# Patient Record
Sex: FEMALE | Race: ASIAN | NOT HISPANIC OR LATINO
[De-identification: names, ages, dates, MRNs, and addresses within clinical notes are randomized per-mention and may not be internally consistent; named-entity substitution may affect disease eponyms.]

---

## 2017-01-31 ENCOUNTER — APPOINTMENT (OUTPATIENT)
Dept: PULMONOLOGY | Facility: CLINIC | Age: 77
End: 2017-01-31

## 2017-09-05 ENCOUNTER — APPOINTMENT (OUTPATIENT)
Dept: OPHTHALMOLOGY | Facility: CLINIC | Age: 77
End: 2017-09-05

## 2017-12-19 ENCOUNTER — INPATIENT (INPATIENT)
Facility: HOSPITAL | Age: 77
LOS: 2 days | Discharge: HOME HEALTH | End: 2017-12-22
Attending: INTERNAL MEDICINE | Admitting: INTERNAL MEDICINE
Payer: MEDICARE

## 2017-12-19 VITALS
HEART RATE: 105 BPM | TEMPERATURE: 99 F | OXYGEN SATURATION: 100 % | SYSTOLIC BLOOD PRESSURE: 178 MMHG | DIASTOLIC BLOOD PRESSURE: 88 MMHG | RESPIRATION RATE: 16 BRPM

## 2017-12-19 DIAGNOSIS — I21.4 NON-ST ELEVATION (NSTEMI) MYOCARDIAL INFARCTION: ICD-10-CM

## 2017-12-19 LAB
ALBUMIN SERPL ELPH-MCNC: 3.6 G/DL — SIGNIFICANT CHANGE UP (ref 3.3–5)
ALP SERPL-CCNC: 158 U/L — HIGH (ref 40–120)
ALT FLD-CCNC: 30 U/L — SIGNIFICANT CHANGE UP (ref 4–33)
APTT BLD: 32.4 SEC — SIGNIFICANT CHANGE UP (ref 27.5–37.4)
AST SERPL-CCNC: 40 U/L — HIGH (ref 4–32)
BASE EXCESS BLDV CALC-SCNC: 3.6 MMOL/L — SIGNIFICANT CHANGE UP
BASOPHILS # BLD AUTO: 0.07 K/UL — SIGNIFICANT CHANGE UP (ref 0–0.2)
BASOPHILS NFR BLD AUTO: 0.5 % — SIGNIFICANT CHANGE UP (ref 0–2)
BILIRUB SERPL-MCNC: 0.2 MG/DL — SIGNIFICANT CHANGE UP (ref 0.2–1.2)
BLOOD GAS VENOUS - CREATININE: 0.99 MG/DL — SIGNIFICANT CHANGE UP (ref 0.5–1.3)
BUN SERPL-MCNC: 15 MG/DL — SIGNIFICANT CHANGE UP (ref 7–23)
CALCIUM SERPL-MCNC: 8.1 MG/DL — LOW (ref 8.4–10.5)
CHLORIDE BLDV-SCNC: 102 MMOL/L — SIGNIFICANT CHANGE UP (ref 96–108)
CHLORIDE SERPL-SCNC: 99 MMOL/L — SIGNIFICANT CHANGE UP (ref 98–107)
CK MB BLD-MCNC: 3.4 — HIGH (ref 0–2.5)
CK MB BLD-MCNC: 6.93 NG/ML — HIGH (ref 1–4.7)
CK SERPL-CCNC: 201 U/L — HIGH (ref 25–170)
CO2 SERPL-SCNC: 27 MMOL/L — SIGNIFICANT CHANGE UP (ref 22–31)
CREAT SERPL-MCNC: 1.15 MG/DL — SIGNIFICANT CHANGE UP (ref 0.5–1.3)
EOSINOPHIL # BLD AUTO: 0.09 K/UL — SIGNIFICANT CHANGE UP (ref 0–0.5)
EOSINOPHIL NFR BLD AUTO: 0.6 % — SIGNIFICANT CHANGE UP (ref 0–6)
GAS PNL BLDV: 135 MMOL/L — LOW (ref 136–146)
GLUCOSE BLDV-MCNC: 255 — HIGH (ref 70–99)
GLUCOSE SERPL-MCNC: 262 MG/DL — HIGH (ref 70–99)
HCO3 BLDV-SCNC: 26 MMOL/L — SIGNIFICANT CHANGE UP (ref 20–27)
HCT VFR BLD CALC: 29.5 % — LOW (ref 34.5–45)
HCT VFR BLDV CALC: 26.9 % — LOW (ref 34.5–45)
HGB BLD-MCNC: 8.5 G/DL — LOW (ref 11.5–15.5)
HGB BLDV-MCNC: 8.7 G/DL — LOW (ref 11.5–15.5)
IMM GRANULOCYTES # BLD AUTO: 0.09 # — SIGNIFICANT CHANGE UP
IMM GRANULOCYTES NFR BLD AUTO: 0.6 % — SIGNIFICANT CHANGE UP (ref 0–1.5)
INR BLD: 0.94 — SIGNIFICANT CHANGE UP (ref 0.88–1.17)
LACTATE BLDV-MCNC: 1 MMOL/L — SIGNIFICANT CHANGE UP (ref 0.5–2)
LYMPHOCYTES # BLD AUTO: 1.11 K/UL — SIGNIFICANT CHANGE UP (ref 1–3.3)
LYMPHOCYTES # BLD AUTO: 7.7 % — LOW (ref 13–44)
MCHC RBC-ENTMCNC: 22.5 PG — LOW (ref 27–34)
MCHC RBC-ENTMCNC: 28.8 % — LOW (ref 32–36)
MCV RBC AUTO: 78.2 FL — LOW (ref 80–100)
MONOCYTES # BLD AUTO: 0.79 K/UL — SIGNIFICANT CHANGE UP (ref 0–0.9)
MONOCYTES NFR BLD AUTO: 5.5 % — SIGNIFICANT CHANGE UP (ref 2–14)
NEUTROPHILS # BLD AUTO: 12.2 K/UL — HIGH (ref 1.8–7.4)
NEUTROPHILS NFR BLD AUTO: 85.1 % — HIGH (ref 43–77)
NRBC # FLD: 0 — SIGNIFICANT CHANGE UP
PCO2 BLDV: 60 MMHG — HIGH (ref 41–51)
PH BLDV: 7.31 PH — LOW (ref 7.32–7.43)
PLATELET # BLD AUTO: 387 K/UL — SIGNIFICANT CHANGE UP (ref 150–400)
PMV BLD: 10.9 FL — SIGNIFICANT CHANGE UP (ref 7–13)
PO2 BLDV: 33 MMHG — LOW (ref 35–40)
POTASSIUM BLDV-SCNC: 4.9 MMOL/L — HIGH (ref 3.4–4.5)
POTASSIUM SERPL-MCNC: 5.3 MMOL/L — SIGNIFICANT CHANGE UP (ref 3.5–5.3)
POTASSIUM SERPL-SCNC: 5.3 MMOL/L — SIGNIFICANT CHANGE UP (ref 3.5–5.3)
PROT SERPL-MCNC: 7.3 G/DL — SIGNIFICANT CHANGE UP (ref 6–8.3)
PROTHROM AB SERPL-ACNC: 10.8 SEC — SIGNIFICANT CHANGE UP (ref 9.8–13.1)
RBC # BLD: 3.77 M/UL — LOW (ref 3.8–5.2)
RBC # FLD: 16.5 % — HIGH (ref 10.3–14.5)
SAO2 % BLDV: 53 % — LOW (ref 60–85)
SODIUM SERPL-SCNC: 137 MMOL/L — SIGNIFICANT CHANGE UP (ref 135–145)
TROPONIN T SERPL-MCNC: 0.12 NG/ML — HIGH (ref 0–0.06)
WBC # BLD: 14.35 K/UL — HIGH (ref 3.8–10.5)
WBC # FLD AUTO: 14.35 K/UL — HIGH (ref 3.8–10.5)

## 2017-12-19 PROCEDURE — 71010: CPT | Mod: 26

## 2017-12-19 RX ORDER — IPRATROPIUM/ALBUTEROL SULFATE 18-103MCG
3 AEROSOL WITH ADAPTER (GRAM) INHALATION ONCE
Qty: 0 | Refills: 0 | Status: COMPLETED | OUTPATIENT
Start: 2017-12-19 | End: 2017-12-19

## 2017-12-19 RX ORDER — HEPARIN SODIUM 5000 [USP'U]/ML
INJECTION INTRAVENOUS; SUBCUTANEOUS
Qty: 25000 | Refills: 0 | Status: DISCONTINUED | OUTPATIENT
Start: 2017-12-19 | End: 2017-12-20

## 2017-12-19 RX ORDER — AZITHROMYCIN 500 MG/1
500 TABLET, FILM COATED ORAL ONCE
Qty: 0 | Refills: 0 | Status: COMPLETED | OUTPATIENT
Start: 2017-12-19 | End: 2017-12-19

## 2017-12-19 RX ORDER — HEPARIN SODIUM 5000 [USP'U]/ML
4000 INJECTION INTRAVENOUS; SUBCUTANEOUS EVERY 6 HOURS
Qty: 0 | Refills: 0 | Status: DISCONTINUED | OUTPATIENT
Start: 2017-12-19 | End: 2017-12-20

## 2017-12-19 RX ORDER — ASPIRIN/CALCIUM CARB/MAGNESIUM 324 MG
324 TABLET ORAL ONCE
Qty: 0 | Refills: 0 | Status: COMPLETED | OUTPATIENT
Start: 2017-12-19 | End: 2017-12-19

## 2017-12-19 RX ORDER — CEFTRIAXONE 500 MG/1
1 INJECTION, POWDER, FOR SOLUTION INTRAMUSCULAR; INTRAVENOUS ONCE
Qty: 0 | Refills: 0 | Status: COMPLETED | OUTPATIENT
Start: 2017-12-19 | End: 2017-12-19

## 2017-12-19 RX ORDER — HEPARIN SODIUM 5000 [USP'U]/ML
4000 INJECTION INTRAVENOUS; SUBCUTANEOUS ONCE
Qty: 0 | Refills: 0 | Status: COMPLETED | OUTPATIENT
Start: 2017-12-19 | End: 2017-12-19

## 2017-12-19 RX ADMIN — Medication 324 MILLIGRAM(S): at 23:32

## 2017-12-19 RX ADMIN — HEPARIN SODIUM 900 UNIT(S)/HR: 5000 INJECTION INTRAVENOUS; SUBCUTANEOUS at 23:30

## 2017-12-19 RX ADMIN — CEFTRIAXONE 100 GRAM(S): 500 INJECTION, POWDER, FOR SOLUTION INTRAMUSCULAR; INTRAVENOUS at 23:32

## 2017-12-19 RX ADMIN — Medication 3 MILLILITER(S): at 21:46

## 2017-12-19 RX ADMIN — HEPARIN SODIUM 4000 UNIT(S): 5000 INJECTION INTRAVENOUS; SUBCUTANEOUS at 23:30

## 2017-12-19 NOTE — ED PROVIDER NOTE - ATTENDING CONTRIBUTION TO CARE
77 year old pmh dm former smoker presents with 1 day of sob and cough.  symptoms have worsened despite duoneb. no fever.  intermittent CP.   + EKG changes. no prior available.  concern for pna versus copd exacerbation vs acs vs ptx.  will give duonebs, trops, get xray, likely admit

## 2017-12-19 NOTE — ED PROVIDER NOTE - NS ED MD DISPO ADMIT [facility] UNIT
Problem: Goal Outcome Summary  Goal: Goal Outcome Summary  Outcome: Improving  Pt a/ox1 with VSS and CMS with 2-3+ BLE edema, in ryan hose. Neuros with BL dementia otherwise intact. Tele SR. Up in room with 1, GB, and wk. L temple bx site CDI. R hip ecchymosis painful, tylenol/ultram given with relief. BG with no coverage needed. [place], pocket talker in use. [facility] with increased services 4/30.       TELE

## 2017-12-19 NOTE — ED PROVIDER NOTE - CARE PLAN
Principal Discharge DX:	NSTEMI (non-ST elevated myocardial infarction)  Secondary Diagnosis:	CAP (community acquired pneumonia)

## 2017-12-19 NOTE — ED ADULT NURSE NOTE - CHIEF COMPLAINT QUOTE
Pt brought in by [facility] c/o SOB since 5 pm. Pt also c/o mid sternal and behind the neck pain.  Denies cardiac history.  EKG in progress.

## 2017-12-19 NOTE — ED ADULT NURSE NOTE - OBJECTIVE STATEMENT
pt received spot 4, alert and orientedx3. c.o productive cough, shortness of breath, chest pain x2 days. denies fever dizziness nausea.  20G IV placed in left ac, labs sent. vitals noted. pt on 2L nasal cannula, tolerating well. respirations even unlabored. family at bedside. will monitor.

## 2017-12-19 NOTE — ED PROVIDER NOTE - OBJECTIVE STATEMENT
77F with hx of HTN, (?)COPD p/w sob, cough and green phlegm for the past 2 days. Patient states the sob has been worsening and came to ED for eval. Denies recent travel, fever, chills. 77F with hx of DM2, (?)COPD p/w sob, cough and green phlegm for the past 2 days. Patient states the sob has been worsening and came to ED for eval. Denies recent travel, fever, chills, dysuria. (+)LE edema, chronic as per patient.

## 2017-12-19 NOTE — ED ADULT TRIAGE NOTE - CHIEF COMPLAINT QUOTE
Pt brought in by [facility] c/o SOB since 5 pm. Pt also c/o mid sternal and behind the neck pain.  Denies cardiac history.  EKG in progress. Pt brought in by [facility] c/o SOB since 5 pm. Pt also c/o mid sternal and behind the neck pain.  Denies cardiac history.  EKG in progress.

## 2017-12-19 NOTE — ED PROVIDER NOTE - MEDICAL DECISION MAKING DETAILS
77F with cough, wheezing and sob. r/o PNA, COPD Exacerbation. check cbc, cmp, vbg, cardiac enzymes, probnp, exg, cxr. venancio,.

## 2017-12-20 DIAGNOSIS — I21.4 NON-ST ELEVATION (NSTEMI) MYOCARDIAL INFARCTION: ICD-10-CM

## 2017-12-20 DIAGNOSIS — Z29.9 ENCOUNTER FOR PROPHYLACTIC MEASURES, UNSPECIFIED: ICD-10-CM

## 2017-12-20 DIAGNOSIS — E78.5 HYPERLIPIDEMIA, UNSPECIFIED: ICD-10-CM

## 2017-12-20 DIAGNOSIS — E11.40 TYPE 2 DIABETES MELLITUS WITH DIABETIC NEUROPATHY, UNSPECIFIED: ICD-10-CM

## 2017-12-20 DIAGNOSIS — E11.65 TYPE 2 DIABETES MELLITUS WITH HYPERGLYCEMIA: ICD-10-CM

## 2017-12-20 LAB
APTT BLD: 40.8 SEC — HIGH (ref 27.5–37.4)
B PERT DNA SPEC QL NAA+PROBE: SIGNIFICANT CHANGE UP
BUN SERPL-MCNC: 16 MG/DL — SIGNIFICANT CHANGE UP (ref 7–23)
C PNEUM DNA SPEC QL NAA+PROBE: NOT DETECTED — SIGNIFICANT CHANGE UP
CALCIUM SERPL-MCNC: 8 MG/DL — LOW (ref 8.4–10.5)
CHLORIDE SERPL-SCNC: 99 MMOL/L — SIGNIFICANT CHANGE UP (ref 98–107)
CHOLEST SERPL-MCNC: 189 MG/DL — SIGNIFICANT CHANGE UP (ref 120–199)
CK MB BLD-MCNC: 17.34 NG/ML — HIGH (ref 1–4.7)
CK SERPL-CCNC: 297 U/L — HIGH (ref 25–170)
CO2 SERPL-SCNC: 26 MMOL/L — SIGNIFICANT CHANGE UP (ref 22–31)
CREAT SERPL-MCNC: 1.21 MG/DL — SIGNIFICANT CHANGE UP (ref 0.5–1.3)
FLUAV H1 2009 PAND RNA SPEC QL NAA+PROBE: NOT DETECTED — SIGNIFICANT CHANGE UP
FLUAV H1 RNA SPEC QL NAA+PROBE: NOT DETECTED — SIGNIFICANT CHANGE UP
FLUAV H3 RNA SPEC QL NAA+PROBE: NOT DETECTED — SIGNIFICANT CHANGE UP
FLUAV SUBTYP SPEC NAA+PROBE: SIGNIFICANT CHANGE UP
FLUBV RNA SPEC QL NAA+PROBE: NOT DETECTED — SIGNIFICANT CHANGE UP
GLUCOSE SERPL-MCNC: 243 MG/DL — HIGH (ref 70–99)
HADV DNA SPEC QL NAA+PROBE: NOT DETECTED — SIGNIFICANT CHANGE UP
HBA1C BLD-MCNC: 8.9 % — HIGH (ref 4–5.6)
HCOV 229E RNA SPEC QL NAA+PROBE: NOT DETECTED — SIGNIFICANT CHANGE UP
HCOV HKU1 RNA SPEC QL NAA+PROBE: NOT DETECTED — SIGNIFICANT CHANGE UP
HCOV NL63 RNA SPEC QL NAA+PROBE: NOT DETECTED — SIGNIFICANT CHANGE UP
HCOV OC43 RNA SPEC QL NAA+PROBE: NOT DETECTED — SIGNIFICANT CHANGE UP
HCT VFR BLD CALC: 27.6 % — LOW (ref 34.5–45)
HDLC SERPL-MCNC: 58 MG/DL — SIGNIFICANT CHANGE UP (ref 45–65)
HGB BLD-MCNC: 8.1 G/DL — LOW (ref 11.5–15.5)
HMPV RNA SPEC QL NAA+PROBE: NOT DETECTED — SIGNIFICANT CHANGE UP
HPIV1 RNA SPEC QL NAA+PROBE: NOT DETECTED — SIGNIFICANT CHANGE UP
HPIV2 RNA SPEC QL NAA+PROBE: NOT DETECTED — SIGNIFICANT CHANGE UP
HPIV3 RNA SPEC QL NAA+PROBE: NOT DETECTED — SIGNIFICANT CHANGE UP
HPIV4 RNA SPEC QL NAA+PROBE: NOT DETECTED — SIGNIFICANT CHANGE UP
LIPID PNL WITH DIRECT LDL SERPL: 119 MG/DL — SIGNIFICANT CHANGE UP
M PNEUMO DNA SPEC QL NAA+PROBE: NOT DETECTED — SIGNIFICANT CHANGE UP
MAGNESIUM SERPL-MCNC: 2.3 MG/DL — SIGNIFICANT CHANGE UP (ref 1.6–2.6)
MCHC RBC-ENTMCNC: 23.5 PG — LOW (ref 27–34)
MCHC RBC-ENTMCNC: 29.3 % — LOW (ref 32–36)
MCV RBC AUTO: 80 FL — SIGNIFICANT CHANGE UP (ref 80–100)
NRBC # FLD: 0 — SIGNIFICANT CHANGE UP
NT-PROBNP SERPL-SCNC: 2757 PG/ML — SIGNIFICANT CHANGE UP
PLATELET # BLD AUTO: 349 K/UL — SIGNIFICANT CHANGE UP (ref 150–400)
PMV BLD: 11.7 FL — SIGNIFICANT CHANGE UP (ref 7–13)
POTASSIUM SERPL-MCNC: 4.8 MMOL/L — SIGNIFICANT CHANGE UP (ref 3.5–5.3)
POTASSIUM SERPL-SCNC: 4.8 MMOL/L — SIGNIFICANT CHANGE UP (ref 3.5–5.3)
RBC # BLD: 3.45 M/UL — LOW (ref 3.8–5.2)
RBC # FLD: 16.4 % — HIGH (ref 10.3–14.5)
RSV RNA SPEC QL NAA+PROBE: NOT DETECTED — SIGNIFICANT CHANGE UP
RV+EV RNA SPEC QL NAA+PROBE: NOT DETECTED — SIGNIFICANT CHANGE UP
SODIUM SERPL-SCNC: 137 MMOL/L — SIGNIFICANT CHANGE UP (ref 135–145)
TRIGL SERPL-MCNC: 118 MG/DL — SIGNIFICANT CHANGE UP (ref 10–149)
TROPONIN T SERPL-MCNC: 0.39 NG/ML — HIGH (ref 0–0.06)
TSH SERPL-MCNC: 0.59 UIU/ML — SIGNIFICANT CHANGE UP (ref 0.27–4.2)
WBC # BLD: 12.82 K/UL — HIGH (ref 3.8–10.5)
WBC # FLD AUTO: 12.82 K/UL — HIGH (ref 3.8–10.5)

## 2017-12-20 PROCEDURE — 93010 ELECTROCARDIOGRAM REPORT: CPT

## 2017-12-20 PROCEDURE — 93458 L HRT ARTERY/VENTRICLE ANGIO: CPT | Mod: 26,59,GC

## 2017-12-20 PROCEDURE — 92928 PRQ TCAT PLMT NTRAC ST 1 LES: CPT | Mod: LC,GC

## 2017-12-20 PROCEDURE — 93571 IV DOP VEL&/PRESS C FLO 1ST: CPT | Mod: 26,LC,GC

## 2017-12-20 RX ORDER — DEXTROSE 50 % IN WATER 50 %
12.5 SYRINGE (ML) INTRAVENOUS ONCE
Qty: 0 | Refills: 0 | Status: DISCONTINUED | OUTPATIENT
Start: 2017-12-20 | End: 2017-12-22

## 2017-12-20 RX ORDER — CLOPIDOGREL BISULFATE 75 MG/1
75 TABLET, FILM COATED ORAL DAILY
Qty: 0 | Refills: 0 | Status: DISCONTINUED | OUTPATIENT
Start: 2017-12-21 | End: 2017-12-22

## 2017-12-20 RX ORDER — SIMVASTATIN 20 MG/1
10 TABLET, FILM COATED ORAL AT BEDTIME
Qty: 0 | Refills: 0 | Status: DISCONTINUED | OUTPATIENT
Start: 2017-12-20 | End: 2017-12-21

## 2017-12-20 RX ORDER — LEVALBUTEROL 1.25 MG/.5ML
0.63 SOLUTION, CONCENTRATE RESPIRATORY (INHALATION) EVERY 6 HOURS
Qty: 0 | Refills: 0 | Status: DISCONTINUED | OUTPATIENT
Start: 2017-12-20 | End: 2017-12-22

## 2017-12-20 RX ORDER — SODIUM CHLORIDE 9 MG/ML
1000 INJECTION, SOLUTION INTRAVENOUS
Qty: 0 | Refills: 0 | Status: DISCONTINUED | OUTPATIENT
Start: 2017-12-20 | End: 2017-12-22

## 2017-12-20 RX ORDER — DEXTROSE 50 % IN WATER 50 %
25 SYRINGE (ML) INTRAVENOUS ONCE
Qty: 0 | Refills: 0 | Status: DISCONTINUED | OUTPATIENT
Start: 2017-12-20 | End: 2017-12-22

## 2017-12-20 RX ORDER — ASPIRIN/CALCIUM CARB/MAGNESIUM 324 MG
81 TABLET ORAL DAILY
Qty: 0 | Refills: 0 | Status: DISCONTINUED | OUTPATIENT
Start: 2017-12-20 | End: 2017-12-21

## 2017-12-20 RX ORDER — INSULIN LISPRO 100/ML
VIAL (ML) SUBCUTANEOUS AT BEDTIME
Qty: 0 | Refills: 0 | Status: DISCONTINUED | OUTPATIENT
Start: 2017-12-20 | End: 2017-12-22

## 2017-12-20 RX ORDER — INSULIN LISPRO 100/ML
VIAL (ML) SUBCUTANEOUS
Qty: 0 | Refills: 0 | Status: DISCONTINUED | OUTPATIENT
Start: 2017-12-20 | End: 2017-12-22

## 2017-12-20 RX ORDER — METOPROLOL TARTRATE 50 MG
12.5 TABLET ORAL
Qty: 0 | Refills: 0 | Status: DISCONTINUED | OUTPATIENT
Start: 2017-12-20 | End: 2017-12-22

## 2017-12-20 RX ORDER — GLUCAGON INJECTION, SOLUTION 0.5 MG/.1ML
1 INJECTION, SOLUTION SUBCUTANEOUS ONCE
Qty: 0 | Refills: 0 | Status: DISCONTINUED | OUTPATIENT
Start: 2017-12-20 | End: 2017-12-22

## 2017-12-20 RX ORDER — FUROSEMIDE 40 MG
40 TABLET ORAL ONCE
Qty: 0 | Refills: 0 | Status: COMPLETED | OUTPATIENT
Start: 2017-12-20 | End: 2017-12-20

## 2017-12-20 RX ORDER — INSULIN GLARGINE 100 [IU]/ML
68 INJECTION, SOLUTION SUBCUTANEOUS AT BEDTIME
Qty: 0 | Refills: 0 | Status: DISCONTINUED | OUTPATIENT
Start: 2017-12-20 | End: 2017-12-22

## 2017-12-20 RX ORDER — DEXTROSE 50 % IN WATER 50 %
1 SYRINGE (ML) INTRAVENOUS ONCE
Qty: 0 | Refills: 0 | Status: DISCONTINUED | OUTPATIENT
Start: 2017-12-20 | End: 2017-12-22

## 2017-12-20 RX ORDER — GABAPENTIN 400 MG/1
300 CAPSULE ORAL
Qty: 0 | Refills: 0 | Status: DISCONTINUED | OUTPATIENT
Start: 2017-12-20 | End: 2017-12-22

## 2017-12-20 RX ADMIN — Medication 12.5 MILLIGRAM(S): at 18:15

## 2017-12-20 RX ADMIN — Medication 40 MILLIGRAM(S): at 08:49

## 2017-12-20 RX ADMIN — GABAPENTIN 300 MILLIGRAM(S): 400 CAPSULE ORAL at 20:35

## 2017-12-20 RX ADMIN — Medication: at 09:16

## 2017-12-20 RX ADMIN — AZITHROMYCIN 250 MILLIGRAM(S): 500 TABLET, FILM COATED ORAL at 00:32

## 2017-12-20 RX ADMIN — HEPARIN SODIUM 1050 UNIT(S)/HR: 5000 INJECTION INTRAVENOUS; SUBCUTANEOUS at 09:16

## 2017-12-20 RX ADMIN — Medication 12.5 MILLIGRAM(S): at 05:51

## 2017-12-20 RX ADMIN — GABAPENTIN 300 MILLIGRAM(S): 400 CAPSULE ORAL at 05:51

## 2017-12-20 RX ADMIN — INSULIN GLARGINE 68 UNIT(S): 100 INJECTION, SOLUTION SUBCUTANEOUS at 22:06

## 2017-12-20 RX ADMIN — Medication: at 16:18

## 2017-12-20 RX ADMIN — SIMVASTATIN 10 MILLIGRAM(S): 20 TABLET, FILM COATED ORAL at 21:40

## 2017-12-20 RX ADMIN — Medication 81 MILLIGRAM(S): at 13:19

## 2017-12-20 NOTE — H&P ADULT - NEGATIVE MUSCULOSKELETAL SYMPTOMS
no joint swelling/no arthritis/no muscle weakness/no stiffness/no arthralgia/no myalgia/no muscle cramps/no neck pain

## 2017-12-20 NOTE — H&P ADULT - NEGATIVE ENMT SYMPTOMS
no ear pain/no vertigo/no tinnitus/no nasal discharge/no nasal congestion/no hearing difficulty/no sinus symptoms

## 2017-12-20 NOTE — H&P ADULT - HISTORY OF PRESENT ILLNESS
76 y/o F with PMH of DM, HLD, ?COPD(not on home O2) presented with the complaint of SOB, cough and midsternal chest pain. As per the patient she developed SOB about 2 days ago. Patient stated that any minimal exertion she would feel winded and SOB. Patient stated that today in the morning she was doing fine, and at 3:00pm she developed severe cough with yellow/green sputum production. The cough fits continued and the patient stated to have SOB both at rest and with exertion and this concerned her and her family and she was brought to the ER. Patient stated that today at 7:00pm she developed sudden onset midsternal chest pain. The chest pain was constant till she came to the ER, characterized as a sharp pain, unsure what is aggravating the pain, relief by the time she was in the ED, without any radiation of the chest pain, with a severity of 7/10. Patient stated that she has never had chest pain like this before. Patient stated that 2 days ago she tried the nebulizer treatment for her SOB but got no relief from it. Patient also endorsed abdominal distention for the past month, with associated 2 pillow orthopnea and intermittent LE swelling. Patient denied any PND. Patient denied any fevers, chills, N/V/D/C, abdominal pain, melena, hematochezia, recent travel, dysuria, sick contact, pleuritic or positional chest pain.     On ED admission EKG revealed Sinus tachycardia at a rate of 114 with TWI in lead V5-V6, 2mm STD in lead V5-V6 and 1mm STD in lead II, AVF with Qtc of 421, CE x 1: Trop: 0.12, CK: 201, CKMB: 6.93, CKMBRI: 3.4, WBC: 14.35, H&H: 8.5/29.5, Gluc: 262, Ca: 8.1, Alk Phos: 158, AST: 40. Patient was given 1x dose of Duonebs in the ED and was started on heparin drip. When examined patient is resting in the stretcher and denied any current chest pain or SOB. 78 y/o F with PMH of DM, HLD, ?COPD(not on home O2) presented with the complaint of SOB, cough and midsternal chest pain. As per the patient she developed SOB about 2 days ago. Patient stated that any minimal exertion she would feel winded and SOB. Patient stated that today in the morning she was doing fine, and at 3:00pm she developed severe cough with yellow/green sputum production. The cough fits continued and the patient stated to have SOB both at rest and with exertion and this concerned her and her family and she was brought to the ER. Patient stated that today at 7:00pm she developed sudden onset midsternal chest pain. The chest pain was constant till she came to the ER, characterized as a sharp pain, unsure what is aggravating the pain, relief by the time she was in the ED, without any radiation of the chest pain, with a severity of 7/10. Patient stated that she has never had chest pain like this before. Patient stated that 2 days ago she tried the nebulizer treatment for her SOB but got no relief from it. Patient also endorsed abdominal distention for the past month, with associated 2 pillow orthopnea and intermittent LE swelling. Patient denied any PND. Patient denied any fevers, chills, N/V/D/C, abdominal pain, melena, hematochezia, recent travel, dysuria, sick contact, pleuritic or positional chest pain.     On ED admission EKG revealed Sinus tachycardia at a rate of 114 with TWI in lead V5-V6, 2mm STD in lead V5-V6 and 1mm STD in lead II, AVF with Qtc of 421, CE x 1: Trop: 0.12, CK: 201, CKMB: 6.93, CKMBRI: 3.4, WBC: 14.35, H&H: 8.5/29.5, Gluc: 262, Ca: 8.1, Alk Phos: 158, AST: 40. Patient was given 1x dose of Duonebs in the ED, 1x dose of Aspirin 324mg and was started on heparin drip for anticoagulation. When examined patient is resting in the stretcher and denied any current chest pain or SOB.

## 2017-12-20 NOTE — H&P ADULT - NSHPSOCIALHISTORY_GEN_ALL_CORE
Single, lives with grand-daughter, retired   Quit smoking 10 years ago and smoked 1 pack a day, drinks alcohol occasionally

## 2017-12-20 NOTE — H&P ADULT - PROBLEM SELECTOR PLAN 2
Patient s/p IV Ceftriaxone and Azithromycin in the ED. Meets SIRS criteria(heart rate and elevated WBC count)   Patient denied any fevers and vital signs just remarkable for tachycardia  Will hold off on antibiotics for now unless patient spikes a fever and if blood cultures come back positive   Blood cultures ordered   RVP ordered   NC at 2L, pulse oxygen

## 2017-12-20 NOTE — H&P ADULT - NEGATIVE GASTROINTESTINAL SYMPTOMS
no hematochezia/no constipation/no diarrhea/no change in bowel habits/no abdominal pain/no nausea/no vomiting/no melena

## 2017-12-20 NOTE — H&P ADULT - NEGATIVE NEUROLOGICAL SYMPTOMS
no vertigo/no confusion/no weakness/no generalized seizures/no loss of sensation/no transient paralysis/no difficulty walking/no loss of consciousness/no paresthesias/no syncope/no focal seizures/no headache/no tremors/no hemiparesis

## 2017-12-20 NOTE — H&P ADULT - ASSESSMENT
76 y/o F with PMH of DM, HLD, ?COPD(not on home O2) presented with the complaint of SOB, cough and midsternal chest pain.     +NSTEMI-On Heparin gtt  +Possible CAP-S/p IV Ceftriaxone and Azithromycin

## 2017-12-20 NOTE — H&P ADULT - PMH
Diabetic neuropathy    HLD (hyperlipidemia)    Type 2 diabetes mellitus with hyperglycemia, with long-term current use of insulin

## 2017-12-20 NOTE — CHART NOTE - NSCHARTNOTEFT_GEN_A_CORE
Patient is s/p cardiac cath via right radial access. Patient without any complaints. Site is stable with no hematoma or active bleed noted. No swelling, dressing is clean/intact/dry. Right Radial pulse palpable,  will monitor closely.

## 2017-12-20 NOTE — H&P ADULT - NSHPLABSRESULTS_GEN_ALL_CORE
8.5    14.35 )-----------( 387      ( 19 Dec 2017 21:20 )             29.5     12-19    137  |  99  |  15  ----------------------------<  262<H>  5.3   |  27  |  1.15    Ca    8.1<L>      19 Dec 2017 21:20    TPro  7.3  /  Alb  3.6  /  TBili  0.2  /  DBili  x   /  AST  40<H>  /  ALT  30  /  AlkPhos  158<H>  12-19    CARDIAC MARKERS ( 19 Dec 2017 21:20 )  x     / 0.12 ng/mL / 201 u/L / 6.93 ng/mL / x        EKG: Sinus tachycardia at a rate of 114 with TWI in lead V5-V6, 2mm STD in lead V5-V6 and 1mm STD in lead II, AVF with Qtc of 421

## 2017-12-20 NOTE — H&P ADULT - ATTENDING COMMENTS
77 year old woman with uncontrolled IDDM presents with respiratory distress, ADHF, and NSTEMI    #NSTEMI-  Patient currently with no chest pain, but rising troponins.  EKG reveals diffuse ST depressions with AVR elevation concerning for multivessel disease.  Continue heparin gtt and ASA.  Hold off on Plavix load should patient have surgical coronary disease.  Plan for angiogram today.    #Acute decompensated CHF (unknown EF)-  Patient with crackles on exam and pulmonary vascular congestion on chest x-ray.  Lasix 40mg IV x1 now. Patient is able to lay flat for angiogram. She is on room air.   Check TTE.

## 2017-12-20 NOTE — H&P ADULT - PROBLEM SELECTOR PLAN 1
Will monitor on telemetry, serial EKG and CE prn for any more episodes of chest pain  HgbA1C, TSH, lipid profile, CBC, CMP in am  Patient was started on heparin drip for anticoagulation   Continue with Zocor 10mg QD  Started patient on Aspirin 81mg and Metoprolol 12.5mg BID  Will make patient NPO for possible LHC in am 12/20  TTE ordered

## 2017-12-20 NOTE — H&P ADULT - RS GEN PE MLT RESP DETAILS PC
rales/no chest wall tenderness/no intercostal retractions/normal/no rhonchi/airway patent/wheezes/respirations non-labored

## 2017-12-21 ENCOUNTER — TRANSCRIPTION ENCOUNTER (OUTPATIENT)
Age: 77
End: 2017-12-21

## 2017-12-21 LAB
APTT BLD: 40.3 SEC — HIGH (ref 27.5–37.4)
APTT BLD: 78.6 SEC — HIGH (ref 27.5–37.4)
BUN SERPL-MCNC: 23 MG/DL — SIGNIFICANT CHANGE UP (ref 7–23)
CALCIUM SERPL-MCNC: 8.3 MG/DL — LOW (ref 8.4–10.5)
CHLORIDE SERPL-SCNC: 96 MMOL/L — LOW (ref 98–107)
CO2 SERPL-SCNC: 27 MMOL/L — SIGNIFICANT CHANGE UP (ref 22–31)
CREAT SERPL-MCNC: 1.33 MG/DL — HIGH (ref 0.5–1.3)
GLUCOSE SERPL-MCNC: 154 MG/DL — HIGH (ref 70–99)
HCT VFR BLD CALC: 30.3 % — LOW (ref 34.5–45)
HCT VFR BLD CALC: 30.6 % — LOW (ref 34.5–45)
HGB BLD-MCNC: 8.6 G/DL — LOW (ref 11.5–15.5)
HGB BLD-MCNC: 8.7 G/DL — LOW (ref 11.5–15.5)
MCHC RBC-ENTMCNC: 22 PG — LOW (ref 27–34)
MCHC RBC-ENTMCNC: 22.2 PG — LOW (ref 27–34)
MCHC RBC-ENTMCNC: 28.4 % — LOW (ref 32–36)
MCHC RBC-ENTMCNC: 28.4 % — LOW (ref 32–36)
MCV RBC AUTO: 77.5 FL — LOW (ref 80–100)
MCV RBC AUTO: 78.1 FL — LOW (ref 80–100)
NRBC # FLD: 0 — SIGNIFICANT CHANGE UP
NRBC # FLD: 0 — SIGNIFICANT CHANGE UP
PLATELET # BLD AUTO: 400 K/UL — SIGNIFICANT CHANGE UP (ref 150–400)
PLATELET # BLD AUTO: 427 K/UL — HIGH (ref 150–400)
PMV BLD: 11.3 FL — SIGNIFICANT CHANGE UP (ref 7–13)
PMV BLD: 11.8 FL — SIGNIFICANT CHANGE UP (ref 7–13)
POTASSIUM SERPL-MCNC: 4.8 MMOL/L — SIGNIFICANT CHANGE UP (ref 3.5–5.3)
POTASSIUM SERPL-SCNC: 4.8 MMOL/L — SIGNIFICANT CHANGE UP (ref 3.5–5.3)
RBC # BLD: 3.88 M/UL — SIGNIFICANT CHANGE UP (ref 3.8–5.2)
RBC # BLD: 3.95 M/UL — SIGNIFICANT CHANGE UP (ref 3.8–5.2)
RBC # FLD: 16.6 % — HIGH (ref 10.3–14.5)
RBC # FLD: 16.8 % — HIGH (ref 10.3–14.5)
SODIUM SERPL-SCNC: 135 MMOL/L — SIGNIFICANT CHANGE UP (ref 135–145)
SPECIMEN SOURCE: SIGNIFICANT CHANGE UP
SPECIMEN SOURCE: SIGNIFICANT CHANGE UP
WBC # BLD: 8.2 K/UL — SIGNIFICANT CHANGE UP (ref 3.8–10.5)
WBC # BLD: 9.18 K/UL — SIGNIFICANT CHANGE UP (ref 3.8–10.5)
WBC # FLD AUTO: 8.2 K/UL — SIGNIFICANT CHANGE UP (ref 3.8–10.5)
WBC # FLD AUTO: 9.18 K/UL — SIGNIFICANT CHANGE UP (ref 3.8–10.5)

## 2017-12-21 PROCEDURE — 93306 TTE W/DOPPLER COMPLETE: CPT | Mod: 26

## 2017-12-21 PROCEDURE — 93010 ELECTROCARDIOGRAM REPORT: CPT

## 2017-12-21 RX ORDER — ATORVASTATIN CALCIUM 80 MG/1
40 TABLET, FILM COATED ORAL AT BEDTIME
Qty: 0 | Refills: 0 | Status: DISCONTINUED | OUTPATIENT
Start: 2017-12-21 | End: 2017-12-22

## 2017-12-21 RX ORDER — HEPARIN SODIUM 5000 [USP'U]/ML
3000 INJECTION INTRAVENOUS; SUBCUTANEOUS EVERY 6 HOURS
Qty: 0 | Refills: 0 | Status: DISCONTINUED | OUTPATIENT
Start: 2017-12-21 | End: 2017-12-21

## 2017-12-21 RX ORDER — HEPARIN SODIUM 5000 [USP'U]/ML
INJECTION INTRAVENOUS; SUBCUTANEOUS
Qty: 25000 | Refills: 0 | Status: DISCONTINUED | OUTPATIENT
Start: 2017-12-21 | End: 2017-12-21

## 2017-12-21 RX ORDER — INSULIN GLARGINE 100 [IU]/ML
30 INJECTION, SOLUTION SUBCUTANEOUS ONCE
Qty: 0 | Refills: 0 | Status: COMPLETED | OUTPATIENT
Start: 2017-12-21 | End: 2017-12-21

## 2017-12-21 RX ORDER — HEPARIN SODIUM 5000 [USP'U]/ML
6500 INJECTION INTRAVENOUS; SUBCUTANEOUS EVERY 6 HOURS
Qty: 0 | Refills: 0 | Status: DISCONTINUED | OUTPATIENT
Start: 2017-12-21 | End: 2017-12-21

## 2017-12-21 RX ORDER — FUROSEMIDE 40 MG
20 TABLET ORAL
Qty: 0 | Refills: 0 | Status: DISCONTINUED | OUTPATIENT
Start: 2017-12-21 | End: 2017-12-22

## 2017-12-21 RX ORDER — RIVAROXABAN 15 MG-20MG
15 KIT ORAL EVERY 24 HOURS
Qty: 0 | Refills: 0 | Status: DISCONTINUED | OUTPATIENT
Start: 2017-12-21 | End: 2017-12-22

## 2017-12-21 RX ORDER — ACETAMINOPHEN 500 MG
650 TABLET ORAL EVERY 6 HOURS
Qty: 0 | Refills: 0 | Status: DISCONTINUED | OUTPATIENT
Start: 2017-12-21 | End: 2017-12-22

## 2017-12-21 RX ADMIN — Medication 12.5 MILLIGRAM(S): at 05:34

## 2017-12-21 RX ADMIN — Medication 81 MILLIGRAM(S): at 11:23

## 2017-12-21 RX ADMIN — HEPARIN SODIUM 1500 UNIT(S)/HR: 5000 INJECTION INTRAVENOUS; SUBCUTANEOUS at 10:46

## 2017-12-21 RX ADMIN — GABAPENTIN 300 MILLIGRAM(S): 400 CAPSULE ORAL at 05:34

## 2017-12-21 RX ADMIN — Medication 1: at 13:57

## 2017-12-21 RX ADMIN — GABAPENTIN 300 MILLIGRAM(S): 400 CAPSULE ORAL at 17:19

## 2017-12-21 RX ADMIN — RIVAROXABAN 15 MILLIGRAM(S): KIT at 20:03

## 2017-12-21 RX ADMIN — HEPARIN SODIUM 1500 UNIT(S)/HR: 5000 INJECTION INTRAVENOUS; SUBCUTANEOUS at 02:55

## 2017-12-21 RX ADMIN — ATORVASTATIN CALCIUM 40 MILLIGRAM(S): 80 TABLET, FILM COATED ORAL at 21:43

## 2017-12-21 RX ADMIN — Medication 20 MILLIGRAM(S): at 17:18

## 2017-12-21 RX ADMIN — Medication 12.5 MILLIGRAM(S): at 17:19

## 2017-12-21 RX ADMIN — Medication: at 09:18

## 2017-12-21 RX ADMIN — CLOPIDOGREL BISULFATE 75 MILLIGRAM(S): 75 TABLET, FILM COATED ORAL at 11:23

## 2017-12-21 RX ADMIN — INSULIN GLARGINE 30 UNIT(S): 100 INJECTION, SOLUTION SUBCUTANEOUS at 23:46

## 2017-12-21 RX ADMIN — Medication 650 MILLIGRAM(S): at 23:45

## 2017-12-21 NOTE — PHYSICAL THERAPY INITIAL EVALUATION ADULT - PERTINENT HX OF CURRENT PROBLEM, REHAB EVAL
76 y/o F with PMH of DM, HLD, ?COPD(not on home O2) presented with the complaint of SOB, cough and midsternal chest pain

## 2017-12-21 NOTE — PROVIDER CONTACT NOTE (OTHER) - SITUATION
At 0121, Scope on monitor shows aflutter with RVR with HR in the 130-140's/min then converted to afib with RVR with HR in the 106-110/min.

## 2017-12-21 NOTE — PROVIDER CONTACT NOTE (OTHER) - ASSESSMENT
Pt. asymptomatic. Vital signs taken as follows: temp=98.5F HQ=753/min RR=18/min BP=160/94 O2 sat pulse ox=99% on O2 2L/min nasal cannula.

## 2017-12-21 NOTE — PROGRESS NOTE ADULT - SUBJECTIVE AND OBJECTIVE BOX
Cardiovascular Disease Progress Note    Overnight events: No acute events overnight. Ms. Tsang feels well. No chest pain or SOB.   Otherwise review of systems negative    Objective Findings:  T(C): 36.3 (12-21-17 @ 05:21), Max: 37.1 (12-20-17 @ 20:12)  HR: 96 (12-21-17 @ 05:21) (78 - 106)  BP: 145/93 (12-21-17 @ 05:21) (140/54 - 161/70)  RR: 18 (12-21-17 @ 05:21) (18 - 23)  SpO2: 100% (12-21-17 @ 05:21) (97% - 100%)  Wt(kg): --  Daily Height in cm: 152.4 (20 Dec 2017 19:40)    Daily       Physical Exam:  Gen: NAD  HEENT: EOMI  CV: IIR, normal S1 + S2, no m/r/g  Lungs: CTAB  Abd: soft, non-tender  Ext: No edema    Telemetry: A-fib 90-100s; no ectopy    Laboratory Data:                        8.7    8.20  )-----------( 400      ( 21 Dec 2017 09:30 )             30.6     12-21    135  |  96<L>  |  23  ----------------------------<  154<H>  4.8   |  27  |  1.33<H>    Ca    8.3<L>      21 Dec 2017 06:40  Mg     2.3     12-20    TPro  7.3  /  Alb  3.6  /  TBili  0.2  /  DBili  x   /  AST  40<H>  /  ALT  30  /  AlkPhos  158<H>  12-19    PT/INR - ( 19 Dec 2017 21:20 )   PT: 10.8 SEC;   INR: 0.94          PTT - ( 21 Dec 2017 09:30 )  PTT:78.6 SEC  CARDIAC MARKERS ( 20 Dec 2017 03:30 )  x     / 0.39 ng/mL / 297 u/L / 17.34 ng/mL / x      CARDIAC MARKERS ( 19 Dec 2017 21:20 )  x     / 0.12 ng/mL / 201 u/L / 6.93 ng/mL / x              Inpatient Medications:  MEDICATIONS  (STANDING):  aspirin enteric coated 81 milliGRAM(s) Oral daily  clopidogrel Tablet 75 milliGRAM(s) Oral daily  dextrose 5%. 1000 milliLiter(s) (50 mL/Hr) IV Continuous <Continuous>  dextrose 50% Injectable 12.5 Gram(s) IV Push once  dextrose 50% Injectable 25 Gram(s) IV Push once  dextrose 50% Injectable 25 Gram(s) IV Push once  gabapentin 300 milliGRAM(s) Oral two times a day  heparin  Infusion.  Unit(s)/Hr (15 mL/Hr) IV Continuous <Continuous>  insulin glargine Injectable (LANTUS) 68 Unit(s) SubCutaneous at bedtime  insulin lispro (HumaLOG) corrective regimen sliding scale   SubCutaneous three times a day before meals  insulin lispro (HumaLOG) corrective regimen sliding scale   SubCutaneous at bedtime  metoprolol     tartrate 12.5 milliGRAM(s) Oral two times a day  simvastatin 10 milliGRAM(s) Oral at bedtime      Assessment: 77 year old woman with uncontrolled IDDM presents with respiratory distress, ADHF, and NSTEMI    #NSTEMI-  Patient s/p MICHAEL to LCx and OM1  Continue ASA, Plavix, and beta-blocker.   Change Zocor to Lipitor 40mg    #Acute decompensated CHF (unknown EF)-  Patient with crackles on exam and pulmonary vascular congestion on chest x-ray.  Lasix 40mg IV x1 now. Patient is able to lay flat for angiogram. She is on room air.   Check TTE.      #A-fib- newly diagnosed on this admission.  Currently rate controlled on Lopressor.  Check TTE to rule out valvular a-fib.   If patient does not have valvular a-fib, will plan for Xarelto 15mg daily and Plavix 75mg daily to prevent triple therapy, as per PIONEER-AF trial.       Over 35 minutes spent on total encounter; more than 50% of the visit was spent counseling and/or coordinating care by the attending physician.      Julio César Hooper M.D.   Cardiovascular Disease  [phone] Cardiovascular Disease Progress Note    Overnight events: No acute events overnight. Ms. Tsang feels well. No chest pain or SOB.   Otherwise review of systems negative    Objective Findings:  T(C): 36.3 (12-21-17 @ 05:21), Max: 37.1 (12-20-17 @ 20:12)  HR: 96 (12-21-17 @ 05:21) (78 - 106)  BP: 145/93 (12-21-17 @ 05:21) (140/54 - 161/70)  RR: 18 (12-21-17 @ 05:21) (18 - 23)  SpO2: 100% (12-21-17 @ 05:21) (97% - 100%)  Wt(kg): --  Daily Height in cm: 152.4 (20 Dec 2017 19:40)    Daily       Physical Exam:  Gen: NAD  HEENT: EOMI  CV: IIR, normal S1 + S2, no m/r/g  Lungs: CTAB  Abd: soft, non-tender  Ext: No edema    Telemetry: A-fib 90-100s; no ectopy    Laboratory Data:                        8.7    8.20  )-----------( 400      ( 21 Dec 2017 09:30 )             30.6     12-21    135  |  96<L>  |  23  ----------------------------<  154<H>  4.8   |  27  |  1.33<H>    Ca    8.3<L>      21 Dec 2017 06:40  Mg     2.3     12-20    TPro  7.3  /  Alb  3.6  /  TBili  0.2  /  DBili  x   /  AST  40<H>  /  ALT  30  /  AlkPhos  158<H>  12-19    PT/INR - ( 19 Dec 2017 21:20 )   PT: 10.8 SEC;   INR: 0.94          PTT - ( 21 Dec 2017 09:30 )  PTT:78.6 SEC  CARDIAC MARKERS ( 20 Dec 2017 03:30 )  x     / 0.39 ng/mL / 297 u/L / 17.34 ng/mL / x      CARDIAC MARKERS ( 19 Dec 2017 21:20 )  x     / 0.12 ng/mL / 201 u/L / 6.93 ng/mL / x              Inpatient Medications:  MEDICATIONS  (STANDING):  aspirin enteric coated 81 milliGRAM(s) Oral daily  clopidogrel Tablet 75 milliGRAM(s) Oral daily  dextrose 5%. 1000 milliLiter(s) (50 mL/Hr) IV Continuous <Continuous>  dextrose 50% Injectable 12.5 Gram(s) IV Push once  dextrose 50% Injectable 25 Gram(s) IV Push once  dextrose 50% Injectable 25 Gram(s) IV Push once  gabapentin 300 milliGRAM(s) Oral two times a day  heparin  Infusion.  Unit(s)/Hr (15 mL/Hr) IV Continuous <Continuous>  insulin glargine Injectable (LANTUS) 68 Unit(s) SubCutaneous at bedtime  insulin lispro (HumaLOG) corrective regimen sliding scale   SubCutaneous three times a day before meals  insulin lispro (HumaLOG) corrective regimen sliding scale   SubCutaneous at bedtime  metoprolol     tartrate 12.5 milliGRAM(s) Oral two times a day  simvastatin 10 milliGRAM(s) Oral at bedtime      Assessment: 77 year old woman with uncontrolled IDDM presents with respiratory distress, ADHF, and NSTEMI    #NSTEMI-  Patient s/p MICHAEL to LCx and OM1  Continue ASA, Plavix, and beta-blocker.   Change Zocor to Lipitor 40mg    #Acute decompensated CHF (unknown EF)-  Lasix 20mg IV BID for LVEDP of 30mmHg on cath.  Check TTE.      #A-fib- newly diagnosed on this admission.  Currently rate controlled on Lopressor.  Check TTE to rule out valvular a-fib.   If patient does not have valvular a-fib, will plan for Xarelto 15mg daily and Plavix 75mg daily to prevent triple therapy, as per PIONEER-AF trial.       Over 35 minutes spent on total encounter; more than 50% of the visit was spent counseling and/or coordinating care by the attending physician.      Julio César Hooper M.D.   Cardiovascular Disease  [phone]

## 2017-12-21 NOTE — PHYSICAL THERAPY INITIAL EVALUATION ADULT - ADDITIONAL COMMENTS
Pt reports that she lives in a private house with grandchildren with ~3 EDWARD; bedroom/bathroom on first floor. Prior to hospital admission pt was completely independent and used no assistive device with ambulation. Pt does have single axis cane if she needs and denies any recent falls.      Pt left comfortable seated @EOB, NAD, all lines intact, all precautions maintained, with call bell in reach.

## 2017-12-21 NOTE — CONSULT NOTE ADULT - SUBJECTIVE AND OBJECTIVE BOX
Patient is a 77y old  Female who presents with a chief complaint of Pt. states" I couldn't breath last night." (20 Dec 2017 01:58)      HPI:  78 y/o F with PMH of DM, HLD, ?COPD(not on home O2) presented with the complaint of SOB, cough and midsternal chest pain. As per the patient she developed SOB about 2 days ago. Patient stated that any minimal exertion she would feel winded and SOB. Patient stated that today in the morning she was doing fine, and at 3:00pm she developed severe cough with yellow/green sputum production. The cough fits continued and the patient stated to have SOB both at rest and with exertion and this concerned her and her family and she was brought to the ER. Patient stated that today at 7:00pm she developed sudden onset midsternal chest pain. The chest pain was constant till she came to the ER, characterized as a sharp pain, unsure what is aggravating the pain, relief by the time she was in the ED, without any radiation of the chest pain, with a severity of 7/10. Patient stated that she has never had chest pain like this before. Patient stated that 2 days ago she tried the nebulizer treatment for her SOB but got no relief from it. Patient also endorsed abdominal distention for the past month, with associated 2 pillow orthopnea and intermittent LE swelling. Patient denied any PND. Patient denied any fevers, chills, N/V/D/C, abdominal pain, melena, hematochezia, recent travel, dysuria, sick contact, pleuritic or positional chest pain.     On ED admission EKG revealed Sinus tachycardia at a rate of 114 with TWI in lead V5-V6, 2mm STD in lead V5-V6 and 1mm STD in lead II, AVF with Qtc of 421, CE x 1: Trop: 0.12, CK: 201, CKMB: 6.93, CKMBRI: 3.4, WBC: 14.35, H&H: 8.5/29.5, Gluc: 262, Ca: 8.1, Alk Phos: 158, AST: 40. Patient was given 1x dose of Duonebs in the ED, 1x dose of Aspirin 324mg and was started on heparin drip for anticoagulation. When examined patient is resting in the stretcher and denied any current chest pain or SOB. (20 Dec 2017 01:29)      PAST MEDICAL & SURGICAL HISTORY:  Diabetic neuropathy  Type 2 diabetes mellitus with hyperglycemia, with long-term current use of insulin  HLD (hyperlipidemia)  No significant past surgical history      Review of Systems:   CONSTITUTIONAL: No fever, weight loss, or fatigue  EYES: No eye pain, visual disturbances, or discharge  ENMT:  No difficulty hearing, tinnitus, vertigo; No sinus or throat pain  NECK: No pain or stiffness  RESPIRATORY: No cough, wheezing, chills or hemoptysis; No shortness of breath  CARDIOVASCULAR: No chest pain, palpitations, dizziness, or leg swelling  GASTROINTESTINAL: No abdominal or epigastric pain. No nausea, vomiting, or hematemesis; No diarrhea or constipation. No melena or hematochezia.  NEUROLOGICAL: No headaches, memory loss, loss of strength, numbness, or tremors  SKIN: No itching, burning, rashes, or lesions   MUSCULOSKELETAL: No joint pain or swelling; No muscle, back, or extremity pain  PSYCHIATRIC: No depression, anxiety, mood swings, or difficulty sleeping      Allergies    No Known Allergies    Intolerances        Social History:     FAMILY HISTORY:  No pertinent family history in first degree relatives      MEDICATIONS  (STANDING):  atorvastatin 40 milliGRAM(s) Oral at bedtime  clopidogrel Tablet 75 milliGRAM(s) Oral daily  dextrose 5%. 1000 milliLiter(s) (50 mL/Hr) IV Continuous <Continuous>  dextrose 50% Injectable 12.5 Gram(s) IV Push once  dextrose 50% Injectable 25 Gram(s) IV Push once  dextrose 50% Injectable 25 Gram(s) IV Push once  furosemide   Injectable 20 milliGRAM(s) IV Push two times a day  gabapentin 300 milliGRAM(s) Oral two times a day  insulin glargine Injectable (LANTUS) 68 Unit(s) SubCutaneous at bedtime  insulin lispro (HumaLOG) corrective regimen sliding scale   SubCutaneous three times a day before meals  insulin lispro (HumaLOG) corrective regimen sliding scale   SubCutaneous at bedtime  metoprolol     tartrate 12.5 milliGRAM(s) Oral two times a day  rivaroxaban 15 milliGRAM(s) Oral every 24 hours    MEDICATIONS  (PRN):  acetaminophen   Tablet. 650 milliGRAM(s) Oral every 6 hours PRN mild to moderate to severe pain (1-10)  dextrose Gel 1 Dose(s) Oral once PRN Blood Glucose LESS THAN 70 milliGRAM(s)/deciliter  glucagon  Injectable 1 milliGRAM(s) IntraMuscular once PRN Glucose LESS THAN 70 milligrams/deciliter  levalbuterol Inhalation 0.63 milliGRAM(s) Inhalation every 6 hours PRN wheezing or SOB      CAPILLARY BLOOD GLUCOSE      POCT Blood Glucose.: 100 mg/dL (21 Dec 2017 22:57)  POCT Blood Glucose.: 136 mg/dL (21 Dec 2017 17:11)  POCT Blood Glucose.: 162 mg/dL (21 Dec 2017 13:04)  POCT Blood Glucose.: 159 mg/dL (21 Dec 2017 08:55)    I&O's Summary    20 Dec 2017 07:01  -  21 Dec 2017 07:00  --------------------------------------------------------  IN: 481.2 mL / OUT: 0 mL / NET: 481.2 mL    21 Dec 2017 07:01  -  21 Dec 2017 23:53  --------------------------------------------------------  IN: 120 mL / OUT: 0 mL / NET: 120 mL        PHYSICAL EXAM:  GENERAL: NAD, well-developed  HEAD:  Atraumatic, Normocephalic  NECK: Supple, No JVD  CHEST/LUNG: Clear to auscultation bilaterally; No wheezing.  HEART: Regular rate and rhythm; No murmurs, rubs, or gallops  ABDOMEN: Soft, Nontender, Nondistended; Bowel sounds present  EXTREMITIES:  2+ Peripheral Pulses, No clubbing, cyanosis, or edema  PSYCH: AAOx3  NEUROLOGY: non-focal  SKIN: No rashes or lesions    LABS:                        8.7    8.20  )-----------( 400      ( 21 Dec 2017 09:30 )             30.6     12-21    135  |  96<L>  |  23  ----------------------------<  154<H>  4.8   |  27  |  1.33<H>    Ca    8.3<L>      21 Dec 2017 06:40  Mg     2.3     12-20      PTT - ( 21 Dec 2017 17:40 )  PTT:40.3 SEC  CARDIAC MARKERS ( 20 Dec 2017 03:30 )  x     / 0.39 ng/mL / 297 u/L / 17.34 ng/mL / x            CAPILLARY BLOOD GLUCOSE      POCT Blood Glucose.: 100 mg/dL (21 Dec 2017 22:57)  POCT Blood Glucose.: 136 mg/dL (21 Dec 2017 17:11)  POCT Blood Glucose.: 162 mg/dL (21 Dec 2017 13:04)  POCT Blood Glucose.: 159 mg/dL (21 Dec 2017 08:55)    12-20 @ 06:07  Culture-urine --  Culture results --  method type --  Organism --  Organism Identification --  Specimen source BLOOD PERIPHERAL           12-20 @ 06:07  Culture blood   NO ORGANISMS ISOLATED  NO ORGANISMS ISOLATED AT 24 HOURS  Culture results --  Gram stain --  Gram stain blood --  Method type --  Organism --  Organism identification --  Specimen source BLOOD PERIPHERAL      RADIOLOGY & ADDITIONAL TESTS:    Imaging Personally Reviewed:    Consultant(s) Notes Reviewed:      Care Discussed with Consultants/Other Providers:    Thanks for consult. Will follow.

## 2017-12-21 NOTE — CONSULT NOTE ADULT - ASSESSMENT
A 76 yo female with SOB.    NSTEMI:  S/p C. Cath and PCI.  Plavix/Xarelto/lipitor.  Cardio f/up noted.    Acute sys HF:  IV lasix 20 bid.  ECHO reviewed.    DM II:  Lantus  FSSS    Diabetic neuropathy:  Neurontin    Dw family.

## 2017-12-21 NOTE — PROVIDER CONTACT NOTE (OTHER) - ACTION/TREATMENT ORDERED:
Pt has 9 beats of VTACH. JULIO CESAR Ireland made aware. Will continue to monitor.
Troponin- 0.39. JULIO CESAR Ireland made aware. Will continue to monitor.
As per JULIO CESAR Somers, she will order EKG.

## 2017-12-22 VITALS
SYSTOLIC BLOOD PRESSURE: 142 MMHG | RESPIRATION RATE: 18 BRPM | OXYGEN SATURATION: 97 % | TEMPERATURE: 98 F | DIASTOLIC BLOOD PRESSURE: 64 MMHG | HEART RATE: 70 BPM

## 2017-12-22 LAB
BASOPHILS # BLD AUTO: 0.06 K/UL — SIGNIFICANT CHANGE UP (ref 0–0.2)
BASOPHILS NFR BLD AUTO: 1 % — SIGNIFICANT CHANGE UP (ref 0–2)
BUN SERPL-MCNC: 31 MG/DL — HIGH (ref 7–23)
CALCIUM SERPL-MCNC: 7.8 MG/DL — LOW (ref 8.4–10.5)
CHLORIDE SERPL-SCNC: 97 MMOL/L — LOW (ref 98–107)
CO2 SERPL-SCNC: 26 MMOL/L — SIGNIFICANT CHANGE UP (ref 22–31)
CREAT SERPL-MCNC: 1.78 MG/DL — HIGH (ref 0.5–1.3)
EOSINOPHIL # BLD AUTO: 0.22 K/UL — SIGNIFICANT CHANGE UP (ref 0–0.5)
EOSINOPHIL NFR BLD AUTO: 3.5 % — SIGNIFICANT CHANGE UP (ref 0–6)
GLUCOSE SERPL-MCNC: 108 MG/DL — HIGH (ref 70–99)
HCT VFR BLD CALC: 27.3 % — LOW (ref 34.5–45)
HCT VFR BLD CALC: 27.3 % — LOW (ref 34.5–45)
HGB BLD-MCNC: 8.1 G/DL — LOW (ref 11.5–15.5)
HGB BLD-MCNC: 8.1 G/DL — LOW (ref 11.5–15.5)
IMM GRANULOCYTES # BLD AUTO: 0.02 # — SIGNIFICANT CHANGE UP
IMM GRANULOCYTES NFR BLD AUTO: 0.3 % — SIGNIFICANT CHANGE UP (ref 0–1.5)
LYMPHOCYTES # BLD AUTO: 1.99 K/UL — SIGNIFICANT CHANGE UP (ref 1–3.3)
LYMPHOCYTES # BLD AUTO: 31.7 % — SIGNIFICANT CHANGE UP (ref 13–44)
MAGNESIUM SERPL-MCNC: 2.4 MG/DL — SIGNIFICANT CHANGE UP (ref 1.6–2.6)
MCHC RBC-ENTMCNC: 22.4 PG — LOW (ref 27–34)
MCHC RBC-ENTMCNC: 22.4 PG — LOW (ref 27–34)
MCHC RBC-ENTMCNC: 29.7 % — LOW (ref 32–36)
MCHC RBC-ENTMCNC: 29.7 % — LOW (ref 32–36)
MCV RBC AUTO: 75.6 FL — LOW (ref 80–100)
MCV RBC AUTO: 75.6 FL — LOW (ref 80–100)
MONOCYTES # BLD AUTO: 0.62 K/UL — SIGNIFICANT CHANGE UP (ref 0–0.9)
MONOCYTES NFR BLD AUTO: 9.9 % — SIGNIFICANT CHANGE UP (ref 2–14)
NEUTROPHILS # BLD AUTO: 3.37 K/UL — SIGNIFICANT CHANGE UP (ref 1.8–7.4)
NEUTROPHILS NFR BLD AUTO: 53.6 % — SIGNIFICANT CHANGE UP (ref 43–77)
NRBC # FLD: 0 — SIGNIFICANT CHANGE UP
NRBC # FLD: 0 — SIGNIFICANT CHANGE UP
PHOSPHATE SERPL-MCNC: 4.8 MG/DL — HIGH (ref 2.5–4.5)
PLATELET # BLD AUTO: 389 K/UL — SIGNIFICANT CHANGE UP (ref 150–400)
PLATELET # BLD AUTO: 389 K/UL — SIGNIFICANT CHANGE UP (ref 150–400)
PMV BLD: 12.2 FL — SIGNIFICANT CHANGE UP (ref 7–13)
PMV BLD: 12.2 FL — SIGNIFICANT CHANGE UP (ref 7–13)
POTASSIUM SERPL-MCNC: 4.1 MMOL/L — SIGNIFICANT CHANGE UP (ref 3.5–5.3)
POTASSIUM SERPL-SCNC: 4.1 MMOL/L — SIGNIFICANT CHANGE UP (ref 3.5–5.3)
RBC # BLD: 3.61 M/UL — LOW (ref 3.8–5.2)
RBC # BLD: 3.61 M/UL — LOW (ref 3.8–5.2)
RBC # FLD: 16.5 % — HIGH (ref 10.3–14.5)
RBC # FLD: 16.5 % — HIGH (ref 10.3–14.5)
SODIUM SERPL-SCNC: 137 MMOL/L — SIGNIFICANT CHANGE UP (ref 135–145)
WBC # BLD: 6.28 K/UL — SIGNIFICANT CHANGE UP (ref 3.8–10.5)
WBC # BLD: 6.28 K/UL — SIGNIFICANT CHANGE UP (ref 3.8–10.5)
WBC # FLD AUTO: 6.28 K/UL — SIGNIFICANT CHANGE UP (ref 3.8–10.5)
WBC # FLD AUTO: 6.28 K/UL — SIGNIFICANT CHANGE UP (ref 3.8–10.5)

## 2017-12-22 RX ORDER — FUROSEMIDE 40 MG
1 TABLET ORAL
Qty: 30 | Refills: 0 | OUTPATIENT
Start: 2017-12-22 | End: 2018-01-20

## 2017-12-22 RX ORDER — RIVAROXABAN 15 MG-20MG
1 KIT ORAL
Qty: 30 | Refills: 0 | OUTPATIENT
Start: 2017-12-22 | End: 2018-01-20

## 2017-12-22 RX ORDER — GABAPENTIN 400 MG/1
1 CAPSULE ORAL
Qty: 0 | Refills: 0 | COMMUNITY

## 2017-12-22 RX ORDER — FUROSEMIDE 40 MG
1 TABLET ORAL
Qty: 30 | Refills: 0
Start: 2017-12-22 | End: 2018-01-20

## 2017-12-22 RX ORDER — GABAPENTIN 400 MG/1
1 CAPSULE ORAL
Qty: 0 | Refills: 0 | COMMUNITY
Start: 2017-12-22

## 2017-12-22 RX ORDER — ATORVASTATIN CALCIUM 80 MG/1
1 TABLET, FILM COATED ORAL
Qty: 30 | Refills: 0 | OUTPATIENT
Start: 2017-12-22 | End: 2018-01-20

## 2017-12-22 RX ORDER — CLOPIDOGREL BISULFATE 75 MG/1
1 TABLET, FILM COATED ORAL
Qty: 30 | Refills: 0 | OUTPATIENT
Start: 2017-12-22 | End: 2018-01-20

## 2017-12-22 RX ORDER — INSULIN GLARGINE 100 [IU]/ML
68 INJECTION, SOLUTION SUBCUTANEOUS
Qty: 0 | Refills: 0 | COMMUNITY

## 2017-12-22 RX ORDER — SIMVASTATIN 20 MG/1
1 TABLET, FILM COATED ORAL
Qty: 0 | Refills: 0 | COMMUNITY

## 2017-12-22 RX ORDER — METOPROLOL TARTRATE 50 MG
0.5 TABLET ORAL
Qty: 30 | Refills: 0 | OUTPATIENT
Start: 2017-12-22 | End: 2018-01-20

## 2017-12-22 RX ORDER — INSULIN GLARGINE 100 [IU]/ML
34 INJECTION, SOLUTION SUBCUTANEOUS
Qty: 10 | Refills: 0 | OUTPATIENT
Start: 2017-12-22 | End: 2018-01-20

## 2017-12-22 RX ORDER — INSULIN GLARGINE 100 [IU]/ML
34 INJECTION, SOLUTION SUBCUTANEOUS
Qty: 11 | Refills: 0
Start: 2017-12-22 | End: 2018-01-20

## 2017-12-22 RX ORDER — FUROSEMIDE 40 MG
40 TABLET ORAL DAILY
Qty: 0 | Refills: 0 | Status: DISCONTINUED | OUTPATIENT
Start: 2017-12-22 | End: 2017-12-22

## 2017-12-22 RX ORDER — CLOPIDOGREL BISULFATE 75 MG/1
1 TABLET, FILM COATED ORAL
Qty: 30 | Refills: 0
Start: 2017-12-22 | End: 2018-01-20

## 2017-12-22 RX ORDER — METOPROLOL TARTRATE 50 MG
0.5 TABLET ORAL
Qty: 30 | Refills: 0
Start: 2017-12-22 | End: 2018-01-20

## 2017-12-22 RX ADMIN — CLOPIDOGREL BISULFATE 75 MILLIGRAM(S): 75 TABLET, FILM COATED ORAL at 13:34

## 2017-12-22 RX ADMIN — Medication 650 MILLIGRAM(S): at 00:45

## 2017-12-22 RX ADMIN — Medication 1: at 13:34

## 2017-12-22 RX ADMIN — GABAPENTIN 300 MILLIGRAM(S): 400 CAPSULE ORAL at 05:42

## 2017-12-22 RX ADMIN — Medication 20 MILLIGRAM(S): at 05:42

## 2017-12-22 RX ADMIN — Medication 12.5 MILLIGRAM(S): at 05:42

## 2017-12-22 NOTE — DISCHARGE NOTE ADULT - MEDICATION SUMMARY - MEDICATIONS TO STOP TAKING
I will STOP taking the medications listed below when I get home from the hospital:    simvastatin 10 mg oral tablet  -- 1 tab(s) by mouth once a day (at bedtime)

## 2017-12-22 NOTE — DISCHARGE NOTE ADULT - HOME CARE AGENCY
[facility] for RN, Home Health Aid & MLTC evaluation.  Please call [phone] with questions/concerns.

## 2017-12-22 NOTE — DISCHARGE NOTE ADULT - PROVIDER TOKENS
TOKEN:'[id number]',TOKEN:'[id number]',FREE:[LAST:[follow up],PHONE:[(   )    -],FAX:[(   )    -],ADDRESS:[Endocrinology]]

## 2017-12-22 NOTE — DISCHARGE NOTE ADULT - CARE PROVIDER_API CALL
Parminder Goldberg), Internal Medicine  [address] [facility] [address] [place] [address]  Phone: [phone]  Fax: [phone]    Julio César Hooper), Internal Medicine  [address] [address] [place] [address]  Phone: [phone]  Fax: [phone]    follow up,   Endocrinology  Phone: (   )    -  Fax: (   )    -

## 2017-12-22 NOTE — DISCHARGE NOTE ADULT - SECONDARY DIAGNOSIS.
CAP (community acquired pneumonia) Type 2 diabetes mellitus with hyperglycemia, with long-term current use of insulin Atrial fibrillation Creatinine elevation

## 2017-12-22 NOTE — DISCHARGE NOTE ADULT - HOSPITAL COURSE
76 y/o F with PMH of DM, HLD, ?COPD(not on home O2) presented with the complaint of SOB, cough and midsternal chest pain. As per the patient she developed SOB about 2 days ago. Patient stated that any minimal exertion she would feel winded and SOB. Patient stated that today in the morning she was doing fine, and at 3:00pm she developed severe cough with yellow/green sputum production. The cough fits continued and the patient stated to have SOB both at rest and with exertion and this concerned her and her family and she was brought to the ER. Patient stated that today at 7:00pm she developed sudden onset midsternal chest pain. The chest pain was constant till she came to the ER, characterized as a sharp pain, unsure what is aggravating the pain, relief by the time she was in the ED, without any radiation of the chest pain, with a severity of 7/10. Patient stated that she has never had chest pain like this before. Patient stated that 2 days ago she tried the nebulizer treatment for her SOB but got no relief from it. Patient also endorsed abdominal distention for the past month, with associated 2 pillow orthopnea and intermittent LE swelling. Patient denied any PND. Patient denied any fevers, chills, N/V/D/C, abdominal pain, melena, hematochezia, recent travel, dysuria, sick contact, pleuritic or positional chest pain.     On ED admission EKG revealed Sinus tachycardia at a rate of 114 with TWI in lead V5-V6, 2mm STD in lead V5-V6 and 1mm STD in lead II, AVF with Qtc of 421, CE x 1: Trop: 0.12, CK: 201, CKMB: 6.93, CKMBRI: 3.4, WBC: 14.35, H&H: 8.5/29.5, Gluc: 262, Ca: 8.1, Alk Phos: 158, AST: 40. Patient was given 1x dose of Duonebs in the ED, 1x dose of Aspirin 324mg and was started on heparin drip for anticoagulation. When examined patient is resting in the stretcher and denied any current chest pain or SOB.     RVP: Negative   CXR: Small bilateral pleural effusions. Interstitial pulmonary edema    12/20 CATH: resolute arcadio stent LCx, resolute arcadio stent OM1 95; LVEDP 30 given Lasix 40mg IV x1 in lab; RRA access. Started on furosemide 20mg BID     TTE: Mitral annular calcification, otherwise normal mitral  valve. Minimal mitral regurgitation.  2. Normal left ventricular internal dimensions and wall  thicknesses.  3. Endocardium not well visualized; unable to evaluate left  ventricular systolic function.  Left ventricular systolic  function grossly appears abnormal.  4. The right ventricle is not well visualized; grossly  normal right ventricular systolic function.  Consider limited repeat imaging with IV contrast  administration for improved evaluation of LV systolic  function, if clinically indicated. 78 y/o F with PMH of DM, HLD, ?COPD(not on home O2) presented with the complaint of SOB, cough and midsternal chest pain. As per the patient she developed SOB about 2 days ago. Patient stated that any minimal exertion she would feel winded and SOB. Patient stated that today in the morning she was doing fine, and at 3:00pm she developed severe cough with yellow/green sputum production. The cough fits continued and the patient stated to have SOB both at rest and with exertion and this concerned her and her family and she was brought to the ER. Patient stated that today at 7:00pm she developed sudden onset midsternal chest pain. The chest pain was constant till she came to the ER, characterized as a sharp pain, unsure what is aggravating the pain, relief by the time she was in the ED, without any radiation of the chest pain, with a severity of 7/10. Patient stated that she has never had chest pain like this before. Patient stated that 2 days ago she tried the nebulizer treatment for her SOB but got no relief from it. Patient also endorsed abdominal distention for the past month, with associated 2 pillow orthopnea and intermittent LE swelling. Patient denied any PND. Patient denied any fevers, chills, N/V/D/C, abdominal pain, melena, hematochezia, recent travel, dysuria, sick contact, pleuritic or positional chest pain.     On ED admission EKG revealed Sinus tachycardia at a rate of 114 with TWI in lead V5-V6, 2mm STD in lead V5-V6 and 1mm STD in lead II, AVF with Qtc of 421, CE x 1: Trop: 0.12, CK: 201, CKMB: 6.93, CKMBRI: 3.4, WBC: 14.35, H&H: 8.5/29.5, Gluc: 262, Ca: 8.1, Alk Phos: 158, AST: 40. Patient was given 1x dose of Duonebs in the ED, 1x dose of Aspirin 324mg and was started on heparin drip for anticoagulation. When examined patient is resting in the stretcher and denied any current chest pain or SOB.     RVP: Negative   CXR: Small bilateral pleural effusions. Interstitial pulmonary edema    12/20 CATH: resolute arcadio stent LCx, resolute arcadio stent OM1 95; LVEDP 30 given Lasix 40mg IV x1 in lab; RRA access. Started on furosemide 20mg BID     TTE: Mitral annular calcification, otherwise normal mitral  valve. Minimal mitral regurgitation.  2. Normal left ventricular internal dimensions and wall  thicknesses.  3. Endocardium not well visualized; unable to evaluate left  ventricular systolic function.  Left ventricular systolic  function grossly appears abnormal.  4. The right ventricle is not well visualized; grossly  normal right ventricular systolic function.  Consider limited repeat imaging with IV contrast  administration for improved evaluation of LV systolic  function, if clinically indicated.    So eventually she was treated for:    Acute decompensated diastolic CHF-  Lasix 20mg IV BID for LVEDP of 30mmHg on cath.  She was switched to Lasix 40mg PO daily today.   TTE- normal LVEF with no significant valvulopathy.     Paroxysmal A-fib- newly diagnosed on this admission.  Currently in sinus.  No significant valvular disease on TTE.  Xarelto 15mg daily and Plavix 75mg daily to prevent triple therapy, as per PIONEER-AF trial.   Discussed with interventional cardiology, Dr. Mckinnon.     KOJO- likely contrast induced nephropathy.  Lasix  Iv was given and changed to PO for discharge.  Patient demanding to be discharged and does not want to wait for nephrology evaluation.   Plan for discharge today 12/22/17 and blood work later this week with her PCP, Dr. Goldberg.  Dr. Hooper discussed this with Dr. Goldberg as well.     She is stable for discharge, 12/22/17. 76 y/o F with PMH of DM, HLD, ?COPD(not on home O2) presented with the complaint of SOB, cough and midsternal chest pain. As per the patient she developed SOB about 2 days ago. Patient stated that any minimal exertion she would feel winded and SOB. Patient stated that today in the morning she was doing fine, and at 3:00pm she developed severe cough with yellow/green sputum production. The cough fits continued and the patient stated to have SOB both at rest and with exertion and this concerned her and her family and she was brought to the ER. Patient stated that today at 7:00pm she developed sudden onset midsternal chest pain. The chest pain was constant till she came to the ER, characterized as a sharp pain, unsure what is aggravating the pain, relief by the time she was in the ED, without any radiation of the chest pain, with a severity of 7/10. Patient stated that she has never had chest pain like this before. Patient stated that 2 days ago she tried the nebulizer treatment for her SOB but got no relief from it. Patient also endorsed abdominal distention for the past month, with associated 2 pillow orthopnea and intermittent LE swelling. Patient denied any PND. Patient denied any fevers, chills, N/V/D/C, abdominal pain, melena, hematochezia, recent travel, dysuria, sick contact, pleuritic or positional chest pain.     On ED admission EKG revealed Sinus tachycardia at a rate of 114 with TWI in lead V5-V6, 2mm STD in lead V5-V6 and 1mm STD in lead II, AVF with Qtc of 421, CE x 1: Trop: 0.12, CK: 201, CKMB: 6.93, CKMBRI: 3.4, WBC: 14.35, H&H: 8.5/29.5, Gluc: 262, Ca: 8.1, Alk Phos: 158, AST: 40. Patient was given 1x dose of Duonebs in the ED, 1x dose of Aspirin 324mg and was started on heparin drip for anticoagulation. When examined patient is resting in the stretcher and denied any current chest pain or SOB.     RVP: Negative   CXR: Small bilateral pleural effusions. Interstitial pulmonary edema    12/20 CATH: resolute arcadio stent LCx, resolute arcadio stent OM1 95; LVEDP 30 given Lasix 40mg IV x1 in lab; RRA access. Started on furosemide 20mg BID     TTE: Mitral annular calcification, otherwise normal mitral  valve. Minimal mitral regurgitation.  2. Normal left ventricular internal dimensions and wall  thicknesses.  3. Endocardium not well visualized; unable to evaluate left  ventricular systolic function.  Left ventricular systolic  function grossly appears abnormal.  4. The right ventricle is not well visualized; grossly  normal right ventricular systolic function.  Consider limited repeat imaging with IV contrast  administration for improved evaluation of LV systolic  function, if clinically indicated.    So eventually she was treated for:    Acute decompensated diastolic CHF-  Lasix 20mg IV BID for LVEDP of 30mmHg on cath.  She was switched to Lasix 40mg PO daily today.   TTE- normal LVEF with no significant valvulopathy.     Paroxysmal A-fib- newly diagnosed on this admission.  Currently in sinus.  No significant valvular disease on TTE.  Xarelto 15mg daily and Plavix 75mg daily to prevent triple therapy, as per PIONEER-AF trial.   Discussed with interventional cardiology, Dr. Mckinnon.     KOJO- likely contrast induced nephropathy.  Lasix  Iv was given and changed to PO for discharge.  Patient demanding to be discharged and does not want to wait for nephrology evaluation.   Plan for discharge today 12/22/17 and blood work later this week with her PCP, Dr. Goldberg.  Dr. Hoopre discussed this with Dr. Goldberg as well.     She is stable for discharge today 12/22/17.

## 2017-12-22 NOTE — DISCHARGE NOTE ADULT - PLAN OF CARE
Avoid another heart attack continue medications as prescribed  Seek medical attention for chest pain, shortness of breath or palpitations  follow up with Cardiology within 1 week Seek medical attention for fevers, shortness of breath, sputum production continue medications with Insulin and Metformin  Monitor your blood sugars, diabetic diet  Seek medical attention for blood sugars lower than 70 or more than 150, lightheadedness, headache, confusion continue medications as prescribed  Seek medical attention for chest pain, shortness of breath or palpitations Your creatinine level was high and needs to be monitored to avoid further kidney disfunction.  As advised and discussed with you by Dr. Hooper, Follow up with you PMD. Dr. Ashlyn KHANNA within 3-4 days

## 2017-12-22 NOTE — DISCHARGE NOTE ADULT - CARE PLAN
Principal Discharge DX:	NSTEMI (non-ST elevated myocardial infarction)  Goal:	Avoid another heart attack  Instructions for follow-up, activity and diet:	continue medications as prescribed  Seek medical attention for chest pain, shortness of breath or palpitations  follow up with Cardiology within 1 week  Secondary Diagnosis:	CAP (community acquired pneumonia)  Instructions for follow-up, activity and diet:	Seek medical attention for fevers, shortness of breath, sputum production  Secondary Diagnosis:	Type 2 diabetes mellitus with hyperglycemia, with long-term current use of insulin  Instructions for follow-up, activity and diet:	continue medications with Insulin and Metformin  Monitor your blood sugars, diabetic diet  Seek medical attention for blood sugars lower than 70 or more than 150, lightheadedness, headache, confusion  Secondary Diagnosis:	Atrial fibrillation  Instructions for follow-up, activity and diet:	continue medications as prescribed  Seek medical attention for chest pain, shortness of breath or palpitations  Secondary Diagnosis:	Creatinine elevation  Instructions for follow-up, activity and diet:	Your creatinine level was high and needs to be monitored to avoid further kidney disfunction.  As advised and discussed with you by Dr. Hooper, Follow up with you PMD. Dr. Ashlyn KHANNA within 3-4 days

## 2017-12-22 NOTE — DISCHARGE NOTE ADULT - ADDITIONAL INSTRUCTIONS
Follow up with Dr. Goldberg in 3-4 days  follow up with Cardiology within 1 week  Follow up with your Endocrinologist as scheduled

## 2017-12-22 NOTE — DISCHARGE NOTE ADULT - CARE PROVIDERS DIRECT ADDRESSES
[email or web address],DirectAddress_Unknown

## 2017-12-22 NOTE — DISCHARGE NOTE ADULT - MEDICATION SUMMARY - MEDICATIONS TO TAKE
I will START or STAY ON the medications listed below when I get home from the hospital:    rivaroxaban 15 mg oral tablet  -- 1 tab(s) by mouth every 24 hours  -- Indication: For blood thinner    gabapentin 300 mg oral capsule  -- 1 cap(s) by mouth 2 times a day  -- Indication: For pain    metFORMIN 500 mg oral tablet, extended release  -- 1 tab(s) by mouth once a day  -- Indication: For Diabetes    Lantus 100 units/mL subcutaneous solution  -- 68 unit(s) subcutaneous once a day (at bedtime)  -- Indication: For Diabetes    atorvastatin 40 mg oral tablet  -- 1 tab(s) by mouth once a day (at bedtime)  -- Indication: For Cholesterol    clopidogrel 75 mg oral tablet  -- 1 tab(s) by mouth once a day  -- Indication: For blood thinner    metoprolol tartrate 25 mg oral tablet  -- 0.5 tab(s) by mouth 2 times a day   -- Indication: For Heart rate    ipratropium-albuterol 0.5 mg-2.5 mg/3 mLinhalation solution  -- 3 milliliter(s) inhaled 4 times a day, As Needed - for shortness of breath and/or wheezing  -- Indication: For breathing    furosemide 40 mg oral tablet  -- 1 tab(s) by mouth once a day  -- Indication: For fluid balance I will START or STAY ON the medications listed below when I get home from the hospital:    rivaroxaban 15 mg oral tablet  -- 1 tab(s) by mouth every 24 hours  -- Indication: For blood thinner    gabapentin 300 mg oral capsule  -- 1 cap(s) by mouth 2 times a day  -- Indication: For pain    Lantus 100 units/mL subcutaneous solution  -- 34 unit(s) subcutaneous once a day (at bedtime)   -- Indication: For Diabetes    metFORMIN 500 mg oral tablet, extended release  -- 1 tab(s) by mouth once a day  -- Indication: For Diabetes    atorvastatin 40 mg oral tablet  -- 1 tab(s) by mouth once a day (at bedtime)  -- Indication: For Cholesterol    clopidogrel 75 mg oral tablet  -- 1 tab(s) by mouth once a day  -- Indication: For blood thinner    metoprolol tartrate 25 mg oral tablet  -- 0.5 tab(s) by mouth 2 times a day   -- Indication: For Heart rate    ipratropium-albuterol 0.5 mg-2.5 mg/3 mLinhalation solution  -- 3 milliliter(s) inhaled 4 times a day, As Needed - for shortness of breath and/or wheezing  -- Indication: For breathing    furosemide 40 mg oral tablet  -- 1 tab(s) by mouth once a day  -- Indication: For fluid balance

## 2017-12-22 NOTE — PROGRESS NOTE ADULT - SUBJECTIVE AND OBJECTIVE BOX
Cardiovascular Disease Progress Note    Overnight events: No acute events overnight. Ms. Tsang feels well. No chest pain or SOB.   Otherwise review of systems negative    Objective Findings:  T(C): 36.6 (12-22-17 @ 05:40), Max: 37.2 (12-21-17 @ 21:36)  HR: 70 (12-22-17 @ 05:40) (70 - 95)  BP: 142/64 (12-22-17 @ 05:40) (136/70 - 145/73)  RR: 18 (12-22-17 @ 05:40) (16 - 18)  SpO2: 97% (12-22-17 @ 05:40) (97% - 99%)  Wt(kg): --  Daily     Daily       Physical Exam:  Gen: NAD  HEENT: EOMI  CV: RRR, normal S1 + S2, no m/r/g  Lungs: CTAB  Abd: soft, non-tender  Ext: No edema    Telemetry: Sinus    Laboratory Data:                        8.1    6.28  )-----------( 389      ( 22 Dec 2017 06:17 )             27.3     12-22    137  |  97<L>  |  31<H>  ----------------------------<  108<H>  4.1   |  26  |  1.78<H>    Ca    7.8<L>      22 Dec 2017 06:17  Phos  4.8     12-22  Mg     2.4     12-22      PTT - ( 21 Dec 2017 17:40 )  PTT:40.3 SEC          Inpatient Medications:  MEDICATIONS  (STANDING):  atorvastatin 40 milliGRAM(s) Oral at bedtime  clopidogrel Tablet 75 milliGRAM(s) Oral daily  dextrose 5%. 1000 milliLiter(s) (50 mL/Hr) IV Continuous <Continuous>  dextrose 50% Injectable 12.5 Gram(s) IV Push once  dextrose 50% Injectable 25 Gram(s) IV Push once  dextrose 50% Injectable 25 Gram(s) IV Push once  furosemide   Injectable 20 milliGRAM(s) IV Push two times a day  gabapentin 300 milliGRAM(s) Oral two times a day  insulin glargine Injectable (LANTUS) 68 Unit(s) SubCutaneous at bedtime  insulin lispro (HumaLOG) corrective regimen sliding scale   SubCutaneous three times a day before meals  insulin lispro (HumaLOG) corrective regimen sliding scale   SubCutaneous at bedtime  metoprolol     tartrate 12.5 milliGRAM(s) Oral two times a day  rivaroxaban 15 milliGRAM(s) Oral every 24 hours      Assessment: 77 year old woman with uncontrolled IDDM presents with respiratory distress, ADHF, and NSTEMI    #NSTEMI-  Patient s/p MICHAEL to LCx and OM1  Continue ASA, Plavix, and beta-blocker.   Change Zocor to Lipitor 40mg    #Acute decompensated diastolic CHF-  Lasix 20mg IV BID for LVEDP of 30mmHg on cath.  Switch to Lasix 40mg PO daily today.   TTE- normal LVEF with no significant valvulopathy.     #Paroxysmal A-fib- newly diagnosed on this admission.  Currently in sinus.  No significant valvular disease on TTE.  Xarelto 15mg daily and Plavix 75mg daily to prevent triple therapy, as per PIONEER-AF trial.   Discussed with interventional cardiology, Dr. Mckinnon.     #KOJO- likely contrast induced nephropathy.  Lasix now decreased and changed to oral.  I will call for nephrology evaluation.     Over 35 minutes spent on total encounter; more than 50% of the visit was spent counseling and/or coordinating care by the attending physician.      Julio César Hooper M.D.   Cardiovascular Disease  [phone] Cardiovascular Disease Progress Note    Overnight events: No acute events overnight. Ms. Tsang feels well. No chest pain or SOB.   Otherwise review of systems negative    Objective Findings:  T(C): 36.6 (12-22-17 @ 05:40), Max: 37.2 (12-21-17 @ 21:36)  HR: 70 (12-22-17 @ 05:40) (70 - 95)  BP: 142/64 (12-22-17 @ 05:40) (136/70 - 145/73)  RR: 18 (12-22-17 @ 05:40) (16 - 18)  SpO2: 97% (12-22-17 @ 05:40) (97% - 99%)  Wt(kg): --  Daily     Daily       Physical Exam:  Gen: NAD  HEENT: EOMI  CV: RRR, normal S1 + S2, no m/r/g  Lungs: CTAB  Abd: soft, non-tender  Ext: No edema    Telemetry: Sinus    Laboratory Data:                        8.1    6.28  )-----------( 389      ( 22 Dec 2017 06:17 )             27.3     12-22    137  |  97<L>  |  31<H>  ----------------------------<  108<H>  4.1   |  26  |  1.78<H>    Ca    7.8<L>      22 Dec 2017 06:17  Phos  4.8     12-22  Mg     2.4     12-22      PTT - ( 21 Dec 2017 17:40 )  PTT:40.3 SEC          Inpatient Medications:  MEDICATIONS  (STANDING):  atorvastatin 40 milliGRAM(s) Oral at bedtime  clopidogrel Tablet 75 milliGRAM(s) Oral daily  dextrose 5%. 1000 milliLiter(s) (50 mL/Hr) IV Continuous <Continuous>  dextrose 50% Injectable 12.5 Gram(s) IV Push once  dextrose 50% Injectable 25 Gram(s) IV Push once  dextrose 50% Injectable 25 Gram(s) IV Push once  furosemide   Injectable 20 milliGRAM(s) IV Push two times a day  gabapentin 300 milliGRAM(s) Oral two times a day  insulin glargine Injectable (LANTUS) 68 Unit(s) SubCutaneous at bedtime  insulin lispro (HumaLOG) corrective regimen sliding scale   SubCutaneous three times a day before meals  insulin lispro (HumaLOG) corrective regimen sliding scale   SubCutaneous at bedtime  metoprolol     tartrate 12.5 milliGRAM(s) Oral two times a day  rivaroxaban 15 milliGRAM(s) Oral every 24 hours      Assessment: 77 year old woman with uncontrolled IDDM presents with respiratory distress, ADHF, and NSTEMI    #NSTEMI-  Patient s/p MICHAEL to LCx and OM1  Continue ASA, Plavix, and beta-blocker.   Change Zocor to Lipitor 40mg    #Acute decompensated diastolic CHF-  Lasix 20mg IV BID for LVEDP of 30mmHg on cath.  Switch to Lasix 40mg PO daily today.   TTE- normal LVEF with no significant valvulopathy.     #Paroxysmal A-fib- newly diagnosed on this admission.  Currently in sinus.  No significant valvular disease on TTE.  Xarelto 15mg daily and Plavix 75mg daily to prevent triple therapy, as per PIONEER-AF trial.   Discussed with interventional cardiology, Dr. Mckinnon.     #KOJO- likely contrast induced nephropathy.  Lasix now decreased and changed to oral.  Patient demanding to be discharged and does not want to wait for nephrology evaluation.   Plan for discharge today and blood work on Wednesday with her PCP, Dr. Goldberg.  I discussed this with Dr. Goldberg as well.     Over 35 minutes spent on total encounter; more than 50% of the visit was spent counseling and/or coordinating care by the attending physician.      Julio César Hooper M.D.   Cardiovascular Disease  [phone] Cardiovascular Disease Progress Note    Overnight events: No acute events overnight. Ms. Tsang feels well. No chest pain or SOB.   Otherwise review of systems negative    Objective Findings:  T(C): 36.6 (12-22-17 @ 05:40), Max: 37.2 (12-21-17 @ 21:36)  HR: 70 (12-22-17 @ 05:40) (70 - 95)  BP: 142/64 (12-22-17 @ 05:40) (136/70 - 145/73)  RR: 18 (12-22-17 @ 05:40) (16 - 18)  SpO2: 97% (12-22-17 @ 05:40) (97% - 99%)  Wt(kg): --  Daily     Daily       Physical Exam:  Gen: NAD  HEENT: EOMI  CV: RRR, normal S1 + S2, no m/r/g  Lungs: CTAB  Abd: soft, non-tender  Ext: No edema    Telemetry: Sinus    Laboratory Data:                        8.1    6.28  )-----------( 389      ( 22 Dec 2017 06:17 )             27.3     12-22    137  |  97<L>  |  31<H>  ----------------------------<  108<H>  4.1   |  26  |  1.78<H>    Ca    7.8<L>      22 Dec 2017 06:17  Phos  4.8     12-22  Mg     2.4     12-22      PTT - ( 21 Dec 2017 17:40 )  PTT:40.3 SEC          Inpatient Medications:  MEDICATIONS  (STANDING):  atorvastatin 40 milliGRAM(s) Oral at bedtime  clopidogrel Tablet 75 milliGRAM(s) Oral daily  dextrose 5%. 1000 milliLiter(s) (50 mL/Hr) IV Continuous <Continuous>  dextrose 50% Injectable 12.5 Gram(s) IV Push once  dextrose 50% Injectable 25 Gram(s) IV Push once  dextrose 50% Injectable 25 Gram(s) IV Push once  furosemide   Injectable 20 milliGRAM(s) IV Push two times a day  gabapentin 300 milliGRAM(s) Oral two times a day  insulin glargine Injectable (LANTUS) 68 Unit(s) SubCutaneous at bedtime  insulin lispro (HumaLOG) corrective regimen sliding scale   SubCutaneous three times a day before meals  insulin lispro (HumaLOG) corrective regimen sliding scale   SubCutaneous at bedtime  metoprolol     tartrate 12.5 milliGRAM(s) Oral two times a day  rivaroxaban 15 milliGRAM(s) Oral every 24 hours      Assessment: 77 year old woman with uncontrolled IDDM presents with respiratory distress, ADHF, and NSTEMI    #NSTEMI-  Patient s/p MICHAEL to LCx and OM1  Continue ASA, Plavix, and beta-blocker.   Change Zocor to Lipitor 40mg    #Acute decompensated diastolic CHF-  Lasix 20mg IV BID for LVEDP of 30mmHg on cath.  Switch to Lasix 40mg PO daily today.   TTE- normal LVEF with no significant valvulopathy.     #Paroxysmal A-fib- newly diagnosed on this admission.  Currently in sinus.  No significant valvular disease on TTE.  Xarelto 15mg daily and Plavix 75mg daily to prevent triple therapy, as per PIONEER-AF trial.   Discussed with interventional cardiology, Dr. Mckinnon.     #KOJO- likely contrast induced nephropathy.  Lasix now decreased and changed to oral.  Patient demanding to be discharged and does not want to wait for nephrology evaluation.   Plan for discharge today and blood work later this week with her PCP, Dr. Goldberg.  I discussed this with Dr. Goldberg as well.     Over 35 minutes spent on total encounter; more than 50% of the visit was spent counseling and/or coordinating care by the attending physician.      Julio César Hooper M.D.   Cardiovascular Disease  [phone]

## 2017-12-22 NOTE — DISCHARGE NOTE ADULT - PATIENT PORTAL LINK FT
“You can access the [employer] Patient Portal, offered by [facility], by registering with the following website: [email or web address]”

## 2017-12-25 LAB
BACTERIA BLD CULT: SIGNIFICANT CHANGE UP
BACTERIA BLD CULT: SIGNIFICANT CHANGE UP

## 2018-01-21 ENCOUNTER — INPATIENT (INPATIENT)
Facility: HOSPITAL | Age: 78
LOS: 1 days | Discharge: HOME | End: 2018-01-23
Attending: INTERNAL MEDICINE | Admitting: INTERNAL MEDICINE
Payer: MEDICARE

## 2018-01-21 VITALS
RESPIRATION RATE: 18 BRPM | OXYGEN SATURATION: 100 % | HEART RATE: 81 BPM | SYSTOLIC BLOOD PRESSURE: 140 MMHG | DIASTOLIC BLOOD PRESSURE: 75 MMHG | TEMPERATURE: 98 F

## 2018-01-21 DIAGNOSIS — R07.9 CHEST PAIN, UNSPECIFIED: ICD-10-CM

## 2018-01-21 DIAGNOSIS — E78.5 HYPERLIPIDEMIA, UNSPECIFIED: ICD-10-CM

## 2018-01-21 DIAGNOSIS — E11.43 TYPE 2 DIABETES MELLITUS WITH DIABETIC AUTONOMIC (POLY)NEUROPATHY: ICD-10-CM

## 2018-01-21 DIAGNOSIS — E11.65 TYPE 2 DIABETES MELLITUS WITH HYPERGLYCEMIA: ICD-10-CM

## 2018-01-21 DIAGNOSIS — I25.10 ATHEROSCLEROTIC HEART DISEASE OF NATIVE CORONARY ARTERY WITHOUT ANGINA PECTORIS: ICD-10-CM

## 2018-01-21 DIAGNOSIS — Z98.891 HISTORY OF UTERINE SCAR FROM PREVIOUS SURGERY: Chronic | ICD-10-CM

## 2018-01-21 DIAGNOSIS — D50.0 IRON DEFICIENCY ANEMIA SECONDARY TO BLOOD LOSS (CHRONIC): ICD-10-CM

## 2018-01-21 LAB
ALBUMIN SERPL ELPH-MCNC: 3.4 G/DL — SIGNIFICANT CHANGE UP (ref 3.3–5)
ALBUMIN SERPL ELPH-MCNC: 3.7 G/DL — SIGNIFICANT CHANGE UP (ref 3.3–5)
ALP SERPL-CCNC: 143 U/L — HIGH (ref 40–120)
ALP SERPL-CCNC: 152 U/L — HIGH (ref 40–120)
ALT FLD-CCNC: 25 U/L — SIGNIFICANT CHANGE UP (ref 4–33)
ALT FLD-CCNC: 26 U/L — SIGNIFICANT CHANGE UP (ref 4–33)
APTT BLD: 29.9 SEC — SIGNIFICANT CHANGE UP (ref 27.5–37.4)
AST SERPL-CCNC: 31 U/L — SIGNIFICANT CHANGE UP (ref 4–32)
AST SERPL-CCNC: 37 U/L — HIGH (ref 4–32)
BASOPHILS # BLD AUTO: 0.03 K/UL — SIGNIFICANT CHANGE UP (ref 0–0.2)
BASOPHILS NFR BLD AUTO: 0.5 % — SIGNIFICANT CHANGE UP (ref 0–2)
BILIRUB SERPL-MCNC: 0.2 MG/DL — SIGNIFICANT CHANGE UP (ref 0.2–1.2)
BILIRUB SERPL-MCNC: 0.7 MG/DL — SIGNIFICANT CHANGE UP (ref 0.2–1.2)
BLD GP AB SCN SERPL QL: NEGATIVE — SIGNIFICANT CHANGE UP
BUN SERPL-MCNC: 20 MG/DL — SIGNIFICANT CHANGE UP (ref 7–23)
BUN SERPL-MCNC: 24 MG/DL — HIGH (ref 7–23)
CALCIUM SERPL-MCNC: 7.9 MG/DL — LOW (ref 8.4–10.5)
CALCIUM SERPL-MCNC: 8.2 MG/DL — LOW (ref 8.4–10.5)
CHLORIDE SERPL-SCNC: 97 MMOL/L — LOW (ref 98–107)
CHLORIDE SERPL-SCNC: 99 MMOL/L — SIGNIFICANT CHANGE UP (ref 98–107)
CK MB BLD-MCNC: 1.8 — SIGNIFICANT CHANGE UP (ref 0–2.5)
CK MB BLD-MCNC: 5.49 NG/ML — HIGH (ref 1–4.7)
CK SERPL-CCNC: 235 U/L — HIGH (ref 25–170)
CK SERPL-CCNC: 310 U/L — HIGH (ref 25–170)
CO2 SERPL-SCNC: 31 MMOL/L — SIGNIFICANT CHANGE UP (ref 22–31)
CO2 SERPL-SCNC: 32 MMOL/L — HIGH (ref 22–31)
CREAT SERPL-MCNC: 1.27 MG/DL — SIGNIFICANT CHANGE UP (ref 0.5–1.3)
CREAT SERPL-MCNC: 1.4 MG/DL — HIGH (ref 0.5–1.3)
EOSINOPHIL # BLD AUTO: 0.02 K/UL — SIGNIFICANT CHANGE UP (ref 0–0.5)
EOSINOPHIL NFR BLD AUTO: 0.3 % — SIGNIFICANT CHANGE UP (ref 0–6)
GLUCOSE BLDC GLUCOMTR-MCNC: 326 MG/DL — HIGH (ref 70–99)
GLUCOSE BLDC GLUCOMTR-MCNC: 336 MG/DL — HIGH (ref 70–99)
GLUCOSE SERPL-MCNC: 241 MG/DL — HIGH (ref 70–99)
GLUCOSE SERPL-MCNC: 354 MG/DL — HIGH (ref 70–99)
HCT VFR BLD CALC: 16.7 % — CRITICAL LOW (ref 34.5–45)
HCT VFR BLD CALC: 23.9 % — LOW (ref 34.5–45)
HGB BLD-MCNC: 4.6 G/DL — CRITICAL LOW (ref 11.5–15.5)
HGB BLD-MCNC: 7.2 G/DL — LOW (ref 11.5–15.5)
IMM GRANULOCYTES # BLD AUTO: 0.05 # — SIGNIFICANT CHANGE UP
IMM GRANULOCYTES NFR BLD AUTO: 0.8 % — SIGNIFICANT CHANGE UP (ref 0–1.5)
INR BLD: 1.17 — SIGNIFICANT CHANGE UP (ref 0.88–1.17)
LYMPHOCYTES # BLD AUTO: 1.92 K/UL — SIGNIFICANT CHANGE UP (ref 1–3.3)
LYMPHOCYTES # BLD AUTO: 31.1 % — SIGNIFICANT CHANGE UP (ref 13–44)
MCHC RBC-ENTMCNC: 20.6 PG — LOW (ref 27–34)
MCHC RBC-ENTMCNC: 23.2 PG — LOW (ref 27–34)
MCHC RBC-ENTMCNC: 27.5 % — LOW (ref 32–36)
MCHC RBC-ENTMCNC: 30.1 % — LOW (ref 32–36)
MCV RBC AUTO: 74.9 FL — LOW (ref 80–100)
MCV RBC AUTO: 77.1 FL — LOW (ref 80–100)
MONOCYTES # BLD AUTO: 0.59 K/UL — SIGNIFICANT CHANGE UP (ref 0–0.9)
MONOCYTES NFR BLD AUTO: 9.5 % — SIGNIFICANT CHANGE UP (ref 2–14)
NEUTROPHILS # BLD AUTO: 3.57 K/UL — SIGNIFICANT CHANGE UP (ref 1.8–7.4)
NEUTROPHILS NFR BLD AUTO: 57.8 % — SIGNIFICANT CHANGE UP (ref 43–77)
NRBC # FLD: 0 — SIGNIFICANT CHANGE UP
NRBC # FLD: 0 — SIGNIFICANT CHANGE UP
OB PNL STL: NEGATIVE — SIGNIFICANT CHANGE UP
PLATELET # BLD AUTO: 319 K/UL — SIGNIFICANT CHANGE UP (ref 150–400)
PLATELET # BLD AUTO: 323 K/UL — SIGNIFICANT CHANGE UP (ref 150–400)
PMV BLD: 12.2 FL — SIGNIFICANT CHANGE UP (ref 7–13)
PMV BLD: 12.5 FL — SIGNIFICANT CHANGE UP (ref 7–13)
POTASSIUM SERPL-MCNC: 3.7 MMOL/L — SIGNIFICANT CHANGE UP (ref 3.5–5.3)
POTASSIUM SERPL-MCNC: 3.9 MMOL/L — SIGNIFICANT CHANGE UP (ref 3.5–5.3)
POTASSIUM SERPL-SCNC: 3.7 MMOL/L — SIGNIFICANT CHANGE UP (ref 3.5–5.3)
POTASSIUM SERPL-SCNC: 3.9 MMOL/L — SIGNIFICANT CHANGE UP (ref 3.5–5.3)
PROT SERPL-MCNC: 6.5 G/DL — SIGNIFICANT CHANGE UP (ref 6–8.3)
PROT SERPL-MCNC: 6.8 G/DL — SIGNIFICANT CHANGE UP (ref 6–8.3)
PROTHROM AB SERPL-ACNC: 13 SEC — SIGNIFICANT CHANGE UP (ref 9.8–13.1)
RBC # BLD: 2.23 M/UL — LOW (ref 3.8–5.2)
RBC # BLD: 3.1 M/UL — LOW (ref 3.8–5.2)
RBC # FLD: 15.7 % — HIGH (ref 10.3–14.5)
RBC # FLD: 16.3 % — HIGH (ref 10.3–14.5)
RETICS #: 0.1 10X6/UL — HIGH (ref 0.02–0.07)
RETICS/RBC NFR: 1.9 % — SIGNIFICANT CHANGE UP (ref 0.5–2.5)
RH IG SCN BLD-IMP: POSITIVE — SIGNIFICANT CHANGE UP
RH IG SCN BLD-IMP: POSITIVE — SIGNIFICANT CHANGE UP
SODIUM SERPL-SCNC: 140 MMOL/L — SIGNIFICANT CHANGE UP (ref 135–145)
SODIUM SERPL-SCNC: 142 MMOL/L — SIGNIFICANT CHANGE UP (ref 135–145)
TROPONIN T SERPL-MCNC: 0.08 NG/ML — HIGH (ref 0–0.06)
TROPONIN T SERPL-MCNC: 0.08 NG/ML — HIGH (ref 0–0.06)
WBC # BLD: 6.18 K/UL — SIGNIFICANT CHANGE UP (ref 3.8–10.5)
WBC # BLD: 6.19 K/UL — SIGNIFICANT CHANGE UP (ref 3.8–10.5)
WBC # FLD AUTO: 6.18 K/UL — SIGNIFICANT CHANGE UP (ref 3.8–10.5)
WBC # FLD AUTO: 6.19 K/UL — SIGNIFICANT CHANGE UP (ref 3.8–10.5)

## 2018-01-21 PROCEDURE — 71046 X-RAY EXAM CHEST 2 VIEWS: CPT | Mod: 26

## 2018-01-21 RX ORDER — PANTOPRAZOLE SODIUM 20 MG/1
40 TABLET, DELAYED RELEASE ORAL
Qty: 0 | Refills: 0 | Status: DISCONTINUED | OUTPATIENT
Start: 2018-01-21 | End: 2018-01-21

## 2018-01-21 RX ORDER — DIPHENHYDRAMINE HCL 50 MG
50 CAPSULE ORAL ONCE
Qty: 0 | Refills: 0 | Status: COMPLETED | OUTPATIENT
Start: 2018-01-21 | End: 2018-01-21

## 2018-01-21 RX ORDER — FUROSEMIDE 40 MG
40 TABLET ORAL DAILY
Qty: 0 | Refills: 0 | Status: DISCONTINUED | OUTPATIENT
Start: 2018-01-21 | End: 2018-01-23

## 2018-01-21 RX ORDER — GLUCAGON INJECTION, SOLUTION 0.5 MG/.1ML
1 INJECTION, SOLUTION SUBCUTANEOUS ONCE
Qty: 0 | Refills: 0 | Status: DISCONTINUED | OUTPATIENT
Start: 2018-01-21 | End: 2018-01-23

## 2018-01-21 RX ORDER — METOPROLOL TARTRATE 50 MG
25 TABLET ORAL
Qty: 0 | Refills: 0 | Status: DISCONTINUED | OUTPATIENT
Start: 2018-01-21 | End: 2018-01-23

## 2018-01-21 RX ORDER — DEXTROSE 50 % IN WATER 50 %
1 SYRINGE (ML) INTRAVENOUS ONCE
Qty: 0 | Refills: 0 | Status: DISCONTINUED | OUTPATIENT
Start: 2018-01-21 | End: 2018-01-23

## 2018-01-21 RX ORDER — ATORVASTATIN CALCIUM 80 MG/1
40 TABLET, FILM COATED ORAL AT BEDTIME
Qty: 0 | Refills: 0 | Status: DISCONTINUED | OUTPATIENT
Start: 2018-01-21 | End: 2018-01-23

## 2018-01-21 RX ORDER — CLOPIDOGREL BISULFATE 75 MG/1
75 TABLET, FILM COATED ORAL DAILY
Qty: 0 | Refills: 0 | Status: DISCONTINUED | OUTPATIENT
Start: 2018-01-21 | End: 2018-01-23

## 2018-01-21 RX ORDER — ACETAMINOPHEN 500 MG
650 TABLET ORAL ONCE
Qty: 0 | Refills: 0 | Status: COMPLETED | OUTPATIENT
Start: 2018-01-21 | End: 2018-01-21

## 2018-01-21 RX ORDER — ASPIRIN/CALCIUM CARB/MAGNESIUM 324 MG
81 TABLET ORAL DAILY
Qty: 0 | Refills: 0 | Status: DISCONTINUED | OUTPATIENT
Start: 2018-01-21 | End: 2018-01-23

## 2018-01-21 RX ORDER — INSULIN LISPRO 100/ML
VIAL (ML) SUBCUTANEOUS
Qty: 0 | Refills: 0 | Status: DISCONTINUED | OUTPATIENT
Start: 2018-01-21 | End: 2018-01-21

## 2018-01-21 RX ORDER — PANTOPRAZOLE SODIUM 20 MG/1
40 TABLET, DELAYED RELEASE ORAL
Qty: 0 | Refills: 0 | Status: DISCONTINUED | OUTPATIENT
Start: 2018-01-21 | End: 2018-01-23

## 2018-01-21 RX ORDER — CLOPIDOGREL BISULFATE 75 MG/1
75 TABLET, FILM COATED ORAL DAILY
Qty: 0 | Refills: 0 | Status: DISCONTINUED | OUTPATIENT
Start: 2018-01-21 | End: 2018-01-21

## 2018-01-21 RX ORDER — PANTOPRAZOLE SODIUM 20 MG/1
80 TABLET, DELAYED RELEASE ORAL ONCE
Qty: 0 | Refills: 0 | Status: COMPLETED | OUTPATIENT
Start: 2018-01-21 | End: 2018-01-21

## 2018-01-21 RX ORDER — DEXTROSE 50 % IN WATER 50 %
25 SYRINGE (ML) INTRAVENOUS ONCE
Qty: 0 | Refills: 0 | Status: DISCONTINUED | OUTPATIENT
Start: 2018-01-21 | End: 2018-01-23

## 2018-01-21 RX ORDER — SODIUM CHLORIDE 9 MG/ML
1000 INJECTION, SOLUTION INTRAVENOUS
Qty: 0 | Refills: 0 | Status: DISCONTINUED | OUTPATIENT
Start: 2018-01-21 | End: 2018-01-23

## 2018-01-21 RX ORDER — INSULIN LISPRO 100/ML
VIAL (ML) SUBCUTANEOUS
Qty: 0 | Refills: 0 | Status: DISCONTINUED | OUTPATIENT
Start: 2018-01-21 | End: 2018-01-23

## 2018-01-21 RX ORDER — GABAPENTIN 400 MG/1
300 CAPSULE ORAL ONCE
Qty: 0 | Refills: 0 | Status: COMPLETED | OUTPATIENT
Start: 2018-01-21 | End: 2018-01-21

## 2018-01-21 RX ORDER — GABAPENTIN 400 MG/1
300 CAPSULE ORAL
Qty: 0 | Refills: 0 | Status: DISCONTINUED | OUTPATIENT
Start: 2018-01-21 | End: 2018-01-23

## 2018-01-21 RX ORDER — DEXTROSE 50 % IN WATER 50 %
12.5 SYRINGE (ML) INTRAVENOUS ONCE
Qty: 0 | Refills: 0 | Status: DISCONTINUED | OUTPATIENT
Start: 2018-01-21 | End: 2018-01-23

## 2018-01-21 RX ORDER — ALBUTEROL 90 UG/1
2 AEROSOL, METERED ORAL EVERY 6 HOURS
Qty: 0 | Refills: 0 | Status: DISCONTINUED | OUTPATIENT
Start: 2018-01-21 | End: 2018-01-23

## 2018-01-21 RX ADMIN — ALBUTEROL 2 PUFF(S): 90 AEROSOL, METERED ORAL at 22:15

## 2018-01-21 RX ADMIN — CLOPIDOGREL BISULFATE 75 MILLIGRAM(S): 75 TABLET, FILM COATED ORAL at 18:21

## 2018-01-21 RX ADMIN — PANTOPRAZOLE SODIUM 80 MILLIGRAM(S): 20 TABLET, DELAYED RELEASE ORAL at 05:21

## 2018-01-21 RX ADMIN — ALBUTEROL 2 PUFF(S): 90 AEROSOL, METERED ORAL at 18:19

## 2018-01-21 RX ADMIN — GABAPENTIN 300 MILLIGRAM(S): 400 CAPSULE ORAL at 10:02

## 2018-01-21 RX ADMIN — PANTOPRAZOLE SODIUM 40 MILLIGRAM(S): 20 TABLET, DELAYED RELEASE ORAL at 16:19

## 2018-01-21 RX ADMIN — GABAPENTIN 300 MILLIGRAM(S): 400 CAPSULE ORAL at 18:20

## 2018-01-21 RX ADMIN — Medication 8: at 23:23

## 2018-01-21 RX ADMIN — Medication 40 MILLIGRAM(S): at 18:21

## 2018-01-21 RX ADMIN — Medication 25 MILLIGRAM(S): at 18:20

## 2018-01-21 RX ADMIN — Medication 650 MILLIGRAM(S): at 13:08

## 2018-01-21 RX ADMIN — Medication 81 MILLIGRAM(S): at 18:21

## 2018-01-21 RX ADMIN — Medication 50 MILLIGRAM(S): at 11:37

## 2018-01-21 RX ADMIN — Medication 650 MILLIGRAM(S): at 11:37

## 2018-01-21 NOTE — PROGRESS NOTE ADULT - SUBJECTIVE AND OBJECTIVE BOX
Patient is a 77y old  Female who presents with a chief complaint of Pt. states" I couldn't breath last night." (21 Jan 2018 19:52)      SUBJECTIVE / OVERNIGHT EVENTS:   Feels better.  Denies CP/SOB/Palpitation/HA.    MEDICATIONS  (STANDING):  ALBUTerol    90 MICROgram(s) HFA Inhaler 2 Puff(s) Inhalation every 6 hours  aspirin enteric coated 81 milliGRAM(s) Oral daily  atorvastatin 40 milliGRAM(s) Oral at bedtime  clopidogrel Tablet 75 milliGRAM(s) Oral daily  dextrose 5%. 1000 milliLiter(s) (50 mL/Hr) IV Continuous <Continuous>  dextrose 50% Injectable 12.5 Gram(s) IV Push once  dextrose 50% Injectable 25 Gram(s) IV Push once  dextrose 50% Injectable 25 Gram(s) IV Push once  furosemide    Tablet 40 milliGRAM(s) Oral daily  gabapentin 300 milliGRAM(s) Oral two times a day  insulin lispro (HumaLOG) corrective regimen sliding scale   SubCutaneous three times a day before meals  metoprolol     tartrate 25 milliGRAM(s) Oral two times a day  pantoprazole  Injectable 40 milliGRAM(s) IV Push two times a day    MEDICATIONS  (PRN):  dextrose Gel 1 Dose(s) Oral once PRN Blood Glucose LESS THAN 70 milliGRAM(s)/deciliter  glucagon  Injectable 1 milliGRAM(s) IntraMuscular once PRN Glucose LESS THAN 70 milligrams/deciliter        CAPILLARY BLOOD GLUCOSE      POCT Blood Glucose.: 326 mg/dL (21 Jan 2018 18:39)  POCT Blood Glucose.: 267 mg/dL (21 Jan 2018 12:04)    I&O's Summary      PHYSICAL EXAM:  GENERAL: NAD, well-developed  HEAD:  Atraumatic, Normocephalic  NECK: Supple, No JVD  CHEST/LUNG: Clear to auscultation bilaterally; No wheezing.  HEART: Regular rate and rhythm; No murmurs, rubs, or gallops  ABDOMEN: Soft, Nontender, Nondistended; Bowel sounds present  EXTREMITIES:   No clubbing, cyanosis, or edema  NEUROLOGY: AAO X 3  SKIN: No rashes    LABS:                        7.2    6.19  )-----------( 319      ( 21 Jan 2018 14:47 )             23.9     01-21    142  |  99  |  20  ----------------------------<  241<H>  3.9   |  31  |  1.27    Ca    7.9<L>      21 Jan 2018 14:47    TPro  6.5  /  Alb  3.4  /  TBili  0.7  /  DBili  x   /  AST  31  /  ALT  25  /  AlkPhos  143<H>  01-21    PT/INR - ( 21 Jan 2018 03:50 )   PT: 13.0 SEC;   INR: 1.17          PTT - ( 21 Jan 2018 03:50 )  PTT:29.9 SEC  CARDIAC MARKERS ( 21 Jan 2018 14:47 )  x     / 0.08 ng/mL / 235 u/L / x     / x      CARDIAC MARKERS ( 21 Jan 2018 03:50 )  x     / 0.08 ng/mL / 310 u/L / 5.49 ng/mL / x            CAPILLARY BLOOD GLUCOSE      POCT Blood Glucose.: 326 mg/dL (21 Jan 2018 18:39)  POCT Blood Glucose.: 267 mg/dL (21 Jan 2018 12:04)                RADIOLOGY & ADDITIONAL TESTS:    Imaging Personally Reviewed:    Consultant(s) Notes Reviewed:      Care Discussed with Consultants/Other Providers:

## 2018-01-21 NOTE — CONSULT NOTE ADULT - PROBLEM SELECTOR RECOMMENDATION 9
clear liquid diet  iv proton pump inhibitor bid  hold anticoagulation  have to continue dual antiplatlet therapy   upper gastrointestinal endoscopy once hemoglobin greater than 9  d/w patient and family  will need cardiac clearance

## 2018-01-21 NOTE — ED PROVIDER NOTE - MEDICAL DECISION MAKING DETAILS
77yoF hx of cad w 2 stents placed 2 weeks ago, pw sob. appears well. no chest pain. concern for stent occlusion vs chf vs fluid overload. will send labs, trops, bnp, cxr, ekg and likely admit for serial enzymes. ems gave ASA. 77yoF hx of cad w 2 stents placed 2 weeks ago, pw sob. appears well. no chest pain. concern for stent occlusion vs chf vs fluid overload. will send labs, trops, bnp, cxr, ekg and likely admit for serial enzymes. ems gave ASA.    Cabot: 77F with PMH of CAD s/p NSTEMI with 2 stents placed 2 weeks ago who comes in with SOB since yesterday AM.  No F/C/cough/sputum.  No CP/N/V/diaphoresis.  No blood per rectum.  No urinary sx.  Her cardiologist told her to take a double dose of lasix, but this did not help.  On exam, HDS, NAD, pale conjunctiva, lungs CTAB, heart sounds normal, abd benign, LEs without edema.  EKG without change from prior.  DDx includes anemia, infection, deconditioning, pulm edema, unlikely recurrent ACS.  Received ASA in ambulance.  Will check full labs, trops, BNP, EKG, CXR, admit.

## 2018-01-21 NOTE — ED ADULT NURSE REASSESSMENT NOTE - NS ED NURSE REASSESS COMMENT FT1
Pt started to complain of pain at the IV site. No erythema, edema noted. Area tender. Pt reported that she pull the line while moving. Pt started to became tachypneic. Reported some palpitations. Tele JULIO CESAR Devine notified. Assessed pt. Did not consider that pt is having an allergic reaction. Pt verbalized she just really tired. Became very emotional. Started to cry. Pt verbalized she was not having any pain from IV site anymore.

## 2018-01-21 NOTE — H&P ADULT - PMH
CAD (coronary artery disease)    Diabetic neuropathy    HLD (hyperlipidemia)    Type 2 diabetes mellitus with hyperglycemia, with long-term current use of insulin CAD (coronary artery disease)    Diabetic neuropathy    HLD (hyperlipidemia)    Paroxysmal atrial fibrillation    Type 2 diabetes mellitus with hyperglycemia, with long-term current use of insulin

## 2018-01-21 NOTE — ED ADULT NURSE NOTE - CHIEF COMPLAINT QUOTE
Brought to ED by EMS from home.  Appears comfortable and in no apparent distress.  Had 3 stents placed 3 wks ago at [facility].  Palpitations and SOB this evening.  Aspirin 162 mg and oxygen 2L NC given by EMS.  Denies chest pain, SOB or palpitations at this time.

## 2018-01-21 NOTE — ED ADULT NURSE NOTE - OBJECTIVE STATEMENT
alert c/o intermittent SOB  intermittent right shoulder stiffness and intermittent palpitations   states she has no energy  SR with occ'l PVCs on scope   seen by Dr Delacruz    Breath sounds clear bilaterally

## 2018-01-21 NOTE — ED PROVIDER NOTE - PROGRESS NOTE DETAILS
Cabot: On reeval, patient reports 3d of melena.  Still denies abd pain. Jayme LAZO - rectal exam with no stool in vault. however endorsing 3 days of black tarry stools.   chaperone MD Jang  called Dr. Hooper for admission who would like to call Dr. Goldberg go admission. awaiting call back from Dr. Goldberg. Jayme Hutchinson - called Dr. Goldberg again, no call back. left message. if no return call, will call Dr. Hooper for admission. No answer or call back from Dr. Goldberg. Will admit to Dr. Hooper.

## 2018-01-21 NOTE — H&P ADULT - ATTENDING COMMENTS
77 year old woman with uncontrolled IDDM presents with respiratory distress found to have acute blood loss anemia.    #Acute blood loss anemia- in the setting of Xarelto and Plavix.  Hemodynamically stable.   Transfuse for Hb of 9.  GI eval appreciated.  Patient displays no signs or symptoms of ACS, decompensated CHF or malignant arrhythmia.  No cardiac objection to proceeding with GI scoping for life threatening bleed.     #CAD-  Patient s/p MICHAEL to LCx and OM1 on 12/20/2017  Continue ASA, Plavix, and beta-blocker given recent stent.       #Compensated diastolic CHF-  Closely monitor volume status between transfusions.  TTE- normal LVEF with no significant valvulopathy.     #Paroxysmal A-fib-  Currently in sinus.  No significant valvular disease on TTE.  Holding AC for GI bleed.

## 2018-01-21 NOTE — ED PROVIDER NOTE - ATTENDING CONTRIBUTION TO CARE
I, Jennifer Cabot, MD, have performed a history and physical exam of the patient and discussed their management with the resident. I reviewed the resident's note and agree with the documented findings and plan of care. My medical decision making and observations are found above.    Cabot: 77F with PMH of CAD s/p NSTEMI with 2 stents placed 2 weeks ago who comes in with SOB since yesterday AM.  No F/C/cough/sputum.  No CP/N/V/diaphoresis.  No blood per rectum.  No urinary sx.  Her cardiologist told her to take a double dose of lasix, but this did not help.  On exam, HDS, NAD, pale conjunctiva, lungs CTAB, heart sounds normal, abd benign, LEs without edema.  EKG without change from prior.  DDx includes anemia, infection, deconditioning, pulm edema, unlikely recurrent ACS.  Received ASA in ambulance.  Will check full labs, trops, BNP, EKG, CXR, admit.

## 2018-01-21 NOTE — ED ADULT TRIAGE NOTE - CHIEF COMPLAINT QUOTE
Brought to ED by EMS from home.  Appears comfortable and in no apparent distress.  Had 3 stents placed 3 wks ago at [facility].  Palpitations and SOB this evening.  Aspirin 162 mg and oxygen 2L NC given by EMS.  Denies chest pain, SOB or palpitations at this time.

## 2018-01-21 NOTE — H&P ADULT - HISTORY OF PRESENT ILLNESS
This is a 78 yo F admitted co SOB, melena for 2 days. Patient states her SOB has gotten progressively worse. She called her doctor and was told to take extra furosemide, without releif. She states since her cath 2 weeks ago she has never fully recovered. She has been weak and SOB just walking around the house. She noticed her stools have been black the past few days, but seems to be returning to normal today. She also feels dizzy and unsteady when she walks.  She did not have any LOC or falls . She has no abd pain, N/V/D, no chest pain. No fever or chills This is a 76 yo F admitted co SOB, melena for 2 days. Patient states her SOB has gotten progressively worse. She called her doctor and was told to take extra furosemide, without releif. She states since her cath 2 weeks ago she has never fully recovered. She has been weak and SOB just walking around the house. She noticed her stools have been black the past few days, but seems to be returning to normal today. She also feels dizzy and unsteady when she walks.  She did not have any LOC or falls . She has no abd pain, N/V/D, no chest pain. No fever or chills     PMhx CAD WITH STENT, PAF on xarelto, DM, DM neuropathy

## 2018-01-21 NOTE — H&P ADULT - PROBLEM SELECTOR PLAN 1
being transfused 2nd unit PRBC   protonix  gave tylenol and benadryl during 2nd unit transfusion  LORRAINE joseph   NPO for now   will discuss with cardio regarding rivaroxaban

## 2018-01-21 NOTE — PROGRESS NOTE ADULT - ASSESSMENT
Patient is a 77y old  Female who presents with a chief complaint of Pt. states" I couldn't breath last night." (21 Jan 2018 19:52).    Acute blood loss anemia:    S/p PRBC  GI eval noted.  Will need EGD.    CAD s/p PCI:    Cardio eval noted.  ASA/Plavix.    DM II:  Clear liquid

## 2018-01-21 NOTE — CONSULT NOTE ADULT - SUBJECTIVE AND OBJECTIVE BOX
[facility]  Tonio Covarrubias PA-C  [address] [place] [address] [phone]      Chief Complaint:  Patient is a 77y old  Female who presents with a chief complaint of SOB and melena (2018 12:00)      HPI: 77F with history as below specifically cardiac cath with MICHAEL placed in december on dual antiplatlet therapy with therapeutic ac now presents with symptomatic anemia and melena   no hematemsis    Allergies:  No Known Allergies      Medications:  ALBUTerol    90 MICROgram(s) HFA Inhaler 2 Puff(s) Inhalation every 6 hours  atorvastatin 40 milliGRAM(s) Oral at bedtime  dextrose 5%. 1000 milliLiter(s) IV Continuous <Continuous>  dextrose 50% Injectable 12.5 Gram(s) IV Push once  dextrose 50% Injectable 25 Gram(s) IV Push once  dextrose 50% Injectable 25 Gram(s) IV Push once  dextrose Gel 1 Dose(s) Oral once PRN  furosemide    Tablet 40 milliGRAM(s) Oral daily  gabapentin 300 milliGRAM(s) Oral two times a day  glucagon  Injectable 1 milliGRAM(s) IntraMuscular once PRN  insulin lispro (HumaLOG) corrective regimen sliding scale   SubCutaneous three times a day before meals  metoprolol     tartrate 25 milliGRAM(s) Oral two times a day  pantoprazole    Tablet 40 milliGRAM(s) Oral before breakfast      PMHX/PSHX:  Paroxysmal atrial fibrillation  CAD (coronary artery disease)  Diabetic neuropathy  Type 2 diabetes mellitus with hyperglycemia, with long-term current use of insulin  HLD (hyperlipidemia)  Type 2 diabetes mellitus without complication  H/O  section  No significant past surgical history      Family history:  No pertinent family history in first degree relatives      Social History:     ROS:     General:  No wt loss, fevers, chills, night sweats, fatigue,   Eyes:  Good vision, no reported pain  ENT:  No sore throat, pain, runny nose, dysphagia  CV:  No pain, palpitations, hypo/hypertension  Resp:  No dyspnea, cough, tachypnea, wheezing  GI:  No pain, No nausea, No vomiting, No diarrhea, No constipation, No weight loss, No fever, No pruritis, No rectal bleeding, No tarry stools, No dysphagia,  :  No pain, bleeding, incontinence, nocturia  Muscle:  No pain, weakness  Neuro:  No weakness, tingling, memory problems  Psych:  No fatigue, insomnia, mood problems, depression  Endocrine:  No polyuria, polydipsia, cold/heat intolerance  Heme:  No petechiae, ecchymosis, easy bruisability  Skin:  No rash, tattoos, scars, edema      PHYSICAL EXAM:   Vital Signs:  Vital Signs Last 24 Hrs  T(C): 36.6 (2018 10:10), Max: 36.9 (2018 01:25)  T(F): 97.9 (2018 10:10), Max: 98.4 (2018 01:25)  HR: 75 (2018 11:07) (75 - 88)  BP: 150/47 (2018 11:07) (140/75 - 176/54)  BP(mean): --  RR: 14 (2018 11:07) (14 - 18)  SpO2: 100% (2018 11:07) (99% - 100%)  Daily     Daily     GENERAL:  Appears stated age, well-groomed, well-nourished, no distress  HEENT:  NC/AT,  conjunctivae clear and pink, no thyromegaly, nodules, adenopathy, no JVD, sclera -anicteric  CHEST:  Full & symmetric excursion, no increased effort, breath sounds clear  HEART:  Regular rhythm, S1, S2, no murmur/rub/S3/S4, no abdominal bruit, no edema  ABDOMEN:  Soft, non-tender, non-distended, normoactive bowel sounds,  no masses ,no hepato-splenomegaly, no signs of chronic liver disease  EXTEREMITIES:  no cyanosis,clubbing or edema  SKIN:  No rash/erythema/ecchymoses/petechiae/wounds/abscess/warm/dry  NEURO:  Alert, oriented, no asterixis, no tremor, no encephalopathy    LABS:                        4.6    6.18  )-----------( 323      ( 2018 03:50 )             16.7         140  |  97<L>  |  24<H>  ----------------------------<  354<H>  3.7   |  32<H>  |  1.40<H>    Ca    8.2<L>      2018 03:50    TPro  6.8  /  Alb  3.7  /  TBili  0.2  /  DBili  x   /  AST  37<H>  /  ALT  26  /  AlkPhos  152<H>      LIVER FUNCTIONS - ( 2018 03:50 )  Alb: 3.7 g/dL / Pro: 6.8 g/dL / ALK PHOS: 152 u/L / ALT: 26 u/L / AST: 37 u/L / GGT: x           PT/INR - ( 2018 03:50 )   PT: 13.0 SEC;   INR: 1.17          PTT - ( 2018 03:50 )  PTT:29.9 SEC        Imaging:

## 2018-01-21 NOTE — H&P ADULT - NSHPSOCIALHISTORY_GEN_ALL_CORE
Single, lives with grand-daughter, retired   Quit smoking 10 years ago and smoked 1 pack a day for 42 years , drinks alcohol occasionally

## 2018-01-21 NOTE — ED PROVIDER NOTE - OBJECTIVE STATEMENT
77yoF hx of CAD with 2 stents placed 2 weeks ago pw 2 days of shortness of breath. started yesterday, slowly got worse. tonight patient unable to sleep due to symptoms. felt dizzy and not right. since discharge from hospital patient also feeling weak and with no energy. took 2 tabs of lasix yesterday and did not help. EMS gave , NC 2 L and patient symptoms improved. now no sob. denies fever, chills, nausea, vomiting, chest pain, abd pain, numbness, tlinging or other issues.

## 2018-01-22 DIAGNOSIS — I48.0 PAROXYSMAL ATRIAL FIBRILLATION: ICD-10-CM

## 2018-01-22 DIAGNOSIS — I25.10 ATHEROSCLEROTIC HEART DISEASE OF NATIVE CORONARY ARTERY WITHOUT ANGINA PECTORIS: ICD-10-CM

## 2018-01-22 LAB
BUN SERPL-MCNC: 16 MG/DL — SIGNIFICANT CHANGE UP (ref 7–23)
CALCIUM SERPL-MCNC: 8.3 MG/DL — LOW (ref 8.4–10.5)
CHLORIDE SERPL-SCNC: 99 MMOL/L — SIGNIFICANT CHANGE UP (ref 98–107)
CO2 SERPL-SCNC: 31 MMOL/L — SIGNIFICANT CHANGE UP (ref 22–31)
CREAT SERPL-MCNC: 1.33 MG/DL — HIGH (ref 0.5–1.3)
GLUCOSE BLDC GLUCOMTR-MCNC: 207 MG/DL — HIGH (ref 70–99)
GLUCOSE BLDC GLUCOMTR-MCNC: 297 MG/DL — HIGH (ref 70–99)
GLUCOSE BLDC GLUCOMTR-MCNC: 301 MG/DL — HIGH (ref 70–99)
GLUCOSE BLDC GLUCOMTR-MCNC: 324 MG/DL — HIGH (ref 70–99)
GLUCOSE SERPL-MCNC: 153 MG/DL — HIGH (ref 70–99)
HCT VFR BLD CALC: 29.5 % — LOW (ref 34.5–45)
HGB BLD-MCNC: 9.3 G/DL — LOW (ref 11.5–15.5)
MAGNESIUM SERPL-MCNC: 2.1 MG/DL — SIGNIFICANT CHANGE UP (ref 1.6–2.6)
MCHC RBC-ENTMCNC: 24 PG — LOW (ref 27–34)
MCHC RBC-ENTMCNC: 31.5 % — LOW (ref 32–36)
MCV RBC AUTO: 76.2 FL — LOW (ref 80–100)
NRBC # FLD: 0 — SIGNIFICANT CHANGE UP
PLATELET # BLD AUTO: 312 K/UL — SIGNIFICANT CHANGE UP (ref 150–400)
PMV BLD: 11.8 FL — SIGNIFICANT CHANGE UP (ref 7–13)
POTASSIUM SERPL-MCNC: 3.4 MMOL/L — LOW (ref 3.5–5.3)
POTASSIUM SERPL-SCNC: 3.4 MMOL/L — LOW (ref 3.5–5.3)
RBC # BLD: 3.87 M/UL — SIGNIFICANT CHANGE UP (ref 3.8–5.2)
RBC # FLD: 15.2 % — HIGH (ref 10.3–14.5)
SODIUM SERPL-SCNC: 140 MMOL/L — SIGNIFICANT CHANGE UP (ref 135–145)
WBC # BLD: 5.94 K/UL — SIGNIFICANT CHANGE UP (ref 3.8–10.5)
WBC # FLD AUTO: 5.94 K/UL — SIGNIFICANT CHANGE UP (ref 3.8–10.5)

## 2018-01-22 RX ORDER — POTASSIUM CHLORIDE 20 MEQ
40 PACKET (EA) ORAL EVERY 4 HOURS
Qty: 0 | Refills: 0 | Status: COMPLETED | OUTPATIENT
Start: 2018-01-22 | End: 2018-01-22

## 2018-01-22 RX ADMIN — Medication 40 MILLIGRAM(S): at 05:40

## 2018-01-22 RX ADMIN — PANTOPRAZOLE SODIUM 40 MILLIGRAM(S): 20 TABLET, DELAYED RELEASE ORAL at 05:40

## 2018-01-22 RX ADMIN — ATORVASTATIN CALCIUM 40 MILLIGRAM(S): 80 TABLET, FILM COATED ORAL at 21:52

## 2018-01-22 RX ADMIN — Medication 40 MILLIEQUIVALENT(S): at 11:07

## 2018-01-22 RX ADMIN — ALBUTEROL 2 PUFF(S): 90 AEROSOL, METERED ORAL at 21:52

## 2018-01-22 RX ADMIN — Medication 40 MILLIEQUIVALENT(S): at 15:27

## 2018-01-22 RX ADMIN — ALBUTEROL 2 PUFF(S): 90 AEROSOL, METERED ORAL at 05:40

## 2018-01-22 RX ADMIN — Medication 25 MILLIGRAM(S): at 17:35

## 2018-01-22 RX ADMIN — ALBUTEROL 2 PUFF(S): 90 AEROSOL, METERED ORAL at 15:28

## 2018-01-22 RX ADMIN — GABAPENTIN 300 MILLIGRAM(S): 400 CAPSULE ORAL at 17:35

## 2018-01-22 RX ADMIN — GABAPENTIN 300 MILLIGRAM(S): 400 CAPSULE ORAL at 05:40

## 2018-01-22 RX ADMIN — Medication: at 12:05

## 2018-01-22 RX ADMIN — PANTOPRAZOLE SODIUM 40 MILLIGRAM(S): 20 TABLET, DELAYED RELEASE ORAL at 17:35

## 2018-01-22 RX ADMIN — Medication 25 MILLIGRAM(S): at 05:40

## 2018-01-22 RX ADMIN — Medication 81 MILLIGRAM(S): at 11:11

## 2018-01-22 RX ADMIN — CLOPIDOGREL BISULFATE 75 MILLIGRAM(S): 75 TABLET, FILM COATED ORAL at 11:11

## 2018-01-22 RX ADMIN — ALBUTEROL 2 PUFF(S): 90 AEROSOL, METERED ORAL at 11:07

## 2018-01-22 RX ADMIN — Medication 8: at 22:29

## 2018-01-22 RX ADMIN — Medication 6: at 17:35

## 2018-01-22 NOTE — PROGRESS NOTE ADULT - SUBJECTIVE AND OBJECTIVE BOX
Patient is a 77y old  Female who presents with a chief complaint of Pt. states" I couldn't breath last night." (21 Jan 2018 19:52)      SUBJECTIVE / OVERNIGHT EVENTS:   Feels better.  Denies CP/SOB/Palpitation/HA.    MEDICATIONS  (STANDING):  ALBUTerol    90 MICROgram(s) HFA Inhaler 2 Puff(s) Inhalation every 6 hours  aspirin enteric coated 81 milliGRAM(s) Oral daily  atorvastatin 40 milliGRAM(s) Oral at bedtime  clopidogrel Tablet 75 milliGRAM(s) Oral daily  dextrose 5%. 1000 milliLiter(s) (50 mL/Hr) IV Continuous <Continuous>  dextrose 50% Injectable 12.5 Gram(s) IV Push once  dextrose 50% Injectable 25 Gram(s) IV Push once  dextrose 50% Injectable 25 Gram(s) IV Push once  furosemide    Tablet 40 milliGRAM(s) Oral daily  gabapentin 300 milliGRAM(s) Oral two times a day  insulin lispro (HumaLOG) corrective regimen sliding scale   SubCutaneous Before meals and at bedtime  metoprolol     tartrate 25 milliGRAM(s) Oral two times a day  pantoprazole  Injectable 40 milliGRAM(s) IV Push two times a day    MEDICATIONS  (PRN):  dextrose Gel 1 Dose(s) Oral once PRN Blood Glucose LESS THAN 70 milliGRAM(s)/deciliter  glucagon  Injectable 1 milliGRAM(s) IntraMuscular once PRN Glucose LESS THAN 70 milligrams/deciliter        CAPILLARY BLOOD GLUCOSE      POCT Blood Glucose.: 301 mg/dL (22 Jan 2018 11:59)  POCT Blood Glucose.: 207 mg/dL (22 Jan 2018 08:37)  POCT Blood Glucose.: 336 mg/dL (21 Jan 2018 22:25)  POCT Blood Glucose.: 326 mg/dL (21 Jan 2018 18:39)    I&O's Summary      PHYSICAL EXAM:  GENERAL: NAD, well-developed  HEAD:  Atraumatic, Normocephalic  NECK: Supple, No JVD  CHEST/LUNG: Clear to auscultation bilaterally; No wheezing.  HEART: Regular rate and rhythm; No murmurs, rubs, or gallops  ABDOMEN: Soft, Nontender, Nondistended; Bowel sounds present  EXTREMITIES:   No clubbing, cyanosis, or edema  NEUROLOGY: AAO X 3  SKIN: No rashes    LABS:                        9.3    5.94  )-----------( 312      ( 22 Jan 2018 06:11 )             29.5     01-22    140  |  99  |  16  ----------------------------<  153<H>  3.4<L>   |  31  |  1.33<H>    Ca    8.3<L>      22 Jan 2018 06:11  Mg     2.1     01-22    TPro  6.5  /  Alb  3.4  /  TBili  0.7  /  DBili  x   /  AST  31  /  ALT  25  /  AlkPhos  143<H>  01-21    PT/INR - ( 21 Jan 2018 03:50 )   PT: 13.0 SEC;   INR: 1.17          PTT - ( 21 Jan 2018 03:50 )  PTT:29.9 SEC  CARDIAC MARKERS ( 21 Jan 2018 14:47 )  x     / 0.08 ng/mL / 235 u/L / x     / x      CARDIAC MARKERS ( 21 Jan 2018 03:50 )  x     / 0.08 ng/mL / 310 u/L / 5.49 ng/mL / x            CAPILLARY BLOOD GLUCOSE      POCT Blood Glucose.: 301 mg/dL (22 Jan 2018 11:59)  POCT Blood Glucose.: 207 mg/dL (22 Jan 2018 08:37)  POCT Blood Glucose.: 336 mg/dL (21 Jan 2018 22:25)  POCT Blood Glucose.: 326 mg/dL (21 Jan 2018 18:39)                RADIOLOGY & ADDITIONAL TESTS:    Imaging Personally Reviewed:    Consultant(s) Notes Reviewed:      Care Discussed with Consultants/Other Providers:

## 2018-01-22 NOTE — PROGRESS NOTE ADULT - ASSESSMENT
Patient is a 77y old  Female who presents with a chief complaint of Pt. states" I couldn't breath last night." (21 Jan 2018 19:52).    Acute blood loss anemia:    S/p PRBC  GI f/up noted.   EGD tomorrow.    CAD s/p PCI:    Cardio f/up  noted.  ASA/Plavix.    DM II:  Clear liquid

## 2018-01-22 NOTE — PROGRESS NOTE ADULT - SUBJECTIVE AND OBJECTIVE BOX
INTERVAL HPI/OVERNIGHT EVENTS:    pt reports +bm overnight "did not look at it"  no bm this morning   "feeling better"  denies n/v/d/c, abdominal pain    MEDICATIONS  (STANDING):  ALBUTerol    90 MICROgram(s) HFA Inhaler 2 Puff(s) Inhalation every 6 hours  aspirin enteric coated 81 milliGRAM(s) Oral daily  atorvastatin 40 milliGRAM(s) Oral at bedtime  clopidogrel Tablet 75 milliGRAM(s) Oral daily  dextrose 5%. 1000 milliLiter(s) (50 mL/Hr) IV Continuous <Continuous>  dextrose 50% Injectable 12.5 Gram(s) IV Push once  dextrose 50% Injectable 25 Gram(s) IV Push once  dextrose 50% Injectable 25 Gram(s) IV Push once  furosemide    Tablet 40 milliGRAM(s) Oral daily  gabapentin 300 milliGRAM(s) Oral two times a day  insulin lispro (HumaLOG) corrective regimen sliding scale   SubCutaneous Before meals and at bedtime  metoprolol     tartrate 25 milliGRAM(s) Oral two times a day  pantoprazole  Injectable 40 milliGRAM(s) IV Push two times a day  potassium chloride   Powder 40 milliEquivalent(s) Oral every 4 hours    MEDICATIONS  (PRN):  dextrose Gel 1 Dose(s) Oral once PRN Blood Glucose LESS THAN 70 milliGRAM(s)/deciliter  glucagon  Injectable 1 milliGRAM(s) IntraMuscular once PRN Glucose LESS THAN 70 milligrams/deciliter      Allergies    No Known Allergies    Intolerances        Review of Systems:    General:  No wt loss, fevers, chills, night sweats, fatigue   Eyes:  Good vision, no reported pain  ENT:  No sore throat, pain, runny nose, dysphagia  CV:  No pain, palpitations, hypo/hypertension  Resp:  No dyspnea, cough, tachypnea, wheezing  GI:  No pain, No nausea, No vomiting, No diarrhea, No constipation, No weight loss, No fever, No pruritis, No rectal bleeding, No melena, No dysphagia  :  No pain, bleeding, incontinence, nocturia  Muscle:  No pain, weakness  Neuro:  No weakness, tingling, memory problems  Psych:  No fatigue, insomnia, mood problems, depression  Endocrine:  No polyuria, polydypsia, cold/heat intolerance  Heme:  No petechiae, ecchymosis, easy bruisability  Skin:  No rash, tattoos, scars, edema      Vital Signs Last 24 Hrs  T(C): 36.7 (22 Jan 2018 10:35), Max: 36.9 (21 Jan 2018 19:46)  T(F): 98 (22 Jan 2018 10:35), Max: 98.4 (21 Jan 2018 19:46)  HR: 72 (22 Jan 2018 10:35) (66 - 75)  BP: 154/66 (22 Jan 2018 10:35) (139/55 - 158/65)  BP(mean): --  RR: 18 (22 Jan 2018 10:35) (17 - 18)  SpO2: 97% (22 Jan 2018 10:35) (97% - 100%)    PHYSICAL EXAM:    Constitutional: NAD  HEENT: EOMI, throat clear  Neck: No LAD, supple  Respiratory: CTA and P  Cardiovascular: S1 and S2, RRR, no M  Gastrointestinal: BS+, soft, NT/ND, neg HSM,  Extremities: No peripheral edema, neg clubbing, cyanosis  Vascular: 2+ peripheral pulses  Neurological: A/O x 3, no focal deficits  Psychiatric: Normal mood, normal affect  Skin: No rashes      LABS:                        9.3    5.94  )-----------( 312      ( 22 Jan 2018 06:11 )             29.5     01-22    140  |  99  |  16  ----------------------------<  153<H>  3.4<L>   |  31  |  1.33<H>    Ca    8.3<L>      22 Jan 2018 06:11  Mg     2.1     01-22    TPro  6.5  /  Alb  3.4  /  TBili  0.7  /  DBili  x   /  AST  31  /  ALT  25  /  AlkPhos  143<H>  01-21    PT/INR - ( 21 Jan 2018 03:50 )   PT: 13.0 SEC;   INR: 1.17          PTT - ( 21 Jan 2018 03:50 )  PTT:29.9 SEC      RADIOLOGY & ADDITIONAL TESTS:

## 2018-01-22 NOTE — PRE-OP CHECKLIST - 1.
Pt complaints of generalised itching on body stating "feels itchy after took morning pills". No rash noted . JULIO CESAR Hassan made aware . Ordered PO benadryl , unable to give medicine bcz pt was in on the way to endo . Informed YOKASTA Berger .

## 2018-01-22 NOTE — PROGRESS NOTE ADULT - SUBJECTIVE AND OBJECTIVE BOX
Cardiovascular Disease Progress Note    Overnight events: No acute events overnight. Ms. Tsnag feels well. No chest pain or SOB.   Otherwise review of systems negative    Objective Findings:  T(C): 36.9 (01-22-18 @ 05:10), Max: 36.9 (01-21-18 @ 19:46)  HR: 74 (01-22-18 @ 05:10) (66 - 78)  BP: 149/62 (01-22-18 @ 05:10) (139/55 - 158/65)  RR: 18 (01-22-18 @ 05:10) (14 - 18)  SpO2: 97% (01-22-18 @ 05:10) (97% - 100%)  Wt(kg): --  Daily     Daily       Physical Exam:  Gen: NAD  HEENT: EOMI  CV: RRR, normal S1 + S2, no m/r/g  Lungs: CTAB  Abd: soft, non-tender  Ext: No edema    Telemetry: Sinus; no ectopy    Laboratory Data:                        7.2    6.19  )-----------( 319      ( 21 Jan 2018 14:47 )             23.9     01-21    142  |  99  |  20  ----------------------------<  241<H>  3.9   |  31  |  1.27    Ca    7.9<L>      21 Jan 2018 14:47    TPro  6.5  /  Alb  3.4  /  TBili  0.7  /  DBili  x   /  AST  31  /  ALT  25  /  AlkPhos  143<H>  01-21    PT/INR - ( 21 Jan 2018 03:50 )   PT: 13.0 SEC;   INR: 1.17          PTT - ( 21 Jan 2018 03:50 )  PTT:29.9 SEC  CARDIAC MARKERS ( 21 Jan 2018 14:47 )  x     / 0.08 ng/mL / 235 u/L / x     / x      CARDIAC MARKERS ( 21 Jan 2018 03:50 )  x     / 0.08 ng/mL / 310 u/L / 5.49 ng/mL / x              Inpatient Medications:  MEDICATIONS  (STANDING):  ALBUTerol    90 MICROgram(s) HFA Inhaler 2 Puff(s) Inhalation every 6 hours  aspirin enteric coated 81 milliGRAM(s) Oral daily  atorvastatin 40 milliGRAM(s) Oral at bedtime  clopidogrel Tablet 75 milliGRAM(s) Oral daily  dextrose 5%. 1000 milliLiter(s) (50 mL/Hr) IV Continuous <Continuous>  dextrose 50% Injectable 12.5 Gram(s) IV Push once  dextrose 50% Injectable 25 Gram(s) IV Push once  dextrose 50% Injectable 25 Gram(s) IV Push once  furosemide    Tablet 40 milliGRAM(s) Oral daily  gabapentin 300 milliGRAM(s) Oral two times a day  insulin lispro (HumaLOG) corrective regimen sliding scale   SubCutaneous Before meals and at bedtime  metoprolol     tartrate 25 milliGRAM(s) Oral two times a day  pantoprazole  Injectable 40 milliGRAM(s) IV Push two times a day      Assessment: 77 year old woman with uncontrolled IDDM presents with respiratory distress found to have acute blood loss anemia.    #Acute blood loss anemia- in the setting of Xarelto and Plavix.  Hemodynamically stable.   Transfuse for Hb of 9, as per GI recommendations for scoping.  GI eval appreciated.  Patient displays no signs or symptoms of ACS, decompensated CHF or malignant arrhythmia.  Baseline EKG with ST depressions.  No cardiac objection to proceeding with GI scoping for life threatening bleed.     #CAD-  Patient s/p MICHAEL to LCx and OM1 on 12/20/2017  Continue ASA, Plavix, and beta-blocker given recent stent.     #Compensated diastolic CHF-  Closely monitor volume status between transfusions.  TTE- normal LVEF with no significant valvulopathy.     #Paroxysmal A-fib-  Currently in sinus.  No significant valvular disease on TTE.  Holding Xarelto for GI bleed.      Over 35 minutes spent on total encounter; more than 50% of the visit was spent counseling and/or coordinating care by the attending physician.      Julio César Hooper M.D.   Cardiovascular Disease  [phone] Cardiovascular Disease Progress Note    Overnight events: No acute events overnight. Ms. Tsang feels well. No chest pain or SOB.   Otherwise review of systems negative    Objective Findings:  T(C): 36.9 (01-22-18 @ 05:10), Max: 36.9 (01-21-18 @ 19:46)  HR: 74 (01-22-18 @ 05:10) (66 - 78)  BP: 149/62 (01-22-18 @ 05:10) (139/55 - 158/65)  RR: 18 (01-22-18 @ 05:10) (14 - 18)  SpO2: 97% (01-22-18 @ 05:10) (97% - 100%)  Wt(kg): --  Daily     Daily       Physical Exam:  Gen: NAD  HEENT: EOMI  CV: RRR, normal S1 + S2, no m/r/g  Lungs: CTAB  Abd: soft, non-tender  Ext: No edema    Telemetry: Sinus; no ectopy    Laboratory Data:                        7.2    6.19  )-----------( 319      ( 21 Jan 2018 14:47 )             23.9     01-21    142  |  99  |  20  ----------------------------<  241<H>  3.9   |  31  |  1.27    Ca    7.9<L>      21 Jan 2018 14:47    TPro  6.5  /  Alb  3.4  /  TBili  0.7  /  DBili  x   /  AST  31  /  ALT  25  /  AlkPhos  143<H>  01-21    PT/INR - ( 21 Jan 2018 03:50 )   PT: 13.0 SEC;   INR: 1.17          PTT - ( 21 Jan 2018 03:50 )  PTT:29.9 SEC  CARDIAC MARKERS ( 21 Jan 2018 14:47 )  x     / 0.08 ng/mL / 235 u/L / x     / x      CARDIAC MARKERS ( 21 Jan 2018 03:50 )  x     / 0.08 ng/mL / 310 u/L / 5.49 ng/mL / x              Inpatient Medications:  MEDICATIONS  (STANDING):  ALBUTerol    90 MICROgram(s) HFA Inhaler 2 Puff(s) Inhalation every 6 hours  aspirin enteric coated 81 milliGRAM(s) Oral daily  atorvastatin 40 milliGRAM(s) Oral at bedtime  clopidogrel Tablet 75 milliGRAM(s) Oral daily  dextrose 5%. 1000 milliLiter(s) (50 mL/Hr) IV Continuous <Continuous>  dextrose 50% Injectable 12.5 Gram(s) IV Push once  dextrose 50% Injectable 25 Gram(s) IV Push once  dextrose 50% Injectable 25 Gram(s) IV Push once  furosemide    Tablet 40 milliGRAM(s) Oral daily  gabapentin 300 milliGRAM(s) Oral two times a day  insulin lispro (HumaLOG) corrective regimen sliding scale   SubCutaneous Before meals and at bedtime  metoprolol     tartrate 25 milliGRAM(s) Oral two times a day  pantoprazole  Injectable 40 milliGRAM(s) IV Push two times a day      Assessment: 77 year old woman with uncontrolled IDDM presents with respiratory distress found to have acute blood loss anemia.    #Acute blood loss anemia- in the setting of Xarelto and Plavix.  Hemodynamically stable.   Awaiting CBC results for this morning.  GI eval appreciated.  Patient displays no signs or symptoms of ACS, decompensated CHF or malignant arrhythmia.  Baseline EKG with ST depressions.  No cardiac objection to proceeding with GI scoping for life threatening bleed.     #CAD-  Patient s/p MICHAEL to LCx and OM1 on 12/20/2017  Continue ASA, Plavix, and beta-blocker given recent stent.     #Compensated diastolic CHF-  Closely monitor volume status between transfusions.  Patient currently euvolemic.  TTE- normal LVEF with no significant valvulopathy.     #Paroxysmal A-fib-  Currently in sinus.  No significant valvular disease on TTE.  Holding Xarelto for GI bleed.      Over 35 minutes spent on total encounter; more than 50% of the visit was spent counseling and/or coordinating care by the attending physician.      Julio César Hooper M.D.   Cardiovascular Disease  [phone]

## 2018-01-23 ENCOUNTER — TRANSCRIPTION ENCOUNTER (OUTPATIENT)
Age: 78
End: 2018-01-23

## 2018-01-23 VITALS
OXYGEN SATURATION: 100 % | TEMPERATURE: 98 F | HEART RATE: 73 BPM | DIASTOLIC BLOOD PRESSURE: 49 MMHG | SYSTOLIC BLOOD PRESSURE: 138 MMHG | RESPIRATION RATE: 18 BRPM

## 2018-01-23 DIAGNOSIS — E78.5 HYPERLIPIDEMIA, UNSPECIFIED: ICD-10-CM

## 2018-01-23 LAB
GLUCOSE BLDC GLUCOMTR-MCNC: 212 MG/DL — HIGH (ref 70–99)
GLUCOSE BLDC GLUCOMTR-MCNC: 247 MG/DL — HIGH (ref 70–99)
GLUCOSE BLDC GLUCOMTR-MCNC: 335 MG/DL — HIGH (ref 70–99)
GLUCOSE BLDC GLUCOMTR-MCNC: 429 MG/DL — HIGH (ref 70–99)

## 2018-01-23 RX ORDER — GABAPENTIN 400 MG/1
1 CAPSULE ORAL
Qty: 0 | Refills: 0 | COMMUNITY
Start: 2018-01-23

## 2018-01-23 RX ORDER — PANTOPRAZOLE SODIUM 20 MG/1
1 TABLET, DELAYED RELEASE ORAL
Qty: 0 | Refills: 0 | COMMUNITY

## 2018-01-23 RX ORDER — ASPIRIN/CALCIUM CARB/MAGNESIUM 324 MG
1 TABLET ORAL
Qty: 0 | Refills: 0 | COMMUNITY
Start: 2018-01-23

## 2018-01-23 RX ORDER — DIPHENHYDRAMINE HCL 50 MG
25 CAPSULE ORAL ONCE
Qty: 0 | Refills: 0 | Status: DISCONTINUED | OUTPATIENT
Start: 2018-01-23 | End: 2018-01-23

## 2018-01-23 RX ORDER — PANTOPRAZOLE SODIUM 20 MG/1
1 TABLET, DELAYED RELEASE ORAL
Qty: 60 | Refills: 1
Start: 2018-01-23 | End: 2018-03-23

## 2018-01-23 RX ORDER — GABAPENTIN 400 MG/1
2 CAPSULE ORAL
Qty: 0 | Refills: 0 | COMMUNITY
Start: 2018-01-23

## 2018-01-23 RX ORDER — ATORVASTATIN CALCIUM 80 MG/1
1 TABLET, FILM COATED ORAL
Qty: 0 | Refills: 0 | DISCHARGE
Start: 2018-01-23

## 2018-01-23 RX ADMIN — Medication 25 MILLIGRAM(S): at 05:16

## 2018-01-23 RX ADMIN — CLOPIDOGREL BISULFATE 75 MILLIGRAM(S): 75 TABLET, FILM COATED ORAL at 12:22

## 2018-01-23 RX ADMIN — Medication 4: at 12:21

## 2018-01-23 RX ADMIN — ALBUTEROL 2 PUFF(S): 90 AEROSOL, METERED ORAL at 04:23

## 2018-01-23 RX ADMIN — PANTOPRAZOLE SODIUM 40 MILLIGRAM(S): 20 TABLET, DELAYED RELEASE ORAL at 05:17

## 2018-01-23 RX ADMIN — ALBUTEROL 2 PUFF(S): 90 AEROSOL, METERED ORAL at 17:51

## 2018-01-23 RX ADMIN — PANTOPRAZOLE SODIUM 40 MILLIGRAM(S): 20 TABLET, DELAYED RELEASE ORAL at 17:52

## 2018-01-23 RX ADMIN — Medication 12: at 17:49

## 2018-01-23 RX ADMIN — Medication 81 MILLIGRAM(S): at 12:22

## 2018-01-23 RX ADMIN — GABAPENTIN 300 MILLIGRAM(S): 400 CAPSULE ORAL at 17:51

## 2018-01-23 RX ADMIN — Medication 40 MILLIGRAM(S): at 05:17

## 2018-01-23 RX ADMIN — GABAPENTIN 300 MILLIGRAM(S): 400 CAPSULE ORAL at 05:16

## 2018-01-23 RX ADMIN — Medication 25 MILLIGRAM(S): at 17:51

## 2018-01-23 RX ADMIN — ALBUTEROL 2 PUFF(S): 90 AEROSOL, METERED ORAL at 12:21

## 2018-01-23 NOTE — DISCHARGE NOTE ADULT - CARE PLAN
Principal Discharge DX:	GI (gastrointestinal bleed)  Goal:	Continue to monitor.  Assessment and plan of treatment:	s/p EGD with non-bleeding duodenal ulcer.   F/U with Dr. Goldberg + Dr. Hooper.   Discontinue Xarelto as per Cardiology.  Secondary Diagnosis:	CAD (coronary artery disease)  Goal:	Prevent progression of disease.  Assessment and plan of treatment:	Continue ASA, Plavix, Lipitor, current medications.  Secondary Diagnosis:	Type 2 diabetes mellitus with hyperglycemia, with long-term current use of insulin  Goal:	Reduce blood glucose.  Assessment and plan of treatment:	Continue current medications.   Monitor your fingersticks.  Secondary Diagnosis:	Paroxysmal atrial fibrillation  Goal:	Rate control.  Assessment and plan of treatment:	Continue ASA, Plavix, Metoprolol.   Discontinue Xarelto as per Cardiology due to GI bleed.

## 2018-01-23 NOTE — PROGRESS NOTE ADULT - PROBLEM SELECTOR PLAN 5
CHADS-2  continue clopidogrel, metoprolol CHADS-2  continue clopidogrel, metoprolol  Xarelto held for EGD

## 2018-01-23 NOTE — DISCHARGE NOTE ADULT - CARE PROVIDER_API CALL
Parminder Goldberg), Internal Medicine  [address] [place] [address]  Phone: [phone]  Fax: [phone]    Julio César Hooper), Internal Medicine  [address] [address] [place] [address]  Phone: [phone]  Fax: [phone]

## 2018-01-23 NOTE — DISCHARGE NOTE ADULT - HOSPITAL COURSE
78 yo F admitted with SOB and melena and H/H 4.7/ 16.7 received 2 u PRBC in ED now on third unit     + PAF was on xarelto , ( the plavix, ASA, xarelto stopped for now)   + Anemia/melena - s/ p 3 U PRBC 1/22- EGD -p   + Hypokalemia- 3.4- supp  CXR - clear lungs  OCCULT negative   first enzymes   TROP - 0.08       12/21 TTE- normal LVEF with no significant valvulopathy.  Paroxysmal A-fib- Currently in sinus. No significant valvular disease on TTE. Holding AC for GI bleed.    1/21  GI eval Dr Daigle- EGD once HGB > 9, hold xarelto and start protonix IV BID ,  WILL NEED CARDIAC CLEARANCE FOR egd   1/21 Med;  S/p PRBC GI eval noted. Will need EGD. CAD s/p PCI: Cardio eval noted. ASA/Plavix. DM II: Clear liquid  1/21 Cardio;  Acute blood loss anemia- in the setting of Xarelto and Plavix. Hemodynamically stable.  Transfuse for Hb of 9. GI eval appreciated. Patient displays no signs or symptoms of ACS,  decompensated CHF or malignant arrhythmia. No cardiac objection to proceeding with GI scoping for life threatening bleed. CAD- Patient s/p MICHAEL to LCx and OM1 on 12/20/2017 Continue ASA, Plavix, and beta-blocker given recent stent.  compensated diastolic CHF- Closely monitor volume status between transfusions.      1/22 Med:  S/p PRBC GI f/up noted.  EGD tomorrow. CAD s/p PCI:  Cardio f/up  noted.  ASA/Plavix.  Clear liquid  1/22 GI: Anemia- daily cbc - transfuse prn - protonix 40mg IVP BID - hold  AC on (xarelto) for afib  - have to c/w  dual antiplatelet therapy given recent stent 12/20/2017 - npo p mn for EGD. - s/p MICHAEL to LCx and OM1 on 12/20/2017 - cont. ASA, Plavix given recent stent.  - holding xarelto for now give gib - keep on ppi upon discharge for gi ppx  1/22 Cardio; anemia- in the setting of Xarelto and Plavix.  Ptt displays no signs or symptoms of ACS, decompensated CHF or malignant arrhythmia. Baseline EKG with ST depressions. No cardiac objection to proceeding with GI scoping for life threatening bleed.  Patient s/p MICHAEL to LCx and OM1 on 12/20/2017 c/w  ASA, Plavix, and BB given recent stent. 78 yo F admitted with SOB and melena and H/H 4.7/ 16.7 received 2 u PRBC in ED now on third unit     + PAF was on xarelto , ( the plavix, ASA, xarelto stopped for now)   + Anemia/melena - s/ p 3 U PRBC 1/22- EGD -p   + Hypokalemia- 3.4- supp  CXR - clear lungs  OCCULT negative   first enzymes   TROP - 0.08       12/21 TTE- normal LVEF with no significant valvulopathy.  Paroxysmal A-fib- Currently in sinus. No significant valvular disease on TTE. Holding AC for GI bleed.    1/21  GI eval Dr Daigle- EGD once HGB > 9, hold xarelto and start protonix IV BID ,  WILL NEED CARDIAC CLEARANCE FOR egd   1/21 Med;  S/p PRBC GI eval noted. Will need EGD. CAD s/p PCI: Cardio eval noted. ASA/Plavix. DM II: Clear liquid  1/21 Cardio;  Acute blood loss anemia- in the setting of Xarelto and Plavix. Hemodynamically stable.  Transfuse for Hb of 9. GI eval appreciated. Patient displays no signs or symptoms of ACS,  decompensated CHF or malignant arrhythmia. No cardiac objection to proceeding with GI scoping for life threatening bleed. CAD- Patient s/p MICHAEL to LCx and OM1 on 12/20/2017 Continue ASA, Plavix, and beta-blocker given recent stent.  compensated diastolic CHF- Closely monitor volume status between transfusions.      1/22 Med:  S/p PRBC GI f/up noted.  EGD tomorrow. CAD s/p PCI:  Cardio f/up  noted.  ASA/Plavix.  Clear liquid  1/22 GI: Anemia- daily cbc - transfuse prn - protonix 40mg IVP BID - hold  AC on (xarelto) for afib  - have to c/w  dual antiplatelet therapy given recent stent 12/20/2017 - npo p mn for EGD. - s/p MICHAEL to LCx and OM1 on 12/20/2017 - cont. ASA, Plavix given recent stent.  - holding xarelto for now give gib - keep on ppi upon discharge for gi ppx  1/22 Cardio; anemia- in the setting of Xarelto and Plavix.  Ptt displays no signs or symptoms of ACS, decompensated CHF or malignant arrhythmia. Baseline EKG with ST depressions. No cardiac objection to proceeding with GI scoping for life threatening bleed.  Patient s/p MICHAEL to LCx and OM1 on 12/20/2017 c/w  ASA, Plavix, and BB given recent stent.     1/23/18 EGD:  Normal esophagus.                       - Gastritis.                       - One non-bleeding duodenal ulcer with no stigmata of                        bleeding.                       - No specimens collected.  Recommendation:      - [facility].                       - Resume regular diet.                       - Use Protonix (pantoprazole) 40 mg PO BID. 76 yo F admitted with SOB and melena and H/H 4.7/ 16.7 received 2 u PRBC in ED now on third unit     + PAF was on xarelto , ( the plavix, ASA, xarelto stopped for now)   + Anemia/melena - s/ p 3 U PRBC 1/22- EGD -p   + Hypokalemia- 3.4- supp  CXR - clear lungs  OCCULT negative   first enzymes   TROP - 0.08       12/21 TTE- normal LVEF with no significant valvulopathy.  Paroxysmal A-fib- Currently in sinus. No significant valvular disease on TTE. Holding AC for GI bleed.    1/21  GI eval Dr Daigle- EGD once HGB > 9, hold xarelto and start protonix IV BID ,  WILL NEED CARDIAC CLEARANCE FOR egd   1/21 Med;  S/p PRBC GI eval noted. Will need EGD. CAD s/p PCI: Cardio eval noted. ASA/Plavix. DM II: Clear liquid  1/21 Cardio;  Acute blood loss anemia- in the setting of Xarelto and Plavix. Hemodynamically stable.  Transfuse for Hb of 9. GI eval appreciated. Patient displays no signs or symptoms of ACS,  decompensated CHF or malignant arrhythmia. No cardiac objection to proceeding with GI scoping for life threatening bleed. CAD- Patient s/p MICHAEL to LCx and OM1 on 12/20/2017 Continue ASA, Plavix, and beta-blocker given recent stent.  compensated diastolic CHF- Closely monitor volume status between transfusions.      1/22 Med:  S/p PRBC GI f/up noted.  EGD tomorrow. CAD s/p PCI:  Cardio f/up  noted.  ASA/Plavix.  Clear liquid  1/22 GI: Anemia- daily cbc - transfuse prn - protonix 40mg IVP BID - hold  AC on (xarelto) for afib  - have to c/w  dual antiplatelet therapy given recent stent 12/20/2017 - npo p mn for EGD. - s/p MICHAEL to LCx and OM1 on 12/20/2017 - cont. ASA, Plavix given recent stent.  - holding xarelto for now give gib - keep on ppi upon discharge for gi ppx  1/22 Cardio; anemia- in the setting of Xarelto and Plavix.  Ptt displays no signs or symptoms of ACS, decompensated CHF or malignant arrhythmia. Baseline EKG with ST depressions. No cardiac objection to proceeding with GI scoping for life threatening bleed.  Patient s/p MICHAEL to LCx and OM1 on 12/20/2017 c/w  ASA, Plavix, and BB given recent stent.     1/23/18 EGD:  Normal esophagus.                       - Gastritis.                       - One non-bleeding duodenal ulcer with no stigmata of                        bleeding.                       - No specimens collected.  Recommendation:      - [facility] patient [facility] care.                       - Resume regular diet.                       - Use Protonix (pantoprazole) 40 mg PO BID.    1/23/18 Pt is medically stable for discharge home today as per Dr. Goldberg. 78 yo F admitted with SOB and melena and H/H 4.7/ 16.7 received 2 u PRBC in ED now on third unit     + PAF was on xarelto , ( the plavix, ASA, xarelto stopped for now)   + Anemia/melena - s/ p 3 U PRBC 1/22- EGD -p   + Hypokalemia- 3.4- supp  CXR - clear lungs  OCCULT negative   first enzymes   TROP - 0.08       12/21 TTE- normal LVEF with no significant valvulopathy.  Paroxysmal A-fib- Currently in sinus. No significant valvular disease on TTE. Holding AC for GI bleed.    1/21  GI eval Dr Daigle- EGD once HGB > 9, hold xarelto and start protonix IV BID ,  WILL NEED CARDIAC CLEARANCE FOR egd   1/21 Med;  S/p PRBC GI eval noted. Will need EGD. CAD s/p PCI: Cardio eval noted. ASA/Plavix. DM II: Clear liquid  1/21 Cardio;  Acute blood loss anemia- in the setting of Xarelto and Plavix. Hemodynamically stable.  Transfuse for Hb of 9. GI eval appreciated. Patient displays no signs or symptoms of ACS,  decompensated CHF or malignant arrhythmia. No cardiac objection to proceeding with GI scoping for life threatening bleed. CAD- Patient s/p MICHAEL to LCx and OM1 on 12/20/2017 Continue ASA, Plavix, and beta-blocker given recent stent.  compensated diastolic CHF- Closely monitor volume status between transfusions.      1/22 Med:  S/p PRBC GI f/up noted.  EGD tomorrow. CAD s/p PCI:  Cardio f/up  noted.  ASA/Plavix.  Clear liquid  1/22 GI: Anemia- daily cbc - transfuse prn - protonix 40mg IVP BID - hold  AC on (xarelto) for afib  - have to c/w  dual antiplatelet therapy given recent stent 12/20/2017 - npo p mn for EGD. - s/p MICHAEL to LCx and OM1 on 12/20/2017 - cont. ASA, Plavix given recent stent.  - holding xarelto for now give gib - keep on ppi upon discharge for gi ppx  1/22 Cardio; anemia- in the setting of Xarelto and Plavix.  Ptt displays no signs or symptoms of ACS, decompensated CHF or malignant arrhythmia. Baseline EKG with ST depressions. No cardiac objection to proceeding with GI scoping for life threatening bleed.  Patient s/p MICHAEL to LCx and OM1 on 12/20/2017 c/w  ASA, Plavix, and BB given recent stent.     1/23/18 EGD:  Normal esophagus.                       - Gastritis.                       - One non-bleeding duodenal ulcer with no stigmata of                        bleeding.                       - No specimens collected.  Recommendation:      - Return [facility] to [facility].                       - Resume regular diet.                       - Use Protonix (pantoprazole) 40 mg PO BID.    1/23/18 Pt is medically stable for discharge home today as per Dr. Goldberg. As per Dr. Hooper, pt to discontinue Xarelto and continue ASA/Plavix. F/U as outpatient with Dr. Goldberg and Dr. Hooper.

## 2018-01-23 NOTE — PROGRESS NOTE ADULT - PROBLEM SELECTOR PLAN 1
- daily cbc   - transfuse prn   - protonix 40mg IVP BID  - hold anticoagulation (xarelto) for afib   - have to continue dual antiplatelet therapy given recent stent 12/20/2017  - npo p mn for EGD
S/P PRBC 3 units  follow up  EGD

## 2018-01-23 NOTE — PROGRESS NOTE ADULT - SUBJECTIVE AND OBJECTIVE BOX
77 year old Female PMH paroxymal atrial fibrillation, CAD, DM, Diabetic neuropathy, HLD admitted for chest pain. Patient was seen at 10:40 AM and currently has no new complaints. Patient admits to a constant headache, slight dizziness when flexing her head and leg pain which she believes is all from her diabetic neuropathy.  Patient states she is "perfectly well and is ready to go home". Patient denies fever, chills, nausea, vomiting, diarrhea, constipation, chest pain, palpitations and chest tightness.    General- 77 year old Female A&OX3 appears well and in no acute distress.  Vital Signs Last 24 Hrs  T(C): 36.7 (23 Jan 2018 05:06), Max: 36.9 (22 Jan 2018 17:40)  T(F): 98.1 (23 Jan 2018 05:06), Max: 98.5 (22 Jan 2018 17:40)  HR: 66 (23 Jan 2018 05:06) (66 - 72)  BP: 139/52 (23 Jan 2018 05:06) (105/43 - 139/52)  RR: 18 (23 Jan 2018 05:06) (18 - 18)  SpO2: 96% (23 Jan 2018 05:06) (96% - 100%)  Cardiac- heart sounds S1, S2 heard   Lung- clear to ascultation, bilateral breath sounds heard  Abdomen- soft, bowel sounds heard in all four quadrants, no guarding or rebound tenderness   Extremities- radial pulse felt, left leg tender to palpation 77 year old Female PMH paroxymal atrial fibrillation, CAD, DM, Diabetic neuropathy, HLD admitted for chest pain. Patient was seen at 10:40 AM and currently has no new complaints. Patient admits to a constant headache, slight dizziness when flexing her head and leg pain which she believes is all from her diabetic neuropathy.  Patient states she is "perfectly well and is ready to go home". Patient denies fever, chills, nausea, vomiting, diarrhea, constipation, chest pain, palpitations and chest tightness.    General- 77 year old Female A&OX3 appears well and in no acute distress.  Vital Signs Last 24 Hrs  T(C): 36.7 (23 Jan 2018 05:06), Max: 36.9 (22 Jan 2018 17:40)  T(F): 98.1 (23 Jan 2018 05:06), Max: 98.5 (22 Jan 2018 17:40)  HR: 66 (23 Jan 2018 05:06) (66 - 72)  BP: 139/52 (23 Jan 2018 05:06) (105/43 - 139/52)  RR: 18 (23 Jan 2018 05:06) (18 - 18)  SpO2: 96% (23 Jan 2018 05:06) (96% - 100%)  Cardiac- heart sounds S1, S2 regular  Lung- clear to ascultation, bilateral breath sounds heard  Abdomen- soft, bowel sounds heard in all four quadrants, no guarding or rebound tenderness   Extremities- radial pulse felt, left leg tender to palpation

## 2018-01-23 NOTE — PROGRESS NOTE ADULT - ASSESSMENT
77 year old Female post EGD for unknown GI bleeding. 77 year old Female admitted with melena, anemia s/p 2 units PRBC now s/p EGD.

## 2018-01-23 NOTE — PROGRESS NOTE ADULT - PROBLEM SELECTOR PLAN 4
continue- aspirin, Clopidogrel, metoprolol, furosemide continue- aspirin, Clopidogrel, metoprolol, atorvastatin

## 2018-01-23 NOTE — DISCHARGE NOTE ADULT - PLAN OF CARE
Continue to monitor. s/p EGD with non-bleeding duodenal ulcer.   F/U with Dr. Goldberg + Dr. Hooper.   Discontinue Xarelto as per Cardiology. Prevent progression of disease. Continue ASA, Plavix, Lipitor, current medications. Reduce blood glucose. Continue current medications.   Monitor your fingersticks. Rate control. Continue ASA, Plavix, Metoprolol.   Discontinue Xarelto as per Cardiology due to GI bleed.

## 2018-01-23 NOTE — DISCHARGE NOTE ADULT - MEDICATION SUMMARY - MEDICATIONS TO TAKE
I will START or STAY ON the medications listed below when I get home from the hospital:    aspirin 81 mg oral delayed release tablet  -- 1 tab(s) by mouth once a day  -- Indication: For CAD (coronary artery disease)    gabapentin 300 mg oral capsule  -- 1 cap(s) by mouth 2 times a day  -- Indication: For CNS agent     metFORMIN 500 mg oral tablet, extended release  -- 1 tab(s) by mouth once a day  -- Indication: For Diabetes    Lantus 100 units/mL subcutaneous solution  -- 34 unit(s) subcutaneous once a day (at bedtime)   -- Indication: For Diabetes    atorvastatin 40 mg oral tablet  -- 1 tab(s) by mouth once a day (at bedtime)  -- Indication: For CAD (coronary artery disease)    clopidogrel 75 mg oral tablet  -- 1 tab(s) by mouth once a day  -- Indication: For CAD (coronary artery disease)    metoprolol tartrate 25 mg oral tablet  -- 0.5 tab(s) by mouth 2 times a day   -- Indication: For HTN    ipratropium-albuterol 0.5 mg-2.5 mg/3 mLinhalation solution  -- 3 milliliter(s) inhaled 4 times a day, As Needed - for shortness of breath and/or wheezing  -- Indication: For Bronchodilator    furosemide 40 mg oral tablet  -- 1 tab(s) by mouth once a day  -- Indication: For Diuretic     Protonix 40 mg oral delayed release tablet  -- 1 tab(s) by mouth 2 times a day  -- Indication: For GERD

## 2018-01-23 NOTE — PROGRESS NOTE ADULT - SUBJECTIVE AND OBJECTIVE BOX
Cardiovascular Disease Progress Note    Overnight events: No acute events overnight. Ms. Tsang feels well. No chest pain or SOB.   Otherwise review of systems negative    Objective Findings:  T(C): 36.7 (01-23-18 @ 05:06), Max: 36.9 (01-22-18 @ 17:40)  HR: 66 (01-23-18 @ 05:06) (66 - 72)  BP: 139/52 (01-23-18 @ 05:06) (105/43 - 154/66)  RR: 18 (01-23-18 @ 05:06) (18 - 18)  SpO2: 96% (01-23-18 @ 05:06) (96% - 100%)  Wt(kg): --  Daily Height in cm: 160 (22 Jan 2018 09:28)    Daily       Physical Exam:  Gen: NAD  HEENT: EOMI  CV: RRR, normal S1 + S2, no m/r/g  Lungs: CTAB  Abd: soft, non-tender  Ext: No edema    Telemetry: Sinus 60s; no ectopy    Laboratory Data:                        9.3    5.94  )-----------( 312      ( 22 Jan 2018 06:11 )             29.5     01-22    140  |  99  |  16  ----------------------------<  153<H>  3.4<L>   |  31  |  1.33<H>    Ca    8.3<L>      22 Jan 2018 06:11  Mg     2.1     01-22    TPro  6.5  /  Alb  3.4  /  TBili  0.7  /  DBili  x   /  AST  31  /  ALT  25  /  AlkPhos  143<H>  01-21      CARDIAC MARKERS ( 21 Jan 2018 14:47 )  x     / 0.08 ng/mL / 235 u/L / x     / x              Inpatient Medications:  MEDICATIONS  (STANDING):  ALBUTerol    90 MICROgram(s) HFA Inhaler 2 Puff(s) Inhalation every 6 hours  aspirin enteric coated 81 milliGRAM(s) Oral daily  atorvastatin 40 milliGRAM(s) Oral at bedtime  clopidogrel Tablet 75 milliGRAM(s) Oral daily  dextrose 5%. 1000 milliLiter(s) (50 mL/Hr) IV Continuous <Continuous>  dextrose 50% Injectable 12.5 Gram(s) IV Push once  dextrose 50% Injectable 25 Gram(s) IV Push once  dextrose 50% Injectable 25 Gram(s) IV Push once  furosemide    Tablet 40 milliGRAM(s) Oral daily  gabapentin 300 milliGRAM(s) Oral two times a day  insulin lispro (HumaLOG) corrective regimen sliding scale   SubCutaneous Before meals and at bedtime  metoprolol     tartrate 25 milliGRAM(s) Oral two times a day  pantoprazole  Injectable 40 milliGRAM(s) IV Push two times a day      Assessment: 77 year old woman with uncontrolled IDDM presents with respiratory distress found to have acute blood loss anemia.    #Acute blood loss anemia- in the setting of Xarelto and Plavix.  Hemodynamically stable.   GI eval appreciated.  Patient displays no signs or symptoms of ACS, decompensated CHF or malignant arrhythmia.  Baseline EKG with ST depressions.  No cardiac objection to proceeding with EGD today for life threatening bleed.     #CAD-  Patient s/p MICHAEL to LCx and OM1 on 12/20/2017  Continue ASA, Plavix, and beta-blocker given recent stent.     #Compensated diastolic CHF-  Closely monitor volume status between transfusions.  Patient currently euvolemic.  TTE- normal LVEF with no significant valvulopathy.     #Paroxysmal A-fib-  Currently in sinus.  No significant valvular disease on TTE.  Holding Xarelto for GI bleed.      Over 35 minutes spent on total encounter; more than 50% of the visit was spent counseling and/or coordinating care by the attending physician.      Julio César Hooper M.D.   Cardiovascular Disease  [phone]

## 2018-01-23 NOTE — DISCHARGE NOTE ADULT - SECONDARY DIAGNOSIS.
CAD (coronary artery disease) Type 2 diabetes mellitus with hyperglycemia, with long-term current use of insulin Paroxysmal atrial fibrillation

## 2018-01-23 NOTE — DISCHARGE NOTE ADULT - MEDICATION SUMMARY - MEDICATIONS TO STOP TAKING
I will STOP taking the medications listed below when I get home from the hospital:    rivaroxaban 15 mg oral tablet  -- 1 tab(s) by mouth every 24 hours

## 2018-03-16 ENCOUNTER — INPATIENT (INPATIENT)
Facility: HOSPITAL | Age: 78
LOS: 5 days | Discharge: HOME | End: 2018-03-22
Attending: INTERNAL MEDICINE | Admitting: INTERNAL MEDICINE
Payer: MEDICAID

## 2018-03-16 VITALS
HEART RATE: 82 BPM | DIASTOLIC BLOOD PRESSURE: 73 MMHG | RESPIRATION RATE: 16 BRPM | SYSTOLIC BLOOD PRESSURE: 167 MMHG | TEMPERATURE: 98 F | OXYGEN SATURATION: 97 %

## 2018-03-16 DIAGNOSIS — E11.65 TYPE 2 DIABETES MELLITUS WITH HYPERGLYCEMIA: ICD-10-CM

## 2018-03-16 DIAGNOSIS — I25.10 ATHEROSCLEROTIC HEART DISEASE OF NATIVE CORONARY ARTERY WITHOUT ANGINA PECTORIS: ICD-10-CM

## 2018-03-16 DIAGNOSIS — Z29.9 ENCOUNTER FOR PROPHYLACTIC MEASURES, UNSPECIFIED: ICD-10-CM

## 2018-03-16 DIAGNOSIS — K92.2 GASTROINTESTINAL HEMORRHAGE, UNSPECIFIED: ICD-10-CM

## 2018-03-16 DIAGNOSIS — I48.0 PAROXYSMAL ATRIAL FIBRILLATION: ICD-10-CM

## 2018-03-16 DIAGNOSIS — I10 ESSENTIAL (PRIMARY) HYPERTENSION: ICD-10-CM

## 2018-03-16 DIAGNOSIS — E78.5 HYPERLIPIDEMIA, UNSPECIFIED: ICD-10-CM

## 2018-03-16 DIAGNOSIS — Z98.891 HISTORY OF UTERINE SCAR FROM PREVIOUS SURGERY: Chronic | ICD-10-CM

## 2018-03-16 LAB
ALBUMIN SERPL ELPH-MCNC: 3.6 G/DL — SIGNIFICANT CHANGE UP (ref 3.3–5)
ALP SERPL-CCNC: 174 U/L — HIGH (ref 40–120)
ALT FLD-CCNC: 11 U/L — SIGNIFICANT CHANGE UP (ref 4–33)
APTT BLD: 25.7 SEC — LOW (ref 27.5–37.4)
AST SERPL-CCNC: 13 U/L — SIGNIFICANT CHANGE UP (ref 4–32)
BASOPHILS # BLD AUTO: 0.03 K/UL — SIGNIFICANT CHANGE UP (ref 0–0.2)
BASOPHILS NFR BLD AUTO: 0.5 % — SIGNIFICANT CHANGE UP (ref 0–2)
BILIRUB SERPL-MCNC: 0.3 MG/DL — SIGNIFICANT CHANGE UP (ref 0.2–1.2)
BLD GP AB SCN SERPL QL: NEGATIVE — SIGNIFICANT CHANGE UP
BUN SERPL-MCNC: 15 MG/DL — SIGNIFICANT CHANGE UP (ref 7–23)
CALCIUM SERPL-MCNC: 7.9 MG/DL — LOW (ref 8.4–10.5)
CHLORIDE SERPL-SCNC: 92 MMOL/L — LOW (ref 98–107)
CK MB BLD-MCNC: 2.25 NG/ML — SIGNIFICANT CHANGE UP (ref 1–4.7)
CK MB BLD-MCNC: SIGNIFICANT CHANGE UP (ref 0–2.5)
CK SERPL-CCNC: 46 U/L — SIGNIFICANT CHANGE UP (ref 25–170)
CO2 SERPL-SCNC: 27 MMOL/L — SIGNIFICANT CHANGE UP (ref 22–31)
CREAT SERPL-MCNC: 1.28 MG/DL — SIGNIFICANT CHANGE UP (ref 0.5–1.3)
EOSINOPHIL # BLD AUTO: 0.09 K/UL — SIGNIFICANT CHANGE UP (ref 0–0.5)
EOSINOPHIL NFR BLD AUTO: 1.5 % — SIGNIFICANT CHANGE UP (ref 0–6)
GLUCOSE SERPL-MCNC: 590 MG/DL — CRITICAL HIGH (ref 70–99)
HCT VFR BLD CALC: 19.6 % — CRITICAL LOW (ref 34.5–45)
HGB BLD-MCNC: 5.6 G/DL — CRITICAL LOW (ref 11.5–15.5)
IMM GRANULOCYTES # BLD AUTO: 0.02 # — SIGNIFICANT CHANGE UP
IMM GRANULOCYTES NFR BLD AUTO: 0.3 % — SIGNIFICANT CHANGE UP (ref 0–1.5)
INR BLD: 0.92 — SIGNIFICANT CHANGE UP (ref 0.88–1.17)
LYMPHOCYTES # BLD AUTO: 1.53 K/UL — SIGNIFICANT CHANGE UP (ref 1–3.3)
LYMPHOCYTES # BLD AUTO: 25.9 % — SIGNIFICANT CHANGE UP (ref 13–44)
MCHC RBC-ENTMCNC: 21.5 PG — LOW (ref 27–34)
MCHC RBC-ENTMCNC: 28.6 % — LOW (ref 32–36)
MCV RBC AUTO: 75.4 FL — LOW (ref 80–100)
MONOCYTES # BLD AUTO: 0.43 K/UL — SIGNIFICANT CHANGE UP (ref 0–0.9)
MONOCYTES NFR BLD AUTO: 7.3 % — SIGNIFICANT CHANGE UP (ref 2–14)
NEUTROPHILS # BLD AUTO: 3.8 K/UL — SIGNIFICANT CHANGE UP (ref 1.8–7.4)
NEUTROPHILS NFR BLD AUTO: 64.5 % — SIGNIFICANT CHANGE UP (ref 43–77)
NRBC # FLD: 0 — SIGNIFICANT CHANGE UP
NT-PROBNP SERPL-SCNC: 1100 PG/ML — SIGNIFICANT CHANGE UP
PLATELET # BLD AUTO: 343 K/UL — SIGNIFICANT CHANGE UP (ref 150–400)
PMV BLD: 12.7 FL — SIGNIFICANT CHANGE UP (ref 7–13)
POTASSIUM SERPL-MCNC: 4.1 MMOL/L — SIGNIFICANT CHANGE UP (ref 3.5–5.3)
POTASSIUM SERPL-SCNC: 4.1 MMOL/L — SIGNIFICANT CHANGE UP (ref 3.5–5.3)
PROT SERPL-MCNC: 6.6 G/DL — SIGNIFICANT CHANGE UP (ref 6–8.3)
PROTHROM AB SERPL-ACNC: 10.2 SEC — SIGNIFICANT CHANGE UP (ref 9.8–13.1)
RBC # BLD: 2.6 M/UL — LOW (ref 3.8–5.2)
RBC # FLD: 17.2 % — HIGH (ref 10.3–14.5)
RH IG SCN BLD-IMP: POSITIVE — SIGNIFICANT CHANGE UP
SODIUM SERPL-SCNC: 132 MMOL/L — LOW (ref 135–145)
TROPONIN T SERPL-MCNC: < 0.06 NG/ML — SIGNIFICANT CHANGE UP (ref 0–0.06)
WBC # BLD: 5.9 K/UL — SIGNIFICANT CHANGE UP (ref 3.8–10.5)
WBC # FLD AUTO: 5.9 K/UL — SIGNIFICANT CHANGE UP (ref 3.8–10.5)

## 2018-03-16 PROCEDURE — 71046 X-RAY EXAM CHEST 2 VIEWS: CPT | Mod: 26

## 2018-03-16 RX ORDER — ATORVASTATIN CALCIUM 80 MG/1
40 TABLET, FILM COATED ORAL AT BEDTIME
Qty: 0 | Refills: 0 | Status: DISCONTINUED | OUTPATIENT
Start: 2018-03-16 | End: 2018-03-22

## 2018-03-16 RX ORDER — DEXTROSE 50 % IN WATER 50 %
1 SYRINGE (ML) INTRAVENOUS ONCE
Qty: 0 | Refills: 0 | Status: DISCONTINUED | OUTPATIENT
Start: 2018-03-16 | End: 2018-03-22

## 2018-03-16 RX ORDER — INSULIN LISPRO 100/ML
VIAL (ML) SUBCUTANEOUS EVERY 6 HOURS
Qty: 0 | Refills: 0 | Status: DISCONTINUED | OUTPATIENT
Start: 2018-03-16 | End: 2018-03-17

## 2018-03-16 RX ORDER — ALBUTEROL 90 UG/1
2 AEROSOL, METERED ORAL EVERY 6 HOURS
Qty: 0 | Refills: 0 | Status: DISCONTINUED | OUTPATIENT
Start: 2018-03-16 | End: 2018-03-22

## 2018-03-16 RX ORDER — PANTOPRAZOLE SODIUM 20 MG/1
80 TABLET, DELAYED RELEASE ORAL ONCE
Qty: 0 | Refills: 0 | Status: COMPLETED | OUTPATIENT
Start: 2018-03-16 | End: 2018-03-16

## 2018-03-16 RX ORDER — FUROSEMIDE 40 MG
40 TABLET ORAL DAILY
Qty: 0 | Refills: 0 | Status: DISCONTINUED | OUTPATIENT
Start: 2018-03-16 | End: 2018-03-20

## 2018-03-16 RX ORDER — ASPIRIN/CALCIUM CARB/MAGNESIUM 324 MG
81 TABLET ORAL DAILY
Qty: 0 | Refills: 0 | Status: DISCONTINUED | OUTPATIENT
Start: 2018-03-16 | End: 2018-03-22

## 2018-03-16 RX ORDER — INSULIN GLARGINE 100 [IU]/ML
17 INJECTION, SOLUTION SUBCUTANEOUS AT BEDTIME
Qty: 0 | Refills: 0 | Status: DISCONTINUED | OUTPATIENT
Start: 2018-03-16 | End: 2018-03-22

## 2018-03-16 RX ORDER — DEXTROSE 50 % IN WATER 50 %
25 SYRINGE (ML) INTRAVENOUS ONCE
Qty: 0 | Refills: 0 | Status: DISCONTINUED | OUTPATIENT
Start: 2018-03-16 | End: 2018-03-22

## 2018-03-16 RX ORDER — DEXTROSE 50 % IN WATER 50 %
12.5 SYRINGE (ML) INTRAVENOUS ONCE
Qty: 0 | Refills: 0 | Status: DISCONTINUED | OUTPATIENT
Start: 2018-03-16 | End: 2018-03-22

## 2018-03-16 RX ORDER — PANTOPRAZOLE SODIUM 20 MG/1
40 TABLET, DELAYED RELEASE ORAL
Qty: 0 | Refills: 0 | Status: DISCONTINUED | OUTPATIENT
Start: 2018-03-16 | End: 2018-03-22

## 2018-03-16 RX ORDER — INSULIN LISPRO 100/ML
6 VIAL (ML) SUBCUTANEOUS ONCE
Qty: 0 | Refills: 0 | Status: COMPLETED | OUTPATIENT
Start: 2018-03-16 | End: 2018-03-16

## 2018-03-16 RX ORDER — GLUCAGON INJECTION, SOLUTION 0.5 MG/.1ML
1 INJECTION, SOLUTION SUBCUTANEOUS ONCE
Qty: 0 | Refills: 0 | Status: DISCONTINUED | OUTPATIENT
Start: 2018-03-16 | End: 2018-03-22

## 2018-03-16 RX ORDER — METOPROLOL TARTRATE 50 MG
12.5 TABLET ORAL
Qty: 0 | Refills: 0 | Status: DISCONTINUED | OUTPATIENT
Start: 2018-03-16 | End: 2018-03-19

## 2018-03-16 RX ORDER — SODIUM CHLORIDE 9 MG/ML
1000 INJECTION, SOLUTION INTRAVENOUS
Qty: 0 | Refills: 0 | Status: DISCONTINUED | OUTPATIENT
Start: 2018-03-16 | End: 2018-03-22

## 2018-03-16 RX ORDER — ASPIRIN/CALCIUM CARB/MAGNESIUM 324 MG
162 TABLET ORAL ONCE
Qty: 0 | Refills: 0 | Status: COMPLETED | OUTPATIENT
Start: 2018-03-16 | End: 2018-03-16

## 2018-03-16 RX ORDER — CLOPIDOGREL BISULFATE 75 MG/1
75 TABLET, FILM COATED ORAL DAILY
Qty: 0 | Refills: 0 | Status: DISCONTINUED | OUTPATIENT
Start: 2018-03-16 | End: 2018-03-22

## 2018-03-16 RX ORDER — GABAPENTIN 400 MG/1
300 CAPSULE ORAL
Qty: 0 | Refills: 0 | Status: DISCONTINUED | OUTPATIENT
Start: 2018-03-16 | End: 2018-03-22

## 2018-03-16 RX ADMIN — Medication 6 UNIT(S): at 21:08

## 2018-03-16 RX ADMIN — Medication 162 MILLIGRAM(S): at 20:03

## 2018-03-16 RX ADMIN — PANTOPRAZOLE SODIUM 80 MILLIGRAM(S): 20 TABLET, DELAYED RELEASE ORAL at 20:03

## 2018-03-16 NOTE — H&P ADULT - PROBLEM SELECTOR PLAN 1
-Patient with Hgb 5.5 in the setting of melanotic stools, HARPER and weakness likely 2/2 anemia 2/2 upper GI bleed  -Patient without tachycardia and hypertensive, hemodynamically stable  -Consented and ordered for 2u pRBC, will check post-transfusion CBC  -Monitor respiratory status during transfusions given patient's HF history, will give Lasix if needed  -Vital signs q4hr  -GI consult called, Dr. Daigle  -CBC q8hr after post-transfusion CBC  -Protonix 40mg IV BID  -NPO for now, likely clear liquid diet after GI consult  -Hold anticoagulation, patient must be on DAPT given recent MICHAEL

## 2018-03-16 NOTE — ED ADULT TRIAGE NOTE - CHIEF COMPLAINT QUOTE
Pt brought in by EMS, sent in from cardiologist for abnormal EKG. Pt denies chest pain at present time. Endorses dyspnea on exertion and lower leg swelling. Pt breathing even and unlabored, sating 97% on room air.

## 2018-03-16 NOTE — H&P ADULT - NSHPREVIEWOFSYSTEMS_GEN_ALL_CORE
REVIEW OF SYSTEMS    General: Denies fevers/chills    Skin/Breast: Negative  	  Ophthalmologic: Denies visual changes  	  ENMT: Negative    Respiratory and Thorax: Denies SOB, cough, palpitations  	  Cardiovascular: Denies CP, palpitations    Gastrointestinal: +Melena. Denies abdominal pain, nausea/vomiting    Genitourinary: Denies dysuria	    Musculoskeletal: Negative    Neurological: +Neuropathic pain in bilateral lower extremities	    Psychiatric: Negative	    Hematology/Lymphatics: Negative	    Endocrine: Denies nausea/vomiting, dry mouth, polydipsia. She does endorse polyuria, but notes this is at baseline with her Lasix.    Allergic/Immunologic: Negative

## 2018-03-16 NOTE — H&P ADULT - NSHPLABSRESULTS_GEN_ALL_CORE
5.6    5.90  )-----------( 343      ( 16 Mar 2018 18:30 )             19.6     03-16    132<L>  |  92<L>  |  15  ----------------------------<  590<HH>  4.1   |  27  |  1.28    Ca    7.9<L>      16 Mar 2018 18:30    TPro  6.6  /  Alb  3.6  /  TBili  0.3  /  DBili  x   /  AST  13  /  ALT  11  /  AlkPhos  174<H>  03-16    PT/INR - ( 16 Mar 2018 18:30 )   PT: 10.2 SEC;   INR: 0.92          PTT - ( 16 Mar 2018 18:30 )  PTT:25.7 SEC    CARDIAC MARKERS ( 16 Mar 2018 18:30 )  x     / < 0.06 ng/mL / 46 u/L / 2.25 ng/mL / x        CXR: Clear lungs    EKG: Normal sinus rhythm, ST depression and AVR elevation, similar to previous EKG. No new ischemic changes. (my read)

## 2018-03-16 NOTE — ED PROVIDER NOTE - ATTENDING CONTRIBUTION TO CARE
78F p/w exertional SOB since cardiac cath.  H/o GIB s/p upper endoscopy – black stools have not gone away.  Sent in by cards Dr Hooper for admission.  EKG with ST depr II, v4-v6 EDWARD avR – but it is chronic.  Pt with bilat LE edema as well as HARPER – likely CHF exacerbation – will check labs, give lasix and ASA, admit. 78F p/w exertional SOB since cardiac cath.  H/o GIB s/p upper endoscopy – black stools have not gone away.  Sent in by cards Dr Hooper for admission.  EKG with ST depr II, v4-v6 EDWARD avR – but it is chronic.  Pt with bilat LE edema as well as HARPER – likely CHF exacerbation – will check labs, give lasix and ASA, admit.  VS:  unremarkable except hypertensive    GEN - NAD; well appearing; A+O x3   HEAD - NC/AT     ENT - PEERL, EOMI, mucous membranes  moist , no discharge      NECK: Neck supple, non-tender without lymphadenopathy, no masses, no JVD  PULM - CTA b/l,  symmetric breath sounds  COR -  normal heart sounds    ABD - , mild abd distension, NT, soft, no guarding, no rebound, no masses    BACK - no CVA tenderness, nontender spine     EXTREMS - (+)1-2 LE edema, no deformity, warm and well perfused    SKIN - no rash or bruising      NEUROLOGIC - alert, CN 2-12 intact, sensation nl, motor 5/5 RUE/LUE/RLE/LLE.

## 2018-03-16 NOTE — ED PROVIDER NOTE - PROGRESS NOTE DETAILS
Oma Batres M.D: pt signed out to me pending labs, admission. labs notable for hb 5.5. pt states she has been having black stools and has recent admission for GIB. will give protonix, blood, admit to [facility].

## 2018-03-16 NOTE — ED PROVIDER NOTE - MEDICAL DECISION MAKING DETAILS
78F p/w exertional SOB since cardiac cath.  H/o GIB s/p upper endoscopy – black stools have not gone away.  Sent in by cards Dr Hooper for admission.  EKG with ST depr II, v4-v6 EDWARD avR – but it is chronic.  Pt with bilat LE edema as well as HARPER – likely CHF exacerbation – will check labs, give lasix and ASA, admit.

## 2018-03-16 NOTE — H&P ADULT - HISTORY OF PRESENT ILLNESS
78 year-old F with PMH CAD s/p MICHAEL 12/20/17 on DAPT, HTN, HFpEF, afib on Xarelto, DM2, and recent admission for GI bleed presented to the hospital from her cardiologist's office for black stools and EKG abnormalities. The patient had an EGD in January 2018 which showed a nonbleeding duodenal ulcer. Since discharge, she reports that she has continued to have black stools. She also endorses generalized weakness and dyspnea on exertion. She saw her cardiologist today, Dr. Hooper, who advised her to present to the ED given these symptoms as well as ST depressions and AVR elevation on EKG.    In the ED, patient's vital signs were: T: 98.5, HR: 82, BP: 167/73, RR: 16, SpO2: 97% on room air. She was given Protonix 80mg IV x1, Aspirin 162mg PO x1, and Humalog 6u SQ x1. She currently denies CP, SOB, abdominal pain, nausea/vomiting, and fevers/chills. She endorses black stools that have been persistent since December. She currently sleeps on 4 pillows at night and does endorse paroxysmal nocturnal dyspnea.

## 2018-03-16 NOTE — H&P ADULT - NSHPPHYSICALEXAM_GEN_ALL_CORE
PHYSICAL EXAM:    Constitutional: NAD, sitting up in bed    Eyes: PERRL    ENMT: No oral lesions noted    Neck: Supple    Respiratory: CTAB    Cardiovascular: S1S2, RRR    Gastrointestinal: Soft, nontender    Genitourinary: No alba    Extremities: +Trace edema    Neurological: AAOx3, no focal deficits    Skin: Warm, pale    Lymph Nodes: No LAD    Musculoskeletal: 5/5 strength in all extremities    Psychiatric: Mood and affect appropriate to clinical condition

## 2018-03-16 NOTE — H&P ADULT - PROBLEM SELECTOR PLAN 3
-Patient with recent MICHAEL 3 months ago, must be on DAPT  -No evidence of chest pain, cardiac enzymes negative, EKG without new depessions or elevations  -C/w asa, clopidogrel, statin, and BBlocker  -Repeat cardiac enzymes in AM, repeat stat with EKG for any chest pain  -Telemetry monitoring  -Cardiology consult in AM

## 2018-03-16 NOTE — H&P ADULT - ASSESSMENT
78 year-old F with PMH CAD s/p MICHAEL, HFpEF, HTN, DM2, afib on Xarelto, and recent admission for GI bleed who presented to the hospital with weakness and black stools, found to have Hgb 5.5, admitted for likely upper GI bleed.

## 2018-03-16 NOTE — H&P ADULT - PROBLEM SELECTOR PLAN 4
-Patient with paroxysmal afib, holding AC in setting of GI bleed  -C/w Metoprolol tartrate 12.5mg PO BID with strict hold parameters  -Currently in sinus rhythm, telemetry monitoring

## 2018-03-16 NOTE — ED ADULT NURSE NOTE - OBJECTIVE STATEMENT
pt is a 78 yr old female sent in by cardiologist for ekg changes. pt denies fever, chills, ha, n/v, chest pain/ pressure. pt states has been having sob/ leone for several week, stents x2 placed 12/17. bp elevated, otherwise no s/s of acute distress noted. pt arrived with r 18 g  AC piv from ems. labs sent, pt placed on monitor.

## 2018-03-16 NOTE — ED PROVIDER NOTE - PMH
CAD (coronary artery disease)    Diabetic neuropathy    HLD (hyperlipidemia)    Paroxysmal atrial fibrillation    Type 2 diabetes mellitus with hyperglycemia, with long-term current use of insulin

## 2018-03-16 NOTE — H&P ADULT - PROBLEM SELECTOR PLAN 2
-Patient with glucose 590 on BMP, anion gap of 13 without reported symptoms  -Patient is s/p 6u Humalog  -Repeat BMP at midnight to assess for further insulin needs  -Lantus dose decreased by half to 17u as patient is currently NPO  -ISS, FS q6hr while NPO  -Consistent carbohydrate diet when patient is taking PO  -Check A1c

## 2018-03-17 DIAGNOSIS — K25.4 CHRONIC OR UNSPECIFIED GASTRIC ULCER WITH HEMORRHAGE: ICD-10-CM

## 2018-03-17 LAB
APTT BLD: 26.6 SEC — LOW (ref 27.5–37.4)
BUN SERPL-MCNC: 13 MG/DL — SIGNIFICANT CHANGE UP (ref 7–23)
BUN SERPL-MCNC: 15 MG/DL — SIGNIFICANT CHANGE UP (ref 7–23)
CALCIUM SERPL-MCNC: 8.4 MG/DL — SIGNIFICANT CHANGE UP (ref 8.4–10.5)
CALCIUM SERPL-MCNC: 8.9 MG/DL — SIGNIFICANT CHANGE UP (ref 8.4–10.5)
CHLORIDE SERPL-SCNC: 97 MMOL/L — LOW (ref 98–107)
CHLORIDE SERPL-SCNC: 97 MMOL/L — LOW (ref 98–107)
CK MB BLD-MCNC: 2.59 NG/ML — SIGNIFICANT CHANGE UP (ref 1–4.7)
CK SERPL-CCNC: 68 U/L — SIGNIFICANT CHANGE UP (ref 25–170)
CO2 SERPL-SCNC: 27 MMOL/L — SIGNIFICANT CHANGE UP (ref 22–31)
CO2 SERPL-SCNC: 29 MMOL/L — SIGNIFICANT CHANGE UP (ref 22–31)
CREAT SERPL-MCNC: 1.18 MG/DL — SIGNIFICANT CHANGE UP (ref 0.5–1.3)
CREAT SERPL-MCNC: 1.19 MG/DL — SIGNIFICANT CHANGE UP (ref 0.5–1.3)
GLUCOSE BLDC GLUCOMTR-MCNC: 268 MG/DL — HIGH (ref 70–99)
GLUCOSE BLDC GLUCOMTR-MCNC: 297 MG/DL — HIGH (ref 70–99)
GLUCOSE BLDC GLUCOMTR-MCNC: 348 MG/DL — HIGH (ref 70–99)
GLUCOSE BLDC GLUCOMTR-MCNC: 424 MG/DL — HIGH (ref 70–99)
GLUCOSE BLDC GLUCOMTR-MCNC: 512 MG/DL — CRITICAL HIGH (ref 70–99)
GLUCOSE SERPL-MCNC: 303 MG/DL — HIGH (ref 70–99)
GLUCOSE SERPL-MCNC: 352 MG/DL — HIGH (ref 70–99)
HBA1C BLD-MCNC: 10.1 % — HIGH (ref 4–5.6)
HCT VFR BLD CALC: 32 % — LOW (ref 34.5–45)
HGB BLD-MCNC: 9.5 G/DL — LOW (ref 11.5–15.5)
INR BLD: 0.93 — SIGNIFICANT CHANGE UP (ref 0.88–1.17)
MAGNESIUM SERPL-MCNC: 3.3 MG/DL — HIGH (ref 1.6–2.6)
MCHC RBC-ENTMCNC: 22.6 PG — LOW (ref 27–34)
MCHC RBC-ENTMCNC: 29.7 % — LOW (ref 32–36)
MCV RBC AUTO: 76.2 FL — LOW (ref 80–100)
NRBC # FLD: 0 — SIGNIFICANT CHANGE UP
PHOSPHATE SERPL-MCNC: 3.2 MG/DL — SIGNIFICANT CHANGE UP (ref 2.5–4.5)
PLATELET # BLD AUTO: 384 K/UL — SIGNIFICANT CHANGE UP (ref 150–400)
PMV BLD: 12.1 FL — SIGNIFICANT CHANGE UP (ref 7–13)
POTASSIUM SERPL-MCNC: 3.9 MMOL/L — SIGNIFICANT CHANGE UP (ref 3.5–5.3)
POTASSIUM SERPL-MCNC: 4.1 MMOL/L — SIGNIFICANT CHANGE UP (ref 3.5–5.3)
POTASSIUM SERPL-SCNC: 3.9 MMOL/L — SIGNIFICANT CHANGE UP (ref 3.5–5.3)
POTASSIUM SERPL-SCNC: 4.1 MMOL/L — SIGNIFICANT CHANGE UP (ref 3.5–5.3)
PROTHROM AB SERPL-ACNC: 10.7 SEC — SIGNIFICANT CHANGE UP (ref 9.8–13.1)
RBC # BLD: 4.2 M/UL — SIGNIFICANT CHANGE UP (ref 3.8–5.2)
RBC # FLD: 17.2 % — HIGH (ref 10.3–14.5)
SODIUM SERPL-SCNC: 137 MMOL/L — SIGNIFICANT CHANGE UP (ref 135–145)
SODIUM SERPL-SCNC: 140 MMOL/L — SIGNIFICANT CHANGE UP (ref 135–145)
TROPONIN T SERPL-MCNC: < 0.06 NG/ML — SIGNIFICANT CHANGE UP (ref 0–0.06)
WBC # BLD: 7.71 K/UL — SIGNIFICANT CHANGE UP (ref 3.8–10.5)
WBC # FLD AUTO: 7.71 K/UL — SIGNIFICANT CHANGE UP (ref 3.8–10.5)

## 2018-03-17 RX ORDER — INSULIN LISPRO 100/ML
VIAL (ML) SUBCUTANEOUS AT BEDTIME
Qty: 0 | Refills: 0 | Status: DISCONTINUED | OUTPATIENT
Start: 2018-03-17 | End: 2018-03-22

## 2018-03-17 RX ORDER — INSULIN LISPRO 100/ML
VIAL (ML) SUBCUTANEOUS
Qty: 0 | Refills: 0 | Status: DISCONTINUED | OUTPATIENT
Start: 2018-03-17 | End: 2018-03-22

## 2018-03-17 RX ADMIN — Medication 12: at 18:47

## 2018-03-17 RX ADMIN — Medication 6: at 06:54

## 2018-03-17 RX ADMIN — PANTOPRAZOLE SODIUM 40 MILLIGRAM(S): 20 TABLET, DELAYED RELEASE ORAL at 17:45

## 2018-03-17 RX ADMIN — Medication 4: at 22:14

## 2018-03-17 RX ADMIN — GABAPENTIN 300 MILLIGRAM(S): 400 CAPSULE ORAL at 17:44

## 2018-03-17 RX ADMIN — Medication 12.5 MILLIGRAM(S): at 06:29

## 2018-03-17 RX ADMIN — Medication 8: at 13:34

## 2018-03-17 RX ADMIN — PANTOPRAZOLE SODIUM 40 MILLIGRAM(S): 20 TABLET, DELAYED RELEASE ORAL at 06:54

## 2018-03-17 RX ADMIN — Medication 12.5 MILLIGRAM(S): at 17:45

## 2018-03-17 RX ADMIN — Medication 40 MILLIGRAM(S): at 06:29

## 2018-03-17 RX ADMIN — INSULIN GLARGINE 17 UNIT(S): 100 INJECTION, SOLUTION SUBCUTANEOUS at 22:14

## 2018-03-17 RX ADMIN — Medication 81 MILLIGRAM(S): at 11:30

## 2018-03-17 RX ADMIN — GABAPENTIN 300 MILLIGRAM(S): 400 CAPSULE ORAL at 06:29

## 2018-03-17 RX ADMIN — CLOPIDOGREL BISULFATE 75 MILLIGRAM(S): 75 TABLET, FILM COATED ORAL at 11:30

## 2018-03-17 RX ADMIN — ATORVASTATIN CALCIUM 40 MILLIGRAM(S): 80 TABLET, FILM COATED ORAL at 22:14

## 2018-03-17 NOTE — CONSULT NOTE ADULT - SUBJECTIVE AND OBJECTIVE BOX
CHIEF COMPLAINT: Weakness    HISTORY OF PRESENT ILLNESS:  This is a 78 year old woman with CAD s/p MICHAEL to OM1 and LCx in 2017, HTN, a-fib not on AC due to GI bleed, and diastolic CHF who presented to my office yesterday for exertional SOB. Ms. Tsang said that she was unable to take more than a few steps without feeling short of breath. Of note, she was also having black stools for the past few weeks. In the office, she was noted to have diffuse ST depressions on her EKG and was sent to the ER.  In the ER, Hb was noted to be 5.9 (7.8 last month in the office). She was transfused 2 Units pRBCs and was admitted.     Allergies  No Known Allergies      MEDICATIONS:  aspirin enteric coated 81 milliGRAM(s) Oral daily  clopidogrel Tablet 75 milliGRAM(s) Oral daily  furosemide    Tablet 40 milliGRAM(s) Oral daily  metoprolol     tartrate 12.5 milliGRAM(s) Oral two times a day      ALBUTerol    90 MICROgram(s) HFA Inhaler 2 Puff(s) Inhalation every 6 hours PRN    gabapentin 300 milliGRAM(s) Oral two times a day    pantoprazole  Injectable 40 milliGRAM(s) IV Push two times a day    atorvastatin 40 milliGRAM(s) Oral at bedtime  dextrose 50% Injectable 12.5 Gram(s) IV Push once  dextrose 50% Injectable 25 Gram(s) IV Push once  dextrose 50% Injectable 25 Gram(s) IV Push once  dextrose Gel 1 Dose(s) Oral once PRN  glucagon  Injectable 1 milliGRAM(s) IntraMuscular once PRN  insulin glargine Injectable (LANTUS) 17 Unit(s) SubCutaneous at bedtime  insulin lispro (HumaLOG) corrective regimen sliding scale   SubCutaneous every 6 hours    dextrose 5%. 1000 milliLiter(s) IV Continuous <Continuous>      PAST MEDICAL & SURGICAL HISTORY:  Paroxysmal atrial fibrillation  CAD (coronary artery disease)  Diabetic neuropathy  Type 2 diabetes mellitus with hyperglycemia, with long-term current use of insulin  HLD (hyperlipidemia)  H/O  section      FAMILY HISTORY:  No pertinent family history in first degree relatives      REVIEW OF SYSTEMS:  See HPI, otherwise complete 10 point review of systems negative    PHYSICAL EXAM:  T(C): 36.8 (18 @ 05:41), Max: 37.1 (18 @ 04:31)  HR: 77 (18 @ 05:41) (74 - 82)  BP: 156/59 (18 @ 05:41) (138/57 - 199/89)  RR: 17 (18 @ 05:41) (16 - 24)  SpO2: 100% (18 @ 05:41) (97% - 100%)  Wt(kg): --  I&O's Summary      Appearance: No Acute Distress	  HEENT:  Normal oral mucosa, PERRL, EOMI	  Cardiovascular: Normal S1 S2, No JVD, No murmurs/rubs/gallops  Respiratory: Lungs clear to auscultation bilaterally  Gastrointestinal:  Soft, Non-tender, + BS	  Skin: No rashes, No ecchymoses, No cyanosis	  Neurologic: Non-focal  Extremities: No clubbing, cyanosis or edema  Vascular: Peripheral pulses palpable 2+ bilaterally  Psychiatry: A & O x 3, Mood & affect appropriate    Laboratory Data:	 	    CBC Full  -  ( 17 Mar 2018 06:21 )  WBC Count : 7.71 K/uL  Hemoglobin : 9.5 g/dL  Hematocrit : 32.0 %  Platelet Count - Automated : 384 K/uL  Mean Cell Volume : 76.2 fL  Mean Cell Hemoglobin : 22.6 pg  Mean Cell Hemoglobin Concentration : 29.7 %  Auto Neutrophil # : x  Auto Lymphocyte # : x  Auto Monocyte # : x  Auto Eosinophil # : x  Auto Basophil # : x  Auto Neutrophil % : x  Auto Lymphocyte % : x  Auto Monocyte % : x  Auto Eosinophil % : x  Auto Basophil % : x        140  |  97<L>  |  13  ----------------------------<  303<H>  4.1   |  29  |  1.18  -16    137  |  97<L>  |  15  ----------------------------<  352<H>  3.9   |  27  |  1.19    Ca    8.9      17 Mar 2018 06:21  Ca    8.4      16 Mar 2018 23:59  Phos  3.2     -  Mg     3.3     -    TPro  6.6  /  Alb  3.6  /  TBili  0.3  /  DBili  x   /  AST  13  /  ALT  11  /  AlkPhos  174<H>  03-16      proBNP: Serum Pro-Brain Natriuretic Peptide: 1100 pg/mL ( @ 18:30)    Lipid Profile:   HgA1c: Hemoglobin A1C, Whole Blood: 10.1 % ( @ 06:21)      CKMB: 2.59 ng/mL ( @ 06:21)  CKMB: 2.25 ng/mL ( @ 18:30)    CKMB Relative Index: Test not performed ( @ 18:30)
CHIEF COMPLAINT: Weakness    HISTORY OF PRESENT ILLNESS:  This is a 78 year old woman with CAD s/p MICHAEL to OM1 and LCx in 2017, HTN, a-fib not on AC due to GI bleed, and diastolic CHF who presented to my office yesterday for exertional SOB. Ms. Tsang said that she was unable to take more than a few steps without feeling short of breath. Of note, she was also having black stools for the past few weeks. In the office, she was noted to have diffuse ST depressions on her EKG and was sent to the ER.  In the ER, Hb was noted to be 5.9 (7.8 last month in the office). She was transfused 2 Units pRBCs and was admitted.     Allergies  No Known Allergies      MEDICATIONS:  aspirin enteric coated 81 milliGRAM(s) Oral daily  clopidogrel Tablet 75 milliGRAM(s) Oral daily  furosemide    Tablet 40 milliGRAM(s) Oral daily  metoprolol     tartrate 12.5 milliGRAM(s) Oral two times a day      ALBUTerol    90 MICROgram(s) HFA Inhaler 2 Puff(s) Inhalation every 6 hours PRN    gabapentin 300 milliGRAM(s) Oral two times a day    pantoprazole  Injectable 40 milliGRAM(s) IV Push two times a day    atorvastatin 40 milliGRAM(s) Oral at bedtime  dextrose 50% Injectable 12.5 Gram(s) IV Push once  dextrose 50% Injectable 25 Gram(s) IV Push once  dextrose 50% Injectable 25 Gram(s) IV Push once  dextrose Gel 1 Dose(s) Oral once PRN  glucagon  Injectable 1 milliGRAM(s) IntraMuscular once PRN  insulin glargine Injectable (LANTUS) 17 Unit(s) SubCutaneous at bedtime  insulin lispro (HumaLOG) corrective regimen sliding scale   SubCutaneous every 6 hours    dextrose 5%. 1000 milliLiter(s) IV Continuous <Continuous>      PAST MEDICAL & SURGICAL HISTORY:  Paroxysmal atrial fibrillation  CAD (coronary artery disease)  Diabetic neuropathy  Type 2 diabetes mellitus with hyperglycemia, with long-term current use of insulin  HLD (hyperlipidemia)  H/O  section      FAMILY HISTORY:  No pertinent family history in first degree relatives      REVIEW OF SYSTEMS:  See HPI, otherwise complete 10 point review of systems negative    PHYSICAL EXAM:  T(C): 36.8 (18 @ 05:41), Max: 37.1 (18 @ 04:31)  HR: 77 (18 @ 05:41) (74 - 82)  BP: 156/59 (18 @ 05:41) (138/57 - 199/89)  RR: 17 (18 @ 05:41) (16 - 24)  SpO2: 100% (18 @ 05:41) (97% - 100%)  Wt(kg): --  I&O's Summary      Appearance: No Acute Distress	  HEENT:  Normal oral mucosa, PERRL, EOMI	  Cardiovascular: Normal S1 S2, No JVD, No murmurs/rubs/gallops  Respiratory: Lungs clear to auscultation bilaterally  Gastrointestinal:  Soft, Non-tender, + BS	  Skin: No rashes, No ecchymoses, No cyanosis	  Neurologic: Non-focal  Extremities: No clubbing, cyanosis or edema  Vascular: Peripheral pulses palpable 2+ bilaterally  Psychiatry: A & O x 3, Mood & affect appropriate    Laboratory Data:	 	    CBC Full  -  ( 17 Mar 2018 06:21 )  WBC Count : 7.71 K/uL  Hemoglobin : 9.5 g/dL  Hematocrit : 32.0 %  Platelet Count - Automated : 384 K/uL  Mean Cell Volume : 76.2 fL  Mean Cell Hemoglobin : 22.6 pg  Mean Cell Hemoglobin Concentration : 29.7 %  Auto Neutrophil # : x  Auto Lymphocyte # : x  Auto Monocyte # : x  Auto Eosinophil # : x  Auto Basophil # : x  Auto Neutrophil % : x  Auto Lymphocyte % : x  Auto Monocyte % : x  Auto Eosinophil % : x  Auto Basophil % : x        140  |  97<L>  |  13  ----------------------------<  303<H>  4.1   |  29  |  1.18  -16    137  |  97<L>  |  15  ----------------------------<  352<H>  3.9   |  27  |  1.19    Ca    8.9      17 Mar 2018 06:21  Ca    8.4      16 Mar 2018 23:59  Phos  3.2     -  Mg     3.3     -    TPro  6.6  /  Alb  3.6  /  TBili  0.3  /  DBili  x   /  AST  13  /  ALT  11  /  AlkPhos  174<H>  03-16      proBNP: Serum Pro-Brain Natriuretic Peptide: 1100 pg/mL ( @ 18:30)    Lipid Profile:   HgA1c: Hemoglobin A1C, Whole Blood: 10.1 % ( @ 06:21)      CKMB: 2.59 ng/mL ( @ 06:21)  CKMB: 2.25 ng/mL ( @ 18:30)    CKMB Relative Index: Test not performed ( @ 18:30)      Interpretation of Telemetry: Sinus   ECG: Sinus ; diffuse ST depressions with AVR elevation     Assessment: 78 year old woman with CAD s/p MICHAEL x2 in 2017, a-fib not on AC due to GI bleed, and compensated diastolic CHF presents with respiratory distress and abnormal EKG due to acute blood loss anemia.    Plan of Care:    #CAD- s/p MICHAEL x2 in 2017.  Despite acute blood loss anemia, patient displays no signs of active ischemic as evidenced by negative cardiac biomarkers.  Continue DAPT in the setting of recent stents 4 months ago.  There is no cardiac objection to scoping given life threatening anemia.    #Paroxymal a-fib-  Currently in sinus.  No AC given GI bleed.    #Compensated diastolic CHF-  Patient tolerated 2 Unit pRBCs transfusion well.  Closely monitor fluid status.    90 minutes spent on total encounter; more than 50% of the visit was spent counseling and/or coordinating care by the attending physician.   	  Julio César Hooper MD [facility]  Cardiovascular Diseases  [phone]

## 2018-03-17 NOTE — ED ADULT NURSE REASSESSMENT NOTE - NS ED NURSE REASSESS COMMENT FT1
Blood transfusion in process, tolerating well. Denies any transfusion reaction, VSS. Pending bed assignment upstairs.

## 2018-03-18 LAB
BUN SERPL-MCNC: 16 MG/DL — SIGNIFICANT CHANGE UP (ref 7–23)
CALCIUM SERPL-MCNC: 8.5 MG/DL — SIGNIFICANT CHANGE UP (ref 8.4–10.5)
CHLORIDE SERPL-SCNC: 99 MMOL/L — SIGNIFICANT CHANGE UP (ref 98–107)
CO2 SERPL-SCNC: 31 MMOL/L — SIGNIFICANT CHANGE UP (ref 22–31)
CREAT SERPL-MCNC: 1.31 MG/DL — HIGH (ref 0.5–1.3)
GLUCOSE BLDC GLUCOMTR-MCNC: 232 MG/DL — HIGH (ref 70–99)
GLUCOSE BLDC GLUCOMTR-MCNC: 280 MG/DL — HIGH (ref 70–99)
GLUCOSE BLDC GLUCOMTR-MCNC: 333 MG/DL — HIGH (ref 70–99)
GLUCOSE BLDC GLUCOMTR-MCNC: 339 MG/DL — HIGH (ref 70–99)
GLUCOSE SERPL-MCNC: 242 MG/DL — HIGH (ref 70–99)
HCT VFR BLD CALC: 27.7 % — LOW (ref 34.5–45)
HGB BLD-MCNC: 8.1 G/DL — LOW (ref 11.5–15.5)
MAGNESIUM SERPL-MCNC: 2.3 MG/DL — SIGNIFICANT CHANGE UP (ref 1.6–2.6)
MCHC RBC-ENTMCNC: 22.3 PG — LOW (ref 27–34)
MCHC RBC-ENTMCNC: 29.2 % — LOW (ref 32–36)
MCV RBC AUTO: 76.3 FL — LOW (ref 80–100)
NRBC # FLD: 0 — SIGNIFICANT CHANGE UP
PLATELET # BLD AUTO: 375 K/UL — SIGNIFICANT CHANGE UP (ref 150–400)
PMV BLD: 12 FL — SIGNIFICANT CHANGE UP (ref 7–13)
POTASSIUM SERPL-MCNC: 4.2 MMOL/L — SIGNIFICANT CHANGE UP (ref 3.5–5.3)
POTASSIUM SERPL-SCNC: 4.2 MMOL/L — SIGNIFICANT CHANGE UP (ref 3.5–5.3)
RBC # BLD: 3.63 M/UL — LOW (ref 3.8–5.2)
RBC # FLD: 17 % — HIGH (ref 10.3–14.5)
SODIUM SERPL-SCNC: 142 MMOL/L — SIGNIFICANT CHANGE UP (ref 135–145)
WBC # BLD: 6.83 K/UL — SIGNIFICANT CHANGE UP (ref 3.8–10.5)
WBC # FLD AUTO: 6.83 K/UL — SIGNIFICANT CHANGE UP (ref 3.8–10.5)

## 2018-03-18 RX ORDER — DIPHENHYDRAMINE HCL 50 MG
25 CAPSULE ORAL ONCE
Qty: 0 | Refills: 0 | Status: COMPLETED | OUTPATIENT
Start: 2018-03-18 | End: 2018-03-18

## 2018-03-18 RX ORDER — POLYETHYLENE GLYCOL 3350 17 G/17G
17 POWDER, FOR SOLUTION ORAL ONCE
Qty: 0 | Refills: 0 | Status: COMPLETED | OUTPATIENT
Start: 2018-03-18 | End: 2018-03-18

## 2018-03-18 RX ORDER — GABAPENTIN 400 MG/1
300 CAPSULE ORAL ONCE
Qty: 0 | Refills: 0 | Status: COMPLETED | OUTPATIENT
Start: 2018-03-18 | End: 2018-03-18

## 2018-03-18 RX ORDER — SOD SULF/SODIUM/NAHCO3/KCL/PEG
1 SOLUTION, RECONSTITUTED, ORAL ORAL EVERY 4 HOURS
Qty: 0 | Refills: 0 | Status: COMPLETED | OUTPATIENT
Start: 2018-03-18 | End: 2018-03-18

## 2018-03-18 RX ADMIN — ATORVASTATIN CALCIUM 40 MILLIGRAM(S): 80 TABLET, FILM COATED ORAL at 22:56

## 2018-03-18 RX ADMIN — Medication 40 MILLIGRAM(S): at 05:53

## 2018-03-18 RX ADMIN — GABAPENTIN 300 MILLIGRAM(S): 400 CAPSULE ORAL at 19:24

## 2018-03-18 RX ADMIN — Medication 1 LITER(S): at 20:08

## 2018-03-18 RX ADMIN — GABAPENTIN 300 MILLIGRAM(S): 400 CAPSULE ORAL at 05:53

## 2018-03-18 RX ADMIN — Medication 25 MILLIGRAM(S): at 01:06

## 2018-03-18 RX ADMIN — Medication 3: at 19:51

## 2018-03-18 RX ADMIN — Medication 2: at 22:01

## 2018-03-18 RX ADMIN — INSULIN GLARGINE 17 UNIT(S): 100 INJECTION, SOLUTION SUBCUTANEOUS at 22:28

## 2018-03-18 RX ADMIN — Medication 2: at 09:11

## 2018-03-18 RX ADMIN — GABAPENTIN 300 MILLIGRAM(S): 400 CAPSULE ORAL at 01:10

## 2018-03-18 RX ADMIN — Medication 30 MILLILITER(S): at 15:28

## 2018-03-18 RX ADMIN — PANTOPRAZOLE SODIUM 40 MILLIGRAM(S): 20 TABLET, DELAYED RELEASE ORAL at 05:53

## 2018-03-18 RX ADMIN — Medication 4: at 14:02

## 2018-03-18 RX ADMIN — PANTOPRAZOLE SODIUM 40 MILLIGRAM(S): 20 TABLET, DELAYED RELEASE ORAL at 19:23

## 2018-03-18 RX ADMIN — POLYETHYLENE GLYCOL 3350 17 GRAM(S): 17 POWDER, FOR SOLUTION ORAL at 14:02

## 2018-03-18 RX ADMIN — Medication 81 MILLIGRAM(S): at 12:13

## 2018-03-18 RX ADMIN — CLOPIDOGREL BISULFATE 75 MILLIGRAM(S): 75 TABLET, FILM COATED ORAL at 12:13

## 2018-03-18 RX ADMIN — Medication 10 MILLIGRAM(S): at 17:52

## 2018-03-18 NOTE — PROGRESS NOTE ADULT - SUBJECTIVE AND OBJECTIVE BOX
Patient is a 78y old  Female who presents with a chief complaint of "I was told to come here by my heart doctor" (16 Mar 2018 22:51)      SUBJECTIVE / OVERNIGHT EVENTS:   Feels better.  Denies CP/SOB/Palpitation/HA.    MEDICATIONS  (STANDING):  aspirin enteric coated 81 milliGRAM(s) Oral daily  atorvastatin 40 milliGRAM(s) Oral at bedtime  bisacodyl 10 milliGRAM(s) Oral every 4 hours  clopidogrel Tablet 75 milliGRAM(s) Oral daily  dextrose 5%. 1000 milliLiter(s) (50 mL/Hr) IV Continuous <Continuous>  dextrose 50% Injectable 12.5 Gram(s) IV Push once  dextrose 50% Injectable 25 Gram(s) IV Push once  dextrose 50% Injectable 25 Gram(s) IV Push once  furosemide    Tablet 40 milliGRAM(s) Oral daily  gabapentin 300 milliGRAM(s) Oral two times a day  insulin glargine Injectable (LANTUS) 17 Unit(s) SubCutaneous at bedtime  insulin lispro (HumaLOG) corrective regimen sliding scale   SubCutaneous three times a day before meals  insulin lispro (HumaLOG) corrective regimen sliding scale   SubCutaneous at bedtime  metoprolol     tartrate 12.5 milliGRAM(s) Oral two times a day  pantoprazole  Injectable 40 milliGRAM(s) IV Push two times a day    MEDICATIONS  (PRN):  ALBUTerol    90 MICROgram(s) HFA Inhaler 2 Puff(s) Inhalation every 6 hours PRN Shortness of Breath and/or Wheezing  dextrose Gel 1 Dose(s) Oral once PRN Blood Glucose LESS THAN 70 milliGRAM(s)/deciliter  glucagon  Injectable 1 milliGRAM(s) IntraMuscular once PRN Glucose LESS THAN 70 milligrams/deciliter        CAPILLARY BLOOD GLUCOSE      POCT Blood Glucose.: 339 mg/dL (18 Mar 2018 21:31)  POCT Blood Glucose.: 280 mg/dL (18 Mar 2018 19:22)  POCT Blood Glucose.: 333 mg/dL (18 Mar 2018 12:53)  POCT Blood Glucose.: 232 mg/dL (18 Mar 2018 08:35)    I&O's Summary    17 Mar 2018 07:01  -  18 Mar 2018 07:00  --------------------------------------------------------  IN: 0 mL / OUT: 300 mL / NET: -300 mL        PHYSICAL EXAM:  GENERAL: NAD, well-developed  HEAD:  Atraumatic, Normocephalic  NECK: Supple, No JVD  CHEST/LUNG: Clear to auscultation bilaterally; No wheezing.  HEART: Regular rate and rhythm; No murmurs, rubs, or gallops  ABDOMEN: Soft, Nontender, Nondistended; Bowel sounds present  EXTREMITIES:   No clubbing, cyanosis, or edema  NEUROLOGY: AAO X 3  SKIN: No rashes    LABS:                        8.1    6.83  )-----------( 375      ( 18 Mar 2018 06:00 )             27.7     03-18    142  |  99  |  16  ----------------------------<  242<H>  4.2   |  31  |  1.31<H>    Ca    8.5      18 Mar 2018 06:00  Phos  3.2     03-17  Mg     2.3     03-18      PT/INR - ( 17 Mar 2018 06:21 )   PT: 10.7 SEC;   INR: 0.93          PTT - ( 17 Mar 2018 06:21 )  PTT:26.6 SEC  CARDIAC MARKERS ( 17 Mar 2018 06:21 )  x     / < 0.06 ng/mL / 68 u/L / 2.59 ng/mL / x            CAPILLARY BLOOD GLUCOSE      POCT Blood Glucose.: 339 mg/dL (18 Mar 2018 21:31)  POCT Blood Glucose.: 280 mg/dL (18 Mar 2018 19:22)  POCT Blood Glucose.: 333 mg/dL (18 Mar 2018 12:53)  POCT Blood Glucose.: 232 mg/dL (18 Mar 2018 08:35)                RADIOLOGY & ADDITIONAL TESTS:    Imaging Personally Reviewed:    Consultant(s) Notes Reviewed:      Care Discussed with Consultants/Other Providers:

## 2018-03-18 NOTE — PROGRESS NOTE ADULT - SUBJECTIVE AND OBJECTIVE BOX
Cardiovascular Disease Progress Note    Overnight events: No acute events overnight. Ms. Tsang denies chest pain. She says the SOB is improved.   Otherwise review of systems negative    Objective Findings:  T(C): 37.1 (03-18-18 @ 05:47), Max: 37.1 (03-18-18 @ 05:47)  HR: 59 (03-18-18 @ 05:47) (59 - 81)  BP: 127/47 (03-18-18 @ 05:47) (127/47 - 168/85)  RR: 18 (03-18-18 @ 05:47) (16 - 18)  SpO2: 97% (03-18-18 @ 05:47) (97% - 100%)  Wt(kg): --  Daily     Daily       Physical Exam:  Gen: NAD  HEENT: EOMI  CV: RRR, normal S1 + S2, no m/r/g  Lungs: CTAB  Abd: soft, non-tender  Ext: No edema    Telemetry: Sinus    Laboratory Data:                        8.1    6.83  )-----------( 375      ( 18 Mar 2018 06:00 )             27.7     03-18    142  |  99  |  16  ----------------------------<  242<H>  4.2   |  31  |  1.31<H>    Ca    8.5      18 Mar 2018 06:00  Phos  3.2     03-17  Mg     2.3     03-18    TPro  6.6  /  Alb  3.6  /  TBili  0.3  /  DBili  x   /  AST  13  /  ALT  11  /  AlkPhos  174<H>  03-16    PT/INR - ( 17 Mar 2018 06:21 )   PT: 10.7 SEC;   INR: 0.93          PTT - ( 17 Mar 2018 06:21 )  PTT:26.6 SEC  CARDIAC MARKERS ( 17 Mar 2018 06:21 )  x     / < 0.06 ng/mL / 68 u/L / 2.59 ng/mL / x      CARDIAC MARKERS ( 16 Mar 2018 18:30 )  x     / < 0.06 ng/mL / 46 u/L / 2.25 ng/mL / x              Inpatient Medications:  MEDICATIONS  (STANDING):  aspirin enteric coated 81 milliGRAM(s) Oral daily  atorvastatin 40 milliGRAM(s) Oral at bedtime  clopidogrel Tablet 75 milliGRAM(s) Oral daily  dextrose 5%. 1000 milliLiter(s) (50 mL/Hr) IV Continuous <Continuous>  dextrose 50% Injectable 12.5 Gram(s) IV Push once  dextrose 50% Injectable 25 Gram(s) IV Push once  dextrose 50% Injectable 25 Gram(s) IV Push once  furosemide    Tablet 40 milliGRAM(s) Oral daily  gabapentin 300 milliGRAM(s) Oral two times a day  insulin glargine Injectable (LANTUS) 17 Unit(s) SubCutaneous at bedtime  insulin lispro (HumaLOG) corrective regimen sliding scale   SubCutaneous three times a day before meals  insulin lispro (HumaLOG) corrective regimen sliding scale   SubCutaneous at bedtime  metoprolol     tartrate 12.5 milliGRAM(s) Oral two times a day  pantoprazole  Injectable 40 milliGRAM(s) IV Push two times a day      Assessment:  78 year old woman with CAD s/p MICHAEL x2 in 12/2017, a-fib not on AC due to GI bleed, and compensated diastolic CHF presents with respiratory distress and abnormal EKG due to acute blood loss anemia.    Plan of Care:    #CAD- s/p MICHAEL x2 in 2/2017.  Despite acute blood loss anemia, patient displays no signs of active ischemia as evidenced by negative cardiac biomarkers.  Continue DAPT in the setting of recent stents 4 months ago.  There is no cardiac objection to EGD/colonoscopy given life threatening anemia.    #Paroxymal a-fib-  Currently in sinus.  No AC given GI bleed.    #Compensated diastolic CHF-  Patient tolerated 2 Unit pRBCs transfusion well.  Closely monitor fluid status.  Continue oral Lasix.    Over 35 minutes spent on total encounter; more than 50% of the visit was spent counseling and/or coordinating care by the attending physician.      Julio Césra Hooper MD [facility]  Cardiovascular Disease  [phone]

## 2018-03-18 NOTE — PROGRESS NOTE ADULT - SUBJECTIVE AND OBJECTIVE BOX
[facility]  Tonio Covarrubias PA-C  [address] [place] [address] [phone]      INTERVAL HPI/OVERNIGHT EVENTS:    no new events     MEDICATIONS  (STANDING):  aluminum hydroxide/magnesium hydroxide/simethicone Suspension 30 milliLiter(s) Oral once  aspirin enteric coated 81 milliGRAM(s) Oral daily  atorvastatin 40 milliGRAM(s) Oral at bedtime  clopidogrel Tablet 75 milliGRAM(s) Oral daily  dextrose 5%. 1000 milliLiter(s) (50 mL/Hr) IV Continuous <Continuous>  dextrose 50% Injectable 12.5 Gram(s) IV Push once  dextrose 50% Injectable 25 Gram(s) IV Push once  dextrose 50% Injectable 25 Gram(s) IV Push once  furosemide    Tablet 40 milliGRAM(s) Oral daily  gabapentin 300 milliGRAM(s) Oral two times a day  insulin glargine Injectable (LANTUS) 17 Unit(s) SubCutaneous at bedtime  insulin lispro (HumaLOG) corrective regimen sliding scale   SubCutaneous three times a day before meals  insulin lispro (HumaLOG) corrective regimen sliding scale   SubCutaneous at bedtime  metoprolol     tartrate 12.5 milliGRAM(s) Oral two times a day  pantoprazole  Injectable 40 milliGRAM(s) IV Push two times a day    MEDICATIONS  (PRN):  ALBUTerol    90 MICROgram(s) HFA Inhaler 2 Puff(s) Inhalation every 6 hours PRN Shortness of Breath and/or Wheezing  dextrose Gel 1 Dose(s) Oral once PRN Blood Glucose LESS THAN 70 milliGRAM(s)/deciliter  glucagon  Injectable 1 milliGRAM(s) IntraMuscular once PRN Glucose LESS THAN 70 milligrams/deciliter      Allergies    No Known Allergies    Intolerances        ROS:   General:  No wt loss, fevers, chills, night sweats, fatigue,   Eyes:  Good vision, no reported pain  ENT:  No sore throat, pain, runny nose, dysphagia  CV:  No pain, palpitations, hypo/hypertension  Resp:  No dyspnea, cough, tachypnea, wheezing  GI:  No pain, No nausea, No vomiting, No diarrhea, No constipation, No weight loss, No fever, No pruritis, No rectal bleeding, No tarry stools, No dysphagia,  :  No pain, bleeding, incontinence, nocturia  Muscle:  No pain, weakness  Neuro:  No weakness, tingling, memory problems  Psych:  No fatigue, insomnia, mood problems, depression  Endocrine:  No polyuria, polydipsia, cold/heat intolerance  Heme:  No petechiae, ecchymosis, easy bruisability  Skin:  No rash, tattoos, scars, edema      PHYSICAL EXAM:   Vital Signs:  Vital Signs Last 24 Hrs  T(C): 37 (18 Mar 2018 10:21), Max: 37.1 (18 Mar 2018 05:47)  T(F): 98.6 (18 Mar 2018 10:21), Max: 98.7 (18 Mar 2018 05:47)  HR: 75 (18 Mar 2018 12:05) (59 - 81)  BP: 128/49 (18 Mar 2018 12:05) (127/47 - 168/56)  BP(mean): --  RR: 18 (18 Mar 2018 12:05) (16 - 18)  SpO2: 98% (18 Mar 2018 12:05) (97% - 100%)  Daily     Daily     GENERAL:  Appears stated age, well-groomed, well-nourished, no distress  HEENT:  NC/AT,  conjunctivae clear and pink, no thyromegaly, nodules, adenopathy, no JVD, sclera -anicteric  CHEST:  Full & symmetric excursion, no increased effort, breath sounds clear  HEART:  Regular rhythm, S1, S2, no murmur/rub/S3/S4, no abdominal bruit, no edema  ABDOMEN:  Soft, non-tender, non-distended, normoactive bowel sounds,  no masses ,no hepato-splenomegaly, no signs of chronic liver disease  EXTEREMITIES:  no cyanosis,clubbing or edema  SKIN:  No rash/erythema/ecchymoses/petechiae/wounds/abscess/warm/dry  NEURO:  Alert, oriented, no asterixis, no tremor, no encephalopathy      LABS:                        8.1    6.83  )-----------( 375      ( 18 Mar 2018 06:00 )             27.7     03-18    142  |  99  |  16  ----------------------------<  242<H>  4.2   |  31  |  1.31<H>    Ca    8.5      18 Mar 2018 06:00  Phos  3.2     03-17  Mg     2.3     03-18    TPro  6.6  /  Alb  3.6  /  TBili  0.3  /  DBili  x   /  AST  13  /  ALT  11  /  AlkPhos  174<H>  03-16    PT/INR - ( 17 Mar 2018 06:21 )   PT: 10.7 SEC;   INR: 0.93          PTT - ( 17 Mar 2018 06:21 )  PTT:26.6 SEC      RADIOLOGY & ADDITIONAL TESTS:

## 2018-03-19 LAB
BUN SERPL-MCNC: 17 MG/DL — SIGNIFICANT CHANGE UP (ref 7–23)
CALCIUM SERPL-MCNC: 8.5 MG/DL — SIGNIFICANT CHANGE UP (ref 8.4–10.5)
CHLORIDE SERPL-SCNC: 98 MMOL/L — SIGNIFICANT CHANGE UP (ref 98–107)
CO2 SERPL-SCNC: 31 MMOL/L — SIGNIFICANT CHANGE UP (ref 22–31)
CREAT SERPL-MCNC: 1.52 MG/DL — HIGH (ref 0.5–1.3)
GLUCOSE BLDC GLUCOMTR-MCNC: 169 MG/DL — HIGH (ref 70–99)
GLUCOSE BLDC GLUCOMTR-MCNC: 233 MG/DL — HIGH (ref 70–99)
GLUCOSE BLDC GLUCOMTR-MCNC: 286 MG/DL — HIGH (ref 70–99)
GLUCOSE BLDC GLUCOMTR-MCNC: 343 MG/DL — HIGH (ref 70–99)
GLUCOSE BLDC GLUCOMTR-MCNC: 359 MG/DL — HIGH (ref 70–99)
GLUCOSE SERPL-MCNC: 252 MG/DL — HIGH (ref 70–99)
HCT VFR BLD CALC: 32.2 % — LOW (ref 34.5–45)
HGB BLD-MCNC: 9.5 G/DL — LOW (ref 11.5–15.5)
MAGNESIUM SERPL-MCNC: 2.2 MG/DL — SIGNIFICANT CHANGE UP (ref 1.6–2.6)
MCHC RBC-ENTMCNC: 22.6 PG — LOW (ref 27–34)
MCHC RBC-ENTMCNC: 29.5 % — LOW (ref 32–36)
MCV RBC AUTO: 76.5 FL — LOW (ref 80–100)
NRBC # FLD: 0 — SIGNIFICANT CHANGE UP
PLATELET # BLD AUTO: 423 K/UL — HIGH (ref 150–400)
PMV BLD: 12 FL — SIGNIFICANT CHANGE UP (ref 7–13)
POTASSIUM SERPL-MCNC: 4 MMOL/L — SIGNIFICANT CHANGE UP (ref 3.5–5.3)
POTASSIUM SERPL-SCNC: 4 MMOL/L — SIGNIFICANT CHANGE UP (ref 3.5–5.3)
RBC # BLD: 4.21 M/UL — SIGNIFICANT CHANGE UP (ref 3.8–5.2)
RBC # FLD: 17.5 % — HIGH (ref 10.3–14.5)
SODIUM SERPL-SCNC: 143 MMOL/L — SIGNIFICANT CHANGE UP (ref 135–145)
WBC # BLD: 6.84 K/UL — SIGNIFICANT CHANGE UP (ref 3.8–10.5)
WBC # FLD AUTO: 6.84 K/UL — SIGNIFICANT CHANGE UP (ref 3.8–10.5)

## 2018-03-19 RX ORDER — METOPROLOL TARTRATE 50 MG
12.5 TABLET ORAL
Qty: 0 | Refills: 0 | Status: DISCONTINUED | OUTPATIENT
Start: 2018-03-19 | End: 2018-03-22

## 2018-03-19 RX ADMIN — INSULIN GLARGINE 17 UNIT(S): 100 INJECTION, SOLUTION SUBCUTANEOUS at 23:11

## 2018-03-19 RX ADMIN — GABAPENTIN 300 MILLIGRAM(S): 400 CAPSULE ORAL at 06:37

## 2018-03-19 RX ADMIN — PANTOPRAZOLE SODIUM 40 MILLIGRAM(S): 20 TABLET, DELAYED RELEASE ORAL at 06:37

## 2018-03-19 RX ADMIN — Medication 2: at 12:49

## 2018-03-19 RX ADMIN — Medication 12.5 MILLIGRAM(S): at 04:13

## 2018-03-19 RX ADMIN — Medication 1 ENEMA: at 05:49

## 2018-03-19 RX ADMIN — Medication 3: at 07:42

## 2018-03-19 RX ADMIN — Medication 1: at 19:05

## 2018-03-19 RX ADMIN — ATORVASTATIN CALCIUM 40 MILLIGRAM(S): 80 TABLET, FILM COATED ORAL at 23:09

## 2018-03-19 RX ADMIN — PANTOPRAZOLE SODIUM 40 MILLIGRAM(S): 20 TABLET, DELAYED RELEASE ORAL at 19:05

## 2018-03-19 RX ADMIN — GABAPENTIN 300 MILLIGRAM(S): 400 CAPSULE ORAL at 19:05

## 2018-03-19 RX ADMIN — Medication 12.5 MILLIGRAM(S): at 19:05

## 2018-03-19 RX ADMIN — Medication 2: at 23:12

## 2018-03-19 NOTE — CHART NOTE - NSCHARTNOTEFT_GEN_A_CORE
Patient noted to be tachycardic in AFib 110s-120s.  Patient HR noted to increase after she got up to use the restroom.  Patient does not endorse any feelings of chest pain, SOB or palpitations at this time.  Chart review noted that she has not received her last 2 doses of Metoprolol 2/2 HR <60.  Will administer Metoprolol now and continue to monitor.

## 2018-03-19 NOTE — PROGRESS NOTE ADULT - SUBJECTIVE AND OBJECTIVE BOX
Cardiovascular Disease Progress Note    Overnight events: Ms. Tsang had rapid a-fib overnight while ambulating. Currently she denies chest pain or SOB.     Otherwise review of systems negative    Objective Findings:  T(C): 36.4 (03-19-18 @ 05:48), Max: 37 (03-18-18 @ 10:21)  HR: 81 (03-19-18 @ 05:48) (67 - 87)  BP: 103/45 (03-19-18 @ 05:48) (103/45 - 168/56)  RR: 18 (03-19-18 @ 05:48) (16 - 18)  SpO2: 98% (03-19-18 @ 05:48) (95% - 100%)  Wt(kg): --  Daily     Daily       Physical Exam:  Gen: NAD  HEENT: EOMI  CV: RRR, normal S1 + S2, no m/r/g  Lungs: CTAB  Abd: soft, non-tender  Ext: No edema    Telemetry: A-fib with RVR overnight. Currently sinus    Laboratory Data:                        9.5    6.84  )-----------( 423      ( 19 Mar 2018 05:35 )             32.2     03-19    143  |  98  |  17  ----------------------------<  252<H>  4.0   |  31  |  1.52<H>    Ca    8.5      19 Mar 2018 05:35  Mg     2.2     03-19                Inpatient Medications:  MEDICATIONS  (STANDING):  aspirin enteric coated 81 milliGRAM(s) Oral daily  atorvastatin 40 milliGRAM(s) Oral at bedtime  clopidogrel Tablet 75 milliGRAM(s) Oral daily  dextrose 5%. 1000 milliLiter(s) (50 mL/Hr) IV Continuous <Continuous>  dextrose 50% Injectable 12.5 Gram(s) IV Push once  dextrose 50% Injectable 25 Gram(s) IV Push once  dextrose 50% Injectable 25 Gram(s) IV Push once  furosemide    Tablet 40 milliGRAM(s) Oral daily  gabapentin 300 milliGRAM(s) Oral two times a day  insulin glargine Injectable (LANTUS) 17 Unit(s) SubCutaneous at bedtime  insulin lispro (HumaLOG) corrective regimen sliding scale   SubCutaneous three times a day before meals  insulin lispro (HumaLOG) corrective regimen sliding scale   SubCutaneous at bedtime  metoprolol     tartrate 12.5 milliGRAM(s) Oral two times a day  pantoprazole  Injectable 40 milliGRAM(s) IV Push two times a day      Assessment: 78 year old woman with CAD s/p MICHAEL x2 in 12/2017, a-fib not on AC due to GI bleed, and compensated diastolic CHF presents with respiratory distress and abnormal EKG due to acute blood loss anemia.    Plan of Care:    #CAD- s/p MICHAEL x2 in 2/2017.  Despite acute blood loss anemia, patient displays no signs of active ischemia as evidenced by negative cardiac biomarkers.  Continue DAPT in the setting of recent stents 4 months ago.  There is no cardiac objection to EGD/colonoscopy given life threatening anemia.    #Paroxymal a-fib-  Patient had rapid a-fib overnight that spontaneously converted to sinus rhythm.  Currently in sinus.  No AC given GI bleed.    #Compensated diastolic CHF-  Patient tolerated 2 Unit pRBCs transfusion well.  Closely monitor fluid status.  Continue oral Lasix.    Over 35 minutes spent on total encounter; more than 50% of the visit was spent counseling and/or coordinating care by the attending physician.      Julio César Hooper MD [facility]  Cardiovascular Disease  [phone]

## 2018-03-19 NOTE — PROGRESS NOTE ADULT - SUBJECTIVE AND OBJECTIVE BOX
Patient is a 78y old  Female who presents with a chief complaint of "I was told to come here by my heart doctor" (16 Mar 2018 22:51)      SUBJECTIVE / OVERNIGHT EVENTS:   Feels better.  Denies CP/SOB/Palpitation/HA.    MEDICATIONS  (STANDING):  aspirin enteric coated 81 milliGRAM(s) Oral daily  atorvastatin 40 milliGRAM(s) Oral at bedtime  clopidogrel Tablet 75 milliGRAM(s) Oral daily  dextrose 5%. 1000 milliLiter(s) (50 mL/Hr) IV Continuous <Continuous>  dextrose 50% Injectable 12.5 Gram(s) IV Push once  dextrose 50% Injectable 25 Gram(s) IV Push once  dextrose 50% Injectable 25 Gram(s) IV Push once  furosemide    Tablet 40 milliGRAM(s) Oral daily  gabapentin 300 milliGRAM(s) Oral two times a day  insulin glargine Injectable (LANTUS) 17 Unit(s) SubCutaneous at bedtime  insulin lispro (HumaLOG) corrective regimen sliding scale   SubCutaneous three times a day before meals  insulin lispro (HumaLOG) corrective regimen sliding scale   SubCutaneous at bedtime  metoprolol     tartrate 12.5 milliGRAM(s) Oral two times a day  pantoprazole  Injectable 40 milliGRAM(s) IV Push two times a day    MEDICATIONS  (PRN):  ALBUTerol    90 MICROgram(s) HFA Inhaler 2 Puff(s) Inhalation every 6 hours PRN Shortness of Breath and/or Wheezing  dextrose Gel 1 Dose(s) Oral once PRN Blood Glucose LESS THAN 70 milliGRAM(s)/deciliter  glucagon  Injectable 1 milliGRAM(s) IntraMuscular once PRN Glucose LESS THAN 70 milligrams/deciliter        CAPILLARY BLOOD GLUCOSE      POCT Blood Glucose.: 343 mg/dL (19 Mar 2018 23:04)  POCT Blood Glucose.: 359 mg/dL (19 Mar 2018 21:47)  POCT Blood Glucose.: 169 mg/dL (19 Mar 2018 19:00)  POCT Blood Glucose.: 233 mg/dL (19 Mar 2018 12:46)  POCT Blood Glucose.: 286 mg/dL (19 Mar 2018 07:38)    I&O's Summary      PHYSICAL EXAM:  GENERAL: NAD, well-developed  HEAD:  Atraumatic, Normocephalic  NECK: Supple, No JVD  CHEST/LUNG: Clear to auscultation bilaterally; No wheezing.  HEART: Regular rate and rhythm; No murmurs, rubs, or gallops  ABDOMEN: Soft, Nontender, Nondistended; Bowel sounds present  EXTREMITIES:   No clubbing, cyanosis, or edema  NEUROLOGY: AAO X 3  SKIN: No rashes    LABS:                        9.5    6.84  )-----------( 423      ( 19 Mar 2018 05:35 )             32.2     03-19    143  |  98  |  17  ----------------------------<  252<H>  4.0   |  31  |  1.52<H>    Ca    8.5      19 Mar 2018 05:35  Mg     2.2     03-19              CAPILLARY BLOOD GLUCOSE      POCT Blood Glucose.: 343 mg/dL (19 Mar 2018 23:04)  POCT Blood Glucose.: 359 mg/dL (19 Mar 2018 21:47)  POCT Blood Glucose.: 169 mg/dL (19 Mar 2018 19:00)  POCT Blood Glucose.: 233 mg/dL (19 Mar 2018 12:46)  POCT Blood Glucose.: 286 mg/dL (19 Mar 2018 07:38)                RADIOLOGY & ADDITIONAL TESTS:    Imaging Personally Reviewed:    Consultant(s) Notes Reviewed:      Care Discussed with Consultants/Other Providers:

## 2018-03-20 LAB
BUN SERPL-MCNC: 18 MG/DL — SIGNIFICANT CHANGE UP (ref 7–23)
CALCIUM SERPL-MCNC: 7.8 MG/DL — LOW (ref 8.4–10.5)
CHLORIDE SERPL-SCNC: 97 MMOL/L — LOW (ref 98–107)
CO2 SERPL-SCNC: 32 MMOL/L — HIGH (ref 22–31)
CREAT SERPL-MCNC: 1.53 MG/DL — HIGH (ref 0.5–1.3)
GLUCOSE BLDC GLUCOMTR-MCNC: 196 MG/DL — HIGH (ref 70–99)
GLUCOSE BLDC GLUCOMTR-MCNC: 236 MG/DL — HIGH (ref 70–99)
GLUCOSE BLDC GLUCOMTR-MCNC: 291 MG/DL — HIGH (ref 70–99)
GLUCOSE BLDC GLUCOMTR-MCNC: 305 MG/DL — HIGH (ref 70–99)
GLUCOSE SERPL-MCNC: 310 MG/DL — HIGH (ref 70–99)
HCT VFR BLD CALC: 29.6 % — LOW (ref 34.5–45)
HGB BLD-MCNC: 8.4 G/DL — LOW (ref 11.5–15.5)
MAGNESIUM SERPL-MCNC: 2.5 MG/DL — SIGNIFICANT CHANGE UP (ref 1.6–2.6)
MCHC RBC-ENTMCNC: 22.3 PG — LOW (ref 27–34)
MCHC RBC-ENTMCNC: 28.4 % — LOW (ref 32–36)
MCV RBC AUTO: 78.7 FL — LOW (ref 80–100)
NRBC # FLD: 0 — SIGNIFICANT CHANGE UP
PLATELET # BLD AUTO: 370 K/UL — SIGNIFICANT CHANGE UP (ref 150–400)
PMV BLD: 12.2 FL — SIGNIFICANT CHANGE UP (ref 7–13)
POTASSIUM SERPL-MCNC: 3.5 MMOL/L — SIGNIFICANT CHANGE UP (ref 3.5–5.3)
POTASSIUM SERPL-SCNC: 3.5 MMOL/L — SIGNIFICANT CHANGE UP (ref 3.5–5.3)
RBC # BLD: 3.76 M/UL — LOW (ref 3.8–5.2)
RBC # FLD: 18.2 % — HIGH (ref 10.3–14.5)
SODIUM SERPL-SCNC: 138 MMOL/L — SIGNIFICANT CHANGE UP (ref 135–145)
WBC # BLD: 6.71 K/UL — SIGNIFICANT CHANGE UP (ref 3.8–10.5)
WBC # FLD AUTO: 6.71 K/UL — SIGNIFICANT CHANGE UP (ref 3.8–10.5)

## 2018-03-20 RX ORDER — SIMETHICONE 80 MG/1
80 TABLET, CHEWABLE ORAL ONCE
Qty: 0 | Refills: 0 | Status: COMPLETED | OUTPATIENT
Start: 2018-03-20 | End: 2018-03-20

## 2018-03-20 RX ORDER — POLYETHYLENE GLYCOL 3350 17 G/17G
17 POWDER, FOR SOLUTION ORAL ONCE
Qty: 0 | Refills: 0 | Status: COMPLETED | OUTPATIENT
Start: 2018-03-20 | End: 2018-03-20

## 2018-03-20 RX ADMIN — Medication 1: at 18:29

## 2018-03-20 RX ADMIN — CLOPIDOGREL BISULFATE 75 MILLIGRAM(S): 75 TABLET, FILM COATED ORAL at 12:42

## 2018-03-20 RX ADMIN — INSULIN GLARGINE 17 UNIT(S): 100 INJECTION, SOLUTION SUBCUTANEOUS at 22:15

## 2018-03-20 RX ADMIN — SIMETHICONE 80 MILLIGRAM(S): 80 TABLET, CHEWABLE ORAL at 23:18

## 2018-03-20 RX ADMIN — Medication 12.5 MILLIGRAM(S): at 05:48

## 2018-03-20 RX ADMIN — Medication 2: at 12:41

## 2018-03-20 RX ADMIN — Medication 2: at 22:15

## 2018-03-20 RX ADMIN — Medication 12.5 MILLIGRAM(S): at 17:37

## 2018-03-20 RX ADMIN — POLYETHYLENE GLYCOL 3350 17 GRAM(S): 17 POWDER, FOR SOLUTION ORAL at 23:18

## 2018-03-20 RX ADMIN — PANTOPRAZOLE SODIUM 40 MILLIGRAM(S): 20 TABLET, DELAYED RELEASE ORAL at 17:37

## 2018-03-20 RX ADMIN — ATORVASTATIN CALCIUM 40 MILLIGRAM(S): 80 TABLET, FILM COATED ORAL at 22:15

## 2018-03-20 RX ADMIN — Medication 81 MILLIGRAM(S): at 12:42

## 2018-03-20 RX ADMIN — PANTOPRAZOLE SODIUM 40 MILLIGRAM(S): 20 TABLET, DELAYED RELEASE ORAL at 05:49

## 2018-03-20 RX ADMIN — GABAPENTIN 300 MILLIGRAM(S): 400 CAPSULE ORAL at 05:48

## 2018-03-20 RX ADMIN — Medication 3: at 08:14

## 2018-03-20 RX ADMIN — GABAPENTIN 300 MILLIGRAM(S): 400 CAPSULE ORAL at 17:37

## 2018-03-20 RX ADMIN — Medication 40 MILLIGRAM(S): at 05:48

## 2018-03-20 NOTE — PROGRESS NOTE ADULT - SUBJECTIVE AND OBJECTIVE BOX
INTERVAL HPI/OVERNIGHT EVENTS:    pt this morning with no complaints  NPO for Capsule    MEDICATIONS  (STANDING):  aspirin enteric coated 81 milliGRAM(s) Oral daily  atorvastatin 40 milliGRAM(s) Oral at bedtime  clopidogrel Tablet 75 milliGRAM(s) Oral daily  dextrose 5%. 1000 milliLiter(s) (50 mL/Hr) IV Continuous <Continuous>  dextrose 50% Injectable 12.5 Gram(s) IV Push once  dextrose 50% Injectable 25 Gram(s) IV Push once  dextrose 50% Injectable 25 Gram(s) IV Push once  gabapentin 300 milliGRAM(s) Oral two times a day  insulin glargine Injectable (LANTUS) 17 Unit(s) SubCutaneous at bedtime  insulin lispro (HumaLOG) corrective regimen sliding scale   SubCutaneous three times a day before meals  insulin lispro (HumaLOG) corrective regimen sliding scale   SubCutaneous at bedtime  metoprolol     tartrate 12.5 milliGRAM(s) Oral two times a day  pantoprazole  Injectable 40 milliGRAM(s) IV Push two times a day    MEDICATIONS  (PRN):  ALBUTerol    90 MICROgram(s) HFA Inhaler 2 Puff(s) Inhalation every 6 hours PRN Shortness of Breath and/or Wheezing  dextrose Gel 1 Dose(s) Oral once PRN Blood Glucose LESS THAN 70 milliGRAM(s)/deciliter  glucagon  Injectable 1 milliGRAM(s) IntraMuscular once PRN Glucose LESS THAN 70 milligrams/deciliter      Allergies    No Known Allergies    Intolerances        Review of Systems:    General:  No wt loss, fevers, chills, night sweats, fatigue   Eyes:  Good vision, no reported pain  ENT:  No sore throat, pain, runny nose, dysphagia  CV:  No pain, palpitations, hypo/hypertension  Resp:  No dyspnea, cough, tachypnea, wheezing  GI:  No pain, No nausea, No vomiting, No diarrhea, No constipation, No weight loss, No fever, No pruritis, No rectal bleeding, No melena, No dysphagia  :  No pain, bleeding, incontinence, nocturia  Muscle:  No pain, weakness  Neuro:  No weakness, tingling, memory problems  Psych:  No fatigue, insomnia, mood problems, depression  Endocrine:  No polyuria, polydypsia, cold/heat intolerance  Heme:  No petechiae, ecchymosis, easy bruisability  Skin:  No rash, tattoos, scars, edema      Vital Signs Last 24 Hrs  T(C): 37.1 (20 Mar 2018 05:48), Max: 37.1 (20 Mar 2018 05:48)  T(F): 98.7 (20 Mar 2018 05:48), Max: 98.7 (20 Mar 2018 05:48)  HR: 73 (20 Mar 2018 05:48) (73 - 84)  BP: 129/54 (20 Mar 2018 05:48) (102/58 - 144/61)  BP(mean): --  RR: 18 (20 Mar 2018 05:48) (16 - 18)  SpO2: 97% (20 Mar 2018 05:48) (97% - 99%)    PHYSICAL EXAM:    Constitutional: NAD  HEENT: EOMI, throat clear  Neck: No LAD, supple  Respiratory: CTA and P  Cardiovascular: S1 and S2, RRR, no M  Gastrointestinal: BS+, soft, NT/ND, neg HSM,  Extremities: No peripheral edema, neg clubbing, cyanosis  Vascular: 2+ peripheral pulses  Neurological: A/O x 3, no focal deficits  Psychiatric: Normal mood, normal affect  Skin: No rashes      LABS:                        8.4    6.71  )-----------( 370      ( 20 Mar 2018 05:38 )             29.6     03-20    138  |  97<L>  |  18  ----------------------------<  310<H>  3.5   |  32<H>  |  1.53<H>    Ca    7.8<L>      20 Mar 2018 05:38  Mg     2.5     03-20            RADIOLOGY & ADDITIONAL TESTS:    < from: Colonoscopy (03.19.18 @ 17:12) >    [facility]  _______________________________________________________________________________  Patient Name: Niuknj Tsang             Procedure Date: 3/19/2018 5:12 PM  MRN: [id number]                     Account Number: [id number]  YOB: 1940              Admit Type: Inpatient  Room: ENDO 03                         Gender: Female  Attending MD: LENORE HODGE DO        _______________________________________________________________________________     Procedure:           Colonoscopy  Indications:         Iron deficiency anemia  Providers:           LENORE HODGE DO  Medicines:           Monitored Anesthesia Care  Complications:       No immediate complications.  Procedure:           After I obtained informed consent, the scope was passed                        under direct vision. Throughout the procedure, the                        patient's blood pressure, pulse, and oxygen saturations                        were monitored continuously. The Colonoscope was                        introduced through the anus and advanced to the cecum,                        identified by appendiceal orifice and ileocecal valve.                        The colonoscopy was performed without difficulty. The                 patient tolerated the procedure well. The quality of the                        bowel preparation was poor.                                                                                   Findings:       A moderate amount of semi-liquid stool was found in the entire colon,        interfering with visualization. Small or flat lesions may not have been        seen.       Stool was brown, no bleeding was noted.       Internal hemorrhoids were found during retroflexion. The hemorrhoids        were moderate.                                                                                   Impression:          - Preparation of the colon was poor.                       - Stool in the entire examined colon.                       - Internal hemorrhoids.                       - No specimens collected.  Recommendation:      - [facility].                       - will need repeat colonoscopy with 2 day prep for                        screening purposes.                                                                                   Attending Participation:       I personally performed the entire procedure.                                                                                     _________________  LENORE HODGE DO  3/19/2018 5:52:04 PM  This report has been signed electronically.  Number of Addenda: 0    Note Initiated On: 3/19/2018 5:12 PM    < end of copied text >  ______    < from: Upper Endoscopy (03.19.18 @ 17:11) >    [facility]  _______________________________________________________________________________  Patient Name: Nikunj Tsang             Procedure Date: 3/19/2018 5:11 PM  MRN: [id number]                     Account Number: [id number]  YOB: 1940              Admit Type: Inpatient  Room: [place] [address]                         Gender: Female  Attending MD: LENORE HODGE DO        _______________________________________________________________________________     Procedure:           Upper GI endoscopy  Indications:         Iron deficiency anemia  Providers:           LENORE HODGE DO  Medicines:           Monitored Anesthesia Care  Complications:       No immediate complications.  Procedure:           After obtaining informed consent, the endoscope was                        passed under direct vision. Throughout the procedure,                        the patient's blood pressure, pulse, and oxygen                        saturations were monitored continuously. The Endoscope                        was introduced through the mouth, and advanced to the                        second part of duodenum. The upper GI endoscopy was                        accomplished without difficulty. The patient tolerated                  the procedure well.                                                                                   Findings:       The examined esophagus was normal.       The entire examined stomach was normal.       The examined duodenum was normal.                                                                                   Impression:          - Normal esophagus.                       - Normal stomach.                       - Normal examined duodenum.                       - No specimens collected.  Recommendation:      - [facility].                       - Resume regular diet.                       - To visualize the small bowel, perform video capsule                        endoscopy tomorrow.                                                                                   Attending Participation:       I personally performed the entire procedure.                                                                                     _________________  LENORE HODGE DO  3/19/2018 5:49:23 PM  This report has been signed electronically.  Number of Addenda: 0    Note Initiated On: 3/19/2018 5:11 PM    < end of copied text > INTERVAL HPI/OVERNIGHT EVENTS:    pt this morning with no complaints      MEDICATIONS  (STANDING):  aspirin enteric coated 81 milliGRAM(s) Oral daily  atorvastatin 40 milliGRAM(s) Oral at bedtime  clopidogrel Tablet 75 milliGRAM(s) Oral daily  dextrose 5%. 1000 milliLiter(s) (50 mL/Hr) IV Continuous <Continuous>  dextrose 50% Injectable 12.5 Gram(s) IV Push once  dextrose 50% Injectable 25 Gram(s) IV Push once  dextrose 50% Injectable 25 Gram(s) IV Push once  gabapentin 300 milliGRAM(s) Oral two times a day  insulin glargine Injectable (LANTUS) 17 Unit(s) SubCutaneous at bedtime  insulin lispro (HumaLOG) corrective regimen sliding scale   SubCutaneous three times a day before meals  insulin lispro (HumaLOG) corrective regimen sliding scale   SubCutaneous at bedtime  metoprolol     tartrate 12.5 milliGRAM(s) Oral two times a day  pantoprazole  Injectable 40 milliGRAM(s) IV Push two times a day    MEDICATIONS  (PRN):  ALBUTerol    90 MICROgram(s) HFA Inhaler 2 Puff(s) Inhalation every 6 hours PRN Shortness of Breath and/or Wheezing  dextrose Gel 1 Dose(s) Oral once PRN Blood Glucose LESS THAN 70 milliGRAM(s)/deciliter  glucagon  Injectable 1 milliGRAM(s) IntraMuscular once PRN Glucose LESS THAN 70 milligrams/deciliter      Allergies    No Known Allergies    Intolerances        Review of Systems:    General:  No wt loss, fevers, chills, night sweats, fatigue   Eyes:  Good vision, no reported pain  ENT:  No sore throat, pain, runny nose, dysphagia  CV:  No pain, palpitations, hypo/hypertension  Resp:  No dyspnea, cough, tachypnea, wheezing  GI:  No pain, No nausea, No vomiting, No diarrhea, No constipation, No weight loss, No fever, No pruritis, No rectal bleeding, No melena, No dysphagia  :  No pain, bleeding, incontinence, nocturia  Muscle:  No pain, weakness  Neuro:  No weakness, tingling, memory problems  Psych:  No fatigue, insomnia, mood problems, depression  Endocrine:  No polyuria, polydypsia, cold/heat intolerance  Heme:  No petechiae, ecchymosis, easy bruisability  Skin:  No rash, tattoos, scars, edema      Vital Signs Last 24 Hrs  T(C): 37.1 (20 Mar 2018 05:48), Max: 37.1 (20 Mar 2018 05:48)  T(F): 98.7 (20 Mar 2018 05:48), Max: 98.7 (20 Mar 2018 05:48)  HR: 73 (20 Mar 2018 05:48) (73 - 84)  BP: 129/54 (20 Mar 2018 05:48) (102/58 - 144/61)  BP(mean): --  RR: 18 (20 Mar 2018 05:48) (16 - 18)  SpO2: 97% (20 Mar 2018 05:48) (97% - 99%)    PHYSICAL EXAM:    Constitutional: NAD  HEENT: EOMI, throat clear  Neck: No LAD, supple  Respiratory: CTA and P  Cardiovascular: S1 and S2, RRR, no M  Gastrointestinal: BS+, soft, NT/ND, neg HSM,  Extremities: No peripheral edema, neg clubbing, cyanosis  Vascular: 2+ peripheral pulses  Neurological: A/O x 3, no focal deficits  Psychiatric: Normal mood, normal affect  Skin: No rashes      LABS:                        8.4    6.71  )-----------( 370      ( 20 Mar 2018 05:38 )             29.6     03-20    138  |  97<L>  |  18  ----------------------------<  310<H>  3.5   |  32<H>  |  1.53<H>    Ca    7.8<L>      20 Mar 2018 05:38  Mg     2.5     03-20            RADIOLOGY & ADDITIONAL TESTS:    < from: Colonoscopy (03.19.18 @ 17:12) >    [facility]  _______________________________________________________________________________  Patient Name: Nikunj Tsang             Procedure Date: 3/19/2018 5:12 PM  MRN: [id number]                     Account Number: [id number]  YOB: 1940              Admit Type: Inpatient  Room: ENDO 03                         Gender: Female  Attending MD: LENORE HODGE DO        _______________________________________________________________________________     Procedure:           Colonoscopy  Indications:         Iron deficiency anemia  Providers:           LENORE HODGE DO  Medicines:           Monitored Anesthesia Care  Complications:       No immediate complications.  Procedure:           After I obtained informed consent, the scope was passed                        under direct vision. Throughout the procedure, the                        patient's blood pressure, pulse, and oxygen saturations                        were monitored continuously. The Colonoscope was                        introduced through the anus and advanced to the cecum,                        identified by appendiceal orifice and ileocecal valve.                        The colonoscopy was performed without difficulty. The                 patient tolerated the procedure well. The quality of the                        bowel preparation was poor.                                                                                   Findings:       A moderate amount of semi-liquid stool was found in the entire colon,        interfering with visualization. Small or flat lesions may not have been        seen.       Stool was brown, no bleeding was noted.       Internal hemorrhoids were found during retroflexion. The hemorrhoids        were moderate.                                                                                   Impression:          - Preparation of the colon was poor.                       - Stool in the entire examined colon.                       - Internal hemorrhoids.                       - No specimens collected.  Recommendation:      - [facility].                       - will need repeat colonoscopy with 2 day prep for                        screening purposes.                                                                                   Attending Participation:       I personally performed the entire procedure.                                                                                     _________________  LENORE HODGE DO  3/19/2018 5:52:04 PM  This report has been signed electronically.  Number of Addenda: 0    Note Initiated On: 3/19/2018 5:12 PM    < end of copied text >  ______    < from: Upper Endoscopy (03.19.18 @ 17:11) >    [facility]  _______________________________________________________________________________  Patient Name: Nikunj Tsang             Procedure Date: 3/19/2018 5:11 PM  MRN: [id number]                     Account Number: [id number]  YOB: 1940              Admit Type: Inpatient  Room: [place] [address]                         Gender: Female  Attending MD: LENORE HODGE DO        _______________________________________________________________________________     Procedure:           Upper GI endoscopy  Indications:         Iron deficiency anemia  Providers:           LENORE HODGE DO  Medicines:           Monitored Anesthesia Care  Complications:       No immediate complications.  Procedure:           After obtaining informed consent, the endoscope was                        passed under direct vision. Throughout the procedure,                        the patient's blood pressure, pulse, and oxygen                        saturations were monitored continuously. The Endoscope                        was introduced through the mouth, and advanced to the                        second part of duodenum. The upper GI endoscopy was                        accomplished without difficulty. The patient tolerated                  the procedure well.                                                                                   Findings:       The examined esophagus was normal.       The entire examined stomach was normal.       The examined duodenum was normal.                                                                                   Impression:          - Normal esophagus.                       - Normal stomach.                       - Normal examined duodenum.                       - No specimens collected.  Recommendation:      - [facility].                       - Resume regular diet.                       - To visualize the small bowel, perform video capsule                        endoscopy tomorrow.                                                                                   Attending Participation:       I personally performed the entire procedure.                                                                                     _________________  LENORE HODGE DO  3/19/2018 5:49:23 PM  This report has been signed electronically.  Number of Addenda: 0    Note Initiated On: 3/19/2018 5:11 PM    < end of copied text >

## 2018-03-20 NOTE — PROGRESS NOTE ADULT - SUBJECTIVE AND OBJECTIVE BOX
Patient is a 78y old  Female who presents with a chief complaint of "I was told to come here by my heart doctor" (16 Mar 2018 22:51)      SUBJECTIVE / OVERNIGHT EVENTS:   Feels better.  Denies CP/SOB/Palpitation/HA.    MEDICATIONS  (STANDING):  aspirin enteric coated 81 milliGRAM(s) Oral daily  atorvastatin 40 milliGRAM(s) Oral at bedtime  clopidogrel Tablet 75 milliGRAM(s) Oral daily  dextrose 5%. 1000 milliLiter(s) (50 mL/Hr) IV Continuous <Continuous>  dextrose 50% Injectable 12.5 Gram(s) IV Push once  dextrose 50% Injectable 25 Gram(s) IV Push once  dextrose 50% Injectable 25 Gram(s) IV Push once  gabapentin 300 milliGRAM(s) Oral two times a day  insulin glargine Injectable (LANTUS) 17 Unit(s) SubCutaneous at bedtime  insulin lispro (HumaLOG) corrective regimen sliding scale   SubCutaneous three times a day before meals  insulin lispro (HumaLOG) corrective regimen sliding scale   SubCutaneous at bedtime  metoprolol     tartrate 12.5 milliGRAM(s) Oral two times a day  pantoprazole  Injectable 40 milliGRAM(s) IV Push two times a day    MEDICATIONS  (PRN):  ALBUTerol    90 MICROgram(s) HFA Inhaler 2 Puff(s) Inhalation every 6 hours PRN Shortness of Breath and/or Wheezing  dextrose Gel 1 Dose(s) Oral once PRN Blood Glucose LESS THAN 70 milliGRAM(s)/deciliter  glucagon  Injectable 1 milliGRAM(s) IntraMuscular once PRN Glucose LESS THAN 70 milligrams/deciliter        CAPILLARY BLOOD GLUCOSE      POCT Blood Glucose.: 305 mg/dL (20 Mar 2018 21:50)  POCT Blood Glucose.: 196 mg/dL (20 Mar 2018 18:28)  POCT Blood Glucose.: 236 mg/dL (20 Mar 2018 12:08)  POCT Blood Glucose.: 291 mg/dL (20 Mar 2018 07:42)  POCT Blood Glucose.: 343 mg/dL (19 Mar 2018 23:04)    I&O's Summary      PHYSICAL EXAM:  GENERAL: NAD, well-developed  HEAD:  Atraumatic, Normocephalic  NECK: Supple, No JVD  CHEST/LUNG: Clear to auscultation bilaterally; No wheezing.  HEART: Regular rate and rhythm; No murmurs, rubs, or gallops  ABDOMEN: Soft, Nontender, Nondistended; Bowel sounds present  EXTREMITIES:   No clubbing, cyanosis, or edema  NEUROLOGY: AAO X 3  SKIN: No rashes    LABS:                        8.4    6.71  )-----------( 370      ( 20 Mar 2018 05:38 )             29.6     03-20    138  |  97<L>  |  18  ----------------------------<  310<H>  3.5   |  32<H>  |  1.53<H>    Ca    7.8<L>      20 Mar 2018 05:38  Mg     2.5     03-20              CAPILLARY BLOOD GLUCOSE      POCT Blood Glucose.: 305 mg/dL (20 Mar 2018 21:50)  POCT Blood Glucose.: 196 mg/dL (20 Mar 2018 18:28)  POCT Blood Glucose.: 236 mg/dL (20 Mar 2018 12:08)  POCT Blood Glucose.: 291 mg/dL (20 Mar 2018 07:42)  POCT Blood Glucose.: 343 mg/dL (19 Mar 2018 23:04)                RADIOLOGY & ADDITIONAL TESTS:    Imaging Personally Reviewed:    Consultant(s) Notes Reviewed:      Care Discussed with Consultants/Other Providers:

## 2018-03-20 NOTE — PROGRESS NOTE ADULT - PROBLEM SELECTOR PLAN 1
- s/p EGD with normal findings  - s/p colonoscopy with poor prep/internal hemorrhoids  - cont ppi qd for gi ppx while on plavix  - NPO today for Video Capsule - s/p EGD with normal findings  - s/p colonoscopy with poor prep/internal hemorrhoids  - cont ppi qd for gi ppx while on plavix  - Regular diet tonight  - NPO p MN for Video Capsule tomorrow morning, 3/21

## 2018-03-20 NOTE — PROGRESS NOTE ADULT - SUBJECTIVE AND OBJECTIVE BOX
Cardiovascular Disease Progress Note    Overnight events: No acute events overnight. Ms. Tsang denies chest pain or SOB.   Otherwise review of systems negative    Objective Findings:  T(C): 36.7 (03-19-18 @ 23:06), Max: 36.8 (03-19-18 @ 21:28)  HR: 76 (03-19-18 @ 23:06) (75 - 130)  BP: 123/63 (03-19-18 @ 23:06) (102/58 - 144/61)  RR: 18 (03-19-18 @ 23:06) (16 - 18)  SpO2: 97% (03-19-18 @ 23:06) (97% - 99%)  Wt(kg): --  Daily     Daily       Physical Exam:  Gen: NAD  HEENT: EOMI  CV: RRR, normal S1 + S2, no m/r/g  Lungs: CTAB  Abd: soft, non-tender  Ext: No edema    Telemetry: Sinus; occasional PVDs    Laboratory Data:                        8.4    6.71  )-----------( 370      ( 20 Mar 2018 05:38 )             29.6     03-20    138  |  97<L>  |  18  ----------------------------<  310<H>  3.5   |  32<H>  |  1.53<H>    Ca    7.8<L>      20 Mar 2018 05:38  Mg     2.5     03-20                Inpatient Medications:  MEDICATIONS  (STANDING):  aspirin enteric coated 81 milliGRAM(s) Oral daily  atorvastatin 40 milliGRAM(s) Oral at bedtime  clopidogrel Tablet 75 milliGRAM(s) Oral daily  dextrose 5%. 1000 milliLiter(s) (50 mL/Hr) IV Continuous <Continuous>  dextrose 50% Injectable 12.5 Gram(s) IV Push once  dextrose 50% Injectable 25 Gram(s) IV Push once  dextrose 50% Injectable 25 Gram(s) IV Push once  furosemide    Tablet 40 milliGRAM(s) Oral daily  gabapentin 300 milliGRAM(s) Oral two times a day  insulin glargine Injectable (LANTUS) 17 Unit(s) SubCutaneous at bedtime  insulin lispro (HumaLOG) corrective regimen sliding scale   SubCutaneous three times a day before meals  insulin lispro (HumaLOG) corrective regimen sliding scale   SubCutaneous at bedtime  metoprolol     tartrate 12.5 milliGRAM(s) Oral two times a day  pantoprazole  Injectable 40 milliGRAM(s) IV Push two times a day      Assessment:  78 year old woman with CAD s/p MICHAEL x2 in 12/2017, a-fib not on AC due to GI bleed, and compensated diastolic CHF presents with respiratory distress and abnormal EKG due to acute blood loss anemia.    Plan of Care:    #CAD- s/p MICHAEL x2 in 2/2017.  Despite acute blood loss anemia, patient displays no signs of active ischemia as evidenced by negative cardiac biomarkers.  EGD/colonoscopy found no overt source of bleeding, but prep suboptimal.  GI f/u  Continue DAPT in the setting of recent stents 4 months ago.      #Paroxymal a-fib-  Currently in sinus.  No AC given GI bleed.    #Compensated diastolic CHF-  Patient tolerated 2 Unit pRBCs transfusion well.  Closely monitor fluid status.  I will hold Lasix today for NPO status and elevated creatinine.     Over 35 minutes spent on total encounter; more than 50% of the visit was spent counseling and/or coordinating care by the attending physician.      Julio César Hooper MD [facility]  Cardiovascular Disease  [phone]

## 2018-03-21 LAB
BUN SERPL-MCNC: 17 MG/DL — SIGNIFICANT CHANGE UP (ref 7–23)
CALCIUM SERPL-MCNC: 8 MG/DL — LOW (ref 8.4–10.5)
CHLORIDE SERPL-SCNC: 102 MMOL/L — SIGNIFICANT CHANGE UP (ref 98–107)
CO2 SERPL-SCNC: 31 MMOL/L — SIGNIFICANT CHANGE UP (ref 22–31)
CREAT SERPL-MCNC: 1.46 MG/DL — HIGH (ref 0.5–1.3)
GLUCOSE BLDC GLUCOMTR-MCNC: 228 MG/DL — HIGH (ref 70–99)
GLUCOSE BLDC GLUCOMTR-MCNC: 269 MG/DL — HIGH (ref 70–99)
GLUCOSE BLDC GLUCOMTR-MCNC: 361 MG/DL — HIGH (ref 70–99)
GLUCOSE BLDC GLUCOMTR-MCNC: 365 MG/DL — HIGH (ref 70–99)
GLUCOSE SERPL-MCNC: 271 MG/DL — HIGH (ref 70–99)
HCT VFR BLD CALC: 30.3 % — LOW (ref 34.5–45)
HGB BLD-MCNC: 8.5 G/DL — LOW (ref 11.5–15.5)
MAGNESIUM SERPL-MCNC: 2.5 MG/DL — SIGNIFICANT CHANGE UP (ref 1.6–2.6)
MCHC RBC-ENTMCNC: 22.2 PG — LOW (ref 27–34)
MCHC RBC-ENTMCNC: 28.1 % — LOW (ref 32–36)
MCV RBC AUTO: 79.1 FL — LOW (ref 80–100)
NRBC # FLD: 0 — SIGNIFICANT CHANGE UP
PLATELET # BLD AUTO: 383 K/UL — SIGNIFICANT CHANGE UP (ref 150–400)
PMV BLD: 12.1 FL — SIGNIFICANT CHANGE UP (ref 7–13)
POTASSIUM SERPL-MCNC: 3.9 MMOL/L — SIGNIFICANT CHANGE UP (ref 3.5–5.3)
POTASSIUM SERPL-SCNC: 3.9 MMOL/L — SIGNIFICANT CHANGE UP (ref 3.5–5.3)
RBC # BLD: 3.83 M/UL — SIGNIFICANT CHANGE UP (ref 3.8–5.2)
RBC # FLD: 18 % — HIGH (ref 10.3–14.5)
SODIUM SERPL-SCNC: 143 MMOL/L — SIGNIFICANT CHANGE UP (ref 135–145)
WBC # BLD: 6.62 K/UL — SIGNIFICANT CHANGE UP (ref 3.8–10.5)
WBC # FLD AUTO: 6.62 K/UL — SIGNIFICANT CHANGE UP (ref 3.8–10.5)

## 2018-03-21 RX ORDER — DIPHENHYDRAMINE HCL 50 MG
25 CAPSULE ORAL ONCE
Qty: 0 | Refills: 0 | Status: COMPLETED | OUTPATIENT
Start: 2018-03-21 | End: 2018-03-21

## 2018-03-21 RX ORDER — DIPHENHYDRAMINE HCL 50 MG
25 CAPSULE ORAL ONCE
Qty: 0 | Refills: 0 | Status: DISCONTINUED | OUTPATIENT
Start: 2018-03-21 | End: 2018-03-21

## 2018-03-21 RX ORDER — HYDROCORTISONE 1 %
1 OINTMENT (GRAM) TOPICAL EVERY 8 HOURS
Qty: 0 | Refills: 0 | Status: DISCONTINUED | OUTPATIENT
Start: 2018-03-21 | End: 2018-03-22

## 2018-03-21 RX ORDER — HYDRALAZINE HCL 50 MG
10 TABLET ORAL ONCE
Qty: 0 | Refills: 0 | Status: COMPLETED | OUTPATIENT
Start: 2018-03-21 | End: 2018-03-21

## 2018-03-21 RX ADMIN — CLOPIDOGREL BISULFATE 75 MILLIGRAM(S): 75 TABLET, FILM COATED ORAL at 13:01

## 2018-03-21 RX ADMIN — Medication 12.5 MILLIGRAM(S): at 09:56

## 2018-03-21 RX ADMIN — Medication 5: at 18:01

## 2018-03-21 RX ADMIN — Medication 81 MILLIGRAM(S): at 13:01

## 2018-03-21 RX ADMIN — GABAPENTIN 300 MILLIGRAM(S): 400 CAPSULE ORAL at 09:45

## 2018-03-21 RX ADMIN — Medication 3: at 09:40

## 2018-03-21 RX ADMIN — Medication 3: at 21:51

## 2018-03-21 RX ADMIN — PANTOPRAZOLE SODIUM 40 MILLIGRAM(S): 20 TABLET, DELAYED RELEASE ORAL at 17:38

## 2018-03-21 RX ADMIN — GABAPENTIN 300 MILLIGRAM(S): 400 CAPSULE ORAL at 17:36

## 2018-03-21 RX ADMIN — Medication 25 MILLIGRAM(S): at 06:13

## 2018-03-21 RX ADMIN — Medication 1 APPLICATION(S): at 21:51

## 2018-03-21 RX ADMIN — ATORVASTATIN CALCIUM 40 MILLIGRAM(S): 80 TABLET, FILM COATED ORAL at 21:51

## 2018-03-21 RX ADMIN — INSULIN GLARGINE 17 UNIT(S): 100 INJECTION, SOLUTION SUBCUTANEOUS at 21:51

## 2018-03-21 RX ADMIN — Medication 12.5 MILLIGRAM(S): at 17:36

## 2018-03-21 RX ADMIN — PANTOPRAZOLE SODIUM 40 MILLIGRAM(S): 20 TABLET, DELAYED RELEASE ORAL at 06:13

## 2018-03-21 RX ADMIN — Medication 2: at 13:03

## 2018-03-21 RX ADMIN — Medication 10 MILLIGRAM(S): at 17:39

## 2018-03-21 NOTE — PROGRESS NOTE ADULT - SUBJECTIVE AND OBJECTIVE BOX
INTERVAL HPI/OVERNIGHT EVENTS:    pt in good spirits despite being upset she did not swallow the capsule yesterday; happy she got to eat dinner last night  she remains NPO for Capsule today  no bms thus far this morning; no melena/no brbpr  yesterday reports some loose stools "from all that prep the other day"    MEDICATIONS  (STANDING):  aspirin enteric coated 81 milliGRAM(s) Oral daily  atorvastatin 40 milliGRAM(s) Oral at bedtime  clopidogrel Tablet 75 milliGRAM(s) Oral daily  dextrose 5%. 1000 milliLiter(s) (50 mL/Hr) IV Continuous <Continuous>  dextrose 50% Injectable 12.5 Gram(s) IV Push once  dextrose 50% Injectable 25 Gram(s) IV Push once  dextrose 50% Injectable 25 Gram(s) IV Push once  gabapentin 300 milliGRAM(s) Oral two times a day  insulin glargine Injectable (LANTUS) 17 Unit(s) SubCutaneous at bedtime  insulin lispro (HumaLOG) corrective regimen sliding scale   SubCutaneous three times a day before meals  insulin lispro (HumaLOG) corrective regimen sliding scale   SubCutaneous at bedtime  metoprolol     tartrate 12.5 milliGRAM(s) Oral two times a day  pantoprazole  Injectable 40 milliGRAM(s) IV Push two times a day    MEDICATIONS  (PRN):  ALBUTerol    90 MICROgram(s) HFA Inhaler 2 Puff(s) Inhalation every 6 hours PRN Shortness of Breath and/or Wheezing  dextrose Gel 1 Dose(s) Oral once PRN Blood Glucose LESS THAN 70 milliGRAM(s)/deciliter  glucagon  Injectable 1 milliGRAM(s) IntraMuscular once PRN Glucose LESS THAN 70 milligrams/deciliter      Allergies    No Known Allergies    Intolerances        Review of Systems:    General:  No wt loss, fevers, chills, night sweats, fatigue   Eyes:  Good vision, no reported pain  ENT:  No sore throat, pain, runny nose, dysphagia  CV:  No pain, palpitations, hypo/hypertension  Resp:  No dyspnea, cough, tachypnea, wheezing  GI:  No pain, No nausea, No vomiting, No diarrhea, No constipation, No weight loss, No fever, No pruritis, No rectal bleeding, No melena, No dysphagia  :  No pain, bleeding, incontinence, nocturia  Muscle:  No pain, weakness  Neuro:  No weakness, tingling, memory problems  Psych:  No fatigue, insomnia, mood problems, depression  Endocrine:  No polyuria, polydypsia, cold/heat intolerance  Heme:  No petechiae, ecchymosis, easy bruisability  Skin:  No rash, tattoos, scars, edema      Vital Signs Last 24 Hrs  T(C): 36.8 (21 Mar 2018 01:30), Max: 37.1 (20 Mar 2018 05:48)  T(F): 98.2 (21 Mar 2018 01:30), Max: 98.7 (20 Mar 2018 05:48)  HR: 71 (21 Mar 2018 01:30) (65 - 82)  BP: 145/58 (21 Mar 2018 01:30) (120/56 - 170/74)  BP(mean): --  RR: 16 (21 Mar 2018 01:30) (16 - 18)  SpO2: 99% (21 Mar 2018 01:30) (97% - 100%)    PHYSICAL EXAM:    Constitutional: NAD  HEENT: EOMI, throat clear  Neck: No LAD, supple  Respiratory: CTA and P  Cardiovascular: S1 and S2, RRR, no M  Gastrointestinal: BS+, soft, NT/ND, neg HSM,  Extremities: No peripheral edema, neg clubbing, cyanosis  Vascular: 2+ peripheral pulses  Neurological: A/O x 3, no focal deficits  Psychiatric: Normal mood, normal affect  Skin: No rashes      LABS:                        8.4    6.71  )-----------( 370      ( 20 Mar 2018 05:38 )             29.6     03-20    138  |  97<L>  |  18  ----------------------------<  310<H>  3.5   |  32<H>  |  1.53<H>    Ca    7.8<L>      20 Mar 2018 05:38  Mg     2.5     03-20            RADIOLOGY & ADDITIONAL TESTS:

## 2018-03-21 NOTE — PROGRESS NOTE ADULT - PROBLEM SELECTOR PLAN 1
- s/p EGD with normal findings  - s/p colonoscopy with poor prep/internal hemorrhoids  - cont ppi qd for gi ppx while on plavix  - NPO Video Capsule this morning; House GI to drop capsule

## 2018-03-21 NOTE — PROGRESS NOTE ADULT - SUBJECTIVE AND OBJECTIVE BOX
Cardiovascular Disease Progress Note    Overnight events: No acute events overnight. Ms. Tsang denies chest pain or SOB.   Otherwise review of systems negative    Objective Findings:  T(C): 36.8 (03-21-18 @ 05:30), Max: 37.1 (03-20-18 @ 21:32)  HR: 74 (03-21-18 @ 05:30) (65 - 75)  BP: 124/83 (03-21-18 @ 05:30) (124/83 - 170/74)  RR: 16 (03-21-18 @ 05:30) (16 - 18)  SpO2: 99% (03-21-18 @ 05:30) (98% - 100%)  Wt(kg): --  Daily     Daily       Physical Exam:  Gen: NAD  HEENT: EOMI  CV: RRR, normal S1 + S2, no m/r/g  Lungs: CTAB  Abd: soft, non-tender  Ext: No edema    Telemetry: Sinus; occasional PVDs    Laboratory Data:                        8.5    6.62  )-----------( 383      ( 21 Mar 2018 05:55 )             30.3     03-21    143  |  102  |  17  ----------------------------<  271<H>  3.9   |  31  |  1.46<H>    Ca    8.0<L>      21 Mar 2018 05:55  Mg     2.5     03-21                Inpatient Medications:  MEDICATIONS  (STANDING):  aspirin enteric coated 81 milliGRAM(s) Oral daily  atorvastatin 40 milliGRAM(s) Oral at bedtime  clopidogrel Tablet 75 milliGRAM(s) Oral daily  dextrose 5%. 1000 milliLiter(s) (50 mL/Hr) IV Continuous <Continuous>  dextrose 50% Injectable 12.5 Gram(s) IV Push once  dextrose 50% Injectable 25 Gram(s) IV Push once  dextrose 50% Injectable 25 Gram(s) IV Push once  gabapentin 300 milliGRAM(s) Oral two times a day  insulin glargine Injectable (LANTUS) 17 Unit(s) SubCutaneous at bedtime  insulin lispro (HumaLOG) corrective regimen sliding scale   SubCutaneous three times a day before meals  insulin lispro (HumaLOG) corrective regimen sliding scale   SubCutaneous at bedtime  metoprolol     tartrate 12.5 milliGRAM(s) Oral two times a day  pantoprazole  Injectable 40 milliGRAM(s) IV Push two times a day      Assessment: 78 year old woman with CAD s/p MICHAEL x2 in 12/2017, a-fib not on AC due to GI bleed, and compensated diastolic CHF presents with respiratory distress and abnormal EKG due to acute blood loss anemia.    Plan of Care:    #CAD- s/p MICHAEL x2 in 12/2017.  Thus far, no source of bleeding found on EGD/colonoscopy.  Awaiting results of capsule study.  Please continue DAPT in the setting of recent stents 4 months ago.      #Paroxymal a-fib-  Currently in sinus.  No AC given GI bleed.    #Compensated diastolic CHF-  Patient tolerated 2 Unit pRBCs transfusion well.  Closely monitor fluid status.  I will hold Lasix today for NPO status and elevated creatinine.    Over 35 minutes spent on total encounter; more than 50% of the visit was spent counseling and/or coordinating care by the attending physician.      Julio César Hooper MD [facility]  Cardiovascular Disease  [phone]

## 2018-03-21 NOTE — PROGRESS NOTE ADULT - SUBJECTIVE AND OBJECTIVE BOX
Patient is a 78y old  Female who presents with a chief complaint of "I was told to come here by my heart doctor" (16 Mar 2018 22:51)      SUBJECTIVE / OVERNIGHT EVENTS:   Feels better.  Denies CP/SOB/Palpitation/HA.    MEDICATIONS  (STANDING):  aspirin enteric coated 81 milliGRAM(s) Oral daily  atorvastatin 40 milliGRAM(s) Oral at bedtime  clopidogrel Tablet 75 milliGRAM(s) Oral daily  dextrose 5%. 1000 milliLiter(s) (50 mL/Hr) IV Continuous <Continuous>  dextrose 50% Injectable 12.5 Gram(s) IV Push once  dextrose 50% Injectable 25 Gram(s) IV Push once  dextrose 50% Injectable 25 Gram(s) IV Push once  gabapentin 300 milliGRAM(s) Oral two times a day  hydrALAZINE Injectable 10 milliGRAM(s) IV Push once  insulin glargine Injectable (LANTUS) 17 Unit(s) SubCutaneous at bedtime  insulin lispro (HumaLOG) corrective regimen sliding scale   SubCutaneous three times a day before meals  insulin lispro (HumaLOG) corrective regimen sliding scale   SubCutaneous at bedtime  metoprolol     tartrate 12.5 milliGRAM(s) Oral two times a day  pantoprazole  Injectable 40 milliGRAM(s) IV Push two times a day    MEDICATIONS  (PRN):  ALBUTerol    90 MICROgram(s) HFA Inhaler 2 Puff(s) Inhalation every 6 hours PRN Shortness of Breath and/or Wheezing  dextrose Gel 1 Dose(s) Oral once PRN Blood Glucose LESS THAN 70 milliGRAM(s)/deciliter  glucagon  Injectable 1 milliGRAM(s) IntraMuscular once PRN Glucose LESS THAN 70 milligrams/deciliter  hydrocortisone 0.5% Cream 1 Application(s) Topical every 8 hours PRN Rash and/or Itching        CAPILLARY BLOOD GLUCOSE      POCT Blood Glucose.: 228 mg/dL (21 Mar 2018 12:57)  POCT Blood Glucose.: 269 mg/dL (21 Mar 2018 09:22)  POCT Blood Glucose.: 305 mg/dL (20 Mar 2018 21:50)  POCT Blood Glucose.: 196 mg/dL (20 Mar 2018 18:28)    I&O's Summary      PHYSICAL EXAM:  GENERAL: NAD, well-developed  HEAD:  Atraumatic, Normocephalic  NECK: Supple, No JVD  CHEST/LUNG: Clear to auscultation bilaterally; No wheezing.  HEART: Regular rate and rhythm; No murmurs, rubs, or gallops  ABDOMEN: Soft, Nontender, Nondistended; Bowel sounds present  EXTREMITIES:   No clubbing, cyanosis, or edema  NEUROLOGY: AAO X 3  SKIN: No rashes    LABS:                        8.5    6.62  )-----------( 383      ( 21 Mar 2018 05:55 )             30.3     03-21    143  |  102  |  17  ----------------------------<  271<H>  3.9   |  31  |  1.46<H>    Ca    8.0<L>      21 Mar 2018 05:55  Mg     2.5     03-21              CAPILLARY BLOOD GLUCOSE      POCT Blood Glucose.: 228 mg/dL (21 Mar 2018 12:57)  POCT Blood Glucose.: 269 mg/dL (21 Mar 2018 09:22)  POCT Blood Glucose.: 305 mg/dL (20 Mar 2018 21:50)  POCT Blood Glucose.: 196 mg/dL (20 Mar 2018 18:28)                RADIOLOGY & ADDITIONAL TESTS:    Imaging Personally Reviewed:    Consultant(s) Notes Reviewed:      Care Discussed with Consultants/Other Providers:

## 2018-03-21 NOTE — CHART NOTE - NSCHARTNOTEFT_GEN_A_CORE
Risks of video capsule endoscopy explained, including risk of retained capsule, potentially requiring surgery for removal.  Patient understands and agrees to risks.    Capsule ID: [id number]    Capsule swallowed at: 7:45AM    NPO now.   Resume Clear liquid diet at:  9:45AM   Resume regular consistency diet at: 11:45AM    Equipment can be removed at: 19:45    GI fellow will retrieve equipment in the evening or tomorrow morning.    NO MRI UNTIL CAPSULE CLEARANCE CONFIRMED. CAPSULE IS NOT MRI COMPATIBLE.

## 2018-03-22 ENCOUNTER — TRANSCRIPTION ENCOUNTER (OUTPATIENT)
Age: 78
End: 2018-03-22

## 2018-03-22 VITALS
OXYGEN SATURATION: 96 % | DIASTOLIC BLOOD PRESSURE: 76 MMHG | HEART RATE: 696 BPM | TEMPERATURE: 98 F | RESPIRATION RATE: 16 BRPM | SYSTOLIC BLOOD PRESSURE: 165 MMHG

## 2018-03-22 LAB
BUN SERPL-MCNC: 11 MG/DL — SIGNIFICANT CHANGE UP (ref 7–23)
CALCIUM SERPL-MCNC: 8.1 MG/DL — LOW (ref 8.4–10.5)
CHLORIDE SERPL-SCNC: 104 MMOL/L — SIGNIFICANT CHANGE UP (ref 98–107)
CO2 SERPL-SCNC: 29 MMOL/L — SIGNIFICANT CHANGE UP (ref 22–31)
CREAT SERPL-MCNC: 1.06 MG/DL — SIGNIFICANT CHANGE UP (ref 0.5–1.3)
GLUCOSE BLDC GLUCOMTR-MCNC: 204 MG/DL — HIGH (ref 70–99)
GLUCOSE BLDC GLUCOMTR-MCNC: 329 MG/DL — HIGH (ref 70–99)
GLUCOSE SERPL-MCNC: 186 MG/DL — HIGH (ref 70–99)
HCT VFR BLD CALC: 28.5 % — LOW (ref 34.5–45)
HGB BLD-MCNC: 8.3 G/DL — LOW (ref 11.5–15.5)
MAGNESIUM SERPL-MCNC: 3.4 MG/DL — HIGH (ref 1.6–2.6)
MCHC RBC-ENTMCNC: 23.2 PG — LOW (ref 27–34)
MCHC RBC-ENTMCNC: 29.1 % — LOW (ref 32–36)
MCV RBC AUTO: 79.8 FL — LOW (ref 80–100)
NRBC # FLD: 0 — SIGNIFICANT CHANGE UP
PLATELET # BLD AUTO: 365 K/UL — SIGNIFICANT CHANGE UP (ref 150–400)
PMV BLD: 12.2 FL — SIGNIFICANT CHANGE UP (ref 7–13)
POTASSIUM SERPL-MCNC: 4 MMOL/L — SIGNIFICANT CHANGE UP (ref 3.5–5.3)
POTASSIUM SERPL-SCNC: 4 MMOL/L — SIGNIFICANT CHANGE UP (ref 3.5–5.3)
RBC # BLD: 3.57 M/UL — LOW (ref 3.8–5.2)
RBC # FLD: 17.7 % — HIGH (ref 10.3–14.5)
SODIUM SERPL-SCNC: 143 MMOL/L — SIGNIFICANT CHANGE UP (ref 135–145)
WBC # BLD: 5.4 K/UL — SIGNIFICANT CHANGE UP (ref 3.8–10.5)
WBC # FLD AUTO: 5.4 K/UL — SIGNIFICANT CHANGE UP (ref 3.8–10.5)

## 2018-03-22 RX ORDER — ACETAMINOPHEN 500 MG
650 TABLET ORAL ONCE
Qty: 0 | Refills: 0 | Status: COMPLETED | OUTPATIENT
Start: 2018-03-22 | End: 2018-03-22

## 2018-03-22 RX ORDER — FUROSEMIDE 40 MG
40 TABLET ORAL DAILY
Qty: 0 | Refills: 0 | Status: DISCONTINUED | OUTPATIENT
Start: 2018-03-22 | End: 2018-03-22

## 2018-03-22 RX ORDER — MESALAMINE 400 MG
800 TABLET, DELAYED RELEASE (ENTERIC COATED) ORAL THREE TIMES A DAY
Qty: 0 | Refills: 0 | Status: DISCONTINUED | OUTPATIENT
Start: 2018-03-22 | End: 2018-03-22

## 2018-03-22 RX ORDER — MESALAMINE 400 MG
2 TABLET, DELAYED RELEASE (ENTERIC COATED) ORAL
Qty: 180 | Refills: 0
Start: 2018-03-22 | End: 2018-04-20

## 2018-03-22 RX ADMIN — GABAPENTIN 300 MILLIGRAM(S): 400 CAPSULE ORAL at 06:07

## 2018-03-22 RX ADMIN — Medication 40 MILLIGRAM(S): at 12:48

## 2018-03-22 RX ADMIN — Medication 12.5 MILLIGRAM(S): at 06:07

## 2018-03-22 RX ADMIN — Medication 650 MILLIGRAM(S): at 08:24

## 2018-03-22 RX ADMIN — Medication 81 MILLIGRAM(S): at 12:47

## 2018-03-22 RX ADMIN — Medication 4: at 13:37

## 2018-03-22 RX ADMIN — Medication 650 MILLIGRAM(S): at 09:15

## 2018-03-22 RX ADMIN — Medication 2: at 09:33

## 2018-03-22 RX ADMIN — CLOPIDOGREL BISULFATE 75 MILLIGRAM(S): 75 TABLET, FILM COATED ORAL at 12:47

## 2018-03-22 RX ADMIN — PANTOPRAZOLE SODIUM 40 MILLIGRAM(S): 20 TABLET, DELAYED RELEASE ORAL at 06:06

## 2018-03-22 NOTE — DISCHARGE NOTE ADULT - HOSPITAL COURSE
77 y/o Female, with a PmHx of CAD s/p MICHAEL, DM2, HTN, HLD, and recent GI bleed, who presented to the hospital with weakness and melanotic stools, found to have hemoglobin 5.5. Pt admitted to telemetry due to recent stent placement. Transfer from  to Tele PA 3/17.    + Recent Stent - monitor on Tele  + Anemia - Hgb: 5.6/19.6 - s/p 2 units of PRBC's  + PAF - converted to NSR 3/19, no AC 2/2 GIB. CHADS2 =  4 no ac 2/2 GIB    EKG: NSR, ST dep and AVR elevation, similar to previous EKG. No new ischemic changes  CE x 2 neg                   Hgb: 5.6/19.6                   Glucose: 590-->352  Alk phos: 174               BNP: 1,100                  HgbA1-c: 10.1  3/16 CXR - no emergent findings  3/19 Colonoscopy: Preparation of the colon was poor. Stool in the entire examined colon. Internal hemorrhoids. No specimens collected. Recommendation: Return patient to [facility] for [facility] care. will need repeat colonoscopy with 2 day prep for screening purposes.  3/19 EGD: Normal esophagus. Normal stomach. Normal examined duodenum. No specimens collected. Recommendation: Resume regular diet. To visualize the small bowel, perform video capsule  endoscopy tomorrow.    Capsule study showed pigmentation suggestive of bleeding in ileum. Mesalamine started by GI and patient cleared for discharge with close outpatient follow up.

## 2018-03-22 NOTE — PROGRESS NOTE ADULT - SUBJECTIVE AND OBJECTIVE BOX
Patient is a 78y old  Female who presents with a chief complaint of "I was told to come here by my heart doctor" (22 Mar 2018 15:44)      SUBJECTIVE / OVERNIGHT EVENTS:   Feels better.  Denies CP/SOB/Palpitation/HA.    MEDICATIONS  (STANDING):  aspirin enteric coated 81 milliGRAM(s) Oral daily  atorvastatin 40 milliGRAM(s) Oral at bedtime  clopidogrel Tablet 75 milliGRAM(s) Oral daily  dextrose 5%. 1000 milliLiter(s) (50 mL/Hr) IV Continuous <Continuous>  dextrose 50% Injectable 12.5 Gram(s) IV Push once  dextrose 50% Injectable 25 Gram(s) IV Push once  dextrose 50% Injectable 25 Gram(s) IV Push once  furosemide    Tablet 40 milliGRAM(s) Oral daily  gabapentin 300 milliGRAM(s) Oral two times a day  insulin glargine Injectable (LANTUS) 17 Unit(s) SubCutaneous at bedtime  insulin lispro (HumaLOG) corrective regimen sliding scale   SubCutaneous three times a day before meals  insulin lispro (HumaLOG) corrective regimen sliding scale   SubCutaneous at bedtime  mesalamine DR Capsule 800 milliGRAM(s) Oral three times a day  metoprolol     tartrate 12.5 milliGRAM(s) Oral two times a day  pantoprazole  Injectable 40 milliGRAM(s) IV Push two times a day    MEDICATIONS  (PRN):  ALBUTerol    90 MICROgram(s) HFA Inhaler 2 Puff(s) Inhalation every 6 hours PRN Shortness of Breath and/or Wheezing  dextrose Gel 1 Dose(s) Oral once PRN Blood Glucose LESS THAN 70 milliGRAM(s)/deciliter  glucagon  Injectable 1 milliGRAM(s) IntraMuscular once PRN Glucose LESS THAN 70 milligrams/deciliter  hydrocortisone 0.5% Cream 1 Application(s) Topical every 8 hours PRN Rash and/or Itching        CAPILLARY BLOOD GLUCOSE      POCT Blood Glucose.: 329 mg/dL (22 Mar 2018 13:00)  POCT Blood Glucose.: 204 mg/dL (22 Mar 2018 09:29)  POCT Blood Glucose.: 361 mg/dL (21 Mar 2018 21:38)  POCT Blood Glucose.: 365 mg/dL (21 Mar 2018 17:45)    I&O's Summary    21 Mar 2018 07:01  -  22 Mar 2018 07:00  --------------------------------------------------------  IN: 1110 mL / OUT: 400 mL / NET: 710 mL    22 Mar 2018 07:01  -  22 Mar 2018 16:16  --------------------------------------------------------  IN: 480 mL / OUT: 450 mL / NET: 30 mL        PHYSICAL EXAM:  GENERAL: NAD, well-developed  HEAD:  Atraumatic, Normocephalic  NECK: Supple, No JVD  CHEST/LUNG: Clear to auscultation bilaterally; No wheezing.  HEART: Regular rate and rhythm; No murmurs, rubs, or gallops  ABDOMEN: Soft, Nontender, Nondistended; Bowel sounds present  EXTREMITIES:   No clubbing, cyanosis, or edema  NEUROLOGY: AAO X 3  SKIN: No rashes    LABS:                        8.3    5.40  )-----------( 365      ( 22 Mar 2018 06:35 )             28.5     03-22    143  |  104  |  11  ----------------------------<  186<H>  4.0   |  29  |  1.06    Ca    8.1<L>      22 Mar 2018 06:35  Mg     3.4     03-22              CAPILLARY BLOOD GLUCOSE      POCT Blood Glucose.: 329 mg/dL (22 Mar 2018 13:00)  POCT Blood Glucose.: 204 mg/dL (22 Mar 2018 09:29)  POCT Blood Glucose.: 361 mg/dL (21 Mar 2018 21:38)  POCT Blood Glucose.: 365 mg/dL (21 Mar 2018 17:45)                RADIOLOGY & ADDITIONAL TESTS:    Imaging Personally Reviewed:    Consultant(s) Notes Reviewed:      Care Discussed with Consultants/Other Providers:

## 2018-03-22 NOTE — DISCHARGE NOTE ADULT - CARE PLAN
Principal Discharge DX:	GI bleed  Goal:	Prevent further bleeding  Assessment and plan of treatment:	Please follow up with your doctors in 1-2 weeks. Information below for Dr. Daigle, please call for an appointment. Evidence of bleeding in your gastrointestinal tract was seen. Please observe for signs and symptoms of bleeding including, but not limited too, blood in stool, dark tarry stool, shortness of breath, abdominal pain, chest pain. Call your doctor or return to ED if you notice these signs and symptoms.  Secondary Diagnosis:	CAD (coronary artery disease)  Assessment and plan of treatment:	Continue aspirin and Plavix, do not stop unless instructed by your physician.  Continue low salt, fat, cholesterol and carbohydrate diet. Follow up with cardiologist and primary care physician's routine appointment.  Secondary Diagnosis:	Paroxysmal atrial fibrillation  Assessment and plan of treatment:	No anticoagulation due to GI bleeding. Follow up Dr. Hooper.  Secondary Diagnosis:	HLD (hyperlipidemia)  Assessment and plan of treatment:	Low fat diet, exercise daily and continue current medications. Follow up with primary care physician and cardiologist for management.  Secondary Diagnosis:	Essential hypertension  Assessment and plan of treatment:	Low sodium and fat diet, continue anti-hypertensive medications, and follow up with primary care physician.

## 2018-03-22 NOTE — PROGRESS NOTE ADULT - SUBJECTIVE AND OBJECTIVE BOX
INTERVAL HPI/OVERNIGHT EVENTS:    denies n/v/d/c, abdominal pain, melena or brbpr   brown bm this morning per patient    MEDICATIONS  (STANDING):  aspirin enteric coated 81 milliGRAM(s) Oral daily  atorvastatin 40 milliGRAM(s) Oral at bedtime  clopidogrel Tablet 75 milliGRAM(s) Oral daily  dextrose 5%. 1000 milliLiter(s) (50 mL/Hr) IV Continuous <Continuous>  dextrose 50% Injectable 12.5 Gram(s) IV Push once  dextrose 50% Injectable 25 Gram(s) IV Push once  dextrose 50% Injectable 25 Gram(s) IV Push once  furosemide    Tablet 40 milliGRAM(s) Oral daily  gabapentin 300 milliGRAM(s) Oral two times a day  insulin glargine Injectable (LANTUS) 17 Unit(s) SubCutaneous at bedtime  insulin lispro (HumaLOG) corrective regimen sliding scale   SubCutaneous three times a day before meals  insulin lispro (HumaLOG) corrective regimen sliding scale   SubCutaneous at bedtime  metoprolol     tartrate 12.5 milliGRAM(s) Oral two times a day  pantoprazole  Injectable 40 milliGRAM(s) IV Push two times a day    MEDICATIONS  (PRN):  ALBUTerol    90 MICROgram(s) HFA Inhaler 2 Puff(s) Inhalation every 6 hours PRN Shortness of Breath and/or Wheezing  dextrose Gel 1 Dose(s) Oral once PRN Blood Glucose LESS THAN 70 milliGRAM(s)/deciliter  glucagon  Injectable 1 milliGRAM(s) IntraMuscular once PRN Glucose LESS THAN 70 milligrams/deciliter  hydrocortisone 0.5% Cream 1 Application(s) Topical every 8 hours PRN Rash and/or Itching      Allergies    Carrots (Rash)  No Known Drug Allergies    Intolerances        Review of Systems:    General:  No wt loss, fevers, chills, night sweats, fatigue   Eyes:  Good vision, no reported pain  ENT:  No sore throat, pain, runny nose, dysphagia  CV:  No pain, palpitations, hypo/hypertension  Resp:  No dyspnea, cough, tachypnea, wheezing  GI:  No pain, No nausea, No vomiting, No diarrhea, No constipation, No weight loss, No fever, No pruritis, No rectal bleeding, No melena, No dysphagia  :  No pain, bleeding, incontinence, nocturia  Muscle:  No pain, weakness  Neuro:  No weakness, tingling, memory problems  Psych:  No fatigue, insomnia, mood problems, depression  Endocrine:  No polyuria, polydypsia, cold/heat intolerance  Heme:  No petechiae, ecchymosis, easy bruisability  Skin:  No rash, tattoos, scars, edema      Vital Signs Last 24 Hrs  T(C): 36.4 (22 Mar 2018 12:49), Max: 36.6 (21 Mar 2018 19:20)  T(F): 97.5 (22 Mar 2018 12:49), Max: 97.9 (21 Mar 2018 19:20)  HR: 70 (22 Mar 2018 12:49) (66 - 72)  BP: 166/64 (22 Mar 2018 12:49) (142/58 - 184/70)  BP(mean): --  RR: 16 (22 Mar 2018 12:49) (16 - 18)  SpO2: 97% (22 Mar 2018 12:49) (97% - 100%)    PHYSICAL EXAM:    Constitutional: NAD  HEENT: EOMI, throat clear  Neck: No LAD, supple  Respiratory: CTA and P  Cardiovascular: S1 and S2, RRR, no M  Gastrointestinal: BS+, soft, NT/ND, neg HSM,  Extremities: No peripheral edema, neg clubbing, cyanosis  Vascular: 2+ peripheral pulses  Neurological: A/O x 3, no focal deficits  Psychiatric: Normal mood, normal affect  Skin: No rashes      LABS:                        8.3    5.40  )-----------( 365      ( 22 Mar 2018 06:35 )             28.5     03-22    143  |  104  |  11  ----------------------------<  186<H>  4.0   |  29  |  1.06    Ca    8.1<L>      22 Mar 2018 06:35  Mg     3.4     03-22            RADIOLOGY & ADDITIONAL TESTS:

## 2018-03-22 NOTE — PROGRESS NOTE ADULT - ASSESSMENT
· Assessment	  78 year-old F with PMH CAD s/p MICHAEL, HFpEF, HTN, DM2, afib on Xarelto, and recent admission for GI bleed who presented to the hospital with weakness and black stools, found to have Hgb 5.5, admitted for likely upper GI bleed.     Problem/Plan - 1:  ·  Problem: GI bleed.  Plan: -  EGD: No source of bleeding/Colon: Poor prep  GI f/up noted.     Problem/Plan - 2:  ·  Problem: Hyperglycemia due to type 2 diabetes mellitus.  Plan: - FSSS  -Consistent carbohydrate diet when patient is taking PO  -Check A1c.      Problem/Plan - 3:  ·  Problem: CAD (coronary artery disease).  Plan: -   -C/w asa, clopidogrel, statin, and B Blocker       Problem/Plan - 4:  ·  Problem: Paroxysmal atrial fibrillation.  Plan: -Patient with paroxysmal afib, holding AC in setting of GI bleed  -C/w Metoprolol tartrate 12.5mg PO BID.        Problem/Plan - 5:  ·  Problem: Hypertension.  Plan: -Patient with HTN, continue with metoprolol and lasix  -DASH diet when taking PO.      Problem/Plan - 6:  Problem: HLD (hyperlipidemia). Plan: -C/w statin.
· Assessment	  78 year-old F with PMH CAD s/p MICHAEL, HFpEF, HTN, DM2, afib on Xarelto, and recent admission for GI bleed who presented to the hospital with weakness and black stools, found to have Hgb 5.5, admitted for likely upper GI bleed.    Accelerated HTN:    S/p IV Hydralazine.   Problem/Plan - 1:  ·  Problem: GI bleed.  Plan: -  EGD: No source of bleeding/Colon: Poor prep  GI f/up noted.  F/up with capsule endoscopy.     Problem/Plan - 2:  ·  Problem: Hyperglycemia due to type 2 diabetes mellitus.  Plan: - FSSS       Problem/Plan - 3:  ·  Problem: CAD (coronary artery disease).  Plan: -   -C/w asa, clopidogrel, statin, and B Blocker       Problem/Plan - 4:  ·  Problem: Paroxysmal atrial fibrillation.  Plan: -Patient with paroxysmal afib,   -C/w Metoprolol tartrate 12.5mg PO BID.        Problem/Plan - 5:  ·  Problem: Hypertension.  Plan: -Patient with HTN, continue with metoprolol and lasix         Problem/Plan - 6:  Problem: HLD (hyperlipidemia). Plan: -C/w statin.
· Assessment	  78 year-old F with PMH CAD s/p MICHAEL, HFpEF, HTN, DM2, afib on Xarelto, and recent admission for GI bleed who presented to the hospital with weakness and black stools, found to have Hgb 5.5, admitted for likely upper GI bleed.    Accelerated HTN:    S/p IV Hydralazine.   Problem/Plan - 1:  ·  Problem: GI bleed.  Plan: -  EGD: No source of bleeding/Colon: Poor prep  GI f/up noted.  Capsule study  w/some pigmentation noted in ileum; started on Delzicol 800mg TID.     Problem/Plan - 2:  ·  Problem: Hyperglycemia due to type 2 diabetes mellitus.  Plan: - FSSS       Problem/Plan - 3:  ·  Problem: CAD (coronary artery disease).  Plan: -   -C/w asa, clopidogrel, statin, and B Blocker       Problem/Plan - 4:  ·  Problem: Paroxysmal atrial fibrillation.  Plan: -Patient with paroxysmal afib,   -C/w Metoprolol tartrate 12.5mg PO BID.        Problem/Plan - 5:  ·  Problem: Hypertension.  Plan: -Patient with HTN, continue with metoprolol and lasix         Problem/Plan - 6:  Problem: HLD (hyperlipidemia). Plan: -C/w statin.    DC home.
· Assessment	  78 year-old F with PMH CAD s/p MICHAEL, HFpEF, HTN, DM2, afib on Xarelto, and recent admission for GI bleed who presented to the hospital with weakness and black stools, found to have Hgb 5.5, admitted for likely upper GI bleed.     Problem/Plan - 1:  ·  Problem: GI bleed.  Plan: -  EGD/Colon tomorrow.  GI f/up noted.     Problem/Plan - 2:  ·  Problem: Hyperglycemia due to type 2 diabetes mellitus.  Plan: - FSSS  -Consistent carbohydrate diet when patient is taking PO  -Check A1c.      Problem/Plan - 3:  ·  Problem: CAD (coronary artery disease).  Plan: -   -C/w asa, clopidogrel, statin, and B Blocker       Problem/Plan - 4:  ·  Problem: Paroxysmal atrial fibrillation.  Plan: -Patient with paroxysmal afib, holding AC in setting of GI bleed  -C/w Metoprolol tartrate 12.5mg PO BID.        Problem/Plan - 5:  ·  Problem: Hypertension.  Plan: -Patient with HTN, continue with metoprolol and lasix  -DASH diet when taking PO.      Problem/Plan - 6:  Problem: HLD (hyperlipidemia). Plan: -C/w statin.
· Assessment	  78 year-old F with PMH CAD s/p MICHAEL, HFpEF, HTN, DM2, afib on Xarelto, and recent admission for GI bleed who presented to the hospital with weakness and black stools, found to have Hgb 5.5, admitted for likely upper GI bleed.     Problem/Plan - 1:  ·  Problem: GI bleed.  Plan: -  EGD: No source of bleeding/Colon: Poor prep  GI f/up noted.  For capsule endoscopy.     Problem/Plan - 2:  ·  Problem: Hyperglycemia due to type 2 diabetes mellitus.  Plan: - FSSS       Problem/Plan - 3:  ·  Problem: CAD (coronary artery disease).  Plan: -   -C/w asa, clopidogrel, statin, and B Blocker       Problem/Plan - 4:  ·  Problem: Paroxysmal atrial fibrillation.  Plan: -Patient with paroxysmal afib,   -C/w Metoprolol tartrate 12.5mg PO BID.        Problem/Plan - 5:  ·  Problem: Hypertension.  Plan: -Patient with HTN, continue with metoprolol and lasix  -DASH diet when taking PO.      Problem/Plan - 6:  Problem: HLD (hyperlipidemia). Plan: -C/w statin.

## 2018-03-22 NOTE — PROGRESS NOTE ADULT - PROBLEM SELECTOR PROBLEM 1
Gastrointestinal hemorrhage associated with gastric ulcer

## 2018-03-22 NOTE — DISCHARGE NOTE ADULT - CARE PROVIDER_API CALL
Julio César Hooper), Internal Medicine  [id number] [address] [place] [address]  Phone: [phone]  Fax: [phone]    Curtis Daigle (), Gastroenterology; Internal Medicine  [address] [place] [address]  Phone: [phone]  Fax: [phone]

## 2018-03-22 NOTE — DISCHARGE NOTE ADULT - SECONDARY DIAGNOSIS.
CAD (coronary artery disease) Paroxysmal atrial fibrillation HLD (hyperlipidemia) Essential hypertension

## 2018-03-22 NOTE — PROGRESS NOTE ADULT - SUBJECTIVE AND OBJECTIVE BOX
Cardiovascular Disease Progress Note    Overnight events: No acute events overnight. Ms. Tsang denies chest pain or SOB.     Otherwise review of systems negative    Objective Findings:  T(C): 36.5 (03-22-18 @ 05:05), Max: 36.9 (03-21-18 @ 09:30)  HR: 66 (03-22-18 @ 05:05) (64 - 74)  BP: 173/65 (03-22-18 @ 05:05) (150/60 - 184/70)  RR: 16 (03-22-18 @ 05:05) (16 - 18)  SpO2: 100% (03-22-18 @ 05:05) (98% - 100%)  Wt(kg): --  Daily Height in cm: 162.56 (21 Mar 2018 12:33)    Daily       Physical Exam:  Gen: NAD  HEENT: EOMI  CV: RRR, normal S1 + S2, no m/r/g  Lungs: CTAB  Abd: soft, non-tender  Ext: No edema    Telemetry: Sinus; occasional PVDs    Laboratory Data:                        8.3    5.40  )-----------( 365      ( 22 Mar 2018 06:35 )             28.5     03-22    143  |  104  |  11  ----------------------------<  186<H>  4.0   |  29  |  1.06    Ca    8.1<L>      22 Mar 2018 06:35  Mg     3.4     03-22                Inpatient Medications:  MEDICATIONS  (STANDING):  aspirin enteric coated 81 milliGRAM(s) Oral daily  atorvastatin 40 milliGRAM(s) Oral at bedtime  clopidogrel Tablet 75 milliGRAM(s) Oral daily  dextrose 5%. 1000 milliLiter(s) (50 mL/Hr) IV Continuous <Continuous>  dextrose 50% Injectable 12.5 Gram(s) IV Push once  dextrose 50% Injectable 25 Gram(s) IV Push once  dextrose 50% Injectable 25 Gram(s) IV Push once  gabapentin 300 milliGRAM(s) Oral two times a day  insulin glargine Injectable (LANTUS) 17 Unit(s) SubCutaneous at bedtime  insulin lispro (HumaLOG) corrective regimen sliding scale   SubCutaneous three times a day before meals  insulin lispro (HumaLOG) corrective regimen sliding scale   SubCutaneous at bedtime  metoprolol     tartrate 12.5 milliGRAM(s) Oral two times a day  pantoprazole  Injectable 40 milliGRAM(s) IV Push two times a day      Assessment: 78 year old woman with CAD s/p MICHAEL x2 in 12/2017, a-fib not on AC due to GI bleed, and compensated diastolic CHF presents with respiratory distress and abnormal EKG due to acute blood loss anemia.    Plan of Care:    #CAD- s/p MICHAEL x2 in 12/2017.  Thus far, no source of bleeding found on EGD/colonoscopy.  Awaiting results of capsule study.  Please continue DAPT in the setting of recent stents 4 months ago.      #Paroxymal a-fib-  Currently in sinus.  No AC given GI bleed.    #Compensated diastolic CHF-  Patient tolerated 2 Unit pRBCs transfusion well.  Closely monitor fluid status.  I will resume oral Lasix today, as patient is no longer NPO.     Over 35 minutes spent on total encounter; more than 50% of the visit was spent counseling and/or coordinating care by the attending physician.      Julio César Hooper MD [facility]  Cardiovascular Disease  [phone]

## 2018-03-22 NOTE — PROGRESS NOTE ADULT - PROBLEM SELECTOR PLAN 1
- now resolved   - s/p EGD with normal findings  - s/p colonoscopy with poor prep/internal hemorrhoids  - cont ppi qd for gi ppx while on plavix  - capsule study uploading; further recommendations pending review - now resolved   - s/p EGD with normal findings  - s/p colonoscopy with poor prep/internal hemorrhoids  - cont ppi qd for gi ppx while on plavix  - capsule study reviewed w/some pigmentation noted in ileum; started on Delzicol 800mg TID

## 2018-03-22 NOTE — DISCHARGE NOTE ADULT - PATIENT PORTAL LINK FT
You can access the [facility], offered by [facility], by registering with the following website: [email or web address]

## 2018-03-22 NOTE — DISCHARGE NOTE ADULT - PLAN OF CARE
Prevent further bleeding Please follow up with your doctors in 1-2 weeks. Information below for Dr. Daigle, please call for an appointment. Evidence of bleeding in your gastrointestinal tract was seen. Please observe for signs and symptoms of bleeding including, but not limited too, blood in stool, dark tarry stool, shortness of breath, abdominal pain, chest pain. Call your doctor or return to ED if you notice these signs and symptoms. Continue aspirin and Plavix, do not stop unless instructed by your physician.  Continue low salt, fat, cholesterol and carbohydrate diet. Follow up with cardiologist and primary care physician's routine appointment. No anticoagulation due to GI bleeding. Follow up Dr. Hooper. Low fat diet, exercise daily and continue current medications. Follow up with primary care physician and cardiologist for management. Low sodium and fat diet, continue anti-hypertensive medications, and follow up with primary care physician.

## 2018-03-22 NOTE — PROGRESS NOTE ADULT - ATTENDING COMMENTS
Advanced care planning was discussed with patient and family.  Advanced care planning forms were reviewed and discussed.  Risks, benefits and alternatives of gastroenterologic procedures were discussed in detail and all questions were answered.    25 minutes spent.
capsule endoscopy placed today  (unable to place yesterday as hospital did not have any in stock)  will read capsule results tommorow
capsule reviewed; ? ulcer in the ileum  would start delzicol 800 tid  hold anticoagulation  diet as tolerated

## 2018-03-22 NOTE — DISCHARGE NOTE ADULT - MEDICATION SUMMARY - MEDICATIONS TO TAKE
I will START or STAY ON the medications listed below when I get home from the hospital:    mesalamine 400 mg oral delayed release capsule  -- 2 cap(s) by mouth 3 times a day  -- Indication: For GI bleed    aspirin 81 mg oral delayed release tablet  -- 1 tab(s) by mouth once a day  -- Indication: For CAD (coronary artery disease)    gabapentin 300 mg oral capsule  -- 1 cap(s) by mouth 2 times a day  -- Indication: For Pain control    Lantus 100 units/mL subcutaneous solution  -- 34 unit(s) subcutaneous once a day (at bedtime)  -- Indication: For Diabetes    metFORMIN 500 mg oral tablet, extended release  -- 1 tab(s) by mouth once a day  -- Indication: For Diabetes    Benadryl 25 mg oral tablet  -- 1 tab(s) by mouth 3 times a day, As Needed  -- Indication: For Itching    atorvastatin 40 mg oral tablet  -- 1 tab(s) by mouth once a day (at bedtime)  -- Indication: For CHolesterol    clopidogrel 75 mg oral tablet  -- 1 tab(s) by mouth once a day  -- Indication: For CAD (coronary artery disease)    Metoprolol Tartrate 25 mg oral tablet  -- 0.5 tab(s) (12.5mg) by mouth 2 times a day  -- Indication: For HTN    Ventolin HFA 90 mcg/inh inhalation aerosol  -- 2 puff(s) inhaled every 4 hours, As Needed  -- Indication: For Shortness of breath    furosemide 40 mg oral tablet  -- 1 tab(s) by mouth once a day  -- Indication: For HTN    Senna 8.6 mg oral tablet  -- 1 tab(s) by mouth once a day (at bedtime)  -- Indication: For Constipation    Protonix 40 mg oral delayed release tablet  -- 1 tab(s) by mouth once a day  -- Indication: For GERD    Multiple Vitamins oral tablet  -- 1 tab(s) by mouth once a day  -- Indication: For Supplement

## 2018-03-22 NOTE — PROGRESS NOTE ADULT - PROVIDER SPECIALTY LIST ADULT
Cardiology
Gastroenterology
Internal Medicine
Gastroenterology

## 2018-05-11 ENCOUNTER — INPATIENT (INPATIENT)
Facility: HOSPITAL | Age: 78
LOS: 9 days | Discharge: HOME | DRG: 871 | End: 2018-05-21
Attending: INTERNAL MEDICINE | Admitting: INTERNAL MEDICINE
Payer: MEDICARE

## 2018-05-11 VITALS
HEART RATE: 87 BPM | DIASTOLIC BLOOD PRESSURE: 75 MMHG | TEMPERATURE: 99 F | WEIGHT: 164.91 LBS | OXYGEN SATURATION: 97 % | SYSTOLIC BLOOD PRESSURE: 146 MMHG | RESPIRATION RATE: 19 BRPM

## 2018-05-11 DIAGNOSIS — E87.1 HYPO-OSMOLALITY AND HYPONATREMIA: ICD-10-CM

## 2018-05-11 DIAGNOSIS — N12 TUBULO-INTERSTITIAL NEPHRITIS, NOT SPECIFIED AS ACUTE OR CHRONIC: ICD-10-CM

## 2018-05-11 DIAGNOSIS — Z98.891 HISTORY OF UTERINE SCAR FROM PREVIOUS SURGERY: Chronic | ICD-10-CM

## 2018-05-11 DIAGNOSIS — I48.91 UNSPECIFIED ATRIAL FIBRILLATION: ICD-10-CM

## 2018-05-11 DIAGNOSIS — D64.9 ANEMIA, UNSPECIFIED: ICD-10-CM

## 2018-05-11 DIAGNOSIS — D50.9 IRON DEFICIENCY ANEMIA, UNSPECIFIED: ICD-10-CM

## 2018-05-11 DIAGNOSIS — I25.10 ATHEROSCLEROTIC HEART DISEASE OF NATIVE CORONARY ARTERY WITHOUT ANGINA PECTORIS: ICD-10-CM

## 2018-05-11 DIAGNOSIS — Z29.9 ENCOUNTER FOR PROPHYLACTIC MEASURES, UNSPECIFIED: ICD-10-CM

## 2018-05-11 DIAGNOSIS — E11.40 TYPE 2 DIABETES MELLITUS WITH DIABETIC NEUROPATHY, UNSPECIFIED: ICD-10-CM

## 2018-05-11 DIAGNOSIS — N17.9 ACUTE KIDNEY FAILURE, UNSPECIFIED: ICD-10-CM

## 2018-05-11 LAB
ALBUMIN SERPL ELPH-MCNC: 3.1 G/DL — LOW (ref 3.3–5)
ALP SERPL-CCNC: 203 U/L — HIGH (ref 40–120)
ALT FLD-CCNC: 14 U/L — SIGNIFICANT CHANGE UP (ref 10–45)
ANION GAP SERPL CALC-SCNC: 16 MMOL/L — SIGNIFICANT CHANGE UP (ref 5–17)
APPEARANCE UR: ABNORMAL
APTT BLD: 24.5 SEC — LOW (ref 27.5–37.4)
AST SERPL-CCNC: 19 U/L — SIGNIFICANT CHANGE UP (ref 10–40)
BACTERIA # UR AUTO: ABNORMAL /HPF
BASOPHILS # BLD AUTO: 0.1 K/UL — SIGNIFICANT CHANGE UP (ref 0–0.2)
BASOPHILS NFR BLD AUTO: 0.3 % — SIGNIFICANT CHANGE UP (ref 0–2)
BILIRUB SERPL-MCNC: 0.5 MG/DL — SIGNIFICANT CHANGE UP (ref 0.2–1.2)
BILIRUB UR-MCNC: NEGATIVE — SIGNIFICANT CHANGE UP
BLD GP AB SCN SERPL QL: NEGATIVE — SIGNIFICANT CHANGE UP
BUN SERPL-MCNC: 25 MG/DL — HIGH (ref 7–23)
CALCIUM SERPL-MCNC: 8.6 MG/DL — SIGNIFICANT CHANGE UP (ref 8.4–10.5)
CHLORIDE SERPL-SCNC: 87 MMOL/L — LOW (ref 96–108)
CO2 SERPL-SCNC: 21 MMOL/L — LOW (ref 22–31)
COLOR SPEC: YELLOW — SIGNIFICANT CHANGE UP
CREAT SERPL-MCNC: 1.54 MG/DL — HIGH (ref 0.5–1.3)
DIFF PNL FLD: ABNORMAL
EOSINOPHIL # BLD AUTO: 0 K/UL — SIGNIFICANT CHANGE UP (ref 0–0.5)
EOSINOPHIL NFR BLD AUTO: 0.1 % — SIGNIFICANT CHANGE UP (ref 0–6)
GAS PNL BLDV: SIGNIFICANT CHANGE UP
GLUCOSE SERPL-MCNC: 450 MG/DL — CRITICAL HIGH (ref 70–99)
GLUCOSE UR QL: >1000 MG/DL
HCT VFR BLD CALC: 26.4 % — LOW (ref 34.5–45)
HGB BLD-MCNC: 7.7 G/DL — LOW (ref 11.5–15.5)
INR BLD: 1.25 RATIO — HIGH (ref 0.88–1.16)
KETONES UR-MCNC: ABNORMAL
LEUKOCYTE ESTERASE UR-ACNC: ABNORMAL
LIDOCAIN IGE QN: 23 U/L — SIGNIFICANT CHANGE UP (ref 7–60)
LYMPHOCYTES # BLD AUTO: 1.2 K/UL — SIGNIFICANT CHANGE UP (ref 1–3.3)
LYMPHOCYTES # BLD AUTO: 5.4 % — LOW (ref 13–44)
MCHC RBC-ENTMCNC: 21.4 PG — LOW (ref 27–34)
MCHC RBC-ENTMCNC: 29.1 GM/DL — LOW (ref 32–36)
MCV RBC AUTO: 73.5 FL — LOW (ref 80–100)
MONOCYTES # BLD AUTO: 0.7 K/UL — SIGNIFICANT CHANGE UP (ref 0–0.9)
MONOCYTES NFR BLD AUTO: 3.4 % — SIGNIFICANT CHANGE UP (ref 2–14)
NEUTROPHILS # BLD AUTO: 19.6 K/UL — HIGH (ref 1.8–7.4)
NEUTROPHILS NFR BLD AUTO: 90.8 % — HIGH (ref 43–77)
NITRITE UR-MCNC: NEGATIVE — SIGNIFICANT CHANGE UP
PH UR: 6 — SIGNIFICANT CHANGE UP (ref 5–8)
PLATELET # BLD AUTO: 605 K/UL — HIGH (ref 150–400)
POTASSIUM SERPL-MCNC: 4.8 MMOL/L — SIGNIFICANT CHANGE UP (ref 3.5–5.3)
POTASSIUM SERPL-SCNC: 4.8 MMOL/L — SIGNIFICANT CHANGE UP (ref 3.5–5.3)
PROT SERPL-MCNC: 7.3 G/DL — SIGNIFICANT CHANGE UP (ref 6–8.3)
PROT UR-MCNC: 150 MG/DL
PROTHROM AB SERPL-ACNC: 13.6 SEC — HIGH (ref 9.8–12.7)
RBC # BLD: 3.59 M/UL — LOW (ref 3.8–5.2)
RBC # FLD: 17.9 % — HIGH (ref 10.3–14.5)
RBC CASTS # UR COMP ASSIST: SIGNIFICANT CHANGE UP /HPF (ref 0–2)
RH IG SCN BLD-IMP: POSITIVE — SIGNIFICANT CHANGE UP
SODIUM SERPL-SCNC: 124 MMOL/L — LOW (ref 135–145)
SP GR SPEC: 1.02 — SIGNIFICANT CHANGE UP (ref 1.01–1.02)
UROBILINOGEN FLD QL: NEGATIVE — SIGNIFICANT CHANGE UP
WBC # BLD: 21.6 K/UL — HIGH (ref 3.8–10.5)
WBC # FLD AUTO: 21.6 K/UL — HIGH (ref 3.8–10.5)
WBC UR QL: SIGNIFICANT CHANGE UP /HPF (ref 0–5)

## 2018-05-11 PROCEDURE — 99285 EMERGENCY DEPT VISIT HI MDM: CPT | Mod: GC

## 2018-05-11 PROCEDURE — 99223 1ST HOSP IP/OBS HIGH 75: CPT

## 2018-05-11 PROCEDURE — 71045 X-RAY EXAM CHEST 1 VIEW: CPT | Mod: 26

## 2018-05-11 PROCEDURE — 74177 CT ABD & PELVIS W/CONTRAST: CPT | Mod: 26

## 2018-05-11 RX ORDER — INSULIN LISPRO 100/ML
VIAL (ML) SUBCUTANEOUS AT BEDTIME
Qty: 0 | Refills: 0 | Status: DISCONTINUED | OUTPATIENT
Start: 2018-05-11 | End: 2018-05-21

## 2018-05-11 RX ORDER — GLUCAGON INJECTION, SOLUTION 0.5 MG/.1ML
1 INJECTION, SOLUTION SUBCUTANEOUS ONCE
Qty: 0 | Refills: 0 | Status: DISCONTINUED | OUTPATIENT
Start: 2018-05-11 | End: 2018-05-21

## 2018-05-11 RX ORDER — ATORVASTATIN CALCIUM 80 MG/1
40 TABLET, FILM COATED ORAL AT BEDTIME
Qty: 0 | Refills: 0 | Status: DISCONTINUED | OUTPATIENT
Start: 2018-05-11 | End: 2018-05-21

## 2018-05-11 RX ORDER — MORPHINE SULFATE 50 MG/1
2 CAPSULE, EXTENDED RELEASE ORAL EVERY 6 HOURS
Qty: 0 | Refills: 0 | Status: DISCONTINUED | OUTPATIENT
Start: 2018-05-11 | End: 2018-05-12

## 2018-05-11 RX ORDER — ACETAMINOPHEN 500 MG
1000 TABLET ORAL ONCE
Qty: 0 | Refills: 0 | Status: COMPLETED | OUTPATIENT
Start: 2018-05-11 | End: 2018-05-11

## 2018-05-11 RX ORDER — METOPROLOL TARTRATE 50 MG
12.5 TABLET ORAL
Qty: 0 | Refills: 0 | Status: DISCONTINUED | OUTPATIENT
Start: 2018-05-11 | End: 2018-05-21

## 2018-05-11 RX ORDER — GABAPENTIN 400 MG/1
600 CAPSULE ORAL
Qty: 0 | Refills: 0 | Status: DISCONTINUED | OUTPATIENT
Start: 2018-05-11 | End: 2018-05-13

## 2018-05-11 RX ORDER — PIPERACILLIN AND TAZOBACTAM 4; .5 G/20ML; G/20ML
3.38 INJECTION, POWDER, LYOPHILIZED, FOR SOLUTION INTRAVENOUS EVERY 8 HOURS
Qty: 0 | Refills: 0 | Status: DISCONTINUED | OUTPATIENT
Start: 2018-05-11 | End: 2018-05-13

## 2018-05-11 RX ORDER — SODIUM CHLORIDE 9 MG/ML
500 INJECTION INTRAMUSCULAR; INTRAVENOUS; SUBCUTANEOUS ONCE
Qty: 0 | Refills: 0 | Status: COMPLETED | OUTPATIENT
Start: 2018-05-11 | End: 2018-05-11

## 2018-05-11 RX ORDER — CLOPIDOGREL BISULFATE 75 MG/1
75 TABLET, FILM COATED ORAL DAILY
Qty: 0 | Refills: 0 | Status: DISCONTINUED | OUTPATIENT
Start: 2018-05-11 | End: 2018-05-21

## 2018-05-11 RX ORDER — SODIUM CHLORIDE 9 MG/ML
3 INJECTION INTRAMUSCULAR; INTRAVENOUS; SUBCUTANEOUS ONCE
Qty: 0 | Refills: 0 | Status: COMPLETED | OUTPATIENT
Start: 2018-05-11 | End: 2018-05-11

## 2018-05-11 RX ORDER — SODIUM CHLORIDE 9 MG/ML
1000 INJECTION, SOLUTION INTRAVENOUS
Qty: 0 | Refills: 0 | Status: DISCONTINUED | OUTPATIENT
Start: 2018-05-11 | End: 2018-05-17

## 2018-05-11 RX ORDER — INSULIN LISPRO 100/ML
VIAL (ML) SUBCUTANEOUS
Qty: 0 | Refills: 0 | Status: DISCONTINUED | OUTPATIENT
Start: 2018-05-11 | End: 2018-05-21

## 2018-05-11 RX ORDER — DEXTROSE 50 % IN WATER 50 %
25 SYRINGE (ML) INTRAVENOUS ONCE
Qty: 0 | Refills: 0 | Status: DISCONTINUED | OUTPATIENT
Start: 2018-05-11 | End: 2018-05-21

## 2018-05-11 RX ORDER — DEXTROSE 50 % IN WATER 50 %
12.5 SYRINGE (ML) INTRAVENOUS ONCE
Qty: 0 | Refills: 0 | Status: DISCONTINUED | OUTPATIENT
Start: 2018-05-11 | End: 2018-05-21

## 2018-05-11 RX ORDER — PIPERACILLIN AND TAZOBACTAM 4; .5 G/20ML; G/20ML
3.38 INJECTION, POWDER, LYOPHILIZED, FOR SOLUTION INTRAVENOUS ONCE
Qty: 0 | Refills: 0 | Status: COMPLETED | OUTPATIENT
Start: 2018-05-11 | End: 2018-05-11

## 2018-05-11 RX ORDER — PANTOPRAZOLE SODIUM 20 MG/1
40 TABLET, DELAYED RELEASE ORAL
Qty: 0 | Refills: 0 | Status: DISCONTINUED | OUTPATIENT
Start: 2018-05-11 | End: 2018-05-21

## 2018-05-11 RX ORDER — VANCOMYCIN HCL 1 G
1000 VIAL (EA) INTRAVENOUS ONCE
Qty: 0 | Refills: 0 | Status: DISCONTINUED | OUTPATIENT
Start: 2018-05-11 | End: 2018-05-11

## 2018-05-11 RX ORDER — SODIUM CHLORIDE 9 MG/ML
500 INJECTION INTRAMUSCULAR; INTRAVENOUS; SUBCUTANEOUS
Qty: 0 | Refills: 0 | Status: COMPLETED | OUTPATIENT
Start: 2018-05-11 | End: 2018-05-12

## 2018-05-11 RX ORDER — INSULIN GLARGINE 100 [IU]/ML
34 INJECTION, SOLUTION SUBCUTANEOUS AT BEDTIME
Qty: 0 | Refills: 0 | Status: DISCONTINUED | OUTPATIENT
Start: 2018-05-11 | End: 2018-05-21

## 2018-05-11 RX ORDER — DEXTROSE 50 % IN WATER 50 %
15 SYRINGE (ML) INTRAVENOUS ONCE
Qty: 0 | Refills: 0 | Status: DISCONTINUED | OUTPATIENT
Start: 2018-05-11 | End: 2018-05-21

## 2018-05-11 RX ORDER — SENNA PLUS 8.6 MG/1
2 TABLET ORAL AT BEDTIME
Qty: 0 | Refills: 0 | Status: DISCONTINUED | OUTPATIENT
Start: 2018-05-11 | End: 2018-05-18

## 2018-05-11 RX ORDER — INSULIN HUMAN 100 [IU]/ML
7 INJECTION, SOLUTION SUBCUTANEOUS ONCE
Qty: 0 | Refills: 0 | Status: COMPLETED | OUTPATIENT
Start: 2018-05-11 | End: 2018-05-11

## 2018-05-11 RX ADMIN — PIPERACILLIN AND TAZOBACTAM 200 GRAM(S): 4; .5 INJECTION, POWDER, LYOPHILIZED, FOR SOLUTION INTRAVENOUS at 18:16

## 2018-05-11 RX ADMIN — Medication 400 MILLIGRAM(S): at 17:05

## 2018-05-11 RX ADMIN — INSULIN GLARGINE 34 UNIT(S): 100 INJECTION, SOLUTION SUBCUTANEOUS at 22:46

## 2018-05-11 RX ADMIN — INSULIN HUMAN 7 UNIT(S): 100 INJECTION, SOLUTION SUBCUTANEOUS at 18:16

## 2018-05-11 RX ADMIN — MORPHINE SULFATE 2 MILLIGRAM(S): 50 CAPSULE, EXTENDED RELEASE ORAL at 23:01

## 2018-05-11 RX ADMIN — Medication 1000 MILLIGRAM(S): at 19:42

## 2018-05-11 RX ADMIN — SODIUM CHLORIDE 500 MILLILITER(S): 9 INJECTION INTRAMUSCULAR; INTRAVENOUS; SUBCUTANEOUS at 17:05

## 2018-05-11 RX ADMIN — SODIUM CHLORIDE 3 MILLILITER(S): 9 INJECTION INTRAMUSCULAR; INTRAVENOUS; SUBCUTANEOUS at 16:47

## 2018-05-11 RX ADMIN — SODIUM CHLORIDE 1000 MILLILITER(S): 9 INJECTION INTRAMUSCULAR; INTRAVENOUS; SUBCUTANEOUS at 22:33

## 2018-05-11 NOTE — H&P ADULT - NSHPREVIEWOFSYSTEMS_GEN_ALL_CORE
REVIEW OF SYSTEMS:  CONSTITUTIONAL: See HPI  EYES: No discharge  ENMT: No sinus or throat pain  NECK: No pain or stiffness  RESPIRATORY: No cough, wheezing. see HPI  CARDIOVASCULAR: No chest pain, dizziness, or leg swelling  GASTROINTESTINAL: See HPI  GENITOURINARY: See HPI  NEUROLOGICAL: No headaches, memory loss, loss of strength. +Chronic neuropathy  SKIN: No rashes or lesions   MUSCULOSKELETAL: No joint pain or swelling; No muscle, back, or extremity pain  PSYCHIATRIC: No depression, anxiety  HEME/LYMPH: No easy bruising, or bleeding gums  ALLERGY AND IMMUNOLOGIC: No reported allergies

## 2018-05-11 NOTE — H&P ADULT - PROBLEM SELECTOR PLAN 3
Patient with uncontrolled glucose levels. Patient seems non compliant with medications: Has not taken Metformin, Takes Lantus irregularly.   pH 7.37, AG at 16 trace ketone: with fairly normal PH, and patient without persistent abdominal pain, nausea, emesis: Low suspicion for DKA.   Will hold Metformin  S/P Insulin Regular 7 units in ED  Initiate Corrective SSI  Resume Lantus 34 U now  Consider Endo consult in AM for improved control

## 2018-05-11 NOTE — ED PROVIDER NOTE - PHYSICAL EXAMINATION
Attending MD Ann: A & O x 3, NAD, HEENT WNL and no facial asymmetry; lungs course BS, heart with reg rhythm without murmur; abdomen soft NTND; extremities with 2 + edema; skin with no rashes, neuro exam non focal with no motor or sensory deficits. Rectal with dark guaiac negative.

## 2018-05-11 NOTE — H&P ADULT - PROBLEM SELECTOR PLAN 7
DVT ppx: SCDs for now Patient CHADVASC ~4 yet per prior hospitalization has been off A/C in setting of GI bleed

## 2018-05-11 NOTE — ED PROVIDER NOTE - OBJECTIVE STATEMENT
79yo F pmhx CAD sp stent on DAP, PUD on EGD Dec 2017 presenting with crampy back pain that changes with defecation a/w black stools. ALternate MRN [id number]    79yo F pmhx CAD sp MICHAEL on DAP, PAF formerly on xarelto, d/c 2/2 gib. EGD Dec 2017 demonstrating evidence of bleeding in ileum with nL gastrum and duondenum presenting with crampy back pain that changes with defecation a/w black stools intermittent x 1 mo. No urinary symptoms. No cough, COLEJ MRN [id number]    77yo F pmhx CAD sp MICHAEL on DAP, PAF formerly on xarelto, d/c 2/2 gib. EGD Dec 2017 demonstrating evidence of bleeding in ileum with nL gastrum and duondenum presenting with crampy back pain that changes with defecation a/w black stools intermittent x 1 mo. No urinary symptoms. No cough    TTE Dec 2017 - grossly nL LV sys function; nL RV systolic function

## 2018-05-11 NOTE — H&P ADULT - PROBLEM SELECTOR PLAN 6
Patient CHADVASC ~4 yet per prior hospitalization has been off A/C in setting of GI bleed Per patient was advised by cardiologist to hold on Aspirin and to continue with Plavix  Will continue with Plavix  Primary day team to obtain collateral with Cardiologist

## 2018-05-11 NOTE — ED PROVIDER NOTE - ATTENDING CONTRIBUTION TO CARE
Attending MD Ann:  I personally have seen and examined this patient.  Resident note reviewed and agree on plan of care and except where noted.  See MDM for details.

## 2018-05-11 NOTE — ED PROVIDER NOTE - NS ED ROS FT
Denies fever/chills (though febrile in ED), no change in vision, no throat pain, no chest pain, no abdominal pain, +back pain, no nausea/vomiting,  no dysuria, no joint pain, no rashes, no focal numbness or weakness, no latex allergy.

## 2018-05-11 NOTE — H&P ADULT - HISTORY OF PRESENT ILLNESS
79 yo woman with PMH of CAD s/p MICHAEL x2 on DAPT, AFib not on A/C 2/2 GI bleed, DMT2 with neuropathy, anemia, presenting with crampy lower back pain ~5 days. Denies dysuria or increased urinary frequency. Endorses subjective fevers and sweats. Patient also endorses dark stool that has been present since her March discharge from [facility]. Denies abdominal pain, BRBPR. Endorses "pain from her ulcers",  and an episode of nausea and with NBNB emesis. She has been taking Advil ~4 pills a day for her neuropathy. Endorses drinking coffee and V8 frequently. Patient denies chest pain or worsening SOB. Has dyspnea on exertion with intermittent palpitations at baseline. States she has been fairly compliant with medications, but in the past few days has been forgetful with some of her medications in the setting of the pain being too distracting. Patient was at cardiologist's office and was referred to the ED for further eval. 79 yo woman with PMH of CAD s/p MICHAEL x2, AFib not on A/C 2/2 GI bleed, DMT2 with neuropathy, anemia, presenting with crampy lower back pain ~5 days. Denies dysuria or increased urinary frequency. Endorses subjective fevers and sweats. Patient also endorses dark stool that has been present since her March discharge from [facility]. Denies abdominal pain, BRBPR. Endorses "pain from her ulcers",  and an episode of nausea and with NBNB emesis. She has been taking Advil ~4 pills a day for her neuropathy. Endorses drinking coffee and V8 frequently. Patient denies chest pain or worsening SOB. Has dyspnea on exertion with intermittent palpitations at baseline. States she has been fairly compliant with medications, but in the past few days has been forgetful with some of her medications in the setting of the pain being too distracting. Patient was at cardiologist's office and was referred to the ED for further eval.

## 2018-05-11 NOTE — H&P ADULT - ATTENDING COMMENTS
Dr. Parminder Goldberg accepted patient's case from the ED and requested in house hospitalist team to complete admission. Patient was previously unknown to me. Patient was assigned to me by hospitalist in charge. My involvement in this case consisted of initial history, physical and management plan. Dr. Goldberg to assume care in AM. Case discussed in detail with overnight medicine NP, Lakshmi [phone]

## 2018-05-11 NOTE — H&P ADULT - PROBLEM SELECTOR PLAN 2
Patient last Hgb on 3/22/2018 of 8.3. Patient currently hemodynamically stable at 7.7  Per ED team, [place] x2: negative PRBCx1 initiated by ED team  Low suspicion of GI bleed at this time: will continue with Plavix in setting for MICHAEL placed <3 months ago.   Trend CBC  Check repeat FOBT  Consider GI consult in AM

## 2018-05-11 NOTE — ED ADULT NURSE REASSESSMENT NOTE - NS ED NURSE REASSESS COMMENT FT1
Signed blood transfusion consent in chart. Blood verified and administered with 2 RNs. Pt. educated and verbalized understanding of transfusion reactions.

## 2018-05-11 NOTE — ED ADULT NURSE REASSESSMENT NOTE - NS ED NURSE REASSESS COMMENT FT1
straight catheterization done under sterile technique as per MD order with 2RNs present. Pt. tolerated well, approx 200cc of cloudy yellow urine drained.

## 2018-05-11 NOTE — H&P ADULT - ASSESSMENT
79 yo woman with PMH of CAD s/p MICHAEL x2 on DAPT, AFib not on A/C 2/2 GI bleed, DMT2 with neuropathy, anemia, presenting with crampy lower back pain ~5 days found with b/l pyelonephritis. 79 yo woman with PMH of CAD s/p MICHAEL x2, AFib not on A/C 2/2 GI bleed, DMT2 with neuropathy, anemia, presenting with crampy lower back pain ~5 days found with b/l pyelonephritis.

## 2018-05-11 NOTE — ED ADULT NURSE REASSESSMENT NOTE - NS ED NURSE REASSESS COMMENT FT1
blood bank contacted, states patient needs second type and screen specimen, second type sent. family at bedside

## 2018-05-11 NOTE — H&P ADULT - NSHPPHYSICALEXAM_GEN_ALL_CORE
Vital Signs Last 24 Hrs  T(C): 36.8 (11 May 2018 19:55), Max: 38.9 (11 May 2018 16:21)  T(F): 98.3 (11 May 2018 19:55), Max: 102.1 (11 May 2018 16:21)  HR: 72 (11 May 2018 20:50) (72 - 92)  BP: 147/64 (11 May 2018 20:50) (115/47 - 152/66)  BP(mean): --  RR: 20 (11 May 2018 20:50) (18 - 20)  SpO2: 99% (11 May 2018 20:50) (92% - 100%)      PHYSICAL EXAM:  GENERAL: NAD, well appearing, well-groomed, well-developed  HEAD:  Atraumatic, Normocephalic  EYES: EOMI, PERRLA, conjunctiva and sclera clear  ENMT: Moist mucous membranes, Good dentition, No lesions  NECK: Supple, No JVD, Normal thyroid  BACK: No verterbral tenderness. + b/l CVA tenderness  NERVOUS SYSTEM:  Alert & Oriented X3, Good concentration; Motor Strength 5/5 B/L upper and lower extremities;   CHEST/LUNG: Clear to percussion bilaterally; No rales, rhonchi, wheezing, or rubs  HEART: Regular rate and rhythm +S1 S2; No murmurs, rubs  ABDOMEN: +BS, Soft, Nontender, Nondistended No guarding No rigidity; Bowel sounds present  EXTREMITIES:  2+ Peripheral Pulses, No clubbing, cyanosis, or edema  LYMPH: No lymphadenopathy noted  SKIN: No rashes or lesions

## 2018-05-11 NOTE — H&P ADULT - NSHPSOURCEINFOTX_GEN_ALL_CORE
Patient with J record MRN [id number] Patient with [facility] record [id number]. Spoke with Evelyn Brock [phone]

## 2018-05-11 NOTE — H&P ADULT - PMH
Atrial fibrillation, unspecified type    CAD (coronary artery disease)    Diabetes mellitus, type 2    GI bleed    Neuropathy

## 2018-05-11 NOTE — ED PROVIDER NOTE - MEDICAL DECISION MAKING DETAILS
Attending MD Ann: 79 yo female with PMH for CAD, PTCA with stent. atrial fib on no AC due to GI Bleed.  Patient presents with complaint of black stool and intermittent low back pain, crampy with defecation.  No reported fever, no nausea or vomiting. Dyspnea on exertion, no chest pain.  Attending MD Ann: A & O x 3, NAD, HEENT WNL and no facial asymmetry; lungs course BS, heart with reg rhythm without murmur; abdomen soft NTND; extremities with 2 + edema; skin with no rashes, neuro exam non focal with no motor or sensory deficits. Rectal with dark guaiac negative.   DDX consider GI, vs other intraabdominal pathology.  Fever here, consider intra-abd source, hyperglycemia will give insulin, low hemoglobin will give blood, give lasix of edema.

## 2018-05-11 NOTE — ED ADULT NURSE REASSESSMENT NOTE - NS ED NURSE REASSESS COMMENT FT1
Patient c/o severe lower back pain, Admitting MD Pershad aware of this, morphine 2mg given as ordered.  Patient's post-transfusion vitals taken and documented in chart.  YOKASTA Lewis on 2DSU made aware of patient's status.  VSS.  Safety maintained.

## 2018-05-11 NOTE — H&P ADULT - PROBLEM SELECTOR PLAN 4
Patient with KOJO last Cr 1.06 in 3/22  May be prerenal. No appreciable obstructive signs on CT  Monitor I/O  Hold on nephrotoxins, diuretics

## 2018-05-11 NOTE — H&P ADULT - NSHPSOCIALHISTORY_GEN_ALL_CORE
Lives at home  Children/Grandchildren in home  Denies ETOH use Lives at home with Grandchildren in home  Denies ETOH use  Former smoker quit 10 years ago  Denies illicit drug use

## 2018-05-11 NOTE — ED ADULT NURSE NOTE - OBJECTIVE STATEMENT
77 yo F presents to ED A+Ox2 by EMS c/o abdominal pain. As per EMS, pt. was at PMD office and wanted to have patient further evaluated. Pt. reports having back pain for approx. 1 month, reports having two days of dark stool and epigastric pain. Pt. denies SOB, changes in urinary pattern, diarrhea, N/V. Pt. noted to be febrile in ED, MD aware, medications to be given as ordered. Pt. states she has two cardiac stents and is on aspirin and plavix. Moves all extremities. Skin warm dry and of color appropriate for ethnicity. Abdomen soft, nondistended, nontender. Breathing unlabored on RA. Comfort and safety measures in place.

## 2018-05-11 NOTE — H&P ADULT - PROBLEM SELECTOR PLAN 5
Per patient was advised by cardiologist to hold on Aspirin and to continue with Plavix  Will continue with Plavix  Primary day team to obtain collateral with Cardiologist May be in setting of low PO intake. Patient with no neurological deficits  S/P 1L NS in ED  Continue maintenance IV fluids  Trend BMP q4-6h

## 2018-05-11 NOTE — H&P ADULT - NSHPLABSRESULTS_GEN_ALL_CORE
Personally reviewed labs and noted in detail below    < from: CT Abdomen and Pelvis w/ Oral Cont and w/ IV Cont (05.11.18 @ 20:20) >    INTERPRETATION:  CLINICAL INFORMATION: Back pain, black stools and   history of GI bleed. Positive urinalysis, fever, elevated white blood   cell count.    PROCEDURE:   CT of the Abdomen and Pelvis was performed with intravenous contrast.   Intravenous contrast: 90 ml Omnipaque 350. 10 ml discarded.  Oral contrast: positive contrast was administered.  Sagittal and coronal reformats were performed.    COMPARISON: None.    FINDINGS:    LOWER CHEST: Within normal limits.    LIVER: Within normal limits.  BILE DUCTS: Normal caliber.  GALLBLADDER: Within normal limits.  SPLEEN: Within normal limits.  PANCREAS: Within normal limits.  ADRENALS: Nodular thickening of the bilateral adrenal glands.  KIDNEYS/URETERS: There is a focal wedge-shaped region of hypoattenuation   in the right lower pole with surrounding perinephric fat stranding. The   left kidney demonstrates striated nephrograms with mild perinephric   stranding. No hydroureteronephrosis bilaterally.    BLADDER: Within normal limits.  REPRODUCTIVE ORGANS: Calcified fibroid measuring 2.6 cm. Normal adnexa   bilaterally. Small to moderate fluid in the pelvis.    BOWEL: No bowel obstruction. Appendix is normal.  PERITONEUM: No free air.     VESSELS: Atherosclerotic disease.  RETROPERITONEUM: No lymphadenopathy.    ABDOMINAL WALL: Within normal limits.  BONES: Degenerative changes of the spine.    IMPRESSION: Bilateral pyelonephritis, right greater than left. It is   uncertain if there is a developing collection in the right kidney.  Small to moderate fluid in the pelvis can be related to adnexa. Further   evaluation with a pelvic ultrasound is advised on outpatient basis.    < end of copied text >        EKG ordered and pending review

## 2018-05-11 NOTE — ED ADULT NURSE REASSESSMENT NOTE - NS ED NURSE REASSESS COMMENT FT1
Patient still at CT.  Repeat vitals performed in CT, documented in EMR flow sheet and blood transfusion form in patient's chart.  Patient states she has no complaints at this time, VSS.  Safety maintained.  Blood transfusion still in progress.

## 2018-05-12 DIAGNOSIS — R11.0 NAUSEA: ICD-10-CM

## 2018-05-12 DIAGNOSIS — K92.2 GASTROINTESTINAL HEMORRHAGE, UNSPECIFIED: ICD-10-CM

## 2018-05-12 LAB
-  CANDIDA ALBICANS: SIGNIFICANT CHANGE UP
-  CANDIDA GLABRATA: SIGNIFICANT CHANGE UP
-  CANDIDA KRUSEI: SIGNIFICANT CHANGE UP
-  CANDIDA PARAPSILOSIS: SIGNIFICANT CHANGE UP
-  CANDIDA TROPICALIS: SIGNIFICANT CHANGE UP
-  COAGULASE NEGATIVE STAPHYLOCOCCUS: SIGNIFICANT CHANGE UP
-  K. PNEUMONIAE GROUP: SIGNIFICANT CHANGE UP
-  KPC RESISTANCE GENE: SIGNIFICANT CHANGE UP
-  STREPTOCOCCUS SP. (NOT GRP A, B OR S PNEUMONIAE): SIGNIFICANT CHANGE UP
A BAUMANNII DNA SPEC QL NAA+PROBE: SIGNIFICANT CHANGE UP
ACETONE SERPL-MCNC: NEGATIVE — SIGNIFICANT CHANGE UP
ANION GAP SERPL CALC-SCNC: 14 MMOL/L — SIGNIFICANT CHANGE UP (ref 5–17)
ANION GAP SERPL CALC-SCNC: 19 MMOL/L — HIGH (ref 5–17)
B-OH-BUTYR SERPL-SCNC: 0.1 MMOL/L — SIGNIFICANT CHANGE UP
BUN SERPL-MCNC: 26 MG/DL — HIGH (ref 7–23)
BUN SERPL-MCNC: 26 MG/DL — HIGH (ref 7–23)
CALCIUM SERPL-MCNC: 8.1 MG/DL — LOW (ref 8.4–10.5)
CALCIUM SERPL-MCNC: 9.1 MG/DL — SIGNIFICANT CHANGE UP (ref 8.4–10.5)
CHLORIDE SERPL-SCNC: 90 MMOL/L — LOW (ref 96–108)
CHLORIDE SERPL-SCNC: 92 MMOL/L — LOW (ref 96–108)
CK MB CFR SERPL CALC: 2 NG/ML — SIGNIFICANT CHANGE UP (ref 0–3.8)
CK MB CFR SERPL CALC: 2.1 NG/ML — SIGNIFICANT CHANGE UP (ref 0–3.8)
CK MB CFR SERPL CALC: 2.2 NG/ML — SIGNIFICANT CHANGE UP (ref 0–3.8)
CK SERPL-CCNC: 58 U/L — SIGNIFICANT CHANGE UP (ref 25–170)
CK SERPL-CCNC: 64 U/L — SIGNIFICANT CHANGE UP (ref 25–170)
CK SERPL-CCNC: 67 U/L — SIGNIFICANT CHANGE UP (ref 25–170)
CO2 SERPL-SCNC: 19 MMOL/L — LOW (ref 22–31)
CO2 SERPL-SCNC: 21 MMOL/L — LOW (ref 22–31)
CREAT SERPL-MCNC: 1.55 MG/DL — HIGH (ref 0.5–1.3)
CREAT SERPL-MCNC: 1.88 MG/DL — HIGH (ref 0.5–1.3)
E CLOAC COMP DNA BLD POS QL NAA+PROBE: SIGNIFICANT CHANGE UP
E COLI DNA BLD POS QL NAA+NON-PROBE: SIGNIFICANT CHANGE UP
ENTEROCOC DNA BLD POS QL NAA+NON-PROBE: SIGNIFICANT CHANGE UP
ENTEROCOC DNA BLD POS QL NAA+NON-PROBE: SIGNIFICANT CHANGE UP
GLUCOSE BLDC GLUCOMTR-MCNC: 178 MG/DL — HIGH (ref 70–99)
GLUCOSE BLDC GLUCOMTR-MCNC: 270 MG/DL — HIGH (ref 70–99)
GLUCOSE BLDC GLUCOMTR-MCNC: 293 MG/DL — HIGH (ref 70–99)
GLUCOSE BLDC GLUCOMTR-MCNC: 293 MG/DL — HIGH (ref 70–99)
GLUCOSE BLDC GLUCOMTR-MCNC: 299 MG/DL — HIGH (ref 70–99)
GLUCOSE SERPL-MCNC: 277 MG/DL — HIGH (ref 70–99)
GLUCOSE SERPL-MCNC: 284 MG/DL — HIGH (ref 70–99)
GP B STREP DNA BLD POS QL NAA+NON-PROBE: SIGNIFICANT CHANGE UP
GRAM STN FLD: SIGNIFICANT CHANGE UP
HAEM INFLU DNA BLD POS QL NAA+NON-PROBE: SIGNIFICANT CHANGE UP
HBA1C BLD-MCNC: 11.8 % — HIGH (ref 4–5.6)
HCT VFR BLD CALC: 25 % — LOW (ref 34.5–45)
HCT VFR BLD CALC: 25.4 % — LOW (ref 34.5–45)
HCT VFR BLD CALC: 29.5 % — LOW (ref 34.5–45)
HCT VFR BLD CALC: 34 % — LOW (ref 34.5–45)
HGB BLD-MCNC: 10.3 G/DL — LOW (ref 11.5–15.5)
HGB BLD-MCNC: 7.7 G/DL — LOW (ref 11.5–15.5)
HGB BLD-MCNC: 7.8 G/DL — LOW (ref 11.5–15.5)
HGB BLD-MCNC: 9.1 G/DL — LOW (ref 11.5–15.5)
K OXYTOCA DNA BLD POS QL NAA+NON-PROBE: SIGNIFICANT CHANGE UP
L MONOCYTOG DNA BLD POS QL NAA+NON-PROBE: SIGNIFICANT CHANGE UP
MCHC RBC-ENTMCNC: 22.9 PG — LOW (ref 27–34)
MCHC RBC-ENTMCNC: 23.8 PG — LOW (ref 27–34)
MCHC RBC-ENTMCNC: 24 PG — LOW (ref 27–34)
MCHC RBC-ENTMCNC: 24.1 PG — LOW (ref 27–34)
MCHC RBC-ENTMCNC: 30.2 GM/DL — LOW (ref 32–36)
MCHC RBC-ENTMCNC: 30.3 GM/DL — LOW (ref 32–36)
MCHC RBC-ENTMCNC: 31 GM/DL — LOW (ref 32–36)
MCHC RBC-ENTMCNC: 31.1 GM/DL — LOW (ref 32–36)
MCV RBC AUTO: 75.6 FL — LOW (ref 80–100)
MCV RBC AUTO: 77.4 FL — LOW (ref 80–100)
MCV RBC AUTO: 78 FL — LOW (ref 80–100)
MCV RBC AUTO: 78.7 FL — LOW (ref 80–100)
METHOD TYPE: SIGNIFICANT CHANGE UP
MRSA SPEC QL CULT: SIGNIFICANT CHANGE UP
MSSA DNA SPEC QL NAA+PROBE: SIGNIFICANT CHANGE UP
N MEN ISLT CULT: SIGNIFICANT CHANGE UP
P AERUGINOSA DNA BLD POS NAA+NON-PROBE: SIGNIFICANT CHANGE UP
PLATELET # BLD AUTO: 477 K/UL — HIGH (ref 150–400)
PLATELET # BLD AUTO: 513 K/UL — HIGH (ref 150–400)
PLATELET # BLD AUTO: 551 K/UL — HIGH (ref 150–400)
PLATELET # BLD AUTO: 675 K/UL — HIGH (ref 150–400)
POTASSIUM SERPL-MCNC: 4.6 MMOL/L — SIGNIFICANT CHANGE UP (ref 3.5–5.3)
POTASSIUM SERPL-MCNC: 4.7 MMOL/L — SIGNIFICANT CHANGE UP (ref 3.5–5.3)
POTASSIUM SERPL-SCNC: 4.6 MMOL/L — SIGNIFICANT CHANGE UP (ref 3.5–5.3)
POTASSIUM SERPL-SCNC: 4.7 MMOL/L — SIGNIFICANT CHANGE UP (ref 3.5–5.3)
RBC # BLD: 3.24 M/UL — LOW (ref 3.8–5.2)
RBC # BLD: 3.36 M/UL — LOW (ref 3.8–5.2)
RBC # BLD: 3.78 M/UL — LOW (ref 3.8–5.2)
RBC # BLD: 4.32 M/UL — SIGNIFICANT CHANGE UP (ref 3.8–5.2)
RBC # FLD: 19.2 % — HIGH (ref 10.3–14.5)
RBC # FLD: 19.5 % — HIGH (ref 10.3–14.5)
RBC # FLD: 19.7 % — HIGH (ref 10.3–14.5)
RBC # FLD: 20.2 % — HIGH (ref 10.3–14.5)
S MARCESCENS DNA BLD POS NAA+NON-PROBE: SIGNIFICANT CHANGE UP
S PNEUM DNA BLD POS QL NAA+NON-PROBE: SIGNIFICANT CHANGE UP
S PYO DNA BLD POS QL NAA+NON-PROBE: SIGNIFICANT CHANGE UP
SODIUM SERPL-SCNC: 127 MMOL/L — LOW (ref 135–145)
SODIUM SERPL-SCNC: 128 MMOL/L — LOW (ref 135–145)
SPECIMEN SOURCE: SIGNIFICANT CHANGE UP
SPECIMEN SOURCE: SIGNIFICANT CHANGE UP
TROPONIN T SERPL-MCNC: 0.28 NG/ML — HIGH (ref 0–0.06)
TROPONIN T SERPL-MCNC: 0.3 NG/ML — HIGH (ref 0–0.06)
TROPONIN T SERPL-MCNC: 0.32 NG/ML — HIGH (ref 0–0.06)
WBC # BLD: 17.5 K/UL — HIGH (ref 3.8–10.5)
WBC # BLD: 18.07 K/UL — HIGH (ref 3.8–10.5)
WBC # BLD: 19.8 K/UL — HIGH (ref 3.8–10.5)
WBC # BLD: 21 K/UL — HIGH (ref 3.8–10.5)
WBC # FLD AUTO: 17.5 K/UL — HIGH (ref 3.8–10.5)
WBC # FLD AUTO: 18.07 K/UL — HIGH (ref 3.8–10.5)
WBC # FLD AUTO: 19.8 K/UL — HIGH (ref 3.8–10.5)
WBC # FLD AUTO: 21 K/UL — HIGH (ref 3.8–10.5)

## 2018-05-12 PROCEDURE — 93010 ELECTROCARDIOGRAM REPORT: CPT | Mod: 76

## 2018-05-12 RX ORDER — ONDANSETRON 8 MG/1
4 TABLET, FILM COATED ORAL ONCE
Qty: 0 | Refills: 0 | Status: COMPLETED | OUTPATIENT
Start: 2018-05-12 | End: 2018-05-12

## 2018-05-12 RX ORDER — FUROSEMIDE 40 MG
1 TABLET ORAL
Qty: 0 | Refills: 0 | COMMUNITY

## 2018-05-12 RX ORDER — ACETAMINOPHEN 500 MG
650 TABLET ORAL EVERY 6 HOURS
Qty: 0 | Refills: 0 | Status: DISCONTINUED | OUTPATIENT
Start: 2018-05-12 | End: 2018-05-21

## 2018-05-12 RX ADMIN — Medication 6: at 12:18

## 2018-05-12 RX ADMIN — Medication 12.5 MILLIGRAM(S): at 17:09

## 2018-05-12 RX ADMIN — ATORVASTATIN CALCIUM 40 MILLIGRAM(S): 80 TABLET, FILM COATED ORAL at 22:17

## 2018-05-12 RX ADMIN — Medication 12.5 MILLIGRAM(S): at 06:15

## 2018-05-12 RX ADMIN — CLOPIDOGREL BISULFATE 75 MILLIGRAM(S): 75 TABLET, FILM COATED ORAL at 12:19

## 2018-05-12 RX ADMIN — PANTOPRAZOLE SODIUM 40 MILLIGRAM(S): 20 TABLET, DELAYED RELEASE ORAL at 17:08

## 2018-05-12 RX ADMIN — PIPERACILLIN AND TAZOBACTAM 25 GRAM(S): 4; .5 INJECTION, POWDER, LYOPHILIZED, FOR SOLUTION INTRAVENOUS at 14:11

## 2018-05-12 RX ADMIN — ONDANSETRON 4 MILLIGRAM(S): 8 TABLET, FILM COATED ORAL at 00:19

## 2018-05-12 RX ADMIN — INSULIN GLARGINE 34 UNIT(S): 100 INJECTION, SOLUTION SUBCUTANEOUS at 22:16

## 2018-05-12 RX ADMIN — ONDANSETRON 4 MILLIGRAM(S): 8 TABLET, FILM COATED ORAL at 17:08

## 2018-05-12 RX ADMIN — Medication 1: at 00:15

## 2018-05-12 RX ADMIN — GABAPENTIN 600 MILLIGRAM(S): 400 CAPSULE ORAL at 17:09

## 2018-05-12 RX ADMIN — PIPERACILLIN AND TAZOBACTAM 25 GRAM(S): 4; .5 INJECTION, POWDER, LYOPHILIZED, FOR SOLUTION INTRAVENOUS at 22:56

## 2018-05-12 RX ADMIN — Medication 6: at 07:41

## 2018-05-12 RX ADMIN — MORPHINE SULFATE 2 MILLIGRAM(S): 50 CAPSULE, EXTENDED RELEASE ORAL at 17:21

## 2018-05-12 RX ADMIN — Medication 650 MILLIGRAM(S): at 06:16

## 2018-05-12 RX ADMIN — PIPERACILLIN AND TAZOBACTAM 25 GRAM(S): 4; .5 INJECTION, POWDER, LYOPHILIZED, FOR SOLUTION INTRAVENOUS at 06:42

## 2018-05-12 RX ADMIN — SODIUM CHLORIDE 70 MILLILITER(S): 9 INJECTION INTRAMUSCULAR; INTRAVENOUS; SUBCUTANEOUS at 07:38

## 2018-05-12 RX ADMIN — Medication 6: at 17:29

## 2018-05-12 RX ADMIN — GABAPENTIN 600 MILLIGRAM(S): 400 CAPSULE ORAL at 06:16

## 2018-05-12 RX ADMIN — PANTOPRAZOLE SODIUM 40 MILLIGRAM(S): 20 TABLET, DELAYED RELEASE ORAL at 06:15

## 2018-05-12 NOTE — PROVIDER CONTACT NOTE (CRITICAL VALUE NOTIFICATION) - SITUATION
For blood culture drawn on 5/11/2018, preliminary results show growth in anaerobic bottle, gram negative rods

## 2018-05-12 NOTE — CHART NOTE - NSCHARTNOTEFT_GEN_A_CORE
Notified by Rn that pt was experiencing nausea and abdominal pain with headache. Pt was assessed and reported epigastric pain, nausea, fatigue, decreased appetite, and headache. Attending notified and assessed pt.   Bladder scan was done revealing 500 ccs urine. Initiated Zofran and Maalox, GI consulted. Sylvester was placed.    Rabia HARRELL

## 2018-05-12 NOTE — CONSULT NOTE ADULT - SUBJECTIVE AND OBJECTIVE BOX
Chief Complaint:  Patient is a 78y old  Female who presents with a chief complaint of bilateral lower back pain (11 May 2018 21:29)    Neuropathy  GI bleed  Diabetes mellitus, type 2  Atrial fibrillation, unspecified type  CAD (coronary artery disease)  H/O:  section     HPI:  77 yo woman with PMH of CAD s/p MICHAEL x2, AFib not on A/C 2/2 GI bleed, DMT2 with neuropathy, anemia, presenting with crampy lower back pain ~5 days. Denies dysuria or increased urinary frequency. Endorses subjective fevers and sweats. Patient also endorses dark stool that has been present since her March discharge from [facility]. Denies abdominal pain, BRBPR. Endorses "pain from her ulcers",  and an episode of nausea and with NBNB emesis. She has been taking Advil ~4 pills a day for her neuropathy. Endorses drinking coffee and V8 frequently. Patient denies chest pain or worsening SOB. Has dyspnea on exertion with intermittent palpitations at baseline. States she has been fairly compliant with medications, but in the past few days has been forgetful with some of her medications in the setting of the pain being too distracting. Patient was at cardiologist's office and was referred to the ED for further eval. (11 May 2018 21:29)      No Known Allergies      acetaminophen   Tablet. 650 milliGRAM(s) Oral every 6 hours PRN  aluminum hydroxide/magnesium hydroxide/simethicone Suspension 30 milliLiter(s) Oral once PRN  atorvastatin 40 milliGRAM(s) Oral at bedtime  clopidogrel Tablet 75 milliGRAM(s) Oral daily  dextrose 40% Gel 15 Gram(s) Oral once PRN  dextrose 5%. 1000 milliLiter(s) IV Continuous <Continuous>  dextrose 50% Injectable 12.5 Gram(s) IV Push once  dextrose 50% Injectable 25 Gram(s) IV Push once  dextrose 50% Injectable 25 Gram(s) IV Push once  gabapentin 600 milliGRAM(s) Oral two times a day  glucagon  Injectable 1 milliGRAM(s) IntraMuscular once PRN  insulin glargine Injectable (LANTUS) 34 Unit(s) SubCutaneous at bedtime  insulin lispro (HumaLOG) corrective regimen sliding scale   SubCutaneous three times a day before meals  insulin lispro (HumaLOG) corrective regimen sliding scale   SubCutaneous at bedtime  metoprolol tartrate 12.5 milliGRAM(s) Oral two times a day  morphine  - Injectable 2 milliGRAM(s) IV Push every 6 hours PRN  pantoprazole    Tablet 40 milliGRAM(s) Oral two times a day  piperacillin/tazobactam IVPB. 3.375 Gram(s) IV Intermittent every 8 hours  senna 2 Tablet(s) Oral at bedtime PRN        FAMILY HISTORY:  No pertinent family history in first degree relatives        Review of Systems:    General:  No wt loss, fevers, chills, night sweats,fatigue,   Eyes:  Good vision, no reported pain  ENT:  No sore throat, pain, runny nose, dysphagia  CV:  No pain, palpitatioins, hypo/hypertension  Resp:  No dyspnea, cough, tachypnea, wheezing  :  No pain, bleeding, incontinence, nocturia  Muscle:  No pain, weakness  Neuro:  No weakness, tingling, memory problems  Psych:  No fatigue, insomnia, mood problems, depression  Endocrine:  No polyuria, polydypsia, cold/heat intolerance  Heme:  No petechiae, ecchymosis, easy bruisability  Skin:  No rash, tattoos, scars, edema    Relevant Family History:       Relevant Social History:       Physical Exam:    Vital Signs:  Vital Signs Last 24 Hrs  T(C): 37.6 (12 May 2018 15:51), Max: 38.7 (11 May 2018 18:26)  T(F): 99.7 (12 May 2018 15:51), Max: 101.7 (11 May 2018 18:26)  HR: 91 (12 May 2018 17:11) (72 - 103)  BP: 147/96 (12 May 2018 17:11) (115/47 - 165/81)  BP(mean): --  RR: 18 (12 May 2018 17:11) (16 - 20)  SpO2: 94% (12 May 2018 17:11) (92% - 100%)  Daily     Daily     General:  Appears stated age, well-groomed, well-nourished, no distress  HEENT:  NC/AT,  conjunctivae clear and pink, no thyromegaly, nodules, adenopathy, no JVD  Chest:  Full & symmetric excursion, no increased effort, breath sounds clear  Cardiovascular:  Regular rhythm, S1, S2, no murmur/rub/S3/S4, no abdominal bruit, no edema  Abdomen:  Soft, non-tender, non-distended, normoactive bowel sounds,  no masses ,no hepatosplenomeagaly, no signs of chronic liver disease  Extremities:  no cyanosis,clubbing or edema  Skin:  No rash/erythema/ecchymoses/petechiae/wounds/abscess/warm/dry  Neuro/Psych:  Alert, oriented, no asterixis, no tremor, no encephalopathy    Laboratory:                            9.1    21.0  )-----------( 513      ( 12 May 2018 16:44 )             29.5     05-12    127<L>  |  92<L>  |  26<H>  ----------------------------<  277<H>  4.6   |  21<L>  |  1.88<H>    Ca    8.1<L>      12 May 2018 07:54    TPro  7.3  /  Alb  3.1<L>  /  TBili  0.5  /  DBili  x   /  AST  19  /  ALT  14  /  AlkPhos  203<H>  05-11    LIVER FUNCTIONS - ( 11 May 2018 16:32 )  Alb: 3.1 g/dL / Pro: 7.3 g/dL / ALK PHOS: 203 U/L / ALT: 14 U/L / AST: 19 U/L / GGT: x           PT/INR - ( 11 May 2018 16:32 )   PT: 13.6 sec;   INR: 1.25 ratio         PTT - ( 11 May 2018 16:32 )  PTT:24.5 sec  Urinalysis Basic - ( 11 May 2018 17:33 )    Color: Yellow / Appearance: SL Turbid / S.016 / pH: x  Gluc: x / Ketone: Trace  / Bili: Negative / Urobili: Negative   Blood: x / Protein: 150 mg/dL / Nitrite: Negative   Leuk Esterase: Large / RBC: 5-10 /HPF / WBC 10-25 /HPF   Sq Epi: x / Non Sq Epi: x / Bacteria: Few /HPF        Imaging:

## 2018-05-12 NOTE — CONSULT NOTE ADULT - SUBJECTIVE AND OBJECTIVE BOX
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 Ms. Tsang has CAD and had MICHAEL placed to LCx and OM1 on 2017. She was also had PAF, but Xarelto and ASA were stopped due to prior GI bleed at [facility]. Plavix was continued.   On 2018, she presented to [facility] with progressive SOB. She was found to have a Hb of 4.6 and was transfused multiple units of pRBCs. She underwent EGD by Dr. Curtis Daigle revealing one non-bleeding duodenal ulcer and Xarelto was stopped. She again presented with similar symptoms in 3/2018 with Hb-5. Capsule study revealed questionable ulcer in ileum and patient was started on mesalamine. ASA was d/c'ed.    In the ED, she was found to have pyelonephritis complicated with GNR bacteremia.     Allergies  No Known Allergies  	    MEDICATIONS:  clopidogrel Tablet 75 milliGRAM(s) Oral daily  metoprolol tartrate 12.5 milliGRAM(s) Oral two times a day  piperacillin/tazobactam IVPB. 3.375 Gram(s) IV Intermittent every 8 hours  acetaminophen   Tablet. 650 milliGRAM(s) Oral every 6 hours PRN  gabapentin 600 milliGRAM(s) Oral two times a day  morphine  - Injectable 2 milliGRAM(s) IV Push every 6 hours PRN  pantoprazole    Tablet 40 milliGRAM(s) Oral two times a day  senna 2 Tablet(s) Oral at bedtime PRN    atorvastatin 40 milliGRAM(s) Oral at bedtime  dextrose 40% Gel 15 Gram(s) Oral once PRN  dextrose 50% Injectable 12.5 Gram(s) IV Push once  dextrose 50% Injectable 25 Gram(s) IV Push once  dextrose 50% Injectable 25 Gram(s) IV Push once  glucagon  Injectable 1 milliGRAM(s) IntraMuscular once PRN  insulin glargine Injectable (LANTUS) 34 Unit(s) SubCutaneous at bedtime  insulin lispro (HumaLOG) corrective regimen sliding scale   SubCutaneous three times a day before meals  insulin lispro (HumaLOG) corrective regimen sliding scale   SubCutaneous at bedtime    dextrose 5%. 1000 milliLiter(s) IV Continuous <Continuous>      PAST MEDICAL & SURGICAL HISTORY:  Neuropathy  GI bleed  Diabetes mellitus, type 2  Atrial fibrillation, unspecified type  CAD (coronary artery disease)  H/O:  section      FAMILY HISTORY:  No pertinent family history in first degree relatives      SOCIAL HISTORY:    Non-smoker        REVIEW OF SYSTEMS:  See HPI, otherwise complete 10 point review of systems negative      PHYSICAL EXAM:  T(C): 37.4 (18 @ 05:06), Max: 38.9 (18 @ 16:21)  HR: 97 (18 @ 05:06) (72 - 103)  BP: 148/72 (18 @ 05:06) (115/47 - 165/81)  RR: 19 (18 @ 05:06) (18 - 20)  SpO2: 92% (18 @ 05:06) (92% - 100%)  Wt(kg): --  I&O's Summary      Appearance: No Acute Distress	  HEENT:  Normal oral mucosa, PERRL, EOMI	  Cardiovascular: Normal S1 S2, No JVD, No murmurs/rubs/gallops  Respiratory: Lungs clear to auscultation bilaterally  Gastrointestinal:  Soft, Non-tender, + BS	  Skin: No rashes, No ecchymoses, No cyanosis	  Neurologic: Non-focal  Extremities: No clubbing, cyanosis or edema  Vascular: Peripheral pulses palpable 2+ bilaterally  Psychiatry: A & O x 3, Mood & affect appropriate    Laboratory Data:	 	    CBC Full  -  ( 12 May 2018 10:40 )  WBC Count : 18.07 K/uL  Hemoglobin : 7.7 g/dL  Hematocrit : 25.4 %  Platelet Count - Automated : 551 K/uL  Mean Cell Volume : 75.6 fl  Mean Cell Hemoglobin : 22.9 pg  Mean Cell Hemoglobin Concentration : 30.3 gm/dL  Auto Neutrophil # : x  Auto Lymphocyte # : x  Auto Monocyte # : x  Auto Eosinophil # : x  Auto Basophil # : x  Auto Neutrophil % : x  Auto Lymphocyte % : x  Auto Monocyte % : x  Auto Eosinophil % : x  Auto Basophil % : x        127<L>  |  92<L>  |  26<H>  ----------------------------<  277<H>  4.6   |  21<L>  |  1.88<H>  -    128<L>  |  90<L>  |  26<H>  ----------------------------<  284<H>  4.7   |  19<L>  |  1.55<H>    Ca    8.1<L>      12 May 2018 07:54  Ca    9.1      12 May 2018 00:56    TPro  7.3  /  Alb  3.1<L>  /  TBili  0.5  /  DBili  x   /  AST  19  /  ALT  14  /  AlkPhos  203<H>  05-        Interpretation of Telemetry: Sinus   ECG: Sinus; diffuse t-wave inversions      Assessment: 78 year old woman with CAD s/p MICHAEL x2 in 2017, a-fib not on AC due to GI bleed, and compensated diastolic CHF presents for weakness found to have severe sepsis bacteremia complicated by supply demand ischemia.      Plan of Care:    #Type II NSTEMI-  Supply demand ischemia in the setting of severe sepsis with GNR bacteremia.  Patient chest pain free.  Given recent h/o life threatening GI bleed, will hold off on further ischemic evaluation at this time.  Ms. Tsang has demonstrated that she cannot tolerate dual anti-platelet therapy.    #CAD- s/p MICHAEL x2 in 2017.  Questionable ulcer in ileum on capsule study.  Only on Plavix now for multiple hospitalizations for life-threatening anemia despite stents in 2017.  Please resume home dose Lopressor 12.5 mg BID.    #Paroxymal a-fib-  Currently in sinus.  No AC given h/o GI bleed.    #Compensated diastolic CHF-  Euvolemic on exam today.  Hold Lasix for severe sepsis.     Please consider ID consult for bacteremia.     90 minutes spent on total encounter; more than 50% of the visit was spent counseling and/or coordinating care by the attending physician.   	  Julio César Hooper MD [facility]  Cardiovascular Diseases  [phone]

## 2018-05-12 NOTE — PROGRESS NOTE ADULT - SUBJECTIVE AND OBJECTIVE BOX
Patient is a 78y old  Female who presents with a chief complaint of bilateral lower back pain (11 May 2018 21:29)      SUBJECTIVE / OVERNIGHT EVENTS:   Feels better.  Denies CP/SOB/Palpitation/HA.    MEDICATIONS  (STANDING):  atorvastatin 40 milliGRAM(s) Oral at bedtime  clopidogrel Tablet 75 milliGRAM(s) Oral daily  dextrose 5%. 1000 milliLiter(s) (50 mL/Hr) IV Continuous <Continuous>  dextrose 50% Injectable 12.5 Gram(s) IV Push once  dextrose 50% Injectable 25 Gram(s) IV Push once  dextrose 50% Injectable 25 Gram(s) IV Push once  gabapentin 600 milliGRAM(s) Oral two times a day  insulin glargine Injectable (LANTUS) 34 Unit(s) SubCutaneous at bedtime  insulin lispro (HumaLOG) corrective regimen sliding scale   SubCutaneous three times a day before meals  insulin lispro (HumaLOG) corrective regimen sliding scale   SubCutaneous at bedtime  metoprolol tartrate 12.5 milliGRAM(s) Oral two times a day  pantoprazole    Tablet 40 milliGRAM(s) Oral two times a day  piperacillin/tazobactam IVPB. 3.375 Gram(s) IV Intermittent every 8 hours    MEDICATIONS  (PRN):  acetaminophen   Tablet. 650 milliGRAM(s) Oral every 6 hours PRN Mild Pain (1 - 3)  aluminum hydroxide/magnesium hydroxide/simethicone Suspension 30 milliLiter(s) Oral once PRN Dyspepsia  dextrose 40% Gel 15 Gram(s) Oral once PRN Blood Glucose LESS THAN 70 milliGRAM(s)/deciliter  glucagon  Injectable 1 milliGRAM(s) IntraMuscular once PRN Glucose LESS THAN 70 milligrams/deciliter  morphine  - Injectable 2 milliGRAM(s) IV Push every 6 hours PRN Moderate to Severe pain  senna 2 Tablet(s) Oral at bedtime PRN Constipation        CAPILLARY BLOOD GLUCOSE  338 (11 May 2018 22:44)      POCT Blood Glucose.: 270 mg/dL (12 May 2018 17:20)  POCT Blood Glucose.: 293 mg/dL (12 May 2018 11:33)  POCT Blood Glucose.: 293 mg/dL (12 May 2018 07:37)  POCT Blood Glucose.: 299 mg/dL (12 May 2018 00:14)  POCT Blood Glucose.: 431 mg/dL (11 May 2018 19:03)    I&O's Summary    12 May 2018 07:01  -  12 May 2018 19:02  --------------------------------------------------------  IN: 300 mL / OUT: 0 mL / NET: 300 mL        PHYSICAL EXAM:  GENERAL: NAD, well-developed  HEAD:  Atraumatic, Normocephalic  NECK: Supple, No JVD  CHEST/LUNG: Clear to auscultation bilaterally; No wheezing.  HEART: Regular rate and rhythm; No murmurs, rubs, or gallops  ABDOMEN: Soft, Nontender, Nondistended; Bowel sounds present  EXTREMITIES:   No clubbing, cyanosis, or edema  NEUROLOGY: AAO X 3  SKIN: No rashes    LABS:                        9.1    21.0  )-----------( 513      ( 12 May 2018 16:44 )             29.5     05-12    127<L>  |  92<L>  |  26<H>  ----------------------------<  277<H>  4.6   |  21<L>  |  1.88<H>    Ca    8.1<L>      12 May 2018 07:54    TPro  7.3  /  Alb  3.1<L>  /  TBili  0.5  /  DBili  x   /  AST  19  /  ALT  14  /  AlkPhos  203<H>  05-11    PT/INR - ( 11 May 2018 16:32 )   PT: 13.6 sec;   INR: 1.25 ratio         PTT - ( 11 May 2018 16:32 )  PTT:24.5 sec  CARDIAC MARKERS ( 12 May 2018 07:54 )  x     / 0.32 ng/mL / 64 U/L / x     / 2.1 ng/mL  CARDIAC MARKERS ( 12 May 2018 00:56 )  x     / 0.30 ng/mL / 58 U/L / x     / 2.2 ng/mL  CARDIAC MARKERS ( 11 May 2018 16:32 )  x     / 0.28 ng/mL / 67 U/L / x     / 2.0 ng/mL      Urinalysis Basic - ( 11 May 2018 17:33 )    Color: Yellow / Appearance: SL Turbid / S.016 / pH: x  Gluc: x / Ketone: Trace  / Bili: Negative / Urobili: Negative   Blood: x / Protein: 150 mg/dL / Nitrite: Negative   Leuk Esterase: Large / RBC: 5-10 /HPF / WBC 10-25 /HPF   Sq Epi: x / Non Sq Epi: x / Bacteria: Few /HPF      CAPILLARY BLOOD GLUCOSE  338 (11 May 2018 22:44)      POCT Blood Glucose.: 270 mg/dL (12 May 2018 17:20)  POCT Blood Glucose.: 293 mg/dL (12 May 2018 11:33)  POCT Blood Glucose.: 293 mg/dL (12 May 2018 07:37)  POCT Blood Glucose.: 299 mg/dL (12 May 2018 00:14)  POCT Blood Glucose.: 431 mg/dL (11 May 2018 19:03)     @ 22:45  Culture-urine --  Culture results   Growth in anaerobic bottle: Gram Negative Rods  Growth in aerobic bottle: Gram Negative Rods  method type PCR  Organism Blood Culture PCR  Organism Identification Blood Culture PCR  Specimen source .Blood Blood-[facility]       @ 22:45  Culture-CSF --  Culture results   Growth in anaerobic bottle: Gram Negative Rods  Growth in aerobic bottle: Gram Negative Rods  Gram stain   Growth in anaerobic bottle: Gram Negative Rods  Growth in aerobic bottle: Gram Negative Rods  Gram stain spinal fluid --  Method type PCR  Organism Blood Culture PCR  Organism identification Blood Culture PCR  Specimen source .Blood Blood-Peripheral        @ 22:45  Culture blood --  Culture results   Growth in anaerobic bottle: Gram Negative Rods  Growth in aerobic bottle: Gram Negative Rods  Gram stain   Growth in anaerobic bottle: Gram Negative Rods  Growth in aerobic bottle: Gram Negative Rods  Gram stain blood --  Method type PCR  Organism Blood Culture PCR  Organism identification Blood Culture PCR  Specimen source .Blood Blood-Peripheral      RADIOLOGY & ADDITIONAL TESTS:    Imaging Personally Reviewed:    Consultant(s) Notes Reviewed:      Care Discussed with Consultants/Other Providers:

## 2018-05-12 NOTE — PROCEDURE NOTE - ADDITIONAL PROCEDURE DETAILS
Called by the primary team for alba catheter placement, patient in urinary retention.   Glans was prepped and cleansed with betadine solution, and a 18fr coude catheter was inserted in sterile manner, with clear urine output of 400 cc .  Patient tolerated procedure well.  RN present at bedside during procedure.

## 2018-05-13 LAB
-  AMIKACIN: SIGNIFICANT CHANGE UP
-  AMOXICILLIN/CLAVULANIC ACID: SIGNIFICANT CHANGE UP
-  AMPICILLIN/SULBACTAM: SIGNIFICANT CHANGE UP
-  AMPICILLIN: SIGNIFICANT CHANGE UP
-  AZTREONAM: SIGNIFICANT CHANGE UP
-  CEFAZOLIN: SIGNIFICANT CHANGE UP
-  CEFEPIME: SIGNIFICANT CHANGE UP
-  CEFOXITIN: SIGNIFICANT CHANGE UP
-  CEFTRIAXONE: SIGNIFICANT CHANGE UP
-  CIPROFLOXACIN: SIGNIFICANT CHANGE UP
-  ERTAPENEM: SIGNIFICANT CHANGE UP
-  GENTAMICIN: SIGNIFICANT CHANGE UP
-  IMIPENEM: SIGNIFICANT CHANGE UP
-  LEVOFLOXACIN: SIGNIFICANT CHANGE UP
-  MEROPENEM: SIGNIFICANT CHANGE UP
-  NITROFURANTOIN: SIGNIFICANT CHANGE UP
-  PIPERACILLIN/TAZOBACTAM: SIGNIFICANT CHANGE UP
-  TIGECYCLINE: SIGNIFICANT CHANGE UP
-  TOBRAMYCIN: SIGNIFICANT CHANGE UP
-  TRIMETHOPRIM/SULFAMETHOXAZOLE: SIGNIFICANT CHANGE UP
ANION GAP SERPL CALC-SCNC: 16 MMOL/L — SIGNIFICANT CHANGE UP (ref 5–17)
BUN SERPL-MCNC: 36 MG/DL — HIGH (ref 7–23)
CALCIUM SERPL-MCNC: 8.4 MG/DL — SIGNIFICANT CHANGE UP (ref 8.4–10.5)
CHLORIDE SERPL-SCNC: 91 MMOL/L — LOW (ref 96–108)
CO2 SERPL-SCNC: 19 MMOL/L — LOW (ref 22–31)
CREAT SERPL-MCNC: 3.8 MG/DL — HIGH (ref 0.5–1.3)
CULTURE RESULTS: SIGNIFICANT CHANGE UP
FERRITIN SERPL-MCNC: 203 NG/ML — HIGH (ref 15–150)
GLUCOSE BLDC GLUCOMTR-MCNC: 199 MG/DL — HIGH (ref 70–99)
GLUCOSE BLDC GLUCOMTR-MCNC: 205 MG/DL — HIGH (ref 70–99)
GLUCOSE BLDC GLUCOMTR-MCNC: 215 MG/DL — HIGH (ref 70–99)
GLUCOSE BLDC GLUCOMTR-MCNC: 261 MG/DL — HIGH (ref 70–99)
GLUCOSE SERPL-MCNC: 185 MG/DL — HIGH (ref 70–99)
HCT VFR BLD CALC: 26.1 % — LOW (ref 34.5–45)
HCT VFR BLD CALC: 26.6 % — LOW (ref 34.5–45)
HGB BLD-MCNC: 7.7 G/DL — LOW (ref 11.5–15.5)
HGB BLD-MCNC: 8.1 G/DL — LOW (ref 11.5–15.5)
IRON SATN MFR SERPL: 5 % — LOW (ref 14–50)
IRON SATN MFR SERPL: 8 UG/DL — LOW (ref 30–160)
MAGNESIUM SERPL-MCNC: 2.4 MG/DL — SIGNIFICANT CHANGE UP (ref 1.6–2.6)
MCHC RBC-ENTMCNC: 22.4 PG — LOW (ref 27–34)
MCHC RBC-ENTMCNC: 23.6 PG — LOW (ref 27–34)
MCHC RBC-ENTMCNC: 29.5 GM/DL — LOW (ref 32–36)
MCHC RBC-ENTMCNC: 30.4 GM/DL — LOW (ref 32–36)
MCV RBC AUTO: 75.9 FL — LOW (ref 80–100)
MCV RBC AUTO: 77.7 FL — LOW (ref 80–100)
METHOD TYPE: SIGNIFICANT CHANGE UP
ORGANISM # SPEC MICROSCOPIC CNT: SIGNIFICANT CHANGE UP
ORGANISM # SPEC MICROSCOPIC CNT: SIGNIFICANT CHANGE UP
PHOSPHATE SERPL-MCNC: 3.9 MG/DL — SIGNIFICANT CHANGE UP (ref 2.5–4.5)
PLATELET # BLD AUTO: 467 K/UL — HIGH (ref 150–400)
PLATELET # BLD AUTO: 564 K/UL — HIGH (ref 150–400)
POTASSIUM SERPL-MCNC: 4.6 MMOL/L — SIGNIFICANT CHANGE UP (ref 3.5–5.3)
POTASSIUM SERPL-SCNC: 4.6 MMOL/L — SIGNIFICANT CHANGE UP (ref 3.5–5.3)
RBC # BLD: 3.42 M/UL — LOW (ref 3.8–5.2)
RBC # BLD: 3.44 M/UL — LOW (ref 3.8–5.2)
RBC # FLD: 20.1 % — HIGH (ref 10.3–14.5)
RBC # FLD: 20.6 % — HIGH (ref 10.3–14.5)
SODIUM SERPL-SCNC: 126 MMOL/L — LOW (ref 135–145)
SPECIMEN SOURCE: SIGNIFICANT CHANGE UP
TIBC SERPL-MCNC: 171 UG/DL — LOW (ref 220–430)
UIBC SERPL-MCNC: 163 UG/DL — SIGNIFICANT CHANGE UP (ref 110–370)
WBC # BLD: 15.9 K/UL — HIGH (ref 3.8–10.5)
WBC # BLD: 18.01 K/UL — HIGH (ref 3.8–10.5)
WBC # FLD AUTO: 15.9 K/UL — HIGH (ref 3.8–10.5)
WBC # FLD AUTO: 18.01 K/UL — HIGH (ref 3.8–10.5)

## 2018-05-13 RX ORDER — ERTAPENEM SODIUM 1 G/1
500 INJECTION, POWDER, LYOPHILIZED, FOR SOLUTION INTRAMUSCULAR; INTRAVENOUS EVERY 24 HOURS
Qty: 0 | Refills: 0 | Status: DISCONTINUED | OUTPATIENT
Start: 2018-05-13 | End: 2018-05-21

## 2018-05-13 RX ORDER — ONDANSETRON 8 MG/1
4 TABLET, FILM COATED ORAL ONCE
Qty: 0 | Refills: 0 | Status: COMPLETED | OUTPATIENT
Start: 2018-05-13 | End: 2018-05-13

## 2018-05-13 RX ORDER — DOCUSATE SODIUM 100 MG
100 CAPSULE ORAL THREE TIMES A DAY
Qty: 0 | Refills: 0 | Status: DISCONTINUED | OUTPATIENT
Start: 2018-05-13 | End: 2018-05-21

## 2018-05-13 RX ORDER — KETOROLAC TROMETHAMINE 30 MG/ML
15 SYRINGE (ML) INJECTION ONCE
Qty: 0 | Refills: 0 | Status: DISCONTINUED | OUTPATIENT
Start: 2018-05-13 | End: 2018-05-13

## 2018-05-13 RX ORDER — SENNA PLUS 8.6 MG/1
2 TABLET ORAL AT BEDTIME
Qty: 0 | Refills: 0 | Status: DISCONTINUED | OUTPATIENT
Start: 2018-05-13 | End: 2018-05-21

## 2018-05-13 RX ORDER — MULTIVIT WITH MIN/MFOLATE/K2 340-15/3 G
150 POWDER (GRAM) ORAL ONCE
Qty: 0 | Refills: 0 | Status: COMPLETED | OUTPATIENT
Start: 2018-05-13 | End: 2018-05-13

## 2018-05-13 RX ORDER — GABAPENTIN 400 MG/1
300 CAPSULE ORAL DAILY
Qty: 0 | Refills: 0 | Status: DISCONTINUED | OUTPATIENT
Start: 2018-05-13 | End: 2018-05-21

## 2018-05-13 RX ORDER — POLYETHYLENE GLYCOL 3350 17 G/17G
17 POWDER, FOR SOLUTION ORAL DAILY
Qty: 0 | Refills: 0 | Status: DISCONTINUED | OUTPATIENT
Start: 2018-05-13 | End: 2018-05-14

## 2018-05-13 RX ORDER — SODIUM CHLORIDE 9 MG/ML
1000 INJECTION INTRAMUSCULAR; INTRAVENOUS; SUBCUTANEOUS
Qty: 0 | Refills: 0 | Status: DISCONTINUED | OUTPATIENT
Start: 2018-05-13 | End: 2018-05-15

## 2018-05-13 RX ORDER — CALCIUM CARBONATE 500(1250)
1 TABLET ORAL ONCE
Qty: 0 | Refills: 0 | Status: COMPLETED | OUTPATIENT
Start: 2018-05-13 | End: 2018-05-14

## 2018-05-13 RX ADMIN — ERTAPENEM SODIUM 100 MILLIGRAM(S): 1 INJECTION, POWDER, LYOPHILIZED, FOR SOLUTION INTRAMUSCULAR; INTRAVENOUS at 22:43

## 2018-05-13 RX ADMIN — Medication 100 MILLIGRAM(S): at 13:15

## 2018-05-13 RX ADMIN — Medication 4: at 07:38

## 2018-05-13 RX ADMIN — CLOPIDOGREL BISULFATE 75 MILLIGRAM(S): 75 TABLET, FILM COATED ORAL at 13:00

## 2018-05-13 RX ADMIN — Medication 12.5 MILLIGRAM(S): at 17:48

## 2018-05-13 RX ADMIN — PIPERACILLIN AND TAZOBACTAM 25 GRAM(S): 4; .5 INJECTION, POWDER, LYOPHILIZED, FOR SOLUTION INTRAVENOUS at 13:13

## 2018-05-13 RX ADMIN — Medication 100 MILLIGRAM(S): at 05:41

## 2018-05-13 RX ADMIN — SENNA PLUS 2 TABLET(S): 8.6 TABLET ORAL at 00:48

## 2018-05-13 RX ADMIN — Medication 100 MILLIGRAM(S): at 00:47

## 2018-05-13 RX ADMIN — INSULIN GLARGINE 34 UNIT(S): 100 INJECTION, SOLUTION SUBCUTANEOUS at 22:10

## 2018-05-13 RX ADMIN — ONDANSETRON 4 MILLIGRAM(S): 8 TABLET, FILM COATED ORAL at 20:33

## 2018-05-13 RX ADMIN — SENNA PLUS 2 TABLET(S): 8.6 TABLET ORAL at 22:10

## 2018-05-13 RX ADMIN — PANTOPRAZOLE SODIUM 40 MILLIGRAM(S): 20 TABLET, DELAYED RELEASE ORAL at 05:41

## 2018-05-13 RX ADMIN — Medication 30 MILLILITER(S): at 17:49

## 2018-05-13 RX ADMIN — Medication 12.5 MILLIGRAM(S): at 05:41

## 2018-05-13 RX ADMIN — Medication 4: at 17:48

## 2018-05-13 RX ADMIN — GABAPENTIN 600 MILLIGRAM(S): 400 CAPSULE ORAL at 05:41

## 2018-05-13 RX ADMIN — SODIUM CHLORIDE 75 MILLILITER(S): 9 INJECTION INTRAMUSCULAR; INTRAVENOUS; SUBCUTANEOUS at 13:00

## 2018-05-13 RX ADMIN — PANTOPRAZOLE SODIUM 40 MILLIGRAM(S): 20 TABLET, DELAYED RELEASE ORAL at 17:52

## 2018-05-13 RX ADMIN — Medication 100 MILLIGRAM(S): at 22:09

## 2018-05-13 RX ADMIN — PIPERACILLIN AND TAZOBACTAM 25 GRAM(S): 4; .5 INJECTION, POWDER, LYOPHILIZED, FOR SOLUTION INTRAVENOUS at 05:42

## 2018-05-13 RX ADMIN — Medication 6: at 11:53

## 2018-05-13 RX ADMIN — ATORVASTATIN CALCIUM 40 MILLIGRAM(S): 80 TABLET, FILM COATED ORAL at 22:10

## 2018-05-13 NOTE — CHART NOTE - NSCHARTNOTEFT_GEN_A_CORE
Nurse called to report pt with PAT 2.8sec and hr 140 on tele, pt asymptomatic,  /60, HR 67,   continue to monitor electrolytes and tele monitoring.  Cardiology on board. will f/u electrolyes

## 2018-05-13 NOTE — PROGRESS NOTE ADULT - SUBJECTIVE AND OBJECTIVE BOX
Cardiovascular Disease Progress Note    Overnight events: Sylvester placed for urinary retention overnight. Ms. Tsang is eating lunch. She denies chest pain or SOB.  Otherwise review of systems negative    Objective Findings:  T(C): 36.9 (05-13-18 @ 05:35), Max: 37.6 (05-12-18 @ 15:51)  HR: 72 (05-13-18 @ 05:35) (72 - 93)  BP: 124/65 (05-13-18 @ 05:35) (124/65 - 165/74)  RR: 18 (05-13-18 @ 05:35) (16 - 19)  SpO2: 97% (05-13-18 @ 05:35) (92% - 97%)  Wt(kg): --  Daily     Daily       Physical Exam:  Gen: NAD  HEENT: EOMI  CV: RRR, normal S1 + S2, no m/r/g  Lungs: CTAB  Abd: soft, non-tender  Ext: No edema    Telemetry: Sinus    Laboratory Data:                        8.1    15.9  )-----------( 467      ( 13 May 2018 10:57 )             26.6     05-13    126<L>  |  91<L>  |  36<H>  ----------------------------<  185<H>  4.6   |  19<L>  |  3.80<H>    Ca    8.4      13 May 2018 06:03  Phos  3.9     05-13  Mg     2.4     05-13    TPro  7.3  /  Alb  3.1<L>  /  TBili  0.5  /  DBili  x   /  AST  19  /  ALT  14  /  AlkPhos  203<H>  05-11    PT/INR - ( 11 May 2018 16:32 )   PT: 13.6 sec;   INR: 1.25 ratio         PTT - ( 11 May 2018 16:32 )  PTT:24.5 sec  CARDIAC MARKERS ( 12 May 2018 07:54 )  x     / 0.32 ng/mL / 64 U/L / x     / 2.1 ng/mL  CARDIAC MARKERS ( 12 May 2018 00:56 )  x     / 0.30 ng/mL / 58 U/L / x     / 2.2 ng/mL  CARDIAC MARKERS ( 11 May 2018 16:32 )  x     / 0.28 ng/mL / 67 U/L / x     / 2.0 ng/mL          Inpatient Medications:  MEDICATIONS  (STANDING):  atorvastatin 40 milliGRAM(s) Oral at bedtime  clopidogrel Tablet 75 milliGRAM(s) Oral daily  dextrose 5%. 1000 milliLiter(s) (50 mL/Hr) IV Continuous <Continuous>  dextrose 50% Injectable 12.5 Gram(s) IV Push once  dextrose 50% Injectable 25 Gram(s) IV Push once  dextrose 50% Injectable 25 Gram(s) IV Push once  docusate sodium 100 milliGRAM(s) Oral three times a day  gabapentin 600 milliGRAM(s) Oral two times a day  insulin glargine Injectable (LANTUS) 34 Unit(s) SubCutaneous at bedtime  insulin lispro (HumaLOG) corrective regimen sliding scale   SubCutaneous three times a day before meals  insulin lispro (HumaLOG) corrective regimen sliding scale   SubCutaneous at bedtime  magnesium citrate Solution 150 milliLiter(s) Oral once  metoprolol tartrate 12.5 milliGRAM(s) Oral two times a day  pantoprazole    Tablet 40 milliGRAM(s) Oral two times a day  piperacillin/tazobactam IVPB. 3.375 Gram(s) IV Intermittent every 8 hours  senna 2 Tablet(s) Oral at bedtime  sodium chloride 0.9%. 1000 milliLiter(s) (75 mL/Hr) IV Continuous <Continuous>      Assessment: 78 year old woman with CAD s/p MICHAEL x2 in 12/2017, a-fib not on AC due to GI bleed, and compensated diastolic CHF presents for weakness found to have severe sepsis bacteremia complicated by supply demand ischemia.      Plan of Care:    #Type II NSTEMI-  Supply demand ischemia in the setting of severe sepsis with GNR bacteremia.  Patient chest pain free.  Given recent h/o life threatening GI bleed, will hold off on further ischemic evaluation at this time.  Ms. Tsang has demonstrated that she cannot tolerate dual anti-platelet therapy.    #CAD- s/p MICHAEL x2 in 12/2017.  Questionable ulcer in ileum on capsule study.  Only on Plavix now for multiple hospitalizations for life-threatening anemia despite stents in 12/2017.  GI input noted.    #Paroxymal a-fib-  Currently in sinus.  No AC given h/o GI bleed.    #Compensated diastolic CHF-  Euvolemic on exam today.  Hold Lasix for severe sepsis.     Please consider ID consult for bacteremia.     Over 35 minutes spent on total encounter; more than 50% of the visit was spent counseling and/or coordinating care by the attending physician.      Julio César Hooper MD [facility]  Cardiovascular Disease  [phone]

## 2018-05-13 NOTE — CONSULT NOTE ADULT - ASSESSMENT
78y Female with CAD, DM, Afib a/w lower back pain x5-6 days 2/2 b/l pyelonephritis, with KOJO and GNR bacteremia
77 yo woman with PMH of CAD s/p MICHAEL x2, AFib not on A/C 2/2 GI bleed, DMT2 with neuropathy, anemia, presenting with crampy lower back pain ~5 days. Denies dysuria or increased urinary frequency. Endorses subjective fevers and sweats. Patient also endorses dark stool that has been present since her March discharge from [facility]. Denies abdominal pain, BRBPR. Endorses "pain from her ulcers",  and an episode of nausea and with NBNB emesis. She has been taking Advil ~4 pills a day for her neuropathy. Endorses drinking coffee and V8 frequently. Patient denies chest pain or worsening SOB. Has dyspnea on exertion with intermittent palpitations at baseline. States she has been fairly compliant with medications, but in the past few days has been forgetful with some of her medications in the setting of the pain being too distracting. Patient was at cardiologist's office and was referred to the ED for further eval. (11 May 2018 21:29)    ER vitals:  T 102.1, P 92, /66.  WBC 21, Na 124, Cr 1.54, elevated blood glucose, UA (+) LE/pyuria.  Pt had Sylvester catheter placed in ER for urinary retention, with 400 cc clear yellow urine output.  Urine cultures with Enterobacter aerogenes, blood cultures growing the same, CT ap with oral/IV co showed b/l pyelonephritis R>L, unclear if developing abscess on right side.  Pt currently on zosyn.  ID consult called for further antibiotic managment.    Problem/Plan - 1:    ·	Sepsis    - Meets sepsis criteria, fever, leukocytosis, and bacteremia.  Source uti and b/l pyelonephritis.  Pt with enterobacter aerogenes from urine and blood cultures.    - Switch to invanz.  Possible developing renal abscess?  Recommend repeat CT ap with iv contrast in a few days, to re-evaluate    - Check repeat cultures to ensure clearance.    - Blood sugar control.    - Monitor wbc, temp curve.     Will follow,    Lisa Lopez  [phone]

## 2018-05-13 NOTE — PROGRESS NOTE ADULT - SUBJECTIVE AND OBJECTIVE BOX
Patient is a 78y old  Female who presents with a chief complaint of bilateral lower back pain (11 May 2018 21:29)      SUBJECTIVE / OVERNIGHT EVENTS:   Feels better.  Denies CP/SOB/Palpitation/HA.    MEDICATIONS  (STANDING):  atorvastatin 40 milliGRAM(s) Oral at bedtime  calcium carbonate 500 mG (Tums) Chewable 1 Tablet(s) Chew once  clopidogrel Tablet 75 milliGRAM(s) Oral daily  dextrose 5%. 1000 milliLiter(s) (50 mL/Hr) IV Continuous <Continuous>  dextrose 50% Injectable 12.5 Gram(s) IV Push once  dextrose 50% Injectable 25 Gram(s) IV Push once  dextrose 50% Injectable 25 Gram(s) IV Push once  docusate sodium 100 milliGRAM(s) Oral three times a day  ertapenem  IVPB 500 milliGRAM(s) IV Intermittent every 24 hours  gabapentin 300 milliGRAM(s) Oral daily  insulin glargine Injectable (LANTUS) 34 Unit(s) SubCutaneous at bedtime  insulin lispro (HumaLOG) corrective regimen sliding scale   SubCutaneous three times a day before meals  insulin lispro (HumaLOG) corrective regimen sliding scale   SubCutaneous at bedtime  metoprolol tartrate 12.5 milliGRAM(s) Oral two times a day  pantoprazole    Tablet 40 milliGRAM(s) Oral two times a day  senna 2 Tablet(s) Oral at bedtime  sodium chloride 0.9%. 1000 milliLiter(s) (75 mL/Hr) IV Continuous <Continuous>    MEDICATIONS  (PRN):  acetaminophen   Tablet. 650 milliGRAM(s) Oral every 6 hours PRN Mild Pain (1 - 3)  dextrose 40% Gel 15 Gram(s) Oral once PRN Blood Glucose LESS THAN 70 milliGRAM(s)/deciliter  glucagon  Injectable 1 milliGRAM(s) IntraMuscular once PRN Glucose LESS THAN 70 milligrams/deciliter  morphine  - Injectable 2 milliGRAM(s) IV Push every 6 hours PRN Moderate to Severe pain  polyethylene glycol 3350 17 Gram(s) Oral daily PRN Constipation  senna 2 Tablet(s) Oral at bedtime PRN Constipation        CAPILLARY BLOOD GLUCOSE      POCT Blood Glucose.: 199 mg/dL (13 May 2018 21:41)  POCT Blood Glucose.: 215 mg/dL (13 May 2018 17:36)  POCT Blood Glucose.: 261 mg/dL (13 May 2018 11:53)  POCT Blood Glucose.: 205 mg/dL (13 May 2018 07:35)  POCT Blood Glucose.: 178 mg/dL (12 May 2018 22:15)    I&O's Summary    12 May 2018 07:01  -  13 May 2018 07:00  --------------------------------------------------------  IN: 520 mL / OUT: 600 mL / NET: -80 mL    13 May 2018 07:01  -  13 May 2018 21:51  --------------------------------------------------------  IN: 610 mL / OUT: 0 mL / NET: 610 mL        PHYSICAL EXAM:  GENERAL: NAD, well-developed  HEAD:  Atraumatic, Normocephalic  NECK: Supple, No JVD  CHEST/LUNG: Clear to auscultation bilaterally; No wheezing.  HEART: Regular rate and rhythm; No murmurs, rubs, or gallops  ABDOMEN: Soft, Nontender, Nondistended; Bowel sounds present  EXTREMITIES:   No clubbing, cyanosis, or edema  NEUROLOGY: AAO X 3  SKIN: No rashes    LABS:                        8.1    15.9  )-----------( 467      ( 13 May 2018 10:57 )             26.6     05-13    126<L>  |  91<L>  |  36<H>  ----------------------------<  185<H>  4.6   |  19<L>  |  3.80<H>    Ca    8.4      13 May 2018 06:03  Phos  3.9     05-13  Mg     2.4     05-13        CARDIAC MARKERS ( 12 May 2018 07:54 )  x     / 0.32 ng/mL / 64 U/L / x     / 2.1 ng/mL  CARDIAC MARKERS ( 12 May 2018 00:56 )  x     / 0.30 ng/mL / 58 U/L / x     / 2.2 ng/mL        CAPILLARY BLOOD GLUCOSE      POCT Blood Glucose.: 199 mg/dL (13 May 2018 21:41)  POCT Blood Glucose.: 215 mg/dL (13 May 2018 17:36)  POCT Blood Glucose.: 261 mg/dL (13 May 2018 11:53)  POCT Blood Glucose.: 205 mg/dL (13 May 2018 07:35)  POCT Blood Glucose.: 178 mg/dL (12 May 2018 22:15)    05-11 @ 22:45  Culture-urine --  Culture results   Growth in anaerobic bottle: Enterobacter aerogenes  Growth in aerobic bottle: Gram Negative Rods  method type PCR  Organism Blood Culture PCR  Organism Identification Blood Culture PCR  Specimen source .Blood Blood-Peripheral  05-11 @ 19:49  Culture-urine --  Culture results   >100,000 CFU/ml Enterobacter aerogenes  method type --  Organism --  Organism Identification --  Specimen source .Urine Clean Catch (Midstream)           05-11 @ 22:45  Culture blood --  Culture results   Growth in anaerobic bottle: Enterobacter aerogenes  Growth in aerobic bottle: Gram Negative Rods  Gram stain   Growth in anaerobic bottle: Gram Negative Rods  Growth in aerobic bottle: Gram Negative Rods  Gram stain blood --  Method type PCR  Organism Blood Culture PCR  Organism identification Blood Culture PCR  Specimen source .Blood Blood-Peripheral   05-11 @ 19:49  Culture blood --  Culture results   >100,000 CFU/ml Enterobacter aerogenes  Gram stain --  Gram stain blood --  Method type --  Organism --  Organism identification --  Specimen source .Urine Clean Catch (Midstream)      RADIOLOGY & ADDITIONAL TESTS:    Imaging Personally Reviewed:    Consultant(s) Notes Reviewed:      Care Discussed with Consultants/Other Providers:

## 2018-05-13 NOTE — CONSULT NOTE ADULT - SUBJECTIVE AND OBJECTIVE BOX
[facility] Consult Note Nephrology - CONSULTATION NOTE    Patient is a 78y Female with CAD, DM, Afib a/w lower back pain x5-6 days. Lower back pain is so severe that it was causing difficulty in ambulation. +chills, but unclear if she was feverish. Poor appetite. Reports urine output, but denies dysuria or hematuria. +chronic constipation. Upon admission, labs significant for leukocytosis with elevated cr, and imaging c/w b/l pyelonephritis.   Since admission, back pain somewhat better. Renal consult for KOJO.    PAST MEDICAL & SURGICAL HISTORY:  Neuropathy  GI bleed  Diabetes mellitus, type 2  Atrial fibrillation, unspecified type  CAD (coronary artery disease)  Paroxysmal atrial fibrillation  CAD (coronary artery disease)  Diabetic neuropathy  Type 2 diabetes mellitus with hyperglycemia, with long-term current use of insulin  HLD (hyperlipidemia)  H/O:  section  H/O  section    Allergies   Carrots (Rash)  No Known Drug Allergies    Home Medications Reviewed  Hospital Medications:   MEDICATIONS  (STANDING):  atorvastatin 40 milliGRAM(s) Oral at bedtime  clopidogrel Tablet 75 milliGRAM(s) Oral daily  dextrose 5%. 1000 milliLiter(s) (50 mL/Hr) IV Continuous <Continuous>  dextrose 50% Injectable 12.5 Gram(s) IV Push once  dextrose 50% Injectable 25 Gram(s) IV Push once  dextrose 50% Injectable 25 Gram(s) IV Push once  docusate sodium 100 milliGRAM(s) Oral three times a day  gabapentin 600 milliGRAM(s) Oral two times a day  insulin glargine Injectable (LANTUS) 34 Unit(s) SubCutaneous at bedtime  insulin lispro (HumaLOG) corrective regimen sliding scale   SubCutaneous three times a day before meals  insulin lispro (HumaLOG) corrective regimen sliding scale   SubCutaneous at bedtime  magnesium citrate Solution 150 milliLiter(s) Oral once  metoprolol tartrate 12.5 milliGRAM(s) Oral two times a day  pantoprazole    Tablet 40 milliGRAM(s) Oral two times a day  piperacillin/tazobactam IVPB. 3.375 Gram(s) IV Intermittent every 8 hours  senna 2 Tablet(s) Oral at bedtime  sodium chloride 0.9%. 1000 milliLiter(s) (75 mL/Hr) IV Continuous <Continuous>    SOCIAL HISTORY:  Denies ETOh,Smoking,   FAMILY HISTORY:  No pertinent family history in first degree relatives  No pertinent family history in first degree relatives    REVIEW OF SYSTEMS:  CONSTITUTIONAL: +weakness, +chills  EYES/ENT: No visual changes;  No vertigo or throat pain   NECK: No pain or stiffness  RESPIRATORY: No cough, wheezing, hemoptysis; No shortness of breath  CARDIOVASCULAR: No chest pain or palpitations.  GASTROINTESTINAL: No abdominal or epigastric pain. No nausea, vomiting, or hematemesis; No diarrhea or constipation. No melena or hematochezia.  GENITOURINARY: No dysuria, frequency, foamy urine, urinary urgency, incontinence or hematuria  NEUROLOGICAL: No numbness or weakness  SKIN: No itching, burning, rashes, or lesions   VASCULAR: No bilateral lower extremity edema.   All other review of systems is negative unless indicated above.    VITALS:  T(F): 98.4 (18 @ 05:35), Max: 99.7 (18 @ 15:51)  HR: 72 (18 @ 05:35)  BP: 124/65 (18 @ 05:35)  RR: 18 (18 @ 05:35)  SpO2: 97% (18 @ 05:35)  Wt(kg): --     @ 07:01  -   @ 07:00  --------------------------------------------------------  IN: 520 mL / OUT: 600 mL / NET: -80 mL    PHYSICAL EXAM:  Constitutional: NAD  HEENT: anicteric sclera, oropharynx clear, MMM  Neck: No JVD  Respiratory: CTAB, no wheezes, rales or rhonchi  Cardiovascular: S1, S2, RRR  Gastrointestinal: BS+, soft, NT/ND  Extremities: No cyanosis or clubbing. No peripheral edema  Neurological: A/O x 3, no focal deficits  Psychiatric: Normal mood, normal affect  : No CVA tenderness. +alba.   Skin: No rashes    LABS:      126<L>  |  91<L>  |  36<H>  ----------------------------<  185<H>  4.6   |  19<L>  |  3.80<H>    Ca    8.4      13 May 2018 06:03  Phos  3.9     05-  Mg     2.4     -    TPro  7.3  /  Alb  3.1<L>  /  TBili  0.5  /  DBili      /  AST  19  /  ALT  14  /  AlkPhos  203<H>  -    Creatinine Trend: 3.80 <--, 1.88 <--, 1.55 <--, 1.54 <--                        8.1    15.9  )-----------( 467      ( 13 May 2018 10:57 )             26.6     Urine Studies:  Urinalysis Basic - ( 11 May 2018 17:33 )    Color: Yellow / Appearance: SL Turbid / S.016 / pH:   Gluc:  / Ketone: Trace  / Bili: Negative / Urobili: Negative   Blood:  / Protein: 150 mg/dL / Nitrite: Negative   Leuk Esterase: Large / RBC: 5-10 /HPF / WBC 10-25 /HPF   Sq Epi:  / Non Sq Epi:  / Bacteria: Few /HPF        RADIOLOGY & ADDITIONAL STUDIES:  < from: CT Abdomen and Pelvis w/ Oral Cont and w/ IV Cont (18 @ 20:20) >  IMPRESSION: Bilateral pyelonephritis, right greater than left. It is   uncertain if there is a developing collection in the right kidney.  Small to moderate fluid in the pelvis can be related to adnexa. Further   evaluation with a pelvic ultrasound is advised on outpatient basis.    < end of copied text >    < from: Xray Chest 1 View- PORTABLE-Urgent (18 @ 22:12) >  IMPRESSION:   Clear lungs.    < end of copied text >

## 2018-05-13 NOTE — CONSULT NOTE ADULT - PROBLEM SELECTOR RECOMMENDATION 9
likely secondary to acute infections and antibiotic use  IVF   Zofran q 8 hours prn  OOB  PPI bid
KOJO likely related to ATN from b/l pyelonephritis and bacteremia.   Urinary retention noted, agree with alba placement.   Broad spectrum abx for pyelo. No hydronephrosis on CT imaging.   Contrast CT scan noted.   Recommend starting IVF NS at 75cc/hr.  Avoid NSAID use.  Decrease gabapentin to 300 daily, given KOJO.

## 2018-05-13 NOTE — CONSULT NOTE ADULT - SUBJECTIVE AND OBJECTIVE BOX
Patient is a 78y old  Female who presents with a chief complaint of bilateral lower back pain (11 May 2018 21:29)      HPI:  79 yo woman with PMH of CAD s/p MICHAEL x2, AFib not on A/C 2/2 GI bleed, DMT2 with neuropathy, anemia, presenting with crampy lower back pain ~5 days. Denies dysuria or increased urinary frequency. Endorses subjective fevers and sweats. Patient also endorses dark stool that has been present since her March discharge from [facility]. Denies abdominal pain, BRBPR. Endorses "pain from her ulcers",  and an episode of nausea and with NBNB emesis. She has been taking Advil ~4 pills a day for her neuropathy. Endorses drinking coffee and V8 frequently. Patient denies chest pain or worsening SOB. Has dyspnea on exertion with intermittent palpitations at baseline. States she has been fairly compliant with medications, but in the past few days has been forgetful with some of her medications in the setting of the pain being too distracting. Patient was at cardiologist's office and was referred to the ED for further eval. (11 May 2018 21:29)      REVIEW OF SYSTEMS:    CONSTITUTIONAL: No fever, weight loss, or fatigue  EYES: No eye pain, visual disturbances, or discharge  ENMT:  No sore throat  NECK: No pain or stiffness  RESPIRATORY: No cough, wheezing, chills or hemoptysis; No shortness of breath  CARDIOVASCULAR: No chest pain, palpitations, dizziness, or leg swelling  GASTROINTESTINAL: No abdominal or epigastric pain. No nausea, vomiting, or hematemesis; No diarrhea or constipation. No melena or hematochezia.  GENITOURINARY: No dysuria, frequency, hematuria, or incontinence  NEUROLOGICAL: No headaches, memory loss, loss of strength, numbness, or tremors  SKIN: No itching, burning, rashes, or lesions   LYMPH NODES: No enlarged glands  MUSCULOSKELETAL: No joint pain or swelling; No muscle, back, or extremity pain      PAST MEDICAL & SURGICAL HISTORY:  Neuropathy  GI bleed  Diabetes mellitus, type 2  Atrial fibrillation, unspecified type  CAD (coronary artery disease)  Paroxysmal atrial fibrillation  CAD (coronary artery disease)  Diabetic neuropathy  Type 2 diabetes mellitus with hyperglycemia, with long-term current use of insulin  HLD (hyperlipidemia)  H/O:  section  H/O  section      Allergies    Carrots (Rash)  No Known Drug Allergies    Intolerances        FAMILY HISTORY:  No pertinent family history in first degree relatives  No pertinent family history in first degree relatives      SOCIAL HISTORY:        MEDICATIONS  (STANDING):  atorvastatin 40 milliGRAM(s) Oral at bedtime  clopidogrel Tablet 75 milliGRAM(s) Oral daily  dextrose 5%. 1000 milliLiter(s) (50 mL/Hr) IV Continuous <Continuous>  dextrose 50% Injectable 12.5 Gram(s) IV Push once  dextrose 50% Injectable 25 Gram(s) IV Push once  dextrose 50% Injectable 25 Gram(s) IV Push once  docusate sodium 100 milliGRAM(s) Oral three times a day  gabapentin 300 milliGRAM(s) Oral daily  insulin glargine Injectable (LANTUS) 34 Unit(s) SubCutaneous at bedtime  insulin lispro (HumaLOG) corrective regimen sliding scale   SubCutaneous three times a day before meals  insulin lispro (HumaLOG) corrective regimen sliding scale   SubCutaneous at bedtime  metoprolol tartrate 12.5 milliGRAM(s) Oral two times a day  pantoprazole    Tablet 40 milliGRAM(s) Oral two times a day  piperacillin/tazobactam IVPB. 3.375 Gram(s) IV Intermittent every 8 hours  senna 2 Tablet(s) Oral at bedtime  sodium chloride 0.9%. 1000 milliLiter(s) (75 mL/Hr) IV Continuous <Continuous>    MEDICATIONS  (PRN):  acetaminophen   Tablet. 650 milliGRAM(s) Oral every 6 hours PRN Mild Pain (1 - 3)  aluminum hydroxide/magnesium hydroxide/simethicone Suspension 30 milliLiter(s) Oral once PRN Dyspepsia  dextrose 40% Gel 15 Gram(s) Oral once PRN Blood Glucose LESS THAN 70 milliGRAM(s)/deciliter  glucagon  Injectable 1 milliGRAM(s) IntraMuscular once PRN Glucose LESS THAN 70 milligrams/deciliter  morphine  - Injectable 2 milliGRAM(s) IV Push every 6 hours PRN Moderate to Severe pain  polyethylene glycol 3350 17 Gram(s) Oral daily PRN Constipation  senna 2 Tablet(s) Oral at bedtime PRN Constipation      Vital Signs Last 24 Hrs  T(C): 36.9 (13 May 2018 05:35), Max: 37.6 (12 May 2018 15:51)  T(F): 98.4 (13 May 2018 05:35), Max: 99.7 (12 May 2018 15:51)  HR: 72 (13 May 2018 05:35) (72 - 93)  BP: 124/65 (13 May 2018 05:35) (124/65 - 160/71)  BP(mean): --  RR: 18 (13 May 2018 05:35) (18 - 19)  SpO2: 97% (13 May 2018 05:35) (92% - 97%)    PHYSICAL EXAM:    GENERAL: NAD, well-groomed  HEAD:  Atraumatic, Normocephalic  EYES: EOMI, PERRLA, conjunctiva and sclera clear  ENMT: No tonsillar erythema, exudates, or enlargement; Moist mucous membranes  NECK: Supple, No JVD  CHEST/LUNG: Clear to percussion bilaterally; No rales, rhonchi, wheezing, or rubs  HEART: Regular rate and rhythm; No murmurs, rubs, or gallops  ABDOMEN: Soft, Nontender, Nondistended; Bowel sounds present  EXTREMITIES:  2+ Peripheral Pulses, No clubbing, cyanosis, or edema  LYMPH: No lymphadenopathy noted  SKIN: No rashes or lesions    LABS:  CBC Full  -  ( 13 May 2018 10:57 )  WBC Count : 15.9 K/uL  Hemoglobin : 8.1 g/dL  Hematocrit : 26.6 %  Platelet Count - Automated : 467 K/uL  Mean Cell Volume : 77.7 fl  Mean Cell Hemoglobin : 23.6 pg  Mean Cell Hemoglobin Concentration : 30.4 gm/dL  Auto Neutrophil # : x  Auto Lymphocyte # : x  Auto Monocyte # : x  Auto Eosinophil # : x  Auto Basophil # : x  Auto Neutrophil % : x  Auto Lymphocyte % : x  Auto Monocyte % : x  Auto Eosinophil % : x  Auto Basophil % : x      -13    126<L>  |  91<L>  |  36<H>  ----------------------------<  185<H>  4.6   |  19<L>  |  3.80<H>    Ca    8.4      13 May 2018 06:03  Phos  3.9     05-  Mg     2.4     -    TPro  7.3  /  Alb  3.1<L>  /  TBili  0.5  /  DBili  x   /  AST  19  /  ALT  14  /  AlkPhos  203<H>  05-11      LIVER FUNCTIONS - ( 11 May 2018 16:32 )  Alb: 3.1 g/dL / Pro: 7.3 g/dL / ALK PHOS: 203 U/L / ALT: 14 U/L / AST: 19 U/L / GGT: x                 MICROBIOLOGY:    Urinalysis Basic - ( 11 May 2018 17:33 )  Color: Yellow / Appearance: SL Turbid / S.016 / pH: x  Gluc: x / Ketone: Trace  / Bili: Negative / Urobili: Negative   Blood: x / Protein: 150 mg/dL / Nitrite: Negative   Leuk Esterase: Large / RBC: 5-10 /HPF / WBC 10-25 /HPF   Sq Epi: x / Non Sq Epi: x / Bacteria: Few /HPF    Culture - Blood (18 @ 22:45)    -  Multidrug (KPC pos) resistant organism: Nondet    -  Staphylococcus aureus: Nondet    -  Methicillin resistant Staphylococcus aureus (MRSA): Nondet    -  Coagulase negative Staphylococcus: Nondet    -  Enterococcus species: Nondet    -  Vancomycin resistant Enterococcus sp.: Nondet    -  Escherichia coli: Nondet    -  Klebsiella oxytoca: Nondet    -  Klebsiella pneumoniae: Nondet    -  Serratia marcescens: Nondet    -  Haemophilus influenzae: Nondet    -  Listeria monocytogenes: Nondet    -  Neisseria meningitidis: Nondet    -  Pseudomonas aeruginosa: Nondet    -  Acinetobacter baumanii: Nondet    -  Enterobacter cloacae complex: Nondet    -  Streptococcus sp. (Not Grp A, B or S pneumoniae): Nondet    -  Streptococcus agalactiae (Group B): Nondet    -  Streptococcus pyogenes (Group A): Nondet    -  Streptococcus pneumoniae: Nondet    -  Candida albicans: Nondet    -  Candida glabrata: Nondet    -  Candida krusei: Nondet    -  Candida parapsilosis: Nondet    -  Candida tropicalis: Nondet    Gram Stain:   Growth in anaerobic bottle: Gram Negative Rods  Growth in aerobic bottle: Gram Negative Rods    Specimen Source: .Blood Blood-Peripheral    Organism: Blood Culture PCR    Culture Results:   Growth in anaerobic bottle: Enterobacter aerogenes  Growth in aerobic bottle: Gram Negative Rods  "Due to technical problems, Proteus sp. will Not be reported as part of  the BCID panel until further notice"  ***Blood Panel PCR results on this specimen are available  approximately 3 hours after the Gram stain result.***  Gram stain, PCR, and/or culture results may not always  correspond due to difference in methodologies.  ************************************************************  This PCR assay was performed using [employer].  The following targets are tested for: Enterococcus,  vancomycin resistant enterococci, Listeria monocytogenes,  coagulase negative staphylococci, S. aureus,  methicillin resistant S. aureus, Streptococcus agalactiae  (Group B), S. pneumoniae, S. pyogenes (Group A),  Acinetobacter baumannii, Enterobacter cloacae, E. coli,  Klebsiella oxytoca, K. pneumoniae, Proteus sp.,  Serratia marcescens, Haemophilus influenzae,  Neisseria meningitidis, Pseudomonas aeruginosa, Candida  albicans, C. glabrata, C krusei, C parapsilosis,  C. tropicalis and the KPC resistance gene.    Organism Identification: Blood Culture PCR    Method Type: PCR        Culture - Blood (18 @ 22:45)    Gram Stain:   Growth in anaerobic bottle: Gram Negative Rods  Growth in aerobic bottle: Gram Negative Rods    Specimen Source: .Blood Blood-Peripheral    Culture Results:   Growth in anaerobic bottle: Enterobacter aerogenes  Growth in aerobic bottle: Gram Negative Rods      Culture - Urine (18 @ 19:49)    Specimen Source: .Urine Clean Catch (Midstream)    Culture Results:   >100,000 CFU/ml Enterobacter aerogenes          RADIOLOGY:    < from: Xray Chest 1 View- PORTABLE-Urgent (18 @ 22:12) >  FINDINGS:  The lungs are clear. There is no pleural effusion or pneumothorax.  The heart size is normal.   No acute bony pathology.    IMPRESSION:   Clear lungs.    < end of copied text >      < from: CT Abdomen and Pelvis w/ Oral Cont and w/ IV Cont (18 @ 20:20) >    FINDINGS:    LOWER CHEST: Within normal limits.    LIVER: Within normal limits.  BILE DUCTS: Normal caliber.  GALLBLADDER: Within normal limits.  SPLEEN: Within normal limits.  PANCREAS: Within normal limits.  ADRENALS: Nodular thickening of the bilateral adrenal glands.  KIDNEYS/URETERS: There is a focal wedge-shaped region of hypoattenuation   in the right lower pole with surrounding perinephric fat stranding. The   left kidney demonstrates striated nephrograms with mild perinephric   stranding. No hydroureteronephrosis bilaterally.    BLADDER: Within normal limits.  REPRODUCTIVE ORGANS: Calcified fibroid measuring 2.6 cm. Normal adnexa   bilaterally. Small to moderate fluid in the pelvis.    BOWEL: No bowel obstruction. Appendix is normal.  PERITONEUM: No free air.     VESSELS: Atherosclerotic disease.  RETROPERITONEUM: No lymphadenopathy.    ABDOMINAL WALL: Within normal limits.  BONES: Degenerative changes of the spine.    IMPRESSION: Bilateral pyelonephritis, right greater than left. It is   uncertain if there is a developing collection in the right kidney.  Small to moderate fluid in the pelvis can be related to adnexa. Further   evaluation with a pelvic ultrasound is advised on outpatient basis.    < end of copied text > Patient is a 78y old  Female who presents with a chief complaint of bilateral lower back pain (11 May 2018 21:29)      HPI:  79 yo woman with PMH of CAD s/p MICHAEL x2, AFib not on A/C 2/2 GI bleed, DMT2 with neuropathy, anemia, presenting with crampy lower back pain ~5 days. Denies dysuria or increased urinary frequency. Endorses subjective fevers and sweats. Patient also endorses dark stool that has been present since her March discharge from [facility]. Denies abdominal pain, BRBPR. Endorses "pain from her ulcers",  and an episode of nausea and with NBNB emesis. She has been taking Advil ~4 pills a day for her neuropathy. Endorses drinking coffee and V8 frequently. Patient denies chest pain or worsening SOB. Has dyspnea on exertion with intermittent palpitations at baseline. States she has been fairly compliant with medications, but in the past few days has been forgetful with some of her medications in the setting of the pain being too distracting. Patient was at cardiologist's office and was referred to the ED for further eval. (11 May 2018 21:29)    ER vitals:  T 102.1, P 92, /66.  WBC 21, Na 124, Cr 1.54, elevated blood glucose, UA (+) LE/pyuria.  Pt had Sylvester catheter placed in ER for urinary retention, with 400 cc clear yellow urine output.  Urine cultures with Enterobacter aerogenes, blood cultures growing the same, CT ap with oral/IV co showed b/l pyelonephritis R>L, unclear if developing abscess on right side.  Pt currently on zosyn.  ID consult called for further antibiotic managment.      REVIEW OF SYSTEMS:    CONSTITUTIONAL: No fever, weight loss, or fatigue  EYES: No eye pain, visual disturbances, or discharge  ENMT:  No sore throat  NECK: No pain or stiffness  RESPIRATORY: No cough, wheezing, chills or hemoptysis; No shortness of breath  CARDIOVASCULAR: No chest pain, palpitations, dizziness, or leg swelling  GASTROINTESTINAL: No abdominal or epigastric pain. No nausea, vomiting, or hematemesis; No diarrhea or constipation. No melena or hematochezia.  GENITOURINARY: No dysuria, frequency, hematuria, or incontinence  NEUROLOGICAL: No headaches, memory loss, loss of strength, numbness, or tremors  SKIN: No itching, burning, rashes, or lesions   LYMPH NODES: No enlarged glands  MUSCULOSKELETAL: Low back pain improving      PAST MEDICAL & SURGICAL HISTORY:  Neuropathy  GI bleed  Diabetes mellitus, type 2  Atrial fibrillation, unspecified type  CAD (coronary artery disease)  Paroxysmal atrial fibrillation  CAD (coronary artery disease)  Diabetic neuropathy  Type 2 diabetes mellitus with hyperglycemia, with long-term current use of insulin  HLD (hyperlipidemia)  H/O:  section  H/O  section      Allergies    Carrots (Rash)  No Known Drug Allergies    Intolerances        FAMILY HISTORY:  No pertinent family history in first degree relatives  No pertinent family history in first degree relatives      SOCIAL HISTORY:  Lives at home with Grandchildren in home  	Denies ETOH use  	Former smoker quit 10 years ago  Denies illicit drug use      MEDICATIONS  (STANDING):  atorvastatin 40 milliGRAM(s) Oral at bedtime  clopidogrel Tablet 75 milliGRAM(s) Oral daily  dextrose 5%. 1000 milliLiter(s) (50 mL/Hr) IV Continuous <Continuous>  dextrose 50% Injectable 12.5 Gram(s) IV Push once  dextrose 50% Injectable 25 Gram(s) IV Push once  dextrose 50% Injectable 25 Gram(s) IV Push once  docusate sodium 100 milliGRAM(s) Oral three times a day  gabapentin 300 milliGRAM(s) Oral daily  insulin glargine Injectable (LANTUS) 34 Unit(s) SubCutaneous at bedtime  insulin lispro (HumaLOG) corrective regimen sliding scale   SubCutaneous three times a day before meals  insulin lispro (HumaLOG) corrective regimen sliding scale   SubCutaneous at bedtime  metoprolol tartrate 12.5 milliGRAM(s) Oral two times a day  pantoprazole    Tablet 40 milliGRAM(s) Oral two times a day  piperacillin/tazobactam IVPB. 3.375 Gram(s) IV Intermittent every 8 hours  senna 2 Tablet(s) Oral at bedtime  sodium chloride 0.9%. 1000 milliLiter(s) (75 mL/Hr) IV Continuous <Continuous>    MEDICATIONS  (PRN):  acetaminophen   Tablet. 650 milliGRAM(s) Oral every 6 hours PRN Mild Pain (1 - 3)  aluminum hydroxide/magnesium hydroxide/simethicone Suspension 30 milliLiter(s) Oral once PRN Dyspepsia  dextrose 40% Gel 15 Gram(s) Oral once PRN Blood Glucose LESS THAN 70 milliGRAM(s)/deciliter  glucagon  Injectable 1 milliGRAM(s) IntraMuscular once PRN Glucose LESS THAN 70 milligrams/deciliter  morphine  - Injectable 2 milliGRAM(s) IV Push every 6 hours PRN Moderate to Severe pain  polyethylene glycol 3350 17 Gram(s) Oral daily PRN Constipation  senna 2 Tablet(s) Oral at bedtime PRN Constipation      Vital Signs Last 24 Hrs  T(C): 36.9 (13 May 2018 05:35), Max: 37.6 (12 May 2018 15:51)  T(F): 98.4 (13 May 2018 05:35), Max: 99.7 (12 May 2018 15:51)  HR: 72 (13 May 2018 05:35) (72 - 93)  BP: 124/65 (13 May 2018 05:35) (124/65 - 160/71)  BP(mean): --  RR: 18 (13 May 2018 05:35) (18 - 19)  SpO2: 97% (13 May 2018 05:35) (92% - 97%)    PHYSICAL EXAM:    GENERAL: NAD, well-groomed  HEAD:  Atraumatic, Normocephalic  EYES: EOMI, PERRLA, conjunctiva and sclera clear  ENMT: No tonsillar erythema, exudates, or enlargement; Moist mucous membranes  NECK: Supple, No JVD  CHEST/LUNG: Clear to percussion bilaterally; No rales, rhonchi, wheezing, or rubs  HEART: Regular rate and rhythm; No murmurs, rubs, or gallops  ABDOMEN: Soft, Nontender, Nondistended; Bowel sounds present  EXTREMITIES:  2+ Peripheral Pulses, No clubbing, cyanosis, or edema  LYMPH: No lymphadenopathy noted  SKIN: No rashes or lesions    LABS:  CBC Full  -  ( 13 May 2018 10:57 )  WBC Count : 15.9 K/uL  Hemoglobin : 8.1 g/dL  Hematocrit : 26.6 %  Platelet Count - Automated : 467 K/uL  Mean Cell Volume : 77.7 fl  Mean Cell Hemoglobin : 23.6 pg  Mean Cell Hemoglobin Concentration : 30.4 gm/dL  Auto Neutrophil # : x  Auto Lymphocyte # : x  Auto Monocyte # : x  Auto Eosinophil # : x  Auto Basophil # : x  Auto Neutrophil % : x  Auto Lymphocyte % : x  Auto Monocyte % : x  Auto Eosinophil % : x  Auto Basophil % : x      05-13    126<L>  |  91<L>  |  36<H>  ----------------------------<  185<H>  4.6   |  19<L>  |  3.80<H>    Ca    8.4      13 May 2018 06:03  Phos  3.9     05-  Mg     2.4     05-    TPro  7.3  /  Alb  3.1<L>  /  TBili  0.5  /  DBili  x   /  AST  19  /  ALT  14  /  AlkPhos  203<H>  05      LIVER FUNCTIONS - ( 11 May 2018 16:32 )  Alb: 3.1 g/dL / Pro: 7.3 g/dL / ALK PHOS: 203 U/L / ALT: 14 U/L / AST: 19 U/L / GGT: x                 MICROBIOLOGY:    Urinalysis Basic - ( 11 May 2018 17:33 )  Color: Yellow / Appearance: SL Turbid / S.016 / pH: x  Gluc: x / Ketone: Trace  / Bili: Negative / Urobili: Negative   Blood: x / Protein: 150 mg/dL / Nitrite: Negative   Leuk Esterase: Large / RBC: 5-10 /HPF / WBC 10-25 /HPF   Sq Epi: x / Non Sq Epi: x / Bacteria: Few /HPF    Culture - Blood (18 @ 22:45)    -  Multidrug (KPC pos) resistant organism: Nondet    -  Staphylococcus aureus: Nondet    -  Methicillin resistant Staphylococcus aureus (MRSA): Nondet    -  Coagulase negative Staphylococcus: Nondet    -  Enterococcus species: Nondet    -  Vancomycin resistant Enterococcus sp.: Nondet    -  Escherichia coli: Nondet    -  Klebsiella oxytoca: Nondet    -  Klebsiella pneumoniae: Nondet    -  Serratia marcescens: Nondet    -  Haemophilus influenzae: Nondet    -  Listeria monocytogenes: Nondet    -  Neisseria meningitidis: Nondet    -  Pseudomonas aeruginosa: Nondet    -  Acinetobacter baumanii: Nondet    -  Enterobacter cloacae complex: Nondet    -  Streptococcus sp. (Not Grp A, B or S pneumoniae): Nondet    -  Streptococcus agalactiae (Group B): Nondet    -  Streptococcus pyogenes (Group A): Nondet    -  Streptococcus pneumoniae: Nondet    -  Candida albicans: Nondet    -  Candida glabrata: Nondet    -  Candida krusei: Nondet    -  Candida parapsilosis: Nondet    -  Candida tropicalis: Nondet    Gram Stain:   Growth in anaerobic bottle: Gram Negative Rods  Growth in aerobic bottle: Gram Negative Rods    Specimen Source: .Blood Blood-Peripheral    Organism: Blood Culture PCR    Culture Results:   Growth in anaerobic bottle: Enterobacter aerogenes  Growth in aerobic bottle: Gram Negative Rods  "Due to technical problems, Proteus sp. will Not be reported as part of  the BCID panel until further notice"  ***Blood Panel PCR results on this specimen are available  approximately 3 hours after the Gram stain result.***  Gram stain, PCR, and/or culture results may not always  correspond due to difference in methodologies.  ************************************************************  This PCR assay was performed using [employer].  The following targets are tested for: Enterococcus,  vancomycin resistant enterococci, Listeria monocytogenes,  coagulase negative staphylococci, S. aureus,  methicillin resistant S. aureus, Streptococcus agalactiae  (Group B), S. pneumoniae, S. pyogenes (Group A),  Acinetobacter baumannii, Enterobacter cloacae, E. coli,  Klebsiella oxytoca, K. pneumoniae, Proteus sp.,  Serratia marcescens, Haemophilus influenzae,  Neisseria meningitidis, Pseudomonas aeruginosa, Candida  albicans, C. glabrata, C krusei, C parapsilosis,  C. tropicalis and the KPC resistance gene.    Organism Identification: Blood Culture PCR    Method Type: PCR        Culture - Blood (18 @ 22:45)    Gram Stain:   Growth in anaerobic bottle: Gram Negative Rods  Growth in aerobic bottle: Gram Negative Rods    Specimen Source: .Blood Blood-Peripheral    Culture Results:   Growth in anaerobic bottle: Enterobacter aerogenes  Growth in aerobic bottle: Gram Negative Rods      Culture - Urine (18 @ 19:49)    Specimen Source: .Urine Clean Catch (Midstream)    Culture Results:   >100,000 CFU/ml Enterobacter aerogenes          RADIOLOGY:    < from: Xray Chest 1 View- PORTABLE-Urgent (18 @ 22:12) >  FINDINGS:  The lungs are clear. There is no pleural effusion or pneumothorax.  The heart size is normal.   No acute bony pathology.    IMPRESSION:   Clear lungs.    < end of copied text >      < from: CT Abdomen and Pelvis w/ Oral Cont and w/ IV Cont (18 @ 20:20) >    FINDINGS:    LOWER CHEST: Within normal limits.    LIVER: Within normal limits.  BILE DUCTS: Normal caliber.  GALLBLADDER: Within normal limits.  SPLEEN: Within normal limits.  PANCREAS: Within normal limits.  ADRENALS: Nodular thickening of the bilateral adrenal glands.  KIDNEYS/URETERS: There is a focal wedge-shaped region of hypoattenuation   in the right lower pole with surrounding perinephric fat stranding. The   left kidney demonstrates striated nephrograms with mild perinephric   stranding. No hydroureteronephrosis bilaterally.    BLADDER: Within normal limits.  REPRODUCTIVE ORGANS: Calcified fibroid measuring 2.6 cm. Normal adnexa   bilaterally. Small to moderate fluid in the pelvis.    BOWEL: No bowel obstruction. Appendix is normal.  PERITONEUM: No free air.     VESSELS: Atherosclerotic disease.  RETROPERITONEUM: No lymphadenopathy.    ABDOMINAL WALL: Within normal limits.  BONES: Degenerative changes of the spine.    IMPRESSION: Bilateral pyelonephritis, right greater than left. It is   uncertain if there is a developing collection in the right kidney.  Small to moderate fluid in the pelvis can be related to adnexa. Further   evaluation with a pelvic ultrasound is advised on outpatient basis.    < end of copied text >

## 2018-05-13 NOTE — PROGRESS NOTE ADULT - SUBJECTIVE AND OBJECTIVE BOX
INTERVAL HPI/OVERNIGHT EVENTS:  nausea resolved  eating    MEDICATIONS  (STANDING):  atorvastatin 40 milliGRAM(s) Oral at bedtime  clopidogrel Tablet 75 milliGRAM(s) Oral daily  dextrose 5%. 1000 milliLiter(s) (50 mL/Hr) IV Continuous <Continuous>  dextrose 50% Injectable 12.5 Gram(s) IV Push once  dextrose 50% Injectable 25 Gram(s) IV Push once  dextrose 50% Injectable 25 Gram(s) IV Push once  docusate sodium 100 milliGRAM(s) Oral three times a day  gabapentin 300 milliGRAM(s) Oral daily  insulin glargine Injectable (LANTUS) 34 Unit(s) SubCutaneous at bedtime  insulin lispro (HumaLOG) corrective regimen sliding scale   SubCutaneous three times a day before meals  insulin lispro (HumaLOG) corrective regimen sliding scale   SubCutaneous at bedtime  metoprolol tartrate 12.5 milliGRAM(s) Oral two times a day  pantoprazole    Tablet 40 milliGRAM(s) Oral two times a day  piperacillin/tazobactam IVPB. 3.375 Gram(s) IV Intermittent every 8 hours  senna 2 Tablet(s) Oral at bedtime  sodium chloride 0.9%. 1000 milliLiter(s) (75 mL/Hr) IV Continuous <Continuous>    MEDICATIONS  (PRN):  acetaminophen   Tablet. 650 milliGRAM(s) Oral every 6 hours PRN Mild Pain (1 - 3)  aluminum hydroxide/magnesium hydroxide/simethicone Suspension 30 milliLiter(s) Oral once PRN Dyspepsia  dextrose 40% Gel 15 Gram(s) Oral once PRN Blood Glucose LESS THAN 70 milliGRAM(s)/deciliter  glucagon  Injectable 1 milliGRAM(s) IntraMuscular once PRN Glucose LESS THAN 70 milligrams/deciliter  morphine  - Injectable 2 milliGRAM(s) IV Push every 6 hours PRN Moderate to Severe pain  polyethylene glycol 3350 17 Gram(s) Oral daily PRN Constipation  senna 2 Tablet(s) Oral at bedtime PRN Constipation      Allergies    Carrots (Rash)  No Known Drug Allergies    Intolerances        Review of Systems:    General:  No wt loss, fevers, chills, night sweats,fatigue,   Eyes:  Good vision, no reported pain  ENT:  No sore throat, pain, runny nose, dysphagia  CV:  No pain, palpitatioins, hypo/hypertension  Resp:  No dyspnea, cough, tachypnea, wheezing  GI:  No pain, No nausea, No vomiting, No diarrhea, No constipatiion, No weight loss, No fever, No pruritis, No rectal bleeding, No tarry stools, No dysphagia,  :  No pain, bleeding, incontinence, nocturia  Muscle:  No pain, weakness  Neuro:  No weakness, tingling, memory problems  Psych:  No fatigue, insomnia, mood problems, depression  Endocrine:  No polyuria, polydypsia, cold/heat intolerance  Heme:  No petechiae, ecchymosis, easy bruisability  Skin:  No rash, tattoos, scars, edema      Vital Signs Last 24 Hrs  T(C): 36.9 (13 May 2018 05:35), Max: 37.6 (12 May 2018 15:51)  T(F): 98.4 (13 May 2018 05:35), Max: 99.7 (12 May 2018 15:51)  HR: 72 (13 May 2018 05:35) (72 - 93)  BP: 124/65 (13 May 2018 05:35) (124/65 - 165/74)  BP(mean): --  RR: 18 (13 May 2018 05:35) (16 - 19)  SpO2: 97% (13 May 2018 05:35) (92% - 97%)    PHYSICAL EXAM:    Constitutional: NAD, well-developed  HEENT: EOMI, throat clear  Neck: No LAD, supple  Respiratory: CTA and P  Cardiovascular: S1 and S2, RRR, no M  Gastrointestinal: BS+, soft, NT/ND, neg HSM,  Extremities: No peripheral edema, neg clubing, cyanosis  Vascular: 2+ peripheral pulses  Neurological: A/O x 3, no focal deficits  Psychiatric: Normal mood, normal affect  Skin: No rashes      LABS:                        8.1    15.9  )-----------( 467      ( 13 May 2018 10:57 )             26.6     05-13    126<L>  |  91<L>  |  36<H>  ----------------------------<  185<H>  4.6   |  19<L>  |  3.80<H>    Ca    8.4      13 May 2018 06:03  Phos  3.9     -  Mg     2.4         TPro  7.3  /  Alb  3.1<L>  /  TBili  0.5  /  DBili  x   /  AST  19  /  ALT  14  /  AlkPhos  203<H>  05-11    PT/INR - ( 11 May 2018 16:32 )   PT: 13.6 sec;   INR: 1.25 ratio         PTT - ( 11 May 2018 16:32 )  PTT:24.5 sec  Urinalysis Basic - ( 11 May 2018 17:33 )    Color: Yellow / Appearance: SL Turbid / S.016 / pH: x  Gluc: x / Ketone: Trace  / Bili: Negative / Urobili: Negative   Blood: x / Protein: 150 mg/dL / Nitrite: Negative   Leuk Esterase: Large / RBC: 5-10 /HPF / WBC 10-25 /HPF   Sq Epi: x / Non Sq Epi: x / Bacteria: Few /HPF        RADIOLOGY & ADDITIONAL TESTS:

## 2018-05-14 LAB
-  AMIKACIN: SIGNIFICANT CHANGE UP
-  AMPICILLIN/SULBACTAM: SIGNIFICANT CHANGE UP
-  AMPICILLIN: SIGNIFICANT CHANGE UP
-  AZTREONAM: SIGNIFICANT CHANGE UP
-  CEFAZOLIN: SIGNIFICANT CHANGE UP
-  CEFEPIME: SIGNIFICANT CHANGE UP
-  CEFOXITIN: SIGNIFICANT CHANGE UP
-  CEFTRIAXONE: SIGNIFICANT CHANGE UP
-  CIPROFLOXACIN: SIGNIFICANT CHANGE UP
-  ERTAPENEM: SIGNIFICANT CHANGE UP
-  GENTAMICIN: SIGNIFICANT CHANGE UP
-  IMIPENEM: SIGNIFICANT CHANGE UP
-  LEVOFLOXACIN: SIGNIFICANT CHANGE UP
-  MEROPENEM: SIGNIFICANT CHANGE UP
-  PIPERACILLIN/TAZOBACTAM: SIGNIFICANT CHANGE UP
-  TOBRAMYCIN: SIGNIFICANT CHANGE UP
-  TRIMETHOPRIM/SULFAMETHOXAZOLE: SIGNIFICANT CHANGE UP
ALBUMIN SERPL ELPH-MCNC: 2.6 G/DL — LOW (ref 3.3–5)
ALBUMIN SERPL ELPH-MCNC: 2.7 G/DL — LOW (ref 3.3–5)
ALP SERPL-CCNC: 237 U/L — HIGH (ref 40–120)
ALP SERPL-CCNC: 240 U/L — HIGH (ref 40–120)
ALT FLD-CCNC: 29 U/L — SIGNIFICANT CHANGE UP (ref 10–45)
ALT FLD-CCNC: 30 U/L — SIGNIFICANT CHANGE UP (ref 10–45)
ANION GAP SERPL CALC-SCNC: 14 MMOL/L — SIGNIFICANT CHANGE UP (ref 5–17)
ANION GAP SERPL CALC-SCNC: 17 MMOL/L — SIGNIFICANT CHANGE UP (ref 5–17)
ANION GAP SERPL CALC-SCNC: 21 MMOL/L — HIGH (ref 5–17)
APPEARANCE UR: ABNORMAL
AST SERPL-CCNC: 21 U/L — SIGNIFICANT CHANGE UP (ref 10–40)
AST SERPL-CCNC: 21 U/L — SIGNIFICANT CHANGE UP (ref 10–40)
BILIRUB SERPL-MCNC: 0.3 MG/DL — SIGNIFICANT CHANGE UP (ref 0.2–1.2)
BILIRUB SERPL-MCNC: 0.4 MG/DL — SIGNIFICANT CHANGE UP (ref 0.2–1.2)
BILIRUB UR-MCNC: NEGATIVE — SIGNIFICANT CHANGE UP
BUN SERPL-MCNC: 48 MG/DL — HIGH (ref 7–23)
BUN SERPL-MCNC: 50 MG/DL — HIGH (ref 7–23)
BUN SERPL-MCNC: 52 MG/DL — HIGH (ref 7–23)
CALCIUM SERPL-MCNC: 8 MG/DL — LOW (ref 8.4–10.5)
CALCIUM SERPL-MCNC: 8.1 MG/DL — LOW (ref 8.4–10.5)
CALCIUM SERPL-MCNC: 8.2 MG/DL — LOW (ref 8.4–10.5)
CHLORIDE SERPL-SCNC: 92 MMOL/L — LOW (ref 96–108)
CHLORIDE SERPL-SCNC: 92 MMOL/L — LOW (ref 96–108)
CHLORIDE SERPL-SCNC: 93 MMOL/L — LOW (ref 96–108)
CHLORIDE UR-SCNC: 35 MMOL/L — SIGNIFICANT CHANGE UP
CO2 SERPL-SCNC: 16 MMOL/L — LOW (ref 22–31)
CO2 SERPL-SCNC: 18 MMOL/L — LOW (ref 22–31)
CO2 SERPL-SCNC: 19 MMOL/L — LOW (ref 22–31)
COLOR SPEC: YELLOW — SIGNIFICANT CHANGE UP
CREAT ?TM UR-MCNC: 51 MG/DL — SIGNIFICANT CHANGE UP
CREAT SERPL-MCNC: 5.22 MG/DL — HIGH (ref 0.5–1.3)
CREAT SERPL-MCNC: 5.71 MG/DL — HIGH (ref 0.5–1.3)
CREAT SERPL-MCNC: 5.93 MG/DL — HIGH (ref 0.5–1.3)
CULTURE RESULTS: SIGNIFICANT CHANGE UP
CULTURE RESULTS: SIGNIFICANT CHANGE UP
DIFF PNL FLD: ABNORMAL
GLUCOSE BLDC GLUCOMTR-MCNC: 170 MG/DL — HIGH (ref 70–99)
GLUCOSE BLDC GLUCOMTR-MCNC: 178 MG/DL — HIGH (ref 70–99)
GLUCOSE BLDC GLUCOMTR-MCNC: 182 MG/DL — HIGH (ref 70–99)
GLUCOSE BLDC GLUCOMTR-MCNC: 207 MG/DL — HIGH (ref 70–99)
GLUCOSE BLDC GLUCOMTR-MCNC: 338 MG/DL — HIGH (ref 70–99)
GLUCOSE SERPL-MCNC: 143 MG/DL — HIGH (ref 70–99)
GLUCOSE SERPL-MCNC: 145 MG/DL — HIGH (ref 70–99)
GLUCOSE SERPL-MCNC: 181 MG/DL — HIGH (ref 70–99)
GLUCOSE UR QL: NEGATIVE — SIGNIFICANT CHANGE UP
HCT VFR BLD CALC: 24.4 % — LOW (ref 34.5–45)
HGB BLD-MCNC: 7.3 G/DL — LOW (ref 11.5–15.5)
KETONES UR-MCNC: NEGATIVE — SIGNIFICANT CHANGE UP
LEUKOCYTE ESTERASE UR-ACNC: ABNORMAL
MCHC RBC-ENTMCNC: 22.5 PG — LOW (ref 27–34)
MCHC RBC-ENTMCNC: 29.9 GM/DL — LOW (ref 32–36)
MCV RBC AUTO: 75.1 FL — LOW (ref 80–100)
METHOD TYPE: SIGNIFICANT CHANGE UP
NITRITE UR-MCNC: NEGATIVE — SIGNIFICANT CHANGE UP
ORGANISM # SPEC MICROSCOPIC CNT: SIGNIFICANT CHANGE UP
OSMOLALITY UR: 276 MOS/KG — SIGNIFICANT CHANGE UP (ref 50–1200)
PH UR: 6 — SIGNIFICANT CHANGE UP (ref 5–8)
PLATELET # BLD AUTO: 520 K/UL — HIGH (ref 150–400)
POTASSIUM SERPL-MCNC: 4.5 MMOL/L — SIGNIFICANT CHANGE UP (ref 3.5–5.3)
POTASSIUM SERPL-MCNC: 4.8 MMOL/L — SIGNIFICANT CHANGE UP (ref 3.5–5.3)
POTASSIUM SERPL-MCNC: 5 MMOL/L — SIGNIFICANT CHANGE UP (ref 3.5–5.3)
POTASSIUM SERPL-SCNC: 4.5 MMOL/L — SIGNIFICANT CHANGE UP (ref 3.5–5.3)
POTASSIUM SERPL-SCNC: 4.8 MMOL/L — SIGNIFICANT CHANGE UP (ref 3.5–5.3)
POTASSIUM SERPL-SCNC: 5 MMOL/L — SIGNIFICANT CHANGE UP (ref 3.5–5.3)
POTASSIUM UR-SCNC: 42 MMOL/L — SIGNIFICANT CHANGE UP
PROT ?TM UR-MCNC: 332 MG/DL — HIGH (ref 0–12)
PROT ?TM UR-MCNC: 332 MG/DL — HIGH (ref 0–12)
PROT SERPL-MCNC: 6.7 G/DL — SIGNIFICANT CHANGE UP (ref 6–8.3)
PROT SERPL-MCNC: 6.8 G/DL — SIGNIFICANT CHANGE UP (ref 6–8.3)
PROT UR-MCNC: 300 MG/DL
PROT/CREAT UR-RTO: 6.5 RATIO — HIGH (ref 0–0.2)
RBC # BLD: 3.25 M/UL — LOW (ref 3.8–5.2)
RBC # FLD: 21 % — HIGH (ref 10.3–14.5)
RBC CASTS # UR COMP ASSIST: ABNORMAL /HPF (ref 0–2)
SODIUM SERPL-SCNC: 125 MMOL/L — LOW (ref 135–145)
SODIUM SERPL-SCNC: 128 MMOL/L — LOW (ref 135–145)
SODIUM SERPL-SCNC: 129 MMOL/L — LOW (ref 135–145)
SODIUM UR-SCNC: 46 MMOL/L — SIGNIFICANT CHANGE UP
SODIUM UR-SCNC: 53 MMOL/L — SIGNIFICANT CHANGE UP
SP GR SPEC: 1.02 — SIGNIFICANT CHANGE UP (ref 1.01–1.02)
SPECIMEN SOURCE: SIGNIFICANT CHANGE UP
SPECIMEN SOURCE: SIGNIFICANT CHANGE UP
TSH SERPL-MCNC: 0.61 UIU/ML — SIGNIFICANT CHANGE UP (ref 0.27–4.2)
URATE SERPL-MCNC: 11.3 MG/DL — HIGH (ref 2.5–7)
UROBILINOGEN FLD QL: NEGATIVE — SIGNIFICANT CHANGE UP
WBC # BLD: 20.88 K/UL — HIGH (ref 3.8–10.5)
WBC # FLD AUTO: 20.88 K/UL — HIGH (ref 3.8–10.5)
WBC UR QL: >50 /HPF (ref 0–5)

## 2018-05-14 RX ORDER — LACTULOSE 10 G/15ML
20 SOLUTION ORAL EVERY 4 HOURS
Qty: 0 | Refills: 0 | Status: DISCONTINUED | OUTPATIENT
Start: 2018-05-14 | End: 2018-05-14

## 2018-05-14 RX ORDER — POLYETHYLENE GLYCOL 3350 17 G/17G
17 POWDER, FOR SOLUTION ORAL
Qty: 0 | Refills: 0 | Status: DISCONTINUED | OUTPATIENT
Start: 2018-05-14 | End: 2018-05-21

## 2018-05-14 RX ADMIN — SODIUM CHLORIDE 100 MILLILITER(S): 9 INJECTION INTRAMUSCULAR; INTRAVENOUS; SUBCUTANEOUS at 17:09

## 2018-05-14 RX ADMIN — INSULIN GLARGINE 34 UNIT(S): 100 INJECTION, SOLUTION SUBCUTANEOUS at 22:28

## 2018-05-14 RX ADMIN — Medication 100 MILLIGRAM(S): at 05:52

## 2018-05-14 RX ADMIN — Medication 1 TABLET(S): at 09:17

## 2018-05-14 RX ADMIN — SODIUM CHLORIDE 100 MILLILITER(S): 9 INJECTION INTRAMUSCULAR; INTRAVENOUS; SUBCUTANEOUS at 13:32

## 2018-05-14 RX ADMIN — LACTULOSE 20 GRAM(S): 10 SOLUTION ORAL at 09:17

## 2018-05-14 RX ADMIN — CLOPIDOGREL BISULFATE 75 MILLIGRAM(S): 75 TABLET, FILM COATED ORAL at 11:41

## 2018-05-14 RX ADMIN — PANTOPRAZOLE SODIUM 40 MILLIGRAM(S): 20 TABLET, DELAYED RELEASE ORAL at 05:52

## 2018-05-14 RX ADMIN — Medication 2: at 17:08

## 2018-05-14 RX ADMIN — Medication 12.5 MILLIGRAM(S): at 17:09

## 2018-05-14 RX ADMIN — Medication 4: at 08:06

## 2018-05-14 RX ADMIN — ERTAPENEM SODIUM 100 MILLIGRAM(S): 1 INJECTION, POWDER, LYOPHILIZED, FOR SOLUTION INTRAMUSCULAR; INTRAVENOUS at 23:43

## 2018-05-14 RX ADMIN — GABAPENTIN 300 MILLIGRAM(S): 400 CAPSULE ORAL at 11:41

## 2018-05-14 RX ADMIN — Medication 12.5 MILLIGRAM(S): at 05:52

## 2018-05-14 RX ADMIN — PANTOPRAZOLE SODIUM 40 MILLIGRAM(S): 20 TABLET, DELAYED RELEASE ORAL at 17:09

## 2018-05-14 RX ADMIN — SODIUM CHLORIDE 75 MILLILITER(S): 9 INJECTION INTRAMUSCULAR; INTRAVENOUS; SUBCUTANEOUS at 09:17

## 2018-05-14 RX ADMIN — Medication 2: at 11:41

## 2018-05-14 RX ADMIN — SODIUM CHLORIDE 100 MILLILITER(S): 9 INJECTION INTRAMUSCULAR; INTRAVENOUS; SUBCUTANEOUS at 22:28

## 2018-05-14 RX ADMIN — ATORVASTATIN CALCIUM 40 MILLIGRAM(S): 80 TABLET, FILM COATED ORAL at 22:28

## 2018-05-14 RX ADMIN — POLYETHYLENE GLYCOL 3350 17 GRAM(S): 17 POWDER, FOR SOLUTION ORAL at 08:06

## 2018-05-14 NOTE — CHART NOTE - NSCHARTNOTEFT_GEN_A_CORE
Comprehensive Metabolic Panel (05.14.18 @ 18:41)    Sodium, Serum: 129 mmol/L    Potassium, Serum: 4.5 mmol/L    Chloride, Serum: 92 mmol/L    Carbon Dioxide, Serum: 16 mmol/L    Anion Gap, Serum: 21 mmol/L    Blood Urea Nitrogen, Serum: 52 mg/dL    Creatinine, Serum: 5.93 mg/dL    Glucose, Serum: 143 mg/dL    Calcium, Total Serum: 8.0 mg/dL    Protein Total, Serum: 6.8 g/dL    Albumin, Serum: 2.6 g/dL    Bilirubin Total, Serum: 0.3 mg/dL    Alkaline Phosphatase, Serum: 237 U/L    Aspartate Aminotransferase (AST/SGOT): 21 U/L    Alanine Aminotransferase (ALT/SGPT): 29 U/L    eGFR if Non : 6: Interpretative comment  The units for eGFR are ml/min/1.73m2 (normalized body surface area). The  eGFR is calculated from a serum creatinine using the CKD-EPI equation.  Other variables required for calculation are race, age and sex. Among  patients with chronic kidney disease (CKD), the eGFR is useful in  determining the stage of disease according to KDOQI CKD classification.  All eGFR results are reported numerically with the following  interpretation.          GFR                    With                 Without     (ml/min/1.73 m2)    Kidney Damage       Kidney Damage        >= 90                    Stage 1                     Normal        60-89                    Stage 2                     Decreased GFR        30-59     Stage 3                     Stage 3        15-29                    Stage 4                     Stage 4        < 15                      Stage 5                     Stage 5  Each stage of CKD assumes that the associated GFR level has been in  effect for at least 3 months. Determination of stages one and two (with  eGFR > 59 ml/min/m2) requires estimation of kidney damage for at least 3  months as defined by structural or functional abnormalities.  Limitations: All estimates of GFR will be less accurate for patients at  extremes of muscle mass (including but not limited to frail elderly,  critically ill, or cancer patients), those with unusual diets, and those  with conditions associated with reduced secretion or extrarenal  elimination of creatinine. The eGFR equation is not recommended for use  in patients with unstable creatinine levels. mL/min/1.73M2    eGFR if African American: 7 mL/min/1.73M2      Discussed results with Dr. LISA Tsang. Will need shiley placement tomorrow for hemodialysis. Attending will discuss with family in the morning. Patient with lab work for the morning including CBC with dif and CMP. Pending urgent renal ultrasound.     Saskia Clark PA-C   [id number]

## 2018-05-14 NOTE — PROGRESS NOTE ADULT - SUBJECTIVE AND OBJECTIVE BOX
Patient seen and examined  no complaints  denies any sob or cp    Carrots (Rash)  No Known Drug Allergies    Hospital Medications:   MEDICATIONS  (STANDING):  atorvastatin 40 milliGRAM(s) Oral at bedtime  clopidogrel Tablet 75 milliGRAM(s) Oral daily  dextrose 5%. 1000 milliLiter(s) (50 mL/Hr) IV Continuous <Continuous>  dextrose 50% Injectable 12.5 Gram(s) IV Push once  dextrose 50% Injectable 25 Gram(s) IV Push once  dextrose 50% Injectable 25 Gram(s) IV Push once  docusate sodium 100 milliGRAM(s) Oral three times a day  ertapenem  IVPB 500 milliGRAM(s) IV Intermittent every 24 hours  gabapentin 300 milliGRAM(s) Oral daily  insulin glargine Injectable (LANTUS) 34 Unit(s) SubCutaneous at bedtime  insulin lispro (HumaLOG) corrective regimen sliding scale   SubCutaneous three times a day before meals  insulin lispro (HumaLOG) corrective regimen sliding scale   SubCutaneous at bedtime  lactulose Syrup 20 Gram(s) Oral every 4 hours  metoprolol tartrate 12.5 milliGRAM(s) Oral two times a day  pantoprazole    Tablet 40 milliGRAM(s) Oral two times a day  polyethylene glycol 3350 17 Gram(s) Oral two times a day  senna 2 Tablet(s) Oral at bedtime  sodium chloride 0.9%. 1000 milliLiter(s) (75 mL/Hr) IV Continuous <Continuous>      VITALS:  T(F): 99.9 (18 @ 05:43), Max: 99.9 (18 @ 05:43)  HR: 71 (18 @ 05:43)  BP: 113/66 (18 @ 05:43)  RR: 16 (18 @ 05:43)  SpO2: 94% (18 @ 05:43)  Wt(kg): --     @ 07:01  -   @ 07:00  --------------------------------------------------------  IN: 1610 mL / OUT: 0 mL / NET: 1610 mL        PHYSICAL EXAM:  Constitutional: NAD  HEENT: anicteric sclera, oropharynx clear, MMM  Neck: No JVD  Respiratory: CTAB, no wheezes, rales or rhonchi  Cardiovascular: S1, S2, RRR  Gastrointestinal: BS+, soft, NT/ND  Extremities: +peripheral edema  Neurological: A/O x 3, no focal deficits  Psychiatric: Normal mood, normal affect  : +alba    LABS:      125<L>  |  93<L>  |  48<H>  ----------------------------<  181<H>  5.0   |  18<L>  |  5.22<H>    Ca    8.1<L>      14 May 2018 05:50  Phos  3.9     -  Mg     2.4           Creatinine Trend: 5.22 <--, 3.80 <--, 1.88 <--, 1.55 <--, 1.54 <--                        7.3    20.88 )-----------( 520      ( 14 May 2018 07:08 )             24.4     Urine Studies:  Urinalysis Basic - ( 11 May 2018 17:33 )    Color: Yellow / Appearance: SL Turbid / S.016 / pH:   Gluc:  / Ketone: Trace  / Bili: Negative / Urobili: Negative   Blood:  / Protein: 150 mg/dL / Nitrite: Negative   Leuk Esterase: Large / RBC: 5-10 /HPF / WBC 10-25 /HPF   Sq Epi:  / Non Sq Epi:  / Bacteria: Few /HPF        RADIOLOGY & ADDITIONAL STUDIES:

## 2018-05-14 NOTE — PROGRESS NOTE ADULT - SUBJECTIVE AND OBJECTIVE BOX
Interval Events: Pt denies abdominal pain, N/V/D/C, BRBPR/melena.       MEDICATIONS  (STANDING):  atorvastatin 40 milliGRAM(s) Oral at bedtime  clopidogrel Tablet 75 milliGRAM(s) Oral daily  dextrose 5%. 1000 milliLiter(s) (50 mL/Hr) IV Continuous <Continuous>  dextrose 50% Injectable 12.5 Gram(s) IV Push once  dextrose 50% Injectable 25 Gram(s) IV Push once  dextrose 50% Injectable 25 Gram(s) IV Push once  docusate sodium 100 milliGRAM(s) Oral three times a day  ertapenem  IVPB 500 milliGRAM(s) IV Intermittent every 24 hours  gabapentin 300 milliGRAM(s) Oral daily  insulin glargine Injectable (LANTUS) 34 Unit(s) SubCutaneous at bedtime  insulin lispro (HumaLOG) corrective regimen sliding scale   SubCutaneous three times a day before meals  insulin lispro (HumaLOG) corrective regimen sliding scale   SubCutaneous at bedtime  lactulose Syrup 20 Gram(s) Oral every 4 hours  metoprolol tartrate 12.5 milliGRAM(s) Oral two times a day  pantoprazole    Tablet 40 milliGRAM(s) Oral two times a day  polyethylene glycol 3350 17 Gram(s) Oral two times a day  senna 2 Tablet(s) Oral at bedtime  sodium chloride 0.9%. 1000 milliLiter(s) (75 mL/Hr) IV Continuous <Continuous>    MEDICATIONS  (PRN):  acetaminophen   Tablet. 650 milliGRAM(s) Oral every 6 hours PRN Mild Pain (1 - 3)  dextrose 40% Gel 15 Gram(s) Oral once PRN Blood Glucose LESS THAN 70 milliGRAM(s)/deciliter  glucagon  Injectable 1 milliGRAM(s) IntraMuscular once PRN Glucose LESS THAN 70 milligrams/deciliter  morphine  - Injectable 2 milliGRAM(s) IV Push every 6 hours PRN Moderate to Severe pain  senna 2 Tablet(s) Oral at bedtime PRN Constipation      Allergies    Carrots (Rash)  No Known Drug Allergies    Intolerances        Review of Systems:    General:  No wt loss, fevers, chills, night sweats,fatigue,   Eyes:  Good vision, no reported pain  ENT:  No sore throat, pain, runny nose, dysphagia  CV:  No pain, palpitations, hypo/hypertension  Resp:  No dyspnea, cough, tachypnea, wheezing  GI:  No pain, No nausea, No vomiting, No diarrhea, No constipation, No weight loss, No fever, No pruritis, No rectal bleeding, No melena, No dysphagia  :  No pain, bleeding, incontinence, nocturia  Muscle:  No pain, weakness  Neuro:  No weakness, tingling, memory problems  Psych:  No fatigue, insomnia, mood problems, depression  Endocrine:  No polyuria, polydypsia, cold/heat intolerance  Heme:  No petechiae, ecchymosis, easy bruisability  Skin:  No rash, tattoos, scars, edema      Vital Signs Last 24 Hrs  T(C): 37.7 (14 May 2018 05:43), Max: 37.7 (14 May 2018 05:43)  T(F): 99.9 (14 May 2018 05:43), Max: 99.9 (14 May 2018 05:43)  HR: 71 (14 May 2018 05:43) (65 - 87)  BP: 113/66 (14 May 2018 05:43) (113/66 - 151/77)  BP(mean): --  RR: 16 (14 May 2018 05:43) (16 - 19)  SpO2: 94% (14 May 2018 05:43) (91% - 98%)    PHYSICAL EXAM:    Constitutional: NAD, well-developed  HEENT: EOMI, throat clear  Neck: No LAD, supple  Respiratory: CTA and P  Cardiovascular: S1 and S2, RRR, no M  Gastrointestinal: BS+, soft, NT/ND, neg HSM,  Extremities: No peripheral edema, neg clubing, cyanosis  Vascular: 2+ peripheral pulses  Neurological: A/O x 3, no focal deficits  Psychiatric: Normal mood, normal affect  Skin: No rashes      LABS:                        7.3    20.88 )-----------( 520      ( 14 May 2018 07:08 )             24.4     05-14    125<L>  |  93<L>  |  48<H>  ----------------------------<  181<H>  5.0   |  18<L>  |  5.22<H>    Ca    8.1<L>      14 May 2018 05:50  Phos  3.9     05-13  Mg     2.4     05-13            RADIOLOGY & ADDITIONAL TESTS:

## 2018-05-14 NOTE — PROGRESS NOTE ADULT - SUBJECTIVE AND OBJECTIVE BOX
Infectious Diseases progress note:    Subjective: Events noted.  Pt with worsening renal function.  Feels very weak.  C/o neuropathic pain in legs, pt expresses her worry regarding possible dialysis.  Low back pain present    ROS:  CONSTITUTIONAL:  No fever, chills, rigors  CARDIOVASCULAR:  No chest pain or palpitations  RESPIRATORY:   No SOB, cough, dyspnea on exertion.  No wheezing  GASTROINTESTINAL:  No abd pain, N/V, diarrhea/constipation  EXTREMITIES:  No swelling or joint pain  GENITOURINARY:  No burning on urination, increased frequency or urgency.  No flank pain  NEUROLOGIC:  No HA, visual disturbances  SKIN: No rashes    Allergies    Carrots (Rash)  No Known Drug Allergies    Intolerances        ANTIBIOTICS/RELEVANT:  antimicrobials  ertapenem  IVPB 500 milliGRAM(s) IV Intermittent every 24 hours    immunologic:    OTHER:  acetaminophen   Tablet. 650 milliGRAM(s) Oral every 6 hours PRN  atorvastatin 40 milliGRAM(s) Oral at bedtime  clopidogrel Tablet 75 milliGRAM(s) Oral daily  dextrose 40% Gel 15 Gram(s) Oral once PRN  dextrose 5%. 1000 milliLiter(s) IV Continuous <Continuous>  dextrose 50% Injectable 12.5 Gram(s) IV Push once  dextrose 50% Injectable 25 Gram(s) IV Push once  dextrose 50% Injectable 25 Gram(s) IV Push once  docusate sodium 100 milliGRAM(s) Oral three times a day  gabapentin 300 milliGRAM(s) Oral daily  glucagon  Injectable 1 milliGRAM(s) IntraMuscular once PRN  insulin glargine Injectable (LANTUS) 34 Unit(s) SubCutaneous at bedtime  insulin lispro (HumaLOG) corrective regimen sliding scale   SubCutaneous three times a day before meals  insulin lispro (HumaLOG) corrective regimen sliding scale   SubCutaneous at bedtime  metoprolol tartrate 12.5 milliGRAM(s) Oral two times a day  morphine  - Injectable 2 milliGRAM(s) IV Push every 6 hours PRN  pantoprazole    Tablet 40 milliGRAM(s) Oral two times a day  polyethylene glycol 3350 17 Gram(s) Oral two times a day  senna 2 Tablet(s) Oral at bedtime  senna 2 Tablet(s) Oral at bedtime PRN  sodium chloride 0.9%. 1000 milliLiter(s) IV Continuous <Continuous>      Objective:  Vital Signs Last 24 Hrs  T(C): 36.5 (14 May 2018 13:03), Max: 37.7 (14 May 2018 05:43)  T(F): 97.7 (14 May 2018 13:03), Max: 99.9 (14 May 2018 05:43)  HR: 66 (14 May 2018 13:03) (66 - 87)  BP: 123/59 (14 May 2018 13:03) (113/66 - 151/77)  BP(mean): --  RR: 17 (14 May 2018 13:03) (16 - 19)  SpO2: 97% (14 May 2018 13:03) (91% - 97%)    PHYSICAL EXAM:  Constitutional:NAD  Eyes:EDIN, EOMI  Ear/Nose/Throat: no thrush, mucositis.  Moist mucous membranes	  Neck:no JVD, no lymphadenopathy, supple  Respiratory: CTA carlos manuel  Cardiovascular: S1S2 RRR, no murmurs  Gastrointestinal:soft, nontender,  nondistended (+) BS  Extremities:no e/e/c  Skin:  no rashes, open wounds or ulcerations  :  Sylvester draining clear yellow urine        LABS:                        7.3    20.88 )-----------( 520      ( 14 May 2018 07:08 )             24.4     05-14    128<L>  |  92<L>  |  50<H>  ----------------------------<  145<H>  4.8   |  19<L>  |  5.71<H>    Ca    8.2<L>      14 May 2018 12:15  Phos  3.9     05-13  Mg     2.4     05-13    TPro  6.7  /  Alb  2.7<L>  /  TBili  0.4  /  DBili  x   /  AST  21  /  ALT  30  /  AlkPhos  240<H>  05-14      Urinalysis Basic - ( 14 May 2018 12:46 )    Color: Yellow / Appearance: Turbid / S.018 / pH: x  Gluc: x / Ketone: Negative  / Bili: Negative / Urobili: Negative   Blood: x / Protein: 300 mg/dL / Nitrite: Negative   Leuk Esterase: Large / RBC: 25-50 /HPF / WBC >50 /HPF   Sq Epi: x / Non Sq Epi: x / Bacteria: x            MICROBIOLOGY:    Culture - Blood (18 @ 22:45)    Gram Stain:   Growth in anaerobic bottle: Gram Negative Rods  Growth in aerobic bottle: Gram Negative Rods    -  Amikacin: S <=8    -  Ampicillin: R >16 These ampicillin results predict results for amoxicillin    -  Ampicillin/Sulbactam: R 8/4    -  Aztreonam: S <=4    -  Cefazolin: R >16    -  Cefepime: S <=2    -  Cefoxitin: R >16    -  Ceftriaxone: S <=1 Enterobacter, Citrobacter, and Serratia may develop resistance during prolonged therapy    -  Ciprofloxacin: S <=0.5    -  Ertapenem: S <=0.5    -  Gentamicin: S <=1    -  Imipenem: S <=1    -  Levofloxacin: S <=1    -  Meropenem: S <=1    -  Piperacillin/Tazobactam: S <=8    -  Tobramycin: S <=2    -  Multidrug (KPC pos) resistant organism: Nondet    -  Staphylococcus aureus: Nondet    -  Methicillin resistant Staphylococcus aureus (MRSA): Nondet    -  Coagulase negative Staphylococcus: Nondet    -  Enterococcus species: Nondet    -  Vancomycin resistant Enterococcus sp.: Nondet    -  Escherichia coli: Nondet    -  Klebsiella oxytoca: Nondet    -  Klebsiella pneumoniae: Nondet    -  Serratia marcescens: Nondet    -  Haemophilus influenzae: Nondet    -  Listeria monocytogenes: Nondet    -  Neisseria meningitidis: Nondet    -  Pseudomonas aeruginosa: Nondet    -  Acinetobacter baumanii: Nondet    -  Enterobacter cloacae complex: Nondet    -  Streptococcus sp. (Not Grp A, B or S pneumoniae): Nondet    -  Streptococcus agalactiae (Group B): Nondet    -  Streptococcus pyogenes (Group A): Nondet    -  Streptococcus pneumoniae: Nondet    -  Candida albicans: Nondet    -  Candida glabrata: Nondet    -  Candida krusei: Nondet    -  Candida parapsilosis: Nondet    -  Candida tropicalis: Nondet    -  Trimethoprim/Sulfamethoxazole: S <=0.5/9.5    Specimen Source: .Blood Blood-Peripheral    Organism: Blood Culture PCR    Organism: Enterobacter aerogenes    Culture Results:   Growth in aerobic and anaerobic bottles: Enterobacter aerogenes  "Due to technical problems, Proteus sp. will Not be reported as part of  the BCID panel until further notice"  ***Blood Panel PCR results on this specimen are available  approximately3 hours after the Gram stain result.***  Gram stain, PCR, and/or culture results may not always  correspond due to difference in methodologies.  ************************************************************  This PCR assay was performed using [employer].  The following targets are tested for: Enterococcus,  vancomycin resistant enterococci, Listeria monocytogenes,  coagulase negative staphylococci, S. aureus,  methicillin resistant S. aureus, Streptococcus agalactiae  (Group B), S. pneumoniae, S. pyogenes (Group A),  Acinetobacter baumannii, Enterobacter cloacae, E. coli,  Klebsiella oxytoca, K. pneumoniae, Proteus sp.,  Serratia marcescens, Haemophilus influenzae,  Neisseria meningitidis, Pseudomonas aeruginosa, Candida  albicans, C. glabrata, C krusei, C parapsilosis,  C. tropicalis and the KPC resistance gene.    Organism Identification: Blood Culture PCR  Enterobacter aerogenes    Method Type: PCR    Method Type: CAITLYN    Culture - Blood (18 @ 22:45)    Gram Stain:   Growth in anaerobic bottle: Gram Negative Rods  Growth in aerobic bottle: Gram Negative Rods    Specimen Source: .Blood Blood-Peripheral    Culture Results:   Growth in aerobic and anaerobic bottles: Enterobacter aerogenes  See previous culture [id number]            RADIOLOGY & ADDITIONAL STUDIES:    < from: Xray Chest 1 View- PORTABLE-Urgent (18 @ 22:12) >  FINDINGS:  The lungs are clear. There is no pleural effusion or pneumothorax.  The heart size is normal.   No acute bony pathology.    IMPRESSION:   Clear lungs.    < end of copied text >      < from: CT Abdomen and Pelvis w/ Oral Cont and w/ IV Cont (18 @ 20:20) >  IMPRESSION: Bilateral pyelonephritis, right greater than left. It is   uncertain if there is a developing collection in the right kidney.  Small to moderate fluid in the pelvis can be related to adnexa. Further   evaluation with a pelvic ultrasound is advised on outpatient basis.    < end of copied text >

## 2018-05-14 NOTE — PROGRESS NOTE ADULT - SUBJECTIVE AND OBJECTIVE BOX
Cardiovascular Disease Progress Note    Overnight events: No acute events overnight. Ms. Tsang denies chest pain or SOB.    Otherwise review of systems negative    Objective Findings:  T(C): 37.7 (18 @ 05:43), Max: 37.7 (18 @ 05:43)  HR: 71 (18 @ 05:43) (65 - 87)  BP: 113/66 (18 @ 05:43) (113/66 - 151/77)  RR: 16 (18 @ 05:43) (16 - 19)  SpO2: 94% (18 @ 05:43) (91% - 98%)  Wt(kg): --  Daily     Daily Weight in k.3 (14 May 2018 07:14)      Physical Exam:  Gen: NAD  HEENT: EOMI  CV: RRR, normal S1 + S2, no m/r/g  Lungs: CTAB  Abd: soft, non-tender  Ext: No edema    Telemetry: brief a-fib overnight; currently sinus    Laboratory Data:                        7.3    20.88 )-----------( 520      ( 14 May 2018 07:08 )             24.4     05-14    125<L>  |  93<L>  |  48<H>  ----------------------------<  181<H>  5.0   |  18<L>  |  5.22<H>    Ca    8.1<L>      14 May 2018 05:50  Phos  3.9     05-13  Mg     2.4     05-13                Inpatient Medications:  MEDICATIONS  (STANDING):  atorvastatin 40 milliGRAM(s) Oral at bedtime  calcium carbonate 500 mG (Tums) Chewable 1 Tablet(s) Chew once  clopidogrel Tablet 75 milliGRAM(s) Oral daily  dextrose 5%. 1000 milliLiter(s) (50 mL/Hr) IV Continuous <Continuous>  dextrose 50% Injectable 12.5 Gram(s) IV Push once  dextrose 50% Injectable 25 Gram(s) IV Push once  dextrose 50% Injectable 25 Gram(s) IV Push once  docusate sodium 100 milliGRAM(s) Oral three times a day  ertapenem  IVPB 500 milliGRAM(s) IV Intermittent every 24 hours  gabapentin 300 milliGRAM(s) Oral daily  insulin glargine Injectable (LANTUS) 34 Unit(s) SubCutaneous at bedtime  insulin lispro (HumaLOG) corrective regimen sliding scale   SubCutaneous three times a day before meals  insulin lispro (HumaLOG) corrective regimen sliding scale   SubCutaneous at bedtime  lactulose Syrup 20 Gram(s) Oral every 4 hours  metoprolol tartrate 12.5 milliGRAM(s) Oral two times a day  pantoprazole    Tablet 40 milliGRAM(s) Oral two times a day  polyethylene glycol 3350 17 Gram(s) Oral two times a day  senna 2 Tablet(s) Oral at bedtime  sodium chloride 0.9%. 1000 milliLiter(s) (75 mL/Hr) IV Continuous <Continuous>      Assessment: 78 year old woman with CAD s/p MICHAEL x2 in 2017, a-fib not on AC due to GI bleed, and compensated diastolic CHF presents for weakness found to have severe sepsis bacteremia complicated by supply demand ischemia.      Plan of Care:    #Type II NSTEMI-  Supply demand ischemia in the setting of severe sepsis with GNR bacteremia.  Patient chest pain free.  Given recent h/o life threatening GI bleed, will hold off on further ischemic evaluation at this time.  Ms. Tsang has demonstrated that she cannot tolerate dual anti-platelet therapy.    #CAD- s/p MICHAEL x2 in 2017.  Questionable ulcer in ileum on capsule study.  Only on Plavix now for multiple hospitalizations for life-threatening anemia despite stents in 2017.  GI input noted.    #Paroxymal a-fib-  Brief a-fib overnight.  Currently in sinus.  No AC given h/o GI bleed.    #Compensated diastolic CHF-  Euvolemic on exam today.  Hold Lasix for severe sepsis.     ID consult appreciated.    Over 35 minutes spent on total encounter; more than 50% of the visit was spent counseling and/or coordinating care by the attending physician.      Julio César Hooper MD [facility]  Cardiovascular Disease  [phone]

## 2018-05-14 NOTE — PROGRESS NOTE ADULT - SUBJECTIVE AND OBJECTIVE BOX
Patient is a 78y old  Female who presents with a chief complaint of bilateral lower back pain (11 May 2018 21:29)      SUBJECTIVE / OVERNIGHT EVENTS:  C/o weakness  Worsening Cr.  Denies CP/SOB/Palpitation/HA.    MEDICATIONS  (STANDING):  atorvastatin 40 milliGRAM(s) Oral at bedtime  clopidogrel Tablet 75 milliGRAM(s) Oral daily  dextrose 5%. 1000 milliLiter(s) (50 mL/Hr) IV Continuous <Continuous>  dextrose 50% Injectable 12.5 Gram(s) IV Push once  dextrose 50% Injectable 25 Gram(s) IV Push once  dextrose 50% Injectable 25 Gram(s) IV Push once  docusate sodium 100 milliGRAM(s) Oral three times a day  ertapenem  IVPB 500 milliGRAM(s) IV Intermittent every 24 hours  gabapentin 300 milliGRAM(s) Oral daily  insulin glargine Injectable (LANTUS) 34 Unit(s) SubCutaneous at bedtime  insulin lispro (HumaLOG) corrective regimen sliding scale   SubCutaneous three times a day before meals  insulin lispro (HumaLOG) corrective regimen sliding scale   SubCutaneous at bedtime  metoprolol tartrate 12.5 milliGRAM(s) Oral two times a day  pantoprazole    Tablet 40 milliGRAM(s) Oral two times a day  polyethylene glycol 3350 17 Gram(s) Oral two times a day  senna 2 Tablet(s) Oral at bedtime  sodium chloride 0.9%. 1000 milliLiter(s) (100 mL/Hr) IV Continuous <Continuous>    MEDICATIONS  (PRN):  acetaminophen   Tablet. 650 milliGRAM(s) Oral every 6 hours PRN Mild Pain (1 - 3)  dextrose 40% Gel 15 Gram(s) Oral once PRN Blood Glucose LESS THAN 70 milliGRAM(s)/deciliter  glucagon  Injectable 1 milliGRAM(s) IntraMuscular once PRN Glucose LESS THAN 70 milligrams/deciliter  morphine  - Injectable 2 milliGRAM(s) IV Push every 6 hours PRN Moderate to Severe pain  senna 2 Tablet(s) Oral at bedtime PRN Constipation        CAPILLARY BLOOD GLUCOSE      POCT Blood Glucose.: 178 mg/dL (14 May 2018 21:50)  POCT Blood Glucose.: 182 mg/dL (14 May 2018 16:59)  POCT Blood Glucose.: 170 mg/dL (14 May 2018 11:35)  POCT Blood Glucose.: 207 mg/dL (14 May 2018 07:14)    I&O's Summary    13 May 2018 07:01  -  14 May 2018 07:00  --------------------------------------------------------  IN: 1610 mL / OUT: 0 mL / NET: 1610 mL    14 May 2018 07:01  -  14 May 2018 22:52  --------------------------------------------------------  IN: 1815 mL / OUT: 45 mL / NET: 1770 mL        PHYSICAL EXAM:    NECK: Supple, No JVD  CHEST/LUNG: Clear to auscultation bilaterally; No wheezing.  HEART: Regular rate and rhythm; No murmurs, rubs, or gallops  ABDOMEN: Soft, Nontender, Nondistended; Bowel sounds present  EXTREMITIES:   No clubbing, cyanosis, or edema  NEUROLOGY: AAO X 3  SKIN: No rashes    LABS:                        7.3    20.88 )-----------( 520      ( 14 May 2018 07:08 )             24.4     05-14    129<L>  |  92<L>  |  52<H>  ----------------------------<  143<H>  4.5   |  16<L>  |  5.93<H>    Ca    8.0<L>      14 May 2018 18:41  Phos  3.9     05-13  Mg     2.4     05-13    TPro  6.8  /  Alb  2.6<L>  /  TBili  0.3  /  DBili  x   /  AST  21  /  ALT  29  /  AlkPhos  237<H>  05-14          Urinalysis Basic - ( 14 May 2018 12:46 )    Color: Yellow / Appearance: Turbid / S.018 / pH: x  Gluc: x / Ketone: Negative  / Bili: Negative / Urobili: Negative   Blood: x / Protein: 300 mg/dL / Nitrite: Negative   Leuk Esterase: Large / RBC: 25-50 /HPF / WBC >50 /HPF   Sq Epi: x / Non Sq Epi: x / Bacteria: x      CAPILLARY BLOOD GLUCOSE      POCT Blood Glucose.: 178 mg/dL (14 May 2018 21:50)  POCT Blood Glucose.: 182 mg/dL (14 May 2018 16:59)  POCT Blood Glucose.: 170 mg/dL (14 May 2018 11:35)  POCT Blood Glucose.: 207 mg/dL (14 May 2018 07:14)     @ 22:45  Culture-urine --  Culture results   Growth in aerobic and anaerobic bottles: Enterobacter aerogenes  See previous culture [id number]  method type PCR  Organism Blood Culture PCR  Organism Identification Blood Culture PCR  Enterobacter aerogenes  Specimen source .Blood Blood-Peripheral   @ 19:49  Culture-urine --  Culture results   >100,000 CFU/ml Enterobacter aerogenes  method type CAITLYN  Organism Enterobacter aerogenes  Organism Identification Enterobacter aerogenes  Specimen source .Urine Clean Catch (Midstream)            @ 22:45  Culture blood --  Culture results   Growth in aerobic and anaerobic bottles: Enterobacter aerogenes  See previous culture [id number]  Gram stain   Growth in anaerobic bottle: Gram Negative Rods  Growth in aerobic bottle: Gram Negative Rods  Gram stain blood --  Method type PCR  Organism Blood Culture PCR  Organism identification Blood Culture PCR  Enterobacter aerogenes  Specimen source .Blood Blood-Peripheral    @ 19:49  Culture blood --  Culture results   >100,000 CFU/ml Enterobacter aerogenes  Gram stain --  Gram stain blood --  Method type CAITLYN  Organism Enterobacter aerogenes  Organism identification Enterobacter aerogenes  Specimen source .Urine Clean Catch (Midstream)      RADIOLOGY & ADDITIONAL TESTS:    Imaging Personally Reviewed:    Consultant(s) Notes Reviewed:      Care Discussed with Consultants/Other Providers:

## 2018-05-15 LAB
ALBUMIN SERPL ELPH-MCNC: 2.5 G/DL — LOW (ref 3.3–5)
ALP SERPL-CCNC: 214 U/L — HIGH (ref 40–120)
ALT FLD-CCNC: 23 U/L — SIGNIFICANT CHANGE UP (ref 10–45)
ANION GAP SERPL CALC-SCNC: 16 MMOL/L — SIGNIFICANT CHANGE UP (ref 5–17)
AST SERPL-CCNC: 13 U/L — SIGNIFICANT CHANGE UP (ref 10–40)
BASOPHILS # BLD AUTO: 0.02 K/UL — SIGNIFICANT CHANGE UP (ref 0–0.2)
BASOPHILS # BLD AUTO: 0.05 K/UL — SIGNIFICANT CHANGE UP (ref 0–0.2)
BASOPHILS NFR BLD AUTO: 0.1 % — SIGNIFICANT CHANGE UP (ref 0–2)
BASOPHILS NFR BLD AUTO: 0.5 % — SIGNIFICANT CHANGE UP (ref 0–2)
BILIRUB SERPL-MCNC: 0.3 MG/DL — SIGNIFICANT CHANGE UP (ref 0.2–1.2)
BUN SERPL-MCNC: 49 MG/DL — HIGH (ref 7–23)
CALCIUM SERPL-MCNC: 8 MG/DL — LOW (ref 8.4–10.5)
CHLORIDE SERPL-SCNC: 96 MMOL/L — SIGNIFICANT CHANGE UP (ref 96–108)
CO2 SERPL-SCNC: 16 MMOL/L — LOW (ref 22–31)
CORTIS AM PEAK SERPL-MCNC: 16.9 UG/DL — SIGNIFICANT CHANGE UP (ref 6–18.4)
CREAT SERPL-MCNC: 5.8 MG/DL — HIGH (ref 0.5–1.3)
EOSINOPHIL # BLD AUTO: 0.1 K/UL — SIGNIFICANT CHANGE UP (ref 0–0.5)
EOSINOPHIL # BLD AUTO: 0.31 K/UL — SIGNIFICANT CHANGE UP (ref 0–0.5)
EOSINOPHIL NFR BLD AUTO: 0.5 % — SIGNIFICANT CHANGE UP (ref 0–6)
EOSINOPHIL NFR BLD AUTO: 3.1 % — SIGNIFICANT CHANGE UP (ref 0–6)
EOSINOPHIL NFR URNS MANUAL: NEGATIVE — SIGNIFICANT CHANGE UP
GLUCOSE BLDC GLUCOMTR-MCNC: 139 MG/DL — HIGH (ref 70–99)
GLUCOSE BLDC GLUCOMTR-MCNC: 201 MG/DL — HIGH (ref 70–99)
GLUCOSE BLDC GLUCOMTR-MCNC: 252 MG/DL — HIGH (ref 70–99)
GLUCOSE BLDC GLUCOMTR-MCNC: 259 MG/DL — HIGH (ref 70–99)
GLUCOSE SERPL-MCNC: 118 MG/DL — HIGH (ref 70–99)
HAV IGM SER-ACNC: SIGNIFICANT CHANGE UP
HBV CORE IGM SER-ACNC: SIGNIFICANT CHANGE UP
HBV SURFACE AB SER-ACNC: SIGNIFICANT CHANGE UP
HBV SURFACE AG SER-ACNC: SIGNIFICANT CHANGE UP
HCT VFR BLD CALC: 25.3 % — LOW (ref 34.5–45)
HCV AB S/CO SERPL IA: 0.48 S/CO — SIGNIFICANT CHANGE UP
HCV AB SERPL-IMP: SIGNIFICANT CHANGE UP
HGB BLD-MCNC: 7.5 G/DL — LOW (ref 11.5–15.5)
IMM GRANULOCYTES NFR BLD AUTO: 0.2 % — SIGNIFICANT CHANGE UP (ref 0–1.5)
IMM GRANULOCYTES NFR BLD AUTO: 1 % — SIGNIFICANT CHANGE UP (ref 0–1.5)
LYMPHOCYTES # BLD AUTO: 1.12 K/UL — SIGNIFICANT CHANGE UP (ref 1–3.3)
LYMPHOCYTES # BLD AUTO: 1.24 K/UL — SIGNIFICANT CHANGE UP (ref 1–3.3)
LYMPHOCYTES # BLD AUTO: 12.3 % — LOW (ref 13–44)
LYMPHOCYTES # BLD AUTO: 5.6 % — LOW (ref 13–44)
MAGNESIUM SERPL-MCNC: 2.6 MG/DL — SIGNIFICANT CHANGE UP (ref 1.6–2.6)
MCHC RBC-ENTMCNC: 22.3 PG — LOW (ref 27–34)
MCHC RBC-ENTMCNC: 29.6 GM/DL — LOW (ref 32–36)
MCV RBC AUTO: 75.3 FL — LOW (ref 80–100)
MONOCYTES # BLD AUTO: 0.99 K/UL — HIGH (ref 0–0.9)
MONOCYTES # BLD AUTO: 1.02 K/UL — HIGH (ref 0–0.9)
MONOCYTES NFR BLD AUTO: 5.1 % — SIGNIFICANT CHANGE UP (ref 2–14)
MONOCYTES NFR BLD AUTO: 9.8 % — SIGNIFICANT CHANGE UP (ref 2–14)
NEUTROPHILS # BLD AUTO: 17.81 K/UL — HIGH (ref 1.8–7.4)
NEUTROPHILS # BLD AUTO: 7.37 K/UL — SIGNIFICANT CHANGE UP (ref 1.8–7.4)
NEUTROPHILS NFR BLD AUTO: 73.3 % — SIGNIFICANT CHANGE UP (ref 43–77)
NEUTROPHILS NFR BLD AUTO: 88.5 % — HIGH (ref 43–77)
OB PNL STL: NEGATIVE — SIGNIFICANT CHANGE UP
OSMOLALITY SERPL: 285 MOS/KG — SIGNIFICANT CHANGE UP (ref 275–300)
OSMOLALITY SERPL: 289 MOS/KG — SIGNIFICANT CHANGE UP (ref 275–300)
OSMOLALITY UR: 238 MOS/KG — SIGNIFICANT CHANGE UP (ref 50–1200)
PHOSPHATE SERPL-MCNC: 5.5 MG/DL — HIGH (ref 2.5–4.5)
PLATELET # BLD AUTO: 522 K/UL — HIGH (ref 150–400)
POTASSIUM SERPL-MCNC: 4.6 MMOL/L — SIGNIFICANT CHANGE UP (ref 3.5–5.3)
POTASSIUM SERPL-SCNC: 4.6 MMOL/L — SIGNIFICANT CHANGE UP (ref 3.5–5.3)
PROT SERPL-MCNC: 6.1 G/DL — SIGNIFICANT CHANGE UP (ref 6–8.3)
RBC # BLD: 3.36 M/UL — LOW (ref 3.8–5.2)
RBC # FLD: 21.5 % — HIGH (ref 10.3–14.5)
SODIUM SERPL-SCNC: 128 MMOL/L — LOW (ref 135–145)
T4 FREE SERPL-MCNC: 0.7 NG/DL — LOW (ref 0.9–1.8)
TSH SERPL-MCNC: 1.21 UIU/ML — SIGNIFICANT CHANGE UP (ref 0.27–4.2)
URATE SERPL-MCNC: 11.1 MG/DL — HIGH (ref 2.5–7)
UUN UR-MCNC: 107 MG/DL — SIGNIFICANT CHANGE UP
WBC # BLD: 10.06 K/UL — SIGNIFICANT CHANGE UP (ref 3.8–10.5)
WBC # FLD AUTO: 10.06 K/UL — SIGNIFICANT CHANGE UP (ref 3.8–10.5)

## 2018-05-15 PROCEDURE — 76775 US EXAM ABDO BACK WALL LIM: CPT | Mod: 26

## 2018-05-15 RX ORDER — SODIUM BICARBONATE 1 MEQ/ML
0.07 SYRINGE (ML) INTRAVENOUS
Qty: 75 | Refills: 0 | Status: DISCONTINUED | OUTPATIENT
Start: 2018-05-15 | End: 2018-05-16

## 2018-05-15 RX ADMIN — ATORVASTATIN CALCIUM 40 MILLIGRAM(S): 80 TABLET, FILM COATED ORAL at 21:35

## 2018-05-15 RX ADMIN — Medication 6: at 14:14

## 2018-05-15 RX ADMIN — INSULIN GLARGINE 34 UNIT(S): 100 INJECTION, SOLUTION SUBCUTANEOUS at 21:35

## 2018-05-15 RX ADMIN — Medication 4: at 18:21

## 2018-05-15 RX ADMIN — Medication 12.5 MILLIGRAM(S): at 18:22

## 2018-05-15 RX ADMIN — GABAPENTIN 300 MILLIGRAM(S): 400 CAPSULE ORAL at 14:06

## 2018-05-15 RX ADMIN — Medication 75 MEQ/KG/HR: at 16:29

## 2018-05-15 RX ADMIN — Medication 12.5 MILLIGRAM(S): at 06:13

## 2018-05-15 RX ADMIN — PANTOPRAZOLE SODIUM 40 MILLIGRAM(S): 20 TABLET, DELAYED RELEASE ORAL at 06:13

## 2018-05-15 RX ADMIN — CLOPIDOGREL BISULFATE 75 MILLIGRAM(S): 75 TABLET, FILM COATED ORAL at 14:06

## 2018-05-15 RX ADMIN — SODIUM CHLORIDE 100 MILLILITER(S): 9 INJECTION INTRAMUSCULAR; INTRAVENOUS; SUBCUTANEOUS at 06:13

## 2018-05-15 RX ADMIN — Medication 1: at 21:35

## 2018-05-15 RX ADMIN — Medication 100 MILLIGRAM(S): at 14:06

## 2018-05-15 RX ADMIN — ERTAPENEM SODIUM 100 MILLIGRAM(S): 1 INJECTION, POWDER, LYOPHILIZED, FOR SOLUTION INTRAMUSCULAR; INTRAVENOUS at 22:38

## 2018-05-15 RX ADMIN — Medication 75 MEQ/KG/HR: at 21:35

## 2018-05-15 RX ADMIN — PANTOPRAZOLE SODIUM 40 MILLIGRAM(S): 20 TABLET, DELAYED RELEASE ORAL at 18:25

## 2018-05-15 NOTE — DIETITIAN INITIAL EVALUATION ADULT. - PERTINENT LABORATORY DATA
5/15: Na: 128, BUN: 49, Cr: 5.80, Glucose: 118, eGFR: 7, Phos: 5.5%. HGA1c: 11.8%. Fingersticks: 259, 139, 178.

## 2018-05-15 NOTE — PROGRESS NOTE ADULT - SUBJECTIVE AND OBJECTIVE BOX
Interval Events: Pt admits to lower abdominal pain, denies N/V/D/C, BRBPR/melena.       MEDICATIONS  (STANDING):  atorvastatin 40 milliGRAM(s) Oral at bedtime  clopidogrel Tablet 75 milliGRAM(s) Oral daily  dextrose 5%. 1000 milliLiter(s) (50 mL/Hr) IV Continuous <Continuous>  dextrose 50% Injectable 12.5 Gram(s) IV Push once  dextrose 50% Injectable 25 Gram(s) IV Push once  dextrose 50% Injectable 25 Gram(s) IV Push once  docusate sodium 100 milliGRAM(s) Oral three times a day  ertapenem  IVPB 500 milliGRAM(s) IV Intermittent every 24 hours  gabapentin 300 milliGRAM(s) Oral daily  insulin glargine Injectable (LANTUS) 34 Unit(s) SubCutaneous at bedtime  insulin lispro (HumaLOG) corrective regimen sliding scale   SubCutaneous three times a day before meals  insulin lispro (HumaLOG) corrective regimen sliding scale   SubCutaneous at bedtime  metoprolol tartrate 12.5 milliGRAM(s) Oral two times a day  pantoprazole    Tablet 40 milliGRAM(s) Oral two times a day  polyethylene glycol 3350 17 Gram(s) Oral two times a day  senna 2 Tablet(s) Oral at bedtime  sodium chloride 0.9%. 1000 milliLiter(s) (100 mL/Hr) IV Continuous <Continuous>    MEDICATIONS  (PRN):  acetaminophen   Tablet. 650 milliGRAM(s) Oral every 6 hours PRN Mild Pain (1 - 3)  dextrose 40% Gel 15 Gram(s) Oral once PRN Blood Glucose LESS THAN 70 milliGRAM(s)/deciliter  glucagon  Injectable 1 milliGRAM(s) IntraMuscular once PRN Glucose LESS THAN 70 milligrams/deciliter  morphine  - Injectable 2 milliGRAM(s) IV Push every 6 hours PRN Moderate to Severe pain  senna 2 Tablet(s) Oral at bedtime PRN Constipation      Allergies    Carrots (Rash)  No Known Drug Allergies    Intolerances        Review of Systems:    General:  No wt loss, fevers, chills, night sweats,fatigue,   Eyes:  Good vision, no reported pain  ENT:  No sore throat, pain, runny nose, dysphagia  CV:  No pain, palpitations, hypo/hypertension  Resp:  No dyspnea, cough, tachypnea, wheezing  GI:  lower abdominal pain, No nausea, No vomiting, No diarrhea, No constipation, No weight loss, No fever, No pruritis, No rectal bleeding, No melena, No dysphagia  :  No pain, bleeding, incontinence, nocturia  Muscle:  No pain, weakness  Neuro:  No weakness, tingling, memory problems  Psych:  No fatigue, insomnia, mood problems, depression  Endocrine:  No polyuria, polydypsia, cold/heat intolerance  Heme:  No petechiae, ecchymosis, easy bruisability  Skin:  No rash, tattoos, scars, edema      Vital Signs Last 24 Hrs  T(C): 36.3 (15 May 2018 11:17), Max: 36.8 (14 May 2018 21:03)  T(F): 97.3 (15 May 2018 11:17), Max: 98.2 (14 May 2018 21:03)  HR: 62 (15 May 2018 11:17) (58 - 66)  BP: 135/66 (15 May 2018 11:17) (112/68 - 135/66)  BP(mean): --  RR: 16 (15 May 2018 11:17) (16 - 18)  SpO2: 99% (15 May 2018 11:17) (97% - 99%)    PHYSICAL EXAM:    Constitutional: NAD, well-developed  HEENT: EOMI, throat clear  Neck: No LAD, supple  Respiratory: CTA and P  Cardiovascular: S1 and S2, RRR, no M  Gastrointestinal: BS+, soft, NT/ND, neg HSM,  Extremities: No peripheral edema, neg clubing, cyanosis  Vascular: 2+ peripheral pulses  Neurological: A/O x 3, no focal deficits  Psychiatric: Normal mood, normal affect  Skin: No rashes      LABS:                        7.3    20.88 )-----------( 520      ( 14 May 2018 07:08 )             24.4     05-15    128<L>  |  96  |  49<H>  ----------------------------<  118<H>  4.6   |  16<L>  |  5.80<H>    Ca    8.0<L>      15 May 2018 06:36  Phos  5.5     05-15  Mg     2.6     05-15    TPro  6.1  /  Alb  2.5<L>  /  TBili  0.3  /  DBili  x   /  AST  13  /  ALT  23  /  AlkPhos  214<H>  05-15      Urinalysis Basic - ( 14 May 2018 12:46 )    Color: Yellow / Appearance: Turbid / S.018 / pH: x  Gluc: x / Ketone: Negative  / Bili: Negative / Urobili: Negative   Blood: x / Protein: 300 mg/dL / Nitrite: Negative   Leuk Esterase: Large / RBC: 25-50 /HPF / WBC >50 /HPF   Sq Epi: x / Non Sq Epi: x / Bacteria: x        RADIOLOGY & ADDITIONAL TESTS:

## 2018-05-15 NOTE — PHYSICAL THERAPY INITIAL EVALUATION ADULT - DISCHARGE DISPOSITION, PT EVAL
home/home w/ assist/home w/ home PT/Home with Home PT for strengthening, bed mob, transfer, gait and balance training

## 2018-05-15 NOTE — PHYSICAL THERAPY INITIAL EVALUATION ADULT - PERTINENT HX OF CURRENT PROBLEM, REHAB EVAL
78yoF with CAD, A-fib, DM, Anemia, p/w fever, anemia, and Pyelonephritis, recent admission to [facility] for anemia, CXR (-), 5/12 ECG, ST/T wave abnormality,

## 2018-05-15 NOTE — DIETITIAN INITIAL EVALUATION ADULT. - ENERGY NEEDS
ht: 60 inches. wt: 176.3 pounds (current, standing, no edema noted). 164 pounds admit wt 5/10. BMI: 34.4 kG/m2. UBW: IBW: 100 pounds +/- 10%. %IBW: 176%  Other pertinent objective information: 78 year old female pt with PMH CAD s/p MICHAEL x2, AFib not on A/C 2/2 GI bleed, T2DM uncontrolled with neuropathy, anemia, p/w crampy lower back pain ~5 days. Found to have symptomatic anemia, fever 2/2 to Pyelonephritis & Enterobacter bacteremia, uncontrolled T2DM, KOJO. No pressure ulcers noted. ht: 60 inches. wt: 176.3 pounds (current, standing, no edema noted). 164 pounds admit wt 5/10. BMI: 34.4 kG/m2. UBW: unknown per pt. IBW: 100 pounds +/- 10%. %IBW: 176%  Other pertinent objective information: 78 year old female pt with PMH CAD s/p MICHAEL x2, AFib not on A/C 2/2 GI bleed, T2DM uncontrolled with neuropathy, anemia, p/w crampy lower back pain ~5 days. Found to have symptomatic anemia, fever 2/2 to pyelonephritis & enterobacter bacteremia, uncontrolled T2DM, KOJO. No pressure ulcers noted.

## 2018-05-15 NOTE — PROGRESS NOTE ADULT - SUBJECTIVE AND OBJECTIVE BOX
Infectious Diseases progress note:    Subjective:  Feels weak in general, still c/o low back pain and neuropathy in legs.  Afebrile.  No chills/rigors/HA/abd pain/diarrhea    ROS:  CONSTITUTIONAL:  No fever, chills, rigors  CARDIOVASCULAR:  No chest pain or palpitations  RESPIRATORY:   No SOB, cough, dyspnea on exertion.  No wheezing  GASTROINTESTINAL:  No abd pain, N/V, diarrhea/constipation  EXTREMITIES:  No swelling or joint pain  GENITOURINARY:  No burning on urination, increased frequency or urgency.  No flank pain  NEUROLOGIC:  No HA, visual disturbances  SKIN: No rashes    Allergies    Carrots (Rash)  No Known Drug Allergies    Intolerances        ANTIBIOTICS/RELEVANT:  antimicrobials  ertapenem  IVPB 500 milliGRAM(s) IV Intermittent every 24 hours    immunologic:    OTHER:  acetaminophen   Tablet. 650 milliGRAM(s) Oral every 6 hours PRN  atorvastatin 40 milliGRAM(s) Oral at bedtime  clopidogrel Tablet 75 milliGRAM(s) Oral daily  dextrose 40% Gel 15 Gram(s) Oral once PRN  dextrose 5%. 1000 milliLiter(s) IV Continuous <Continuous>  dextrose 50% Injectable 12.5 Gram(s) IV Push once  dextrose 50% Injectable 25 Gram(s) IV Push once  dextrose 50% Injectable 25 Gram(s) IV Push once  docusate sodium 100 milliGRAM(s) Oral three times a day  gabapentin 300 milliGRAM(s) Oral daily  glucagon  Injectable 1 milliGRAM(s) IntraMuscular once PRN  insulin glargine Injectable (LANTUS) 34 Unit(s) SubCutaneous at bedtime  insulin lispro (HumaLOG) corrective regimen sliding scale   SubCutaneous three times a day before meals  insulin lispro (HumaLOG) corrective regimen sliding scale   SubCutaneous at bedtime  metoprolol tartrate 12.5 milliGRAM(s) Oral two times a day  morphine  - Injectable 2 milliGRAM(s) IV Push every 6 hours PRN  pantoprazole    Tablet 40 milliGRAM(s) Oral two times a day  polyethylene glycol 3350 17 Gram(s) Oral two times a day  senna 2 Tablet(s) Oral at bedtime  senna 2 Tablet(s) Oral at bedtime PRN  sodium bicarbonate  Infusion 0.075 mEq/kG/Hr IV Continuous <Continuous>      Objective:  Vital Signs Last 24 Hrs  T(C): 36.4 (15 May 2018 12:47), Max: 36.8 (14 May 2018 21:03)  T(F): 97.5 (15 May 2018 12:47), Max: 98.2 (14 May 2018 21:03)  HR: 61 (15 May 2018 12:47) (58 - 64)  BP: 144/73 (15 May 2018 12:47) (112/68 - 144/73)  BP(mean): --  RR: 17 (15 May 2018 12:47) (16 - 18)  SpO2: 99% (15 May 2018 12:47) (97% - 99%)    PHYSICAL EXAM:  Constitutional:NAD  Eyes:EDIN, EOMI  Ear/Nose/Throat: no thrush, mucositis.  Moist mucous membranes	  Neck:no JVD, no lymphadenopathy, supple  Respiratory: CTA carlos manuel  Cardiovascular: S1S2 RRR, no murmurs  Gastrointestinal:soft, nontender,  nondistended (+) BS  Extremities:no e/e/c  Skin:  no rashes, open wounds or ulcerations  :  alba draining clear yellow urine        LABS:                        7.5    10.06 )-----------( 522      ( 15 May 2018 07:38 )             25.3     05-15    128<L>  |  96  |  49<H>  ----------------------------<  118<H>  4.6   |  16<L>  |  5.80<H>    Ca    8.0<L>      15 May 2018 06:36  Phos  5.5     05-15  Mg     2.6     05-15    TPro  6.1  /  Alb  2.5<L>  /  TBili  0.3  /  DBili  x   /  AST  13  /  ALT  23  /  AlkPhos  214<H>  05-15      Urinalysis Basic - ( 14 May 2018 12:46 )    Color: Yellow / Appearance: Turbid / S.018 / pH: x  Gluc: x / Ketone: Negative  / Bili: Negative / Urobili: Negative   Blood: x / Protein: 300 mg/dL / Nitrite: Negative   Leuk Esterase: Large / RBC: 25-50 /HPF / WBC >50 /HPF   Sq Epi: x / Non Sq Epi: x / Bacteria: x            MICROBIOLOGY:    Culture - Blood (18 @ 16:49)    Specimen Source: .Blood Blood-Peripheral    Culture Results:   No growth to date.    Culture - Blood (18 @ 16:42)    Specimen Source: .Blood Blood-Peripheral    Culture Results:   No growth to date.    Culture - Blood (18 @ 22:45)    Gram Stain:   Growth in anaerobic bottle: Gram Negative Rods  Growth in aerobic bottle: Gram Negative Rods    -  Amikacin: S <=8    -  Ampicillin: R >16 These ampicillin results predict results for amoxicillin    -  Multidrug (KPC pos) resistant organism: Nondet    -  Staphylococcus aureus: Nondet    -  Methicillin resistant Staphylococcus aureus (MRSA): Nondet    -  Coagulase negative Staphylococcus: Nondet    -  Enterococcus species: Nondet    -  Vancomycin resistant Enterococcus sp.: Nondet    -  Escherichia coli: Nondet    -  Klebsiella oxytoca: Nondet    -  Klebsiella pneumoniae: Nondet    -  Serratia marcescens: Nondet    -  Haemophilus influenzae: Nondet    -  Listeria monocytogenes: Nondet    -  Neisseria meningitidis: Nondet    -  Pseudomonas aeruginosa: Nondet    -  Acinetobacter baumanii: Nondet    -  Enterobacter cloacae complex: Nondet    -  Streptococcus sp. (Not Grp A, B or S pneumoniae): Nondet    -  Streptococcus agalactiae (Group B): Nondet    -  Streptococcus pyogenes (Group A): Nondet    -  Streptococcus pneumoniae: Nondet    -  Candida albicans: Nondet    -  Candida glabrata: Nondet    -  Candida krusei: Nondet    -  Candida parapsilosis: Nondet    -  Candida tropicalis: Nondet    -  Ampicillin/Sulbactam: R 8/4    -  Aztreonam: S <=4    -  Cefazolin: R >16    -  Cefepime: S <=2    -  Cefoxitin: R >16    -  Ceftriaxone: S <=1 Enterobacter, Citrobacter, and Serratia may develop resistance during prolonged therapy    -  Ciprofloxacin: S <=0.5    -  Ertapenem: S <=0.5    -  Gentamicin: S <=1    -  Imipenem: S <=1    -  Levofloxacin: S <=1    -  Meropenem: S <=1    -  Piperacillin/Tazobactam: S <=8    -  Tobramycin: S <=2    -  Trimethoprim/Sulfamethoxazole: S <=0.5/9.5    Specimen Source: .Blood Blood-Peripheral    Organism: Blood Culture PCR    Organism: Enterobacter aerogenes    Culture Results:   Growth in aerobic and anaerobic bottles: Enterobacter aerogenes  "Due to technical problems, Proteus sp. will Not be reported as part of  the BCID panel until further notice"  ***Blood Panel PCR results on this specimen are available  approximately3 hours after the Gram stain result.***  Gram stain, PCR, and/or culture results may not always  correspond due to difference in methodologies.  ************************************************************  This PCR assay was performed using [employer].  The following targets are tested for: Enterococcus,  vancomycin resistant enterococci, Listeria monocytogenes,  coagulase negative staphylococci, S. aureus,  methicillin resistant S. aureus, Streptococcus agalactiae  (Group B), S. pneumoniae, S. pyogenes (Group A),  Acinetobacter baumannii, Enterobacter cloacae, E. coli,  Klebsiella oxytoca, K. pneumoniae, Proteus sp.,  Serratia marcescens, Haemophilus influenzae,  Neisseria meningitidis, Pseudomonas aeruginosa, Candida  albicans, C. glabrata, C krusei, C parapsilosis,  C. tropicalis and the KPC resistance gene.    Organism Identification: Blood Culture PCR  Enterobacter aerogenes    Method Type: PCR    Method Type: CAITLYN    Culture - Urine (18 @ 19:49)    -  Ampicillin: R 16 These ampicillin results predict results for amoxicillin    -  Amikacin: S <=8    -  Amoxicillin/Clavulanic Acid: R 16/8    -  Trimethoprim/Sulfamethoxazole: S <=0.5/9.5    -  Tobramycin: S <=2    -  Piperacillin/Tazobactam: S <=8    -  Tigecycline: S <=1    -  Meropenem: S <=1    -  Nitrofurantoin: S <=32 Should not be used to treat pyelonephritis    -  Levofloxacin: S <=1    -  Imipenem: S <=1    -  Gentamicin: S <=1    -  Ertapenem: S <=0.5    -  Ciprofloxacin: S <=0.5    -  Ceftriaxone: S <=1 Enterobacter, Citrobacter, and Serratia may develop resistance during prolonged therapy    -  Cefoxitin: R >16    -  Cefepime: S <=2    -  Cefazolin: R >16 This predicts results for oral agents cefaclor, cefdinir, cefpodoxime, cefprozil, cefuroxime axetil, cephalexin and locarbef for uncomplicated UTI. Note that some isolates may be susceptible to these agents while testing resistant to cefazolin.    -  Aztreonam: S <=4    -  Ampicillin/Sulbactam: R <=4/2    Specimen Source: .Urine Clean Catch (Midstream)    Culture Results:   >100,000 CFU/ml Enterobacter aerogenes    Organism Identification: Enterobacter aerogenes    Organism: Enterobacter aerogenes    Method Type: CAITLYN          RADIOLOGY & ADDITIONAL STUDIES:    < from: US Renal (05.15.18 @ 11:36) >    IMPRESSION:     Mild heterogeneous echotexture to the kidneys bilaterally. No   hydronephrosis.    Partially collapsed with a Alba catheter.    < end of copied text >

## 2018-05-15 NOTE — DIETITIAN INITIAL EVALUATION ADULT. - NS AS NUTRI INTERV ED CONTENT
Pt refused diet education at this time in setting of agitation. Emotional support provided, SW aware and states she will see patient to assist. Follow-up as pt amenable with T2DM diet/management education, heart healthy education, wt loss education.

## 2018-05-15 NOTE — PROGRESS NOTE ADULT - SUBJECTIVE AND OBJECTIVE BOX
Patient is a 78y old  Female who presents with a chief complaint of bilateral lower back pain (11 May 2018 21:29)      SUBJECTIVE / OVERNIGHT EVENTS:  Worried about her kidneys  Denies CP/SOB/Palpitation/HA.    MEDICATIONS  (STANDING):  atorvastatin 40 milliGRAM(s) Oral at bedtime  clopidogrel Tablet 75 milliGRAM(s) Oral daily  dextrose 5%. 1000 milliLiter(s) (50 mL/Hr) IV Continuous <Continuous>  dextrose 50% Injectable 12.5 Gram(s) IV Push once  dextrose 50% Injectable 25 Gram(s) IV Push once  dextrose 50% Injectable 25 Gram(s) IV Push once  docusate sodium 100 milliGRAM(s) Oral three times a day  ertapenem  IVPB 500 milliGRAM(s) IV Intermittent every 24 hours  gabapentin 300 milliGRAM(s) Oral daily  insulin glargine Injectable (LANTUS) 34 Unit(s) SubCutaneous at bedtime  insulin lispro (HumaLOG) corrective regimen sliding scale   SubCutaneous three times a day before meals  insulin lispro (HumaLOG) corrective regimen sliding scale   SubCutaneous at bedtime  metoprolol tartrate 12.5 milliGRAM(s) Oral two times a day  pantoprazole    Tablet 40 milliGRAM(s) Oral two times a day  polyethylene glycol 3350 17 Gram(s) Oral two times a day  senna 2 Tablet(s) Oral at bedtime  sodium bicarbonate  Infusion 0.075 mEq/kG/Hr (75 mL/Hr) IV Continuous <Continuous>    MEDICATIONS  (PRN):  acetaminophen   Tablet. 650 milliGRAM(s) Oral every 6 hours PRN Mild Pain (1 - 3)  dextrose 40% Gel 15 Gram(s) Oral once PRN Blood Glucose LESS THAN 70 milliGRAM(s)/deciliter  glucagon  Injectable 1 milliGRAM(s) IntraMuscular once PRN Glucose LESS THAN 70 milligrams/deciliter  morphine  - Injectable 2 milliGRAM(s) IV Push every 6 hours PRN Moderate to Severe pain  senna 2 Tablet(s) Oral at bedtime PRN Constipation        CAPILLARY BLOOD GLUCOSE      POCT Blood Glucose.: 252 mg/dL (15 May 2018 21:31)  POCT Blood Glucose.: 201 mg/dL (15 May 2018 17:49)  POCT Blood Glucose.: 259 mg/dL (15 May 2018 14:10)  POCT Blood Glucose.: 139 mg/dL (15 May 2018 07:20)    I&O's Summary    14 May 2018 07:01  -  15 May 2018 07:00  --------------------------------------------------------  IN: 3015 mL / OUT: 45 mL / NET: 2970 mL    15 May 2018 07:01  -  15 May 2018 22:47  --------------------------------------------------------  IN: 1080 mL / OUT: 2050 mL / NET: -970 mL        PHYSICAL EXAM:    NECK: Supple, No JVD  CHEST/LUNG: Clear to auscultation bilaterally; No wheezing.  HEART: Regular rate and rhythm; No murmurs, rubs, or gallops  ABDOMEN: Soft, Nontender, Nondistended; Bowel sounds present  EXTREMITIES:   No clubbing, cyanosis, or edema  NEUROLOGY: AAO   SKIN: No rashes    LABS:                        7.5    10.06 )-----------( 522      ( 15 May 2018 07:38 )             25.3     05-15    128<L>  |  96  |  49<H>  ----------------------------<  118<H>  4.6   |  16<L>  |  5.80<H>    Ca    8.0<L>      15 May 2018 06:36  Phos  5.5     05-15  Mg     2.6     05-15    TPro  6.1  /  Alb  2.5<L>  /  TBili  0.3  /  DBili  x   /  AST  13  /  ALT  23  /  AlkPhos  214<H>  -15          Urinalysis Basic - ( 14 May 2018 12:46 )    Color: Yellow / Appearance: Turbid / S.018 / pH: x  Gluc: x / Ketone: Negative  / Bili: Negative / Urobili: Negative   Blood: x / Protein: 300 mg/dL / Nitrite: Negative   Leuk Esterase: Large / RBC: 25-50 /HPF / WBC >50 /HPF   Sq Epi: x / Non Sq Epi: x / Bacteria: x      CAPILLARY BLOOD GLUCOSE      POCT Blood Glucose.: 252 mg/dL (15 May 2018 21:31)  POCT Blood Glucose.: 201 mg/dL (15 May 2018 17:49)  POCT Blood Glucose.: 259 mg/dL (15 May 2018 14:10)  POCT Blood Glucose.: 139 mg/dL (15 May 2018 07:20)     @ 16:49  Culture-urine --  Culture results   No growth to date.  method type --  Organism --  Organism Identification --  Specimen source .Blood Blood-Peripheral   @ 16:42  Culture-urine --  Culture results   No growth to date.  method type --  Organism --  Organism Identification --  Specimen source .Blood Blood-[facility]   @ 22:45  Culture-urine --  Culture results   Growth in aerobic and anaerobic bottles: Enterobacter aerogenes  See previous culture [id number]  method type PCR  Organism Blood Culture PCR  Organism Identification Blood Culture PCR  Enterobacter aerogenes  Specimen source .Blood Blood-[facility]   @ 19:49  Culture-urine --  Culture results   >100,000 CFU/ml Enterobacter aerogenes  method type CAITLYN  Organism Enterobacter aerogenes  Organism Identification Enterobacter aerogenes  Specimen source .Urine Clean Catch (Midstream)            @ 16:49  Culture blood --  Culture results   No growth to date.  Gram stain --  Gram stain blood --  Method type --  Organism --  Organism identification --  Specimen source .Blood Blood-Peripheral    @ 16:42  Culture blood --  Culture results   No growth to date.  Gram stain --  Gram stain blood --  Method type --  Organism --  Organism identification --  Specimen source .Blood Blood-[facility]    @ 22:45  Culture blood --  Culture results   Growth in aerobic and anaerobic bottles: Enterobacter aerogenes  See previous culture [id number]  Gram stain   Growth in anaerobic bottle: Gram Negative Rods  Growth in aerobic bottle: Gram Negative Rods  Gram stain blood --  Method type PCR  Organism Blood Culture PCR  Organism identification Blood Culture PCR  Enterobacter aerogenes  Specimen source .Blood Blood-Peripheral    @ 19:49  Culture blood --  Culture results   >100,000 CFU/ml Enterobacter aerogenes  Gram stain --  Gram stain blood --  Method type CAITLYN  Organism Enterobacter aerogenes  Organism identification Enterobacter aerogenes  Specimen source .Urine Clean Catch (Midstream)      RADIOLOGY & ADDITIONAL TESTS:    Imaging Personally Reviewed:    Consultant(s) Notes Reviewed:      Care Discussed with Consultants/Other Providers:

## 2018-05-15 NOTE — DIETITIAN INITIAL EVALUATION ADULT. - ADHERENCE
Pt states she does not eat sweets to help control her blood sugar. however admits to regular intake of cranberry juice, rice suspect in large quantity as pt consumes one large meal/day. Also exhibits knowledge deficit; when asked what foods can spike her blood sugar pt states "spinach."

## 2018-05-15 NOTE — PHYSICAL THERAPY INITIAL EVALUATION ADULT - ADDITIONAL COMMENTS
as per pt, resides in a PH with grand-children, 3 Steps to enter with railings, stays on main floor, PTA, pt amb (I), use of SC occasionally, (I) with ADLs.

## 2018-05-15 NOTE — DIETITIAN INITIAL EVALUATION ADULT. - OTHER INFO
Pt seen for consult: HgA1c: 11.8%. DM medications PTA: Lantus, Metformin. Pt seen for consult: HgA1c: 11.8%. Pt currently agitated therefore limited interview conducted. Pt reports poor appetite only wants "tea and bread." Denies N+V or constipation, admits to large loose BM yesterday following bowel regimen. Pt unsure of UBW or typical weight; states she does not have a scale at home to weight herself. In terms of DM control pt reports non adherence to home DM medication regimen, states she stopped Metformin on her own 2/2 "kidney issues," states she would still take the Lantus. Pt states she checks her fingersticks at home (morning, evening) but is unable to recall results. States it is often always "high in 400s," denies hypoglycemia hx. Pt states she is able to walk around at home and do some exercise in that sense but otherwise is fairly sedentary. Pt not amenable to diet education at this time as she appears agitated, stating "when you get me bread I'll chat with you."

## 2018-05-15 NOTE — DIETITIAN INITIAL EVALUATION ADULT. - NS AS NUTRI DX KNOWLEDGE BELIEFS
Spontaneous, unlabored and symmetrical
Limited adherence to nutrition - related recommendations/(suspected)

## 2018-05-15 NOTE — DIETITIAN INITIAL EVALUATION ADULT. - PROBLEM SELECTOR PLAN 1
Patient meeting sepsis criteria in setting of fever of 101.7 and elevated leukocytosis  Patient denies history of UTIs  F/U Urine Culture and Blood cultures  S/P Zosyn in ED. Will continue for now. Deescalate once speciated  Consider ID consult in AM

## 2018-05-15 NOTE — DIETITIAN INITIAL EVALUATION ADULT. - PROBLEM SELECTOR PLAN 5
May be in setting of low PO intake. Patient with no neurological deficits  S/P 1L NS in ED  Continue maintenance IV fluids  Trend BMP q4-6h

## 2018-05-15 NOTE — DIETITIAN INITIAL EVALUATION ADULT. - PT NOT SOURCE
Endorses carrot allergy. Pt agitated at this time: states her roommate is very noisy and she has a headache. Just wants bread and tea, refuses to continue interview until she gets bread. Called kitchen to send up bread but can take up to 45 min for delivery. Therefore limited interview conducted./other (specify)

## 2018-05-15 NOTE — PHYSICAL THERAPY INITIAL EVALUATION ADULT - PLANNED THERAPY INTERVENTIONS, PT EVAL
gait training/bed mobility training/stairs: GOAL: patient will be able to negotiated 3 stairs (I) with one HR in 2 weeks/transfer training

## 2018-05-15 NOTE — DIETITIAN INITIAL EVALUATION ADULT. - PROBLEM SELECTOR PLAN 6
Per patient was advised by cardiologist to hold on Aspirin and to continue with Plavix  Will continue with Plavix  Primary day team to obtain collateral with Cardiologist

## 2018-05-15 NOTE — DIETITIAN INITIAL EVALUATION ADULT. - ORAL INTAKE PTA
fair/Typical meal intake: Breakfast: skips, may just have tea. Lunch: Largest meal of the day rice with beef or chicken and vegetables. Dinner: Soup and bread. Pt's granddaughters are primarily responsible for meal prep. Endorses carrot allergy. Vitamin/mineral/herbal supplements PTA: Iron.

## 2018-05-15 NOTE — PROGRESS NOTE ADULT - SUBJECTIVE AND OBJECTIVE BOX
Patient seen and examined  has some pain around alba insertion  has urineoutput in alba    Carrots (Rash)  No Known Drug Allergies    Hospital Medications:   MEDICATIONS  (STANDING):  atorvastatin 40 milliGRAM(s) Oral at bedtime  clopidogrel Tablet 75 milliGRAM(s) Oral daily  dextrose 5%. 1000 milliLiter(s) (50 mL/Hr) IV Continuous <Continuous>  dextrose 50% Injectable 12.5 Gram(s) IV Push once  dextrose 50% Injectable 25 Gram(s) IV Push once  dextrose 50% Injectable 25 Gram(s) IV Push once  docusate sodium 100 milliGRAM(s) Oral three times a day  ertapenem  IVPB 500 milliGRAM(s) IV Intermittent every 24 hours  gabapentin 300 milliGRAM(s) Oral daily  insulin glargine Injectable (LANTUS) 34 Unit(s) SubCutaneous at bedtime  insulin lispro (HumaLOG) corrective regimen sliding scale   SubCutaneous three times a day before meals  insulin lispro (HumaLOG) corrective regimen sliding scale   SubCutaneous at bedtime  metoprolol tartrate 12.5 milliGRAM(s) Oral two times a day  pantoprazole    Tablet 40 milliGRAM(s) Oral two times a day  polyethylene glycol 3350 17 Gram(s) Oral two times a day  senna 2 Tablet(s) Oral at bedtime  sodium chloride 0.9%. 1000 milliLiter(s) (100 mL/Hr) IV Continuous <Continuous>        VITALS:  T(F): 97.5 (05-15-18 @ 12:47), Max: 98.2 (18 @ 21:03)  HR: 61 (05-15-18 @ 12:47)  BP: 144/73 (05-15-18 @ 12:47)  RR: 17 (05-15-18 @ 12:47)  SpO2: 99% (05-15-18 @ 12:47)  Wt(kg): --     @ 07:01  -  05-15 @ 07:00  --------------------------------------------------------  IN: 3015 mL / OUT: 45 mL / NET: 2970 mL    05-15 @ 07:01  -  05-15 @ 13:56  --------------------------------------------------------  IN: 600 mL / OUT: 1100 mL / NET: -500 mL      Height (cm): 152.4 ( @ 21:18)  PHYSICAL EXAM:  Constitutional: NAD  HEENT: anicteric sclera, oropharynx clear, MMM  Neck: No JVD  Respiratory: CTAB, no wheezes, rales or rhonchi  Cardiovascular: S1, S2, RRR  Gastrointestinal: BS+, soft, NT/ND  Extremities: +peripheral edema  Neurological: A/O x 3, no focal deficits  Psychiatric: Normal mood, normal affect  : +alba    LABS:  05-15    128<L>  |  96  |  49<H>  ----------------------------<  118<H>  4.6   |  16<L>  |  5.80<H>    Ca    8.0<L>      15 May 2018 06:36  Phos  5.5     05-15  Mg     2.6     05-15    TPro  6.1  /  Alb  2.5<L>  /  TBili  0.3  /  DBili      /  AST  13  /  ALT  23  /  AlkPhos  214<H>  05-15    Creatinine Trend: 5.80 <--, 5.93 <--, 5.71 <--, 5.22 <--, 3.80 <--, 1.88 <--, 1.55 <--, 1.54 <--                        7.5    10.06 )-----------( 522      ( 15 May 2018 07:38 )             25.3     Urine Studies:  Urinalysis Basic - ( 14 May 2018 12:46 )    Color: Yellow / Appearance: Turbid / S.018 / pH:   Gluc:  / Ketone: Negative  / Bili: Negative / Urobili: Negative   Blood:  / Protein: 300 mg/dL / Nitrite: Negative   Leuk Esterase: Large / RBC: 25-50 /HPF / WBC >50 /HPF   Sq Epi:  / Non Sq Epi:  / Bacteria:       Osmolality, Random Urine: 238 mos/kg ( @ 23:08)  Sodium, Random Urine: 46 mmol/L ( @ 21:13)  Osmolality, Random Urine: 276 mos/kg ( @ 15:27)  Creatinine, Random Urine: 51 mg/dL ( @ 15:27)  Protein/Creatinine Ratio Calculation: 6.5 Ratio ( @ 15:27)  Chloride, Random Urine: 35 mmol/L ( @ 12:46)  Potassium, Random Urine: 42 mmol/L ( @ 12:46)  Sodium, Random Urine: 53 mmol/L ( @ 12:46)    RADIOLOGY & ADDITIONAL STUDIES:

## 2018-05-15 NOTE — PROGRESS NOTE ADULT - SUBJECTIVE AND OBJECTIVE BOX
Cardiovascular Disease Progress Note    Overnight events: No acute events overnight.  Ms. Tsang is feeling weak. She denies chest pain or SOB.   Otherwise review of systems negative    Objective Findings:  T(C): 36.5 (05-15-18 @ 05:28), Max: 36.8 (05-14-18 @ 21:03)  HR: 62 (05-15-18 @ 06:10) (58 - 66)  BP: 112/68 (05-15-18 @ 05:28) (112/68 - 134/67)  RR: 18 (05-15-18 @ 05:28) (17 - 18)  SpO2: 97% (05-15-18 @ 05:28) (97% - 99%)  Wt(kg): --  Daily Height in cm: 152.4 (14 May 2018 21:18)    Daily       Physical Exam:  Gen: NAD  HEENT: EOMI  CV: RRR, normal S1 + S2, no m/r/g  Lungs: CTAB  Abd: soft, non-tender  Ext: No edema    Telemetry: Sinus; no ectopy    Laboratory Data:                        7.3    20.88 )-----------( 520      ( 14 May 2018 07:08 )             24.4     05-15    128<L>  |  96  |  49<H>  ----------------------------<  118<H>  4.6   |  16<L>  |  5.80<H>    Ca    8.0<L>      15 May 2018 06:36  Phos  5.5     05-15  Mg     2.6     05-15    TPro  6.1  /  Alb  2.5<L>  /  TBili  0.3  /  DBili  x   /  AST  13  /  ALT  23  /  AlkPhos  214<H>  05-15              Inpatient Medications:  MEDICATIONS  (STANDING):  atorvastatin 40 milliGRAM(s) Oral at bedtime  clopidogrel Tablet 75 milliGRAM(s) Oral daily  dextrose 5%. 1000 milliLiter(s) (50 mL/Hr) IV Continuous <Continuous>  dextrose 50% Injectable 12.5 Gram(s) IV Push once  dextrose 50% Injectable 25 Gram(s) IV Push once  dextrose 50% Injectable 25 Gram(s) IV Push once  docusate sodium 100 milliGRAM(s) Oral three times a day  ertapenem  IVPB 500 milliGRAM(s) IV Intermittent every 24 hours  gabapentin 300 milliGRAM(s) Oral daily  insulin glargine Injectable (LANTUS) 34 Unit(s) SubCutaneous at bedtime  insulin lispro (HumaLOG) corrective regimen sliding scale   SubCutaneous three times a day before meals  insulin lispro (HumaLOG) corrective regimen sliding scale   SubCutaneous at bedtime  metoprolol tartrate 12.5 milliGRAM(s) Oral two times a day  pantoprazole    Tablet 40 milliGRAM(s) Oral two times a day  polyethylene glycol 3350 17 Gram(s) Oral two times a day  senna 2 Tablet(s) Oral at bedtime  sodium chloride 0.9%. 1000 milliLiter(s) (100 mL/Hr) IV Continuous <Continuous>      Assessment:  78 year old woman with CAD s/p MICHAEL x2 in 12/2017, a-fib not on AC due to GI bleed, and compensated diastolic CHF presents for weakness found to have severe sepsis bacteremia complicated by supply demand ischemia.      Plan of Care:    #Non-oliguric KOJO-  Patient with good urine output via [place].  Electrolytes are within normal limits.  Renal f/u today.    #Type II NSTEMI-  Supply demand ischemia in the setting of severe sepsis with GNR bacteremia.  Patient chest pain free.  Given recent h/o life threatening GI bleed, will hold off on further ischemic evaluation at this time.  Ms. Tsang has demonstrated that she cannot tolerate dual anti-platelet therapy.    #CAD- s/p MICHAEL x2 in 12/2017.  Only on Plavix now for multiple hospitalizations for life-threatening anemia despite stents in 12/2017.  GI input noted.    #Paroxymal a-fib-  Brief a-fib overnight.  Currently in sinus.  No AC given h/o GI bleed.    #Compensated diastolic CHF-  Euvolemic on exam today.  Hold Lasix for severe sepsis with KOJO    ID consult appreciated- hold off on CT scan given KOJO.     Care discussed at length with patient and granddaughter.       Over 35 minutes spent on total encounter; more than 50% of the visit was spent counseling and/or coordinating care by the attending physician.      Julio César Hooper MD [facility]  Cardiovascular Disease  [phone]

## 2018-05-16 LAB
ANION GAP SERPL CALC-SCNC: 18 MMOL/L — HIGH (ref 5–17)
BUN SERPL-MCNC: 48 MG/DL — HIGH (ref 7–23)
CALCIUM SERPL-MCNC: 7.5 MG/DL — LOW (ref 8.4–10.5)
CHLORIDE SERPL-SCNC: 98 MMOL/L — SIGNIFICANT CHANGE UP (ref 96–108)
CO2 SERPL-SCNC: 17 MMOL/L — LOW (ref 22–31)
CREAT SERPL-MCNC: 4.67 MG/DL — HIGH (ref 0.5–1.3)
GLUCOSE BLDC GLUCOMTR-MCNC: 118 MG/DL — HIGH (ref 70–99)
GLUCOSE BLDC GLUCOMTR-MCNC: 182 MG/DL — HIGH (ref 70–99)
GLUCOSE BLDC GLUCOMTR-MCNC: 193 MG/DL — HIGH (ref 70–99)
GLUCOSE BLDC GLUCOMTR-MCNC: 226 MG/DL — HIGH (ref 70–99)
GLUCOSE SERPL-MCNC: 111 MG/DL — HIGH (ref 70–99)
HCT VFR BLD CALC: 24.5 % — LOW (ref 34.5–45)
HGB BLD-MCNC: 7.4 G/DL — LOW (ref 11.5–15.5)
MAGNESIUM SERPL-MCNC: 2.3 MG/DL — SIGNIFICANT CHANGE UP (ref 1.6–2.6)
MCHC RBC-ENTMCNC: 22.2 PG — LOW (ref 27–34)
MCHC RBC-ENTMCNC: 30.2 GM/DL — LOW (ref 32–36)
MCV RBC AUTO: 73.6 FL — LOW (ref 80–100)
PHOSPHATE SERPL-MCNC: 4.1 MG/DL — SIGNIFICANT CHANGE UP (ref 2.5–4.5)
PLATELET # BLD AUTO: 471 K/UL — HIGH (ref 150–400)
POTASSIUM SERPL-MCNC: 4.2 MMOL/L — SIGNIFICANT CHANGE UP (ref 3.5–5.3)
POTASSIUM SERPL-SCNC: 4.2 MMOL/L — SIGNIFICANT CHANGE UP (ref 3.5–5.3)
RBC # BLD: 3.33 M/UL — LOW (ref 3.8–5.2)
RBC # FLD: 21.9 % — HIGH (ref 10.3–14.5)
SODIUM SERPL-SCNC: 133 MMOL/L — LOW (ref 135–145)
WBC # BLD: 14.43 K/UL — HIGH (ref 3.8–10.5)
WBC # FLD AUTO: 14.43 K/UL — HIGH (ref 3.8–10.5)

## 2018-05-16 RX ORDER — SODIUM BICARBONATE 1 MEQ/ML
0.05 SYRINGE (ML) INTRAVENOUS
Qty: 75 | Refills: 0 | Status: DISCONTINUED | OUTPATIENT
Start: 2018-05-16 | End: 2018-05-17

## 2018-05-16 RX ORDER — INSULIN LISPRO 100/ML
7 VIAL (ML) SUBCUTANEOUS
Qty: 0 | Refills: 0 | Status: DISCONTINUED | OUTPATIENT
Start: 2018-05-16 | End: 2018-05-21

## 2018-05-16 RX ADMIN — INSULIN GLARGINE 34 UNIT(S): 100 INJECTION, SOLUTION SUBCUTANEOUS at 22:25

## 2018-05-16 RX ADMIN — Medication 12.5 MILLIGRAM(S): at 05:48

## 2018-05-16 RX ADMIN — Medication 75 MEQ/KG/HR: at 05:48

## 2018-05-16 RX ADMIN — Medication 2: at 12:04

## 2018-05-16 RX ADMIN — Medication 75 MEQ/KG/HR: at 12:06

## 2018-05-16 RX ADMIN — Medication 7 UNIT(S): at 12:05

## 2018-05-16 RX ADMIN — PANTOPRAZOLE SODIUM 40 MILLIGRAM(S): 20 TABLET, DELAYED RELEASE ORAL at 05:48

## 2018-05-16 RX ADMIN — Medication 12.5 MILLIGRAM(S): at 17:42

## 2018-05-16 RX ADMIN — GABAPENTIN 300 MILLIGRAM(S): 400 CAPSULE ORAL at 11:06

## 2018-05-16 RX ADMIN — Medication 7 UNIT(S): at 17:42

## 2018-05-16 RX ADMIN — POLYETHYLENE GLYCOL 3350 17 GRAM(S): 17 POWDER, FOR SOLUTION ORAL at 17:42

## 2018-05-16 RX ADMIN — ATORVASTATIN CALCIUM 40 MILLIGRAM(S): 80 TABLET, FILM COATED ORAL at 22:25

## 2018-05-16 RX ADMIN — PANTOPRAZOLE SODIUM 40 MILLIGRAM(S): 20 TABLET, DELAYED RELEASE ORAL at 17:43

## 2018-05-16 RX ADMIN — ERTAPENEM SODIUM 100 MILLIGRAM(S): 1 INJECTION, POWDER, LYOPHILIZED, FOR SOLUTION INTRAMUSCULAR; INTRAVENOUS at 21:47

## 2018-05-16 RX ADMIN — Medication 50 MEQ/KG/HR: at 21:47

## 2018-05-16 RX ADMIN — CLOPIDOGREL BISULFATE 75 MILLIGRAM(S): 75 TABLET, FILM COATED ORAL at 11:06

## 2018-05-16 RX ADMIN — Medication 4: at 17:42

## 2018-05-16 RX ADMIN — Medication 50 MEQ/KG/HR: at 18:18

## 2018-05-16 NOTE — PROGRESS NOTE ADULT - SUBJECTIVE AND OBJECTIVE BOX
Interval Events: Pt is without GI complaints. She denies abdominal pain, N/V/D/C, BRBPR/melena.     MEDICATIONS  (STANDING):  atorvastatin 40 milliGRAM(s) Oral at bedtime  clopidogrel Tablet 75 milliGRAM(s) Oral daily  dextrose 5%. 1000 milliLiter(s) (50 mL/Hr) IV Continuous <Continuous>  dextrose 50% Injectable 12.5 Gram(s) IV Push once  dextrose 50% Injectable 25 Gram(s) IV Push once  dextrose 50% Injectable 25 Gram(s) IV Push once  docusate sodium 100 milliGRAM(s) Oral three times a day  ertapenem  IVPB 500 milliGRAM(s) IV Intermittent every 24 hours  gabapentin 300 milliGRAM(s) Oral daily  insulin glargine Injectable (LANTUS) 34 Unit(s) SubCutaneous at bedtime  insulin lispro (HumaLOG) corrective regimen sliding scale   SubCutaneous three times a day before meals  insulin lispro (HumaLOG) corrective regimen sliding scale   SubCutaneous at bedtime  insulin lispro Injectable (HumaLOG) 7 Unit(s) SubCutaneous three times a day before meals  metoprolol tartrate 12.5 milliGRAM(s) Oral two times a day  pantoprazole    Tablet 40 milliGRAM(s) Oral two times a day  polyethylene glycol 3350 17 Gram(s) Oral two times a day  senna 2 Tablet(s) Oral at bedtime  sodium bicarbonate  Infusion 0.075 mEq/kG/Hr (75 mL/Hr) IV Continuous <Continuous>    MEDICATIONS  (PRN):  acetaminophen   Tablet. 650 milliGRAM(s) Oral every 6 hours PRN Mild Pain (1 - 3)  dextrose 40% Gel 15 Gram(s) Oral once PRN Blood Glucose LESS THAN 70 milliGRAM(s)/deciliter  glucagon  Injectable 1 milliGRAM(s) IntraMuscular once PRN Glucose LESS THAN 70 milligrams/deciliter  morphine  - Injectable 2 milliGRAM(s) IV Push every 6 hours PRN Moderate to Severe pain  senna 2 Tablet(s) Oral at bedtime PRN Constipation      Allergies    Carrots (Rash)  No Known Drug Allergies    Intolerances        Review of Systems:    General:  No wt loss, fevers, chills, night sweats,fatigue,   Eyes:  Good vision, no reported pain  ENT:  No sore throat, pain, runny nose, dysphagia  CV:  No pain, palpitations, hypo/hypertension  Resp:  No dyspnea, cough, tachypnea, wheezing  GI:  No pain, No nausea, No vomiting, No diarrhea, No constipation, No weight loss, No fever, No pruritis, No rectal bleeding, No melena, No dysphagia  :  No pain, bleeding, incontinence, nocturia  Muscle:  No pain, weakness  Neuro:  No weakness, tingling, memory problems  Psych:  No fatigue, insomnia, mood problems, depression  Endocrine:  No polyuria, polydypsia, cold/heat intolerance  Heme:  No petechiae, ecchymosis, easy bruisability  Skin:  No rash, tattoos, scars, edema      Vital Signs Last 24 Hrs  T(C): 36.9 (16 May 2018 04:47), Max: 37 (15 May 2018 21:23)  T(F): 98.4 (16 May 2018 04:47), Max: 98.6 (15 May 2018 21:23)  HR: 72 (16 May 2018 04:47) (61 - 72)  BP: 142/75 (16 May 2018 04:47) (142/75 - 154/73)  BP(mean): --  RR: 20 (16 May 2018 04:47) (17 - 20)  SpO2: 97% (16 May 2018 04:47) (96% - 99%)    PHYSICAL EXAM:    Constitutional: NAD, well-developed  HEENT: EOMI, throat clear  Neck: No LAD, supple  Respiratory: CTA and P  Cardiovascular: S1 and S2, RRR, no M  Gastrointestinal: BS+, soft, NT/ND, neg HSM,  Extremities: No peripheral edema, neg clubing, cyanosis  Vascular: 2+ peripheral pulses  Neurological: A/O x 3, no focal deficits  Psychiatric: Normal mood, normal affect  Skin: No rashes      LABS:                        7.4    14.43 )-----------( 471      ( 16 May 2018 07:42 )             24.5     05-16    133<L>  |  98  |  48<H>  ----------------------------<  111<H>  4.2   |  17<L>  |  4.67<H>    Ca    7.5<L>      16 May 2018 05:44  Phos  4.1     05-16  Mg     2.3     05-16    TPro  6.1  /  Alb  2.5<L>  /  TBili  0.3  /  DBili  x   /  AST  13  /  ALT  23  /  AlkPhos  214<H>  05-15      Urinalysis Basic - ( 14 May 2018 12:46 )    Color: Yellow / Appearance: Turbid / S.018 / pH: x  Gluc: x / Ketone: Negative  / Bili: Negative / Urobili: Negative   Blood: x / Protein: 300 mg/dL / Nitrite: Negative   Leuk Esterase: Large / RBC: 25-50 /HPF / WBC >50 /HPF   Sq Epi: x / Non Sq Epi: x / Bacteria: x        RADIOLOGY & ADDITIONAL TESTS:

## 2018-05-16 NOTE — PROGRESS NOTE ADULT - SUBJECTIVE AND OBJECTIVE BOX
Patient is a 78y old  Female who presents with a chief complaint of bilateral lower back pain (11 May 2018 21:29)      SUBJECTIVE / OVERNIGHT EVENTS:   Feels better.  Denies CP/SOB/Palpitation/HA.    MEDICATIONS  (STANDING):  atorvastatin 40 milliGRAM(s) Oral at bedtime  clopidogrel Tablet 75 milliGRAM(s) Oral daily  dextrose 5%. 1000 milliLiter(s) (50 mL/Hr) IV Continuous <Continuous>  dextrose 50% Injectable 12.5 Gram(s) IV Push once  dextrose 50% Injectable 25 Gram(s) IV Push once  dextrose 50% Injectable 25 Gram(s) IV Push once  docusate sodium 100 milliGRAM(s) Oral three times a day  ertapenem  IVPB 500 milliGRAM(s) IV Intermittent every 24 hours  gabapentin 300 milliGRAM(s) Oral daily  insulin glargine Injectable (LANTUS) 34 Unit(s) SubCutaneous at bedtime  insulin lispro (HumaLOG) corrective regimen sliding scale   SubCutaneous three times a day before meals  insulin lispro (HumaLOG) corrective regimen sliding scale   SubCutaneous at bedtime  insulin lispro Injectable (HumaLOG) 7 Unit(s) SubCutaneous three times a day before meals  metoprolol tartrate 12.5 milliGRAM(s) Oral two times a day  pantoprazole    Tablet 40 milliGRAM(s) Oral two times a day  polyethylene glycol 3350 17 Gram(s) Oral two times a day  senna 2 Tablet(s) Oral at bedtime  sodium bicarbonate  Infusion 0.05 mEq/kG/Hr (50 mL/Hr) IV Continuous <Continuous>    MEDICATIONS  (PRN):  acetaminophen   Tablet. 650 milliGRAM(s) Oral every 6 hours PRN Mild Pain (1 - 3)  dextrose 40% Gel 15 Gram(s) Oral once PRN Blood Glucose LESS THAN 70 milliGRAM(s)/deciliter  glucagon  Injectable 1 milliGRAM(s) IntraMuscular once PRN Glucose LESS THAN 70 milligrams/deciliter  morphine  - Injectable 2 milliGRAM(s) IV Push every 6 hours PRN Moderate to Severe pain  senna 2 Tablet(s) Oral at bedtime PRN Constipation        CAPILLARY BLOOD GLUCOSE      POCT Blood Glucose.: 226 mg/dL (16 May 2018 17:12)  POCT Blood Glucose.: 193 mg/dL (16 May 2018 11:43)  POCT Blood Glucose.: 118 mg/dL (16 May 2018 07:55)  POCT Blood Glucose.: 252 mg/dL (15 May 2018 21:31)    I&O's Summary    15 May 2018 07:01  -  16 May 2018 07:00  --------------------------------------------------------  IN: 1980 mL / OUT: 2700 mL / NET: -720 mL    16 May 2018 07:01  -  16 May 2018 19:45  --------------------------------------------------------  IN: 1380 mL / OUT: 1200 mL / NET: 180 mL        PHYSICAL EXAM:  GENERAL: NAD, well-developed  HEAD:  Atraumatic, Normocephalic  NECK: Supple, No JVD  CHEST/LUNG: Clear to auscultation bilaterally; No wheezing.  HEART: Regular rate and rhythm; No murmurs, rubs, or gallops  ABDOMEN: Soft, Nontender, Nondistended; Bowel sounds present  EXTREMITIES:   No clubbing, cyanosis, or edema  NEUROLOGY: AAO X 3  SKIN: No rashes    LABS:                        7.4    14.43 )-----------( 471      ( 16 May 2018 07:42 )             24.5     05-16    133<L>  |  98  |  48<H>  ----------------------------<  111<H>  4.2   |  17<L>  |  4.67<H>    Ca    7.5<L>      16 May 2018 05:44  Phos  4.1     05-16  Mg     2.3     05-16    TPro  6.1  /  Alb  2.5<L>  /  TBili  0.3  /  DBili  x   /  AST  13  /  ALT  23  /  AlkPhos  214<H>  05-15            CAPILLARY BLOOD GLUCOSE      POCT Blood Glucose.: 226 mg/dL (16 May 2018 17:12)  POCT Blood Glucose.: 193 mg/dL (16 May 2018 11:43)  POCT Blood Glucose.: 118 mg/dL (16 May 2018 07:55)  POCT Blood Glucose.: 252 mg/dL (15 May 2018 21:31)    05-15 @ 09:15  Culture-urine --  Culture results   No growth to date.  method type --  Organism --  Organism Identification --  Specimen source .Blood Blood-Peripheral  05-14 @ 16:49  Culture-urine --  Culture results   No growth to date.  method type --  Organism --  Organism Identification --  Specimen source .Blood Blood-Peripheral  05-14 @ 16:42  Culture-urine --  Culture results   No growth to date.  method type --  Organism --  Organism Identification --  Specimen source .Blood Blood-Peripheral  05-11 @ 22:45  Culture-urine --  Culture results   Growth in aerobic and anaerobic bottles: Enterobacter aerogenes  See previous culture [id number]  method type PCR  Organism Blood Culture PCR  Organism Identification Blood Culture PCR  Enterobacter aerogenes  Specimen source .Blood Blood-[facility]  05-11 @ 19:49  Culture-urine --  Culture results   >100,000 CFU/ml Enterobacter aerogenes  method type CAITLYN  Organism Enterobacter aerogenes  Organism Identification Enterobacter aerogenes  Specimen source .Urine Clean Catch (Midstream)           05-15 @ 09:15  Culture blood --  Culture results   No growth to date.  Gram stain --  Gram stain blood --  Method type --  Organism --  Organism identification --  Specimen source .Blood Blood-Peripheral   05-14 @ 16:49  Culture blood --  Culture results   No growth to date.  Gram stain --  Gram stain blood --  Method type --  Organism --  Organism identification --  Specimen source .Blood Blood-Peripheral   05-14 @ 16:42  Culture blood --  Culture results   No growth to date.  Gram stain --  Gram stain blood --  Method type --  Organism --  Organism identification --  Specimen source .Blood Blood-Peripheral   05-11 @ 22:45  Culture blood --  Culture results   Growth in aerobic and anaerobic bottles: Enterobacter aerogenes  See previous culture [id number]  Gram stain   Growth in anaerobic bottle: Gram Negative Rods  Growth in aerobic bottle: Gram Negative Rods  Gram stain blood --  Method type PCR  Organism Blood Culture PCR  Organism identification Blood Culture PCR  Enterobacter aerogenes  Specimen source .Blood BloodPeripheral   05-11 @ 19:49  Culture blood --  Culture results   >100,000 CFU/ml Enterobacter aerogenes  Gram stain --  Gram stain blood --  Method type CAITLYN  Organism Enterobacter aerogenes  Organism identification Enterobacter aerogenes  Specimen source .Urine Clean Catch (Midstream)      RADIOLOGY & ADDITIONAL TESTS:    Imaging Personally Reviewed:    Consultant(s) Notes Reviewed:      Care Discussed with Consultants/Other Providers:

## 2018-05-16 NOTE — PROGRESS NOTE ADULT - SUBJECTIVE AND OBJECTIVE BOX
Cardiovascular Disease Progress Note    Overnight events: No acute events overnight.  Ms. Tsang denies chest pain or SOB.   Otherwise review of systems negative    Objective Findings:  T(C): 36.9 (18 @ 04:47), Max: 37 (05-15-18 @ 21:23)  HR: 72 (18 @ 04:47) (61 - 72)  BP: 142/75 (18 @ 04:47) (135/66 - 154/73)  RR: 20 (18 @ 04:47) (16 - 20)  SpO2: 97% (18 @ 04:47) (96% - 99%)  Wt(kg): --  Daily     Daily Weight in k.8 (15 May 2018 15:13)      Physical Exam:  Gen: NAD  HEENT: EOMI  CV: RRR, normal S1 + S2, no m/r/g  Lungs: CTAB  Abd: soft, non-tender  Ext: No edema    Telemetry: Sinus 50-80s; no ectopy    Laboratory Data:                        7.4    14.43 )-----------( 471      ( 16 May 2018 07:42 )             24.5     05-16    133<L>  |  98  |  48<H>  ----------------------------<  111<H>  4.2   |  17<L>  |  4.67<H>    Ca    7.5<L>      16 May 2018 05:44  Phos  4.1     -  Mg     2.3     -    TPro  6.1  /  Alb  2.5<L>  /  TBili  0.3  /  DBili  x   /  AST  13  /  ALT  23  /  AlkPhos  214<H>  -15              Inpatient Medications:  MEDICATIONS  (STANDING):  atorvastatin 40 milliGRAM(s) Oral at bedtime  clopidogrel Tablet 75 milliGRAM(s) Oral daily  dextrose 5%. 1000 milliLiter(s) (50 mL/Hr) IV Continuous <Continuous>  dextrose 50% Injectable 12.5 Gram(s) IV Push once  dextrose 50% Injectable 25 Gram(s) IV Push once  dextrose 50% Injectable 25 Gram(s) IV Push once  docusate sodium 100 milliGRAM(s) Oral three times a day  ertapenem  IVPB 500 milliGRAM(s) IV Intermittent every 24 hours  gabapentin 300 milliGRAM(s) Oral daily  insulin glargine Injectable (LANTUS) 34 Unit(s) SubCutaneous at bedtime  insulin lispro (HumaLOG) corrective regimen sliding scale   SubCutaneous three times a day before meals  insulin lispro (HumaLOG) corrective regimen sliding scale   SubCutaneous at bedtime  metoprolol tartrate 12.5 milliGRAM(s) Oral two times a day  pantoprazole    Tablet 40 milliGRAM(s) Oral two times a day  polyethylene glycol 3350 17 Gram(s) Oral two times a day  senna 2 Tablet(s) Oral at bedtime  sodium bicarbonate  Infusion 0.075 mEq/kG/Hr (75 mL/Hr) IV Continuous <Continuous>      Assessment:  78 year old woman with CAD s/p MICHAEL x2 in 2017, a-fib not on AC due to GI bleed, and compensated diastolic CHF presents for weakness found to have severe sepsis bacteremia complicated by supply demand ischemia.      Plan of Care:    #Non-oliguric KOJO-  Patient with good urine output via [place].  Electrolytes are within normal limits.  Renal f/u appreciated.    #Type II NSTEMI-  Supply demand ischemia in the setting of severe sepsis with GNR bacteremia.  Patient chest pain free.  Given recent h/o life threatening GI bleed, will hold off on further ischemic evaluation at this time.  Ms. Tsang has demonstrated that she cannot tolerate dual anti-platelet therapy.    #CAD- s/p MICHAEL x2 in 2017.  Only on Plavix now for multiple hospitalizations for life-threatening anemia despite stents in 2017.  GI input noted.    #Paroxymal a-fib-  Currently in sinus.  No AC given h/o GI bleed.    #Compensated diastolic CHF-  Euvolemic on exam today.  Hold Lasix for severe sepsis with KOJO    ID consult appreciated- hold off on CT scan given KOJO.     Over 35 minutes spent on total encounter; more than 50% of the visit was spent counseling and/or coordinating care by the attending physician.      Julio César Hooper MD [facility]  Cardiovascular Disease  [phone]

## 2018-05-16 NOTE — PROGRESS NOTE ADULT - SUBJECTIVE AND OBJECTIVE BOX
Infectious Diseases progress note:    Subjective: No new complaints.  Creatinine improved, patient making good urine in Sylvester bag.  Back pain improving.  Afebrile and WBC downtrending.    ROS:  CONSTITUTIONAL:  No fever, chills, rigors  CARDIOVASCULAR:  No chest pain or palpitations  RESPIRATORY:   No SOB, cough, dyspnea on exertion.  No wheezing  GASTROINTESTINAL:  No abd pain, N/V, diarrhea/constipation  EXTREMITIES:  No swelling or joint pain  GENITOURINARY:  No burning on urination, increased frequency or urgency.  No flank pain  NEUROLOGIC:  No HA, visual disturbances  SKIN: No rashes    Allergies    Carrots (Rash)  No Known Drug Allergies    Intolerances        ANTIBIOTICS/RELEVANT:  antimicrobials  ertapenem  IVPB 500 milliGRAM(s) IV Intermittent every 24 hours    immunologic:    OTHER:  acetaminophen   Tablet. 650 milliGRAM(s) Oral every 6 hours PRN  atorvastatin 40 milliGRAM(s) Oral at bedtime  clopidogrel Tablet 75 milliGRAM(s) Oral daily  dextrose 40% Gel 15 Gram(s) Oral once PRN  dextrose 5%. 1000 milliLiter(s) IV Continuous <Continuous>  dextrose 50% Injectable 12.5 Gram(s) IV Push once  dextrose 50% Injectable 25 Gram(s) IV Push once  dextrose 50% Injectable 25 Gram(s) IV Push once  docusate sodium 100 milliGRAM(s) Oral three times a day  gabapentin 300 milliGRAM(s) Oral daily  glucagon  Injectable 1 milliGRAM(s) IntraMuscular once PRN  insulin glargine Injectable (LANTUS) 34 Unit(s) SubCutaneous at bedtime  insulin lispro (HumaLOG) corrective regimen sliding scale   SubCutaneous three times a day before meals  insulin lispro (HumaLOG) corrective regimen sliding scale   SubCutaneous at bedtime  insulin lispro Injectable (HumaLOG) 7 Unit(s) SubCutaneous three times a day before meals  metoprolol tartrate 12.5 milliGRAM(s) Oral two times a day  morphine  - Injectable 2 milliGRAM(s) IV Push every 6 hours PRN  pantoprazole    Tablet 40 milliGRAM(s) Oral two times a day  polyethylene glycol 3350 17 Gram(s) Oral two times a day  senna 2 Tablet(s) Oral at bedtime  senna 2 Tablet(s) Oral at bedtime PRN  sodium bicarbonate  Infusion 0.075 mEq/kG/Hr IV Continuous <Continuous>      Objective:  Vital Signs Last 24 Hrs  T(C): 36.9 (16 May 2018 04:47), Max: 37 (15 May 2018 21:23)  T(F): 98.4 (16 May 2018 04:47), Max: 98.6 (15 May 2018 21:23)  HR: 72 (16 May 2018 04:47) (66 - 72)  BP: 142/75 (16 May 2018 04:47) (142/75 - 154/73)  BP(mean): --  RR: 20 (16 May 2018 04:47) (20 - 20)  SpO2: 97% (16 May 2018 04:47) (96% - 97%)    PHYSICAL EXAM:  Constitutional:NAD  Eyes:EDIN, EOMI  Ear/Nose/Throat: no thrush, mucositis.  Moist mucous membranes	  Neck:no JVD, no lymphadenopathy, supple  Respiratory: CTA carlos manuel  Cardiovascular: S1S2 RRR, no murmurs  Gastrointestinal:soft, nontender,  nondistended (+) BS  Extremities:no e/e/c  Skin:  no rashes, open wounds or ulcerations  :  Sylvester in place draining clear yellow urine        LABS:                        7.4    14.43 )-----------( 471      ( 16 May 2018 07:42 )             24.5     05-16    133<L>  |  98  |  48<H>  ----------------------------<  111<H>  4.2   |  17<L>  |  4.67<H>    Ca    7.5<L>      16 May 2018 05:44  Phos  4.1     05-16  Mg     2.3     05-16    TPro  6.1  /  Alb  2.5<L>  /  TBili  0.3  /  DBili  x   /  AST  13  /  ALT  23  /  AlkPhos  214<H>  05-15            MICROBIOLOGY:    Culture - Blood in AM (05.15.18 @ 09:15)    Specimen Source: .Blood Blood-Peripheral    Culture Results:   No growth to date.    Culture - Blood (05.14.18 @ 16:49)    Specimen Source: .Blood Blood-Peripheral    Culture Results:   No growth to date.    Culture - Blood (05.14.18 @ 16:42)    Specimen Source: .Blood Blood-Peripheral    Culture Results:   No growth to date.    Culture - Blood (05.11.18 @ 22:45)    -  Multidrug (KPC pos) resistant organism: Nondet    -  Staphylococcus aureus: Nondet    -  Methicillin resistant Staphylococcus aureus (MRSA): Nondet    -  Coagulase negative Staphylococcus: Nondet    -  Enterococcus species: Nondet    -  Vancomycin resistant Enterococcus sp.: Nondet    -  Escherichia coli: Nondet    -  Klebsiella oxytoca: Nondet    -  Klebsiella pneumoniae: Nondet    -  Serratia marcescens: Nondet    -  Haemophilus influenzae: Nondet    -  Listeria monocytogenes: Nondet    -  Neisseria meningitidis: Nondet    -  Pseudomonas aeruginosa: Nondet    -  Acinetobacter baumanii: Nondet    -  Enterobacter cloacae complex: Nondet    -  Streptococcus sp. (Not Grp A, B or S pneumoniae): Nondet    -  Streptococcus agalactiae (Group B): Nondet    -  Streptococcus pyogenes (Group A): Nondet    -  Streptococcus pneumoniae: Nondet    -  Candida albicans: Nondet    -  Candida glabrata: Nondet    -  Candida krusei: Nondet    -  Candida parapsilosis: Nondet    -  Candida tropicalis: Nondet    -  Trimethoprim/Sulfamethoxazole: S <=0.5/9.5    -  Ampicillin: R >16 These ampicillin results predict results for amoxicillin    -  Ampicillin/Sulbactam: R 8/4    -  Aztreonam: S <=4    -  Cefazolin: R >16    -  Cefepime: S <=2    -  Cefoxitin: R >16    -  Ceftriaxone: S <=1 Enterobacter, Citrobacter, and Serratia may develop resistance during prolonged therapy    -  Ciprofloxacin: S <=0.5    -  Ertapenem: S <=0.5    -  Gentamicin: S <=1    -  Imipenem: S <=1    -  Levofloxacin: S <=1    -  Meropenem: S <=1    -  Piperacillin/Tazobactam: S <=8    -  Tobramycin: S <=2    Gram Stain:   Growth in anaerobic bottle: Gram Negative Rods  Growth in aerobic bottle: Gram Negative Rods    -  Amikacin: S <=8    Specimen Source: .Blood Blood-Peripheral    Organism: Blood Culture PCR    Organism: Enterobacter aerogenes    Culture Results:   Growth in aerobic and anaerobic bottles: Enterobacter aerogenes  "Due to technical problems, Proteus sp. will Not be reported as part of  the BCID panel until further notice"  ***Blood Panel PCR results on this specimen are available  approximately3 hours after the Gram stain result.***  Gram stain, PCR, and/or culture results may not always  correspond due to difference in methodologies.  ************************************************************  This PCR assay was performed using [employer].  The following targets are tested for: Enterococcus,  vancomycin resistant enterococci, Listeria monocytogenes,  coagulase negative staphylococci, S. aureus,  methicillin resistant S. aureus, Streptococcus agalactiae  (Group B), S. pneumoniae, S. pyogenes (Group A),  Acinetobacter baumannii, Enterobacter cloacae, E. coli,  Klebsiella oxytoca, K. pneumoniae, Proteus sp.,  Serratia marcescens, Haemophilus influenzae,  Neisseria meningitidis, Pseudomonas aeruginosa, Candida  albicans, C. glabrata, C krusei, C parapsilosis,  C. tropicalis and the KPC resistance gene.    Organism Identification: Blood Culture PCR  Enterobacter aerogenes    Method Type: PCR    Method Type: CAITLYN    Culture - Urine (05.11.18 @ 19:49)    -  Trimethoprim/Sulfamethoxazole: S <=0.5/9.5    -  Amikacin: S <=8    -  Amoxicillin/Clavulanic Acid: R 16/8    -  Tobramycin: S <=2    -  Piperacillin/Tazobactam: S <=8    -  Tigecycline: S <=1    -  Meropenem: S <=1    -  Nitrofurantoin: S <=32 Should not be used to treat pyelonephritis    -  Levofloxacin: S <=1    -  Imipenem: S <=1    -  Gentamicin: S <=1    -  Ertapenem: S <=0.5    -  Ciprofloxacin: S <=0.5    -  Ceftriaxone: S <=1 Enterobacter, Citrobacter, and Serratia may develop resistance during prolonged therapy    -  Cefoxitin: R >16    -  Cefepime: S <=2    -  Cefazolin: R >16 This predicts results for oral agents cefaclor, cefdinir, cefpodoxime, cefprozil, cefuroxime axetil, cephalexin and locarbef for uncomplicated UTI. Note that some isolates may be susceptible to these agents while testing resistant to cefazolin.    -  Aztreonam: S <=4    -  Ampicillin/Sulbactam: R <=4/2    -  Ampicillin: R 16 These ampicillin results predict results for amoxicillin    Specimen Source: .Urine Clean Catch (Midstream)    Culture Results:   >100,000 CFU/ml Enterobacter aerogenes    Organism Identification: Enterobacter aerogenes    Organism: Enterobacter aerogenes    Method Type: CAITLYN          RADIOLOGY & ADDITIONAL STUDIES:    < from: US Renal (05.15.18 @ 11:36) >  FINDINGS:    Right kidney:  11.2 cm. Mildly heterogeneous echotexture. No renal mass,   hydronephrosis or calculi.    Left kidney:  9.4 cm. Mildly heterogeneous echotexture. No renal mass,   hydronephrosis or calculi.    Urinary bladder: Partially collapsed with a Sylvester catheter.    IMPRESSION:     Mild heterogeneous echotexture to the kidneys bilaterally. No   hydronephrosis.    Partially collapsed with a Sylvester catheter.    < end of copied text > Infectious Diseases progress note:    Subjective: No new complaints.  Creatinine improved, patient making good urine in Sylvester bag.  Back pain improving.  Afebrile.  WBC elevated.  No diarrhea.    ROS:  CONSTITUTIONAL:  No fever, chills, rigors  CARDIOVASCULAR:  No chest pain or palpitations  RESPIRATORY:   No SOB, cough, dyspnea on exertion.  No wheezing  GASTROINTESTINAL:  No abd pain, N/V, diarrhea/constipation  EXTREMITIES:  No swelling or joint pain  GENITOURINARY:  No burning on urination, increased frequency or urgency.  No flank pain  NEUROLOGIC:  No HA, visual disturbances  SKIN: No rashes    Allergies    Carrots (Rash)  No Known Drug Allergies    Intolerances        ANTIBIOTICS/RELEVANT:  antimicrobials  ertapenem  IVPB 500 milliGRAM(s) IV Intermittent every 24 hours    immunologic:    OTHER:  acetaminophen   Tablet. 650 milliGRAM(s) Oral every 6 hours PRN  atorvastatin 40 milliGRAM(s) Oral at bedtime  clopidogrel Tablet 75 milliGRAM(s) Oral daily  dextrose 40% Gel 15 Gram(s) Oral once PRN  dextrose 5%. 1000 milliLiter(s) IV Continuous <Continuous>  dextrose 50% Injectable 12.5 Gram(s) IV Push once  dextrose 50% Injectable 25 Gram(s) IV Push once  dextrose 50% Injectable 25 Gram(s) IV Push once  docusate sodium 100 milliGRAM(s) Oral three times a day  gabapentin 300 milliGRAM(s) Oral daily  glucagon  Injectable 1 milliGRAM(s) IntraMuscular once PRN  insulin glargine Injectable (LANTUS) 34 Unit(s) SubCutaneous at bedtime  insulin lispro (HumaLOG) corrective regimen sliding scale   SubCutaneous three times a day before meals  insulin lispro (HumaLOG) corrective regimen sliding scale   SubCutaneous at bedtime  insulin lispro Injectable (HumaLOG) 7 Unit(s) SubCutaneous three times a day before meals  metoprolol tartrate 12.5 milliGRAM(s) Oral two times a day  morphine  - Injectable 2 milliGRAM(s) IV Push every 6 hours PRN  pantoprazole    Tablet 40 milliGRAM(s) Oral two times a day  polyethylene glycol 3350 17 Gram(s) Oral two times a day  senna 2 Tablet(s) Oral at bedtime  senna 2 Tablet(s) Oral at bedtime PRN  sodium bicarbonate  Infusion 0.075 mEq/kG/Hr IV Continuous <Continuous>      Objective:  Vital Signs Last 24 Hrs  T(C): 36.9 (16 May 2018 04:47), Max: 37 (15 May 2018 21:23)  T(F): 98.4 (16 May 2018 04:47), Max: 98.6 (15 May 2018 21:23)  HR: 72 (16 May 2018 04:47) (66 - 72)  BP: 142/75 (16 May 2018 04:47) (142/75 - 154/73)  BP(mean): --  RR: 20 (16 May 2018 04:47) (20 - 20)  SpO2: 97% (16 May 2018 04:47) (96% - 97%)    PHYSICAL EXAM:  Constitutional:NAD  Eyes:EDIN, EOMI  Ear/Nose/Throat: no thrush, mucositis.  Moist mucous membranes	  Neck:no JVD, no lymphadenopathy, supple  Respiratory: CTA carlos manuel  Cardiovascular: S1S2 RRR, no murmurs  Gastrointestinal:soft, nontender,  nondistended (+) BS  Extremities:no e/e/c  Skin:  no rashes, open wounds or ulcerations  :  Sylvester in place draining clear yellow urine        LABS:                        7.4    14.43 )-----------( 471      ( 16 May 2018 07:42 )             24.5     05-16    133<L>  |  98  |  48<H>  ----------------------------<  111<H>  4.2   |  17<L>  |  4.67<H>    Ca    7.5<L>      16 May 2018 05:44  Phos  4.1     05-16  Mg     2.3     05-16    TPro  6.1  /  Alb  2.5<L>  /  TBili  0.3  /  DBili  x   /  AST  13  /  ALT  23  /  AlkPhos  214<H>  05-15            MICROBIOLOGY:    Culture - Blood in AM (05.15.18 @ 09:15)    Specimen Source: .Blood Blood-Peripheral    Culture Results:   No growth to date.    Culture - Blood (05.14.18 @ 16:49)    Specimen Source: .Blood Blood-Peripheral    Culture Results:   No growth to date.    Culture - Blood (05.14.18 @ 16:42)    Specimen Source: .Blood Blood-Peripheral    Culture Results:   No growth to date.    Culture - Blood (05.11.18 @ 22:45)    -  Multidrug (KPC pos) resistant organism: Nondet    -  Staphylococcus aureus: Nondet    -  Methicillin resistant Staphylococcus aureus (MRSA): Nondet    -  Coagulase negative Staphylococcus: Nondet    -  Enterococcus species: Nondet    -  Vancomycin resistant Enterococcus sp.: Nondet    -  Escherichia coli: Nondet    -  Klebsiella oxytoca: Nondet    -  Klebsiella pneumoniae: Nondet    -  Serratia marcescens: Nondet    -  Haemophilus influenzae: Nondet    -  Listeria monocytogenes: Nondet    -  Neisseria meningitidis: Nondet    -  Pseudomonas aeruginosa: Nondet    -  Acinetobacter baumanii: Nondet    -  Enterobacter cloacae complex: Nondet    -  Streptococcus sp. (Not Grp A, B or S pneumoniae): Nondet    -  Streptococcus agalactiae (Group B): Nondet    -  Streptococcus pyogenes (Group A): Nondet    -  Streptococcus pneumoniae: Nondet    -  Candida albicans: Nondet    -  Candida glabrata: Nondet    -  Candida krusei: Nondet    -  Candida parapsilosis: Nondet    -  Candida tropicalis: Nondet    -  Trimethoprim/Sulfamethoxazole: S <=0.5/9.5    -  Ampicillin: R >16 These ampicillin results predict results for amoxicillin    -  Ampicillin/Sulbactam: R 8/4    -  Aztreonam: S <=4    -  Cefazolin: R >16    -  Cefepime: S <=2    -  Cefoxitin: R >16    -  Ceftriaxone: S <=1 Enterobacter, Citrobacter, and Serratia may develop resistance during prolonged therapy    -  Ciprofloxacin: S <=0.5    -  Ertapenem: S <=0.5    -  Gentamicin: S <=1    -  Imipenem: S <=1    -  Levofloxacin: S <=1    -  Meropenem: S <=1    -  Piperacillin/Tazobactam: S <=8    -  Tobramycin: S <=2    Gram Stain:   Growth in anaerobic bottle: Gram Negative Rods  Growth in aerobic bottle: Gram Negative Rods    -  Amikacin: S <=8    Specimen Source: .Blood Blood-Peripheral    Organism: Blood Culture PCR    Organism: Enterobacter aerogenes    Culture Results:   Growth in aerobic and anaerobic bottles: Enterobacter aerogenes  "Due to technical problems, Proteus sp. will Not be reported as part of  the BCID panel until further notice"  ***Blood Panel PCR results on this specimen are available  approximately3 hours after the Gram stain result.***  Gram stain, PCR, and/or culture results may not always  correspond due to difference in methodologies.  ************************************************************  This PCR assay was performed using [employer].  The following targets are tested for: Enterococcus,  vancomycin resistant enterococci, Listeria monocytogenes,  coagulase negative staphylococci, S. aureus,  methicillin resistant S. aureus, Streptococcus agalactiae  (Group B), S. pneumoniae, S. pyogenes (Group A),  Acinetobacter baumannii, Enterobacter cloacae, E. coli,  Klebsiella oxytoca, K. pneumoniae, Proteus sp.,  Serratia marcescens, Haemophilus influenzae,  Neisseria meningitidis, Pseudomonas aeruginosa, Candida  albicans, C. glabrata, C krusei, C parapsilosis,  C. tropicalis and the KPC resistance gene.    Organism Identification: Blood Culture PCR  Enterobacter aerogenes    Method Type: PCR    Method Type: CAITLYN    Culture - Urine (05.11.18 @ 19:49)    -  Trimethoprim/Sulfamethoxazole: S <=0.5/9.5    -  Amikacin: S <=8    -  Amoxicillin/Clavulanic Acid: R 16/8    -  Tobramycin: S <=2    -  Piperacillin/Tazobactam: S <=8    -  Tigecycline: S <=1    -  Meropenem: S <=1    -  Nitrofurantoin: S <=32 Should not be used to treat pyelonephritis    -  Levofloxacin: S <=1    -  Imipenem: S <=1    -  Gentamicin: S <=1    -  Ertapenem: S <=0.5    -  Ciprofloxacin: S <=0.5    -  Ceftriaxone: S <=1 Enterobacter, Citrobacter, and Serratia may develop resistance during prolonged therapy    -  Cefoxitin: R >16    -  Cefepime: S <=2    -  Cefazolin: R >16 This predicts results for oral agents cefaclor, cefdinir, cefpodoxime, cefprozil, cefuroxime axetil, cephalexin and locarbef for uncomplicated UTI. Note that some isolates may be susceptible to these agents while testing resistant to cefazolin.    -  Aztreonam: S <=4    -  Ampicillin/Sulbactam: R <=4/2    -  Ampicillin: R 16 These ampicillin results predict results for amoxicillin    Specimen Source: .Urine Clean Catch (Midstream)    Culture Results:   >100,000 CFU/ml Enterobacter aerogenes    Organism Identification: Enterobacter aerogenes    Organism: Enterobacter aerogenes    Method Type: CAITLYN          RADIOLOGY & ADDITIONAL STUDIES:    < from: US Renal (05.15.18 @ 11:36) >  FINDINGS:    Right kidney:  11.2 cm. Mildly heterogeneous echotexture. No renal mass,   hydronephrosis or calculi.    Left kidney:  9.4 cm. Mildly heterogeneous echotexture. No renal mass,   hydronephrosis or calculi.    Urinary bladder: Partially collapsed with a Sylvester catheter.    IMPRESSION:     Mild heterogeneous echotexture to the kidneys bilaterally. No   hydronephrosis.    Partially collapsed with a Sylvester catheter.    < end of copied text >

## 2018-05-16 NOTE — PROGRESS NOTE ADULT - SUBJECTIVE AND OBJECTIVE BOX
HPI:  77 yo woman with PMH of CAD s/p MICHAEL x2, AFib not on A/C 2/2 GI bleed, DMT2 with neuropathy, anemia, presenting with crampy lower back pain ~5 days. Denies dysuria or increased urinary frequency. Endorses subjective fevers and sweats. Patient also endorses dark stool that has been present since her March discharge from [facility]. Denies abdominal pain, BRBPR. Endorses "pain from her ulcers",  and an episode of nausea and with NBNB emesis. She has been taking Advil ~4 pills a day for her neuropathy. Endorses drinking coffee and V8 frequently. Patient denies chest pain or worsening SOB. Has dyspnea on exertion with intermittent palpitations at baseline. States she has been fairly compliant with medications, but in the past few days has been forgetful with some of her medications in the setting of the pain being too distracting. Patient was at cardiologist's office and was referred to the ED for further eval. (11 May 2018 21:29)  Patient has history of diabetes, on oral meds and on insulin at home, no recent hypoglycemic episodes, no polyuria polydipsia. Patient follows up with PCP for diabetes management.    PAST MEDICAL & SURGICAL HISTORY:  Neuropathy  GI bleed  Diabetes mellitus, type 2  Atrial fibrillation, unspecified type  CAD (coronary artery disease)  Paroxysmal atrial fibrillation  CAD (coronary artery disease)  Diabetic neuropathy  Type 2 diabetes mellitus with hyperglycemia, with long-term current use of insulin  HLD (hyperlipidemia)  H/O:  section  H/O  section      FAMILY HISTORY:  No pertinent family history in first degree relatives  No pertinent family history in first degree relatives      Social History:    Outpatient Medications:    MEDICATIONS  (STANDING):  atorvastatin 40 milliGRAM(s) Oral at bedtime  clopidogrel Tablet 75 milliGRAM(s) Oral daily  dextrose 5%. 1000 milliLiter(s) (50 mL/Hr) IV Continuous <Continuous>  dextrose 50% Injectable 12.5 Gram(s) IV Push once  dextrose 50% Injectable 25 Gram(s) IV Push once  dextrose 50% Injectable 25 Gram(s) IV Push once  docusate sodium 100 milliGRAM(s) Oral three times a day  ertapenem  IVPB 500 milliGRAM(s) IV Intermittent every 24 hours  gabapentin 300 milliGRAM(s) Oral daily  insulin glargine Injectable (LANTUS) 34 Unit(s) SubCutaneous at bedtime  insulin lispro (HumaLOG) corrective regimen sliding scale   SubCutaneous three times a day before meals  insulin lispro (HumaLOG) corrective regimen sliding scale   SubCutaneous at bedtime  insulin lispro Injectable (HumaLOG) 7 Unit(s) SubCutaneous three times a day before meals  metoprolol tartrate 12.5 milliGRAM(s) Oral two times a day  pantoprazole    Tablet 40 milliGRAM(s) Oral two times a day  polyethylene glycol 3350 17 Gram(s) Oral two times a day  senna 2 Tablet(s) Oral at bedtime  sodium bicarbonate  Infusion 0.075 mEq/kG/Hr (75 mL/Hr) IV Continuous <Continuous>    MEDICATIONS  (PRN):  acetaminophen   Tablet. 650 milliGRAM(s) Oral every 6 hours PRN Mild Pain (1 - 3)  dextrose 40% Gel 15 Gram(s) Oral once PRN Blood Glucose LESS THAN 70 milliGRAM(s)/deciliter  glucagon  Injectable 1 milliGRAM(s) IntraMuscular once PRN Glucose LESS THAN 70 milligrams/deciliter  morphine  - Injectable 2 milliGRAM(s) IV Push every 6 hours PRN Moderate to Severe pain  senna 2 Tablet(s) Oral at bedtime PRN Constipation      Allergies    Carrots (Rash)  No Known Drug Allergies    Intolerances      Review of Systems:  Constitutional: No fever, no chills  Eyes: No blurry vision  Neuro: No tremors  HEENT: No pain, no neck swelling  Cardiovascular: No chest pain, no palpitations  Respiratory: Has SOB, no cough  GI: No nausea, vomiting, abdominal pain  : No dysuria  Skin: no rash  MSK: Has leg swelling.  Psych: no depression  Endocrine: no polyuria, polydipsia    ALL OTHER SYSTEMS REVIEWED AND NEGATIVE    UNABLE TO OBTAIN    PHYSICAL EXAM:  VITALS: T(C): 36.9 (18 @ 04:47)  T(F): 98.4 (18 @ 04:47), Max: 98.6 (05-15-18 @ 21:23)  HR: 72 (18 @ 04:47) (61 - 72)  BP: 142/75 (18 @ 04:47) (135/66 - 154/73)  RR:  (16 - 20)  SpO2:  (96% - 99%)  Wt(kg): --  GENERAL: NAD, well-groomed, well-developed  EYES: No proptosis, no lid lag  HEENT:  Atraumatic, Normocephalic  THYROID: Normal size, no palpable nodules  RESPIRATORY: Clear to auscultation bilaterally; No rales, rhonchi, wheezing  CARDIOVASCULAR: Si S2, No murmurs;  GI: Soft, non distended, normal bowel sounds  SKIN: Dry, intact, No rashes or lesions  MUSCULOSKELETAL: Has BL lower extremity edema.  NEURO:  no tremor, sensation decreased in feet BL,    POCT Blood Glucose.: 118 mg/dL (18 @ 07:55)  POCT Blood Glucose.: 252 mg/dL (05-15-18 @ 21:31)  POCT Blood Glucose.: 201 mg/dL (05-15-18 @ 17:49)  POCT Blood Glucose.: 259 mg/dL (05-15-18 @ 14:10)  POCT Blood Glucose.: 139 mg/dL (05-15-18 @ 07:20)  POCT Blood Glucose.: 178 mg/dL (18 @ 21:50)  POCT Blood Glucose.: 182 mg/dL (18 @ 16:59)  POCT Blood Glucose.: 170 mg/dL (18 @ 11:35)  POCT Blood Glucose.: 207 mg/dL (18 @ 07:14)  POCT Blood Glucose.: 199 mg/dL (18 @ 21:41)  POCT Blood Glucose.: 215 mg/dL (18 @ 17:36)  POCT Blood Glucose.: 261 mg/dL (18 @ 11:53)                            7.4    14.43 )-----------( 471      ( 16 May 2018 07:42 )             24.5           133<L>  |  98  |  48<H>  ----------------------------<  111<H>  4.2   |  17<L>  |  4.67<H>    EGFR if : 10<L>  EGFR if non : 8<L>    Ca    7.5<L>        Mg     2.3     -  Phos  4.1     -    TPro  6.1  /  Alb  2.5<L>  /  TBili  0.3  /  DBili  x   /  AST  13  /  ALT  23  /  AlkPhos  214<H>  05-15      Thyroid Function Tests:  05-15 @ 09:35 TSH 1.21 FreeT4 0.7 T3 -- Anti TPO -- Anti Thyroglobulin Ab -- TSI --   @ 07:12 TSH 0.61 FreeT4 -- T3 -- Anti TPO -- Anti Thyroglobulin Ab -- TSI --      Hemoglobin A1C, Whole Blood: 11.8 % <H> [4.0 - 5.6] (18 @ 07:46)  Hemoglobin A1C, Whole Blood: 10.1 % <H> [4.0 - 5.6] (18 @ 06:21)          Radiology:

## 2018-05-16 NOTE — PROGRESS NOTE ADULT - SUBJECTIVE AND OBJECTIVE BOX
Patient seen and examined  no complaints    Carrots (Rash)  No Known Drug Allergies    Hospital Medications:   MEDICATIONS  (STANDING):  atorvastatin 40 milliGRAM(s) Oral at bedtime  clopidogrel Tablet 75 milliGRAM(s) Oral daily  dextrose 5%. 1000 milliLiter(s) (50 mL/Hr) IV Continuous <Continuous>  dextrose 50% Injectable 12.5 Gram(s) IV Push once  dextrose 50% Injectable 25 Gram(s) IV Push once  dextrose 50% Injectable 25 Gram(s) IV Push once  docusate sodium 100 milliGRAM(s) Oral three times a day  ertapenem  IVPB 500 milliGRAM(s) IV Intermittent every 24 hours  gabapentin 300 milliGRAM(s) Oral daily  insulin glargine Injectable (LANTUS) 34 Unit(s) SubCutaneous at bedtime  insulin lispro (HumaLOG) corrective regimen sliding scale   SubCutaneous three times a day before meals  insulin lispro (HumaLOG) corrective regimen sliding scale   SubCutaneous at bedtime  insulin lispro Injectable (HumaLOG) 7 Unit(s) SubCutaneous three times a day before meals  metoprolol tartrate 12.5 milliGRAM(s) Oral two times a day  pantoprazole    Tablet 40 milliGRAM(s) Oral two times a day  polyethylene glycol 3350 17 Gram(s) Oral two times a day  senna 2 Tablet(s) Oral at bedtime  sodium bicarbonate  Infusion 0.075 mEq/kG/Hr (75 mL/Hr) IV Continuous <Continuous>      VITALS:  T(F): 98.4 (18 @ 04:47), Max: 98.6 (05-15-18 @ 21:23)  HR: 72 (18 @ 04:47)  BP: 142/75 (18 @ 04:47)  RR: 20 (18 @ 04:47)  SpO2: 97% (18 @ 04:47)  Wt(kg): --    05-15 @ 07:01  -   @ 07:00  --------------------------------------------------------  IN: 1980 mL / OUT: 2700 mL / NET: -720 mL     @ 07:01  -   @ 14:35  --------------------------------------------------------  IN: 480 mL / OUT: 0 mL / NET: 480 mL      PHYSICAL EXAM:  Constitutional: NAD  HEENT: anicteric sclera, oropharynx clear, MMM  Neck: No JVD  Respiratory: CTAB, no wheezes, rales or rhonchi  Cardiovascular: S1, S2, RRR  Gastrointestinal: BS+, soft, NT/ND  Extremities: +peripheral edema  Neurological: A/O x 3, no focal deficits  Psychiatric: Normal mood, normal affect  : +alba    LABS:      133<L>  |  98  |  48<H>  ----------------------------<  111<H>  4.2   |  17<L>  |  4.67<H>    Ca    7.5<L>      16 May 2018 05:44  Phos  4.1       Mg     2.3         TPro  6.1  /  Alb  2.5<L>  /  TBili  0.3  /  DBili      /  AST  13  /  ALT  23  /  AlkPhos  214<H>  05-15    Creatinine Trend: 4.67 <--, 5.80 <--, 5.93 <--, 5.71 <--, 5.22 <--, 3.80 <--, 1.88 <--, 1.55 <--, 1.54 <--                        7.4    14.43 )-----------( 471      ( 16 May 2018 07:42 )             24.5     Urine Studies:  Urinalysis Basic - ( 14 May 2018 12:46 )    Color: Yellow / Appearance: Turbid / S.018 / pH:   Gluc:  / Ketone: Negative  / Bili: Negative / Urobili: Negative   Blood:  / Protein: 300 mg/dL / Nitrite: Negative   Leuk Esterase: Large / RBC: 25-50 /HPF / WBC >50 /HPF   Sq Epi:  / Non Sq Epi:  / Bacteria:       Osmolality, Random Urine: 238 mos/kg ( @ 23:08)  Sodium, Random Urine: 46 mmol/L ( @ 21:13)  Osmolality, Random Urine: 276 mos/kg ( @ 15:27)  Creatinine, Random Urine: 51 mg/dL ( @ 15:27)  Protein/Creatinine Ratio Calculation: 6.5 Ratio ( @ 15:27)  Chloride, Random Urine: 35 mmol/L ( @ 12:46)  Potassium, Random Urine: 42 mmol/L ( @ 12:46)  Sodium, Random Urine: 53 mmol/L ( @ 12:46)    RADIOLOGY & ADDITIONAL STUDIES:

## 2018-05-17 LAB
ANION GAP SERPL CALC-SCNC: 12 MMOL/L — SIGNIFICANT CHANGE UP (ref 5–17)
BUN SERPL-MCNC: 40 MG/DL — HIGH (ref 7–23)
CALCIUM SERPL-MCNC: 7.7 MG/DL — LOW (ref 8.4–10.5)
CHLORIDE SERPL-SCNC: 103 MMOL/L — SIGNIFICANT CHANGE UP (ref 96–108)
CO2 SERPL-SCNC: 20 MMOL/L — LOW (ref 22–31)
CREAT SERPL-MCNC: 3.22 MG/DL — HIGH (ref 0.5–1.3)
GLUCOSE BLDC GLUCOMTR-MCNC: 132 MG/DL — HIGH (ref 70–99)
GLUCOSE BLDC GLUCOMTR-MCNC: 178 MG/DL — HIGH (ref 70–99)
GLUCOSE BLDC GLUCOMTR-MCNC: 196 MG/DL — HIGH (ref 70–99)
GLUCOSE BLDC GLUCOMTR-MCNC: 255 MG/DL — HIGH (ref 70–99)
GLUCOSE SERPL-MCNC: 160 MG/DL — HIGH (ref 70–99)
HCT VFR BLD CALC: 24.1 % — LOW (ref 34.5–45)
HGB BLD-MCNC: 7.1 G/DL — LOW (ref 11.5–15.5)
MAGNESIUM SERPL-MCNC: 2.3 MG/DL — SIGNIFICANT CHANGE UP (ref 1.6–2.6)
MCHC RBC-ENTMCNC: 22.3 PG — LOW (ref 27–34)
MCHC RBC-ENTMCNC: 29.5 GM/DL — LOW (ref 32–36)
MCV RBC AUTO: 75.8 FL — LOW (ref 80–100)
PHOSPHATE SERPL-MCNC: 3.5 MG/DL — SIGNIFICANT CHANGE UP (ref 2.5–4.5)
PLATELET # BLD AUTO: 589 K/UL — HIGH (ref 150–400)
POTASSIUM SERPL-MCNC: 4.5 MMOL/L — SIGNIFICANT CHANGE UP (ref 3.5–5.3)
POTASSIUM SERPL-SCNC: 4.5 MMOL/L — SIGNIFICANT CHANGE UP (ref 3.5–5.3)
RBC # BLD: 3.18 M/UL — LOW (ref 3.8–5.2)
RBC # FLD: 21.5 % — HIGH (ref 10.3–14.5)
SODIUM SERPL-SCNC: 135 MMOL/L — SIGNIFICANT CHANGE UP (ref 135–145)
WBC # BLD: 9.55 K/UL — SIGNIFICANT CHANGE UP (ref 3.8–10.5)
WBC # FLD AUTO: 9.55 K/UL — SIGNIFICANT CHANGE UP (ref 3.8–10.5)

## 2018-05-17 RX ADMIN — Medication 12.5 MILLIGRAM(S): at 18:20

## 2018-05-17 RX ADMIN — ATORVASTATIN CALCIUM 40 MILLIGRAM(S): 80 TABLET, FILM COATED ORAL at 22:02

## 2018-05-17 RX ADMIN — Medication 650 MILLIGRAM(S): at 00:50

## 2018-05-17 RX ADMIN — Medication 7 UNIT(S): at 07:51

## 2018-05-17 RX ADMIN — GABAPENTIN 300 MILLIGRAM(S): 400 CAPSULE ORAL at 11:07

## 2018-05-17 RX ADMIN — Medication 50 MEQ/KG/HR: at 07:53

## 2018-05-17 RX ADMIN — Medication 2: at 18:19

## 2018-05-17 RX ADMIN — PANTOPRAZOLE SODIUM 40 MILLIGRAM(S): 20 TABLET, DELAYED RELEASE ORAL at 06:39

## 2018-05-17 RX ADMIN — Medication 100 MILLIGRAM(S): at 22:02

## 2018-05-17 RX ADMIN — CLOPIDOGREL BISULFATE 75 MILLIGRAM(S): 75 TABLET, FILM COATED ORAL at 11:07

## 2018-05-17 RX ADMIN — Medication 12.5 MILLIGRAM(S): at 06:38

## 2018-05-17 RX ADMIN — POLYETHYLENE GLYCOL 3350 17 GRAM(S): 17 POWDER, FOR SOLUTION ORAL at 18:20

## 2018-05-17 RX ADMIN — INSULIN GLARGINE 34 UNIT(S): 100 INJECTION, SOLUTION SUBCUTANEOUS at 22:02

## 2018-05-17 RX ADMIN — Medication 7 UNIT(S): at 18:19

## 2018-05-17 RX ADMIN — Medication 650 MILLIGRAM(S): at 00:18

## 2018-05-17 RX ADMIN — Medication 50 MEQ/KG/HR: at 06:39

## 2018-05-17 RX ADMIN — Medication 1: at 22:03

## 2018-05-17 RX ADMIN — SENNA PLUS 2 TABLET(S): 8.6 TABLET ORAL at 22:02

## 2018-05-17 RX ADMIN — Medication 2: at 07:51

## 2018-05-17 RX ADMIN — Medication 7 UNIT(S): at 12:04

## 2018-05-17 RX ADMIN — PANTOPRAZOLE SODIUM 40 MILLIGRAM(S): 20 TABLET, DELAYED RELEASE ORAL at 18:22

## 2018-05-17 NOTE — PROGRESS NOTE ADULT - SUBJECTIVE AND OBJECTIVE BOX
Cardiovascular Disease Progress Note    Overnight events: No acute events overnight. Ms. Tsang continues to feel weak. No chest pain or SOB.   Otherwise review of systems negative    Objective Findings:  T(C): 37 (05-16-18 @ 20:42), Max: 37 (05-16-18 @ 20:42)  HR: 65 (05-16-18 @ 20:42) (62 - 65)  BP: 157/63 (05-16-18 @ 20:42) (147/69 - 157/63)  RR: 18 (05-16-18 @ 20:42) (18 - 20)  SpO2: 96% (05-16-18 @ 20:42) (96% - 96%)  Wt(kg): --  Daily     Daily       Physical Exam:  Gen: NAD  HEENT: EOMI  CV: RRR, normal S1 + S2, no m/r/g  Lungs: CTAB  Abd: soft, non-tender  Ext: No edema    Telemetry: Sinus    Laboratory Data:                        7.4    14.43 )-----------( 471      ( 16 May 2018 07:42 )             24.5     05-17    135  |  103  |  40<H>  ----------------------------<  160<H>  4.5   |  20<L>  |  3.22<H>    Ca    7.7<L>      17 May 2018 05:42  Phos  3.5     05-17  Mg     2.3     05-17                Inpatient Medications:  MEDICATIONS  (STANDING):  atorvastatin 40 milliGRAM(s) Oral at bedtime  clopidogrel Tablet 75 milliGRAM(s) Oral daily  dextrose 5%. 1000 milliLiter(s) (50 mL/Hr) IV Continuous <Continuous>  dextrose 50% Injectable 12.5 Gram(s) IV Push once  dextrose 50% Injectable 25 Gram(s) IV Push once  dextrose 50% Injectable 25 Gram(s) IV Push once  docusate sodium 100 milliGRAM(s) Oral three times a day  ertapenem  IVPB 500 milliGRAM(s) IV Intermittent every 24 hours  gabapentin 300 milliGRAM(s) Oral daily  insulin glargine Injectable (LANTUS) 34 Unit(s) SubCutaneous at bedtime  insulin lispro (HumaLOG) corrective regimen sliding scale   SubCutaneous three times a day before meals  insulin lispro (HumaLOG) corrective regimen sliding scale   SubCutaneous at bedtime  insulin lispro Injectable (HumaLOG) 7 Unit(s) SubCutaneous three times a day before meals  metoprolol tartrate 12.5 milliGRAM(s) Oral two times a day  pantoprazole    Tablet 40 milliGRAM(s) Oral two times a day  polyethylene glycol 3350 17 Gram(s) Oral two times a day  senna 2 Tablet(s) Oral at bedtime  sodium bicarbonate  Infusion 0.05 mEq/kG/Hr (50 mL/Hr) IV Continuous <Continuous>      Assessment:  78 year old woman with CAD s/p MICHAEL x2 in 12/2017, a-fib not on AC due to GI bleed, and compensated diastolic CHF presents for weakness found to have severe sepsis bacteremia complicated by supply demand ischemia.      Plan of Care:    #Non-oliguric KOJO-  Patient with good urine output.  Electrolytes are within normal limits.  Renal f/u appreciated.    #Type II NSTEMI-  Supply demand ischemia in the setting of severe sepsis with GNR bacteremia.  Patient chest pain free.  Given recent h/o life threatening GI bleed, will hold off on further ischemic evaluation at this time.  Ms. Tsang has demonstrated that she cannot tolerate dual anti-platelet therapy.    #CAD- s/p MICHAEL x2 in 12/2017.  Only on Plavix now for multiple hospitalizations for life-threatening anemia despite stents in 12/2017.  GI input noted.    #Uncontrolled IDDM-  Sugars better controlled on Basal/bolus insulin.  Endocrine input noted.    #Paroxymal a-fib-  Currently in sinus.  No AC given h/o GI bleed.    #Compensated diastolic CHF-  Euvolemic on exam today.  Hold Lasix for severe sepsis with KOJO    ID consult appreciated- hold off on CT scan given KOJO.     Over 35 minutes spent on total encounter; more than 50% of the visit was spent counseling and/or coordinating care by the attending physician.      Julio César Hooper MD [facility]  Cardiovascular Disease  [phone] Cardiovascular Disease Progress Note    Overnight events: No acute events overnight. Ms. Tsang continues to feel weak. No chest pain or SOB.   Otherwise review of systems negative    Objective Findings:  T(C): 37 (05-16-18 @ 20:42), Max: 37 (05-16-18 @ 20:42)  HR: 65 (05-16-18 @ 20:42) (62 - 65)  BP: 157/63 (05-16-18 @ 20:42) (147/69 - 157/63)  RR: 18 (05-16-18 @ 20:42) (18 - 20)  SpO2: 96% (05-16-18 @ 20:42) (96% - 96%)  Wt(kg): --  Daily     Daily       Physical Exam:  Gen: NAD  HEENT: EOMI  CV: RRR, normal S1 + S2, no m/r/g  Lungs: CTAB  Abd: soft, non-tender  Ext: 1+ pedal edema    Telemetry: Sinus    Laboratory Data:                        7.4    14.43 )-----------( 471      ( 16 May 2018 07:42 )             24.5     05-17    135  |  103  |  40<H>  ----------------------------<  160<H>  4.5   |  20<L>  |  3.22<H>    Ca    7.7<L>      17 May 2018 05:42  Phos  3.5     05-17  Mg     2.3     05-17                Inpatient Medications:  MEDICATIONS  (STANDING):  atorvastatin 40 milliGRAM(s) Oral at bedtime  clopidogrel Tablet 75 milliGRAM(s) Oral daily  dextrose 5%. 1000 milliLiter(s) (50 mL/Hr) IV Continuous <Continuous>  dextrose 50% Injectable 12.5 Gram(s) IV Push once  dextrose 50% Injectable 25 Gram(s) IV Push once  dextrose 50% Injectable 25 Gram(s) IV Push once  docusate sodium 100 milliGRAM(s) Oral three times a day  ertapenem  IVPB 500 milliGRAM(s) IV Intermittent every 24 hours  gabapentin 300 milliGRAM(s) Oral daily  insulin glargine Injectable (LANTUS) 34 Unit(s) SubCutaneous at bedtime  insulin lispro (HumaLOG) corrective regimen sliding scale   SubCutaneous three times a day before meals  insulin lispro (HumaLOG) corrective regimen sliding scale   SubCutaneous at bedtime  insulin lispro Injectable (HumaLOG) 7 Unit(s) SubCutaneous three times a day before meals  metoprolol tartrate 12.5 milliGRAM(s) Oral two times a day  pantoprazole    Tablet 40 milliGRAM(s) Oral two times a day  polyethylene glycol 3350 17 Gram(s) Oral two times a day  senna 2 Tablet(s) Oral at bedtime  sodium bicarbonate  Infusion 0.05 mEq/kG/Hr (50 mL/Hr) IV Continuous <Continuous>      Assessment:  78 year old woman with CAD s/p MICHAEL x2 in 12/2017, a-fib not on AC due to GI bleed, and compensated diastolic CHF presents for weakness found to have severe sepsis bacteremia complicated by supply demand ischemia.      Plan of Care:    #Non-oliguric KOJO-  Patient with good urine output.  Electrolytes are within normal limits.  Renal f/u appreciated.    #Compensated diastolic CHF-  Patient with increasing pedal edema.   Consider stopping IV fluids now that KOJO is improving.    #Type II NSTEMI-  Supply demand ischemia in the setting of severe sepsis with GNR bacteremia.  Patient chest pain free.  Given recent h/o life threatening GI bleed, will hold off on further ischemic evaluation at this time.  Ms. Tsang has demonstrated that she cannot tolerate dual anti-platelet therapy.    #CAD- s/p MICHAEL x2 in 12/2017.  Only on Plavix now for multiple hospitalizations for life-threatening anemia despite stents in 12/2017.  GI input noted.    #Uncontrolled IDDM-  Sugars better controlled on Basal/bolus insulin.  Endocrine input noted.    #Paroxymal a-fib-  Currently in sinus.  No AC given h/o GI bleed.  ID consult appreciated- hold off on CT scan given KOJO.     Over 35 minutes spent on total encounter; more than 50% of the visit was spent counseling and/or coordinating care by the attending physician.      Julio César Hooper MD [facility]  Cardiovascular Disease  [phone] Cardiovascular Disease Progress Note    Overnight events: No acute events overnight. Ms. Tsang continues to feel weak. No chest pain or SOB.   Otherwise review of systems negative    Objective Findings:  T(C): 37 (05-16-18 @ 20:42), Max: 37 (05-16-18 @ 20:42)  HR: 65 (05-16-18 @ 20:42) (62 - 65)  BP: 157/63 (05-16-18 @ 20:42) (147/69 - 157/63)  RR: 18 (05-16-18 @ 20:42) (18 - 20)  SpO2: 96% (05-16-18 @ 20:42) (96% - 96%)  Wt(kg): --  Daily     Daily       Physical Exam:  Gen: NAD  HEENT: EOMI  CV: RRR, normal S1 + S2, no m/r/g  Lungs: CTAB  Abd: soft, non-tender  Ext: 1+ pedal edema    Telemetry: Sinus    Laboratory Data:                        7.4    14.43 )-----------( 471      ( 16 May 2018 07:42 )             24.5     05-17    135  |  103  |  40<H>  ----------------------------<  160<H>  4.5   |  20<L>  |  3.22<H>    Ca    7.7<L>      17 May 2018 05:42  Phos  3.5     05-17  Mg     2.3     05-17                Inpatient Medications:  MEDICATIONS  (STANDING):  atorvastatin 40 milliGRAM(s) Oral at bedtime  clopidogrel Tablet 75 milliGRAM(s) Oral daily  dextrose 5%. 1000 milliLiter(s) (50 mL/Hr) IV Continuous <Continuous>  dextrose 50% Injectable 12.5 Gram(s) IV Push once  dextrose 50% Injectable 25 Gram(s) IV Push once  dextrose 50% Injectable 25 Gram(s) IV Push once  docusate sodium 100 milliGRAM(s) Oral three times a day  ertapenem  IVPB 500 milliGRAM(s) IV Intermittent every 24 hours  gabapentin 300 milliGRAM(s) Oral daily  insulin glargine Injectable (LANTUS) 34 Unit(s) SubCutaneous at bedtime  insulin lispro (HumaLOG) corrective regimen sliding scale   SubCutaneous three times a day before meals  insulin lispro (HumaLOG) corrective regimen sliding scale   SubCutaneous at bedtime  insulin lispro Injectable (HumaLOG) 7 Unit(s) SubCutaneous three times a day before meals  metoprolol tartrate 12.5 milliGRAM(s) Oral two times a day  pantoprazole    Tablet 40 milliGRAM(s) Oral two times a day  polyethylene glycol 3350 17 Gram(s) Oral two times a day  senna 2 Tablet(s) Oral at bedtime  sodium bicarbonate  Infusion 0.05 mEq/kG/Hr (50 mL/Hr) IV Continuous <Continuous>      Assessment:  78 year old woman with CAD s/p MICHAEL x2 in 12/2017, a-fib not on AC due to GI bleed, and compensated diastolic CHF presents for weakness found to have severe sepsis bacteremia complicated by supply demand ischemia.      Plan of Care:    #Non-oliguric KOJO-  Patient with good urine output.  Electrolytes are within normal limits.  Renal f/u appreciated.    #Compensated diastolic CHF-  Will discontinue IV fluid, as patient now has pedal edema.  Plan discussed with nephrology.     #Type II NSTEMI-  Supply demand ischemia in the setting of severe sepsis with GNR bacteremia.  Patient chest pain free.  Given recent h/o life threatening GI bleed, will hold off on further ischemic evaluation at this time.  Ms. Tsang has demonstrated that she cannot tolerate dual anti-platelet therapy.    #CAD- s/p MICHAEL x2 in 12/2017.  Only on Plavix now for multiple hospitalizations for life-threatening anemia despite stents in 12/2017.  GI input noted.    #Uncontrolled IDDM-  Sugars better controlled on Basal/bolus insulin.  Endocrine input noted.    #Paroxymal a-fib-  Currently in sinus.  No AC given h/o GI bleed.  ID consult appreciated- hold off on CT scan given KOJO.     Over 35 minutes spent on total encounter; more than 50% of the visit was spent counseling and/or coordinating care by the attending physician.      Julio César Hooper MD [facility]  Cardiovascular Disease  [phone] Cardiovascular Disease Progress Note    Overnight events: No acute events overnight. Ms. Tsang continues to feel weak. No chest pain or SOB.   Otherwise review of systems negative    Objective Findings:  T(C): 37 (05-16-18 @ 20:42), Max: 37 (05-16-18 @ 20:42)  HR: 65 (05-16-18 @ 20:42) (62 - 65)  BP: 157/63 (05-16-18 @ 20:42) (147/69 - 157/63)  RR: 18 (05-16-18 @ 20:42) (18 - 20)  SpO2: 96% (05-16-18 @ 20:42) (96% - 96%)  Wt(kg): --  Daily     Daily       Physical Exam:  Gen: NAD  HEENT: EOMI  CV: RRR, normal S1 + S2, no m/r/g  Lungs: CTAB  Abd: soft, non-tender  Ext: 1+ pedal edema    Telemetry: Sinus    Laboratory Data:                        7.4    14.43 )-----------( 471      ( 16 May 2018 07:42 )             24.5     05-17    135  |  103  |  40<H>  ----------------------------<  160<H>  4.5   |  20<L>  |  3.22<H>    Ca    7.7<L>      17 May 2018 05:42  Phos  3.5     05-17  Mg     2.3     05-17                Inpatient Medications:  MEDICATIONS  (STANDING):  atorvastatin 40 milliGRAM(s) Oral at bedtime  clopidogrel Tablet 75 milliGRAM(s) Oral daily  dextrose 5%. 1000 milliLiter(s) (50 mL/Hr) IV Continuous <Continuous>  dextrose 50% Injectable 12.5 Gram(s) IV Push once  dextrose 50% Injectable 25 Gram(s) IV Push once  dextrose 50% Injectable 25 Gram(s) IV Push once  docusate sodium 100 milliGRAM(s) Oral three times a day  ertapenem  IVPB 500 milliGRAM(s) IV Intermittent every 24 hours  gabapentin 300 milliGRAM(s) Oral daily  insulin glargine Injectable (LANTUS) 34 Unit(s) SubCutaneous at bedtime  insulin lispro (HumaLOG) corrective regimen sliding scale   SubCutaneous three times a day before meals  insulin lispro (HumaLOG) corrective regimen sliding scale   SubCutaneous at bedtime  insulin lispro Injectable (HumaLOG) 7 Unit(s) SubCutaneous three times a day before meals  metoprolol tartrate 12.5 milliGRAM(s) Oral two times a day  pantoprazole    Tablet 40 milliGRAM(s) Oral two times a day  polyethylene glycol 3350 17 Gram(s) Oral two times a day  senna 2 Tablet(s) Oral at bedtime  sodium bicarbonate  Infusion 0.05 mEq/kG/Hr (50 mL/Hr) IV Continuous <Continuous>      Assessment:  78 year old woman with CAD s/p MICHAEL x2 in 12/2017, a-fib not on AC due to GI bleed, and compensated diastolic CHF presents for weakness found to have severe sepsis bacteremia complicated by supply demand ischemia.      Plan of Care:    #Non-oliguric KOJO-  Patient with good urine output.  Electrolytes are within normal limits.  Renal f/u appreciated.    #Compensated diastolic CHF-  Will discontinue IV fluid, as patient now has pedal edema.  Plan discussed with nephrology.     #Type II NSTEMI-  Supply demand ischemia in the setting of severe sepsis with GNR bacteremia.  Patient chest pain free.  Given recent h/o life threatening GI bleed, will hold off on further ischemic evaluation at this time.  Ms. Tsang has demonstrated that she cannot tolerate dual anti-platelet therapy.    #CAD- s/p MICHAEL x2 in 12/2017.  Only on Plavix now for multiple hospitalizations for life-threatening anemia despite stents in 12/2017.  GI input noted.    #Uncontrolled IDDM-  Sugars better controlled on Basal/bolus insulin.  Endocrine input noted.    #Paroxymal a-fib-  Currently in sinus.  No AC given h/o GI bleed.  Consider pRBC transfusion.   ID consult appreciated- hold off on CT scan given KOJO.     Over 35 minutes spent on total encounter; more than 50% of the visit was spent counseling and/or coordinating care by the attending physician.      Julio César Hooper MD [facility]  Cardiovascular Disease  [phone]

## 2018-05-17 NOTE — PROGRESS NOTE ADULT - SUBJECTIVE AND OBJECTIVE BOX
Patient is a 78y old  Female who presents with a chief complaint of bilateral lower back pain (11 May 2018 21:29)      SUBJECTIVE / OVERNIGHT EVENTS:   Feels better.  Denies CP/SOB/Palpitation/HA.    MEDICATIONS  (STANDING):  atorvastatin 40 milliGRAM(s) Oral at bedtime  clopidogrel Tablet 75 milliGRAM(s) Oral daily  dextrose 50% Injectable 12.5 Gram(s) IV Push once  dextrose 50% Injectable 25 Gram(s) IV Push once  dextrose 50% Injectable 25 Gram(s) IV Push once  docusate sodium 100 milliGRAM(s) Oral three times a day  ertapenem  IVPB 500 milliGRAM(s) IV Intermittent every 24 hours  gabapentin 300 milliGRAM(s) Oral daily  insulin glargine Injectable (LANTUS) 34 Unit(s) SubCutaneous at bedtime  insulin lispro (HumaLOG) corrective regimen sliding scale   SubCutaneous three times a day before meals  insulin lispro (HumaLOG) corrective regimen sliding scale   SubCutaneous at bedtime  insulin lispro Injectable (HumaLOG) 7 Unit(s) SubCutaneous three times a day before meals  metoprolol tartrate 12.5 milliGRAM(s) Oral two times a day  pantoprazole    Tablet 40 milliGRAM(s) Oral two times a day  polyethylene glycol 3350 17 Gram(s) Oral two times a day  senna 2 Tablet(s) Oral at bedtime    MEDICATIONS  (PRN):  acetaminophen   Tablet. 650 milliGRAM(s) Oral every 6 hours PRN Mild Pain (1 - 3)  dextrose 40% Gel 15 Gram(s) Oral once PRN Blood Glucose LESS THAN 70 milliGRAM(s)/deciliter  glucagon  Injectable 1 milliGRAM(s) IntraMuscular once PRN Glucose LESS THAN 70 milligrams/deciliter  morphine  - Injectable 2 milliGRAM(s) IV Push every 6 hours PRN Moderate to Severe pain  senna 2 Tablet(s) Oral at bedtime PRN Constipation        CAPILLARY BLOOD GLUCOSE      POCT Blood Glucose.: 255 mg/dL (17 May 2018 21:54)  POCT Blood Glucose.: 196 mg/dL (17 May 2018 17:27)  POCT Blood Glucose.: 132 mg/dL (17 May 2018 11:35)  POCT Blood Glucose.: 178 mg/dL (17 May 2018 07:17)    I&O's Summary    16 May 2018 07:01  -  17 May 2018 07:00  --------------------------------------------------------  IN: 1980 mL / OUT: 3350 mL / NET: -1370 mL    17 May 2018 07:01  -  17 May 2018 23:43  --------------------------------------------------------  IN: 730 mL / OUT: 1150 mL / NET: -420 mL        PHYSICAL EXAM:  GENERAL: NAD, well-developed  HEAD:  Atraumatic, Normocephalic  NECK: Supple, No JVD  CHEST/LUNG: Clear to auscultation bilaterally; No wheezing.  HEART: Regular rate and rhythm; No murmurs, rubs, or gallops  ABDOMEN: Soft, Nontender, Nondistended; Bowel sounds present  EXTREMITIES:   No clubbing, cyanosis, or edema  NEUROLOGY: AAO X 3  SKIN: No rashes    LABS:                        7.1    9.55  )-----------( 589      ( 17 May 2018 07:42 )             24.1     05-17    135  |  103  |  40<H>  ----------------------------<  160<H>  4.5   |  20<L>  |  3.22<H>    Ca    7.7<L>      17 May 2018 05:42  Phos  3.5     05-17  Mg     2.3     05-17              CAPILLARY BLOOD GLUCOSE      POCT Blood Glucose.: 255 mg/dL (17 May 2018 21:54)  POCT Blood Glucose.: 196 mg/dL (17 May 2018 17:27)  POCT Blood Glucose.: 132 mg/dL (17 May 2018 11:35)  POCT Blood Glucose.: 178 mg/dL (17 May 2018 07:17)    05-15 @ 09:15  Culture-urine --  Culture results   No growth to date.  method type --  Organism --  Organism Identification --  Specimen source .Blood Blood-Peripheral  05-14 @ 16:49  Culture-urine --  Culture results   No growth to date.  method type --  Organism --  Organism Identification --  Specimen source .Blood Blood-Peripheral  05-14 @ 16:42  Culture-urine --  Culture results   No growth to date.  method type --  Organism --  Organism Identification --  Specimen source .Blood Blood-Peripheral  05-11 @ 22:45  Culture-urine --  Culture results   Growth in aerobic and anaerobic bottles: Enterobacter aerogenes  See previous culture [id number]  method type PCR  Organism Blood Culture PCR  Organism Identification Blood Culture PCR  Enterobacter aerogenes  Specimen source .Blood Blood-Peripheral  05-11 @ 19:49  Culture-urine --  Culture results   >100,000 CFU/ml Enterobacter aerogenes  method type CAITLYN  Organism Enterobacter aerogenes  Organism Identification Enterobacter aerogenes  Specimen source .Urine Clean Catch (Midstream)           05-15 @ 09:15  Culture blood --  Culture results   No growth to date.  Gram stain --  Gram stain blood --  Method type --  Organism --  Organism identification --  Specimen source .Blood Blood-Peripheral   05-14 @ 16:49  Culture blood --  Culture results   No growth to date.  Gram stain --  Gram stain blood --  Method type --  Organism --  Organism identification --  Specimen source .Blood Blood-Peripheral   05-14 @ 16:42  Culture blood --  Culture results   No growth to date.  Gram stain --  Gram stain blood --  Method type --  Organism --  Organism identification --  Specimen source .Blood BloodPeripheral   05-11 @ 22:45  Culture blood --  Culture results   Growth in aerobic and anaerobic bottles: Enterobacter aerogenes  See previous culture [id number]  Gram stain   Growth in anaerobic bottle: Gram Negative Rods  Growth in aerobic bottle: Gram Negative Rods  Gram stain blood --  Method type PCR  Organism Blood Culture PCR  Organism identification Blood Culture PCR  Enterobacter aerogenes  Specimen source .Blood Blood-Peripheral   05-11 @ 19:49  Culture blood --  Culture results   >100,000 CFU/ml Enterobacter aerogenes  Gram stain --  Gram stain blood --  Method type CAITLYN  Organism Enterobacter aerogenes  Organism identification Enterobacter aerogenes  Specimen source .Urine Clean Catch (Midstream)      RADIOLOGY & ADDITIONAL TESTS:    Imaging Personally Reviewed:    Consultant(s) Notes Reviewed:      Care Discussed with Consultants/Other Providers:

## 2018-05-17 NOTE — PROGRESS NOTE ADULT - SUBJECTIVE AND OBJECTIVE BOX
Infectious Diseases progress note:    Subjective: Pt states she feels sleepy.  Sylvester removed.  Afebrile.  No back pain    ROS:  CONSTITUTIONAL:  No fever, chills, rigors  CARDIOVASCULAR:  No chest pain or palpitations  RESPIRATORY:   No SOB, cough, dyspnea on exertion.  No wheezing  GASTROINTESTINAL:  No abd pain, N/V, diarrhea/constipation  EXTREMITIES:  No swelling or joint pain  GENITOURINARY:  No burning on urination, increased frequency or urgency.  No flank pain  NEUROLOGIC:  No HA, visual disturbances  SKIN: No rashes    Allergies    Carrots (Rash)  No Known Drug Allergies    Intolerances        ANTIBIOTICS/RELEVANT:  antimicrobials  ertapenem  IVPB 500 milliGRAM(s) IV Intermittent every 24 hours    immunologic:    OTHER:  acetaminophen   Tablet. 650 milliGRAM(s) Oral every 6 hours PRN  atorvastatin 40 milliGRAM(s) Oral at bedtime  clopidogrel Tablet 75 milliGRAM(s) Oral daily  dextrose 40% Gel 15 Gram(s) Oral once PRN  dextrose 50% Injectable 12.5 Gram(s) IV Push once  dextrose 50% Injectable 25 Gram(s) IV Push once  dextrose 50% Injectable 25 Gram(s) IV Push once  docusate sodium 100 milliGRAM(s) Oral three times a day  gabapentin 300 milliGRAM(s) Oral daily  glucagon  Injectable 1 milliGRAM(s) IntraMuscular once PRN  insulin glargine Injectable (LANTUS) 34 Unit(s) SubCutaneous at bedtime  insulin lispro (HumaLOG) corrective regimen sliding scale   SubCutaneous three times a day before meals  insulin lispro (HumaLOG) corrective regimen sliding scale   SubCutaneous at bedtime  insulin lispro Injectable (HumaLOG) 7 Unit(s) SubCutaneous three times a day before meals  metoprolol tartrate 12.5 milliGRAM(s) Oral two times a day  morphine  - Injectable 2 milliGRAM(s) IV Push every 6 hours PRN  pantoprazole    Tablet 40 milliGRAM(s) Oral two times a day  polyethylene glycol 3350 17 Gram(s) Oral two times a day  senna 2 Tablet(s) Oral at bedtime  senna 2 Tablet(s) Oral at bedtime PRN      Objective:  Vital Signs Last 24 Hrs  T(C): 36.9 (17 May 2018 14:43), Max: 37 (16 May 2018 20:42)  T(F): 98.4 (17 May 2018 14:43), Max: 98.6 (16 May 2018 20:42)  HR: 76 (17 May 2018 14:43) (65 - 76)  BP: 158/75 (17 May 2018 14:43) (157/63 - 158/75)  BP(mean): --  RR: 17 (17 May 2018 14:43) (17 - 18)  SpO2: 99% (17 May 2018 14:43) (96% - 99%)    PHYSICAL EXAM:  Constitutional:NAD  Eyes:EDIN, EOMI  Ear/Nose/Throat: no thrush, mucositis.  Moist mucous membranes	  Neck:no JVD, no lymphadenopathy, supple  Respiratory: CTA carlos manuel  Cardiovascular: S1S2 RRR, no murmurs  Gastrointestinal:soft, nontender,  nondistended (+) BS  Extremities:no e/e/c  Skin:  no rashes, open wounds or ulcerations        LABS:                        7.1    9.55  )-----------( 589      ( 17 May 2018 07:42 )             24.1     05-17    135  |  103  |  40<H>  ----------------------------<  160<H>  4.5   |  20<L>  |  3.22<H>    Ca    7.7<L>      17 May 2018 05:42  Phos  3.5     05-17  Mg     2.3     05-17          MICROBIOLOGY:    Culture - Blood in AM (05.15.18 @ 09:15)    Specimen Source: .Blood Blood-Peripheral    Culture Results:   No growth to date.    Culture - Blood (05.14.18 @ 16:49)    Specimen Source: .Blood Blood-Peripheral    Culture Results:   No growth to date.    Culture - Blood (05.14.18 @ 16:42)    Specimen Source: .Blood Blood-Peripheral    Culture Results:   No growth to date.          RADIOLOGY & ADDITIONAL STUDIES:

## 2018-05-17 NOTE — PROGRESS NOTE ADULT - SUBJECTIVE AND OBJECTIVE BOX
Chief complaint  Patient is a 78y old  Female who presents with a chief complaint of bilateral lower back pain (11 May 2018 21:29)   Review of systems  Patient in bed, looks comfortable, no fever, no hypoglycemia.    Labs and Fingersticks  CAPILLARY BLOOD GLUCOSE      POCT Blood Glucose.: 196 mg/dL (17 May 2018 17:27)  POCT Blood Glucose.: 132 mg/dL (17 May 2018 11:35)  POCT Blood Glucose.: 178 mg/dL (17 May 2018 07:17)  POCT Blood Glucose.: 182 mg/dL (16 May 2018 21:06)      Anion Gap, Serum: 12 (05-17 @ 05:42)  Anion Gap, Serum: 18 <H> (05-16 @ 05:44)      Calcium, Total Serum: 7.7 <L> (05-17 @ 05:42)  Calcium, Total Serum: 7.5 <L> (05-16 @ 05:44)          05-17    135  |  103  |  40<H>  ----------------------------<  160<H>  4.5   |  20<L>  |  3.22<H>    Ca    7.7<L>      17 May 2018 05:42  Phos  3.5     05-17  Mg     2.3     05-17                          7.1    9.55  )-----------( 589      ( 17 May 2018 07:42 )             24.1     Medications  MEDICATIONS  (STANDING):  atorvastatin 40 milliGRAM(s) Oral at bedtime  clopidogrel Tablet 75 milliGRAM(s) Oral daily  dextrose 50% Injectable 12.5 Gram(s) IV Push once  dextrose 50% Injectable 25 Gram(s) IV Push once  dextrose 50% Injectable 25 Gram(s) IV Push once  docusate sodium 100 milliGRAM(s) Oral three times a day  ertapenem  IVPB 500 milliGRAM(s) IV Intermittent every 24 hours  gabapentin 300 milliGRAM(s) Oral daily  insulin glargine Injectable (LANTUS) 34 Unit(s) SubCutaneous at bedtime  insulin lispro (HumaLOG) corrective regimen sliding scale   SubCutaneous three times a day before meals  insulin lispro (HumaLOG) corrective regimen sliding scale   SubCutaneous at bedtime  insulin lispro Injectable (HumaLOG) 7 Unit(s) SubCutaneous three times a day before meals  metoprolol tartrate 12.5 milliGRAM(s) Oral two times a day  pantoprazole    Tablet 40 milliGRAM(s) Oral two times a day  polyethylene glycol 3350 17 Gram(s) Oral two times a day  senna 2 Tablet(s) Oral at bedtime      Physical Exam  General: Patient comfortable in bed  Vital Signs Last 12 Hrs  T(F): 98.4 (05-17-18 @ 14:43), Max: 98.4 (05-17-18 @ 14:43)  HR: 76 (05-17-18 @ 14:43) (76 - 76)  BP: 158/75 (05-17-18 @ 14:43) (158/75 - 158/75)  BP(mean): --  RR: 17 (05-17-18 @ 14:43) (17 - 17)  SpO2: 99% (05-17-18 @ 14:43) (99% - 99%)  Neck: No palpable thyroid nodules.  CVS: S1S2, No murmurs  Respiratory: No wheezing, no crepitations  GI: Abdomen soft, bowel sounds positive  Musculoskeletal:  edema lower extremities.   Skin: No skin rashes, no ecchymosis    Diagnostics

## 2018-05-17 NOTE — PROGRESS NOTE ADULT - SUBJECTIVE AND OBJECTIVE BOX
Interval Events:Pt is without GI complaints. She denies abdominal pain, N/V/D/C, BRBPR/melena.     MEDICATIONS  (STANDING):  atorvastatin 40 milliGRAM(s) Oral at bedtime  clopidogrel Tablet 75 milliGRAM(s) Oral daily  dextrose 50% Injectable 12.5 Gram(s) IV Push once  dextrose 50% Injectable 25 Gram(s) IV Push once  dextrose 50% Injectable 25 Gram(s) IV Push once  docusate sodium 100 milliGRAM(s) Oral three times a day  ertapenem  IVPB 500 milliGRAM(s) IV Intermittent every 24 hours  gabapentin 300 milliGRAM(s) Oral daily  insulin glargine Injectable (LANTUS) 34 Unit(s) SubCutaneous at bedtime  insulin lispro (HumaLOG) corrective regimen sliding scale   SubCutaneous three times a day before meals  insulin lispro (HumaLOG) corrective regimen sliding scale   SubCutaneous at bedtime  insulin lispro Injectable (HumaLOG) 7 Unit(s) SubCutaneous three times a day before meals  metoprolol tartrate 12.5 milliGRAM(s) Oral two times a day  pantoprazole    Tablet 40 milliGRAM(s) Oral two times a day  polyethylene glycol 3350 17 Gram(s) Oral two times a day  senna 2 Tablet(s) Oral at bedtime    MEDICATIONS  (PRN):  acetaminophen   Tablet. 650 milliGRAM(s) Oral every 6 hours PRN Mild Pain (1 - 3)  dextrose 40% Gel 15 Gram(s) Oral once PRN Blood Glucose LESS THAN 70 milliGRAM(s)/deciliter  glucagon  Injectable 1 milliGRAM(s) IntraMuscular once PRN Glucose LESS THAN 70 milligrams/deciliter  morphine  - Injectable 2 milliGRAM(s) IV Push every 6 hours PRN Moderate to Severe pain  senna 2 Tablet(s) Oral at bedtime PRN Constipation      Allergies    Carrots (Rash)  No Known Drug Allergies    Intolerances        Review of Systems:    General:  No wt loss, fevers, chills, night sweats,fatigue,   Eyes:  Good vision, no reported pain  ENT:  No sore throat, pain, runny nose, dysphagia  CV:  No pain, palpitations, hypo/hypertension  Resp:  No dyspnea, cough, tachypnea, wheezing  GI:  No pain, No nausea, No vomiting, No diarrhea, No constipation, No weight loss, No fever, No pruritis, No rectal bleeding, No melena, No dysphagia  :  No pain, bleeding, incontinence, nocturia  Muscle:  No pain, weakness  Neuro:  No weakness, tingling, memory problems  Psych:  No fatigue, insomnia, mood problems, depression  Endocrine:  No polyuria, polydypsia, cold/heat intolerance  Heme:  No petechiae, ecchymosis, easy bruisability  Skin:  No rash, tattoos, scars, edema      Vital Signs Last 24 Hrs  T(C): 37 (16 May 2018 20:42), Max: 37 (16 May 2018 20:42)  T(F): 98.6 (16 May 2018 20:42), Max: 98.6 (16 May 2018 20:42)  HR: 65 (16 May 2018 20:42) (62 - 65)  BP: 157/63 (16 May 2018 20:42) (147/69 - 157/63)  BP(mean): --  RR: 18 (16 May 2018 20:42) (18 - 20)  SpO2: 96% (16 May 2018 20:42) (96% - 96%)    PHYSICAL EXAM:    Constitutional: NAD, well-developed  HEENT: EOMI, throat clear  Neck: No LAD, supple  Respiratory: CTA and P  Cardiovascular: S1 and S2, RRR, no M  Gastrointestinal: BS+, soft, NT/ND, neg HSM,  Extremities: No peripheral edema, neg clubing, cyanosis  Vascular: 2+ peripheral pulses  Neurological: A/O x 3, no focal deficits  Psychiatric: Normal mood, normal affect  Skin: No rashes      LABS:                        7.1    9.55  )-----------( 589      ( 17 May 2018 07:42 )             24.1     05-17    135  |  103  |  40<H>  ----------------------------<  160<H>  4.5   |  20<L>  |  3.22<H>    Ca    7.7<L>      17 May 2018 05:42  Phos  3.5     05-17  Mg     2.3     05-17            RADIOLOGY & ADDITIONAL TESTS:

## 2018-05-18 DIAGNOSIS — E87.0 HYPEROSMOLALITY AND HYPERNATREMIA: ICD-10-CM

## 2018-05-18 LAB
ANION GAP SERPL CALC-SCNC: 17 MMOL/L — SIGNIFICANT CHANGE UP (ref 5–17)
BUN SERPL-MCNC: 27 MG/DL — HIGH (ref 7–23)
CALCIUM SERPL-MCNC: 9.1 MG/DL — SIGNIFICANT CHANGE UP (ref 8.4–10.5)
CHLORIDE SERPL-SCNC: 109 MMOL/L — HIGH (ref 96–108)
CO2 SERPL-SCNC: 20 MMOL/L — LOW (ref 22–31)
CREAT SERPL-MCNC: 2.45 MG/DL — HIGH (ref 0.5–1.3)
GLUCOSE BLDC GLUCOMTR-MCNC: 102 MG/DL — HIGH (ref 70–99)
GLUCOSE BLDC GLUCOMTR-MCNC: 186 MG/DL — HIGH (ref 70–99)
GLUCOSE BLDC GLUCOMTR-MCNC: 209 MG/DL — HIGH (ref 70–99)
GLUCOSE BLDC GLUCOMTR-MCNC: 94 MG/DL — SIGNIFICANT CHANGE UP (ref 70–99)
GLUCOSE SERPL-MCNC: 91 MG/DL — SIGNIFICANT CHANGE UP (ref 70–99)
HCT VFR BLD CALC: 26 % — LOW (ref 34.5–45)
HGB BLD-MCNC: 7.5 G/DL — LOW (ref 11.5–15.5)
MCHC RBC-ENTMCNC: 22.1 PG — LOW (ref 27–34)
MCHC RBC-ENTMCNC: 28.8 GM/DL — LOW (ref 32–36)
MCV RBC AUTO: 76.7 FL — LOW (ref 80–100)
PLATELET # BLD AUTO: 665 K/UL — HIGH (ref 150–400)
POTASSIUM SERPL-MCNC: 5 MMOL/L — SIGNIFICANT CHANGE UP (ref 3.5–5.3)
POTASSIUM SERPL-SCNC: 5 MMOL/L — SIGNIFICANT CHANGE UP (ref 3.5–5.3)
RBC # BLD: 3.39 M/UL — LOW (ref 3.8–5.2)
RBC # FLD: 21.5 % — HIGH (ref 10.3–14.5)
SODIUM SERPL-SCNC: 146 MMOL/L — HIGH (ref 135–145)
WBC # BLD: 14.49 K/UL — HIGH (ref 3.8–10.5)
WBC # FLD AUTO: 14.49 K/UL — HIGH (ref 3.8–10.5)

## 2018-05-18 PROCEDURE — 74176 CT ABD & PELVIS W/O CONTRAST: CPT | Mod: 26

## 2018-05-18 RX ADMIN — Medication 100 MILLIGRAM(S): at 05:30

## 2018-05-18 RX ADMIN — ERTAPENEM SODIUM 100 MILLIGRAM(S): 1 INJECTION, POWDER, LYOPHILIZED, FOR SOLUTION INTRAMUSCULAR; INTRAVENOUS at 22:31

## 2018-05-18 RX ADMIN — Medication 7 UNIT(S): at 13:11

## 2018-05-18 RX ADMIN — ATORVASTATIN CALCIUM 40 MILLIGRAM(S): 80 TABLET, FILM COATED ORAL at 21:36

## 2018-05-18 RX ADMIN — Medication 12.5 MILLIGRAM(S): at 05:30

## 2018-05-18 RX ADMIN — Medication 650 MILLIGRAM(S): at 22:30

## 2018-05-18 RX ADMIN — PANTOPRAZOLE SODIUM 40 MILLIGRAM(S): 20 TABLET, DELAYED RELEASE ORAL at 05:30

## 2018-05-18 RX ADMIN — GABAPENTIN 300 MILLIGRAM(S): 400 CAPSULE ORAL at 13:11

## 2018-05-18 RX ADMIN — CLOPIDOGREL BISULFATE 75 MILLIGRAM(S): 75 TABLET, FILM COATED ORAL at 13:11

## 2018-05-18 RX ADMIN — ERTAPENEM SODIUM 100 MILLIGRAM(S): 1 INJECTION, POWDER, LYOPHILIZED, FOR SOLUTION INTRAMUSCULAR; INTRAVENOUS at 00:44

## 2018-05-18 RX ADMIN — INSULIN GLARGINE 34 UNIT(S): 100 INJECTION, SOLUTION SUBCUTANEOUS at 21:41

## 2018-05-18 RX ADMIN — Medication 12.5 MILLIGRAM(S): at 17:46

## 2018-05-18 RX ADMIN — PANTOPRAZOLE SODIUM 40 MILLIGRAM(S): 20 TABLET, DELAYED RELEASE ORAL at 17:46

## 2018-05-18 RX ADMIN — Medication 2: at 17:46

## 2018-05-18 RX ADMIN — Medication 650 MILLIGRAM(S): at 21:36

## 2018-05-18 RX ADMIN — POLYETHYLENE GLYCOL 3350 17 GRAM(S): 17 POWDER, FOR SOLUTION ORAL at 05:30

## 2018-05-18 NOTE — PROGRESS NOTE ADULT - SUBJECTIVE AND OBJECTIVE BOX
Chief complaint  Patient is a 78y old  Female who presents with a chief complaint of bilateral lower back pain (11 May 2018 21:29)   Review of systems  Patient in bed, looks comfortable, no fever, no hypoglycemia.    Labs and Fingersticks  CAPILLARY BLOOD GLUCOSE      POCT Blood Glucose.: 102 mg/dL (18 May 2018 11:45)  POCT Blood Glucose.: 94 mg/dL (18 May 2018 07:25)  POCT Blood Glucose.: 255 mg/dL (17 May 2018 21:54)  POCT Blood Glucose.: 196 mg/dL (17 May 2018 17:27)      Anion Gap, Serum: 17 (05-18 @ 08:54)  Anion Gap, Serum: 12 (05-17 @ 05:42)      Calcium, Total Serum: 9.1 (05-18 @ 08:54)  Calcium, Total Serum: 7.7 <L> (05-17 @ 05:42)          05-18    146<H>  |  109<H>  |  27<H>  ----------------------------<  91  5.0   |  20<L>  |  2.45<H>    Ca    9.1      18 May 2018 08:54  Phos  3.5     05-17  Mg     2.3     05-17                          7.5    14.49 )-----------( 665      ( 18 May 2018 11:16 )             26.0     Medications  MEDICATIONS  (STANDING):  atorvastatin 40 milliGRAM(s) Oral at bedtime  clopidogrel Tablet 75 milliGRAM(s) Oral daily  dextrose 50% Injectable 12.5 Gram(s) IV Push once  dextrose 50% Injectable 25 Gram(s) IV Push once  dextrose 50% Injectable 25 Gram(s) IV Push once  docusate sodium 100 milliGRAM(s) Oral three times a day  ertapenem  IVPB 500 milliGRAM(s) IV Intermittent every 24 hours  gabapentin 300 milliGRAM(s) Oral daily  insulin glargine Injectable (LANTUS) 34 Unit(s) SubCutaneous at bedtime  insulin lispro (HumaLOG) corrective regimen sliding scale   SubCutaneous three times a day before meals  insulin lispro (HumaLOG) corrective regimen sliding scale   SubCutaneous at bedtime  insulin lispro Injectable (HumaLOG) 7 Unit(s) SubCutaneous three times a day before meals  metoprolol tartrate 12.5 milliGRAM(s) Oral two times a day  pantoprazole    Tablet 40 milliGRAM(s) Oral two times a day  polyethylene glycol 3350 17 Gram(s) Oral two times a day  senna 2 Tablet(s) Oral at bedtime      Physical Exam  General: Patient comfortable in bed  Vital Signs Last 12 Hrs  T(F): 98.4 (05-18-18 @ 13:31), Max: 100.4 (05-18-18 @ 05:00)  HR: 69 (05-18-18 @ 13:31) (69 - 102)  BP: 158/76 (05-18-18 @ 13:31) (158/76 - 182/76)  BP(mean): --  RR: 16 (05-18-18 @ 13:31) (16 - 18)  SpO2: 99% (05-18-18 @ 13:31) (93% - 99%)  Neck: No palpable thyroid nodules.  CVS: S1S2, No murmurs  Respiratory: No wheezing, no crepitations  GI: Abdomen soft, bowel sounds positive  Musculoskeletal:  edema lower extremities.   Skin: No skin rashes, no ecchymosis    Diagnostics

## 2018-05-18 NOTE — PROGRESS NOTE ADULT - SUBJECTIVE AND OBJECTIVE BOX
Interval Events: Pt is without GI complaints. She denies abdominal pain, N/V/D/C, BRBPR/melena.     MEDICATIONS  (STANDING):  atorvastatin 40 milliGRAM(s) Oral at bedtime  clopidogrel Tablet 75 milliGRAM(s) Oral daily  dextrose 50% Injectable 12.5 Gram(s) IV Push once  dextrose 50% Injectable 25 Gram(s) IV Push once  dextrose 50% Injectable 25 Gram(s) IV Push once  docusate sodium 100 milliGRAM(s) Oral three times a day  ertapenem  IVPB 500 milliGRAM(s) IV Intermittent every 24 hours  gabapentin 300 milliGRAM(s) Oral daily  insulin glargine Injectable (LANTUS) 34 Unit(s) SubCutaneous at bedtime  insulin lispro (HumaLOG) corrective regimen sliding scale   SubCutaneous three times a day before meals  insulin lispro (HumaLOG) corrective regimen sliding scale   SubCutaneous at bedtime  insulin lispro Injectable (HumaLOG) 7 Unit(s) SubCutaneous three times a day before meals  metoprolol tartrate 12.5 milliGRAM(s) Oral two times a day  pantoprazole    Tablet 40 milliGRAM(s) Oral two times a day  polyethylene glycol 3350 17 Gram(s) Oral two times a day  senna 2 Tablet(s) Oral at bedtime    MEDICATIONS  (PRN):  acetaminophen   Tablet. 650 milliGRAM(s) Oral every 6 hours PRN Mild Pain (1 - 3)  dextrose 40% Gel 15 Gram(s) Oral once PRN Blood Glucose LESS THAN 70 milliGRAM(s)/deciliter  glucagon  Injectable 1 milliGRAM(s) IntraMuscular once PRN Glucose LESS THAN 70 milligrams/deciliter  morphine  - Injectable 2 milliGRAM(s) IV Push every 6 hours PRN Moderate to Severe pain      Allergies    Carrots (Rash)  No Known Drug Allergies    Intolerances        Review of Systems:    General:  No wt loss, fevers, chills, night sweats,fatigue,   Eyes:  Good vision, no reported pain  ENT:  No sore throat, pain, runny nose, dysphagia  CV:  No pain, palpitations, hypo/hypertension  Resp:  No dyspnea, cough, tachypnea, wheezing  GI:  No pain, No nausea, No vomiting, No diarrhea, No constipation, No weight loss, No fever, No pruritis, No rectal bleeding, No melena, No dysphagia  :  No pain, bleeding, incontinence, nocturia  Muscle:  No pain, weakness  Neuro:  No weakness, tingling, memory problems  Psych:  No fatigue, insomnia, mood problems, depression  Endocrine:  No polyuria, polydypsia, cold/heat intolerance  Heme:  No petechiae, ecchymosis, easy bruisability  Skin:  No rash, tattoos, scars, edema      Vital Signs Last 24 Hrs  T(C): 38 (18 May 2018 05:00), Max: 38 (18 May 2018 05:00)  T(F): 100.4 (18 May 2018 05:00), Max: 100.4 (18 May 2018 05:00)  HR: 102 (18 May 2018 05:00) (76 - 102)  BP: 182/76 (18 May 2018 05:00) (158/75 - 182/76)  BP(mean): --  RR: 18 (18 May 2018 05:00) (17 - 18)  SpO2: 93% (18 May 2018 05:00) (93% - 99%)    PHYSICAL EXAM:    Constitutional: NAD, well-developed  HEENT: EOMI, throat clear  Neck: No LAD, supple  Respiratory: CTA and P  Cardiovascular: S1 and S2, RRR, no M  Gastrointestinal: BS+, soft, NT/ND, neg HSM,  Extremities: No peripheral edema, neg clubing, cyanosis  Vascular: 2+ peripheral pulses  Neurological: A/O x 3, no focal deficits  Psychiatric: Normal mood, normal affect  Skin: No rashes      LABS:                        7.5    14.49 )-----------( 665      ( 18 May 2018 11:16 )             26.0     05-18    146<H>  |  109<H>  |  27<H>  ----------------------------<  91  5.0   |  20<L>  |  2.45<H>    Ca    9.1      18 May 2018 08:54  Phos  3.5     05-17  Mg     2.3     05-17            RADIOLOGY & ADDITIONAL TESTS:

## 2018-05-18 NOTE — PROGRESS NOTE ADULT - SUBJECTIVE AND OBJECTIVE BOX
Infectious Diseases progress note:    Subjective: Feels weak.  Occasional chills.  Had fever 100.4 this AM, mildly tachycardic, and WBC elevated.  Denies dysuria, difficulty urinating, abd pain, diarrhea, cough or congestion.    ROS:  CONSTITUTIONAL:  No fever, chills, rigors  CARDIOVASCULAR:  No chest pain or palpitations  RESPIRATORY:   No SOB, cough, dyspnea on exertion.  No wheezing  GASTROINTESTINAL:  No abd pain, N/V, diarrhea/constipation  EXTREMITIES:  No swelling or joint pain  GENITOURINARY:  No burning on urination, increased frequency or urgency.  No flank pain  NEUROLOGIC:  No HA, visual disturbances  SKIN: No rashes    Allergies    Carrots (Rash)  No Known Drug Allergies    Intolerances        ANTIBIOTICS/RELEVANT:  antimicrobials  ertapenem  IVPB 500 milliGRAM(s) IV Intermittent every 24 hours    immunologic:    OTHER:  acetaminophen   Tablet. 650 milliGRAM(s) Oral every 6 hours PRN  atorvastatin 40 milliGRAM(s) Oral at bedtime  clopidogrel Tablet 75 milliGRAM(s) Oral daily  dextrose 40% Gel 15 Gram(s) Oral once PRN  dextrose 50% Injectable 12.5 Gram(s) IV Push once  dextrose 50% Injectable 25 Gram(s) IV Push once  dextrose 50% Injectable 25 Gram(s) IV Push once  docusate sodium 100 milliGRAM(s) Oral three times a day  gabapentin 300 milliGRAM(s) Oral daily  glucagon  Injectable 1 milliGRAM(s) IntraMuscular once PRN  insulin glargine Injectable (LANTUS) 34 Unit(s) SubCutaneous at bedtime  insulin lispro (HumaLOG) corrective regimen sliding scale   SubCutaneous three times a day before meals  insulin lispro (HumaLOG) corrective regimen sliding scale   SubCutaneous at bedtime  insulin lispro Injectable (HumaLOG) 7 Unit(s) SubCutaneous three times a day before meals  metoprolol tartrate 12.5 milliGRAM(s) Oral two times a day  morphine  - Injectable 2 milliGRAM(s) IV Push every 6 hours PRN  pantoprazole    Tablet 40 milliGRAM(s) Oral two times a day  polyethylene glycol 3350 17 Gram(s) Oral two times a day  senna 2 Tablet(s) Oral at bedtime      Objective:  Vital Signs Last 24 Hrs  T(C): 36.9 (18 May 2018 13:31), Max: 38 (18 May 2018 05:00)  T(F): 98.4 (18 May 2018 13:31), Max: 100.4 (18 May 2018 05:00)  HR: 69 (18 May 2018 13:31) (69 - 102)  BP: 158/76 (18 May 2018 13:31) (158/75 - 182/76)  BP(mean): --  RR: 16 (18 May 2018 13:31) (16 - 18)  SpO2: 99% (18 May 2018 13:31) (93% - 99%)    PHYSICAL EXAM:  Constitutional:NAD  Eyes:EDIN, EOMI  Ear/Nose/Throat: no thrush, mucositis.  Moist mucous membranes	  Neck:no JVD, no lymphadenopathy, supple  Respiratory: CTA carlos manuel  Cardiovascular: S1S2 RRR, no murmurs  Gastrointestinal:soft, nontender,  nondistended (+) BS  Extremities: LE b/l trace edema  Skin:  no rashes, open wounds or ulcerations        LABS:                        7.5    14.49 )-----------( 665      ( 18 May 2018 11:16 )             26.0     05-18    146<H>  |  109<H>  |  27<H>  ----------------------------<  91  5.0   |  20<L>  |  2.45<H>    Ca    9.1      18 May 2018 08:54  Phos  3.5     05-17  Mg     2.3     05-17          MICROBIOLOGY:    Culture - Blood in AM (05.15.18 @ 09:15)    Specimen Source: .Blood Blood-Peripheral    Culture Results:   No growth to date.    Culture - Blood (05.14.18 @ 16:49)    Specimen Source: .Blood Blood-Peripheral    Culture Results:   No growth to date.    Culture - Blood (05.14.18 @ 16:42)    Specimen Source: .Blood Blood-Peripheral    Culture Results:   No growth to date.          RADIOLOGY & ADDITIONAL STUDIES:    < from: US Renal (05.15.18 @ 11:36) >  FINDINGS:    Right kidney:  11.2 cm. Mildly heterogeneous echotexture. No renal mass,   hydronephrosis or calculi.    Left kidney:  9.4 cm. Mildly heterogeneous echotexture. No renal mass,   hydronephrosis or calculi.    Urinary bladder: Partially collapsed with a Sylvester catheter.    IMPRESSION:     Mild heterogeneous echotexture to the kidneys bilaterally. No   hydronephrosis.    Partially collapsed with a Sylvester catheter.    < end of copied text >      < from: CT Abdomen and Pelvis w/ Oral Cont and w/ IV Cont (05.11.18 @ 20:20) >    IMPRESSION: Bilateral pyelonephritis, right greater than left. It is   uncertain if there is a developing collection in the right kidney.  Small to moderate fluid in the pelvis can be related to adnexa. Further   evaluation with a pelvic ultrasound is advised on outpatient basis.    < end of copied text >

## 2018-05-18 NOTE — PROGRESS NOTE ADULT - SUBJECTIVE AND OBJECTIVE BOX
Patient seen and examined  no complaints    Carrots (Rash)  No Known Drug Allergies    Hospital Medications:   MEDICATIONS  (STANDING):  atorvastatin 40 milliGRAM(s) Oral at bedtime  clopidogrel Tablet 75 milliGRAM(s) Oral daily  dextrose 50% Injectable 12.5 Gram(s) IV Push once  dextrose 50% Injectable 25 Gram(s) IV Push once  dextrose 50% Injectable 25 Gram(s) IV Push once  docusate sodium 100 milliGRAM(s) Oral three times a day  ertapenem  IVPB 500 milliGRAM(s) IV Intermittent every 24 hours  gabapentin 300 milliGRAM(s) Oral daily  insulin glargine Injectable (LANTUS) 34 Unit(s) SubCutaneous at bedtime  insulin lispro (HumaLOG) corrective regimen sliding scale   SubCutaneous three times a day before meals  insulin lispro (HumaLOG) corrective regimen sliding scale   SubCutaneous at bedtime  insulin lispro Injectable (HumaLOG) 7 Unit(s) SubCutaneous three times a day before meals  metoprolol tartrate 12.5 milliGRAM(s) Oral two times a day  pantoprazole    Tablet 40 milliGRAM(s) Oral two times a day  polyethylene glycol 3350 17 Gram(s) Oral two times a day  senna 2 Tablet(s) Oral at bedtime        VITALS:  T(F): 100.4 (18 @ 05:00), Max: 100.4 (18 @ 05:00)  HR: 102 (18 @ 05:00)  BP: 182/76 (18 @ 05:00)  RR: 18 (18 @ 05:00)  SpO2: 93% (18 @ 05:00)  Wt(kg): --     @ 07:01  -   @ 07:00  --------------------------------------------------------  IN: 730 mL / OUT: 1550 mL / NET: -820 mL     @ 07:01  -   @ 11:49  --------------------------------------------------------  IN: 0 mL / OUT: 300 mL / NET: -300 mL        PHYSICAL EXAM:  Constitutional: NAD  HEENT: anicteric sclera, oropharynx clear, MMM  Neck: No JVD  Respiratory: CTAB, no wheezes, rales or rhonchi  Cardiovascular: S1, S2, RRR  Gastrointestinal: BS+, soft, NT/ND  Extremities: +peripheral edema  Neurological: A/O x 3, no focal deficits  Psychiatric: Normal mood, normal affect  : +alba    LABS:      146<H>  |  109<H>  |  27<H>  ----------------------------<  91  5.0   |  20<L>  |  2.45<H>    Ca    9.1      18 May 2018 08:54  Phos  3.5       Mg     2.3           Creatinine Trend: 2.45 <--, 3.22 <--, 4.67 <--, 5.80 <--, 5.93 <--, 5.71 <--, 5.22 <--, 3.80 <--, 1.88 <--, 1.55 <--, 1.54 <--                        7.5    14.49 )-----------( 665      ( 18 May 2018 11:16 )             26.0     Urine Studies:  Urinalysis Basic - ( 14 May 2018 12:46 )    Color: Yellow / Appearance: Turbid / S.018 / pH:   Gluc:  / Ketone: Negative  / Bili: Negative / Urobili: Negative   Blood:  / Protein: 300 mg/dL / Nitrite: Negative   Leuk Esterase: Large / RBC: 25-50 /HPF / WBC >50 /HPF   Sq Epi:  / Non Sq Epi:  / Bacteria:       Osmolality, Random Urine: 238 mos/kg ( @ 23:08)  Sodium, Random Urine: 46 mmol/L ( @ 21:13)  Osmolality, Random Urine: 276 mos/kg ( @ 15:27)  Creatinine, Random Urine: 51 mg/dL ( @ 15:27)  Protein/Creatinine Ratio Calculation: 6.5 Ratio ( @ 15:27)  Chloride, Random Urine: 35 mmol/L ( @ 12:46)  Potassium, Random Urine: 42 mmol/L ( @ 12:46)  Sodium, Random Urine: 53 mmol/L ( @ 12:46)    RADIOLOGY & ADDITIONAL STUDIES:

## 2018-05-18 NOTE — PROGRESS NOTE ADULT - SUBJECTIVE AND OBJECTIVE BOX
Patient is a 78y old  Female who presents with a chief complaint of bilateral lower back pain (11 May 2018 21:29)      SUBJECTIVE / OVERNIGHT EVENTS:   Feels better.  Denies CP/SOB/Palpitation/HA.    MEDICATIONS  (STANDING):  atorvastatin 40 milliGRAM(s) Oral at bedtime  clopidogrel Tablet 75 milliGRAM(s) Oral daily  dextrose 50% Injectable 12.5 Gram(s) IV Push once  dextrose 50% Injectable 25 Gram(s) IV Push once  dextrose 50% Injectable 25 Gram(s) IV Push once  docusate sodium 100 milliGRAM(s) Oral three times a day  ertapenem  IVPB 500 milliGRAM(s) IV Intermittent every 24 hours  gabapentin 300 milliGRAM(s) Oral daily  insulin glargine Injectable (LANTUS) 34 Unit(s) SubCutaneous at bedtime  insulin lispro (HumaLOG) corrective regimen sliding scale   SubCutaneous three times a day before meals  insulin lispro (HumaLOG) corrective regimen sliding scale   SubCutaneous at bedtime  insulin lispro Injectable (HumaLOG) 7 Unit(s) SubCutaneous three times a day before meals  metoprolol tartrate 12.5 milliGRAM(s) Oral two times a day  pantoprazole    Tablet 40 milliGRAM(s) Oral two times a day  polyethylene glycol 3350 17 Gram(s) Oral two times a day  senna 2 Tablet(s) Oral at bedtime    MEDICATIONS  (PRN):  acetaminophen   Tablet. 650 milliGRAM(s) Oral every 6 hours PRN Mild Pain (1 - 3)  dextrose 40% Gel 15 Gram(s) Oral once PRN Blood Glucose LESS THAN 70 milliGRAM(s)/deciliter  glucagon  Injectable 1 milliGRAM(s) IntraMuscular once PRN Glucose LESS THAN 70 milligrams/deciliter  morphine  - Injectable 2 milliGRAM(s) IV Push every 6 hours PRN Moderate to Severe pain        CAPILLARY BLOOD GLUCOSE      POCT Blood Glucose.: 186 mg/dL (18 May 2018 17:26)  POCT Blood Glucose.: 102 mg/dL (18 May 2018 11:45)  POCT Blood Glucose.: 94 mg/dL (18 May 2018 07:25)  POCT Blood Glucose.: 255 mg/dL (17 May 2018 21:54)    I&O's Summary    17 May 2018 07:01  -  18 May 2018 07:00  --------------------------------------------------------  IN: 730 mL / OUT: 1550 mL / NET: -820 mL    18 May 2018 07:01  -  18 May 2018 19:02  --------------------------------------------------------  IN: 480 mL / OUT: 800 mL / NET: -320 mL        PHYSICAL EXAM:  GENERAL: NAD, well-developed  HEAD:  Atraumatic, Normocephalic  NECK: Supple, No JVD  CHEST/LUNG: Clear to auscultation bilaterally; No wheezing.  HEART: Regular rate and rhythm; No murmurs, rubs, or gallops  ABDOMEN: Soft, Nontender, Nondistended; Bowel sounds present  EXTREMITIES:   No clubbing, cyanosis, or edema  NEUROLOGY: AAO X 3  SKIN: No rashes    LABS:                        7.5    14.49 )-----------( 665      ( 18 May 2018 11:16 )             26.0     05-18    146<H>  |  109<H>  |  27<H>  ----------------------------<  91  5.0   |  20<L>  |  2.45<H>    Ca    9.1      18 May 2018 08:54  Phos  3.5     05-17  Mg     2.3     05-17              CAPILLARY BLOOD GLUCOSE      POCT Blood Glucose.: 186 mg/dL (18 May 2018 17:26)  POCT Blood Glucose.: 102 mg/dL (18 May 2018 11:45)  POCT Blood Glucose.: 94 mg/dL (18 May 2018 07:25)  POCT Blood Glucose.: 255 mg/dL (17 May 2018 21:54)    05-15 @ 09:15  Culture-urine --  Culture results   No growth to date.  method type --  Organism --  Organism Identification --  Specimen source .Blood Blood-Peripheral  05-14 @ 16:49  Culture-urine --  Culture results   No growth to date.  method type --  Organism --  Organism Identification --  Specimen source .Blood Blood-Peripheral  05-14 @ 16:42  Culture-urine --  Culture results   No growth to date.  method type --  Organism --  Organism Identification --  [facility]  05-11 @ 22:45  Culture-urine --  Culture results   Growth in aerobic and anaerobic bottles: Enterobacter aerogenes  See previous culture [id number]  method type PCR  Organism Blood Culture PCR  Organism Identification Blood Culture PCR  Enterobacter aerogenes  [facility]  05-11 @ 19:49  Culture-urine --  Culture results   >100,000 CFU/ml Enterobacter aerogenes  method type CAITLYN  Organism Enterobacter aerogenes  Organism Identification Enterobacter aerogenes  Specimen source .Urine Clean Catch (Midstream)           05-15 @ 09:15  Culture blood --  Culture results   No growth to date.  Gram stain --  Gram stain blood --  Method type --  Organism --  Organism identification --  Specimen source .Blood BloodPeripheral   05-14 @ 16:49  Culture blood --  Culture results   No growth to date.  Gram stain --  Gram stain blood --  Method type --  Organism --  Organism identification --  Specimen source .Blood Blood-Peripheral   05-14 @ 16:42  Culture blood --  Culture results   No growth to date.  Gram stain --  Gram stain blood --  Method type --  Organism --  Organism identification --  Specimen source .Blood BloodPeripheral   05-11 @ 22:45  Culture blood --  Culture results   Growth in aerobic and anaerobic bottles: Enterobacter aerogenes  See previous culture [id number]  Gram stain   Growth in anaerobic bottle: Gram Negative Rods  Growth in aerobic bottle: Gram Negative Rods  Gram stain blood --  Method type PCR  Organism Blood Culture PCR  Organism identification Blood Culture PCR  Enterobacter aerogenes  Specimen source .Blood BloodPeripheral   05-11 @ 19:49  Culture blood --  Culture results   >100,000 CFU/ml Enterobacter aerogenes  Gram stain --  Gram stain blood --  Method type CAITLYN  Organism Enterobacter aerogenes  Organism identification Enterobacter aerogenes  Specimen source .Urine Clean Catch (Midstream)      RADIOLOGY & ADDITIONAL TESTS:    Imaging Personally Reviewed:    Consultant(s) Notes Reviewed:      Care Discussed with Consultants/Other Providers:

## 2018-05-18 NOTE — PROGRESS NOTE ADULT - SUBJECTIVE AND OBJECTIVE BOX
Cardiovascular Disease Progress Note    Overnight events: No acute events overnight. Ms. Tsang denies chest pain or SOB.   Otherwise review of systems negative    Objective Findings:  T(C): 38 (18 @ 05:00), Max: 38 (18 @ 05:00)  HR: 102 (18 @ 05:00) (76 - 102)  BP: 182/76 (18 @ 05:00) (158/75 - 182/76)  RR: 18 (18 @ 05:00) (17 - 18)  SpO2: 93% (18 @ 05:00) (93% - 99%)  Wt(kg): --  Daily     Daily Weight in k.8 (17 May 2018 11:45)      Physical Exam:  Gen: NAD  HEENT: EOMI  CV: RRR, normal S1 + S2, no m/r/g  Lungs: CTAB  Abd: soft, non-tender  Ext: 1+ pedal edema    Telemetry: Sinus; brief a-fib.     Laboratory Data:                        7.1    9.55  )-----------( 589      ( 17 May 2018 07:42 )             24.1     05-    135  |  103  |  40<H>  ----------------------------<  160<H>  4.5   |  20<L>  |  3.22<H>    Ca    7.7<L>      17 May 2018 05:42  Phos  3.5     -  Mg     2.3     -                Inpatient Medications:  MEDICATIONS  (STANDING):  atorvastatin 40 milliGRAM(s) Oral at bedtime  clopidogrel Tablet 75 milliGRAM(s) Oral daily  dextrose 50% Injectable 12.5 Gram(s) IV Push once  dextrose 50% Injectable 25 Gram(s) IV Push once  dextrose 50% Injectable 25 Gram(s) IV Push once  docusate sodium 100 milliGRAM(s) Oral three times a day  ertapenem  IVPB 500 milliGRAM(s) IV Intermittent every 24 hours  gabapentin 300 milliGRAM(s) Oral daily  insulin glargine Injectable (LANTUS) 34 Unit(s) SubCutaneous at bedtime  insulin lispro (HumaLOG) corrective regimen sliding scale   SubCutaneous three times a day before meals  insulin lispro (HumaLOG) corrective regimen sliding scale   SubCutaneous at bedtime  insulin lispro Injectable (HumaLOG) 7 Unit(s) SubCutaneous three times a day before meals  metoprolol tartrate 12.5 milliGRAM(s) Oral two times a day  pantoprazole    Tablet 40 milliGRAM(s) Oral two times a day  polyethylene glycol 3350 17 Gram(s) Oral two times a day  senna 2 Tablet(s) Oral at bedtime      Assessment:  78 year old woman with CAD s/p MICHAEL x2 in 2017, a-fib not on AC due to GI bleed, and compensated diastolic CHF presents for weakness found to have severe sepsis bacteremia complicated by supply demand ischemia.      Plan of Care:    #Non-oliguric KOJO-  Patient with good urine output.  Electrolytes are within normal limits.  Renal f/u appreciated.    #Compensated diastolic CHF-  Now off IV fluids.  Will eventually resume diuretics once KOJO improves.     #Type II NSTEMI-  Supply demand ischemia in the setting of severe sepsis with GNR bacteremia.  Patient chest pain free.  Given recent h/o life threatening GI bleed, will hold off on further ischemic evaluation at this time.  Ms. Tsang has demonstrated that she cannot tolerate dual anti-platelet therapy.    #CAD- s/p MICHAEL x2 in 2017.  Only on Plavix now for multiple hospitalizations for life-threatening anemia despite stents in 2017.  GI input noted.    #Uncontrolled IDDM-  Sugars better controlled on Basal/bolus insulin.  Endocrine input noted.    #Paroxymal a-fib-  Currently in sinus.  No AC given h/o GI bleed.  ID consult appreciated- eventually transition to Cipro.        Over 35 minutes spent on total encounter; more than 50% of the visit was spent counseling and/or coordinating care by the attending physician.      Julio César Hooper MD [facility]  Cardiovascular Disease  [phone] Cardiovascular Disease Progress Note    Overnight events: No acute events overnight. Ms. Tsang continues to feel weak. She denies chest pain or SOB.   Otherwise review of systems negative    Objective Findings:  T(C): 38 (18 @ 05:00), Max: 38 (18 @ 05:00)  HR: 102 (18 @ 05:00) (76 - 102)  BP: 182/76 (18 @ 05:00) (158/75 - 182/76)  RR: 18 (18 @ 05:00) (17 - 18)  SpO2: 93% (18 @ 05:00) (93% - 99%)  Wt(kg): --  Daily     Daily Weight in k.8 (17 May 2018 11:45)      Physical Exam:  Gen: NAD  HEENT: EOMI  CV: RRR, normal S1 + S2, no m/r/g  Lungs: CTAB  Abd: soft, non-tender  Ext: 1+ pedal edema    Telemetry: Sinus; brief a-fib.     Laboratory Data:                        7.1    9.55  )-----------( 589      ( 17 May 2018 07:42 )             24.1     05-17    135  |  103  |  40<H>  ----------------------------<  160<H>  4.5   |  20<L>  |  3.22<H>    Ca    7.7<L>      17 May 2018 05:42  Phos  3.5       Mg     2.3                     Inpatient Medications:  MEDICATIONS  (STANDING):  atorvastatin 40 milliGRAM(s) Oral at bedtime  clopidogrel Tablet 75 milliGRAM(s) Oral daily  dextrose 50% Injectable 12.5 Gram(s) IV Push once  dextrose 50% Injectable 25 Gram(s) IV Push once  dextrose 50% Injectable 25 Gram(s) IV Push once  docusate sodium 100 milliGRAM(s) Oral three times a day  ertapenem  IVPB 500 milliGRAM(s) IV Intermittent every 24 hours  gabapentin 300 milliGRAM(s) Oral daily  insulin glargine Injectable (LANTUS) 34 Unit(s) SubCutaneous at bedtime  insulin lispro (HumaLOG) corrective regimen sliding scale   SubCutaneous three times a day before meals  insulin lispro (HumaLOG) corrective regimen sliding scale   SubCutaneous at bedtime  insulin lispro Injectable (HumaLOG) 7 Unit(s) SubCutaneous three times a day before meals  metoprolol tartrate 12.5 milliGRAM(s) Oral two times a day  pantoprazole    Tablet 40 milliGRAM(s) Oral two times a day  polyethylene glycol 3350 17 Gram(s) Oral two times a day  senna 2 Tablet(s) Oral at bedtime      Assessment:  78 year old woman with CAD s/p MICHAEL x2 in 2017, a-fib not on AC due to GI bleed, and compensated diastolic CHF presents for weakness found to have severe sepsis bacteremia complicated by supply demand ischemia.      Plan of Care:    #Non-oliguric KOJO-  Patient with good urine output.  Electrolytes are within normal limits.  Renal f/u appreciated.    #Anemia-  FOBT negative.  Consider pRBC transfusion.     #Compensated diastolic CHF-  Now off IV fluids.  Will eventually resume diuretics once KOJO improves.     #Type II NSTEMI-  Supply demand ischemia in the setting of severe sepsis with GNR bacteremia.  Patient chest pain free.  Given recent h/o life threatening GI bleed, will hold off on further ischemic evaluation at this time.  Ms. Tsang has demonstrated that she cannot tolerate dual anti-platelet therapy.    #CAD- s/p MICHAEL x2 in 2017.  Only on Plavix now for multiple hospitalizations for life-threatening anemia despite stents in 2017.  GI input noted.    #Uncontrolled IDDM-  Sugars better controlled on Basal/bolus insulin.  Endocrine input noted.    #Paroxymal a-fib-  Currently in sinus.  No AC given h/o GI bleed.  ID consult appreciated- eventually transition to Cipro.        Over 35 minutes spent on total encounter; more than 50% of the visit was spent counseling and/or coordinating care by the attending physician.      Julio César Hooper MD [facility]  Cardiovascular Disease  [phone]

## 2018-05-19 LAB
ANION GAP SERPL CALC-SCNC: 12 MMOL/L — SIGNIFICANT CHANGE UP (ref 5–17)
BUN SERPL-MCNC: 20 MG/DL — SIGNIFICANT CHANGE UP (ref 7–23)
CALCIUM SERPL-MCNC: 8.9 MG/DL — SIGNIFICANT CHANGE UP (ref 8.4–10.5)
CHLORIDE SERPL-SCNC: 104 MMOL/L — SIGNIFICANT CHANGE UP (ref 96–108)
CO2 SERPL-SCNC: 23 MMOL/L — SIGNIFICANT CHANGE UP (ref 22–31)
CREAT SERPL-MCNC: 1.76 MG/DL — HIGH (ref 0.5–1.3)
CULTURE RESULTS: SIGNIFICANT CHANGE UP
CULTURE RESULTS: SIGNIFICANT CHANGE UP
GLUCOSE BLDC GLUCOMTR-MCNC: 100 MG/DL — HIGH (ref 70–99)
GLUCOSE BLDC GLUCOMTR-MCNC: 177 MG/DL — HIGH (ref 70–99)
GLUCOSE BLDC GLUCOMTR-MCNC: 234 MG/DL — HIGH (ref 70–99)
GLUCOSE BLDC GLUCOMTR-MCNC: 91 MG/DL — SIGNIFICANT CHANGE UP (ref 70–99)
GLUCOSE SERPL-MCNC: 92 MG/DL — SIGNIFICANT CHANGE UP (ref 70–99)
HCT VFR BLD CALC: 25.4 % — LOW (ref 34.5–45)
HGB BLD-MCNC: 7.4 G/DL — LOW (ref 11.5–15.5)
MCHC RBC-ENTMCNC: 22.3 PG — LOW (ref 27–34)
MCHC RBC-ENTMCNC: 29.1 GM/DL — LOW (ref 32–36)
MCV RBC AUTO: 76.5 FL — LOW (ref 80–100)
PLATELET # BLD AUTO: 674 K/UL — HIGH (ref 150–400)
POTASSIUM SERPL-MCNC: 4.8 MMOL/L — SIGNIFICANT CHANGE UP (ref 3.5–5.3)
POTASSIUM SERPL-SCNC: 4.8 MMOL/L — SIGNIFICANT CHANGE UP (ref 3.5–5.3)
RBC # BLD: 3.32 M/UL — LOW (ref 3.8–5.2)
RBC # FLD: 21.7 % — HIGH (ref 10.3–14.5)
SODIUM SERPL-SCNC: 139 MMOL/L — SIGNIFICANT CHANGE UP (ref 135–145)
SPECIMEN SOURCE: SIGNIFICANT CHANGE UP
SPECIMEN SOURCE: SIGNIFICANT CHANGE UP
WBC # BLD: 7.51 K/UL — SIGNIFICANT CHANGE UP (ref 3.8–10.5)
WBC # FLD AUTO: 7.51 K/UL — SIGNIFICANT CHANGE UP (ref 3.8–10.5)

## 2018-05-19 RX ORDER — HYDROCORTISONE 1 %
1 OINTMENT (GRAM) TOPICAL EVERY 8 HOURS
Qty: 0 | Refills: 0 | Status: DISCONTINUED | OUTPATIENT
Start: 2018-05-19 | End: 2018-05-21

## 2018-05-19 RX ADMIN — Medication 650 MILLIGRAM(S): at 21:57

## 2018-05-19 RX ADMIN — Medication 12.5 MILLIGRAM(S): at 05:52

## 2018-05-19 RX ADMIN — Medication 12.5 MILLIGRAM(S): at 17:25

## 2018-05-19 RX ADMIN — ATORVASTATIN CALCIUM 40 MILLIGRAM(S): 80 TABLET, FILM COATED ORAL at 21:57

## 2018-05-19 RX ADMIN — Medication 650 MILLIGRAM(S): at 10:23

## 2018-05-19 RX ADMIN — PANTOPRAZOLE SODIUM 40 MILLIGRAM(S): 20 TABLET, DELAYED RELEASE ORAL at 17:25

## 2018-05-19 RX ADMIN — GABAPENTIN 300 MILLIGRAM(S): 400 CAPSULE ORAL at 10:23

## 2018-05-19 RX ADMIN — Medication 650 MILLIGRAM(S): at 11:00

## 2018-05-19 RX ADMIN — Medication 650 MILLIGRAM(S): at 22:30

## 2018-05-19 RX ADMIN — PANTOPRAZOLE SODIUM 40 MILLIGRAM(S): 20 TABLET, DELAYED RELEASE ORAL at 05:52

## 2018-05-19 RX ADMIN — ERTAPENEM SODIUM 100 MILLIGRAM(S): 1 INJECTION, POWDER, LYOPHILIZED, FOR SOLUTION INTRAMUSCULAR; INTRAVENOUS at 21:57

## 2018-05-19 RX ADMIN — INSULIN GLARGINE 34 UNIT(S): 100 INJECTION, SOLUTION SUBCUTANEOUS at 21:58

## 2018-05-19 RX ADMIN — Medication 7 UNIT(S): at 17:25

## 2018-05-19 RX ADMIN — Medication 2: at 17:25

## 2018-05-19 RX ADMIN — Medication 7 UNIT(S): at 08:15

## 2018-05-19 RX ADMIN — CLOPIDOGREL BISULFATE 75 MILLIGRAM(S): 75 TABLET, FILM COATED ORAL at 12:38

## 2018-05-19 NOTE — PROGRESS NOTE ADULT - SUBJECTIVE AND OBJECTIVE BOX
Patient seen and examined  no complaints    Carrots (Rash)  No Known Drug Allergies    Hospital Medications:   MEDICATIONS  (STANDING):  atorvastatin 40 milliGRAM(s) Oral at bedtime  clopidogrel Tablet 75 milliGRAM(s) Oral daily  dextrose 50% Injectable 12.5 Gram(s) IV Push once  dextrose 50% Injectable 25 Gram(s) IV Push once  dextrose 50% Injectable 25 Gram(s) IV Push once  docusate sodium 100 milliGRAM(s) Oral three times a day  ertapenem  IVPB 500 milliGRAM(s) IV Intermittent every 24 hours  gabapentin 300 milliGRAM(s) Oral daily  insulin glargine Injectable (LANTUS) 34 Unit(s) SubCutaneous at bedtime  insulin lispro (HumaLOG) corrective regimen sliding scale   SubCutaneous three times a day before meals  insulin lispro (HumaLOG) corrective regimen sliding scale   SubCutaneous at bedtime  insulin lispro Injectable (HumaLOG) 7 Unit(s) SubCutaneous three times a day before meals  metoprolol tartrate 12.5 milliGRAM(s) Oral two times a day  pantoprazole    Tablet 40 milliGRAM(s) Oral two times a day  polyethylene glycol 3350 17 Gram(s) Oral two times a day  senna 2 Tablet(s) Oral at bedtime      VITALS:  T(F): 98 (18 @ 05:12), Max: 98.4 (18 @ 13:31)  HR: 66 (18 @ 05:12)  BP: 194/77 (18 @ 05:12)  RR: 18 (18 @ 05:12)  SpO2: 98% (18 @ 05:12)  Wt(kg): --     @ 07:01  -   @ 07:00  --------------------------------------------------------  IN: 1194 mL / OUT: 1400 mL / NET: -206 mL      PHYSICAL EXAM:  Constitutional: NAD  HEENT: anicteric sclera, oropharynx clear, MMM  Neck: No JVD  Respiratory: CTAB, no wheezes, rales or rhonchi  Cardiovascular: S1, S2, RRR  Gastrointestinal: BS+, soft, NT/ND  Extremities: +peripheral edema  Neurological: A/O x 3, no focal deficits  Psychiatric: Normal mood, normal affect    LABS:      139  |  104  |  20  ----------------------------<  92  4.8   |  23  |  1.76<H>    Ca    8.9      19 May 2018 06:47      Creatinine Trend: 1.76 <--, 2.45 <--, 3.22 <--, 4.67 <--, 5.80 <--, 5.93 <--, 5.71 <--, 5.22 <--, 3.80 <--                        7.5    14.49 )-----------( 665      ( 18 May 2018 11:16 )             26.0     Urine Studies:  Urinalysis Basic - ( 14 May 2018 12:46 )    Color: Yellow / Appearance: Turbid / S.018 / pH:   Gluc:  / Ketone: Negative  / Bili: Negative / Urobili: Negative   Blood:  / Protein: 300 mg/dL / Nitrite: Negative   Leuk Esterase: Large / RBC: 25-50 /HPF / WBC >50 /HPF   Sq Epi:  / Non Sq Epi:  / Bacteria:       Osmolality, Random Urine: 238 mos/kg ( @ 23:08)  Sodium, Random Urine: 46 mmol/L ( @ 21:13)  Osmolality, Random Urine: 276 mos/kg ( @ 15:27)  Creatinine, Random Urine: 51 mg/dL ( @ 15:27)  Protein/Creatinine Ratio Calculation: 6.5 Ratio ( @ 15:27)  Chloride, Random Urine: 35 mmol/L ( @ 12:46)  Potassium, Random Urine: 42 mmol/L ( @ 12:46)  Sodium, Random Urine: 53 mmol/L ( @ 12:46)    RADIOLOGY & ADDITIONAL STUDIES:

## 2018-05-19 NOTE — PROGRESS NOTE ADULT - SUBJECTIVE AND OBJECTIVE BOX
Interval Events: Pt is without GI complaints. She denies abdominal pain, N/V/D/C, BRBPR/melena.     MEDICATIONS  (STANDING):  atorvastatin 40 milliGRAM(s) Oral at bedtime  clopidogrel Tablet 75 milliGRAM(s) Oral daily  dextrose 50% Injectable 12.5 Gram(s) IV Push once  dextrose 50% Injectable 25 Gram(s) IV Push once  dextrose 50% Injectable 25 Gram(s) IV Push once  docusate sodium 100 milliGRAM(s) Oral three times a day  ertapenem  IVPB 500 milliGRAM(s) IV Intermittent every 24 hours  gabapentin 300 milliGRAM(s) Oral daily  insulin glargine Injectable (LANTUS) 34 Unit(s) SubCutaneous at bedtime  insulin lispro (HumaLOG) corrective regimen sliding scale   SubCutaneous three times a day before meals  insulin lispro (HumaLOG) corrective regimen sliding scale   SubCutaneous at bedtime  insulin lispro Injectable (HumaLOG) 7 Unit(s) SubCutaneous three times a day before meals  metoprolol tartrate 12.5 milliGRAM(s) Oral two times a day  pantoprazole    Tablet 40 milliGRAM(s) Oral two times a day  polyethylene glycol 3350 17 Gram(s) Oral two times a day  senna 2 Tablet(s) Oral at bedtime    MEDICATIONS  (PRN):  acetaminophen   Tablet. 650 milliGRAM(s) Oral every 6 hours PRN Mild Pain (1 - 3)  dextrose 40% Gel 15 Gram(s) Oral once PRN Blood Glucose LESS THAN 70 milliGRAM(s)/deciliter  glucagon  Injectable 1 milliGRAM(s) IntraMuscular once PRN Glucose LESS THAN 70 milligrams/deciliter      Allergies    Carrots (Rash)  No Known Drug Allergies    Intolerances        Review of Systems:    General:  No wt loss, fevers, chills, night sweats,fatigue,   Eyes:  Good vision, no reported pain  ENT:  No sore throat, pain, runny nose, dysphagia  CV:  No pain, palpitations, hypo/hypertension  Resp:  No dyspnea, cough, tachypnea, wheezing  GI:  No pain, No nausea, No vomiting, No diarrhea, No constipation, No weight loss, No fever, No pruritis, No rectal bleeding, No melena, No dysphagia  :  No pain, bleeding, incontinence, nocturia  Muscle:  No pain, weakness  Neuro:  No weakness, tingling, memory problems  Psych:  No fatigue, insomnia, mood problems, depression  Endocrine:  No polyuria, polydypsia, cold/heat intolerance  Heme:  No petechiae, ecchymosis, easy bruisability  Skin:  No rash, tattoos, scars, edema      Vital Signs Last 24 Hrs  T(C): 36.7 (19 May 2018 05:12), Max: 36.9 (18 May 2018 13:31)  T(F): 98 (19 May 2018 05:12), Max: 98.4 (18 May 2018 13:31)  HR: 66 (19 May 2018 05:12) (66 - 74)  BP: 194/77 (19 May 2018 05:12) (155/70 - 194/77)  BP(mean): --  RR: 18 (19 May 2018 05:12) (16 - 18)  SpO2: 98% (19 May 2018 05:12) (98% - 99%)    PHYSICAL EXAM:    Constitutional: NAD, well-developed  HEENT: EOMI, throat clear  Neck: No LAD, supple  Respiratory: CTA and P  Cardiovascular: S1 and S2, RRR, no M  Gastrointestinal: BS+, soft, NT/ND, neg HSM,  Extremities: No peripheral edema, neg clubing, cyanosis  Vascular: 2+ peripheral pulses  Neurological: A/O x 3, no focal deficits  Psychiatric: Normal mood, normal affect  Skin: No rashes      LABS:                        7.4    7.51  )-----------( 674      ( 19 May 2018 08:33 )             25.4     05-19    139  |  104  |  20  ----------------------------<  92  4.8   |  23  |  1.76<H>    Ca    8.9      19 May 2018 06:47            RADIOLOGY & ADDITIONAL TESTS:

## 2018-05-19 NOTE — PROGRESS NOTE ADULT - SUBJECTIVE AND OBJECTIVE BOX
Chief complaint  Patient is a 78y old  Female who presents with a chief complaint of bilateral lower back pain (11 May 2018 21:29)   Review of systems  Patient in bed, looks comfortable, no fever, no hypoglycemia.    Labs and Fingersticks  CAPILLARY BLOOD GLUCOSE      POCT Blood Glucose.: 91 mg/dL (19 May 2018 11:15)  POCT Blood Glucose.: 100 mg/dL (19 May 2018 07:45)  POCT Blood Glucose.: 209 mg/dL (18 May 2018 21:39)  POCT Blood Glucose.: 186 mg/dL (18 May 2018 17:26)      Anion Gap, Serum: 12 (05-19 @ 06:47)  Anion Gap, Serum: 17 (05-18 @ 08:54)      Calcium, Total Serum: 8.9 (05-19 @ 06:47)  Calcium, Total Serum: 9.1 (05-18 @ 08:54)          05-19    139  |  104  |  20  ----------------------------<  92  4.8   |  23  |  1.76<H>    Ca    8.9      19 May 2018 06:47                          7.4    7.51  )-----------( 674      ( 19 May 2018 08:33 )             25.4     Medications  MEDICATIONS  (STANDING):  atorvastatin 40 milliGRAM(s) Oral at bedtime  clopidogrel Tablet 75 milliGRAM(s) Oral daily  dextrose 50% Injectable 12.5 Gram(s) IV Push once  dextrose 50% Injectable 25 Gram(s) IV Push once  dextrose 50% Injectable 25 Gram(s) IV Push once  docusate sodium 100 milliGRAM(s) Oral three times a day  ertapenem  IVPB 500 milliGRAM(s) IV Intermittent every 24 hours  gabapentin 300 milliGRAM(s) Oral daily  insulin glargine Injectable (LANTUS) 34 Unit(s) SubCutaneous at bedtime  insulin lispro (HumaLOG) corrective regimen sliding scale   SubCutaneous three times a day before meals  insulin lispro (HumaLOG) corrective regimen sliding scale   SubCutaneous at bedtime  insulin lispro Injectable (HumaLOG) 7 Unit(s) SubCutaneous three times a day before meals  metoprolol tartrate 12.5 milliGRAM(s) Oral two times a day  pantoprazole    Tablet 40 milliGRAM(s) Oral two times a day  polyethylene glycol 3350 17 Gram(s) Oral two times a day  senna 2 Tablet(s) Oral at bedtime      Physical Exam  General: Patient comfortable in bed  Vital Signs Last 12 Hrs  T(F): 98.3 (05-19-18 @ 14:45), Max: 98.3 (05-19-18 @ 14:45)  HR: 74 (05-19-18 @ 14:45) (66 - 74)  BP: 179/70 (05-19-18 @ 14:45) (179/70 - 194/77)  BP(mean): --  RR: 16 (05-19-18 @ 14:45) (16 - 18)  SpO2: 96% (05-19-18 @ 14:45) (96% - 98%)  Neck: No palpable thyroid nodules.  CVS: S1S2, No murmurs  Respiratory: No wheezing, no crepitations  GI: Abdomen soft, bowel sounds positive  Musculoskeletal:  edema lower extremities.   Skin: No skin rashes, no ecchymosis    Diagnostics

## 2018-05-19 NOTE — PROGRESS NOTE ADULT - SUBJECTIVE AND OBJECTIVE BOX
Infectious Diseases progress note:    Subjective: WBC normalized today.  Afebrile.  s/p CTap without evidence for abscess.  Renal function continues to improve    ROS:  CONSTITUTIONAL:  No fever, chills, rigors  CARDIOVASCULAR:  No chest pain or palpitations  RESPIRATORY:   No SOB, cough, dyspnea on exertion.  No wheezing  GASTROINTESTINAL:  No abd pain, N/V, diarrhea/constipation  EXTREMITIES:  No swelling or joint pain  GENITOURINARY:  No burning on urination, increased frequency or urgency.  No flank pain  NEUROLOGIC:  No HA, visual disturbances  SKIN: No rashes    Allergies    Carrots (Rash)  No Known Drug Allergies    Intolerances        ANTIBIOTICS/RELEVANT:  antimicrobials  ertapenem  IVPB 500 milliGRAM(s) IV Intermittent every 24 hours    immunologic:    OTHER:  acetaminophen   Tablet. 650 milliGRAM(s) Oral every 6 hours PRN  atorvastatin 40 milliGRAM(s) Oral at bedtime  clopidogrel Tablet 75 milliGRAM(s) Oral daily  dextrose 40% Gel 15 Gram(s) Oral once PRN  dextrose 50% Injectable 12.5 Gram(s) IV Push once  dextrose 50% Injectable 25 Gram(s) IV Push once  dextrose 50% Injectable 25 Gram(s) IV Push once  docusate sodium 100 milliGRAM(s) Oral three times a day  gabapentin 300 milliGRAM(s) Oral daily  glucagon  Injectable 1 milliGRAM(s) IntraMuscular once PRN  insulin glargine Injectable (LANTUS) 34 Unit(s) SubCutaneous at bedtime  insulin lispro (HumaLOG) corrective regimen sliding scale   SubCutaneous three times a day before meals  insulin lispro (HumaLOG) corrective regimen sliding scale   SubCutaneous at bedtime  insulin lispro Injectable (HumaLOG) 7 Unit(s) SubCutaneous three times a day before meals  metoprolol tartrate 12.5 milliGRAM(s) Oral two times a day  pantoprazole    Tablet 40 milliGRAM(s) Oral two times a day  polyethylene glycol 3350 17 Gram(s) Oral two times a day  senna 2 Tablet(s) Oral at bedtime      Objective:  Vital Signs Last 24 Hrs  T(C): 36.8 (19 May 2018 14:45), Max: 36.8 (19 May 2018 14:45)  T(F): 98.3 (19 May 2018 14:45), Max: 98.3 (19 May 2018 14:45)  HR: 74 (19 May 2018 17:28) (66 - 74)  BP: 176/81 (19 May 2018 17:28) (167/69 - 194/77)  BP(mean): --  RR: 16 (19 May 2018 14:45) (16 - 18)  SpO2: 96% (19 May 2018 14:45) (96% - 99%)    PHYSICAL EXAM:  Constitutional:NAD  Eyes:EIDN, EOMI  Ear/Nose/Throat: no thrush, mucositis.  Moist mucous membranes	  Neck:no JVD, no lymphadenopathy, supple  Respiratory: CTA carlos manuel  Cardiovascular: S1S2 RRR, no murmurs  Gastrointestinal:soft, nontender,  nondistended (+) BS  Extremities:no e/e/c  Skin:  no rashes, open wounds or ulcerations  MSK:  no cvat        LABS:                        7.4    7.51  )-----------( 674      ( 19 May 2018 08:33 )             25.4     05-19    139  |  104  |  20  ----------------------------<  92  4.8   |  23  |  1.76<H>    Ca    8.9      19 May 2018 06:47          MICROBIOLOGY:    Culture - Blood in AM (05.15.18 @ 09:15)    Specimen Source: .Blood Blood-Peripheral    Culture Results:   No growth to date.    Culture - Blood (05.14.18 @ 16:49)    Specimen Source: .Blood Blood-Peripheral    Culture Results:   No growth at 5 days.    Culture - Blood (05.14.18 @ 16:42)    Specimen Source: .Blood Blood-Peripheral    Culture Results:   No growth at 5 days.    Culture - Blood (05.14.18 @ 16:42)    Specimen Source: .Blood Blood-Peripheral    Culture Results:   No growth at 5 days.          RADIOLOGY & ADDITIONAL STUDIES:    < from: CT Abdomen and Pelvis w/ Oral Cont (05.18.18 @ 17:21) >  IMPRESSION:     Limited exam without administration of IV contrast.    No abscess or hydronephrosis is identified.     6 mm right lower lobe part solid pulmonary nodule. Recommend further   evaluation with a dedicated noncontrast CT chest.    Cystic adnexal mass. Further evaluation with pelvic ultrasound is   recommended.    < end of copied text >

## 2018-05-19 NOTE — PROGRESS NOTE ADULT - SUBJECTIVE AND OBJECTIVE BOX
Patient is a 78y old  Female who presents with a chief complaint of bilateral lower back pain (11 May 2018 21:29)      SUBJECTIVE / OVERNIGHT EVENTS:  Afebrile.  Review of Systems:   CONSTITUTIONAL: No fever, weight loss, or fatigue  EYES: No eye pain, visual disturbances, or discharge  ENMT:  No difficulty hearing, tinnitus, vertigo; No sinus or throat pain  NECK: No pain or stiffness  BREASTS: No pain, masses, or nipple discharge  RESPIRATORY: No cough, wheezing, chills or hemoptysis; No shortness of breath  CARDIOVASCULAR: No chest pain, palpitations, dizziness, or leg swelling  GASTROINTESTINAL: No abdominal or epigastric pain. No nausea, vomiting, or hematemesis; No diarrhea or constipation. No melena or hematochezia.  GENITOURINARY: No dysuria, frequency, hematuria, or incontinence  NEUROLOGICAL: No headaches, memory loss, loss of strength, numbness, or tremors  SKIN: No itching, burning, rashes, or lesions   LYMPH NODES: No enlarged glands  ENDOCRINE: No heat or cold intolerance; No hair loss  MUSCULOSKELETAL: No joint pain or swelling; No muscle, back, or extremity pain  PSYCHIATRIC: No depression, anxiety, mood swings, or difficulty sleeping  HEME/LYMPH: No easy bruising, or bleeding gums  ALLERY AND IMMUNOLOGIC: No hives or eczema    MEDICATIONS  (STANDING):  atorvastatin 40 milliGRAM(s) Oral at bedtime  clopidogrel Tablet 75 milliGRAM(s) Oral daily  dextrose 50% Injectable 12.5 Gram(s) IV Push once  dextrose 50% Injectable 25 Gram(s) IV Push once  dextrose 50% Injectable 25 Gram(s) IV Push once  docusate sodium 100 milliGRAM(s) Oral three times a day  ertapenem  IVPB 500 milliGRAM(s) IV Intermittent every 24 hours  gabapentin 300 milliGRAM(s) Oral daily  insulin glargine Injectable (LANTUS) 34 Unit(s) SubCutaneous at bedtime  insulin lispro (HumaLOG) corrective regimen sliding scale   SubCutaneous three times a day before meals  insulin lispro (HumaLOG) corrective regimen sliding scale   SubCutaneous at bedtime  insulin lispro Injectable (HumaLOG) 7 Unit(s) SubCutaneous three times a day before meals  metoprolol tartrate 12.5 milliGRAM(s) Oral two times a day  pantoprazole    Tablet 40 milliGRAM(s) Oral two times a day  polyethylene glycol 3350 17 Gram(s) Oral two times a day  senna 2 Tablet(s) Oral at bedtime    MEDICATIONS  (PRN):  acetaminophen   Tablet. 650 milliGRAM(s) Oral every 6 hours PRN Mild Pain (1 - 3)  dextrose 40% Gel 15 Gram(s) Oral once PRN Blood Glucose LESS THAN 70 milliGRAM(s)/deciliter  glucagon  Injectable 1 milliGRAM(s) IntraMuscular once PRN Glucose LESS THAN 70 milligrams/deciliter      PHYSICAL EXAM:  Vital Signs Last 24 Hrs  T(C): 36.8 (19 May 2018 14:45), Max: 36.8 (19 May 2018 14:45)  T(F): 98.3 (19 May 2018 14:45), Max: 98.3 (19 May 2018 14:45)  HR: 74 (19 May 2018 14:45) (66 - 74)  BP: 179/70 (19 May 2018 14:45) (155/70 - 194/77)  BP(mean): --  RR: 16 (19 May 2018 14:45) (16 - 18)  SpO2: 96% (19 May 2018 14:45) (96% - 99%)  I&O's Summary    18 May 2018 07:01  -  19 May 2018 07:00  --------------------------------------------------------  IN: 1194 mL / OUT: 1400 mL / NET: -206 mL    19 May 2018 07:01  -  19 May 2018 15:47  --------------------------------------------------------  IN: 480 mL / OUT: 0 mL / NET: 480 mL      GENERAL: NAD, well-developed  HEAD:  Atraumatic, Normocephalic  EYES: EOMI, PERRLA, conjunctiva and sclera clear  NECK: Supple, No JVD  CHEST/LUNG: Clear to auscultation bilaterally; No wheeze  HEART: Regular rate and rhythm; No murmurs, rubs, or gallops  ABDOMEN: Soft, Nontender, Nondistended; Bowel sounds present  EXTREMITIES:  2+ Peripheral Pulses, No clubbing, cyanosis, or edema  PSYCH: AAOx3  NEUROLOGY: non-focal  SKIN: No rashes or lesions    LABS:  CAPILLARY BLOOD GLUCOSE      POCT Blood Glucose.: 91 mg/dL (19 May 2018 11:15)  POCT Blood Glucose.: 100 mg/dL (19 May 2018 07:45)  POCT Blood Glucose.: 209 mg/dL (18 May 2018 21:39)  POCT Blood Glucose.: 186 mg/dL (18 May 2018 17:26)                          7.4    7.51  )-----------( 674      ( 19 May 2018 08:33 )             25.4     05-19    139  |  104  |  20  ----------------------------<  92  4.8   |  23  |  1.76<H>    Ca    8.9      19 May 2018 06:47                RADIOLOGY & ADDITIONAL TESTS:    Imaging Personally Reviewed:    Consultant(s) Notes Reviewed:      Care Discussed with Consultants/Other Providers:

## 2018-05-20 LAB
ANION GAP SERPL CALC-SCNC: 10 MMOL/L — SIGNIFICANT CHANGE UP (ref 5–17)
BUN SERPL-MCNC: 18 MG/DL — SIGNIFICANT CHANGE UP (ref 7–23)
CALCIUM SERPL-MCNC: 8.5 MG/DL — SIGNIFICANT CHANGE UP (ref 8.4–10.5)
CHLORIDE SERPL-SCNC: 103 MMOL/L — SIGNIFICANT CHANGE UP (ref 96–108)
CO2 SERPL-SCNC: 25 MMOL/L — SIGNIFICANT CHANGE UP (ref 22–31)
CREAT SERPL-MCNC: 1.58 MG/DL — HIGH (ref 0.5–1.3)
CULTURE RESULTS: SIGNIFICANT CHANGE UP
GLUCOSE BLDC GLUCOMTR-MCNC: 130 MG/DL — HIGH (ref 70–99)
GLUCOSE BLDC GLUCOMTR-MCNC: 131 MG/DL — HIGH (ref 70–99)
GLUCOSE BLDC GLUCOMTR-MCNC: 183 MG/DL — HIGH (ref 70–99)
GLUCOSE BLDC GLUCOMTR-MCNC: 89 MG/DL — SIGNIFICANT CHANGE UP (ref 70–99)
GLUCOSE SERPL-MCNC: 185 MG/DL — HIGH (ref 70–99)
HCT VFR BLD CALC: 27.2 % — LOW (ref 34.5–45)
HGB BLD-MCNC: 7.8 G/DL — LOW (ref 11.5–15.5)
MAGNESIUM SERPL-MCNC: 1.9 MG/DL — SIGNIFICANT CHANGE UP (ref 1.6–2.6)
MCHC RBC-ENTMCNC: 22.2 PG — LOW (ref 27–34)
MCHC RBC-ENTMCNC: 28.7 GM/DL — LOW (ref 32–36)
MCV RBC AUTO: 77.5 FL — LOW (ref 80–100)
PHOSPHATE SERPL-MCNC: 3.5 MG/DL — SIGNIFICANT CHANGE UP (ref 2.5–4.5)
PLATELET # BLD AUTO: 734 K/UL — HIGH (ref 150–400)
POTASSIUM SERPL-MCNC: 4.8 MMOL/L — SIGNIFICANT CHANGE UP (ref 3.5–5.3)
POTASSIUM SERPL-SCNC: 4.8 MMOL/L — SIGNIFICANT CHANGE UP (ref 3.5–5.3)
RBC # BLD: 3.51 M/UL — LOW (ref 3.8–5.2)
RBC # FLD: 21.8 % — HIGH (ref 10.3–14.5)
SODIUM SERPL-SCNC: 138 MMOL/L — SIGNIFICANT CHANGE UP (ref 135–145)
SPECIMEN SOURCE: SIGNIFICANT CHANGE UP
WBC # BLD: 11.39 K/UL — HIGH (ref 3.8–10.5)
WBC # FLD AUTO: 11.39 K/UL — HIGH (ref 3.8–10.5)

## 2018-05-20 RX ORDER — FUROSEMIDE 40 MG
40 TABLET ORAL DAILY
Qty: 0 | Refills: 0 | Status: DISCONTINUED | OUTPATIENT
Start: 2018-05-20 | End: 2018-05-21

## 2018-05-20 RX ADMIN — Medication 7 UNIT(S): at 11:42

## 2018-05-20 RX ADMIN — CLOPIDOGREL BISULFATE 75 MILLIGRAM(S): 75 TABLET, FILM COATED ORAL at 11:42

## 2018-05-20 RX ADMIN — GABAPENTIN 300 MILLIGRAM(S): 400 CAPSULE ORAL at 11:42

## 2018-05-20 RX ADMIN — INSULIN GLARGINE 34 UNIT(S): 100 INJECTION, SOLUTION SUBCUTANEOUS at 21:39

## 2018-05-20 RX ADMIN — Medication 7 UNIT(S): at 08:11

## 2018-05-20 RX ADMIN — Medication 650 MILLIGRAM(S): at 11:42

## 2018-05-20 RX ADMIN — PANTOPRAZOLE SODIUM 40 MILLIGRAM(S): 20 TABLET, DELAYED RELEASE ORAL at 17:19

## 2018-05-20 RX ADMIN — Medication 7 UNIT(S): at 17:19

## 2018-05-20 RX ADMIN — Medication 12.5 MILLIGRAM(S): at 17:19

## 2018-05-20 RX ADMIN — Medication 1 APPLICATION(S): at 05:06

## 2018-05-20 RX ADMIN — Medication 12.5 MILLIGRAM(S): at 05:06

## 2018-05-20 RX ADMIN — ATORVASTATIN CALCIUM 40 MILLIGRAM(S): 80 TABLET, FILM COATED ORAL at 21:40

## 2018-05-20 RX ADMIN — PANTOPRAZOLE SODIUM 40 MILLIGRAM(S): 20 TABLET, DELAYED RELEASE ORAL at 05:06

## 2018-05-20 RX ADMIN — ERTAPENEM SODIUM 100 MILLIGRAM(S): 1 INJECTION, POWDER, LYOPHILIZED, FOR SOLUTION INTRAMUSCULAR; INTRAVENOUS at 21:40

## 2018-05-20 RX ADMIN — Medication 650 MILLIGRAM(S): at 12:15

## 2018-05-20 NOTE — PROGRESS NOTE ADULT - ASSESSMENT
77 yo woman with PMH of CAD s/p MICHAEL x2, AFib not on A/C 2/2 GI bleed, DMT2 with neuropathy, anemia, presenting with crampy lower back pain ~5 days. Denies dysuria or increased urinary frequency. Endorses subjective fevers and sweats. Patient also endorses dark stool that has been present since her March discharge from [facility]. Denies abdominal pain, BRBPR. Endorses "pain from her ulcers",  and an episode of nausea and with NBNB emesis. She has been taking Advil ~4 pills a day for her neuropathy. Endorses drinking coffee and V8 frequently. Patient denies chest pain or worsening SOB. Has dyspnea on exertion with intermittent palpitations at baseline. States she has been fairly compliant with medications, but in the past few days has been forgetful with some of her medications in the setting of the pain being too distracting. Patient was at cardiologist's office and was referred to the ED for further eval. (11 May 2018 21:29)    ER vitals:  T 102.1, P 92, /66.  WBC 21, Na 124, Cr 1.54, elevated blood glucose, UA (+) LE/pyuria.  Pt had Sylvester catheter placed in ER for urinary retention, with 400 cc clear yellow urine output.  Urine cultures with Enterobacter aerogenes, blood cultures growing the same, CT ap with oral/IV co showed b/l pyelonephritis R>L, unclear if developing abscess on right side.  Pt currently on zosyn.  ID consult called for further antibiotic managment.    Problem/Plan - 1:    ·	Sepsis    - Meets sepsis criteria, fever, leukocytosis, and bacteremia.  Source uti and b/l pyelonephritis.  Pt with enterobacter aerogenes from urine and blood cultures.    - Switch to invanz.  Possible developing renal abscess?  Recommend repeat CT ap  in a few days, to re-evaluate.  Avoid IV contrast given rise in Cr    - Check repeat cultures to ensure clearance.    - Blood sugar control.    - Pt with worsening renal function - Nephrology f/u noted.  Pt undergoing w/u for KOJO.  Renally dose antibiotics.      Will follow,    Lisa Lopez  [phone]
78y Female with CAD, DM, Afib a/w lower back pain x5-6 days 2/2 b/l pyelonephritis, with KOJO and GNR bacteremia
· Assessment	  79 yo woman with PMH of CAD s/p MICHAEL x2, AFib not on A/C 2/2 GI bleed, DMT2 with neuropathy, anemia, presenting with crampy lower back pain ~5 days found with b/l pyelonephritis.        Sepsis sec to pyelonephritis:  IV Invanz, as per ID recommendations.      KOJO:  Prerenal vs ATN  Nephro f/up noted.  Cr improving.    Anemia:  GI f/up  Hb stable.
77 yo woman with PMH of CAD s/p MICHAEL x2, AFib not on A/C 2/2 GI bleed, DMT2 with neuropathy, anemia, presenting with crampy lower back pain ~5 days. Denies dysuria or increased urinary frequency. Endorses subjective fevers and sweats. Patient also endorses dark stool that has been present since her March discharge from [facility]. Denies abdominal pain, BRBPR. Endorses "pain from her ulcers",  and an episode of nausea and with NBNB emesis. She has been taking Advil ~4 pills a day for her neuropathy. Endorses drinking coffee and V8 frequently. Patient denies chest pain or worsening SOB. Has dyspnea on exertion with intermittent palpitations at baseline. States she has been fairly compliant with medications, but in the past few days has been forgetful with some of her medications in the setting of the pain being too distracting. Patient was at cardiologist's office and was referred to the ED for further eval. (11 May 2018 21:29)    ER vitals:  T 102.1, P 92, /66.  WBC 21, Na 124, Cr 1.54, elevated blood glucose, UA (+) LE/pyuria.  Pt had Sylvester catheter placed in ER for urinary retention, with 400 cc clear yellow urine output.  Urine cultures with Enterobacter aerogenes, blood cultures growing the same, CT ap with oral/IV co showed b/l pyelonephritis R>L, unclear if developing abscess on right side.  Pt currently on zosyn.  ID consult called for further antibiotic managment.    Problem/Plan - 1:    ·	Sepsis    - Meets sepsis criteria:  On admission, pt had fever, leukocytosis, and found to have bacteremia.  Source uti and b/l pyelonephritis.  Pt with enterobacter aerogenes from urine and blood cultures.    - Switched to invanz.  Initial ctap showed possible renal abscess.  Repeat CT without evidence for collection.      - Kidney function improving - no plan for Shiley or dialysis at this time.    - No new fevers.  WBC normalized today.  If pt continues to improve, change to PO cipro 500mg Qdaily to complete total 2 week course (5/14 through 5/27)      Will follow,    Lisa Lopez  [phone]
77 yo woman with PMH of CAD s/p MICHAEL x2, AFib not on A/C 2/2 GI bleed, DMT2 with neuropathy, anemia, presenting with crampy lower back pain ~5 days. Denies dysuria or increased urinary frequency. Endorses subjective fevers and sweats. Patient also endorses dark stool that has been present since her March discharge from [facility]. Denies abdominal pain, BRBPR. Endorses "pain from her ulcers",  and an episode of nausea and with NBNB emesis. She has been taking Advil ~4 pills a day for her neuropathy. Endorses drinking coffee and V8 frequently. Patient denies chest pain or worsening SOB. Has dyspnea on exertion with intermittent palpitations at baseline. States she has been fairly compliant with medications, but in the past few days has been forgetful with some of her medications in the setting of the pain being too distracting. Patient was at cardiologist's office and was referred to the ED for further eval. (11 May 2018 21:29)    ER vitals:  T 102.1, P 92, /66.  WBC 21, Na 124, Cr 1.54, elevated blood glucose, UA (+) LE/pyuria.  Pt had Sylvester catheter placed in ER for urinary retention, with 400 cc clear yellow urine output.  Urine cultures with Enterobacter aerogenes, blood cultures growing the same, CT ap with oral/IV co showed b/l pyelonephritis R>L, unclear if developing abscess on right side.  Pt currently on zosyn.  ID consult called for further antibiotic managment.    Problem/Plan - 1:    ·	Sepsis    - Meets sepsis criteria, fever, leukocytosis, and bacteremia.  Source uti and b/l pyelonephritis.  Pt with enterobacter aerogenes from urine and blood cultures.    - Switched to invanz.  Possible developing renal abscess?  Recommend repeat CT ap.  Pt has been clinically improving, repeat blood cultures NGTD.  f/u CT scan    - Kidney function improving - no plan for Shiley or dialysis at this time.    - If no renal abscess, will de-escalate to PO cipro (Qtc  = 408) to complete total 2 week course      Will follow,    Lisa Lopez  [phone]
77 yo woman with PMH of CAD s/p MICHAEL x2, AFib not on A/C 2/2 GI bleed, DMT2 with neuropathy, anemia, presenting with crampy lower back pain ~5 days. Denies dysuria or increased urinary frequency. Endorses subjective fevers and sweats. Patient also endorses dark stool that has been present since her March discharge from [facility]. Denies abdominal pain, BRBPR. Endorses "pain from her ulcers",  and an episode of nausea and with NBNB emesis. She has been taking Advil ~4 pills a day for her neuropathy. Endorses drinking coffee and V8 frequently. Patient denies chest pain or worsening SOB. Has dyspnea on exertion with intermittent palpitations at baseline. States she has been fairly compliant with medications, but in the past few days has been forgetful with some of her medications in the setting of the pain being too distracting. Patient was at cardiologist's office and was referred to the ED for further eval. (11 May 2018 21:29)    ER vitals:  T 102.1, P 92, /66.  WBC 21, Na 124, Cr 1.54, elevated blood glucose, UA (+) LE/pyuria.  Pt had Sylvester catheter placed in ER for urinary retention, with 400 cc clear yellow urine output.  Urine cultures with Enterobacter aerogenes, blood cultures growing the same, CT ap with oral/IV co showed b/l pyelonephritis R>L, unclear if developing abscess on right side.  Pt currently on zosyn.  ID consult called for further antibiotic managment.    Problem/Plan - 1:    ·	Sepsis    - Meets sepsis criteria, fever, leukocytosis, and bacteremia.  Source uti and b/l pyelonephritis.  Pt with enterobacter aerogenes from urine and blood cultures.    - Switched to invanz.  Possible developing renal abscess?  Recommend repeat CT ap  in one week to re-evaluate.  If Cr still elevated, avoid IV contrast    - Repeat cultures NGTD    - Blood sugar control - endocrine following    - Nephrology following - if kidney function worsens, may need dialysis.    Will follow,    Lisa Lopez  [phone]
78y Female with CAD, DM, Afib a/w lower back pain x5-6 days 2/2 b/l pyelonephritis, with KOJO and GNR bacteremia
78y Female with CAD, DM, Afib a/w lower back pain x5-6 days 2/2 b/l pyelonephritis, with KOJO and bacteremia
79 yo woman with PMH of CAD s/p MICHAEL x2, AFib not on A/C 2/2 GI bleed, DMT2 with neuropathy, anemia, presenting with crampy lower back pain ~5 days. Denies dysuria or increased urinary frequency. Endorses subjective fevers and sweats. Patient also endorses dark stool that has been present since her March discharge from [facility]. Denies abdominal pain, BRBPR. Endorses "pain from her ulcers",  and an episode of nausea and with NBNB emesis. She has been taking Advil ~4 pills a day for her neuropathy. Endorses drinking coffee and V8 frequently. Patient denies chest pain or worsening SOB. Has dyspnea on exertion with intermittent palpitations at baseline. States she has been fairly compliant with medications, but in the past few days has been forgetful with some of her medications in the setting of the pain being too distracting. Patient was at cardiologist's office and was referred to the ED for further eval. (11 May 2018 21:29)    ER vitals:  T 102.1, P 92, /66.  WBC 21, Na 124, Cr 1.54, elevated blood glucose, UA (+) LE/pyuria.  Pt had Sylvester catheter placed in ER for urinary retention, with 400 cc clear yellow urine output.  Urine cultures with Enterobacter aerogenes, blood cultures growing the same, CT ap with oral/IV co showed b/l pyelonephritis R>L, unclear if developing abscess on right side.  Pt currently on zosyn.  ID consult called for further antibiotic managment.    Problem/Plan - 1:    ·	Sepsis    - Meets sepsis criteria, fever, leukocytosis, and bacteremia.  Source uti and b/l pyelonephritis.  Pt with enterobacter aerogenes from urine and blood cultures.    - Switched to invanz.  Possible developing renal abscess?  Recommend repeat CT ap.  Pt has been clinically improving, repeat blood cultures NGTD.  Will order CT for AM (d/w Renal - avoid IV contrast)    - Kidney function improving - no plan for Shiley or dialysis at this time.      Will follow,    Lisa Lopez  [phone]
79 yo woman with PMH of CAD s/p MICHAEL x2, AFib not on A/C 2/2 GI bleed, DMT2 with neuropathy, anemia, presenting with crampy lower back pain ~5 days. Denies dysuria or increased urinary frequency. Endorses subjective fevers and sweats. Patient also endorses dark stool that has been present since her March discharge from [facility]. Denies abdominal pain, BRBPR. Endorses "pain from her ulcers",  and an episode of nausea and with NBNB emesis. She has been taking Advil ~4 pills a day for her neuropathy. Endorses drinking coffee and V8 frequently. Patient denies chest pain or worsening SOB. Has dyspnea on exertion with intermittent palpitations at baseline. States she has been fairly compliant with medications, but in the past few days has been forgetful with some of her medications in the setting of the pain being too distracting. Patient was at cardiologist's office and was referred to the ED for further eval. (11 May 2018 21:29)    ER vitals:  T 102.1, P 92, /66.  WBC 21, Na 124, Cr 1.54, elevated blood glucose, UA (+) LE/pyuria.  Pt had Sylvester catheter placed in ER for urinary retention, with 400 cc clear yellow urine output.  Urine cultures with Enterobacter aerogenes, blood cultures growing the same, CT ap with oral/IV co showed b/l pyelonephritis R>L, unclear if developing abscess on right side.  Pt currently on zosyn.  ID consult called for further antibiotic managment.    Problem/Plan - 1:    ·	Sepsis    - Meets sepsis criteria, fever, leukocytosis, and bacteremia.  Source uti and b/l pyelonephritis.  Pt with enterobacter aerogenes from urine and blood cultures.    - Switched to invanz.  Possible developing renal abscess?  Recommend repeat CT ap.      - Kidney function improving - no plan for Shiley or dialysis at this time.    - Pt with low grade fever, and leukocytosis today. Cont to monitor, f/u ct to evaluate for renal abscess      Will follow,    Lisa Lopez  [phone]
Assessment  DMT2: 78y Female with DM T2 with hyperglycemia, A1C high, on insulin, blood sugars improving, no hypoglycemic episode,  eating meals,  non compliant with low carb diet.  CAD: On medications, stable, monitored.  Anemia: Stable H&H, GI FU  HTN: Controlled, On med.
Assessment  DMT2: 78y Female with DM T2 with hyperglycemia, A1C high, on insulin, blood sugars running high postprandially, no hypoglycemic episode,  eating meals,  non compliant with low carb diet.  CAD: On medications, stable, monitored.  Anemia: Stable H&H, GI FU  HTN: Controlled, On med.
· Assessment	  77 yo woman with PMH of CAD s/p MICHAEL x2, AFib not on A/C 2/2 GI bleed, DMT2 with neuropathy, anemia, presenting with crampy lower back pain ~5 days found with b/l pyelonephritis.        Sepsis sec to pyelonephritis:  IV Invanz  ID f/up noted.    KOJO:  Prerenal vs ATN  Nephro f/up noted.  BMP    Anemia:  GI f/up  CBC
· Assessment	  77 yo woman with PMH of CAD s/p MICHAEL x2, AFib not on A/C 2/2 GI bleed, DMT2 with neuropathy, anemia, presenting with crampy lower back pain ~5 days found with b/l pyelonephritis.        Sepsis sec to pyelonephritis:  IV Invanz  ID f/up noted.    KOJO:  Prerenal vs ATN  Nephro f/up noted.  Cr improving.    Anemia:  GI f/up  CBC
· Assessment	  77 yo woman with PMH of CAD s/p MICHAEL x2, AFib not on A/C 2/2 GI bleed, DMT2 with neuropathy, anemia, presenting with crampy lower back pain ~5 days found with b/l pyelonephritis.        Sepsis sec to pyelonephritis:  IV InvanzDiscontinued, as per ID recommendations.  ID f/up noted.    KOJO:  Prerenal vs ATN  Nephro f/up noted.  Cr improving.    Anemia:  GI f/up  CBC
· Assessment	  77 yo woman with PMH of CAD s/p MICHAEL x2, AFib not on A/C 2/2 GI bleed, DMT2 with neuropathy, anemia, presenting with crampy lower back pain ~5 days found with b/l pyelonephritis.       ·  Sepsis sec to pyelonephritis:  IV Invanz  ID f/up noted.    KOJO:  Prerenal vs ATN  Nephro f/up noted.  BMP    Anemia:  GI f/up  CBC
· Assessment	  77 yo woman with PMH of CAD s/p MICHAEL x2, AFib not on A/C 2/2 GI bleed, DMT2 with neuropathy, anemia, presenting with crampy lower back pain ~5 days found with b/l pyelonephritis.     Problem/Plan - 1:  ·  Problem: Pyelonephritis.  Plan: Patient meeting sepsis criteria in setting of fever of 101.7 and elevated leukocytosis  Patient denies history of UTIs  F/U Urine Culture and Blood cultures  IV Zosyn       Problem/Plan - 2:  ·  Problem: Microcytic anemia.  Plan: GI f/up noted. CBC       Problem/Plan - 3:  ·  Problem: Type 2 diabetes mellitus with diabetic neuropathy, with long-term current use of insulin.  Plan:  Lantus /FSSS       Problem/Plan - 4:  ·  Problem: KOJO (acute kidney injury).  Plan: BMP/ Nephro eval noted.       Problem/Plan - 6:  Problem: CAD (coronary artery disease). Plan: Per patient was advised by cardiologist to hold on Aspirin and to continue with Plavix  Will continue with Plavix       Problem/Plan - 7:  ·  Problem: Atrial fibrillation, unspecified type.  Plan: Cardio f/up.
· Assessment	  77 yo woman with PMH of CAD s/p MICHAEL x2, AFib not on A/C 2/2 GI bleed, DMT2 with neuropathy, anemia, presenting with crampy lower back pain ~5 days found with b/l pyelonephritis.     Problem/Plan - 1:  ·  Problem: Pyelonephritis.  Plan: Patient meeting sepsis criteria in setting of fever of 101.7 and elevated leukocytosis  Patient denies history of UTIs  F/U Urine Culture and Blood cultures  IV Zosyn       Problem/Plan - 2:  ·  Problem: Microcytic anemia.  Plan: Patient last Hgb on 3/22/2018 of 8.3. Patient currently hemodynamically stable at 7.7  Per ED team, [place] x2: negative PRBCx1 initiated by ED team  Low suspicion of GI bleed at this time: will continue with Plavix in setting for MICHAEL placed <3 months ago.   GI eval appreciated.       Problem/Plan - 3:  ·  Problem: Type 2 diabetes mellitus with diabetic neuropathy, with long-term current use of insulin.  Plan: Patient with uncontrolled glucose levels. Patient seems non compliant with medications: Has not taken Metformin, Takes Lantus irregularly.   pH 7.37, AG at 16 trace ketone: with fairly normal PH, and patient without persistent abdominal pain, nausea, emesis: Low suspicion for DKA.   Will hold Metformin  Corrective SSI   Lantus 34 U       Problem/Plan - 4:  ·  Problem: KOJO (acute kidney injury).  Plan: Patient with KOJO last Cr 1.06 in 3/22  May be prerenal. No appreciable obstructive signs on CT  Monitor I/O  Hold on nephrotoxins, diuretics.      Problem/Plan - 5:  ·  Problem: Hyponatremia.  Plan: May be in setting of low PO intake. Patient with no neurological deficits  Continue maintenance IV fluids       Problem/Plan - 6:  Problem: CAD (coronary artery disease). Plan: Per patient was advised by cardiologist to hold on Aspirin and to continue with Plavix  Will continue with Plavix       Problem/Plan - 7:  ·  Problem: Atrial fibrillation, unspecified type.  Plan: Patient CHADVASC ~4 yet per prior hospitalization has been off A/C in setting of GI bleed.
Assessment  DMT2: 78y Female with DM T2 with hyperglycemia, A1C high, on insulin, blood sugars improving, no hypoglycemic episode,  eating meals,  non compliant with low carb diet.  CAD: On medications, stable, monitored.  Anemia: Stable H&H, GI FU  HTN: Controlled, On med.

## 2018-05-20 NOTE — PROGRESS NOTE ADULT - SUBJECTIVE AND OBJECTIVE BOX
Interval Events: Pt is without GI complaints. She denies abdominal pain, N/V/D/C, BRBPR/melena.       MEDICATIONS  (STANDING):  atorvastatin 40 milliGRAM(s) Oral at bedtime  clopidogrel Tablet 75 milliGRAM(s) Oral daily  dextrose 50% Injectable 12.5 Gram(s) IV Push once  dextrose 50% Injectable 25 Gram(s) IV Push once  dextrose 50% Injectable 25 Gram(s) IV Push once  docusate sodium 100 milliGRAM(s) Oral three times a day  ertapenem  IVPB 500 milliGRAM(s) IV Intermittent every 24 hours  gabapentin 300 milliGRAM(s) Oral daily  insulin glargine Injectable (LANTUS) 34 Unit(s) SubCutaneous at bedtime  insulin lispro (HumaLOG) corrective regimen sliding scale   SubCutaneous three times a day before meals  insulin lispro (HumaLOG) corrective regimen sliding scale   SubCutaneous at bedtime  insulin lispro Injectable (HumaLOG) 7 Unit(s) SubCutaneous three times a day before meals  metoprolol tartrate 12.5 milliGRAM(s) Oral two times a day  pantoprazole    Tablet 40 milliGRAM(s) Oral two times a day  polyethylene glycol 3350 17 Gram(s) Oral two times a day  senna 2 Tablet(s) Oral at bedtime    MEDICATIONS  (PRN):  acetaminophen   Tablet. 650 milliGRAM(s) Oral every 6 hours PRN Mild Pain (1 - 3)  dextrose 40% Gel 15 Gram(s) Oral once PRN Blood Glucose LESS THAN 70 milliGRAM(s)/deciliter  glucagon  Injectable 1 milliGRAM(s) IntraMuscular once PRN Glucose LESS THAN 70 milligrams/deciliter  hydrocortisone 1% Ointment 1 Application(s) Topical every 8 hours PRN Itching      Allergies    Carrots (Rash)  No Known Drug Allergies    Intolerances        Review of Systems:    General:  No wt loss, fevers, chills, night sweats,fatigue,   Eyes:  Good vision, no reported pain  ENT:  No sore throat, pain, runny nose, dysphagia  CV:  No pain, palpitations, hypo/hypertension  Resp:  No dyspnea, cough, tachypnea, wheezing  GI:  No pain, No nausea, No vomiting, No diarrhea, No constipation, No weight loss, No fever, No pruritis, No rectal bleeding, No melena, No dysphagia  :  No pain, bleeding, incontinence, nocturia  Muscle:  No pain, weakness  Neuro:  No weakness, tingling, memory problems  Psych:  No fatigue, insomnia, mood problems, depression  Endocrine:  No polyuria, polydypsia, cold/heat intolerance  Heme:  No petechiae, ecchymosis, easy bruisability  Skin:  No rash, tattoos, scars, edema      Vital Signs Last 24 Hrs  T(C): 36.8 (20 May 2018 04:43), Max: 36.8 (19 May 2018 14:45)  T(F): 98.2 (20 May 2018 04:43), Max: 98.3 (19 May 2018 14:45)  HR: 80 (20 May 2018 04:43) (74 - 80)  BP: 189/79 (20 May 2018 04:43) (160/78 - 189/79)  BP(mean): --  RR: 18 (20 May 2018 04:43) (16 - 18)  SpO2: 98% (20 May 2018 04:43) (96% - 98%)    PHYSICAL EXAM:    Constitutional: NAD, well-developed  HEENT: EOMI, throat clear  Neck: No LAD, supple  Respiratory: CTA and P  Cardiovascular: S1 and S2, RRR, no M  Gastrointestinal: BS+, soft, NT/ND, neg HSM,  Extremities: No peripheral edema, neg clubing, cyanosis  Vascular: 2+ peripheral pulses  Neurological: A/O x 3, no focal deficits  Psychiatric: Normal mood, normal affect  Skin: No rashes      LABS:                        7.8    11.39 )-----------( 734      ( 20 May 2018 11:40 )             27.2     05-20    138  |  103  |  18  ----------------------------<  185<H>  4.8   |  25  |  1.58<H>    Ca    8.5      20 May 2018 09:24  Phos  3.5     05-20  Mg     1.9     05-20            RADIOLOGY & ADDITIONAL TESTS:

## 2018-05-20 NOTE — PROGRESS NOTE ADULT - SUBJECTIVE AND OBJECTIVE BOX
Chief complaint  Patient is a 78y old  Female who presents with a chief complaint of bilateral lower back pain (11 May 2018 21:29)   Review of systems  Patient in bed, looks comfortable, no fever, no hypoglycemia.    Labs and Fingersticks  CAPILLARY BLOOD GLUCOSE      POCT Blood Glucose.: 131 mg/dL (20 May 2018 11:41)  POCT Blood Glucose.: 89 mg/dL (20 May 2018 07:30)  POCT Blood Glucose.: 234 mg/dL (19 May 2018 21:12)  POCT Blood Glucose.: 177 mg/dL (19 May 2018 17:06)      Anion Gap, Serum: 10 (05-20 @ 09:24)  Anion Gap, Serum: 12 (05-19 @ 06:47)      Calcium, Total Serum: 8.5 (05-20 @ 09:24)  Calcium, Total Serum: 8.9 (05-19 @ 06:47)          05-20    138  |  103  |  18  ----------------------------<  185<H>  4.8   |  25  |  1.58<H>    Ca    8.5      20 May 2018 09:24  Phos  3.5     05-20  Mg     1.9     05-20                          7.8    11.39 )-----------( 734      ( 20 May 2018 11:40 )             27.2     Medications  MEDICATIONS  (STANDING):  atorvastatin 40 milliGRAM(s) Oral at bedtime  clopidogrel Tablet 75 milliGRAM(s) Oral daily  dextrose 50% Injectable 12.5 Gram(s) IV Push once  dextrose 50% Injectable 25 Gram(s) IV Push once  dextrose 50% Injectable 25 Gram(s) IV Push once  docusate sodium 100 milliGRAM(s) Oral three times a day  ertapenem  IVPB 500 milliGRAM(s) IV Intermittent every 24 hours  furosemide    Tablet 40 milliGRAM(s) Oral daily  gabapentin 300 milliGRAM(s) Oral daily  insulin glargine Injectable (LANTUS) 34 Unit(s) SubCutaneous at bedtime  insulin lispro (HumaLOG) corrective regimen sliding scale   SubCutaneous three times a day before meals  insulin lispro (HumaLOG) corrective regimen sliding scale   SubCutaneous at bedtime  insulin lispro Injectable (HumaLOG) 7 Unit(s) SubCutaneous three times a day before meals  metoprolol tartrate 12.5 milliGRAM(s) Oral two times a day  pantoprazole    Tablet 40 milliGRAM(s) Oral two times a day  polyethylene glycol 3350 17 Gram(s) Oral two times a day  senna 2 Tablet(s) Oral at bedtime      Physical Exam  General: Patient comfortable in bed  Vital Signs Last 12 Hrs  T(F): 98.2 (05-20-18 @ 04:43), Max: 98.2 (05-20-18 @ 04:43)  HR: 80 (05-20-18 @ 04:43) (80 - 80)  BP: 189/79 (05-20-18 @ 04:43) (189/79 - 189/79)  BP(mean): --  RR: 18 (05-20-18 @ 04:43) (18 - 18)  SpO2: 98% (05-20-18 @ 04:43) (98% - 98%)  Neck: No palpable thyroid nodules.  CVS: S1S2, No murmurs  Respiratory: No wheezing, no crepitations  GI: Abdomen soft, bowel sounds positive  Musculoskeletal:  edema lower extremities.   Skin: No skin rashes, no ecchymosis    Diagnostics

## 2018-05-20 NOTE — PROGRESS NOTE ADULT - SUBJECTIVE AND OBJECTIVE BOX
Patient is a 78y old  Female who presents with a chief complaint of bilateral lower back pain (11 May 2018 21:29)      SUBJECTIVE / OVERNIGHT EVENTS:   Feels better.  Denies CP/SOB/Palpitation/HA.    MEDICATIONS  (STANDING):  atorvastatin 40 milliGRAM(s) Oral at bedtime  clopidogrel Tablet 75 milliGRAM(s) Oral daily  dextrose 50% Injectable 12.5 Gram(s) IV Push once  dextrose 50% Injectable 25 Gram(s) IV Push once  dextrose 50% Injectable 25 Gram(s) IV Push once  docusate sodium 100 milliGRAM(s) Oral three times a day  ertapenem  IVPB 500 milliGRAM(s) IV Intermittent every 24 hours  furosemide    Tablet 40 milliGRAM(s) Oral daily  gabapentin 300 milliGRAM(s) Oral daily  insulin glargine Injectable (LANTUS) 34 Unit(s) SubCutaneous at bedtime  insulin lispro (HumaLOG) corrective regimen sliding scale   SubCutaneous three times a day before meals  insulin lispro (HumaLOG) corrective regimen sliding scale   SubCutaneous at bedtime  insulin lispro Injectable (HumaLOG) 7 Unit(s) SubCutaneous three times a day before meals  metoprolol tartrate 12.5 milliGRAM(s) Oral two times a day  pantoprazole    Tablet 40 milliGRAM(s) Oral two times a day  polyethylene glycol 3350 17 Gram(s) Oral two times a day  senna 2 Tablet(s) Oral at bedtime    MEDICATIONS  (PRN):  acetaminophen   Tablet. 650 milliGRAM(s) Oral every 6 hours PRN Mild Pain (1 - 3)  dextrose 40% Gel 15 Gram(s) Oral once PRN Blood Glucose LESS THAN 70 milliGRAM(s)/deciliter  glucagon  Injectable 1 milliGRAM(s) IntraMuscular once PRN Glucose LESS THAN 70 milligrams/deciliter  hydrocortisone 1% Ointment 1 Application(s) Topical every 8 hours PRN Itching        CAPILLARY BLOOD GLUCOSE      POCT Blood Glucose.: 130 mg/dL (20 May 2018 17:02)  POCT Blood Glucose.: 131 mg/dL (20 May 2018 11:41)  POCT Blood Glucose.: 89 mg/dL (20 May 2018 07:30)  POCT Blood Glucose.: 234 mg/dL (19 May 2018 21:12)    I&O's Summary    19 May 2018 07:01  -  20 May 2018 07:00  --------------------------------------------------------  IN: 954 mL / OUT: 0 mL / NET: 954 mL    20 May 2018 07:01  -  20 May 2018 17:56  --------------------------------------------------------  IN: 840 mL / OUT: 0 mL / NET: 840 mL        PHYSICAL EXAM:  GENERAL: NAD, well-developed  HEAD:  Atraumatic, Normocephalic  NECK: Supple, No JVD  CHEST/LUNG: Clear to auscultation bilaterally; No wheezing.  HEART: Regular rate and rhythm; No murmurs, rubs, or gallops  ABDOMEN: Soft, Nontender, Nondistended; Bowel sounds present  EXTREMITIES:   No clubbing, cyanosis, or edema  NEUROLOGY: AAO X 3  SKIN: No rashes    LABS:                        7.8    11.39 )-----------( 734      ( 20 May 2018 11:40 )             27.2     05-20    138  |  103  |  18  ----------------------------<  185<H>  4.8   |  25  |  1.58<H>    Ca    8.5      20 May 2018 09:24  Phos  3.5     05-20  Mg     1.9     05-20              CAPILLARY BLOOD GLUCOSE      POCT Blood Glucose.: 130 mg/dL (20 May 2018 17:02)  POCT Blood Glucose.: 131 mg/dL (20 May 2018 11:41)  POCT Blood Glucose.: 89 mg/dL (20 May 2018 07:30)  POCT Blood Glucose.: 234 mg/dL (19 May 2018 21:12)    05-15 @ 09:15  Culture-urine --  Culture results   No growth at 5 days.  method type --  Organism --  Organism Identification --  Specimen source .Blood Blood-Peripheral  05-14 @ 16:49  Culture-urine --  Culture results   No growth at 5 days.  method type --  Organism --  Organism Identification --  Specimen source .Blood Blood-Peripheral  05-14 @ 16:42  Culture-urine --  Culture results   No growth at 5 days.  method type --  Organism --  Organism Identification --  Specimen source .Blood Blood-Peripheral           05-15 @ 09:15  Culture blood --  Culture results   No growth at 5 days.  Gram stain --  Gram stain blood --  Method type --  Organism --  Organism identification --  Specimen source .Blood Blood-Peripheral   05-14 @ 16:49  Culture blood --  Culture results   No growth at 5 days.  Gram stain --  Gram stain blood --  Method type --  Organism --  Organism identification --  Specimen source .Blood Blood-Peripheral   05-14 @ 16:42  Culture blood --  Culture results   No growth at 5 days.  Gram stain --  Gram stain blood --  Method type --  Organism --  Organism identification --  Specimen source .Blood Blood-Peripheral      RADIOLOGY & ADDITIONAL TESTS:    Imaging Personally Reviewed:    Consultant(s) Notes Reviewed:      Care Discussed with Consultants/Other Providers:

## 2018-05-20 NOTE — PROGRESS NOTE ADULT - SUBJECTIVE AND OBJECTIVE BOX
Patient seen and examined  no complaints    Carrots (Rash)  No Known Drug Allergies    Hospital Medications:   MEDICATIONS  (STANDING):  atorvastatin 40 milliGRAM(s) Oral at bedtime  clopidogrel Tablet 75 milliGRAM(s) Oral daily  dextrose 50% Injectable 12.5 Gram(s) IV Push once  dextrose 50% Injectable 25 Gram(s) IV Push once  dextrose 50% Injectable 25 Gram(s) IV Push once  docusate sodium 100 milliGRAM(s) Oral three times a day  ertapenem  IVPB 500 milliGRAM(s) IV Intermittent every 24 hours  gabapentin 300 milliGRAM(s) Oral daily  insulin glargine Injectable (LANTUS) 34 Unit(s) SubCutaneous at bedtime  insulin lispro (HumaLOG) corrective regimen sliding scale   SubCutaneous three times a day before meals  insulin lispro (HumaLOG) corrective regimen sliding scale   SubCutaneous at bedtime  insulin lispro Injectable (HumaLOG) 7 Unit(s) SubCutaneous three times a day before meals  metoprolol tartrate 12.5 milliGRAM(s) Oral two times a day  pantoprazole    Tablet 40 milliGRAM(s) Oral two times a day  polyethylene glycol 3350 17 Gram(s) Oral two times a day  senna 2 Tablet(s) Oral at bedtime        VITALS:  T(F): 98.2 (18 @ 04:43), Max: 98.3 (18 @ 14:45)  HR: 80 (18 @ 04:43)  BP: 189/79 (18 @ 04:43)  RR: 18 (18 @ 04:43)  SpO2: 98% (18 @ 04:43)  Wt(kg): --     @ 07:01  -   @ 07:00  --------------------------------------------------------  IN: 954 mL / OUT: 0 mL / NET: 954 mL     @ 07:01  -   @ 12:26  --------------------------------------------------------  IN: 360 mL / OUT: 0 mL / NET: 360 mL        PHYSICAL EXAM:  Constitutional: NAD  HEENT: anicteric sclera, oropharynx clear, MMM  Neck: No JVD  Respiratory: CTAB, no wheezes, rales or rhonchi  Cardiovascular: S1, S2, RRR  Gastrointestinal: BS+, soft, NT/ND  Extremities: +peripheral edema  Neurological: A/O x 3, no focal deficits  Psychiatric: Normal mood, normal affect    LABS:      138  |  103  |  18  ----------------------------<  185<H>  4.8   |  25  |  1.58<H>    Ca    8.5      20 May 2018 09:24  Phos  3.5       Mg     1.9           Creatinine Trend: 1.58 <--, 1.76 <--, 2.45 <--, 3.22 <--, 4.67 <--, 5.80 <--, 5.93 <--, 5.71 <--, 5.22 <--                        7.8    11.39 )-----------( 734      ( 20 May 2018 11:40 )             27.2     Urine Studies:  Urinalysis Basic - ( 14 May 2018 12:46 )    Color: Yellow / Appearance: Turbid / S.018 / pH:   Gluc:  / Ketone: Negative  / Bili: Negative / Urobili: Negative   Blood:  / Protein: 300 mg/dL / Nitrite: Negative   Leuk Esterase: Large / RBC: 25-50 /HPF / WBC >50 /HPF   Sq Epi:  / Non Sq Epi:  / Bacteria:       Osmolality, Random Urine: 238 mos/kg ( @ 23:08)  Sodium, Random Urine: 46 mmol/L ( @ 21:13)  Osmolality, Random Urine: 276 mos/kg ( @ 15:27)  Creatinine, Random Urine: 51 mg/dL ( @ 15:27)  Protein/Creatinine Ratio Calculation: 6.5 Ratio ( @ 15:27)  Chloride, Random Urine: 35 mmol/L ( @ 12:46)  Potassium, Random Urine: 42 mmol/L (05 @ 12:46)  Sodium, Random Urine: 53 mmol/L ( @ 12:46)    RADIOLOGY & ADDITIONAL STUDIES:

## 2018-05-21 ENCOUNTER — TRANSCRIPTION ENCOUNTER (OUTPATIENT)
Age: 78
End: 2018-05-21

## 2018-05-21 VITALS — WEIGHT: 169.98 LBS

## 2018-05-21 LAB
ANION GAP SERPL CALC-SCNC: 8 MMOL/L — SIGNIFICANT CHANGE UP (ref 5–17)
BUN SERPL-MCNC: 14 MG/DL — SIGNIFICANT CHANGE UP (ref 7–23)
CALCIUM SERPL-MCNC: 8.5 MG/DL — SIGNIFICANT CHANGE UP (ref 8.4–10.5)
CHLORIDE SERPL-SCNC: 104 MMOL/L — SIGNIFICANT CHANGE UP (ref 96–108)
CO2 SERPL-SCNC: 26 MMOL/L — SIGNIFICANT CHANGE UP (ref 22–31)
CREAT SERPL-MCNC: 1.47 MG/DL — HIGH (ref 0.5–1.3)
GLUCOSE BLDC GLUCOMTR-MCNC: 98 MG/DL — SIGNIFICANT CHANGE UP (ref 70–99)
GLUCOSE BLDC GLUCOMTR-MCNC: 98 MG/DL — SIGNIFICANT CHANGE UP (ref 70–99)
GLUCOSE SERPL-MCNC: 105 MG/DL — HIGH (ref 70–99)
HCT VFR BLD CALC: 25.2 % — LOW (ref 34.5–45)
HGB BLD-MCNC: 7.5 G/DL — LOW (ref 11.5–15.5)
MCHC RBC-ENTMCNC: 22.5 PG — LOW (ref 27–34)
MCHC RBC-ENTMCNC: 29.8 GM/DL — LOW (ref 32–36)
MCV RBC AUTO: 75.4 FL — LOW (ref 80–100)
PLATELET # BLD AUTO: 670 K/UL — HIGH (ref 150–400)
POTASSIUM SERPL-MCNC: 4.8 MMOL/L — SIGNIFICANT CHANGE UP (ref 3.5–5.3)
POTASSIUM SERPL-SCNC: 4.8 MMOL/L — SIGNIFICANT CHANGE UP (ref 3.5–5.3)
RBC # BLD: 3.34 M/UL — LOW (ref 3.8–5.2)
RBC # FLD: 21.7 % — HIGH (ref 10.3–14.5)
SODIUM SERPL-SCNC: 138 MMOL/L — SIGNIFICANT CHANGE UP (ref 135–145)
WBC # BLD: 9.63 K/UL — SIGNIFICANT CHANGE UP (ref 3.8–10.5)
WBC # FLD AUTO: 9.63 K/UL — SIGNIFICANT CHANGE UP (ref 3.8–10.5)

## 2018-05-21 PROCEDURE — 84439 ASSAY OF FREE THYROXINE: CPT

## 2018-05-21 PROCEDURE — 85027 COMPLETE CBC AUTOMATED: CPT

## 2018-05-21 PROCEDURE — 84300 ASSAY OF URINE SODIUM: CPT

## 2018-05-21 PROCEDURE — 85730 THROMBOPLASTIN TIME PARTIAL: CPT

## 2018-05-21 PROCEDURE — 84295 ASSAY OF SERUM SODIUM: CPT

## 2018-05-21 PROCEDURE — 84133 ASSAY OF URINE POTASSIUM: CPT

## 2018-05-21 PROCEDURE — 82010 KETONE BODYS QUAN: CPT

## 2018-05-21 PROCEDURE — 80053 COMPREHEN METABOLIC PANEL: CPT

## 2018-05-21 PROCEDURE — 84156 ASSAY OF PROTEIN URINE: CPT

## 2018-05-21 PROCEDURE — 83690 ASSAY OF LIPASE: CPT

## 2018-05-21 PROCEDURE — 82272 OCCULT BLD FECES 1-3 TESTS: CPT

## 2018-05-21 PROCEDURE — 74176 CT ABD & PELVIS W/O CONTRAST: CPT

## 2018-05-21 PROCEDURE — 76775 US EXAM ABDO BACK WALL LIM: CPT

## 2018-05-21 PROCEDURE — 82803 BLOOD GASES ANY COMBINATION: CPT

## 2018-05-21 PROCEDURE — 99285 EMERGENCY DEPT VISIT HI MDM: CPT | Mod: 25

## 2018-05-21 PROCEDURE — 86706 HEP B SURFACE ANTIBODY: CPT

## 2018-05-21 PROCEDURE — 84550 ASSAY OF BLOOD/URIC ACID: CPT

## 2018-05-21 PROCEDURE — 84100 ASSAY OF PHOSPHORUS: CPT

## 2018-05-21 PROCEDURE — 97162 PT EVAL MOD COMPLEX 30 MIN: CPT

## 2018-05-21 PROCEDURE — 51701 INSERT BLADDER CATHETER: CPT

## 2018-05-21 PROCEDURE — 97530 THERAPEUTIC ACTIVITIES: CPT

## 2018-05-21 PROCEDURE — 71045 X-RAY EXAM CHEST 1 VIEW: CPT

## 2018-05-21 PROCEDURE — 82533 TOTAL CORTISOL: CPT

## 2018-05-21 PROCEDURE — 97110 THERAPEUTIC EXERCISES: CPT

## 2018-05-21 PROCEDURE — 96374 THER/PROPH/DIAG INJ IV PUSH: CPT | Mod: XU

## 2018-05-21 PROCEDURE — 82553 CREATINE MB FRACTION: CPT

## 2018-05-21 PROCEDURE — 82436 ASSAY OF URINE CHLORIDE: CPT

## 2018-05-21 PROCEDURE — 85014 HEMATOCRIT: CPT

## 2018-05-21 PROCEDURE — 84443 ASSAY THYROID STIM HORMONE: CPT

## 2018-05-21 PROCEDURE — 96375 TX/PRO/DX INJ NEW DRUG ADDON: CPT | Mod: XU

## 2018-05-21 PROCEDURE — 83550 IRON BINDING TEST: CPT

## 2018-05-21 PROCEDURE — 84484 ASSAY OF TROPONIN QUANT: CPT

## 2018-05-21 PROCEDURE — 84132 ASSAY OF SERUM POTASSIUM: CPT

## 2018-05-21 PROCEDURE — 74177 CT ABD & PELVIS W/CONTRAST: CPT

## 2018-05-21 PROCEDURE — 87150 DNA/RNA AMPLIFIED PROBE: CPT

## 2018-05-21 PROCEDURE — 36430 TRANSFUSION BLD/BLD COMPNT: CPT | Mod: XU

## 2018-05-21 PROCEDURE — 82962 GLUCOSE BLOOD TEST: CPT

## 2018-05-21 PROCEDURE — 86900 BLOOD TYPING SEROLOGIC ABO: CPT

## 2018-05-21 PROCEDURE — 87186 SC STD MICRODIL/AGAR DIL: CPT

## 2018-05-21 PROCEDURE — G8978: CPT

## 2018-05-21 PROCEDURE — 83735 ASSAY OF MAGNESIUM: CPT

## 2018-05-21 PROCEDURE — 86850 RBC ANTIBODY SCREEN: CPT

## 2018-05-21 PROCEDURE — 81001 URINALYSIS AUTO W/SCOPE: CPT

## 2018-05-21 PROCEDURE — 87205 SMEAR GRAM STAIN: CPT

## 2018-05-21 PROCEDURE — 84540 ASSAY OF URINE/UREA-N: CPT

## 2018-05-21 PROCEDURE — 83930 ASSAY OF BLOOD OSMOLALITY: CPT

## 2018-05-21 PROCEDURE — 82009 KETONE BODYS QUAL: CPT

## 2018-05-21 PROCEDURE — 82435 ASSAY OF BLOOD CHLORIDE: CPT

## 2018-05-21 PROCEDURE — 82728 ASSAY OF FERRITIN: CPT

## 2018-05-21 PROCEDURE — 82550 ASSAY OF CK (CPK): CPT

## 2018-05-21 PROCEDURE — 93005 ELECTROCARDIOGRAM TRACING: CPT

## 2018-05-21 PROCEDURE — 86923 COMPATIBILITY TEST ELECTRIC: CPT

## 2018-05-21 PROCEDURE — 87040 BLOOD CULTURE FOR BACTERIA: CPT

## 2018-05-21 PROCEDURE — G8979: CPT

## 2018-05-21 PROCEDURE — 83605 ASSAY OF LACTIC ACID: CPT

## 2018-05-21 PROCEDURE — 83935 ASSAY OF URINE OSMOLALITY: CPT

## 2018-05-21 PROCEDURE — 86901 BLOOD TYPING SEROLOGIC RH(D): CPT

## 2018-05-21 PROCEDURE — 85610 PROTHROMBIN TIME: CPT

## 2018-05-21 PROCEDURE — 97116 GAIT TRAINING THERAPY: CPT

## 2018-05-21 PROCEDURE — 82947 ASSAY GLUCOSE BLOOD QUANT: CPT

## 2018-05-21 PROCEDURE — 80074 ACUTE HEPATITIS PANEL: CPT

## 2018-05-21 PROCEDURE — P9016: CPT

## 2018-05-21 PROCEDURE — 82330 ASSAY OF CALCIUM: CPT

## 2018-05-21 PROCEDURE — 83036 HEMOGLOBIN GLYCOSYLATED A1C: CPT

## 2018-05-21 PROCEDURE — 87086 URINE CULTURE/COLONY COUNT: CPT

## 2018-05-21 PROCEDURE — 80048 BASIC METABOLIC PNL TOTAL CA: CPT

## 2018-05-21 PROCEDURE — 96372 THER/PROPH/DIAG INJ SC/IM: CPT | Mod: XU

## 2018-05-21 RX ORDER — ASPIRIN/CALCIUM CARB/MAGNESIUM 324 MG
1 TABLET ORAL
Qty: 0 | Refills: 0 | COMMUNITY

## 2018-05-21 RX ORDER — INSULIN LISPRO 100/ML
5 VIAL (ML) SUBCUTANEOUS
Qty: 0 | Refills: 0 | DISCHARGE
Start: 2018-05-21 | End: 2018-06-19

## 2018-05-21 RX ORDER — INSULIN LISPRO 100/ML
1 VIAL (ML) SUBCUTANEOUS
Qty: 100 | Refills: 0
Start: 2018-05-21

## 2018-05-21 RX ORDER — INSULIN GLARGINE 100 [IU]/ML
34 INJECTION, SOLUTION SUBCUTANEOUS
Qty: 0 | Refills: 0 | COMMUNITY

## 2018-05-21 RX ORDER — METOPROLOL TARTRATE 50 MG
0.5 TABLET ORAL
Qty: 0 | Refills: 0 | COMMUNITY

## 2018-05-21 RX ORDER — INSULIN LISPRO 100/ML
7 VIAL (ML) SUBCUTANEOUS
Qty: 100 | Refills: 0 | OUTPATIENT
Start: 2018-05-21

## 2018-05-21 RX ORDER — INSULIN LISPRO 100/ML
5 VIAL (ML) SUBCUTANEOUS
Qty: 0 | Refills: 0 | COMMUNITY
Start: 2018-05-21

## 2018-05-21 RX ORDER — METFORMIN HYDROCHLORIDE 850 MG/1
1 TABLET ORAL
Qty: 0 | Refills: 0 | COMMUNITY

## 2018-05-21 RX ORDER — INSULIN LISPRO 100/ML
7 VIAL (ML) SUBCUTANEOUS
Qty: 1 | Refills: 0 | OUTPATIENT
Start: 2018-05-21 | End: 2018-06-19

## 2018-05-21 RX ORDER — FUROSEMIDE 40 MG
1 TABLET ORAL
Qty: 0 | Refills: 0 | COMMUNITY

## 2018-05-21 RX ORDER — SENNA PLUS 8.6 MG/1
1 TABLET ORAL
Qty: 0 | Refills: 0 | COMMUNITY

## 2018-05-21 RX ORDER — ATORVASTATIN CALCIUM 80 MG/1
1 TABLET, FILM COATED ORAL
Qty: 0 | Refills: 0 | COMMUNITY

## 2018-05-21 RX ORDER — GABAPENTIN 400 MG/1
2 CAPSULE ORAL
Qty: 0 | Refills: 0 | COMMUNITY

## 2018-05-21 RX ORDER — INSULIN LISPRO 100/ML
2 VIAL (ML) SUBCUTANEOUS
Qty: 1 | Refills: 0
Start: 2018-05-21 | End: 2018-06-19

## 2018-05-21 RX ORDER — CIPROFLOXACIN LACTATE 400MG/40ML
1 VIAL (ML) INTRAVENOUS
Qty: 6 | Refills: 0
Start: 2018-05-21 | End: 2018-05-26

## 2018-05-21 RX ORDER — CLOPIDOGREL BISULFATE 75 MG/1
1 TABLET, FILM COATED ORAL
Qty: 0 | Refills: 0 | COMMUNITY

## 2018-05-21 RX ORDER — CIPROFLOXACIN LACTATE 400MG/40ML
500 VIAL (ML) INTRAVENOUS DAILY
Qty: 0 | Refills: 0 | Status: DISCONTINUED | OUTPATIENT
Start: 2018-05-21 | End: 2018-05-21

## 2018-05-21 RX ORDER — PANTOPRAZOLE SODIUM 20 MG/1
1 TABLET, DELAYED RELEASE ORAL
Qty: 0 | Refills: 0 | COMMUNITY

## 2018-05-21 RX ORDER — INSULIN LISPRO 100/ML
1 VIAL (ML) SUBCUTANEOUS
Qty: 1 | Refills: 0 | OUTPATIENT
Start: 2018-05-21

## 2018-05-21 RX ORDER — DOCUSATE SODIUM 100 MG
1 CAPSULE ORAL
Qty: 90 | Refills: 0
Start: 2018-05-21 | End: 2018-06-19

## 2018-05-21 RX ADMIN — PANTOPRAZOLE SODIUM 40 MILLIGRAM(S): 20 TABLET, DELAYED RELEASE ORAL at 05:35

## 2018-05-21 RX ADMIN — CLOPIDOGREL BISULFATE 75 MILLIGRAM(S): 75 TABLET, FILM COATED ORAL at 11:25

## 2018-05-21 RX ADMIN — Medication 12.5 MILLIGRAM(S): at 05:35

## 2018-05-21 RX ADMIN — Medication 40 MILLIGRAM(S): at 05:35

## 2018-05-21 RX ADMIN — Medication 7 UNIT(S): at 07:55

## 2018-05-21 RX ADMIN — GABAPENTIN 300 MILLIGRAM(S): 400 CAPSULE ORAL at 11:25

## 2018-05-21 NOTE — DISCHARGE NOTE ADULT - HOSPITAL COURSE
77 yo woman with PMH of CAD s/p MICHAEL x2, AFib not on A/C 2/2 GI bleed, DMT2 with neuropathy, anemia, presenting with crampy lower back pain ~5 days found with b/l pyelonephritis.   Sepsis sec to pyelonephritis:  IV Invanz, as per ID recommendations. now on Cipro po for 7 more days   KOJO:  Prerenal vs ATN  Nephro f/up noted.  Cr improving.  Anemia:  GI f/up  Hb stable.

## 2018-05-21 NOTE — DISCHARGE NOTE ADULT - CARE PROVIDER_API CALL
Parminder Goldberg), Internal Medicine  [address] [address] [address]  Phone: [phone]  Fax: [phone]    Julio César Hooper), Internal Medicine  [address] [address] [place] [address]  Phone: [phone]  Fax: [phone]    Claire Tsang), Internal Medicine  [facility] [address] [place] [address]  Phone: [phone]  Fax: [phone]

## 2018-05-21 NOTE — DISCHARGE NOTE ADULT - MEDICATION SUMMARY - MEDICATIONS TO CHANGE
I will SWITCH the dose or number of times a day I take the medications listed below when I get home from the hospital:    gabapentin 300 mg oral capsule  -- 1 cap(s) by mouth 2 times a day    gabapentin 300 mg oral tablet  -- 2 tab(s) by mouth 2 times a day    furosemide 40 mg oral tablet  -- 1 tab(s) by mouth 2 times a day

## 2018-05-21 NOTE — PROGRESS NOTE ADULT - PROBLEM SELECTOR PLAN 1
Will continue current insulin regimen for now. Will continue monitoring FS, log, will Follow up.  DC home on current insulin, FU 2 weeks  Patient counseled for compliance with consistent low carb diet.
abx per ID  repeat CT a/p done with no evidence of renal abscess  f/u ID recs
abx per ID  repeat CT a/p in a few days to re-evaluate for possible developing renal abscess
resolve  continue ppi   zofran prn
abx per ID  repeat CT a/p done to re-evaluate for possible developing renal abscess, await official radiology read
abx per ID  repeat CT a/p done with no evidence of renal abscess  f/u ID recs
abx per ID  repeat CT a/p done with no evidence of renal abscess  f/u ID recs
abx per ID  repeat CT a/p in a few days to re-evaluate for possible developing renal abscess
likely ATN from sepsis   cr now improving  excellent urineoutput  encourage po intake- d/c IVF  trend bmp daily  s/p alba- TOV
likely ATN from sepsis +/- obstruction  f/u renal us  check cmp q12h  avoid nephrotoxins  change  IV nacl to 100cc/hr to 1/2nacl + 75meq hco3 @ 75cc/hr  f/u urology  monitor urine output  given improving urineoutput and hopefully improvement in cr- no indication for HD at the moment; will monitor daily  need strict i/o's
likely ATN from sepsis +/- obstruction  would check ua  check urine na/cr/cl/osm/k  check renal us  check hep panel  check cmp q6h  avoid nephrotoxins  inc IV nacl to 100cc/hr  f/u urology  monitor urine output  explained to patient- about renal failire, low gfr and rising cr with low urineoutput- if she continues to worsen she will need hemodialysis/ PT refusing dialysis initially but informed her that this maybe a temporary measure. pt to decide; until then will treat conservatively  informed risks and benefits of hemodialysis- attempted to call son and grand daughter but no reply- will try again later
Will add Humalog 7u before each meal, continue current Lantsu and Humalog coverage regimen for now. Will continue monitoring FS, log, will Follow up.  Patient counseled for compliance with consistent low carb diet.
Will continue current insulin regimen for now. Will continue monitoring FS, log, will Follow up.  DC home on current insulin, FU 2 weeks  Patient counseled for compliance with consistent low carb diet.
Will continue current insulin regimen for now. Will continue monitoring FS, log, will Follow up.  DC home on current insulin, FU 2 weeks  Patient counseled for compliance with consistent low carb diet.
Will continue current insulin regimen for now. Will continue monitoring FS, log, will Follow up.  Patient counseled for compliance with consistent low carb diet.
likely ATN from sepsis   cr continues to improve  monitor urineoutput  encourage po intake-  re start lasix 40mg po qd  trend bmp daily  Can follow up with me output   [address]  [facility] [address]  [phone]
likely ATN from sepsis   cr continues to improve  monitor urineoutput  encourage po intake-  re start lasix 40mg po qd  trend bmp daily  Can follow up with me output   [address]  [facility] [address]  [phone]
likely ATN from sepsis   cr continues to improve  monitor urineoutput  encourage po intake-  trend bmp daily
likely ATN from sepsis +/- obstruction  renal us reviewed  avoid nephrotoxins  dec 1/2nacl + 75meq hco3 @ 75cc/hr to 50/cchr  f/u urology  monitor urine output  cr now trending and excellent urineoutput- no need for HD now  need strict i/o's  c/w alba

## 2018-05-21 NOTE — DISCHARGE NOTE ADULT - PLAN OF CARE
Symptoms improved Continue iron supplements  Eat iron and protein rich foods including: meat, dark leafy green vegetables, and beans.  Limit caffeine.  Notify your doctor if you experience heartburn, constipation, diarrhea, nausea/vomiting, dizziness or are feeling extremely tired.  Seek immediate care or call 911 if you experience:  shortness of breath even at rest, you have blood in your bowel movement or vomit, if you are too dizzy to stand up HgA1C this admission.  Make sure you get your HgA1c checked every three months.  If you take oral diabetes medications, check your blood glucose two times a day.  If you take insulin, check your blood glucose before meals and at bedtime.  It's important not to skip any meals.  Keep a log of your blood glucose results and always take it with you to your doctor appointments.  Keep a list of your current medications including injectables and over the counter medications and bring this medication list with you to all your doctor appointments.  If you have not seen your ophthalmologist this year call for appointment.  Check your feet daily for redness, sores, or openings. Do not self treat. If no improvement in two days call your primary care physician for an appointment.  Low blood sugar (hypoglycemia) is a blood sugar below 70mg/dl. Check your blood sugar if you feel signs/symptoms of hypoglycemia. If your blood sugar is below 70 take 15 grams of carbohydrates (ex 4 oz of apple juice, 3-4 glucose tablets, or 4-6 oz of regular soda) wait 15 minutes and repeat blood sugar to make sure it comes up above 70.  If your blood sugar is above 70 and you are due for a meal, have a meal.  If you are not due for a meal have a snack.  This snack helps keeps your blood sugar at a safe range. Atrial fibrillation is the most common heart rhythm problem.  The condition puts you at risk for has stroke and heart attack  It helps if you control your blood pressure, not drink more than 1-2 alcohol drinks per day, cut down on caffeine, getting treatment for over active thyroid gland, and get regular exercise  Call your doctor if you feel your heart racing or beating unusually, chest tightness or pain, lightheaded, faint, shortness of breath especially with exercise  It is important to take your heart medication as prescribed  You may be on anticoagulation which is very important to take as directed - you may need blood work to monitor drug levels Continue all medications as prescribed

## 2018-05-21 NOTE — DISCHARGE NOTE ADULT - MEDICATION SUMMARY - MEDICATIONS TO STOP TAKING
I will STOP taking the medications listed below when I get home from the hospital:    metFORMIN 500 mg oral tablet, extended release  -- 1 tab(s) by mouth once a day    metFORMIN 500 mg oral tablet  -- 1 tab(s) by mouth once a day I will STOP taking the medications listed below when I get home from the hospital:    metFORMIN 500 mg oral tablet, extended release  -- 1 tab(s) by mouth once a day    aspirin 81 mg oral delayed release tablet  -- 1 tab(s) by mouth once a day    metFORMIN 500 mg oral tablet  -- 1 tab(s) by mouth once a day    Aspirin Enteric Coated 81 mg oral delayed release tablet  -- 1 tab(s) by mouth once a day

## 2018-05-21 NOTE — CHART NOTE - NSCHARTNOTEFT_GEN_A_CORE
Spoke with   , asked to facilitate patient discharge home. Medication reconciliation reviewed, revised and resolved with     who has medically cleared patient for discharge with follow up advised  - ok to restart Mesalamine  0 F/U pmd noted in DC

## 2018-05-21 NOTE — DISCHARGE NOTE ADULT - MEDICATION SUMMARY - MEDICATIONS TO TAKE
I will START or STAY ON the medications listed below when I get home from the hospital:    mesalamine 400 mg oral delayed release capsule  -- 2 cap(s) by mouth 3 times a day  -- Indication: For Prophylactic measure    Aspirin Enteric Coated 81 mg oral delayed release tablet  -- 1 tab(s) by mouth once a day  -- Indication: For CAD (coronary artery disease)    gabapentin 300 mg oral capsule  -- 1 cap(s) by mouth once a day  -- Indication: For Neuropathy    insulin lispro  -- 7 unit(s) subcutaneously 3 times a day (with meals)   -- Indication: For Diabetes mellitus, type 2    insulin lispro  -- 2 Unit(s) if Glucose 151 - 200  4 Unit(s) if bg 201 - 250  6 Unit(s) if bg 251 - 300  8 Unit(s) if bg 301 - 350  10 Unit(s) if bg351 - 400  -- Indication: For Diabetes mellitus, type 2    insulin lispro  -- 1 Unit(s) if Glucose 251 - 300  2 Unit(s) if Glucose 301 - 350  3 Unit(s) if Glucose 351 - 400  4 Unit(s) if Glucose Greater Than 400  -- Indication: For Diabetes mellitus, type 2    Lantus 100 units/mL subcutaneous solution  -- 34 unit(s) subcutaneous once a day (at bedtime)  -- Indication: For Diabetes mellitus, type 2    Benadryl 25 mg oral tablet  -- 1 tab(s) by mouth 3 times a day, As Needed  -- Indication: For Prophylactic measure    atorvastatin 40 mg oral tablet  -- 1 tab(s) by mouth once a day (at bedtime)  -- Indication: For Other hyperlipidemia    clopidogrel 75 mg oral tablet  -- 1 tab(s) by mouth once a day  -- Indication: For CAD (coronary artery disease)    Metoprolol Tartrate 25 mg oral tablet  -- 0.5 tab(s) (12.5mg) by mouth 2 times a day  -- Indication: For HTN    Ventolin HFA 90 mcg/inh inhalation aerosol  -- 2 puff(s) inhaled every 4 hours, As Needed  -- Indication: For Prophylactic measure    furosemide 40 mg oral tablet  -- 1 tab(s) by mouth once a day  -- Indication: For CAD (coronary artery disease)    ferrous sulfate 325 mg (65 mg elemental iron) oral tablet  -- 1 tab(s) by mouth 3 times a day  -- Indication: For Prophylactic measure    docusate sodium 100 mg oral capsule  -- 1 cap(s) by mouth 3 times a day  -- Indication: For CONSTIPATION    Senna 8.6 mg oral tablet  -- 1 tab(s) by mouth once a day (at bedtime)  -- Indication: For CONSTIPATION    Protonix 40 mg oral delayed release tablet  -- 1 tab(s) by mouth 2 times a day  -- Indication: For GERD    ciprofloxacin 500 mg oral tablet  -- 1 tab(s) by mouth once a day  -- Indication: For Pyelonephritis    Multiple Vitamins oral tablet  -- 1 tab(s) by mouth once a day  -- Indication: For SUPPLEMENT I will START or STAY ON the medications listed below when I get home from the hospital:    mesalamine 400 mg oral delayed release capsule  -- 2 cap(s) by mouth 3 times a day  -- Indication: For Prophylactic measure    gabapentin 300 mg oral capsule  -- 1 cap(s) by mouth once a day  -- Indication: For Neuropathy    insulin lispro  -- 7 unit(s) subcutaneously 3 times a day (with meals)   -- Indication: For Diabetes mellitus, type 2    insulin lispro  -- 2 Unit(s) if Glucose 151 - 200  4 Unit(s) if bg 201 - 250  6 Unit(s) if bg 251 - 300  8 Unit(s) if bg 301 - 350  10 Unit(s) if bg351 - 400  -- Indication: For Diabetes mellitus, type 2    insulin lispro  -- 1 Unit(s) if Glucose 251 - 300  2 Unit(s) if Glucose 301 - 350  3 Unit(s) if Glucose 351 - 400  4 Unit(s) if Glucose Greater Than 400  -- Indication: For Diabetes mellitus, type 2    Lantus 100 units/mL subcutaneous solution  -- 34 unit(s) subcutaneous once a day (at bedtime)  -- Indication: For Diabetes mellitus, type 2    Benadryl 25 mg oral tablet  -- 1 tab(s) by mouth 3 times a day, As Needed  -- Indication: For Prophylactic measure    atorvastatin 40 mg oral tablet  -- 1 tab(s) by mouth once a day (at bedtime)  -- Indication: For Other hyperlipidemia    clopidogrel 75 mg oral tablet  -- 1 tab(s) by mouth once a day  -- Indication: For CAD (coronary artery disease)    Metoprolol Tartrate 25 mg oral tablet  -- 0.5 tab(s) (12.5mg) by mouth 2 times a day  -- Indication: For HTN    Ventolin HFA 90 mcg/inh inhalation aerosol  -- 2 puff(s) inhaled every 4 hours, As Needed  -- Indication: For Prophylactic measure    furosemide 40 mg oral tablet  -- 1 tab(s) by mouth once a day  -- Indication: For CAD (coronary artery disease)    ferrous sulfate 325 mg (65 mg elemental iron) oral tablet  -- 1 tab(s) by mouth 3 times a day  -- Indication: For Prophylactic measure    docusate sodium 100 mg oral capsule  -- 1 cap(s) by mouth 3 times a day  -- Indication: For CONSTIPATION    Senna 8.6 mg oral tablet  -- 1 tab(s) by mouth once a day (at bedtime)  -- Indication: For CONSTIPATION    Protonix 40 mg oral delayed release tablet  -- 1 tab(s) by mouth 2 times a day  -- Indication: For GERD    ciprofloxacin 500 mg oral tablet  -- 1 tab(s) by mouth once a day  -- Indication: For Pyelonephritis    Multiple Vitamins oral tablet  -- 1 tab(s) by mouth once a day  -- Indication: For SUPPLEMENT I will START or STAY ON the medications listed below when I get home from the hospital:    mesalamine 400 mg oral delayed release capsule  -- 2 cap(s) by mouth 3 times a day  -- Indication: For Prophylactic measure    gabapentin 300 mg oral capsule  -- 1 cap(s) by mouth once a day  -- Indication: For Neuropathy    insulin lispro  -- 7 unit(s) subcutaneously 3 times a day (with meals)   -- Indication: For Diabetes mellitus, type 2    insulin lispro  -- 2 Unit(s) if Glucose 151 - 200  4 Unit(s) if bg 201 - 250  6 Unit(s) if bg 251 - 300  8 Unit(s) if bg 301 - 350  10 Unit(s) if bg351 - 400  -- Indication: For Diabetes mellitus, type 2    Lantus 100 units/mL subcutaneous solution  -- 34 unit(s) subcutaneous once a day (at bedtime)  -- Indication: For Diabetes mellitus, type 2    Benadryl 25 mg oral tablet  -- 1 tab(s) by mouth 3 times a day, As Needed  -- Indication: For Prophylactic measure    atorvastatin 40 mg oral tablet  -- 1 tab(s) by mouth once a day (at bedtime)  -- Indication: For Other hyperlipidemia    clopidogrel 75 mg oral tablet  -- 1 tab(s) by mouth once a day  -- Indication: For CAD (coronary artery disease)    Metoprolol Tartrate 25 mg oral tablet  -- 0.5 tab(s) (12.5mg) by mouth 2 times a day  -- Indication: For HTN    Ventolin HFA 90 mcg/inh inhalation aerosol  -- 2 puff(s) inhaled every 4 hours, As Needed  -- Indication: For Prophylactic measure    furosemide 40 mg oral tablet  -- 1 tab(s) by mouth once a day  -- Indication: For CAD (coronary artery disease)    ferrous sulfate 325 mg (65 mg elemental iron) oral tablet  -- 1 tab(s) by mouth 3 times a day  -- Indication: For Prophylactic measure    docusate sodium 100 mg oral capsule  -- 1 cap(s) by mouth 3 times a day  -- Indication: For CONSTIPATION    Senna 8.6 mg oral tablet  -- 1 tab(s) by mouth once a day (at bedtime)  -- Indication: For CONSTIPATION    Protonix 40 mg oral delayed release tablet  -- 1 tab(s) by mouth 2 times a day  -- Indication: For GERD    ciprofloxacin 500 mg oral tablet  -- 1 tab(s) by mouth once a day  -- Indication: For Pyelonephritis    Multiple Vitamins oral tablet  -- 1 tab(s) by mouth once a day  -- Indication: For SUPPLEMENT

## 2018-05-21 NOTE — PROGRESS NOTE ADULT - PROBLEM SELECTOR PROBLEM 3
GI bleed
KOJO (acute kidney injury)
GI bleed

## 2018-05-21 NOTE — PROGRESS NOTE ADULT - PROBLEM SELECTOR PLAN 4
diuretics on hold in the setting of sepsis  cardiology following
diuretics on hold in the setting of sepsis  cardiology following
cardiology following
diuretics on hold in the setting of sepsis  cardiology following

## 2018-05-21 NOTE — PROGRESS NOTE ADULT - PROBLEM SELECTOR PROBLEM 1
Type 2 diabetes mellitus with diabetic neuropathy, with long-term current use of insulin
Nausea
Pyelonephritis
Pyelonephritis
KOJO (acute kidney injury)
Pyelonephritis
KOJO (acute kidney injury)
Type 2 diabetes mellitus with diabetic neuropathy, with long-term current use of insulin

## 2018-05-21 NOTE — PROGRESS NOTE ADULT - PROVIDER SPECIALTY LIST ADULT
Cardiology
Endocrinology
Gastroenterology
Infectious Disease
Internal Medicine
Nephrology
Cardiology
Internal Medicine
Infectious Disease
Gastroenterology
Nephrology
Endocrinology
Gastroenterology
Gastroenterology

## 2018-05-21 NOTE — DISCHARGE NOTE ADULT - CARE PLAN
Principal Discharge DX:	Symptomatic anemia  Goal:	Symptoms improved  Assessment and plan of treatment:	Continue iron supplements  Eat iron and protein rich foods including: meat, dark leafy green vegetables, and beans.  Limit caffeine.  Notify your doctor if you experience heartburn, constipation, diarrhea, nausea/vomiting, dizziness or are feeling extremely tired.  Seek immediate care or call 911 if you experience:  shortness of breath even at rest, you have blood in your bowel movement or vomit, if you are too dizzy to stand up  Secondary Diagnosis:	Type 2 diabetes mellitus with diabetic neuropathy, with long-term current use of insulin  Assessment and plan of treatment:	HgA1C this admission.  Make sure you get your HgA1c checked every three months.  If you take oral diabetes medications, check your blood glucose two times a day.  If you take insulin, check your blood glucose before meals and at bedtime.  It's important not to skip any meals.  Keep a log of your blood glucose results and always take it with you to your doctor appointments.  Keep a list of your current medications including injectables and over the counter medications and bring this medication list with you to all your doctor appointments.  If you have not seen your ophthalmologist this year call for appointment.  Check your feet daily for redness, sores, or openings. Do not self treat. If no improvement in two days call your primary care physician for an appointment.  Low blood sugar (hypoglycemia) is a blood sugar below 70mg/dl. Check your blood sugar if you feel signs/symptoms of hypoglycemia. If your blood sugar is below 70 take 15 grams of carbohydrates (ex 4 oz of apple juice, 3-4 glucose tablets, or 4-6 oz of regular soda) wait 15 minutes and repeat blood sugar to make sure it comes up above 70.  If your blood sugar is above 70 and you are due for a meal, have a meal.  If you are not due for a meal have a snack.  This snack helps keeps your blood sugar at a safe range.  Secondary Diagnosis:	Atrial fibrillation, unspecified type  Assessment and plan of treatment:	Atrial fibrillation is the most common heart rhythm problem.  The condition puts you at risk for has stroke and heart attack  It helps if you control your blood pressure, not drink more than 1-2 alcohol drinks per day, cut down on caffeine, getting treatment for over active thyroid gland, and get regular exercise  Call your doctor if you feel your heart racing or beating unusually, chest tightness or pain, lightheaded, faint, shortness of breath especially with exercise  It is important to take your heart medication as prescribed  You may be on anticoagulation which is very important to take as directed - you may need blood work to monitor drug levels  Secondary Diagnosis:	Other hyperlipidemia  Assessment and plan of treatment:	Continue all medications as prescribed

## 2018-05-21 NOTE — DISCHARGE NOTE ADULT - CARE PROVIDERS DIRECT ADDRESSES
,[email or web address],DirectAddress_[email or web address],DirectAddress_Unknown

## 2018-05-21 NOTE — PROGRESS NOTE ADULT - SUBJECTIVE AND OBJECTIVE BOX
Interval Events: Pt is without GI complaints. She denies abdominal pain, N/V/D/C, BRBPR/melena.     MEDICATIONS  (STANDING):  atorvastatin 40 milliGRAM(s) Oral at bedtime  ciprofloxacin     Tablet 500 milliGRAM(s) Oral daily  clopidogrel Tablet 75 milliGRAM(s) Oral daily  dextrose 50% Injectable 12.5 Gram(s) IV Push once  dextrose 50% Injectable 25 Gram(s) IV Push once  dextrose 50% Injectable 25 Gram(s) IV Push once  docusate sodium 100 milliGRAM(s) Oral three times a day  furosemide    Tablet 40 milliGRAM(s) Oral daily  gabapentin 300 milliGRAM(s) Oral daily  insulin glargine Injectable (LANTUS) 34 Unit(s) SubCutaneous at bedtime  insulin lispro (HumaLOG) corrective regimen sliding scale   SubCutaneous three times a day before meals  insulin lispro (HumaLOG) corrective regimen sliding scale   SubCutaneous at bedtime  insulin lispro Injectable (HumaLOG) 7 Unit(s) SubCutaneous three times a day before meals  metoprolol tartrate 12.5 milliGRAM(s) Oral two times a day  pantoprazole    Tablet 40 milliGRAM(s) Oral two times a day  polyethylene glycol 3350 17 Gram(s) Oral two times a day  senna 2 Tablet(s) Oral at bedtime    MEDICATIONS  (PRN):  acetaminophen   Tablet. 650 milliGRAM(s) Oral every 6 hours PRN Mild Pain (1 - 3)  dextrose 40% Gel 15 Gram(s) Oral once PRN Blood Glucose LESS THAN 70 milliGRAM(s)/deciliter  glucagon  Injectable 1 milliGRAM(s) IntraMuscular once PRN Glucose LESS THAN 70 milligrams/deciliter  hydrocortisone 1% Ointment 1 Application(s) Topical every 8 hours PRN Itching      Allergies    Carrots (Rash)  No Known Drug Allergies    Intolerances        Review of Systems:    General:  No wt loss, fevers, chills, night sweats,fatigue,   Eyes:  Good vision, no reported pain  ENT:  No sore throat, pain, runny nose, dysphagia  CV:  No pain, palpitations, hypo/hypertension  Resp:  No dyspnea, cough, tachypnea, wheezing  GI:  No pain, No nausea, No vomiting, No diarrhea, No constipation, No weight loss, No fever, No pruritis, No rectal bleeding, No melena, No dysphagia  :  No pain, bleeding, incontinence, nocturia  Muscle:  No pain, weakness  Neuro:  No weakness, tingling, memory problems  Psych:  No fatigue, insomnia, mood problems, depression  Endocrine:  No polyuria, polydypsia, cold/heat intolerance  Heme:  No petechiae, ecchymosis, easy bruisability  Skin:  No rash, tattoos, scars, edema      Vital Signs Last 24 Hrs  T(C): 36.6 (21 May 2018 05:08), Max: 36.8 (20 May 2018 13:54)  T(F): 97.9 (21 May 2018 05:08), Max: 98.3 (20 May 2018 13:54)  HR: 80 (21 May 2018 05:08) (68 - 80)  BP: 178/80 (21 May 2018 05:08) (157/73 - 178/80)  BP(mean): --  RR: 18 (21 May 2018 05:08) (18 - 18)  SpO2: 97% (21 May 2018 05:08) (97% - 99%)    PHYSICAL EXAM:    Constitutional: NAD, well-developed  HEENT: EOMI, throat clear  Neck: No LAD, supple  Respiratory: CTA and P  Cardiovascular: S1 and S2, RRR, no M  Gastrointestinal: BS+, soft, NT/ND, neg HSM,  Extremities: No peripheral edema, neg clubing, cyanosis  Vascular: 2+ peripheral pulses  Neurological: A/O x 3, no focal deficits  Psychiatric: Normal mood, normal affect  Skin: No rashes      LABS:                        7.5    9.63  )-----------( 670      ( 21 May 2018 07:55 )             25.2     05-21    138  |  104  |  14  ----------------------------<  105<H>  4.8   |  26  |  1.47<H>    Ca    8.5      21 May 2018 06:44  Phos  3.5     05-20  Mg     1.9     05-20            RADIOLOGY & ADDITIONAL TESTS:

## 2018-05-21 NOTE — PROGRESS NOTE ADULT - PROBLEM SELECTOR PLAN 3
Continue monitoring H&H, GI primary teams FU
Nephrology evaluation appreciated  KOJO likely related to ATN from pyelonephritis and bacteremia  if no improvement, pt may require dialysis
Nephrology following  KOJO likely related to ATN from pyelonephritis and bacteremia  Cr now improving
Nephrology evaluation appreciated  KOJO likely related to ATN from pyelonephritis and bacteremia
Nephrology evaluation appreciated  KOJO likely related to ATN from pyelonephritis and bacteremia
Nephrology following  KOJO likely related to ATN from pyelonephritis and bacteremia  Cr now improving
Continue monitoring H&H, GI primary teams FU

## 2018-05-21 NOTE — PROGRESS NOTE ADULT - PROBLEM SELECTOR PLAN 2
On medications, monitored and followed up by primary/cardiology team
resolved  continue ppi   zofran prn
resolved  continue ppi   zofran prn
multifactorial  change ivf as above  free h20 restriction to 1 liter  check na q12h
multifactorial  check urine osm  inc nacl to 100cc/hr  check tsh/am cortisol  free h20 restriction to 1 liter  check na q6h
multifactorial  improving  trend na
resolved  continue ppi   zofran prn
On medications, monitored and followed up by primary/cardiology team
improved   trend na
improved   trend na
likely 2/2 post atn diuresis  advised free h20 intake  trend na
multifactorial  improving  free h20 restriction to 1 liter  trend na

## 2018-05-21 NOTE — PROGRESS NOTE ADULT - PROBLEM SELECTOR PROBLEM 2
CAD (coronary artery disease)
Nausea
Nausea
Hyponatremia
Nausea
CAD (coronary artery disease)
Hypernatremia
Hyponatremia

## 2018-05-21 NOTE — DISCHARGE NOTE ADULT - SECONDARY DIAGNOSIS.
Type 2 diabetes mellitus with diabetic neuropathy, with long-term current use of insulin Atrial fibrillation, unspecified type Other hyperlipidemia

## 2018-06-11 NOTE — ED ADULT NURSE NOTE - NS PRO PASSIVE SMOKE EXP
CERTIFICATE OF WORK    June 11, 2018      Re: Jayro Moise  [address]  [place] [address]      This is to certify that Jayro Moise has been under my care from 6/11/2018 will be off work for the next week until 06/18/2018 as he is in the process of being evaluated for disability by the appropriate specialist.     RESTRICTIONS: none      REMARKS: none        SIGNATURE:___________________________________________,   6/11/2018  Omer Byrnes MD        Internal Medicine  [facility]  [id number] [place] [address] [address]  [place]  [id number]  [phone]     Unknown

## 2018-08-15 ENCOUNTER — INPATIENT (INPATIENT)
Facility: HOSPITAL | Age: 78
LOS: 2 days | Discharge: HOME | End: 2018-08-18
Attending: INTERNAL MEDICINE | Admitting: INTERNAL MEDICINE
Payer: MEDICARE

## 2018-08-15 VITALS
HEART RATE: 78 BPM | TEMPERATURE: 98 F | SYSTOLIC BLOOD PRESSURE: 141 MMHG | DIASTOLIC BLOOD PRESSURE: 46 MMHG | OXYGEN SATURATION: 100 % | RESPIRATION RATE: 16 BRPM

## 2018-08-15 DIAGNOSIS — R06.02 SHORTNESS OF BREATH: ICD-10-CM

## 2018-08-15 DIAGNOSIS — Z79.899 OTHER LONG TERM (CURRENT) DRUG THERAPY: ICD-10-CM

## 2018-08-15 DIAGNOSIS — I25.10 ATHEROSCLEROTIC HEART DISEASE OF NATIVE CORONARY ARTERY WITHOUT ANGINA PECTORIS: ICD-10-CM

## 2018-08-15 DIAGNOSIS — R30.0 DYSURIA: ICD-10-CM

## 2018-08-15 DIAGNOSIS — Z98.891 HISTORY OF UTERINE SCAR FROM PREVIOUS SURGERY: Chronic | ICD-10-CM

## 2018-08-15 DIAGNOSIS — Z29.9 ENCOUNTER FOR PROPHYLACTIC MEASURES, UNSPECIFIED: ICD-10-CM

## 2018-08-15 DIAGNOSIS — E11.40 TYPE 2 DIABETES MELLITUS WITH DIABETIC NEUROPATHY, UNSPECIFIED: ICD-10-CM

## 2018-08-15 DIAGNOSIS — E11.8 TYPE 2 DIABETES MELLITUS WITH UNSPECIFIED COMPLICATIONS: ICD-10-CM

## 2018-08-15 PROBLEM — E78.5 HYPERLIPIDEMIA, UNSPECIFIED: Chronic | Status: ACTIVE | Noted: 2017-12-20

## 2018-08-15 PROBLEM — K92.2 GASTROINTESTINAL HEMORRHAGE, UNSPECIFIED: Chronic | Status: ACTIVE | Noted: 2018-05-11

## 2018-08-15 PROBLEM — I48.0 PAROXYSMAL ATRIAL FIBRILLATION: Chronic | Status: ACTIVE | Noted: 2018-01-21

## 2018-08-15 PROBLEM — E11.9 TYPE 2 DIABETES MELLITUS WITHOUT COMPLICATIONS: Chronic | Status: ACTIVE | Noted: 2018-05-11

## 2018-08-15 PROBLEM — G62.9 POLYNEUROPATHY, UNSPECIFIED: Chronic | Status: ACTIVE | Noted: 2018-05-11

## 2018-08-15 LAB
ALBUMIN SERPL ELPH-MCNC: 3.8 G/DL — SIGNIFICANT CHANGE UP (ref 3.3–5)
ALP SERPL-CCNC: 140 U/L — HIGH (ref 40–120)
ALT FLD-CCNC: 14 U/L — SIGNIFICANT CHANGE UP (ref 4–33)
APTT BLD: 32.7 SEC — SIGNIFICANT CHANGE UP (ref 27.5–37.4)
AST SERPL-CCNC: 32 U/L — SIGNIFICANT CHANGE UP (ref 4–32)
BASE EXCESS BLDV CALC-SCNC: 1.9 MMOL/L — SIGNIFICANT CHANGE UP
BASOPHILS # BLD AUTO: 0.04 K/UL — SIGNIFICANT CHANGE UP (ref 0–0.2)
BASOPHILS NFR BLD AUTO: 0.7 % — SIGNIFICANT CHANGE UP (ref 0–2)
BILIRUB SERPL-MCNC: < 0.2 MG/DL — LOW (ref 0.2–1.2)
BLD GP AB SCN SERPL QL: NEGATIVE — SIGNIFICANT CHANGE UP
BLOOD GAS VENOUS - CREATININE: 2 MG/DL — HIGH (ref 0.5–1.3)
BUN SERPL-MCNC: 26 MG/DL — HIGH (ref 7–23)
CALCIUM SERPL-MCNC: 9.2 MG/DL — SIGNIFICANT CHANGE UP (ref 8.4–10.5)
CHLORIDE BLDV-SCNC: 104 MMOL/L — SIGNIFICANT CHANGE UP (ref 96–108)
CHLORIDE SERPL-SCNC: 104 MMOL/L — SIGNIFICANT CHANGE UP (ref 98–107)
CK SERPL-CCNC: 267 U/L — HIGH (ref 25–170)
CO2 SERPL-SCNC: 22 MMOL/L — SIGNIFICANT CHANGE UP (ref 22–31)
CREAT SERPL-MCNC: 1.89 MG/DL — HIGH (ref 0.5–1.3)
EOSINOPHIL # BLD AUTO: 0.04 K/UL — SIGNIFICANT CHANGE UP (ref 0–0.5)
EOSINOPHIL NFR BLD AUTO: 0.7 % — SIGNIFICANT CHANGE UP (ref 0–6)
GAS PNL BLDV: 133 MMOL/L — LOW (ref 136–146)
GLUCOSE BLDC GLUCOMTR-MCNC: 168 MG/DL — HIGH (ref 70–99)
GLUCOSE BLDV-MCNC: 107 — HIGH (ref 70–99)
GLUCOSE SERPL-MCNC: 114 MG/DL — HIGH (ref 70–99)
HCO3 BLDV-SCNC: 25 MMOL/L — SIGNIFICANT CHANGE UP (ref 20–27)
HCT VFR BLD CALC: 23.1 % — LOW (ref 34.5–45)
HCT VFR BLDV CALC: 23.2 % — LOW (ref 34.5–45)
HGB BLD-MCNC: 6.7 G/DL — CRITICAL LOW (ref 11.5–15.5)
HGB BLDV-MCNC: 7.4 G/DL — LOW (ref 11.5–15.5)
IMM GRANULOCYTES # BLD AUTO: 0.02 # — SIGNIFICANT CHANGE UP
IMM GRANULOCYTES NFR BLD AUTO: 0.4 % — SIGNIFICANT CHANGE UP (ref 0–1.5)
INR BLD: 1.15 — SIGNIFICANT CHANGE UP (ref 0.88–1.17)
LACTATE BLDV-MCNC: 1.2 MMOL/L — SIGNIFICANT CHANGE UP (ref 0.5–2)
LYMPHOCYTES # BLD AUTO: 1.75 K/UL — SIGNIFICANT CHANGE UP (ref 1–3.3)
LYMPHOCYTES # BLD AUTO: 31.7 % — SIGNIFICANT CHANGE UP (ref 13–44)
MCHC RBC-ENTMCNC: 21.5 PG — LOW (ref 27–34)
MCHC RBC-ENTMCNC: 29 % — LOW (ref 32–36)
MCV RBC AUTO: 74 FL — LOW (ref 80–100)
MONOCYTES # BLD AUTO: 0.38 K/UL — SIGNIFICANT CHANGE UP (ref 0–0.9)
MONOCYTES NFR BLD AUTO: 6.9 % — SIGNIFICANT CHANGE UP (ref 2–14)
NEUTROPHILS # BLD AUTO: 3.29 K/UL — SIGNIFICANT CHANGE UP (ref 1.8–7.4)
NEUTROPHILS NFR BLD AUTO: 59.6 % — SIGNIFICANT CHANGE UP (ref 43–77)
NRBC # FLD: 0 — SIGNIFICANT CHANGE UP
NT-PROBNP SERPL-SCNC: 4775 PG/ML — SIGNIFICANT CHANGE UP
OB PNL STL: NEGATIVE — SIGNIFICANT CHANGE UP
PCO2 BLDV: 39 MMHG — LOW (ref 41–51)
PH BLDV: 7.43 PH — SIGNIFICANT CHANGE UP (ref 7.32–7.43)
PLATELET # BLD AUTO: 751 K/UL — HIGH (ref 150–400)
PMV BLD: 9.3 FL — SIGNIFICANT CHANGE UP (ref 7–13)
PO2 BLDV: 27 MMHG — LOW (ref 35–40)
POTASSIUM BLDV-SCNC: 4.4 MMOL/L — SIGNIFICANT CHANGE UP (ref 3.4–4.5)
POTASSIUM SERPL-MCNC: 5 MMOL/L — SIGNIFICANT CHANGE UP (ref 3.5–5.3)
POTASSIUM SERPL-SCNC: 5 MMOL/L — SIGNIFICANT CHANGE UP (ref 3.5–5.3)
PROT SERPL-MCNC: 8.2 G/DL — SIGNIFICANT CHANGE UP (ref 6–8.3)
PROTHROM AB SERPL-ACNC: 13.3 SEC — HIGH (ref 9.8–13.1)
RBC # BLD: 3.12 M/UL — LOW (ref 3.8–5.2)
RBC # FLD: 18.4 % — HIGH (ref 10.3–14.5)
RH IG SCN BLD-IMP: POSITIVE — SIGNIFICANT CHANGE UP
SAO2 % BLDV: 38.5 % — LOW (ref 60–85)
SODIUM SERPL-SCNC: 143 MMOL/L — SIGNIFICANT CHANGE UP (ref 135–145)
TROPONIN T, HIGH SENSITIVITY: 51 NG/L — SIGNIFICANT CHANGE UP (ref ?–14)
WBC # BLD: 5.52 K/UL — SIGNIFICANT CHANGE UP (ref 3.8–10.5)
WBC # FLD AUTO: 5.52 K/UL — SIGNIFICANT CHANGE UP (ref 3.8–10.5)

## 2018-08-15 PROCEDURE — 71046 X-RAY EXAM CHEST 2 VIEWS: CPT | Mod: 26

## 2018-08-15 PROCEDURE — 99223 1ST HOSP IP/OBS HIGH 75: CPT

## 2018-08-15 RX ORDER — ACETAMINOPHEN 500 MG
975 TABLET ORAL ONCE
Qty: 0 | Refills: 0 | Status: COMPLETED | OUTPATIENT
Start: 2018-08-15 | End: 2018-08-15

## 2018-08-15 RX ORDER — INSULIN LISPRO 100/ML
VIAL (ML) SUBCUTANEOUS AT BEDTIME
Qty: 0 | Refills: 0 | Status: DISCONTINUED | OUTPATIENT
Start: 2018-08-15 | End: 2018-08-18

## 2018-08-15 RX ORDER — GLUCAGON INJECTION, SOLUTION 0.5 MG/.1ML
1 INJECTION, SOLUTION SUBCUTANEOUS ONCE
Qty: 0 | Refills: 0 | Status: DISCONTINUED | OUTPATIENT
Start: 2018-08-15 | End: 2018-08-18

## 2018-08-15 RX ORDER — ATORVASTATIN CALCIUM 80 MG/1
40 TABLET, FILM COATED ORAL AT BEDTIME
Qty: 0 | Refills: 0 | Status: DISCONTINUED | OUTPATIENT
Start: 2018-08-15 | End: 2018-08-18

## 2018-08-15 RX ORDER — METOPROLOL TARTRATE 50 MG
12.5 TABLET ORAL
Qty: 0 | Refills: 0 | Status: DISCONTINUED | OUTPATIENT
Start: 2018-08-15 | End: 2018-08-18

## 2018-08-15 RX ORDER — INSULIN GLARGINE 100 [IU]/ML
20 INJECTION, SOLUTION SUBCUTANEOUS ONCE
Qty: 0 | Refills: 0 | Status: COMPLETED | OUTPATIENT
Start: 2018-08-15 | End: 2018-08-15

## 2018-08-15 RX ORDER — INSULIN GLARGINE 100 [IU]/ML
40 INJECTION, SOLUTION SUBCUTANEOUS AT BEDTIME
Qty: 0 | Refills: 0 | Status: DISCONTINUED | OUTPATIENT
Start: 2018-08-16 | End: 2018-08-18

## 2018-08-15 RX ORDER — GABAPENTIN 400 MG/1
600 CAPSULE ORAL DAILY
Qty: 0 | Refills: 0 | Status: DISCONTINUED | OUTPATIENT
Start: 2018-08-15 | End: 2018-08-17

## 2018-08-15 RX ORDER — PANTOPRAZOLE SODIUM 20 MG/1
40 TABLET, DELAYED RELEASE ORAL
Qty: 0 | Refills: 0 | Status: DISCONTINUED | OUTPATIENT
Start: 2018-08-15 | End: 2018-08-18

## 2018-08-15 RX ORDER — SENNA PLUS 8.6 MG/1
2 TABLET ORAL AT BEDTIME
Qty: 0 | Refills: 0 | Status: DISCONTINUED | OUTPATIENT
Start: 2018-08-15 | End: 2018-08-18

## 2018-08-15 RX ORDER — SODIUM CHLORIDE 9 MG/ML
1000 INJECTION, SOLUTION INTRAVENOUS
Qty: 0 | Refills: 0 | Status: DISCONTINUED | OUTPATIENT
Start: 2018-08-15 | End: 2018-08-18

## 2018-08-15 RX ORDER — GABAPENTIN 400 MG/1
600 CAPSULE ORAL
Qty: 0 | Refills: 0 | Status: DISCONTINUED | OUTPATIENT
Start: 2018-08-15 | End: 2018-08-15

## 2018-08-15 RX ORDER — IPRATROPIUM/ALBUTEROL SULFATE 18-103MCG
3 AEROSOL WITH ADAPTER (GRAM) INHALATION ONCE
Qty: 0 | Refills: 0 | Status: COMPLETED | OUTPATIENT
Start: 2018-08-15 | End: 2018-08-15

## 2018-08-15 RX ORDER — FUROSEMIDE 40 MG
40 TABLET ORAL DAILY
Qty: 0 | Refills: 0 | Status: DISCONTINUED | OUTPATIENT
Start: 2018-08-15 | End: 2018-08-18

## 2018-08-15 RX ORDER — DEXTROSE 50 % IN WATER 50 %
25 SYRINGE (ML) INTRAVENOUS ONCE
Qty: 0 | Refills: 0 | Status: DISCONTINUED | OUTPATIENT
Start: 2018-08-15 | End: 2018-08-18

## 2018-08-15 RX ORDER — DEXTROSE 50 % IN WATER 50 %
12.5 SYRINGE (ML) INTRAVENOUS ONCE
Qty: 0 | Refills: 0 | Status: DISCONTINUED | OUTPATIENT
Start: 2018-08-15 | End: 2018-08-18

## 2018-08-15 RX ORDER — DOCUSATE SODIUM 100 MG
100 CAPSULE ORAL THREE TIMES A DAY
Qty: 0 | Refills: 0 | Status: DISCONTINUED | OUTPATIENT
Start: 2018-08-15 | End: 2018-08-18

## 2018-08-15 RX ORDER — INSULIN LISPRO 100/ML
VIAL (ML) SUBCUTANEOUS
Qty: 0 | Refills: 0 | Status: DISCONTINUED | OUTPATIENT
Start: 2018-08-15 | End: 2018-08-18

## 2018-08-15 RX ORDER — DEXTROSE 50 % IN WATER 50 %
15 SYRINGE (ML) INTRAVENOUS ONCE
Qty: 0 | Refills: 0 | Status: DISCONTINUED | OUTPATIENT
Start: 2018-08-15 | End: 2018-08-18

## 2018-08-15 RX ADMIN — Medication 3 MILLILITER(S): at 15:51

## 2018-08-15 RX ADMIN — Medication 12.5 MILLIGRAM(S): at 23:53

## 2018-08-15 RX ADMIN — PANTOPRAZOLE SODIUM 40 MILLIGRAM(S): 20 TABLET, DELAYED RELEASE ORAL at 20:27

## 2018-08-15 RX ADMIN — INSULIN GLARGINE 20 UNIT(S): 100 INJECTION, SOLUTION SUBCUTANEOUS at 23:30

## 2018-08-15 RX ADMIN — GABAPENTIN 600 MILLIGRAM(S): 400 CAPSULE ORAL at 23:52

## 2018-08-15 RX ADMIN — Medication 40 MILLIGRAM(S): at 23:52

## 2018-08-15 RX ADMIN — Medication 975 MILLIGRAM(S): at 18:57

## 2018-08-15 NOTE — H&P ADULT - PROBLEM SELECTOR PLAN 1
-likely 2/2 anemia  -follow post prbc Hb  -follow repeat trop  -npo post midnight for possible GI intervention

## 2018-08-15 NOTE — H&P ADULT - NSHPREVIEWOFSYSTEMS_GEN_ALL_CORE
Review of Systems:   CONSTITUTIONAL: No fever  EYES: No eye pain, visual disturbances, or discharge  ENMT:  No difficulty hearing, tinnitus, vertigo; No sinus or throat pain  NECK: No pain or stiffness  RESPIRATORY: No cough, wheezing, chills or hemoptysis; No current shortness of breath  CARDIOVASCULAR: No chest pain, palpitations, dizziness, or leg swelling  GASTROINTESTINAL: No abdominal or epigastric pain. No nausea, vomiting, or hematemesis; No diarrhea or constipation. No melena or hematochezia.  GENITOURINARY: +dysuria  NEUROLOGICAL: No headaches, memory loss, loss of strength, numbness, or tremors  SKIN: No itching, burning, rashes, or lesions   LYMPH NODES: No enlarged glands  ENDOCRINE: No heat or cold intolerance; No hair loss  MUSCULOSKELETAL: No joint pain or swelling; No muscle, back, or extremity pain

## 2018-08-15 NOTE — H&P ADULT - NSHPLABSRESULTS_GEN_ALL_CORE
6.7    5.52  )-----------( 751      ( 15 Aug 2018 14:32 )             23.1     08-15    143  |  104  |  26<H>  ----------------------------<  114<H>  5.0   |  22  |  1.89<H>    Ca    9.2      15 Aug 2018 14:39    TPro  8.2  /  Alb  3.8  /  TBili  < 0.2<L>  /  DBili  x   /  AST  32  /  ALT  14  /  AlkPhos  140<H>  08-15    CAPILLARY BLOOD GLUCOSE      POCT Blood Glucose.: 219 mg/dL (15 Aug 2018 18:00)    PT/INR - ( 15 Aug 2018 14:39 )   PT: 13.3 SEC;   INR: 1.15          PTT - ( 15 Aug 2018 14:39 )  PTT:32.7 SEC        Vital Signs Last 24 Hrs  T(C): 37 (15 Aug 2018 19:19), Max: 37 (15 Aug 2018 19:19)  T(F): 98.6 (15 Aug 2018 19:19), Max: 98.6 (15 Aug 2018 19:19)  HR: 83 (15 Aug 2018 19:19) (74 - 94)  BP: 154/53 (15 Aug 2018 19:19) (141/46 - 173/56)  BP(mean): --  RR: 18 (15 Aug 2018 19:00) (16 - 22)  SpO2: 100% (15 Aug 2018 19:00) (100% - 100%)

## 2018-08-15 NOTE — ED PROVIDER NOTE - PMH
Atrial fibrillation, unspecified type    CAD (coronary artery disease)    CAD (coronary artery disease)    Diabetes mellitus, type 2    Diabetic neuropathy    GI bleed    HLD (hyperlipidemia)    Neuropathy    Paroxysmal atrial fibrillation    Type 2 diabetes mellitus with hyperglycemia, with long-term current use of insulin

## 2018-08-15 NOTE — H&P ADULT - PROBLEM SELECTOR PLAN 2
-continue holding plavix for now pending morning labs as well as clarification regarding pending spinal injection for back pain control  -c/w statin, resume full BB regimen

## 2018-08-15 NOTE — ED PROVIDER NOTE - OBJECTIVE STATEMENT
Akbar: 78 F, CAD, a. fib, SOB x 2 days, not taking meds. Had MRI, then her doctor recommended Tramadol 1 wk. ago, which makes her itchy and nauseated, hot/cold. Prior long-term smoker. Mild orthopnea. HARPER after only 3 stairs; used to be able to walk around the house.

## 2018-08-15 NOTE — ED ADULT TRIAGE NOTE - CHIEF COMPLAINT QUOTE
Pt c/o sob since yesterday.  Denies chest pain, dizziness, cough.  Pt with chronic leg pain and hx of  2 cardiac stents

## 2018-08-15 NOTE — ED PROVIDER NOTE - PROGRESS NOTE DETAILS
- MD Manish,PhD - Resident: Patient with likely symptomatic anemia, consented for blood, ordered prbc, endorsed to Dr. Goldberg, will admit.

## 2018-08-15 NOTE — ED PROVIDER NOTE - ATTENDING CONTRIBUTION TO CARE
I performed a face-to-face evaluation of the patient and performed a history and physical examination. I agree with the history and physical examination.    Akbar: DDx: CHF, COPD, allergic reaction. Plan: labs, CXR, EKG.

## 2018-08-15 NOTE — H&P ADULT - HISTORY OF PRESENT ILLNESS
79 yo f cad/ pci with December 2017 MICHAEL to prox LCx and OM1, parox afib not on AC 2/2 GI bleed, DM, CHF. Pt presents with one day of intermittent non-exertional dyspnea and palpitation. Found to be anemic with Hb 6.7. Denies melena, hematochezia; fobt negative. Taken off mesalamine several months ago 2/2 constiaption; had initially taken following questionable ileal region ulcer seen fir GI blled eval last March 79 yo f cad/ pci with December 2017 MICHAEL to prox LCx and OM1, parox afib not on AC 2/2 GI bleed, DM, neuropathy CHF. CKD.  Pt presents with one day of intermittent non-exertional dyspnea and palpitation. Found to be anemic with Hb 6.7. Denies melena, hematochezia; fobt negative. Taken off mesalamine several months ago 2/2 constipation; had initially taken following questionable ileal region ulcer seen fir GI bleed eval last March. Denies abd pain, nausea, vomitng, fevers, chills. Off plavix sicne last week for anticipated spinal injection for chronic lower back pain. Per pt, Ok'd with her cardiologist. Also reports self DCing lipitor and cutting her BB regimen in half. Pt denies cp, ekg nsr with no ST changes. HST trop 51

## 2018-08-15 NOTE — ED ADULT NURSE NOTE - OBJECTIVE STATEMENT
Alert and oriented x 4. Pt received to spot 2 due to shortness of breath. Pt states : " I have shortness of breath since yesterday. " Pt denies chest pain, dizziness, nausea, vomiting , painful urination or diarrhea. IV 20g to right AC. Labs drawn. Pt ambulates HHA and son at bedside. Will continue to monitor. Alert and oriented x 4. Pt received to spot 2 due to shortness of breath. Pt states : " I have shortness of breath since yesterday. " Pt denies chest pain, dizziness, nausea, vomiting , painful urination or diarrhea. IV 20g to right AC. Labs drawn. Pt ambulates HHA and son at bedside. Will continue to monitor. RN Break Coverage

## 2018-08-15 NOTE — ED PROVIDER NOTE - CARE PLAN
Principal Discharge DX:	Shortness of breath  Secondary Diagnosis:	CAD (coronary artery disease)  Secondary Diagnosis:	Atrial fibrillation, unspecified type

## 2018-08-15 NOTE — ED PROVIDER NOTE - GASTROINTESTINAL, MLM
Abdomen soft, non-tender, no guarding. Abdomen soft, non-tender, no guarding. Rectal: no masses, brown/tan stool.

## 2018-08-15 NOTE — ED ADULT NURSE NOTE - NSIMPLEMENTINTERV_GEN_ALL_ED
Implemented All Fall Risk Interventions:  [place] to call system. Call bell, personal items and telephone within reach. Instruct patient to call for assistance. Room bathroom lighting operational. Non-slip footwear when patient is off stretcher. Physically safe environment: no spills, clutter or unnecessary equipment. Stretcher in lowest position, wheels locked, appropriate side rails in place. Provide visual cue, wrist band, yellow gown, etc. Monitor gait and stability. Monitor for mental status changes and reorient to person, place, and time. Review medications for side effects contributing to fall risk. Reinforce activity limits and safety measures with patient and family.

## 2018-08-16 DIAGNOSIS — D64.9 ANEMIA, UNSPECIFIED: ICD-10-CM

## 2018-08-16 LAB
APPEARANCE UR: SIGNIFICANT CHANGE UP
BACTERIA # UR AUTO: HIGH
BILIRUB UR-MCNC: NEGATIVE — SIGNIFICANT CHANGE UP
BLOOD UR QL VISUAL: SIGNIFICANT CHANGE UP
BUN SERPL-MCNC: 25 MG/DL — HIGH (ref 7–23)
CALCIUM SERPL-MCNC: 8.5 MG/DL — SIGNIFICANT CHANGE UP (ref 8.4–10.5)
CHLORIDE SERPL-SCNC: 101 MMOL/L — SIGNIFICANT CHANGE UP (ref 98–107)
CO2 SERPL-SCNC: 25 MMOL/L — SIGNIFICANT CHANGE UP (ref 22–31)
COLOR SPEC: COLORLESS — SIGNIFICANT CHANGE UP
CREAT SERPL-MCNC: 2.09 MG/DL — HIGH (ref 0.5–1.3)
GLUCOSE BLDC GLUCOMTR-MCNC: 119 MG/DL — HIGH (ref 70–99)
GLUCOSE BLDC GLUCOMTR-MCNC: 121 MG/DL — HIGH (ref 70–99)
GLUCOSE BLDC GLUCOMTR-MCNC: 170 MG/DL — HIGH (ref 70–99)
GLUCOSE BLDC GLUCOMTR-MCNC: 182 MG/DL — HIGH (ref 70–99)
GLUCOSE BLDC GLUCOMTR-MCNC: 270 MG/DL — HIGH (ref 70–99)
GLUCOSE SERPL-MCNC: 190 MG/DL — HIGH (ref 70–99)
GLUCOSE UR-MCNC: NEGATIVE — SIGNIFICANT CHANGE UP
HBA1C BLD-MCNC: 8.6 % — HIGH (ref 4–5.6)
HCT VFR BLD CALC: 26.6 % — LOW (ref 34.5–45)
HGB BLD-MCNC: 7.7 G/DL — LOW (ref 11.5–15.5)
KETONES UR-MCNC: NEGATIVE — SIGNIFICANT CHANGE UP
LEUKOCYTE ESTERASE UR-ACNC: SIGNIFICANT CHANGE UP
MCHC RBC-ENTMCNC: 21.8 PG — LOW (ref 27–34)
MCHC RBC-ENTMCNC: 28.9 % — LOW (ref 32–36)
MCV RBC AUTO: 75.1 FL — LOW (ref 80–100)
NITRITE UR-MCNC: NEGATIVE — SIGNIFICANT CHANGE UP
NRBC # FLD: 0 — SIGNIFICANT CHANGE UP
PH UR: 7.5 — SIGNIFICANT CHANGE UP (ref 5–8)
PLATELET # BLD AUTO: 691 K/UL — HIGH (ref 150–400)
PMV BLD: 9.7 FL — SIGNIFICANT CHANGE UP (ref 7–13)
POTASSIUM SERPL-MCNC: 4.4 MMOL/L — SIGNIFICANT CHANGE UP (ref 3.5–5.3)
POTASSIUM SERPL-SCNC: 4.4 MMOL/L — SIGNIFICANT CHANGE UP (ref 3.5–5.3)
PROT UR-MCNC: SIGNIFICANT CHANGE UP
RBC # BLD: 3.54 M/UL — LOW (ref 3.8–5.2)
RBC # FLD: 18 % — HIGH (ref 10.3–14.5)
RBC CASTS # UR COMP ASSIST: SIGNIFICANT CHANGE UP
SODIUM SERPL-SCNC: 139 MMOL/L — SIGNIFICANT CHANGE UP (ref 135–145)
SP GR SPEC: 1 — SIGNIFICANT CHANGE UP (ref 1–1.04)
SQUAMOUS # UR AUTO: SIGNIFICANT CHANGE UP
TROPONIN T, HIGH SENSITIVITY: 64 NG/L — CRITICAL HIGH (ref ?–14)
TROPONIN T, HIGH SENSITIVITY: 81 NG/L — CRITICAL HIGH (ref ?–14)
UROBILINOGEN FLD QL: NORMAL — SIGNIFICANT CHANGE UP
WBC # BLD: 7.61 K/UL — SIGNIFICANT CHANGE UP (ref 3.8–10.5)
WBC # FLD AUTO: 7.61 K/UL — SIGNIFICANT CHANGE UP (ref 3.8–10.5)
WBC UR QL: >50 — HIGH

## 2018-08-16 RX ORDER — INSULIN GLARGINE 100 [IU]/ML
34 INJECTION, SOLUTION SUBCUTANEOUS
Qty: 0 | Refills: 0 | COMMUNITY

## 2018-08-16 RX ORDER — FUROSEMIDE 40 MG
1 TABLET ORAL
Qty: 0 | Refills: 0 | COMMUNITY

## 2018-08-16 RX ADMIN — GABAPENTIN 600 MILLIGRAM(S): 400 CAPSULE ORAL at 11:09

## 2018-08-16 RX ADMIN — Medication 40 MILLIGRAM(S): at 06:31

## 2018-08-16 RX ADMIN — Medication 3: at 17:22

## 2018-08-16 RX ADMIN — Medication 1: at 06:51

## 2018-08-16 RX ADMIN — PANTOPRAZOLE SODIUM 40 MILLIGRAM(S): 20 TABLET, DELAYED RELEASE ORAL at 17:23

## 2018-08-16 RX ADMIN — PANTOPRAZOLE SODIUM 40 MILLIGRAM(S): 20 TABLET, DELAYED RELEASE ORAL at 06:32

## 2018-08-16 RX ADMIN — Medication 12.5 MILLIGRAM(S): at 06:32

## 2018-08-16 RX ADMIN — Medication 12.5 MILLIGRAM(S): at 17:23

## 2018-08-16 NOTE — CONSULT NOTE ADULT - SUBJECTIVE AND OBJECTIVE BOX
[facility]  Tonio Buck PA-C  [address] [place] [address]  [phone]      Chief Complaint:  Patient is a 78y old  Female who presents with a chief complaint of sob/ palpitations (15 Aug 2018 20:05)      HPI: 79 yo f cad/ pci with 2017 MICHAEL to prox LCx and OM1, parox afib not on AC 2/2 GI bleed, DM, neuropathy CHF. CKD.  Pt presents with one day of intermittent non-exertional dyspnea and palpitation. Found to be anemic with Hb 6.7. Denies melena, hematochezia; fobt negative. Taken off mesalamine several months ago 2/2 constipation; had initially taken following questionable ileal region ulcer seen fir GI bleed eval last March. Denies abd pain, nausea, vomitng, fevers, chills. Off plavix sicne last week for anticipated spinal injection for chronic lower back pain.   she was prescribed feso4 in our office but has been non complaint 2/2 constipation    Allergies:  Carrots (Rash)  No Known Drug Allergies      Medications:  atorvastatin 40 milliGRAM(s) Oral at bedtime  dextrose 40% Gel 15 Gram(s) Oral once PRN  dextrose 5%. 1000 milliLiter(s) IV Continuous <Continuous>  dextrose 50% Injectable 12.5 Gram(s) IV Push once  dextrose 50% Injectable 25 Gram(s) IV Push once  dextrose 50% Injectable 25 Gram(s) IV Push once  docusate sodium 100 milliGRAM(s) Oral three times a day  furosemide    Tablet 40 milliGRAM(s) Oral daily  gabapentin 600 milliGRAM(s) Oral daily  glucagon  Injectable 1 milliGRAM(s) IntraMuscular once PRN  insulin glargine Injectable (LANTUS) 40 Unit(s) SubCutaneous at bedtime  insulin lispro (HumaLOG) corrective regimen sliding scale   SubCutaneous three times a day before meals  insulin lispro (HumaLOG) corrective regimen sliding scale   SubCutaneous at bedtime  metoprolol tartrate 12.5 milliGRAM(s) Oral two times a day  pantoprazole  Injectable 40 milliGRAM(s) IV Push two times a day  senna 2 Tablet(s) Oral at bedtime      PMHX/PSHX:  Neuropathy  GI bleed  Diabetes mellitus, type 2  Atrial fibrillation, unspecified type  CAD (coronary artery disease)  Paroxysmal atrial fibrillation  CAD (coronary artery disease)  Diabetic neuropathy  Type 2 diabetes mellitus with hyperglycemia, with long-term current use of insulin  HLD (hyperlipidemia)  Type 2 diabetes mellitus without complication  H/O:  section  H/O  section  No significant past surgical history      Family history:  No pertinent family history in first degree relatives  No pertinent family history in first degree relatives      Social History:     ROS:     General:  No wt loss, fevers, chills, night sweats, fatigue,   Eyes:  Good vision, no reported pain  ENT:  No sore throat, pain, runny nose, dysphagia  CV:  No pain, palpitations, hypo/hypertension  Resp:  No dyspnea, cough, tachypnea, wheezing  GI:  No pain, No nausea, No vomiting, No diarrhea, No constipation, No weight loss, No fever, No pruritis, No rectal bleeding, No tarry stools, No dysphagia,  :  No pain, bleeding, incontinence, nocturia  Muscle:  No pain, weakness  Neuro:  No weakness, tingling, memory problems  Psych:  No fatigue, insomnia, mood problems, depression  Endocrine:  No polyuria, polydipsia, cold/heat intolerance  Heme:  No petechiae, ecchymosis, easy bruisability  Skin:  No rash, tattoos, scars, edema      PHYSICAL EXAM:   Vital Signs:  Vital Signs Last 24 Hrs  T(C): 36.7 (16 Aug 2018 14:10), Max: 37 (15 Aug 2018 19:19)  T(F): 98 (16 Aug 2018 14:10), Max: 98.6 (15 Aug 2018 19:19)  HR: 81 (16 Aug 2018 14:10) (65 - 94)  BP: 143/69 (16 Aug 2018 14:10) (123/56 - 173/56)  BP(mean): --  RR: 16 (16 Aug 2018 14:10) (16 - 22)  SpO2: 100% (16 Aug 2018 14:10) (99% - 100%)  Daily Height in cm: 162.56 (16 Aug 2018 14:10)    Daily     GENERAL:  Appears stated age, well-groomed, well-nourished, no distress  HEENT:  NC/AT,  conjunctivae clear and pink, no thyromegaly, nodules, adenopathy, no JVD, sclera -anicteric  CHEST:  Full & symmetric excursion, no increased effort, breath sounds clear  HEART:  Regular rhythm, S1, S2, no murmur/rub/S3/S4, no abdominal bruit, no edema  ABDOMEN:  Soft, non-tender, non-distended, normoactive bowel sounds,  no masses ,no hepato-splenomegaly, no signs of chronic liver disease  EXTEREMITIES:  no cyanosis,clubbing or edema  SKIN:  No rash/erythema/ecchymoses/petechiae/wounds/abscess/warm/dry  NEURO:  Alert, oriented, no asterixis, no tremor, no encephalopathy    LABS:                        7.7    7.61  )-----------( 691      ( 16 Aug 2018 05:48 )             26.6     08-    139  |  101  |  25<H>  ----------------------------<  190<H>  4.4   |  25  |  2.09<H>    Ca    8.5      16 Aug 2018 05:48    TPro  8.2  /  Alb  3.8  /  TBili  < 0.2<L>  /  DBili  x   /  AST  32  /  ALT  14  /  AlkPhos  140<H>  08-15    LIVER FUNCTIONS - ( 15 Aug 2018 14:39 )  Alb: 3.8 g/dL / Pro: 8.2 g/dL / ALK PHOS: 140 u/L / ALT: 14 u/L / AST: 32 u/L / GGT: x           PT/INR - ( 15 Aug 2018 14:39 )   PT: 13.3 SEC;   INR: 1.15          PTT - ( 15 Aug 2018 14:39 )  PTT:32.7 SEC  Urinalysis Basic - ( 16 Aug 2018 00:50 )    Color: COLORLESS / Appearance: Lt TURBID / S.005 / pH: 7.5  Gluc: NEGATIVE / Ketone: NEGATIVE  / Bili: NEGATIVE / Urobili: NORMAL   Blood: TRACE / Protein: SMALL / Nitrite: NEGATIVE   Leuk Esterase: LARGE / RBC: 0-2 / WBC >50   Sq Epi: FEW / Non Sq Epi: x / Bacteria: MANY          Imaging:

## 2018-08-16 NOTE — CONSULT NOTE ADULT - PROBLEM SELECTOR RECOMMENDATION 9
occult negative  consider iron suppliments  no overt bleed  patient with extensive gi w/u in past  ? heme eval

## 2018-08-16 NOTE — CONSULT NOTE ADULT - SUBJECTIVE AND OBJECTIVE BOX
Cardiovascular Disease Initial Evaluation    CHIEF COMPLAINT: Shortness of breath    HISTORY OF PRESENT ILLNESS:  This is a 78 year old woman with uncontrolled IDDM (NdO4l-2.9%), diastolic CHF and HTN who presented [facility] ED on 8/15/2018 with exertional SOB found to have blood loss anemia. In 2017, Ms. Tsang underwent cath with Dr. Zulay Mckinnon and had MICHAEL placed to LCx and OM1 on 2017. She was also found to have PAF on that admission. Since then, she has had multiple hospitalizations for blood loss anemia resulting in discontinuation of ASA and Xarelto.   She underwent EGD by Dr. Curtis Daigle revealing one non-bleeding duodenal ulcer. Capsule study revealed questionable ulcer in ileum.  Currently she denies chest pain or SOB.       Allergies  Carrots (Rash)  No Known Drug Allergies    MEDICATIONS:  furosemide    Tablet 40 milliGRAM(s) Oral daily  metoprolol tartrate 12.5 milliGRAM(s) Oral two times a day  gabapentin 600 milliGRAM(s) Oral daily    docusate sodium 100 milliGRAM(s) Oral three times a day  pantoprazole  Injectable 40 milliGRAM(s) IV Push two times a day  senna 2 Tablet(s) Oral at bedtime    atorvastatin 40 milliGRAM(s) Oral at bedtime  dextrose 40% Gel 15 Gram(s) Oral once PRN  dextrose 50% Injectable 12.5 Gram(s) IV Push once  dextrose 50% Injectable 25 Gram(s) IV Push once  dextrose 50% Injectable 25 Gram(s) IV Push once  glucagon  Injectable 1 milliGRAM(s) IntraMuscular once PRN  insulin glargine Injectable (LANTUS) 40 Unit(s) SubCutaneous at bedtime  insulin lispro (HumaLOG) corrective regimen sliding scale   SubCutaneous three times a day before meals  insulin lispro (HumaLOG) corrective regimen sliding scale   SubCutaneous at bedtime    dextrose 5%. 1000 milliLiter(s) IV Continuous <Continuous>      PAST MEDICAL & SURGICAL HISTORY:  Neuropathy  GI bleed  Diabetes mellitus, type 2  Atrial fibrillation, unspecified type  CAD (coronary artery disease)  Paroxysmal atrial fibrillation  CAD (coronary artery disease)  Diabetic neuropathy  Type 2 diabetes mellitus with hyperglycemia, with long-term current use of insulin  HLD (hyperlipidemia)  H/O:  section  H/O  section      FAMILY HISTORY:  No pertinent family history in first degree relatives: no pertinent FH for current admission  No pertinent family history in first degree relatives      SOCIAL HISTORY:    Non-smoker    REVIEW OF SYSTEMS:  See HPI, otherwise complete 10 point review of systems negative      PHYSICAL EXAM:  T(C): 36.7 (18 @ 06:00), Max: 37 (08-15-18 @ 19:19)  HR: 65 (18 @ 06:00) (65 - 94)  BP: 160/68 (18 @ 06:00) (141/46 - 173/56)  RR: 16 (18 @ 06:00) (16 - 22)  SpO2: 100% (18 @ 06:00) (100% - 100%)  Wt(kg): --  I&O's Summary      Appearance: No Acute Distress	  HEENT:  Normal oral mucosa, PERRL, EOMI	  Cardiovascular: Normal S1 S2, No JVD, No murmurs/rubs/gallops  Respiratory: Lungs clear to auscultation bilaterally  Gastrointestinal:  Soft, Non-tender, + BS	  Skin: No rashes, No ecchymoses, No cyanosis	  Neurologic: Non-focal  Extremities: No clubbing, cyanosis or edema  Vascular: Peripheral pulses palpable 2+ bilaterally  Psychiatry: A & O x 3, Mood & affect appropriate    Laboratory Data:	 	    CBC Full  -  ( 16 Aug 2018 05:48 )  WBC Count : 7.61 K/uL  Hemoglobin : 7.7 g/dL  Hematocrit : 26.6 %  Platelet Count - Automated : 691 K/uL  Mean Cell Volume : 75.1 fL  Mean Cell Hemoglobin : 21.8 pg  Mean Cell Hemoglobin Concentration : 28.9 %  Auto Neutrophil # : x  Auto Lymphocyte # : x  Auto Monocyte # : x  Auto Eosinophil # : x  Auto Basophil # : x  Auto Neutrophil % : x  Auto Lymphocyte % : x  Auto Monocyte % : x  Auto Eosinophil % : x  Auto Basophil % : x        139  |  101  |  25<H>  ----------------------------<  190<H>  4.4   |  25  |  2.09<H>  08-15    143  |  104  |  26<H>  ----------------------------<  114<H>  5.0   |  22  |  1.89<H>    Ca    8.5      16 Aug 2018 05:48  Ca    9.2      15 Aug 2018 14:39    TPro  8.2  /  Alb  3.8  /  TBili  < 0.2<L>  /  DBili  x   /  AST  32  /  ALT  14  /  AlkPhos  140<H>  08-15      proBNP: Serum Pro-Brain Natriuretic Peptide: 4775 pg/mL (08-15 @ 14:39)      Interpretation of Telemetry: Sinus 	    ECG:  Sinus; non-specific t-wave changes    Assessment: 78 year old woman with CAD s/p MICHAEL x2 in 2017, a-fib not on AC due to GI bleed, and compensated diastolic CHF presents with supply demand ischemia and acute blood loss anemia.    Plan of Care:    #Supply demand ischemia-  Due to acute blood loss anemia.  No ischemic changes on EKG.  S/p coronary MICHAEL x2 in 2017.  Patient with multiple hospitalizations for recurrent life threatening bleeding.  Agree with holding Plavix at this time.  No cardiac objection to scoping.     #Paroxymal a-fib-  Currently in sinus  No AC given h/o GI bleed.    #Compensated diastolic CHF-  Euvolemic on exam today.  Close attention to volume status post-transfusion.     62 minutes spent on total encounter; more than 50% of the visit was spent counseling and/or coordinating care by the attending physician.   	  Julio César Hooper MD [facility]  Cardiovascular Diseases  [phone]

## 2018-08-16 NOTE — CHART NOTE - NSCHARTNOTEFT_GEN_A_CORE
Spoke with Dr. Daigle - no plans for scope today, can have clear liquid diet for now. Dr. Daigle to evaluate patient later on today.    Thank you,  Erica JAUREGUI NP  primary team  [phone]

## 2018-08-16 NOTE — PROGRESS NOTE ADULT - ASSESSMENT
· Assessment	  79 yo f with symptomatic anemia      Symptomatic anemia: Acute drop in Hb:  PRBC  GI eval noted.   Clears      Problem/Plan - 1:  ·  Problem: Shortness of breath.  Plan: -likely 2/2 anemia          Problem/Plan - 2:  ·  Problem: Coronary artery disease involving native coronary artery of native heart, angina presence unspecified.  Plan: - Cardio f/up noted.  -c/w statin,      Problem/Plan - 3:  ·  Problem: Type 2 diabetes mellitus with complication, unspecified whether long term insulin use.  Plan: - Lantus.  -ISS.      Problem/Plan - 4:  ·  Problem: Diabetic neuropathy.  Plan: c/w  gabapentin.

## 2018-08-17 ENCOUNTER — TRANSCRIPTION ENCOUNTER (OUTPATIENT)
Age: 78
End: 2018-08-17

## 2018-08-17 LAB
ALBUMIN SERPL ELPH-MCNC: 3.2 G/DL — LOW (ref 3.3–5)
ALP SERPL-CCNC: 99 U/L — SIGNIFICANT CHANGE UP (ref 40–120)
ALT FLD-CCNC: 10 U/L — SIGNIFICANT CHANGE UP (ref 4–33)
AST SERPL-CCNC: 9 U/L — SIGNIFICANT CHANGE UP (ref 4–32)
BASOPHILS # BLD AUTO: 0.06 K/UL — SIGNIFICANT CHANGE UP (ref 0–0.2)
BASOPHILS NFR BLD AUTO: 0.8 % — SIGNIFICANT CHANGE UP (ref 0–2)
BILIRUB SERPL-MCNC: 0.4 MG/DL — SIGNIFICANT CHANGE UP (ref 0.2–1.2)
BUN SERPL-MCNC: 28 MG/DL — HIGH (ref 7–23)
CALCIUM SERPL-MCNC: 8.4 MG/DL — SIGNIFICANT CHANGE UP (ref 8.4–10.5)
CHLORIDE SERPL-SCNC: 97 MMOL/L — LOW (ref 98–107)
CO2 SERPL-SCNC: 25 MMOL/L — SIGNIFICANT CHANGE UP (ref 22–31)
CREAT SERPL-MCNC: 2.51 MG/DL — HIGH (ref 0.5–1.3)
EOSINOPHIL # BLD AUTO: 0.05 K/UL — SIGNIFICANT CHANGE UP (ref 0–0.5)
EOSINOPHIL NFR BLD AUTO: 0.7 % — SIGNIFICANT CHANGE UP (ref 0–6)
GLUCOSE BLDC GLUCOMTR-MCNC: 155 MG/DL — HIGH (ref 70–99)
GLUCOSE BLDC GLUCOMTR-MCNC: 208 MG/DL — HIGH (ref 70–99)
GLUCOSE BLDC GLUCOMTR-MCNC: 243 MG/DL — HIGH (ref 70–99)
GLUCOSE BLDC GLUCOMTR-MCNC: 79 MG/DL — SIGNIFICANT CHANGE UP (ref 70–99)
GLUCOSE SERPL-MCNC: 95 MG/DL — SIGNIFICANT CHANGE UP (ref 70–99)
HCT VFR BLD CALC: 29.4 % — LOW (ref 34.5–45)
HGB BLD-MCNC: 8.6 G/DL — LOW (ref 11.5–15.5)
IMM GRANULOCYTES # BLD AUTO: 0.03 # — SIGNIFICANT CHANGE UP
IMM GRANULOCYTES NFR BLD AUTO: 0.4 % — SIGNIFICANT CHANGE UP (ref 0–1.5)
LYMPHOCYTES # BLD AUTO: 1.98 K/UL — SIGNIFICANT CHANGE UP (ref 1–3.3)
LYMPHOCYTES # BLD AUTO: 27.4 % — SIGNIFICANT CHANGE UP (ref 13–44)
MCHC RBC-ENTMCNC: 22.1 PG — LOW (ref 27–34)
MCHC RBC-ENTMCNC: 29.3 % — LOW (ref 32–36)
MCV RBC AUTO: 75.6 FL — LOW (ref 80–100)
MONOCYTES # BLD AUTO: 0.71 K/UL — SIGNIFICANT CHANGE UP (ref 0–0.9)
MONOCYTES NFR BLD AUTO: 9.8 % — SIGNIFICANT CHANGE UP (ref 2–14)
NEUTROPHILS # BLD AUTO: 4.39 K/UL — SIGNIFICANT CHANGE UP (ref 1.8–7.4)
NEUTROPHILS NFR BLD AUTO: 60.9 % — SIGNIFICANT CHANGE UP (ref 43–77)
NRBC # FLD: 0 — SIGNIFICANT CHANGE UP
PLATELET # BLD AUTO: 684 K/UL — HIGH (ref 150–400)
PMV BLD: 9.5 FL — SIGNIFICANT CHANGE UP (ref 7–13)
POTASSIUM SERPL-MCNC: 4.4 MMOL/L — SIGNIFICANT CHANGE UP (ref 3.5–5.3)
POTASSIUM SERPL-SCNC: 4.4 MMOL/L — SIGNIFICANT CHANGE UP (ref 3.5–5.3)
PROT SERPL-MCNC: 7 G/DL — SIGNIFICANT CHANGE UP (ref 6–8.3)
RBC # BLD: 3.89 M/UL — SIGNIFICANT CHANGE UP (ref 3.8–5.2)
RBC # FLD: 18.6 % — HIGH (ref 10.3–14.5)
SODIUM SERPL-SCNC: 137 MMOL/L — SIGNIFICANT CHANGE UP (ref 135–145)
WBC # BLD: 7.22 K/UL — SIGNIFICANT CHANGE UP (ref 3.8–10.5)
WBC # FLD AUTO: 7.22 K/UL — SIGNIFICANT CHANGE UP (ref 3.8–10.5)

## 2018-08-17 RX ORDER — GABAPENTIN 400 MG/1
1 CAPSULE ORAL
Qty: 0 | Refills: 0 | COMMUNITY
Start: 2018-08-17

## 2018-08-17 RX ORDER — GABAPENTIN 400 MG/1
1 CAPSULE ORAL
Qty: 0 | Refills: 0 | DISCHARGE
Start: 2018-08-17

## 2018-08-17 RX ORDER — ATORVASTATIN CALCIUM 80 MG/1
1 TABLET, FILM COATED ORAL
Qty: 0 | Refills: 0 | COMMUNITY
Start: 2018-08-17

## 2018-08-17 RX ORDER — GABAPENTIN 400 MG/1
300 CAPSULE ORAL DAILY
Qty: 0 | Refills: 0 | Status: DISCONTINUED | OUTPATIENT
Start: 2018-08-17 | End: 2018-08-18

## 2018-08-17 RX ORDER — GABAPENTIN 400 MG/1
2 CAPSULE ORAL
Qty: 0 | Refills: 0 | DISCHARGE
Start: 2018-08-17

## 2018-08-17 RX ORDER — ACETAMINOPHEN 500 MG
650 TABLET ORAL EVERY 6 HOURS
Qty: 0 | Refills: 0 | Status: DISCONTINUED | OUTPATIENT
Start: 2018-08-17 | End: 2018-08-18

## 2018-08-17 RX ORDER — ACETAMINOPHEN 500 MG
650 TABLET ORAL ONCE
Qty: 0 | Refills: 0 | Status: COMPLETED | OUTPATIENT
Start: 2018-08-17 | End: 2018-08-17

## 2018-08-17 RX ORDER — FUROSEMIDE 40 MG
1 TABLET ORAL
Qty: 0 | Refills: 0 | COMMUNITY
Start: 2018-08-17

## 2018-08-17 RX ORDER — ACETAMINOPHEN 500 MG
2 TABLET ORAL
Qty: 0 | Refills: 0 | DISCHARGE
Start: 2018-08-17

## 2018-08-17 RX ADMIN — INSULIN GLARGINE 40 UNIT(S): 100 INJECTION, SOLUTION SUBCUTANEOUS at 22:11

## 2018-08-17 RX ADMIN — Medication 2: at 17:18

## 2018-08-17 RX ADMIN — Medication 650 MILLIGRAM(S): at 00:30

## 2018-08-17 RX ADMIN — INSULIN GLARGINE 40 UNIT(S): 100 INJECTION, SOLUTION SUBCUTANEOUS at 00:31

## 2018-08-17 RX ADMIN — PANTOPRAZOLE SODIUM 40 MILLIGRAM(S): 20 TABLET, DELAYED RELEASE ORAL at 17:18

## 2018-08-17 RX ADMIN — Medication 40 MILLIGRAM(S): at 06:24

## 2018-08-17 RX ADMIN — PANTOPRAZOLE SODIUM 40 MILLIGRAM(S): 20 TABLET, DELAYED RELEASE ORAL at 06:23

## 2018-08-17 RX ADMIN — Medication 1: at 12:14

## 2018-08-17 RX ADMIN — Medication 12.5 MILLIGRAM(S): at 17:19

## 2018-08-17 RX ADMIN — Medication 650 MILLIGRAM(S): at 01:00

## 2018-08-17 RX ADMIN — Medication 12.5 MILLIGRAM(S): at 06:22

## 2018-08-17 RX ADMIN — GABAPENTIN 300 MILLIGRAM(S): 400 CAPSULE ORAL at 12:16

## 2018-08-17 NOTE — DISCHARGE NOTE ADULT - PLAN OF CARE
Optimal H/H levels - 8/15 s/p 1 u PRBC. GI Dr Daigle consulted recommended no plan for scope, occult negative  - Clear liquid diet advanced to Soft as tolerated  - Heme Dr Real consulted  - you had EGD, colonoscopy and capsule endoscopy in recent past the tests were negative  - Follow up with PCP and GI as outpatient for further management - Card consulted in the hospital  - No ischemic changes on EKG.  - S/p coronary MICHAEL x2 in 12/2017.  - Patient with multiple hospitalizations for recurrent life threatening bleeding.  - held Plavix in the hospital. Follow up with Card as outpatient - HgA1C- 8.6%  - continue monitoring blood sugar levels at home before meals  - continue administering Insulin as prescribed  - follow up with PCP or Endo as outpatient for further management - your Neurontin was decreased to 300 mg oral daily due to kidney injury and Creatinine level 2.51  - Follow up with PCP for further management - Card consulted  - No ischemic changes on EKG.  - S/p coronary MICHAEL x2 in 12/2017.  - Patient with multiple hospitalizations for recurrent life threatening bleeding.  - continue holding Plavix - 8/15 s/p 1 u PRBC. GI Dr Daigle consulted recommended no plan for scope, occult negative  - Clear liquid diet advanced to Soft   - Heme Dr Real consulted  - you had EGD, colonoscopy and capsule endoscopy in recent past the tests were negative  - Follow up with PCP and GI as outpatient for further management Symptoms control Glucose control rate control Take Metoprolol as prescribed   - No ischemic changes on EKG.  - S/p coronary MICHAEL x2 in 12/2017.- continue holding Plavix until spinal injection done and OK by the spinal specialist to resume** - HgA1C- 8.6%, goal below 7%**  - continue monitoring blood sugar levels at home before meals  - continue administering Insulin as prescribed. Lantus 42 Units daily and Pre-meal 5 Units 3 x day   - follow up with PCP or Endo Dr Trinh casey for further management Improved renal function ******HOLD LASIX for now and make a follow up appt with Dr Goldberg this week to repeat lab work, You can call and schedule an appt with a renal specialist Dr Jackson***  Creat on 8/18 was 2.91*** - 8/15 s/p 1 u PRBC. GI Dr Daigle consulted recommended no plan for scope, occult negative  - Heme Dr Real consulted call to schedule an outpt appt  - you had EGD, colonoscopy and capsule endoscopy in recent past the tests were negative  - Follow up with PCP and GI as outpatient for further management - Card consulted in the hospital  - No ischemic changes on EKG.  - S/p coronary MICHAEL x2 in 12/2017.  - Patient with multiple hospitalizations for recurrent life threatening bleeding.  - Continue Plavix as per your cardiologist recommendations,  Follow up with Card as outpatient Take Metoprolol as prescribed   - No ischemic changes on EKG.  - S/p coronary MICHAEL x2 in 12/2017 Continue daily Plavix and monitor for signs of bleeding

## 2018-08-17 NOTE — DISCHARGE NOTE ADULT - MEDICATION SUMMARY - MEDICATIONS TO STOP TAKING
I will STOP taking the medications listed below when I get home from the hospital:    clopidogrel 75 mg oral tablet  -- 1 tab(s) by mouth once a day    Benadryl 25 mg oral tablet  -- 1 tab(s) by mouth 3 times a day, As Needed    mesalamine 400 mg oral delayed release capsule  -- 2 cap(s) by mouth 3 times a day    ferrous sulfate 325 mg (65 mg elemental iron) oral tablet  -- 1 tab(s) by mouth 3 times a day    ciprofloxacin 500 mg oral tablet  -- 1 tab(s) by mouth once a day    traMADol 50 mg oral tablet  -- 1 tab(s) by mouth every 8 hours, As Needed    ISTOP Ref#[id number]  RxDispensed: 8/7/2018  Quantity: 21  Day Supply 7 I will STOP taking the medications listed below when I get home from the hospital:    clopidogrel 75 mg oral tablet  -- 1 tab(s) by mouth once a day    furosemide 40 mg oral tablet  -- 1 tab(s) by mouth once a day    Benadryl 25 mg oral tablet  -- 1 tab(s) by mouth 3 times a day, As Needed    mesalamine 400 mg oral delayed release capsule  -- 2 cap(s) by mouth 3 times a day    ferrous sulfate 325 mg (65 mg elemental iron) oral tablet  -- 1 tab(s) by mouth 3 times a day    ciprofloxacin 500 mg oral tablet  -- 1 tab(s) by mouth once a day    traMADol 50 mg oral tablet  -- 1 tab(s) by mouth every 8 hours, As Needed    ISTOP Ref#[id number]  RxDispensed: 8/7/2018  Quantity: 21  Day Supply 7

## 2018-08-17 NOTE — DISCHARGE NOTE ADULT - PATIENT PORTAL LINK FT
You can access the [employer] Patient Portal, offered by [facility], by registering with the following website: [email or web address]

## 2018-08-17 NOTE — DISCHARGE NOTE ADULT - CARE PROVIDER_API CALL
Curtis Daigle (DO), Gastroenterology; Internal Medicine  [address] [place] [address]  Phone: [phone]  Fax: [phone]    Julio César Hooper (MD), Internal Medicine  [address] [place] [address]  Phone: [phone]  Fax: [phone] Curtis Daigle (), Gastroenterology; Internal Medicine  [address] [place] [address]  Phone: [phone]  Fax: [phone]    Julio César Hooper), Internal Medicine  [address] [place] [address]  Phone: [phone]  Fax: [phone]    Ora Jackson; MBBS), Internal Medicine; Nephrology  [address] [place] [address]  Phone: [phone]  Fax: [phone]    Luciana Tsang), EndocrinologyMetabDiabetes; Internal Medicine  [address] [place] [address]  Phone: [phone]  Fax: [phone]

## 2018-08-17 NOTE — DISCHARGE NOTE ADULT - MEDICATION SUMMARY - MEDICATIONS TO TAKE
I will START or STAY ON the medications listed below when I get home from the hospital:    acetaminophen 325 mg oral tablet  -- 2 tab(s) by mouth every 6 hours, As needed, Mild and mod Pain  -- Indication: For Pain control     gabapentin 300 mg oral capsule  -- 1 cap(s) by mouth once a day  -- Indication: For Neuropathy     Lantus 100 units/mL subcutaneous solution  -- 40 unit(s) subcutaneous once a day (at bedtime)  -- Indication: For  type 2 diabetes mellitus    insulin lispro 100 units/mL injectable solution  -- 5 unit(s) injectable 3 times a day (with meals)  -- Indication: For  type 2 diabetes mellitus    atorvastatin 40 mg oral tablet  -- 1 tab(s) by mouth once a day (at bedtime)  -- Indication: For CAD    Metoprolol Tartrate 25 mg oral tablet  -- 0.5 tab(s) (12.5mg) by mouth 2 times a day  -- Indication: For CAD    furosemide 40 mg oral tablet  -- 1 tab(s) by mouth once a day  -- Indication: For CHF    Senna 8.6 mg oral tablet  -- 1 tab(s) by mouth once a day (at bedtime), As Needed  -- Indication: For Constipation     docusate sodium 100 mg oral capsule  -- 1 cap(s) by mouth 3 times a day, As Needed  -- Indication: For Constipation     pantoprazole 40 mg oral delayed release tablet  -- 1 tab(s) by mouth 2 times a day  -- Indication: For GIB    Multiple Vitamins oral tablet  -- 1 tab(s) by mouth once a day  -- Indication: For Supplement     B Complex 50 oral tablet, extended release  -- 1 tab(s) by mouth once a day  -- Indication: For Supplement I will START or STAY ON the medications listed below when I get home from the hospital:    acetaminophen 325 mg oral tablet  -- 2 tab(s) by mouth every 6 hours, As needed, Mild and mod Pain  -- Indication: For Pain control     gabapentin 300 mg oral capsule  -- 1 cap(s) by mouth once a day  -- Indication: For Neuropathy     insulin lispro 100 units/mL injectable solution  -- 5 unit(s) injectable 3 times a day (with meals)  -- Indication: For  type 2 diabetes mellitus    Lantus 100 units/mL subcutaneous solution  -- 42 unit(s) subcutaneous once a day (at bedtime)  -- Indication: For Type 2 diabetes mellitus with complication    atorvastatin 40 mg oral tablet  -- 1 tab(s) by mouth once a day (at bedtime)  -- Indication: For Coronary artery disease involving native coronary artery of native heart    Metoprolol Tartrate 25 mg oral tablet  -- 0.5 tab(s) (12.5mg) by mouth 2 times a day  -- Indication: For CAD    Senna 8.6 mg oral tablet  -- 1 tab(s) by mouth once a day (at bedtime), As Needed  -- Indication: For Constipation     docusate sodium 100 mg oral capsule  -- 1 cap(s) by mouth 3 times a day, As Needed  -- Indication: For Constipation     pantoprazole 40 mg oral delayed release tablet  -- 1 tab(s) by mouth 2 times a day  -- Indication: For GERD/GIB    Multiple Vitamins oral tablet  -- 1 tab(s) by mouth once a day  -- Indication: For Supplement     B Complex 50 oral tablet, extended release  -- 1 tab(s) by mouth once a day  -- Indication: For Supplement

## 2018-08-17 NOTE — PROGRESS NOTE ADULT - ASSESSMENT
· Assessment	  77 yo f with symptomatic anemia      Symptomatic anemia: Acute drop in Hb:  PRBC  GI f/up noted.  Hem eval appreciated.  Advanced diet     Problem/Plan - 1:  ·  Problem: Shortness of breath.  Plan: -likely 2/2 anemia          Problem/Plan - 2:  ·  Problem: Coronary artery disease involving native coronary artery of native heart, angina presence unspecified.  Plan: - Cardio f/up noted.  -c/w statin,      Problem/Plan - 3:  ·  Problem: Type 2 diabetes mellitus with complication, unspecified whether long term insulin use.  Plan: - Lantus.  -ISS.      Problem/Plan - 4:  ·  Problem: Diabetic neuropathy.  Plan: c/w  gabapentin.

## 2018-08-17 NOTE — CONSULT NOTE ADULT - SUBJECTIVE AND OBJECTIVE BOX
HPI:  77 yo Female with CAD/PCI  2017 MICHAEL to prox LCx and OM1, PAF not on AC 2/2 GI bleed, DM, neuropathy CHF. CKD admittd with  intermittent non-exertional dyspnea and palpitation. She was anemic with Hb 6.7gm/dl. Denied melena, hematochezia; fobt negative. Taken off mesalamine several months ago 2/2 constipation; had initially taken following questionable ileal region ulcer seen fir GI bleed eval last March. Off plavix for 1 week PTA  for anticipated spinal injection for chronic lower back pain. Seen by GI      PAST MEDICAL & SURGICAL HISTORY:  Neuropathy  GI bleed  Diabetes mellitus, type 2  Atrial fibrillation, unspecified type  CAD (coronary artery disease)  Paroxysmal atrial fibrillation  CAD (coronary artery disease)  Diabetic neuropathy  Type 2 diabetes mellitus with hyperglycemia, with long-term current use of insulin  HLD (hyperlipidemia)  H/O:  section  H/O  section    Meds:  acetaminophen   Tablet. 650 milliGRAM(s) Oral every 6 hours PRN Mild and mod Pain  atorvastatin 40 milliGRAM(s) Oral at bedtime  dextrose 40% Gel 15 Gram(s) Oral once PRN Blood Glucose LESS THAN 70 milliGRAM(s)/deciliter  dextrose 5%. 1000 milliLiter(s) IV Continuous <Continuous>  dextrose 50% Injectable 12.5 Gram(s) IV Push once  dextrose 50% Injectable 25 Gram(s) IV Push once  dextrose 50% Injectable 25 Gram(s) IV Push once  docusate sodium 100 milliGRAM(s) Oral three times a day  furosemide    Tablet 40 milliGRAM(s) Oral daily  gabapentin 300 milliGRAM(s) Oral daily  glucagon  Injectable 1 milliGRAM(s) IntraMuscular once PRN Glucose LESS THAN 70 milligrams/deciliter  insulin glargine Injectable (LANTUS) 40 Unit(s) SubCutaneous at bedtime  insulin lispro (HumaLOG) corrective regimen sliding scale   SubCutaneous three times a day before meals  insulin lispro (HumaLOG) corrective regimen sliding scale   SubCutaneous at bedtime  metoprolol tartrate 12.5 milliGRAM(s) Oral two times a day  pantoprazole  Injectable 40 milliGRAM(s) IV Push two times a day  senna 2 Tablet(s) Oral at bedtime    Labs:  CBC Full  -  ( 17 Aug 2018 05:20 )  WBC Count : 7.22 K/uL  Hemoglobin : 8.6 g/dL  Hematocrit : 29.4 %  Platelet Count - Automated : 684 K/uL  Mean Cell Volume : 75.6 fL  Mean Cell Hemoglobin : 22.1 pg  Mean Cell Hemoglobin Concentration : 29.3 %  Auto Neutrophil # : 4.39 K/uL  Auto Lymphocyte # : 1.98 K/uL  Auto Monocyte # : 0.71 K/uL  Auto Eosinophil # : 0.05 K/uL  Auto Basophil # : 0.06 K/uL  Auto Neutrophil % : 60.9 %  Auto Lymphocyte % : 27.4 %  Auto Monocyte % : 9.8 %  Auto Eosinophil % : 0.7 %  Auto Basophil % : 0.8 %        137  |  97<L>  |  28<H>  ----------------------------<  95  4.4   |  25  |  2.51<H>    Ca    8.4      17 Aug 2018 05:20    TPro  7.0  /  Alb  3.2<L>  /  TBili  0.4  /  DBili  x   /  AST  9   /  ALT  10  /  AlkPhos  99    Ferritin, Serum in AM (18 @ 08:24)    Ferritin, Serum: 203: Note: New reference ranges effective 2018. ng/mL  Iron with Total Binding Capacity in AM (18 @ 08:24)    Iron - Total Binding Capacity.: 171 ug/dL    % Saturation, Iron: 5 %    Iron Total, Serum: 8 ug/dL    Unsaturated Iron Binding Capacity: 163 ug/dL        < from: Upper Endoscopy (18 @ 17:11) >         - Normal esophagus.                       - Normal stomach.                       - Normal examined duodenum.                       - No specimens collected.    < end of copied text >  < from: Colonoscopy (18 @ 17:12) >  - Preparation of the colon was poor.                       - Stool in the entire examined colon.                       - Internal hemorrhoids.                       - No specimens collected.    < end of copied text >    Radiology:             ROS:  No pain, no fever  No lumps in neck or pain  No Sob or chest pain  No palpitations   No abdominal pain. No change in bowel habit. No blood in stools  No weakness in extremities  No leg swelling      Vital Signs Last 24 Hrs  T(C): 36.8 (17 Aug 2018 06:21), Max: 36.8 (17 Aug 2018 06:21)  T(F): 98.2 (17 Aug 2018 06:21), Max: 98.2 (17 Aug 2018 06:21)  HR: 63 (17 Aug 2018 06:21) (63 - 84)  BP: 105/52 (17 Aug 2018 06:21) (105/52 - 143/69)  BP(mean): --  RR: 18 (17 Aug 2018 06:21) (16 - 18)  SpO2: 99% (17 Aug 2018 06:21) (99% - 100%)    Physical Exam:  Patient comfortable  AXOX3  Neck supple, no LN's  Chest: bilateral breath sounds, no wheeze or rales  CVS: regular heart rate without murmur  Abdomen: soft, BS+, no massess or organomegaly  CNS: no gross deficit  Ext: no edema HPI:  77 yo Female with CAD/PCI  2017 MICHAEL to prox LCx and OM1, PAF not on AC 2/2 GI bleed, DM, neuropathy CHF. CKD admittd with  intermittent non-exertional dyspnea and palpitation. She was anemic with Hb 6.7gm/dl. Denied melena, hematochezia; fobt negative. Taken off mesalamine several months ago 2/2 constipation; had initially taken following questionable ileal region ulcer seen for GI bleed eval last March. Off plavix for 1 week PTA  for anticipated spinal injection for chronic lower back pain. Seen by GI      PAST MEDICAL & SURGICAL HISTORY:  Neuropathy  GI bleed  Diabetes mellitus, type 2  Atrial fibrillation, unspecified type  CAD (coronary artery disease)  Paroxysmal atrial fibrillation  CAD (coronary artery disease)  Diabetic neuropathy  Type 2 diabetes mellitus with hyperglycemia, with long-term current use of insulin  HLD (hyperlipidemia)  H/O:  section  H/O  section    Meds:  acetaminophen   Tablet. 650 milliGRAM(s) Oral every 6 hours PRN Mild and mod Pain  atorvastatin 40 milliGRAM(s) Oral at bedtime  dextrose 40% Gel 15 Gram(s) Oral once PRN Blood Glucose LESS THAN 70 milliGRAM(s)/deciliter  dextrose 5%. 1000 milliLiter(s) IV Continuous <Continuous>  dextrose 50% Injectable 12.5 Gram(s) IV Push once  dextrose 50% Injectable 25 Gram(s) IV Push once  dextrose 50% Injectable 25 Gram(s) IV Push once  docusate sodium 100 milliGRAM(s) Oral three times a day  furosemide    Tablet 40 milliGRAM(s) Oral daily  gabapentin 300 milliGRAM(s) Oral daily  glucagon  Injectable 1 milliGRAM(s) IntraMuscular once PRN Glucose LESS THAN 70 milligrams/deciliter  insulin glargine Injectable (LANTUS) 40 Unit(s) SubCutaneous at bedtime  insulin lispro (HumaLOG) corrective regimen sliding scale   SubCutaneous three times a day before meals  insulin lispro (HumaLOG) corrective regimen sliding scale   SubCutaneous at bedtime  metoprolol tartrate 12.5 milliGRAM(s) Oral two times a day  pantoprazole  Injectable 40 milliGRAM(s) IV Push two times a day  senna 2 Tablet(s) Oral at bedtime    Labs:  CBC Full  -  ( 17 Aug 2018 05:20 )  WBC Count : 7.22 K/uL  Hemoglobin : 8.6 g/dL  Hematocrit : 29.4 %  Platelet Count - Automated : 684 K/uL  Mean Cell Volume : 75.6 fL  Mean Cell Hemoglobin : 22.1 pg  Mean Cell Hemoglobin Concentration : 29.3 %  Auto Neutrophil # : 4.39 K/uL  Auto Lymphocyte # : 1.98 K/uL  Auto Monocyte # : 0.71 K/uL  Auto Eosinophil # : 0.05 K/uL  Auto Basophil # : 0.06 K/uL  Auto Neutrophil % : 60.9 %  Auto Lymphocyte % : 27.4 %  Auto Monocyte % : 9.8 %  Auto Eosinophil % : 0.7 %  Auto Basophil % : 0.8 %        137  |  97<L>  |  28<H>  ----------------------------<  95  4.4   |  25  |  2.51<H>    Ca    8.4      17 Aug 2018 05:20    TPro  7.0  /  Alb  3.2<L>  /  TBili  0.4  /  DBili  x   /  AST  9   /  ALT  10  /  AlkPhos  99    Ferritin, Serum in AM (18 @ 08:24)    Ferritin, Serum: 203: Note: New reference ranges effective 2018. ng/mL  Iron with Total Binding Capacity in AM (18 @ 08:24)    Iron - Total Binding Capacity.: 171 ug/dL    % Saturation, Iron: 5 %    Iron Total, Serum: 8 ug/dL    Unsaturated Iron Binding Capacity: 163 ug/dL        < from: Upper Endoscopy (18 @ 17:11) >         - Normal esophagus.                       - Normal stomach.                       - Normal examined duodenum.                       - No specimens collected.    < end of copied text >  < from: Colonoscopy (18 @ 17:12) >  - Preparation of the colon was poor.                       - Stool in the entire examined colon.                       - Internal hemorrhoids.                       - No specimens collected.    < end of copied text >    Radiology:             ROS:  No pain, no fever  No lumps in neck or pain  No Sob or chest pain  No palpitations   No abdominal pain. No change in bowel habit. No blood in stools  No weakness in extremities, but has pain  No leg swelling      Vital Signs Last 24 Hrs  T(C): 36.8 (17 Aug 2018 06:21), Max: 36.8 (17 Aug 2018 06:21)  T(F): 98.2 (17 Aug 2018 06:21), Max: 98.2 (17 Aug 2018 06:21)  HR: 63 (17 Aug 2018 06:21) (63 - 84)  BP: 105/52 (17 Aug 2018 06:21) (105/52 - 143/69)  BP(mean): --  RR: 18 (17 Aug 2018 06:21) (16 - 18)  SpO2: 99% (17 Aug 2018 06:21) (99% - 100%)    Physical Exam:  Patient comfortable  AXOX3  Neck supple, no LN's  Chest: bilateral breath sounds, no wheeze or rales  CVS: regular heart rate without murmur  Abdomen: soft, BS+, no massess or organomegaly  CNS: no gross deficit  Ext: no edema

## 2018-08-17 NOTE — DISCHARGE NOTE ADULT - NS AS ACTIVITY OBS
Walking-Indoors allowed/Bathing allowed Walking-Indoors allowed/Walking-Outdoors allowed/as tolerated/Showering allowed

## 2018-08-17 NOTE — DISCHARGE NOTE ADULT - SECONDARY DIAGNOSIS.
Coronary artery disease involving native coronary artery of native heart, angina presence unspecified Type 2 diabetes mellitus with complication, unspecified whether long term insulin use Diabetic autonomic neuropathy associated with type 2 diabetes mellitus Paroxysmal atrial fibrillation Renal insufficiency

## 2018-08-17 NOTE — DISCHARGE NOTE ADULT - HOSPITAL COURSE
77 yo f cad/ pci with December 2017 MICHAEL to prox LCx and OM1, parox afib not on AC 2/2 GI bleed, DM, neuropathy CHF. CKD.  Pt presents with one day of intermittent non-exertional dyspnea and palpitation. Found to be anemic with Hb 6.7. Denies melena, hematochezia; fobt negative. Taken off mesalamine several months ago 2/2 constipation; had initially taken following questionable ileal region ulcer seen fir GI bleed eval last March. Denies abd pain, nausea, vomitng, fevers, chills. Off Plavix since last week for anticipated spinal injection for chronic lower back pain. Per pt, Ok'd with her cardiologist. Also reports self DCing lipitor and cutting her BB regimen in half. Pt denies cp, EKG with NSR with no ST changes. HST trop 51.    Hospital Course    Anemia- 8/15 s/p 1 u PRBC, no plan for scope per GI. Pt was on Clear liquid diet advanced to Soft. GI Dr Daigle consulted, occult negative, consider iron supplements. Heme Dr Gamze consulted s/p EGD, colonoscopy and capsule in recent past. acute anemia may still be related to undiagnosed bleed. f/u iron profile though it was OK in May 2018 along with rest of w/u.    CAD- Card consulted. No ischemic changes on EKG. S/p coronary MICHAEL x2 in 12/2017. Patient with multiple hospitalizations for recurrent life threatening bleeding. Held Plavix     Type 2 diabetes mellitus- HgA1C- 8.6%, reports 40 lantus qhs; will reduced to 20 for tonight, ISS.     CHF- pt euvolemic cont Lasix    Diabetic neuropathy- c/w renally adjusted Gabapentin dose decreased to 300mg qd 79 yo f cad/ pci with December 2017 MICHAEL to prox LCx and OM1, parox afib not on AC 2/2 GI bleed, DM, neuropathy CHF. CKD.  Pt presents with one day of intermittent non-exertional dyspnea and palpitation. Found to be anemic with Hb 6.7. Denies melena, hematochezia; fobt negative. Taken off mesalamine several months ago 2/2 constipation; had initially taken following questionable ileal region ulcer seen fir GI bleed eval last March. Denies abd pain, nausea, vomitng, fevers, chills. Off Plavix since last week for anticipated spinal injection for chronic lower back pain. Per pt, Ok'd with her cardiologist. Also reports self DCing lipitor and cutting her BB regimen in half. Pt denies cp, EKG with NSR with no ST changes. HST trop 51.    Hospital Course    Anemia- 8/15 s/p 1 u PRBC, no plan for scope per GI. Pt was on Clear liquid diet advanced to Soft. GI Dr Daigle consulted, occult negative, consider iron supplements. Heme Dr Gamez consulted s/p EGD, colonoscopy and capsule in recent past. acute anemia may still be related to undiagnosed bleed, iron profile was OK in May 2018 along with rest of w/u.  CAD- Card consulted. No ischemic changes on EKG. S/p coronary MICHAEL x2 in 12/2017. Patient with multiple hospitalizations for recurrent life threatening bleeding. Held Plavix   Type 2 diabetes mellitus- HgA1C- 8.6%, on basal/pre meals insulin  CHF- pt euvolemic cont Lasix  Diabetic neuropathy- c/w renally adjusted Gabapentin dose decreased to 300mg qd     Pt is optimized for DC 79 yo f cad/ pci with December 2017 MICHAEL to prox LCx and OM1, parox afib not on AC 2/2 GI bleed, DM, neuropathy CHF. CKD.  Pt presents with one day of intermittent non-exertional dyspnea and palpitation. Found to be anemic with Hb 6.7. Denies melena, hematochezia; fobt negative. Taken off mesalamine several months ago 2/2 constipation; had initially taken following questionable ileal region ulcer seen fir GI bleed eval last March. Denies abd pain, nausea, vomitng, fevers, chills. Off Plavix since last week for anticipated spinal injection for chronic lower back pain. Per pt, Ok'd with her cardiologist. Also reports self DCing lipitor and cutting her BB regimen in half. Pt denies cp, EKG with NSR with no ST changes. HST trop 51.    Hospital Course    Anemia- 8/15 s/p 1 u PRBC, no plan for scope per GI. Pt was on Clear liquid diet advanced to Soft. GI Dr Daigle consulted, occult negative, consider iron supplements. Heme Dr Gamez consulted s/p EGD, colonoscopy and capsule in recent past. acute anemia may still be related to undiagnosed bleed, iron profile was OK in May 2018 along with rest of w/u.  CAD- Card consulted. No ischemic changes on EKG. S/p coronary MICHAEL x2 in 12/2017. Patient with multiple hospitalizations for recurrent life threatening bleeding. Held Plavix   Type 2 diabetes mellitus- HgA1C- 8.6%, on basal/pre meals insulin  CHF- pt euvolemic was on Lasix, 8'/18 creat increased, Lasix dc for now, outpt w/u.   Diabetic neuropathy- c/w renally adjusted Gabapentin dose decreased to 300mg qd     Pt is optimized for DC 79 yo f cad/ pci with December 2017 MICHAEL to prox LCx and OM1, parox afib not on AC 2/2 GI bleed, DM, neuropathy CHF. CKD.  Pt presents with one day of intermittent non-exertional dyspnea and palpitation. Found to be anemic with Hb 6.7. Denies melena, hematochezia; fobt negative. Taken off mesalamine several months ago 2/2 constipation; had initially taken following questionable ileal region ulcer seen fir GI bleed eval last March. Denies abd pain, nausea, vomitng, fevers, chills. Off Plavix since last week for anticipated spinal injection for chronic lower back pain. Per pt, Ok'd with her cardiologist. Also reports self DCing lipitor and cutting her BB regimen in half. Pt denies cp, EKG with NSR with no ST changes. HST trop 51.    Hospital Course  Anemia- 8/15 s/p 1 u PRBC, no plan for scope per GI. Pt was on Clear liquid diet advanced to Soft. GI Dr Daigle consulted, occult negative, consider iron supplements. Heme Dr Gamez consulted s/p EGD, colonoscopy and capsule in recent past. acute anemia may still be related to undiagnosed bleed, iron profile was OK in May 2018 along with rest of w/u.  CAD- Card consulted. No ischemic changes on EKG. S/p coronary MICHAEL x2 in 12/2017. Patient with multiple hospitalizations for recurrent life threatening bleeding. Plavix resumed on dc    Type 2 diabetes mellitus- HgA1C- 8.6%, on basal/pre meals insulin  CHF- pt euvolemic was on Lasix, 8'/18 creat increased, Lasix dc for now, outpt w/u.   Diabetic neuropathy- c/w renally adjusted Gabapentin dose decreased to 300mg qd     Pt is optimized for DC

## 2018-08-17 NOTE — DISCHARGE NOTE ADULT - MEDICATION SUMMARY - MEDICATIONS TO CHANGE
I will SWITCH the dose or number of times a day I take the medications listed below when I get home from the hospital:    Lantus 100 units/mL subcutaneous solution  -- 34 unit(s) subcutaneous once a day (at bedtime)    insulin lispro  -- 7 unit(s) subcutaneously 3 times a day (with meals)    insulin lispro 100 units/mL injectable solution  -- 7 milliliter(s) injectable 3 times a day (with meals)    insulin lispro 100 units/mL injectable solution  -- 1 milliliter(s) injectable 4 times a day    furosemide 40 mg oral tablet  -- 1 tab(s) by mouth 2 times a day    gabapentin 300 mg oral capsule  -- 2 cap(s) by mouth 2 times a day I will SWITCH the dose or number of times a day I take the medications listed below when I get home from the hospital:    Lantus 100 units/mL subcutaneous solution  -- 34 unit(s) subcutaneous once a day (at bedtime)    insulin lispro  -- 7 unit(s) subcutaneously 3 times a day (with meals)    insulin lispro  -- 2 Unit(s) if Glucose 151 - 200  4 Unit(s) if bg 201 - 250  6 Unit(s) if bg 251 - 300  8 Unit(s) if bg 301 - 350  10 Unit(s) if bg351 - 400    insulin lispro 100 units/mL injectable solution  -- 7 milliliter(s) injectable 3 times a day (with meals)    insulin lispro 100 units/mL injectable solution  -- 1 milliliter(s) injectable 4 times a day    furosemide 40 mg oral tablet  -- 1 tab(s) by mouth 2 times a day    gabapentin 300 mg oral capsule  -- 2 cap(s) by mouth 2 times a day    Lantus 100 units/mL subcutaneous solution  -- 40 unit(s) subcutaneous once a day (at bedtime)

## 2018-08-17 NOTE — DISCHARGE NOTE ADULT - PROVIDER TOKENS
TOKEN:'[id number]:'[id number]' TOKEN:'[id number]:'[id number]'

## 2018-08-17 NOTE — PROGRESS NOTE ADULT - PROBLEM SELECTOR PLAN 1
no overt gi bleed  patient with egd/colon/capsule in recent past   no plan to repeat  iron supplementation  laxatives as needed  will follow

## 2018-08-17 NOTE — DISCHARGE NOTE ADULT - CARE PLAN
Principal Discharge DX:	Anemia, unspecified type  Goal:	Optimal H/H levels  Assessment and plan of treatment:	- 8/15 s/p 1 u PRBC. GI Dr Daigle consulted recommended no plan for scope, occult negative  - Clear liquid diet advanced to Soft as tolerated  - Heme Dr Real consulted  - you had EGD, colonoscopy and capsule endoscopy in recent past the tests were negative  - Follow up with PCP and GI as outpatient for further management  Secondary Diagnosis:	Coronary artery disease involving native coronary artery of native heart, angina presence unspecified  Assessment and plan of treatment:	- Card consulted in the hospital  - No ischemic changes on EKG.  - S/p coronary MICHAEL x2 in 12/2017.  - Patient with multiple hospitalizations for recurrent life threatening bleeding.  - held Plavix in the hospital. Follow up with Card as outpatient  Secondary Diagnosis:	Type 2 diabetes mellitus with complication, unspecified whether long term insulin use  Assessment and plan of treatment:	- HgA1C- 8.6%  - continue monitoring blood sugar levels at home before meals  - continue administering Insulin as prescribed  - follow up with PCP or Endo as outpatient for further management  Secondary Diagnosis:	Diabetic autonomic neuropathy associated with type 2 diabetes mellitus  Assessment and plan of treatment:	- your Neurontin was decreased to 300 mg oral daily due to kidney injury and Creatinine level 2.51  - Follow up with PCP for further management  Secondary Diagnosis:	Paroxysmal atrial fibrillation  Assessment and plan of treatment:	- Card consulted  - No ischemic changes on EKG.  - S/p coronary MICHAEL x2 in 12/2017.  - Patient with multiple hospitalizations for recurrent life threatening bleeding.  - continue holding Plavix Principal Discharge DX:	Anemia, unspecified type  Goal:	Optimal H/H levels  Assessment and plan of treatment:	- 8/15 s/p 1 u PRBC. GI Dr Daigle consulted recommended no plan for scope, occult negative  - Clear liquid diet advanced to Soft   - Heme Dr Real consulted  - you had EGD, colonoscopy and capsule endoscopy in recent past the tests were negative  - Follow up with PCP and GI as outpatient for further management  Secondary Diagnosis:	Coronary artery disease involving native coronary artery of native heart, angina presence unspecified  Goal:	Symptoms control  Assessment and plan of treatment:	- Card consulted in the hospital  - No ischemic changes on EKG.  - S/p coronary MICHAEL x2 in 12/2017.  - Patient with multiple hospitalizations for recurrent life threatening bleeding.  - held Plavix in the hospital. Follow up with Card as outpatient  Secondary Diagnosis:	Type 2 diabetes mellitus with complication, unspecified whether long term insulin use  Goal:	Glucose control  Assessment and plan of treatment:	- HgA1C- 8.6%  - continue monitoring blood sugar levels at home before meals  - continue administering Insulin as prescribed  - follow up with PCP or Endo as outpatient for further management  Secondary Diagnosis:	Diabetic autonomic neuropathy associated with type 2 diabetes mellitus  Goal:	Symptoms control  Assessment and plan of treatment:	- your Neurontin was decreased to 300 mg oral daily due to kidney injury and Creatinine level 2.51  - Follow up with PCP for further management  Secondary Diagnosis:	Paroxysmal atrial fibrillation  Goal:	rate control  Assessment and plan of treatment:	Take Metoprolol as prescribed   - No ischemic changes on EKG.  - S/p coronary MICHAEL x2 in 12/2017.- continue holding Plavix until spinal injection done and OK by the spinal specialist to resume** Principal Discharge DX:	Anemia, unspecified type  Goal:	Optimal H/H levels  Assessment and plan of treatment:	- 8/15 s/p 1 u PRBC. GI Dr Daigle consulted recommended no plan for scope, occult negative  - Clear liquid diet advanced to Soft   - Heme Dr Real consulted  - you had EGD, colonoscopy and capsule endoscopy in recent past the tests were negative  - Follow up with PCP and GI as outpatient for further management  Secondary Diagnosis:	Coronary artery disease involving native coronary artery of native heart, angina presence unspecified  Goal:	Symptoms control  Assessment and plan of treatment:	- Card consulted in the hospital  - No ischemic changes on EKG.  - S/p coronary MICHAEL x2 in 12/2017.  - Patient with multiple hospitalizations for recurrent life threatening bleeding.  - held Plavix in the hospital. Follow up with Card as outpatient  Secondary Diagnosis:	Type 2 diabetes mellitus with complication, unspecified whether long term insulin use  Goal:	Glucose control  Assessment and plan of treatment:	- HgA1C- 8.6%, goal below 7%**  - continue monitoring blood sugar levels at home before meals  - continue administering Insulin as prescribed. Lantus 42 Units daily and Pre-meal 5 Units 3 x day   - follow up with PCP or Endo Dr Trinh casey for further management  Secondary Diagnosis:	Diabetic autonomic neuropathy associated with type 2 diabetes mellitus  Goal:	Symptoms control  Assessment and plan of treatment:	- your Neurontin was decreased to 300 mg oral daily due to kidney injury and Creatinine level 2.51  - Follow up with PCP for further management  Secondary Diagnosis:	Paroxysmal atrial fibrillation  Goal:	rate control  Assessment and plan of treatment:	Take Metoprolol as prescribed   - No ischemic changes on EKG.  - S/p coronary MICHAEL x2 in 12/2017.- continue holding Plavix until spinal injection done and OK by the spinal specialist to resume**  Secondary Diagnosis:	Renal insufficiency  Goal:	Improved renal function  Assessment and plan of treatment:	******HOLD LASIX for now and make a follow up appt with Dr Goldberg this week to repeat lab work, You can call and schedule an appt with a renal specialist Dr Jackson***  Creat on 8/18 was 2.91*** Principal Discharge DX:	Anemia, unspecified type  Goal:	Optimal H/H levels  Assessment and plan of treatment:	- 8/15 s/p 1 u PRBC. GI Dr Daigle consulted recommended no plan for scope, occult negative  - Heme Dr Real consulted call to schedule an outpt appt  - you had EGD, colonoscopy and capsule endoscopy in recent past the tests were negative  - Follow up with PCP and GI as outpatient for further management  Secondary Diagnosis:	Coronary artery disease involving native coronary artery of native heart, angina presence unspecified  Goal:	Symptoms control  Assessment and plan of treatment:	- Card consulted in the hospital  - No ischemic changes on EKG.  - S/p coronary MICHAEL x2 in 12/2017.  - Patient with multiple hospitalizations for recurrent life threatening bleeding.  - Continue Plavix as per your cardiologist recommendations,  Follow up with Card as outpatient  Secondary Diagnosis:	Type 2 diabetes mellitus with complication, unspecified whether long term insulin use  Goal:	Glucose control  Assessment and plan of treatment:	- HgA1C- 8.6%, goal below 7%**  - continue monitoring blood sugar levels at home before meals  - continue administering Insulin as prescribed. Lantus 42 Units daily and Pre-meal 5 Units 3 x day   - follow up with PCP or Endo Dr Trinh casey for further management  Secondary Diagnosis:	Diabetic autonomic neuropathy associated with type 2 diabetes mellitus  Goal:	Symptoms control  Assessment and plan of treatment:	- your Neurontin was decreased to 300 mg oral daily due to kidney injury and Creatinine level 2.51  - Follow up with PCP for further management  Secondary Diagnosis:	Paroxysmal atrial fibrillation  Goal:	rate control  Assessment and plan of treatment:	Take Metoprolol as prescribed   - No ischemic changes on EKG.  - S/p coronary MICHAEL x2 in 12/2017 Continue daily Plavix and monitor for signs of bleeding  Secondary Diagnosis:	Renal insufficiency  Goal:	Improved renal function  Assessment and plan of treatment:	******HOLD LASIX for now and make a follow up appt with Dr Goldberg this week to repeat lab work, You can call and schedule an appt with a renal specialist Dr Jackson***  Creat on 8/18 was 2.91***

## 2018-08-17 NOTE — CONSULT NOTE ADULT - ASSESSMENT
79 yo Female with CAD/PCI  December 2017 MICHAEL to prox LCx and OM1, PAF not on AC 2/2 GI bleed, DM, neuropathy CHF. CKD was admitted with  intermittent non-exertional dyspnea and palpitation, now feels better.  She needed PRBC on admission for Hb 6.7gm/dl. She has h/o GI bleed but no active bleeding was found this time. She had EGD, colonoscopy and capsule in recent past.   However acute anemia may still be related to undiagnosed bleed. She also has chronic anemia, likely related to CKD.  I will order iron profile though it was OK in May 2018 along with rest of w/u.  If she is iron deficient then iron can be given IV if she cannot take it orally because of constipation. If she is not iron deficient then aranesp/procrit can be used to keep her hb around 10 gm/dl for anemia of CKD. Acute bleed will still require PRBC  If discharged to follow up in office

## 2018-08-18 VITALS
TEMPERATURE: 98 F | SYSTOLIC BLOOD PRESSURE: 148 MMHG | HEART RATE: 86 BPM | OXYGEN SATURATION: 100 % | DIASTOLIC BLOOD PRESSURE: 79 MMHG | RESPIRATION RATE: 18 BRPM

## 2018-08-18 LAB
BUN SERPL-MCNC: 37 MG/DL — HIGH (ref 7–23)
CALCIUM SERPL-MCNC: 8.5 MG/DL — SIGNIFICANT CHANGE UP (ref 8.4–10.5)
CHLORIDE SERPL-SCNC: 99 MMOL/L — SIGNIFICANT CHANGE UP (ref 98–107)
CO2 SERPL-SCNC: 24 MMOL/L — SIGNIFICANT CHANGE UP (ref 22–31)
CREAT SERPL-MCNC: 2.91 MG/DL — HIGH (ref 0.5–1.3)
FERRITIN SERPL-MCNC: 27.89 NG/ML — SIGNIFICANT CHANGE UP (ref 15–150)
GLUCOSE BLDC GLUCOMTR-MCNC: 161 MG/DL — HIGH (ref 70–99)
GLUCOSE BLDC GLUCOMTR-MCNC: 182 MG/DL — HIGH (ref 70–99)
GLUCOSE BLDC GLUCOMTR-MCNC: 227 MG/DL — HIGH (ref 70–99)
GLUCOSE SERPL-MCNC: 169 MG/DL — HIGH (ref 70–99)
HAPTOGLOB SERPL-MCNC: 314 MG/DL — HIGH (ref 34–200)
HCT VFR BLD CALC: 28.1 % — LOW (ref 34.5–45)
HGB BLD-MCNC: 8.2 G/DL — LOW (ref 11.5–15.5)
MAGNESIUM SERPL-MCNC: 2.4 MG/DL — SIGNIFICANT CHANGE UP (ref 1.6–2.6)
MCHC RBC-ENTMCNC: 21.8 PG — LOW (ref 27–34)
MCHC RBC-ENTMCNC: 29.2 % — LOW (ref 32–36)
MCV RBC AUTO: 74.7 FL — LOW (ref 80–100)
NRBC # FLD: 0 — SIGNIFICANT CHANGE UP
PHOSPHATE SERPL-MCNC: 4.1 MG/DL — SIGNIFICANT CHANGE UP (ref 2.5–4.5)
PLATELET # BLD AUTO: 669 K/UL — HIGH (ref 150–400)
PMV BLD: 9.6 FL — SIGNIFICANT CHANGE UP (ref 7–13)
POTASSIUM SERPL-MCNC: 4.2 MMOL/L — SIGNIFICANT CHANGE UP (ref 3.5–5.3)
POTASSIUM SERPL-SCNC: 4.2 MMOL/L — SIGNIFICANT CHANGE UP (ref 3.5–5.3)
PROT SERPL-MCNC: 7.8 G/DL — SIGNIFICANT CHANGE UP (ref 6–8.3)
RBC # BLD: 3.76 M/UL — LOW (ref 3.8–5.2)
RBC # FLD: 18.4 % — HIGH (ref 10.3–14.5)
RETICS #: 57 K/UL — SIGNIFICANT CHANGE UP (ref 25–125)
RETICS/RBC NFR: 1.5 % — SIGNIFICANT CHANGE UP (ref 0.5–2.5)
SODIUM SERPL-SCNC: 139 MMOL/L — SIGNIFICANT CHANGE UP (ref 135–145)
VIT B12 SERPL-MCNC: 1264 PG/ML — HIGH (ref 200–900)
WBC # BLD: 8.63 K/UL — SIGNIFICANT CHANGE UP (ref 3.8–10.5)
WBC # FLD AUTO: 8.63 K/UL — SIGNIFICANT CHANGE UP (ref 3.8–10.5)

## 2018-08-18 PROCEDURE — 86334 IMMUNOFIX E-PHORESIS SERUM: CPT | Mod: 26

## 2018-08-18 PROCEDURE — 84165 PROTEIN E-PHORESIS SERUM: CPT | Mod: 26

## 2018-08-18 RX ORDER — TRAMADOL HYDROCHLORIDE 50 MG/1
1 TABLET ORAL
Qty: 0 | Refills: 0 | COMMUNITY

## 2018-08-18 RX ORDER — INSULIN GLARGINE 100 [IU]/ML
40 INJECTION, SOLUTION SUBCUTANEOUS
Qty: 0 | Refills: 0 | COMMUNITY

## 2018-08-18 RX ORDER — ATORVASTATIN CALCIUM 80 MG/1
1 TABLET, FILM COATED ORAL
Qty: 30 | Refills: 0
Start: 2018-08-18 | End: 2018-09-16

## 2018-08-18 RX ORDER — DIPHENHYDRAMINE HCL 50 MG
1 CAPSULE ORAL
Qty: 0 | Refills: 0 | COMMUNITY

## 2018-08-18 RX ORDER — CLOPIDOGREL BISULFATE 75 MG/1
75 TABLET, FILM COATED ORAL DAILY
Qty: 0 | Refills: 0 | Status: DISCONTINUED | OUTPATIENT
Start: 2018-08-18 | End: 2018-08-18

## 2018-08-18 RX ORDER — PANTOPRAZOLE SODIUM 20 MG/1
1 TABLET, DELAYED RELEASE ORAL
Qty: 60 | Refills: 0
Start: 2018-08-18 | End: 2018-09-16

## 2018-08-18 RX ORDER — PANTOPRAZOLE SODIUM 20 MG/1
40 TABLET, DELAYED RELEASE ORAL
Qty: 0 | Refills: 0 | Status: DISCONTINUED | OUTPATIENT
Start: 2018-08-18 | End: 2018-08-18

## 2018-08-18 RX ORDER — PANTOPRAZOLE SODIUM 20 MG/1
1 TABLET, DELAYED RELEASE ORAL
Qty: 60 | Refills: 0 | OUTPATIENT
Start: 2018-08-18 | End: 2018-09-16

## 2018-08-18 RX ORDER — FUROSEMIDE 40 MG
1 TABLET ORAL
Qty: 0 | Refills: 0 | COMMUNITY

## 2018-08-18 RX ORDER — METOPROLOL TARTRATE 50 MG
0.5 TABLET ORAL
Qty: 0 | Refills: 0 | COMMUNITY

## 2018-08-18 RX ORDER — CLOPIDOGREL BISULFATE 75 MG/1
1 TABLET, FILM COATED ORAL
Qty: 0 | Refills: 0 | COMMUNITY

## 2018-08-18 RX ORDER — CLOPIDOGREL BISULFATE 75 MG/1
1 TABLET, FILM COATED ORAL
Qty: 30 | Refills: 0
Start: 2018-08-18 | End: 2018-09-16

## 2018-08-18 RX ORDER — INSULIN ASPART 100 [IU]/ML
5 INJECTION, SOLUTION SUBCUTANEOUS
Qty: 1 | Refills: 0
Start: 2018-08-18 | End: 2018-09-16

## 2018-08-18 RX ADMIN — PANTOPRAZOLE SODIUM 40 MILLIGRAM(S): 20 TABLET, DELAYED RELEASE ORAL at 17:06

## 2018-08-18 RX ADMIN — Medication 1: at 12:25

## 2018-08-18 RX ADMIN — GABAPENTIN 300 MILLIGRAM(S): 400 CAPSULE ORAL at 17:06

## 2018-08-18 RX ADMIN — PANTOPRAZOLE SODIUM 40 MILLIGRAM(S): 20 TABLET, DELAYED RELEASE ORAL at 05:49

## 2018-08-18 RX ADMIN — Medication 12.5 MILLIGRAM(S): at 05:49

## 2018-08-18 RX ADMIN — Medication 40 MILLIGRAM(S): at 05:49

## 2018-08-18 RX ADMIN — Medication 12.5 MILLIGRAM(S): at 17:06

## 2018-08-18 RX ADMIN — Medication 1: at 07:31

## 2018-08-18 RX ADMIN — Medication 2: at 17:07

## 2018-08-18 NOTE — PROGRESS NOTE ADULT - PROVIDER SPECIALTY LIST ADULT
Cardiology
Cardiology
Internal Medicine
Gastroenterology
Gastroenterology

## 2018-08-18 NOTE — PROGRESS NOTE ADULT - ASSESSMENT
· Assessment	  79 yo f with symptomatic anemia      Symptomatic anemia: Acute drop in Hb:  PRBC  GI f/up noted.  Hem eval appreciated.  Advanced diet     Problem/Plan - 1:  ·  Problem: Shortness of breath.  Plan: -likely 2/2 anemia          Problem/Plan - 2:  ·  Problem: Coronary artery disease involving native coronary artery of native heart, angina presence unspecified.  Plan: - Cardio f/up noted.  -c/w statin,      Problem/Plan - 3:  ·  Problem: Type 2 diabetes mellitus with complication, unspecified whether long term insulin use.  Plan: - Lantus.  -ISS.      Problem/Plan - 4:  ·  Problem: Diabetic neuropathy.  Plan: c/w  gabapentin.

## 2018-08-18 NOTE — PROGRESS NOTE ADULT - SUBJECTIVE AND OBJECTIVE BOX
INTERVAL HPI/OVERNIGHT EVENTS:  No new overnight event.  No N/V/D.  Tolerating diet.  no melena no brbpr    MEDICATIONS  (STANDING):  atorvastatin 40 milliGRAM(s) Oral at bedtime  dextrose 5%. 1000 milliLiter(s) (50 mL/Hr) IV Continuous <Continuous>  dextrose 50% Injectable 12.5 Gram(s) IV Push once  dextrose 50% Injectable 25 Gram(s) IV Push once  dextrose 50% Injectable 25 Gram(s) IV Push once  docusate sodium 100 milliGRAM(s) Oral three times a day  gabapentin 300 milliGRAM(s) Oral daily  insulin glargine Injectable (LANTUS) 40 Unit(s) SubCutaneous at bedtime  insulin lispro (HumaLOG) corrective regimen sliding scale   SubCutaneous three times a day before meals  insulin lispro (HumaLOG) corrective regimen sliding scale   SubCutaneous at bedtime  metoprolol tartrate 12.5 milliGRAM(s) Oral two times a day  pantoprazole    Tablet 40 milliGRAM(s) Oral two times a day  senna 2 Tablet(s) Oral at bedtime    MEDICATIONS  (PRN):  acetaminophen   Tablet. 650 milliGRAM(s) Oral every 6 hours PRN Mild and mod Pain  dextrose 40% Gel 15 Gram(s) Oral once PRN Blood Glucose LESS THAN 70 milliGRAM(s)/deciliter  glucagon  Injectable 1 milliGRAM(s) IntraMuscular once PRN Glucose LESS THAN 70 milligrams/deciliter      Allergies    Carrots (Rash)  No Known Drug Allergies    Intolerances          General:  No wt loss, fevers, chills, night sweats, fatigue,   Eyes:  Good vision, no reported pain  ENT:  No sore throat, pain, runny nose, dysphagia  CV:  No pain, palpitations, hypo/hypertension  Resp:  No dyspnea, cough, tachypnea, wheezing  GI:  No pain, No nausea, No vomiting, No diarrhea, No constipation, No weight loss, No fever, No pruritis, No rectal bleeding, No tarry stools, No dysphagia,  :  No pain, bleeding, incontinence, nocturia  Muscle:  No pain, weakness  Neuro:  No weakness, tingling, memory problems  Psych:  No fatigue, insomnia, mood problems, depression  Endocrine:  No polyuria, polydipsia, cold/heat intolerance  Heme:  No petechiae, ecchymosis, easy bruisability  Skin:  No rash, tattoos, scars, edema      PHYSICAL EXAM:   Vital Signs:  Vital Signs Last 24 Hrs  T(C): 36.8 (18 Aug 2018 12:32), Max: 36.8 (18 Aug 2018 06:06)  T(F): 98.2 (18 Aug 2018 12:32), Max: 98.3 (18 Aug 2018 06:06)  HR: 67 (18 Aug 2018 12:32) (67 - 78)  BP: 152/81 (18 Aug 2018 12:32) (105/61 - 152/81)  BP(mean): --  RR: 18 (18 Aug 2018 12:32) (17 - 18)  SpO2: 100% (18 Aug 2018 12:32) (99% - 100%)  Daily     Daily Weight in k.5 (17 Aug 2018 22:06)I&O's Summary      GENERAL:  Appears stated age, well-groomed, well-nourished, no distress  HEENT:  NC/AT,  conjunctivae clear and pink, no thyromegaly, nodules, adenopathy, no JVD, sclera -anicteric  CHEST:  Full & symmetric excursion, no increased effort, breath sounds clear  HEART:  Regular rhythm, S1, S2, no murmur/rub/S3/S4, no abdominal bruit, no edema  ABDOMEN:  Soft, non-tender, non-distended, normoactive bowel sounds,  no masses ,no hepato-splenomegaly, no signs of chronic liver disease  EXTEREMITIES:  no cyanosis,clubbing or edema  SKIN:  No rash/erythema/ecchymoses/petechiae/wounds/abscess/warm/dry  NEURO:  Alert, oriented, no asterixis, no tremor, no encephalopathy      LABS:                        8.2    8.63  )-----------( 669      ( 18 Aug 2018 06:42 )             28.1     08-18    139  |  99  |  37<H>  ----------------------------<  169<H>  4.2   |  24  |  2.91<H>    Ca    8.5      18 Aug 2018 06:42  Phos  4.1     08-18  Mg     2.4     08-18    TPro  7.8  /  Alb  x   /  TBili  x   /  DBili  x   /  AST  x   /  ALT  x   /  AlkPhos  x   08-18        amylase   lipase  RADIOLOGY & ADDITIONAL TESTS:
[facility]  Tonio Buck PA-C  [address] [place] [address]  [phone]      INTERVAL HPI/OVERNIGHT EVENTS:    no new events   tolerating diet     MEDICATIONS  (STANDING):  atorvastatin 40 milliGRAM(s) Oral at bedtime  dextrose 5%. 1000 milliLiter(s) (50 mL/Hr) IV Continuous <Continuous>  dextrose 50% Injectable 12.5 Gram(s) IV Push once  dextrose 50% Injectable 25 Gram(s) IV Push once  dextrose 50% Injectable 25 Gram(s) IV Push once  docusate sodium 100 milliGRAM(s) Oral three times a day  furosemide    Tablet 40 milliGRAM(s) Oral daily  gabapentin 300 milliGRAM(s) Oral daily  insulin glargine Injectable (LANTUS) 40 Unit(s) SubCutaneous at bedtime  insulin lispro (HumaLOG) corrective regimen sliding scale   SubCutaneous three times a day before meals  insulin lispro (HumaLOG) corrective regimen sliding scale   SubCutaneous at bedtime  metoprolol tartrate 12.5 milliGRAM(s) Oral two times a day  pantoprazole  Injectable 40 milliGRAM(s) IV Push two times a day  senna 2 Tablet(s) Oral at bedtime    MEDICATIONS  (PRN):  acetaminophen   Tablet. 650 milliGRAM(s) Oral every 6 hours PRN Mild and mod Pain  dextrose 40% Gel 15 Gram(s) Oral once PRN Blood Glucose LESS THAN 70 milliGRAM(s)/deciliter  glucagon  Injectable 1 milliGRAM(s) IntraMuscular once PRN Glucose LESS THAN 70 milligrams/deciliter      Allergies    Carrots (Rash)  No Known Drug Allergies    Intolerances        ROS:   General:  No wt loss, fevers, chills, night sweats, fatigue,   Eyes:  Good vision, no reported pain  ENT:  No sore throat, pain, runny nose, dysphagia  CV:  No pain, palpitations, hypo/hypertension  Resp:  No dyspnea, cough, tachypnea, wheezing  GI:  No pain, No nausea, No vomiting, No diarrhea, No constipation, No weight loss, No fever, No pruritis, No rectal bleeding, No tarry stools, No dysphagia,  :  No pain, bleeding, incontinence, nocturia  Muscle:  No pain, weakness  Neuro:  No weakness, tingling, memory problems  Psych:  No fatigue, insomnia, mood problems, depression  Endocrine:  No polyuria, polydipsia, cold/heat intolerance  Heme:  No petechiae, ecchymosis, easy bruisability  Skin:  No rash, tattoos, scars, edema      PHYSICAL EXAM:   Vital Signs:  Vital Signs Last 24 Hrs  T(C): 36.7 (17 Aug 2018 13:28), Max: 36.8 (17 Aug 2018 06:21)  T(F): 98.1 (17 Aug 2018 13:28), Max: 98.2 (17 Aug 2018 06:21)  HR: 74 (17 Aug 2018 13:) (63 - 84)  BP: 107/52 (17 Aug 2018 13:) (105/52 - 123/59)  BP(mean): --  RR: 16 (17 Aug 2018 13:28) (16 - 18)  SpO2: 100% (17 Aug 2018 13:) (99% - 100%)  Daily     Daily     GENERAL:  Appears stated age, well-groomed, well-nourished, no distress  HEENT:  NC/AT,  conjunctivae clear and pink, no thyromegaly, nodules, adenopathy, no JVD, sclera -anicteric  CHEST:  Full & symmetric excursion, no increased effort, breath sounds clear  HEART:  Regular rhythm, S1, S2, no murmur/rub/S3/S4, no abdominal bruit, no edema  ABDOMEN:  Soft, non-tender, non-distended, normoactive bowel sounds,  no masses ,no hepato-splenomegaly, no signs of chronic liver disease  EXTEREMITIES:  no cyanosis,clubbing or edema  SKIN:  No rash/erythema/ecchymoses/petechiae/wounds/abscess/warm/dry  NEURO:  Alert, oriented, no asterixis, no tremor, no encephalopathy      LABS:                        8.6    7.22  )-----------( 684      ( 17 Aug 2018 05:20 )             29.4     08-17    137  |  97<L>  |  28<H>  ----------------------------<  95  4.4   |  25  |  2.51<H>    Ca    8.4      17 Aug 2018 05:20    TPro  7.0  /  Alb  3.2<L>  /  TBili  0.4  /  DBili  x   /  AST  9   /  ALT  10  /  AlkPhos  99  08-17      Urinalysis Basic - ( 16 Aug 2018 00:50 )    Color: COLORLESS / Appearance: Lt TURBID / S.005 / pH: 7.5  Gluc: NEGATIVE / Ketone: NEGATIVE  / Bili: NEGATIVE / Urobili: NORMAL   Blood: TRACE / Protein: SMALL / Nitrite: NEGATIVE   Leuk Esterase: LARGE / RBC: 0-2 / WBC >50   Sq Epi: FEW / Non Sq Epi: x / Bacteria: MANY        RADIOLOGY & ADDITIONAL TESTS:
Cardiovascular Disease Progress Note    Overnight events: No acute events overnight. Ms. Tsang denies chest pain or SOB.     Otherwise review of systems negative    Objective Findings:  T(C): 36.8 (08-17-18 @ 06:21), Max: 36.8 (08-16-18 @ 10:19)  HR: 63 (08-17-18 @ 06:21) (63 - 84)  BP: 105/52 (08-17-18 @ 06:21) (105/52 - 143/69)  RR: 18 (08-17-18 @ 06:21) (16 - 18)  SpO2: 99% (08-17-18 @ 06:21) (99% - 100%)  Wt(kg): --  Daily Height in cm: 162.56 (16 Aug 2018 14:10)    Daily       Physical Exam:  Gen: NAD  HEENT: EOMI  CV: RRR, normal S1 + S2, no m/r/g  Lungs: CTAB  Abd: soft, non-tender  Ext: No edema    Telemetry: n/a    Laboratory Data:                        8.6    7.22  )-----------( 684      ( 17 Aug 2018 05:20 )             29.4     08-17    137  |  97<L>  |  28<H>  ----------------------------<  95  4.4   |  25  |  2.51<H>    Ca    8.4      17 Aug 2018 05:20    TPro  7.0  /  Alb  3.2<L>  /  TBili  0.4  /  DBili  x   /  AST  9   /  ALT  10  /  AlkPhos  99  08-17    PT/INR - ( 15 Aug 2018 14:39 )   PT: 13.3 SEC;   INR: 1.15          PTT - ( 15 Aug 2018 14:39 )  PTT:32.7 SEC  CARDIAC MARKERS ( 15 Aug 2018 14:39 )  x     / x     / 267 u/L / x     / x              Inpatient Medications:  MEDICATIONS  (STANDING):  atorvastatin 40 milliGRAM(s) Oral at bedtime  dextrose 5%. 1000 milliLiter(s) (50 mL/Hr) IV Continuous <Continuous>  dextrose 50% Injectable 12.5 Gram(s) IV Push once  dextrose 50% Injectable 25 Gram(s) IV Push once  dextrose 50% Injectable 25 Gram(s) IV Push once  docusate sodium 100 milliGRAM(s) Oral three times a day  furosemide    Tablet 40 milliGRAM(s) Oral daily  gabapentin 600 milliGRAM(s) Oral daily  insulin glargine Injectable (LANTUS) 40 Unit(s) SubCutaneous at bedtime  insulin lispro (HumaLOG) corrective regimen sliding scale   SubCutaneous three times a day before meals  insulin lispro (HumaLOG) corrective regimen sliding scale   SubCutaneous at bedtime  metoprolol tartrate 12.5 milliGRAM(s) Oral two times a day  pantoprazole  Injectable 40 milliGRAM(s) IV Push two times a day  senna 2 Tablet(s) Oral at bedtime      Assessment: 78 year old woman with CAD s/p MICHAEL x2 in 12/2017, a-fib not on AC due to GI bleed, and compensated diastolic CHF presents with supply demand ischemia and acute blood loss anemia.    Plan of Care:    #Supply demand ischemia-  Due to acute blood loss anemia.  No ischemic changes on EKG.  S/p coronary MICHAEL x2 in 12/2017.  Patient with multiple hospitalizations for recurrent life threatening bleeding.  Agree with holding Plavix at this time.    #Anemia-  Repeat Fe studies.  No overt source of bleeding.  Consider hematology evaluation.  Give Fe supplements with bowel regimen.    #Paroxymal a-fib-  Currently in sinus  No AC given h/o GI bleed.    #Compensated diastolic CHF-  Euvolemic on exam today.  Continue current dose Lasix.     Over 25 minutes spent on total encounter; more than 50% of the visit was spent counseling and/or coordinating care by the attending physician.      Julio César Hooper MD [facility]  Cardiovascular Disease  [phone]
Cardiovascular Disease Progress Note    Overnight events: No acute events overnight. Ms. Tsang denies chest pain or SOB.   Otherwise review of systems negative    Objective Findings:  T(C): 36.8 (18 @ 12:32), Max: 36.8 (18 @ 06:06)  HR: 67 (18 @ 12:32) (67 - 78)  BP: 152/81 (18 @ 12:32) (105/61 - 152/81)  RR: 18 (18 @ 12:32) (17 - 18)  SpO2: 100% (18 @ 12:32) (99% - 100%)  Wt(kg): --  Daily     Daily Weight in k.5 (17 Aug 2018 22:06)      Physical Exam:  Gen: NAD  HEENT: EOMI  CV: RRR, normal S1 + S2, no m/r/g  Lungs: CTAB  Abd: soft, non-tender  Ext: No edema    Telemetry: n/a    Laboratory Data:                        8.2    8.63  )-----------( 669      ( 18 Aug 2018 06:42 )             28.1         139  |  99  |  37<H>  ----------------------------<  169<H>  4.2   |  24  |  2.91<H>    Ca    8.5      18 Aug 2018 06:42  Phos  4.1       Mg     2.4         TPro  7.8  /  Alb  x   /  TBili  x   /  DBili  x   /  AST  x   /  ALT  x   /  AlkPhos  x                 Inpatient Medications:  MEDICATIONS  (STANDING):  atorvastatin 40 milliGRAM(s) Oral at bedtime  dextrose 5%. 1000 milliLiter(s) (50 mL/Hr) IV Continuous <Continuous>  dextrose 50% Injectable 12.5 Gram(s) IV Push once  dextrose 50% Injectable 25 Gram(s) IV Push once  dextrose 50% Injectable 25 Gram(s) IV Push once  docusate sodium 100 milliGRAM(s) Oral three times a day  gabapentin 300 milliGRAM(s) Oral daily  insulin glargine Injectable (LANTUS) 40 Unit(s) SubCutaneous at bedtime  insulin lispro (HumaLOG) corrective regimen sliding scale   SubCutaneous three times a day before meals  insulin lispro (HumaLOG) corrective regimen sliding scale   SubCutaneous at bedtime  metoprolol tartrate 12.5 milliGRAM(s) Oral two times a day  pantoprazole    Tablet 40 milliGRAM(s) Oral two times a day  senna 2 Tablet(s) Oral at bedtime      Assessment: 78 year old woman with CAD s/p MICHAEL x2 in 2017, a-fib not on AC due to GI bleed, and compensated diastolic CHF presents with supply demand ischemia and acute blood loss anemia.    Plan of Care:    #Supply demand ischemia-  Due to anemia.  No ischemic changes on EKG.  S/p coronary MICHAEL x2 in 2017.  No active bleeding identified.   Will resume Plavix.     #Anemia-  Possible secondary to CKD.  Hematology input appreciated.     #Paroxymal a-fib-  Currently in sinus  No AC given h/o GI bleed.    #Compensated diastolic CHF-  Euvolemic on exam today.  Continue current dose Lasix.     Over 25 minutes spent on total encounter; more than 50% of the visit was spent counseling and/or coordinating care by the attending physician.      Julio César Hooper MD [facility]  Cardiovascular Disease  [phone]
Patient is a 78y old  Female who presents with a chief complaint of sob/ palpitations (15 Aug 2018 20:05)      SUBJECTIVE / OVERNIGHT EVENTS:   Feels better.  Denies CP/SOB/Palpitation/HA.    MEDICATIONS  (STANDING):  atorvastatin 40 milliGRAM(s) Oral at bedtime  dextrose 5%. 1000 milliLiter(s) (50 mL/Hr) IV Continuous <Continuous>  dextrose 50% Injectable 12.5 Gram(s) IV Push once  dextrose 50% Injectable 25 Gram(s) IV Push once  dextrose 50% Injectable 25 Gram(s) IV Push once  docusate sodium 100 milliGRAM(s) Oral three times a day  furosemide    Tablet 40 milliGRAM(s) Oral daily  gabapentin 300 milliGRAM(s) Oral daily  insulin glargine Injectable (LANTUS) 40 Unit(s) SubCutaneous at bedtime  insulin lispro (HumaLOG) corrective regimen sliding scale   SubCutaneous three times a day before meals  insulin lispro (HumaLOG) corrective regimen sliding scale   SubCutaneous at bedtime  metoprolol tartrate 12.5 milliGRAM(s) Oral two times a day  pantoprazole  Injectable 40 milliGRAM(s) IV Push two times a day  senna 2 Tablet(s) Oral at bedtime    MEDICATIONS  (PRN):  acetaminophen   Tablet. 650 milliGRAM(s) Oral every 6 hours PRN Mild and mod Pain  dextrose 40% Gel 15 Gram(s) Oral once PRN Blood Glucose LESS THAN 70 milliGRAM(s)/deciliter  glucagon  Injectable 1 milliGRAM(s) IntraMuscular once PRN Glucose LESS THAN 70 milligrams/deciliter        CAPILLARY BLOOD GLUCOSE      POCT Blood Glucose.: 208 mg/dL (17 Aug 2018 17:03)  POCT Blood Glucose.: 155 mg/dL (17 Aug 2018 11:59)  POCT Blood Glucose.: 79 mg/dL (17 Aug 2018 07:44)  POCT Blood Glucose.: 182 mg/dL (16 Aug 2018 23:50)  POCT Blood Glucose.: 121 mg/dL (16 Aug 2018 21:46)    I&O's Summary      PHYSICAL EXAM:  GENERAL: NAD, well-developed  HEAD:  Atraumatic, Normocephalic  NECK: Supple, No JVD  CHEST/LUNG: Clear to auscultation bilaterally; No wheezing.  HEART: Regular rate and rhythm; No murmurs, rubs, or gallops  ABDOMEN: Soft, Nontender, Nondistended; Bowel sounds present  EXTREMITIES:   No clubbing, cyanosis, or edema  NEUROLOGY: AAO X 3  SKIN: No rashes    LABS:                        8.6    7.22  )-----------( 684      ( 17 Aug 2018 05:20 )             29.4         137  |  97<L>  |  28<H>  ----------------------------<  95  4.4   |  25  |  2.51<H>    Ca    8.4      17 Aug 2018 05:20    TPro  7.0  /  Alb  3.2<L>  /  TBili  0.4  /  DBili  x   /  AST  9   /  ALT  10  /  AlkPhos  99            Urinalysis Basic - ( 16 Aug 2018 00:50 )    Color: COLORLESS / Appearance: Lt TURBID / S.005 / pH: 7.5  Gluc: NEGATIVE / Ketone: NEGATIVE  / Bili: NEGATIVE / Urobili: NORMAL   Blood: TRACE / Protein: SMALL / Nitrite: NEGATIVE   Leuk Esterase: LARGE / RBC: 0-2 / WBC >50   Sq Epi: FEW / Non Sq Epi: x / Bacteria: MANY      CAPILLARY BLOOD GLUCOSE      POCT Blood Glucose.: 208 mg/dL (17 Aug 2018 17:03)  POCT Blood Glucose.: 155 mg/dL (17 Aug 2018 11:59)  POCT Blood Glucose.: 79 mg/dL (17 Aug 2018 07:44)  POCT Blood Glucose.: 182 mg/dL (16 Aug 2018 23:50)  POCT Blood Glucose.: 121 mg/dL (16 Aug 2018 21:46)                RADIOLOGY & ADDITIONAL TESTS:    Imaging Personally Reviewed:    Consultant(s) Notes Reviewed:      Care Discussed with Consultants/Other Providers:
Patient is a 78y old  Female who presents with a chief complaint of sob/ palpitations (15 Aug 2018 20:05)      SUBJECTIVE / OVERNIGHT EVENTS:   Feels better.  Denies CP/SOB/Palpitation/HA.    MEDICATIONS  (STANDING):  atorvastatin 40 milliGRAM(s) Oral at bedtime  dextrose 5%. 1000 milliLiter(s) (50 mL/Hr) IV Continuous <Continuous>  dextrose 50% Injectable 12.5 Gram(s) IV Push once  dextrose 50% Injectable 25 Gram(s) IV Push once  dextrose 50% Injectable 25 Gram(s) IV Push once  docusate sodium 100 milliGRAM(s) Oral three times a day  furosemide    Tablet 40 milliGRAM(s) Oral daily  gabapentin 600 milliGRAM(s) Oral daily  insulin glargine Injectable (LANTUS) 40 Unit(s) SubCutaneous at bedtime  insulin lispro (HumaLOG) corrective regimen sliding scale   SubCutaneous three times a day before meals  insulin lispro (HumaLOG) corrective regimen sliding scale   SubCutaneous at bedtime  metoprolol tartrate 12.5 milliGRAM(s) Oral two times a day  pantoprazole  Injectable 40 milliGRAM(s) IV Push two times a day  senna 2 Tablet(s) Oral at bedtime    MEDICATIONS  (PRN):  dextrose 40% Gel 15 Gram(s) Oral once PRN Blood Glucose LESS THAN 70 milliGRAM(s)/deciliter  glucagon  Injectable 1 milliGRAM(s) IntraMuscular once PRN Glucose LESS THAN 70 milligrams/deciliter        CAPILLARY BLOOD GLUCOSE      POCT Blood Glucose.: 121 mg/dL (16 Aug 2018 21:46)  POCT Blood Glucose.: 270 mg/dL (16 Aug 2018 16:52)  POCT Blood Glucose.: 119 mg/dL (16 Aug 2018 12:56)  POCT Blood Glucose.: 170 mg/dL (16 Aug 2018 06:16)    I&O's Summary      PHYSICAL EXAM:  GENERAL: NAD, well-developed  HEAD:  Atraumatic, Normocephalic  NECK: Supple, No JVD  CHEST/LUNG: Clear to auscultation bilaterally; No wheezing.  HEART: Regular rate and rhythm; No murmurs, rubs, or gallops  ABDOMEN: Soft, Nontender, Nondistended; Bowel sounds present  EXTREMITIES:   No clubbing, cyanosis, or edema  NEUROLOGY: AAO X 3  SKIN: No rashes    LABS:                        7.7    7.61  )-----------( 691      ( 16 Aug 2018 05:48 )             26.6     08-16    139  |  101  |  25<H>  ----------------------------<  190<H>  4.4   |  25  |  2.09<H>    Ca    8.5      16 Aug 2018 05:48    TPro  8.2  /  Alb  3.8  /  TBili  < 0.2<L>  /  DBili  x   /  AST  32  /  ALT  14  /  AlkPhos  140<H>  08-15    PT/INR - ( 15 Aug 2018 14:39 )   PT: 13.3 SEC;   INR: 1.15          PTT - ( 15 Aug 2018 14:39 )  PTT:32.7 SEC  CARDIAC MARKERS ( 15 Aug 2018 14:39 )  x     / x     / 267 u/L / x     / x          Urinalysis Basic - ( 16 Aug 2018 00:50 )    Color: COLORLESS / Appearance: Lt TURBID / S.005 / pH: 7.5  Gluc: NEGATIVE / Ketone: NEGATIVE  / Bili: NEGATIVE / Urobili: NORMAL   Blood: TRACE / Protein: SMALL / Nitrite: NEGATIVE   Leuk Esterase: LARGE / RBC: 0-2 / WBC >50   Sq Epi: FEW / Non Sq Epi: x / Bacteria: MANY      CAPILLARY BLOOD GLUCOSE      POCT Blood Glucose.: 121 mg/dL (16 Aug 2018 21:46)  POCT Blood Glucose.: 270 mg/dL (16 Aug 2018 16:52)  POCT Blood Glucose.: 119 mg/dL (16 Aug 2018 12:56)  POCT Blood Glucose.: 170 mg/dL (16 Aug 2018 06:16)                RADIOLOGY & ADDITIONAL TESTS:    Imaging Personally Reviewed:    Consultant(s) Notes Reviewed:      Care Discussed with Consultants/Other Providers:
Patient is a 78y old  Female who presents with a chief complaint of sob/ palpitations (17 Aug 2018 22:06)      SUBJECTIVE / OVERNIGHT EVENTS:   Feels better.  Denies CP/SOB/Palpitation/HA.    MEDICATIONS  (STANDING):  atorvastatin 40 milliGRAM(s) Oral at bedtime  dextrose 5%. 1000 milliLiter(s) (50 mL/Hr) IV Continuous <Continuous>  dextrose 50% Injectable 12.5 Gram(s) IV Push once  dextrose 50% Injectable 25 Gram(s) IV Push once  dextrose 50% Injectable 25 Gram(s) IV Push once  docusate sodium 100 milliGRAM(s) Oral three times a day  furosemide    Tablet 40 milliGRAM(s) Oral daily  gabapentin 300 milliGRAM(s) Oral daily  insulin glargine Injectable (LANTUS) 40 Unit(s) SubCutaneous at bedtime  insulin lispro (HumaLOG) corrective regimen sliding scale   SubCutaneous three times a day before meals  insulin lispro (HumaLOG) corrective regimen sliding scale   SubCutaneous at bedtime  metoprolol tartrate 12.5 milliGRAM(s) Oral two times a day  pantoprazole    Tablet 40 milliGRAM(s) Oral two times a day  senna 2 Tablet(s) Oral at bedtime    MEDICATIONS  (PRN):  acetaminophen   Tablet. 650 milliGRAM(s) Oral every 6 hours PRN Mild and mod Pain  dextrose 40% Gel 15 Gram(s) Oral once PRN Blood Glucose LESS THAN 70 milliGRAM(s)/deciliter  glucagon  Injectable 1 milliGRAM(s) IntraMuscular once PRN Glucose LESS THAN 70 milligrams/deciliter        CAPILLARY BLOOD GLUCOSE      POCT Blood Glucose.: 161 mg/dL (18 Aug 2018 07:28)  POCT Blood Glucose.: 243 mg/dL (17 Aug 2018 21:54)  POCT Blood Glucose.: 208 mg/dL (17 Aug 2018 17:03)  POCT Blood Glucose.: 155 mg/dL (17 Aug 2018 11:59)    I&O's Summary      PHYSICAL EXAM:  GENERAL: NAD, well-developed  HEAD:  Atraumatic, Normocephalic  NECK: Supple, No JVD  CHEST/LUNG: Clear to auscultation bilaterally; No wheezing.  HEART: Regular rate and rhythm; No murmurs, rubs, or gallops  ABDOMEN: Soft, Nontender, Nondistended; Bowel sounds present  EXTREMITIES:   No clubbing, cyanosis, or edema  NEUROLOGY: AAO X 3  SKIN: No rashes    LABS:                        8.2    8.63  )-----------( 669      ( 18 Aug 2018 06:42 )             28.1     08-18    139  |  99  |  37<H>  ----------------------------<  169<H>  4.2   |  24  |  2.91<H>    Ca    8.5      18 Aug 2018 06:42  Phos  4.1     08-18  Mg     2.4     08-18    TPro  7.8  /  Alb  x   /  TBili  x   /  DBili  x   /  AST  x   /  ALT  x   /  AlkPhos  x   08-18            CAPILLARY BLOOD GLUCOSE      POCT Blood Glucose.: 161 mg/dL (18 Aug 2018 07:28)  POCT Blood Glucose.: 243 mg/dL (17 Aug 2018 21:54)  POCT Blood Glucose.: 208 mg/dL (17 Aug 2018 17:03)  POCT Blood Glucose.: 155 mg/dL (17 Aug 2018 11:59)                RADIOLOGY & ADDITIONAL TESTS:    Imaging Personally Reviewed:    Consultant(s) Notes Reviewed:      Care Discussed with Consultants/Other Providers:

## 2018-08-20 LAB
KAPPA FREE LIGHT CHAINS, SERUM: 13.18 MG/DL — HIGH (ref 0.33–1.94)
LAMBDA FREE LIGHT CHAINS, SERUM: 8.36 MG/DL — HIGH (ref 0.57–2.63)

## 2018-08-21 LAB — GAS PNL BLDMV: SIGNIFICANT CHANGE UP

## 2018-08-22 LAB
GAS PNL BLDMV: SIGNIFICANT CHANGE UP
KAPPA/LAMBDA FREE LIGHT CHAIN RATIO, SERUM: 1.58 — SIGNIFICANT CHANGE UP

## 2018-09-09 ENCOUNTER — INPATIENT (INPATIENT)
Facility: HOSPITAL | Age: 78
LOS: 3 days | Discharge: HOME HEALTH | End: 2018-09-13
Attending: INTERNAL MEDICINE | Admitting: INTERNAL MEDICINE
Payer: MEDICARE

## 2018-09-09 VITALS
HEART RATE: 106 BPM | TEMPERATURE: 100 F | RESPIRATION RATE: 20 BRPM | SYSTOLIC BLOOD PRESSURE: 156 MMHG | DIASTOLIC BLOOD PRESSURE: 95 MMHG | OXYGEN SATURATION: 99 %

## 2018-09-09 DIAGNOSIS — R10.9 UNSPECIFIED ABDOMINAL PAIN: ICD-10-CM

## 2018-09-09 DIAGNOSIS — N12 TUBULO-INTERSTITIAL NEPHRITIS, NOT SPECIFIED AS ACUTE OR CHRONIC: ICD-10-CM

## 2018-09-09 DIAGNOSIS — D64.9 ANEMIA, UNSPECIFIED: ICD-10-CM

## 2018-09-09 DIAGNOSIS — I48.0 PAROXYSMAL ATRIAL FIBRILLATION: ICD-10-CM

## 2018-09-09 DIAGNOSIS — Z95.5 PRESENCE OF CORONARY ANGIOPLASTY IMPLANT AND GRAFT: Chronic | ICD-10-CM

## 2018-09-09 DIAGNOSIS — Z98.891 HISTORY OF UTERINE SCAR FROM PREVIOUS SURGERY: Chronic | ICD-10-CM

## 2018-09-09 DIAGNOSIS — I50.9 HEART FAILURE, UNSPECIFIED: ICD-10-CM

## 2018-09-09 DIAGNOSIS — E11.9 TYPE 2 DIABETES MELLITUS WITHOUT COMPLICATIONS: ICD-10-CM

## 2018-09-09 DIAGNOSIS — I25.10 ATHEROSCLEROTIC HEART DISEASE OF NATIVE CORONARY ARTERY WITHOUT ANGINA PECTORIS: ICD-10-CM

## 2018-09-09 LAB
ALBUMIN SERPL ELPH-MCNC: 2.7 G/DL — LOW (ref 3.3–5)
ALBUMIN SERPL ELPH-MCNC: 2.9 G/DL — LOW (ref 3.3–5)
ALP SERPL-CCNC: 195 U/L — HIGH (ref 40–120)
ALP SERPL-CCNC: 196 U/L — HIGH (ref 40–120)
ALT FLD-CCNC: 7 U/L — SIGNIFICANT CHANGE UP (ref 4–33)
ALT FLD-CCNC: 7 U/L — SIGNIFICANT CHANGE UP (ref 4–33)
ANISOCYTOSIS BLD QL: SLIGHT — SIGNIFICANT CHANGE UP
APPEARANCE UR: SIGNIFICANT CHANGE UP
AST SERPL-CCNC: 10 U/L — SIGNIFICANT CHANGE UP (ref 4–32)
AST SERPL-CCNC: 8 U/L — SIGNIFICANT CHANGE UP (ref 4–32)
BACTERIA # UR AUTO: SIGNIFICANT CHANGE UP
BASE EXCESS BLDV CALC-SCNC: -4 MMOL/L — SIGNIFICANT CHANGE UP
BASOPHILS # BLD AUTO: 0.05 K/UL — SIGNIFICANT CHANGE UP (ref 0–0.2)
BASOPHILS # BLD AUTO: 0.06 K/UL — SIGNIFICANT CHANGE UP (ref 0–0.2)
BASOPHILS NFR BLD AUTO: 0.2 % — SIGNIFICANT CHANGE UP (ref 0–2)
BASOPHILS NFR BLD AUTO: 0.2 % — SIGNIFICANT CHANGE UP (ref 0–2)
BASOPHILS NFR SPEC: 0 % — SIGNIFICANT CHANGE UP (ref 0–2)
BILIRUB SERPL-MCNC: 0.2 MG/DL — SIGNIFICANT CHANGE UP (ref 0.2–1.2)
BILIRUB SERPL-MCNC: < 0.2 MG/DL — LOW (ref 0.2–1.2)
BILIRUB UR-MCNC: NEGATIVE — SIGNIFICANT CHANGE UP
BLD GP AB SCN SERPL QL: NEGATIVE — SIGNIFICANT CHANGE UP
BLOOD GAS VENOUS - CREATININE: 2.64 MG/DL — HIGH (ref 0.5–1.3)
BLOOD UR QL VISUAL: NEGATIVE — SIGNIFICANT CHANGE UP
BUN SERPL-MCNC: 36 MG/DL — HIGH (ref 7–23)
BUN SERPL-MCNC: 37 MG/DL — HIGH (ref 7–23)
CALCIUM SERPL-MCNC: 8.3 MG/DL — LOW (ref 8.4–10.5)
CALCIUM SERPL-MCNC: 8.7 MG/DL — SIGNIFICANT CHANGE UP (ref 8.4–10.5)
CHLORIDE BLDV-SCNC: 105 MMOL/L — SIGNIFICANT CHANGE UP (ref 96–108)
CHLORIDE SERPL-SCNC: 97 MMOL/L — LOW (ref 98–107)
CHLORIDE SERPL-SCNC: 98 MMOL/L — SIGNIFICANT CHANGE UP (ref 98–107)
CHOLEST SERPL-MCNC: 169 MG/DL — SIGNIFICANT CHANGE UP (ref 120–199)
CK SERPL-CCNC: 72 U/L — SIGNIFICANT CHANGE UP (ref 25–170)
CO2 SERPL-SCNC: 18 MMOL/L — LOW (ref 22–31)
CO2 SERPL-SCNC: 20 MMOL/L — LOW (ref 22–31)
COLOR SPEC: YELLOW — SIGNIFICANT CHANGE UP
CREAT SERPL-MCNC: 2.45 MG/DL — HIGH (ref 0.5–1.3)
CREAT SERPL-MCNC: 2.6 MG/DL — HIGH (ref 0.5–1.3)
EOSINOPHIL # BLD AUTO: 0.01 K/UL — SIGNIFICANT CHANGE UP (ref 0–0.5)
EOSINOPHIL # BLD AUTO: 0.07 K/UL — SIGNIFICANT CHANGE UP (ref 0–0.5)
EOSINOPHIL NFR BLD AUTO: 0 % — SIGNIFICANT CHANGE UP (ref 0–6)
EOSINOPHIL NFR BLD AUTO: 0.3 % — SIGNIFICANT CHANGE UP (ref 0–6)
EOSINOPHIL NFR FLD: 0 % — SIGNIFICANT CHANGE UP (ref 0–6)
EPI CELLS # UR: SIGNIFICANT CHANGE UP
GAS PNL BLDV: 130 MMOL/L — LOW (ref 136–146)
GLUCOSE BLDC GLUCOMTR-MCNC: 130 MG/DL — HIGH (ref 70–99)
GLUCOSE BLDV-MCNC: 166 — HIGH (ref 70–99)
GLUCOSE SERPL-MCNC: 143 MG/DL — HIGH (ref 70–99)
GLUCOSE SERPL-MCNC: 162 MG/DL — HIGH (ref 70–99)
GLUCOSE UR-MCNC: NEGATIVE — SIGNIFICANT CHANGE UP
HCO3 BLDV-SCNC: 20 MMOL/L — SIGNIFICANT CHANGE UP (ref 20–27)
HCT VFR BLD CALC: 24.5 % — LOW (ref 34.5–45)
HCT VFR BLD CALC: 25.6 % — LOW (ref 34.5–45)
HCT VFR BLDV CALC: 23.6 % — LOW (ref 34.5–45)
HDLC SERPL-MCNC: 30 MG/DL — LOW (ref 45–65)
HGB BLD-MCNC: 7.4 G/DL — LOW (ref 11.5–15.5)
HGB BLD-MCNC: 7.6 G/DL — LOW (ref 11.5–15.5)
HGB BLDV-MCNC: 7.6 G/DL — LOW (ref 11.5–15.5)
HYPOCHROMIA BLD QL: SLIGHT — SIGNIFICANT CHANGE UP
IMM GRANULOCYTES # BLD AUTO: 0.12 # — SIGNIFICANT CHANGE UP
IMM GRANULOCYTES # BLD AUTO: 0.2 # — SIGNIFICANT CHANGE UP
IMM GRANULOCYTES NFR BLD AUTO: 0.6 % — SIGNIFICANT CHANGE UP (ref 0–1.5)
IMM GRANULOCYTES NFR BLD AUTO: 0.8 % — SIGNIFICANT CHANGE UP (ref 0–1.5)
KETONES UR-MCNC: NEGATIVE — SIGNIFICANT CHANGE UP
LACTATE BLDV-MCNC: 1.2 MMOL/L — SIGNIFICANT CHANGE UP (ref 0.5–2)
LEUKOCYTE ESTERASE UR-ACNC: HIGH
LG PLATELETS BLD QL AUTO: SLIGHT — SIGNIFICANT CHANGE UP
LIPID PNL WITH DIRECT LDL SERPL: 120 MG/DL — SIGNIFICANT CHANGE UP
LYMPHOCYTES # BLD AUTO: 0.8 K/UL — LOW (ref 1–3.3)
LYMPHOCYTES # BLD AUTO: 1.04 K/UL — SIGNIFICANT CHANGE UP (ref 1–3.3)
LYMPHOCYTES # BLD AUTO: 3.3 % — LOW (ref 13–44)
LYMPHOCYTES # BLD AUTO: 5.1 % — LOW (ref 13–44)
LYMPHOCYTES NFR SPEC AUTO: 5 % — LOW (ref 13–44)
MAGNESIUM SERPL-MCNC: 2.2 MG/DL — SIGNIFICANT CHANGE UP (ref 1.6–2.6)
MANUAL SMEAR VERIFICATION: SIGNIFICANT CHANGE UP
MCHC RBC-ENTMCNC: 22.4 PG — LOW (ref 27–34)
MCHC RBC-ENTMCNC: 22.7 PG — LOW (ref 27–34)
MCHC RBC-ENTMCNC: 29.7 % — LOW (ref 32–36)
MCHC RBC-ENTMCNC: 30.2 % — LOW (ref 32–36)
MCV RBC AUTO: 75.2 FL — LOW (ref 80–100)
MCV RBC AUTO: 75.5 FL — LOW (ref 80–100)
MICROCYTES BLD QL: SLIGHT — SIGNIFICANT CHANGE UP
MONOCYTES # BLD AUTO: 1.23 K/UL — HIGH (ref 0–0.9)
MONOCYTES # BLD AUTO: 1.33 K/UL — HIGH (ref 0–0.9)
MONOCYTES NFR BLD AUTO: 5.1 % — SIGNIFICANT CHANGE UP (ref 2–14)
MONOCYTES NFR BLD AUTO: 6.6 % — SIGNIFICANT CHANGE UP (ref 2–14)
MONOCYTES NFR BLD: 1 % — LOW (ref 2–9)
NEUTROPHIL AB SER-ACNC: 89 % — HIGH (ref 43–77)
NEUTROPHILS # BLD AUTO: 17.63 K/UL — HIGH (ref 1.8–7.4)
NEUTROPHILS # BLD AUTO: 22.03 K/UL — HIGH (ref 1.8–7.4)
NEUTROPHILS NFR BLD AUTO: 87.2 % — HIGH (ref 43–77)
NEUTROPHILS NFR BLD AUTO: 90.6 % — HIGH (ref 43–77)
NEUTS BAND # BLD: 5 % — SIGNIFICANT CHANGE UP (ref 0–6)
NITRITE UR-MCNC: NEGATIVE — SIGNIFICANT CHANGE UP
NRBC # BLD: 0 /100WBC — SIGNIFICANT CHANGE UP
NRBC # FLD: 0 — SIGNIFICANT CHANGE UP
NRBC # FLD: 0 — SIGNIFICANT CHANGE UP
NT-PROBNP SERPL-SCNC: 9975 PG/ML — SIGNIFICANT CHANGE UP
PCO2 BLDV: 43 MMHG — SIGNIFICANT CHANGE UP (ref 41–51)
PH BLDV: 7.32 PH — SIGNIFICANT CHANGE UP (ref 7.32–7.43)
PH UR: 8 — SIGNIFICANT CHANGE UP (ref 5–8)
PHOSPHATE SERPL-MCNC: 5 MG/DL — HIGH (ref 2.5–4.5)
PLATELET # BLD AUTO: 545 K/UL — HIGH (ref 150–400)
PLATELET # BLD AUTO: 570 K/UL — HIGH (ref 150–400)
PLATELET COUNT - ESTIMATE: NORMAL — SIGNIFICANT CHANGE UP
PMV BLD: 9.6 FL — SIGNIFICANT CHANGE UP (ref 7–13)
PMV BLD: 9.6 FL — SIGNIFICANT CHANGE UP (ref 7–13)
PO2 BLDV: 32 MMHG — LOW (ref 35–40)
POIKILOCYTOSIS BLD QL AUTO: SLIGHT — SIGNIFICANT CHANGE UP
POLYCHROMASIA BLD QL SMEAR: SLIGHT — SIGNIFICANT CHANGE UP
POTASSIUM BLDV-SCNC: 4.5 MMOL/L — SIGNIFICANT CHANGE UP (ref 3.4–4.5)
POTASSIUM SERPL-MCNC: 4.6 MMOL/L — SIGNIFICANT CHANGE UP (ref 3.5–5.3)
POTASSIUM SERPL-MCNC: 4.9 MMOL/L — SIGNIFICANT CHANGE UP (ref 3.5–5.3)
POTASSIUM SERPL-SCNC: 4.6 MMOL/L — SIGNIFICANT CHANGE UP (ref 3.5–5.3)
POTASSIUM SERPL-SCNC: 4.9 MMOL/L — SIGNIFICANT CHANGE UP (ref 3.5–5.3)
PROT SERPL-MCNC: 7 G/DL — SIGNIFICANT CHANGE UP (ref 6–8.3)
PROT SERPL-MCNC: 7.3 G/DL — SIGNIFICANT CHANGE UP (ref 6–8.3)
PROT UR-MCNC: NEGATIVE — SIGNIFICANT CHANGE UP
RBC # BLD: 3.26 M/UL — LOW (ref 3.8–5.2)
RBC # BLD: 3.39 M/UL — LOW (ref 3.8–5.2)
RBC # FLD: 19.6 % — HIGH (ref 10.3–14.5)
RBC # FLD: 19.8 % — HIGH (ref 10.3–14.5)
RBC CASTS # UR COMP ASSIST: SIGNIFICANT CHANGE UP (ref 0–?)
RH IG SCN BLD-IMP: POSITIVE — SIGNIFICANT CHANGE UP
SAO2 % BLDV: 48.2 % — LOW (ref 60–85)
SODIUM SERPL-SCNC: 131 MMOL/L — LOW (ref 135–145)
SODIUM SERPL-SCNC: 132 MMOL/L — LOW (ref 135–145)
SP GR SPEC: 1 — SIGNIFICANT CHANGE UP (ref 1–1.04)
TRIGL SERPL-MCNC: 147 MG/DL — SIGNIFICANT CHANGE UP (ref 10–149)
TROPONIN T, HIGH SENSITIVITY: 149 NG/L — CRITICAL HIGH (ref ?–14)
TROPONIN T, HIGH SENSITIVITY: 160 NG/L — CRITICAL HIGH (ref ?–14)
TROPONIN T, HIGH SENSITIVITY: 169 NG/L — CRITICAL HIGH (ref ?–14)
TSH SERPL-MCNC: 0.66 UIU/ML — SIGNIFICANT CHANGE UP (ref 0.27–4.2)
UROBILINOGEN FLD QL: NORMAL — SIGNIFICANT CHANGE UP
WBC # BLD: 20.24 K/UL — HIGH (ref 3.8–10.5)
WBC # BLD: 24.33 K/UL — HIGH (ref 3.8–10.5)
WBC # FLD AUTO: 20.24 K/UL — HIGH (ref 3.8–10.5)
WBC # FLD AUTO: 24.33 K/UL — HIGH (ref 3.8–10.5)
WBC UR QL: >50 — HIGH (ref 0–?)

## 2018-09-09 PROCEDURE — 74176 CT ABD & PELVIS W/O CONTRAST: CPT | Mod: 26

## 2018-09-09 PROCEDURE — 71046 X-RAY EXAM CHEST 2 VIEWS: CPT | Mod: 26

## 2018-09-09 RX ORDER — CEFTRIAXONE 500 MG/1
1 INJECTION, POWDER, FOR SOLUTION INTRAMUSCULAR; INTRAVENOUS EVERY 24 HOURS
Qty: 0 | Refills: 0 | Status: DISCONTINUED | OUTPATIENT
Start: 2018-09-10 | End: 2018-09-10

## 2018-09-09 RX ORDER — CYCLOBENZAPRINE HYDROCHLORIDE 10 MG/1
5 TABLET, FILM COATED ORAL ONCE
Qty: 0 | Refills: 0 | Status: COMPLETED | OUTPATIENT
Start: 2018-09-09 | End: 2018-09-09

## 2018-09-09 RX ORDER — DEXTROSE 50 % IN WATER 50 %
25 SYRINGE (ML) INTRAVENOUS ONCE
Qty: 0 | Refills: 0 | Status: DISCONTINUED | OUTPATIENT
Start: 2018-09-09 | End: 2018-09-13

## 2018-09-09 RX ORDER — GLUCAGON INJECTION, SOLUTION 0.5 MG/.1ML
1 INJECTION, SOLUTION SUBCUTANEOUS ONCE
Qty: 0 | Refills: 0 | Status: DISCONTINUED | OUTPATIENT
Start: 2018-09-09 | End: 2018-09-13

## 2018-09-09 RX ORDER — ACETAMINOPHEN 500 MG
650 TABLET ORAL ONCE
Qty: 0 | Refills: 0 | Status: COMPLETED | OUTPATIENT
Start: 2018-09-09 | End: 2018-09-09

## 2018-09-09 RX ORDER — PANTOPRAZOLE SODIUM 20 MG/1
40 TABLET, DELAYED RELEASE ORAL
Qty: 0 | Refills: 0 | Status: DISCONTINUED | OUTPATIENT
Start: 2018-09-09 | End: 2018-09-13

## 2018-09-09 RX ORDER — CLOPIDOGREL BISULFATE 75 MG/1
75 TABLET, FILM COATED ORAL DAILY
Qty: 0 | Refills: 0 | Status: DISCONTINUED | OUTPATIENT
Start: 2018-09-09 | End: 2018-09-13

## 2018-09-09 RX ORDER — LIDOCAINE 4 G/100G
1 CREAM TOPICAL ONCE
Qty: 0 | Refills: 0 | Status: COMPLETED | OUTPATIENT
Start: 2018-09-09 | End: 2018-09-09

## 2018-09-09 RX ORDER — INFLUENZA VIRUS VACCINE 15; 15; 15; 15 UG/.5ML; UG/.5ML; UG/.5ML; UG/.5ML
0.5 SUSPENSION INTRAMUSCULAR ONCE
Qty: 0 | Refills: 0 | Status: DISCONTINUED | OUTPATIENT
Start: 2018-09-09 | End: 2018-09-13

## 2018-09-09 RX ORDER — DOCUSATE SODIUM 100 MG
100 CAPSULE ORAL THREE TIMES A DAY
Qty: 0 | Refills: 0 | Status: DISCONTINUED | OUTPATIENT
Start: 2018-09-09 | End: 2018-09-13

## 2018-09-09 RX ORDER — INSULIN LISPRO 100/ML
VIAL (ML) SUBCUTANEOUS
Qty: 0 | Refills: 0 | Status: DISCONTINUED | OUTPATIENT
Start: 2018-09-09 | End: 2018-09-13

## 2018-09-09 RX ORDER — SENNA PLUS 8.6 MG/1
1 TABLET ORAL
Qty: 0 | Refills: 0 | COMMUNITY

## 2018-09-09 RX ORDER — ATORVASTATIN CALCIUM 80 MG/1
40 TABLET, FILM COATED ORAL AT BEDTIME
Qty: 0 | Refills: 0 | Status: DISCONTINUED | OUTPATIENT
Start: 2018-09-09 | End: 2018-09-13

## 2018-09-09 RX ORDER — SENNA PLUS 8.6 MG/1
2 TABLET ORAL AT BEDTIME
Qty: 0 | Refills: 0 | Status: DISCONTINUED | OUTPATIENT
Start: 2018-09-09 | End: 2018-09-13

## 2018-09-09 RX ORDER — DEXTROSE 50 % IN WATER 50 %
12.5 SYRINGE (ML) INTRAVENOUS ONCE
Qty: 0 | Refills: 0 | Status: DISCONTINUED | OUTPATIENT
Start: 2018-09-09 | End: 2018-09-13

## 2018-09-09 RX ORDER — SODIUM CHLORIDE 9 MG/ML
1000 INJECTION, SOLUTION INTRAVENOUS
Qty: 0 | Refills: 0 | Status: DISCONTINUED | OUTPATIENT
Start: 2018-09-09 | End: 2018-09-13

## 2018-09-09 RX ORDER — FERROUS SULFATE 325(65) MG
325 TABLET ORAL DAILY
Qty: 0 | Refills: 0 | Status: DISCONTINUED | OUTPATIENT
Start: 2018-09-09 | End: 2018-09-13

## 2018-09-09 RX ORDER — METOPROLOL TARTRATE 50 MG
12.5 TABLET ORAL
Qty: 0 | Refills: 0 | Status: DISCONTINUED | OUTPATIENT
Start: 2018-09-09 | End: 2018-09-13

## 2018-09-09 RX ORDER — ACETAMINOPHEN 500 MG
650 TABLET ORAL EVERY 6 HOURS
Qty: 0 | Refills: 0 | Status: DISCONTINUED | OUTPATIENT
Start: 2018-09-09 | End: 2018-09-13

## 2018-09-09 RX ORDER — DEXTROSE 50 % IN WATER 50 %
15 SYRINGE (ML) INTRAVENOUS ONCE
Qty: 0 | Refills: 0 | Status: DISCONTINUED | OUTPATIENT
Start: 2018-09-09 | End: 2018-09-13

## 2018-09-09 RX ORDER — ACETAMINOPHEN 500 MG
1000 TABLET ORAL ONCE
Qty: 0 | Refills: 0 | Status: COMPLETED | OUTPATIENT
Start: 2018-09-09 | End: 2018-09-09

## 2018-09-09 RX ORDER — INSULIN GLARGINE 100 [IU]/ML
42 INJECTION, SOLUTION SUBCUTANEOUS AT BEDTIME
Qty: 0 | Refills: 0 | Status: DISCONTINUED | OUTPATIENT
Start: 2018-09-09 | End: 2018-09-13

## 2018-09-09 RX ORDER — GABAPENTIN 400 MG/1
300 CAPSULE ORAL AT BEDTIME
Qty: 0 | Refills: 0 | Status: DISCONTINUED | OUTPATIENT
Start: 2018-09-09 | End: 2018-09-13

## 2018-09-09 RX ORDER — DIPHENHYDRAMINE HCL 50 MG
25 CAPSULE ORAL ONCE
Qty: 0 | Refills: 0 | Status: COMPLETED | OUTPATIENT
Start: 2018-09-09 | End: 2018-09-09

## 2018-09-09 RX ORDER — FUROSEMIDE 40 MG
40 TABLET ORAL
Qty: 0 | Refills: 0 | Status: DISCONTINUED | OUTPATIENT
Start: 2018-09-09 | End: 2018-09-13

## 2018-09-09 RX ORDER — CEFTRIAXONE 500 MG/1
1 INJECTION, POWDER, FOR SOLUTION INTRAMUSCULAR; INTRAVENOUS ONCE
Qty: 0 | Refills: 0 | Status: COMPLETED | OUTPATIENT
Start: 2018-09-09 | End: 2018-09-09

## 2018-09-09 RX ADMIN — LIDOCAINE 1 PATCH: 4 CREAM TOPICAL at 03:14

## 2018-09-09 RX ADMIN — Medication 650 MILLIGRAM(S): at 03:14

## 2018-09-09 RX ADMIN — LIDOCAINE 1 PATCH: 4 CREAM TOPICAL at 15:06

## 2018-09-09 RX ADMIN — CLOPIDOGREL BISULFATE 75 MILLIGRAM(S): 75 TABLET, FILM COATED ORAL at 12:05

## 2018-09-09 RX ADMIN — INSULIN GLARGINE 42 UNIT(S): 100 INJECTION, SOLUTION SUBCUTANEOUS at 23:52

## 2018-09-09 RX ADMIN — GABAPENTIN 300 MILLIGRAM(S): 400 CAPSULE ORAL at 23:52

## 2018-09-09 RX ADMIN — Medication 100 MILLIGRAM(S): at 10:46

## 2018-09-09 RX ADMIN — Medication 400 MILLIGRAM(S): at 11:34

## 2018-09-09 RX ADMIN — Medication 12.5 MILLIGRAM(S): at 18:17

## 2018-09-09 RX ADMIN — CEFTRIAXONE 100 GRAM(S): 500 INJECTION, POWDER, FOR SOLUTION INTRAMUSCULAR; INTRAVENOUS at 08:48

## 2018-09-09 RX ADMIN — Medication 1000 MILLIGRAM(S): at 11:49

## 2018-09-09 RX ADMIN — Medication 40 MILLIGRAM(S): at 18:17

## 2018-09-09 RX ADMIN — Medication 25 MILLIGRAM(S): at 17:30

## 2018-09-09 RX ADMIN — PANTOPRAZOLE SODIUM 40 MILLIGRAM(S): 20 TABLET, DELAYED RELEASE ORAL at 18:17

## 2018-09-09 RX ADMIN — Medication 650 MILLIGRAM(S): at 04:14

## 2018-09-09 RX ADMIN — ATORVASTATIN CALCIUM 40 MILLIGRAM(S): 80 TABLET, FILM COATED ORAL at 23:53

## 2018-09-09 RX ADMIN — SENNA PLUS 2 TABLET(S): 8.6 TABLET ORAL at 23:53

## 2018-09-09 RX ADMIN — Medication 325 MILLIGRAM(S): at 12:05

## 2018-09-09 RX ADMIN — PANTOPRAZOLE SODIUM 40 MILLIGRAM(S): 20 TABLET, DELAYED RELEASE ORAL at 12:05

## 2018-09-09 RX ADMIN — Medication 650 MILLIGRAM(S): at 17:31

## 2018-09-09 RX ADMIN — Medication 650 MILLIGRAM(S): at 15:09

## 2018-09-09 RX ADMIN — Medication 1 TABLET(S): at 12:05

## 2018-09-09 RX ADMIN — CYCLOBENZAPRINE HYDROCHLORIDE 5 MILLIGRAM(S): 10 TABLET, FILM COATED ORAL at 03:14

## 2018-09-09 NOTE — H&P ADULT - GASTROINTESTINAL COMMENTS
Last EGD 3/18- normal, last colonoscopy 3/18- mod internal hemorrhoids, no bleeding. Capsule study ? ulcer in ileum

## 2018-09-09 NOTE — ED ADULT NURSE NOTE - OBJECTIVE STATEMENT
Charlotte RN: Patient received in room #7 c/o sob and lower back pain. Patient A&OX3, ambulatory. Patient reports exertion does not make the sob worse. Patient denies any chest pain, pain or difficulty urinating. Patient c/o constipation, last BM 3 days ago. NSR ON CM. VS as noted. Heat packs given; Will monitor.

## 2018-09-09 NOTE — ED ADULT NURSE REASSESSMENT NOTE - NS ED NURSE REASSESS COMMENT FT1
Report received.  Pt received to rm #7.  A+Ox3.  skin warm and dry.  Resp even , non-labored.  No resp distress noted.  denies sob, chest pain.  denies flank/abd pain.  Rt forearm SL dry and intact. VSS. febrile FS this morning at 8:30 136.  Admitted to Tele.  Awaits bed assignment.  Moved to ESS[facility] 1.  Report given to receiving RN.  Pt in nad..

## 2018-09-09 NOTE — H&P ADULT - HISTORY OF PRESENT ILLNESS
77 yo F s/p recent admission to [facility] from 8/15-18 for SOB & palp's, pt received 1U PRBC for anemia then and evaluated by GI. Pt now p/w chills, feeling cold since last night. Pt also started feeling Rt sided lateral abd pain last night. Pt also admits to feeling dysuria but unsure how long its been occurring. Pt denies feeling fevers, polyuria, hematuria, N/V/D. Pt admits to constipation, had a small BM  this AM, prior to that was 2 days ago. Unsure if she has had hematochezia or melena as pt states she does not check. Pt also admits to feeling SOB at times, going on for years, states it has not worsened recently. Also admits to orthopnea, sleeps with 2 pillows at baseline, denies CP, HARPER, LE edema, wheezing, or coughing. Pt also admits to feeling palp's sometimes but not sure how long it lasts.

## 2018-09-09 NOTE — H&P ADULT - PMH
CAD (coronary artery disease)    Diabetes mellitus, type 2    Diabetic neuropathy    GI bleed    HLD (hyperlipidemia)    Neuropathy    Paroxysmal atrial fibrillation

## 2018-09-09 NOTE — ED ADULT TRIAGE NOTE - CHIEF COMPLAINT QUOTE
pt c/o difficulty breathing since this am. as per family, pt was also feeling warm today. denies chest pain. hx htn, cardiac stents x 2, dm. ekg in progress

## 2018-09-09 NOTE — ED PROVIDER NOTE - CADM POA PRESS ULCER
Pt c/o epigastric discomfort with n/vX1 X1day. Also started on Sertraline and Xanax last Tuesday. PMH HLD, borderline DM, anxiety, hypothyroidism
No

## 2018-09-09 NOTE — ED PROVIDER NOTE - OBJECTIVE STATEMENT
78yF hx htn, cad s/p stent x 2 p/w sob this morning while walking to [facility]. Pt has had similar sob many times in past since stent placement. Denies associated cp or diaphoresis. She felt cold/chills throughout the day today as well. Denies fever. Endorse low-back pain, worse today. Denies cough, dysuria, N/V/D/Abd pain.

## 2018-09-09 NOTE — H&P ADULT - NEGATIVE ENMT SYMPTOMS
no tinnitus/no hearing difficulty/no sinus symptoms/no nasal congestion/no ear pain/no throat pain/no dysphagia

## 2018-09-09 NOTE — ED ADULT NURSE NOTE - NSIMPLEMENTINTERV_GEN_ALL_ED
Implemented All Universal Safety Interventions:  [place] to call system. Call bell, personal items and telephone within reach. Instruct patient to call for assistance. Room bathroom lighting operational. Non-slip footwear when patient is off stretcher. Physically safe environment: no spills, clutter or unnecessary equipment. Stretcher in lowest position, wheels locked, appropriate side rails in place.

## 2018-09-09 NOTE — H&P ADULT - ASSESSMENT
77 yo F p/w chills, dysuria, Rt sided lateral abd pain r/o Pyelonephritis, as well as int. SOB/ orthopnea r/o CHF exac    EKG- ST @ 102 <1mm ST dep v3-6

## 2018-09-09 NOTE — ED PROVIDER NOTE - MEDICAL DECISION MAKING DETAILS
78yF hx htn, cad s/p stent x 2 p/w sob and abdominal pain/flank pain. Found with a CHF exacerbation and pyelonephritis. Trops x2 elevated, but no concerns for ACS. Started treatment with ceftriaxone. Will admit to telemetry.

## 2018-09-10 PROBLEM — I25.10 ATHEROSCLEROTIC HEART DISEASE OF NATIVE CORONARY ARTERY WITHOUT ANGINA PECTORIS: Chronic | Status: INACTIVE | Noted: 2018-05-11 | Resolved: 2018-09-09

## 2018-09-10 PROBLEM — E11.65 TYPE 2 DIABETES MELLITUS WITH HYPERGLYCEMIA: Chronic | Status: INACTIVE | Noted: 2017-12-20 | Resolved: 2018-09-09

## 2018-09-10 PROBLEM — I48.91 UNSPECIFIED ATRIAL FIBRILLATION: Chronic | Status: INACTIVE | Noted: 2018-05-11 | Resolved: 2018-09-09

## 2018-09-10 LAB
ALBUMIN SERPL ELPH-MCNC: 2.5 G/DL — LOW (ref 3.3–5)
ALP SERPL-CCNC: 232 U/L — HIGH (ref 40–120)
ALT FLD-CCNC: 10 U/L — SIGNIFICANT CHANGE UP (ref 4–33)
AST SERPL-CCNC: 11 U/L — SIGNIFICANT CHANGE UP (ref 4–32)
BASOPHILS # BLD AUTO: 0.05 K/UL — SIGNIFICANT CHANGE UP (ref 0–0.2)
BASOPHILS NFR BLD AUTO: 0.3 % — SIGNIFICANT CHANGE UP (ref 0–2)
BILIRUB SERPL-MCNC: 0.3 MG/DL — SIGNIFICANT CHANGE UP (ref 0.2–1.2)
BUN SERPL-MCNC: 37 MG/DL — HIGH (ref 7–23)
CALCIUM SERPL-MCNC: 8.7 MG/DL — SIGNIFICANT CHANGE UP (ref 8.4–10.5)
CHLORIDE SERPL-SCNC: 101 MMOL/L — SIGNIFICANT CHANGE UP (ref 98–107)
CO2 SERPL-SCNC: 16 MMOL/L — LOW (ref 22–31)
CREAT SERPL-MCNC: 2.87 MG/DL — HIGH (ref 0.5–1.3)
EOSINOPHIL # BLD AUTO: 0.04 K/UL — SIGNIFICANT CHANGE UP (ref 0–0.5)
EOSINOPHIL NFR BLD AUTO: 0.2 % — SIGNIFICANT CHANGE UP (ref 0–6)
GLUCOSE BLDC GLUCOMTR-MCNC: 128 MG/DL — HIGH (ref 70–99)
GLUCOSE BLDC GLUCOMTR-MCNC: 78 MG/DL — SIGNIFICANT CHANGE UP (ref 70–99)
GLUCOSE BLDC GLUCOMTR-MCNC: 83 MG/DL — SIGNIFICANT CHANGE UP (ref 70–99)
GLUCOSE BLDC GLUCOMTR-MCNC: 90 MG/DL — SIGNIFICANT CHANGE UP (ref 70–99)
GLUCOSE BLDC GLUCOMTR-MCNC: 92 MG/DL — SIGNIFICANT CHANGE UP (ref 70–99)
GLUCOSE SERPL-MCNC: 67 MG/DL — LOW (ref 70–99)
HCT VFR BLD CALC: 29.2 % — LOW (ref 34.5–45)
HGB BLD-MCNC: 8.8 G/DL — LOW (ref 11.5–15.5)
IMM GRANULOCYTES # BLD AUTO: 0.12 # — SIGNIFICANT CHANGE UP
IMM GRANULOCYTES NFR BLD AUTO: 0.7 % — SIGNIFICANT CHANGE UP (ref 0–1.5)
LYMPHOCYTES # BLD AUTO: 1.6 K/UL — SIGNIFICANT CHANGE UP (ref 1–3.3)
LYMPHOCYTES # BLD AUTO: 9.9 % — LOW (ref 13–44)
MAGNESIUM SERPL-MCNC: 2.3 MG/DL — SIGNIFICANT CHANGE UP (ref 1.6–2.6)
MCHC RBC-ENTMCNC: 23.3 PG — LOW (ref 27–34)
MCHC RBC-ENTMCNC: 30.1 % — LOW (ref 32–36)
MCV RBC AUTO: 77.5 FL — LOW (ref 80–100)
MONOCYTES # BLD AUTO: 0.88 K/UL — SIGNIFICANT CHANGE UP (ref 0–0.9)
MONOCYTES NFR BLD AUTO: 5.4 % — SIGNIFICANT CHANGE UP (ref 2–14)
NEUTROPHILS # BLD AUTO: 13.55 K/UL — HIGH (ref 1.8–7.4)
NEUTROPHILS NFR BLD AUTO: 83.5 % — HIGH (ref 43–77)
NRBC # FLD: 0 — SIGNIFICANT CHANGE UP
PHOSPHATE SERPL-MCNC: 4.9 MG/DL — HIGH (ref 2.5–4.5)
PLATELET # BLD AUTO: 611 K/UL — HIGH (ref 150–400)
PMV BLD: 9.6 FL — SIGNIFICANT CHANGE UP (ref 7–13)
POTASSIUM SERPL-MCNC: 5 MMOL/L — SIGNIFICANT CHANGE UP (ref 3.5–5.3)
POTASSIUM SERPL-SCNC: 5 MMOL/L — SIGNIFICANT CHANGE UP (ref 3.5–5.3)
PROT SERPL-MCNC: 7.2 G/DL — SIGNIFICANT CHANGE UP (ref 6–8.3)
RBC # BLD: 3.77 M/UL — LOW (ref 3.8–5.2)
RBC # FLD: 19.6 % — HIGH (ref 10.3–14.5)
SODIUM SERPL-SCNC: 131 MMOL/L — LOW (ref 135–145)
SPECIMEN SOURCE: SIGNIFICANT CHANGE UP
WBC # BLD: 16.24 K/UL — HIGH (ref 3.8–10.5)
WBC # FLD AUTO: 16.24 K/UL — HIGH (ref 3.8–10.5)

## 2018-09-10 PROCEDURE — 76705 ECHO EXAM OF ABDOMEN: CPT | Mod: 26

## 2018-09-10 RX ORDER — ERTAPENEM SODIUM 1 G/1
500 INJECTION, POWDER, LYOPHILIZED, FOR SOLUTION INTRAMUSCULAR; INTRAVENOUS EVERY 24 HOURS
Qty: 0 | Refills: 0 | Status: DISCONTINUED | OUTPATIENT
Start: 2018-09-10 | End: 2018-09-13

## 2018-09-10 RX ADMIN — PANTOPRAZOLE SODIUM 40 MILLIGRAM(S): 20 TABLET, DELAYED RELEASE ORAL at 09:04

## 2018-09-10 RX ADMIN — Medication 12.5 MILLIGRAM(S): at 18:34

## 2018-09-10 RX ADMIN — Medication 1 TABLET(S): at 11:13

## 2018-09-10 RX ADMIN — GABAPENTIN 300 MILLIGRAM(S): 400 CAPSULE ORAL at 22:34

## 2018-09-10 RX ADMIN — INSULIN GLARGINE 42 UNIT(S): 100 INJECTION, SOLUTION SUBCUTANEOUS at 22:34

## 2018-09-10 RX ADMIN — CEFTRIAXONE 100 GRAM(S): 500 INJECTION, POWDER, FOR SOLUTION INTRAMUSCULAR; INTRAVENOUS at 05:31

## 2018-09-10 RX ADMIN — ERTAPENEM SODIUM 100 MILLIGRAM(S): 1 INJECTION, POWDER, LYOPHILIZED, FOR SOLUTION INTRAMUSCULAR; INTRAVENOUS at 18:33

## 2018-09-10 RX ADMIN — Medication 40 MILLIGRAM(S): at 18:34

## 2018-09-10 RX ADMIN — CLOPIDOGREL BISULFATE 75 MILLIGRAM(S): 75 TABLET, FILM COATED ORAL at 11:14

## 2018-09-10 RX ADMIN — PANTOPRAZOLE SODIUM 40 MILLIGRAM(S): 20 TABLET, DELAYED RELEASE ORAL at 18:34

## 2018-09-10 RX ADMIN — ATORVASTATIN CALCIUM 40 MILLIGRAM(S): 80 TABLET, FILM COATED ORAL at 22:34

## 2018-09-10 RX ADMIN — Medication 12.5 MILLIGRAM(S): at 05:41

## 2018-09-10 NOTE — PHYSICAL THERAPY INITIAL EVALUATION ADULT - GENERAL OBSERVATIONS, REHAB EVAL
Consult received, chart reviewed. Patient received supine in [facility], [facility]. Patient agreed to Evaluation from Physical Therapist.

## 2018-09-10 NOTE — CONSULT NOTE ADULT - PROBLEM SELECTOR RECOMMENDATION 2
- CT Abdomen reviewed   - ID consult in progress; continue care per their recommendations/appreciated  - bacid tiscott

## 2018-09-10 NOTE — CONSULT NOTE ADULT - PROBLEM SELECTOR RECOMMENDATION 9
- Hgb stable and at her baseline from previous admission   - recent EGD/Colonoscopy 3/2018 with poor prep and an EGD at that time with normal findings  - iron studies from May admission show iron deficiency  - would continue her home dose ferrous sulfate  - trend h/h daily   - transfuse prn   - no overt gi bleeding   - senna/colace   - miralax qd   - no role for endoscopic evaluation at this time   - will continue to follow

## 2018-09-10 NOTE — PHYSICAL THERAPY INITIAL EVALUATION ADULT - CRITERIA FOR SKILLED THERAPEUTIC INTERVENTIONS
therapy frequency/predicted duration of therapy intervention/impairments found/anticipated discharge recommendation/rehab potential

## 2018-09-10 NOTE — CONSULT NOTE ADULT - PROBLEM SELECTOR RECOMMENDATION 3
- s/p MICHAEL placed to LCx and OM1 on 12/20/2017  - cont Plavix per cardiology recommendations   - gi ppx with protonix qd while on Plavix

## 2018-09-10 NOTE — CONSULT NOTE ADULT - ASSESSMENT
78 year old female h/o uncontrolled IDDM, diastolic CHF, AFib, CAD s/p MICHAEL placed to LCx and OM1 on 12/20/2017 (on plavix) and HTN who presented to [facility] ED on 9/9/2018 with back pain and generalized weakness found to have sepsis UTI. GI consulted for anemia; in 1/2018 EGD with nonbleeding duodenal ulcer and capsule study with questionable ileum ulcer. Most recent colonoscopy 3/2018 with poor prep and an EGD at that time with normal findings.
77 yo F s/p recent admission to [facility] from 8/15-18 for SOB & palp's, pt received 1U PRBC for anemia then and evaluated by GI. Pt now p/w chills, feeling cold since last night. Pt also started feeling Rt sided lateral abd pain last night. Pt also admits to feeling dysuria but unsure how long its been occurring. Pt denies feeling fevers, polyuria, hematuria, N/V/D. Pt admits to constipation, had a small BM  this AM, prior to that was 2 days ago. Unsure if she has had hematochezia or melena as pt states she does not check. Pt also admits to feeling SOB at times, going on for years, states it has not worsened recently. Also admits to orthopnea, sleeps with 2 pillows at baseline, denies CP, HARPER, LE edema, wheezing, or coughing. Pt also admits to feeling palp's sometimes but not sure how long it lasts. (09 Sep 2018 12:38)    WBC on admission 24.  UA large LE and pyuria.  Urine cultures growing Enterobacter aerogenes.  Pt with prior h/o enterobacter sepsis in May, at that time treated with invanz x 2 week course.      ID consult called for further abx managment.        Problem/Plan - 1:    ·	Acute cystitis/UTI    - Pt with leukocytosis and positive urinary symptoms.  No fevers.  CT ap without acute pathology or sequestered focus.  Urine cultures growing Enterobacter.  Change to ertapenem.    - f/u blood cultures    - Monitor WBC, and temp curve.    Will follow,    Lisa Lopez  [phone]

## 2018-09-10 NOTE — CONSULT NOTE ADULT - SUBJECTIVE AND OBJECTIVE BOX
Patient is a 78y old  Female who presents with a chief complaint of UTI sepsis (09 Sep 2018 10:08)      HPI:  79 yo F s/p recent admission to [facility] from 8/15- for SOB & palp's, pt received 1U PRBC for anemia then and evaluated by GI. Pt now p/w chills, feeling cold since last night. Pt also started feeling Rt sided lateral abd pain last night. Pt also admits to feeling dysuria but unsure how long its been occurring. Pt denies feeling fevers, polyuria, hematuria, N/V/D. Pt admits to constipation, had a small BM  this AM, prior to that was 2 days ago. Unsure if she has had hematochezia or melena as pt states she does not check. Pt also admits to feeling SOB at times, going on for years, states it has not worsened recently. Also admits to orthopnea, sleeps with 2 pillows at baseline, denies CP, HARPER, LE edema, wheezing, or coughing. Pt also admits to feeling palp's sometimes but not sure how long it lasts. (09 Sep 2018 12:38)      REVIEW OF SYSTEMS:    CONSTITUTIONAL: No fever, weight loss, or fatigue  EYES: No eye pain, visual disturbances, or discharge  ENMT:  No sore throat  NECK: No pain or stiffness  RESPIRATORY: No cough, wheezing, chills or hemoptysis; No shortness of breath  CARDIOVASCULAR: No chest pain, palpitations, dizziness, or leg swelling  GASTROINTESTINAL: No abdominal or epigastric pain. No nausea, vomiting, or hematemesis; No diarrhea or constipation. No melena or hematochezia.  GENITOURINARY: No dysuria, frequency, hematuria, or incontinence  NEUROLOGICAL: No headaches, memory loss, loss of strength, numbness, or tremors  SKIN: No itching, burning, rashes, or lesions   LYMPH NODES: No enlarged glands  MUSCULOSKELETAL: No joint pain or swelling; No muscle, back, or extremity pain      PAST MEDICAL & SURGICAL HISTORY:  Neuropathy  GI bleed  Diabetes mellitus, type 2  Paroxysmal atrial fibrillation  CAD (coronary artery disease)  Diabetic neuropathy  HLD (hyperlipidemia)  S/P coronary artery stent placement: x2 in   H/O  section      Allergies    Carrots (Rash)  No Known Drug Allergies    Intolerances        FAMILY HISTORY:  No pertinent family history in first degree relatives: no pertinent FH for current admission  No pertinent family history in first degree relatives      SOCIAL HISTORY:        MEDICATIONS  (STANDING):  atorvastatin 40 milliGRAM(s) Oral at bedtime  cefTRIAXone   IVPB 1 Gram(s) IV Intermittent every 24 hours  clopidogrel Tablet 75 milliGRAM(s) Oral daily  dextrose 5%. 1000 milliLiter(s) (50 mL/Hr) IV Continuous <Continuous>  dextrose 50% Injectable 12.5 Gram(s) IV Push once  dextrose 50% Injectable 25 Gram(s) IV Push once  dextrose 50% Injectable 25 Gram(s) IV Push once  docusate sodium 100 milliGRAM(s) Oral three times a day  ferrous    sulfate 325 milliGRAM(s) Oral daily  furosemide    Tablet 40 milliGRAM(s) Oral two times a day  gabapentin 300 milliGRAM(s) Oral at bedtime  influenza   Vaccine 0.5 milliLiter(s) IntraMuscular once  insulin glargine Injectable (LANTUS) 42 Unit(s) SubCutaneous at bedtime  insulin lispro (HumaLOG) corrective regimen sliding scale   SubCutaneous three times a day before meals  metoprolol tartrate 12.5 milliGRAM(s) Oral two times a day  multivitamin 1 Tablet(s) Oral daily  pantoprazole    Tablet 40 milliGRAM(s) Oral two times a day  senna 2 Tablet(s) Oral at bedtime    MEDICATIONS  (PRN):  acetaminophen   Tablet .. 650 milliGRAM(s) Oral every 6 hours PRN Temp greater or equal to 38C (100.4F), Mild Pain (1 - 3), Moderate Pain (4 - 6)  dextrose 40% Gel 15 Gram(s) Oral once PRN Blood Glucose LESS THAN 70 milliGRAM(s)/deciliter  glucagon  Injectable 1 milliGRAM(s) IntraMuscular once PRN Glucose LESS THAN 70 milligrams/deciliter      Vital Signs Last 24 Hrs  T(C): 36.7 (10 Sep 2018 11:14), Max: 37.5 (10 Sep 2018 05:53)  T(F): 98 (10 Sep 2018 11:14), Max: 99.5 (10 Sep 2018 05:53)  HR: 70 (10 Sep 2018 11:14) (66 - 78)  BP: 128/49 (10 Sep 2018 11:14) (106/52 - 158/66)  BP(mean): --  RR: 17 (10 Sep 2018 11:14) (17 - 18)  SpO2: 98% (10 Sep 2018 11:14) (98% - 100%)    PHYSICAL EXAM:    GENERAL: NAD, well-groomed  HEAD:  Atraumatic, Normocephalic  EYES: EOMI, PERRLA, conjunctiva and sclera clear  ENMT: No tonsillar erythema, exudates, or enlargement; Moist mucous membranes  NECK: Supple, No JVD  CHEST/LUNG: Clear to percussion bilaterally; No rales, rhonchi, wheezing, or rubs  HEART: Regular rate and rhythm; No murmurs, rubs, or gallops  ABDOMEN: Soft, Nontender, Nondistended; Bowel sounds present  EXTREMITIES:  2+ Peripheral Pulses, No clubbing, cyanosis, or edema  LYMPH: No lymphadenopathy noted  SKIN: No rashes or lesions    LABS:  CBC Full  -  ( 09 Sep 2018 12:15 )  WBC Count : 20.24 K/uL  Hemoglobin : 7.6 g/dL  Hematocrit : 25.6 %  Platelet Count - Automated : 570 K/uL  Mean Cell Volume : 75.5 fL  Mean Cell Hemoglobin : 22.4 pg  Mean Cell Hemoglobin Concentration : 29.7 %  Auto Neutrophil # : 17.63 K/uL  Auto Lymphocyte # : 1.04 K/uL  Auto Monocyte # : 1.33 K/uL  Auto Eosinophil # : 0.07 K/uL  Auto Basophil # : 0.05 K/uL  Auto Neutrophil % : 87.2 %  Auto Lymphocyte % : 5.1 %  Auto Monocyte % : 6.6 %  Auto Eosinophil % : 0.3 %  Auto Basophil % : 0.2 %          132<L>  |  97<L>  |  37<H>  ----------------------------<  143<H>  4.6   |  20<L>  |  2.60<H>    Ca    8.7      09 Sep 2018 12:15  Phos  5.0       Mg     2.2         TPro  7.3  /  Alb  2.9<L>  /  TBili  0.2  /  DBili  x   /  AST  8   /  ALT  7   /  AlkPhos  195<H>        LIVER FUNCTIONS - ( 09 Sep 2018 12:15 )  Alb: 2.9 g/dL / Pro: 7.3 g/dL / ALK PHOS: 195 u/L / ALT: 7 u/L / AST: 8 u/L / GGT: x                               MICROBIOLOGY:        Urinalysis Basic - ( 09 Sep 2018 05:39 )    Color: YELLOW / Appearance: TURBID / S.005 / pH: 8.0  Gluc: NEGATIVE / Ketone: NEGATIVE  / Bili: NEGATIVE / Urobili: NORMAL   Blood: NEGATIVE / Protein: NEGATIVE / Nitrite: NEGATIVE   Leuk Esterase: LARGE / RBC: 3-5 / WBC >50   Sq Epi: x / Non Sq Epi: FEW / Bacteria: LARGE                RADIOLOGY: Patient is a 78y old  Female who presents with a chief complaint of UTI sepsis (09 Sep 2018 10:08)      HPI:  79 yo F s/p recent admission to [facility] from 8/15- for SOB & palp's, pt received 1U PRBC for anemia then and evaluated by GI. Pt now p/w chills, feeling cold since last night. Pt also started feeling Rt sided lateral abd pain last night. Pt also admits to feeling dysuria but unsure how long its been occurring. Pt denies feeling fevers, polyuria, hematuria, N/V/D. Pt admits to constipation, had a small BM  this AM, prior to that was 2 days ago. Unsure if she has had hematochezia or melena as pt states she does not check. Pt also admits to feeling SOB at times, going on for years, states it has not worsened recently. Also admits to orthopnea, sleeps with 2 pillows at baseline, denies CP, HARPER, LE edema, wheezing, or coughing. Pt also admits to feeling palp's sometimes but not sure how long it lasts. (09 Sep 2018 12:38)    WBC on admission 24.  UA large LE and pyuria.  Urine cultures growing Enterobacter aerogenes.  Pt with prior h/o enterobacter sepsis in May, at that time treated with invanz x 2 week course.      ID consult called for further abx managment.        REVIEW OF SYSTEMS:    CONSTITUTIONAL: No fever, weight loss, or fatigue  EYES: No eye pain, visual disturbances, or discharge  ENMT:  No sore throat  NECK: No pain or stiffness  RESPIRATORY: No cough, wheezing, chills or hemoptysis; No shortness of breath  CARDIOVASCULAR: No chest pain, palpitations, dizziness, or leg swelling  GASTROINTESTINAL: No abdominal or epigastric pain. No nausea, vomiting, or hematemesis; No diarrhea or constipation. No melena or hematochezia.  GENITOURINARY: No dysuria, frequency, hematuria, or incontinence  NEUROLOGICAL: No headaches, memory loss, loss of strength, numbness, or tremors  SKIN: No itching, burning, rashes, or lesions   LYMPH NODES: No enlarged glands  MUSCULOSKELETAL: No joint pain or swelling; No muscle, back, or extremity pain      PAST MEDICAL & SURGICAL HISTORY:  Neuropathy  GI bleed  Diabetes mellitus, type 2  Paroxysmal atrial fibrillation  CAD (coronary artery disease)  Diabetic neuropathy  HLD (hyperlipidemia)  S/P coronary artery stent placement: x2 in   H/O  section      Allergies    Carrots (Rash)  No Known Drug Allergies    Intolerances        FAMILY HISTORY:  No pertinent family history in first degree relatives: no pertinent FH for current admission  No pertinent family history in first degree relatives      SOCIAL HISTORY:  No ivdu, etoh, smoking      MEDICATIONS  (STANDING):  atorvastatin 40 milliGRAM(s) Oral at bedtime  cefTRIAXone   IVPB 1 Gram(s) IV Intermittent every 24 hours  clopidogrel Tablet 75 milliGRAM(s) Oral daily  dextrose 5%. 1000 milliLiter(s) (50 mL/Hr) IV Continuous <Continuous>  dextrose 50% Injectable 12.5 Gram(s) IV Push once  dextrose 50% Injectable 25 Gram(s) IV Push once  dextrose 50% Injectable 25 Gram(s) IV Push once  docusate sodium 100 milliGRAM(s) Oral three times a day  ferrous    sulfate 325 milliGRAM(s) Oral daily  furosemide    Tablet 40 milliGRAM(s) Oral two times a day  gabapentin 300 milliGRAM(s) Oral at bedtime  influenza   Vaccine 0.5 milliLiter(s) IntraMuscular once  insulin glargine Injectable (LANTUS) 42 Unit(s) SubCutaneous at bedtime  insulin lispro (HumaLOG) corrective regimen sliding scale   SubCutaneous three times a day before meals  metoprolol tartrate 12.5 milliGRAM(s) Oral two times a day  multivitamin 1 Tablet(s) Oral daily  pantoprazole    Tablet 40 milliGRAM(s) Oral two times a day  senna 2 Tablet(s) Oral at bedtime    MEDICATIONS  (PRN):  acetaminophen   Tablet .. 650 milliGRAM(s) Oral every 6 hours PRN Temp greater or equal to 38C (100.4F), Mild Pain (1 - 3), Moderate Pain (4 - 6)  dextrose 40% Gel 15 Gram(s) Oral once PRN Blood Glucose LESS THAN 70 milliGRAM(s)/deciliter  glucagon  Injectable 1 milliGRAM(s) IntraMuscular once PRN Glucose LESS THAN 70 milligrams/deciliter      Vital Signs Last 24 Hrs  T(C): 36.7 (10 Sep 2018 11:14), Max: 37.5 (10 Sep 2018 05:53)  T(F): 98 (10 Sep 2018 11:14), Max: 99.5 (10 Sep 2018 05:53)  HR: 70 (10 Sep 2018 11:14) (66 - 78)  BP: 128/49 (10 Sep 2018 11:14) (106/52 - 158/66)  BP(mean): --  RR: 17 (10 Sep 2018 11:14) (17 - 18)  SpO2: 98% (10 Sep 2018 11:14) (98% - 100%)    PHYSICAL EXAM:    GENERAL: NAD, well-groomed  HEAD:  Atraumatic, Normocephalic  EYES: EOMI, PERRLA, conjunctiva and sclera clear  ENMT: No tonsillar erythema, exudates, or enlargement; Moist mucous membranes  NECK: Supple, No JVD  CHEST/LUNG: Clear to percussion bilaterally; No rales, rhonchi, wheezing, or rubs  HEART: Regular rate and rhythm; No murmurs, rubs, or gallops  ABDOMEN: Soft, Nontender, Nondistended; Bowel sounds present  EXTREMITIES:  2+ Peripheral Pulses, No clubbing, cyanosis, or edema  LYMPH: No lymphadenopathy noted  SKIN: No rashes or lesions    LABS:  CBC Full  -  ( 09 Sep 2018 12:15 )  WBC Count : 20.24 K/uL  Hemoglobin : 7.6 g/dL  Hematocrit : 25.6 %  Platelet Count - Automated : 570 K/uL  Mean Cell Volume : 75.5 fL  Mean Cell Hemoglobin : 22.4 pg  Mean Cell Hemoglobin Concentration : 29.7 %  Auto Neutrophil # : 17.63 K/uL  Auto Lymphocyte # : 1.04 K/uL  Auto Monocyte # : 1.33 K/uL  Auto Eosinophil # : 0.07 K/uL  Auto Basophil # : 0.05 K/uL  Auto Neutrophil % : 87.2 %  Auto Lymphocyte % : 5.1 %  Auto Monocyte % : 6.6 %  Auto Eosinophil % : 0.3 %  Auto Basophil % : 0.2 %          132<L>  |  97<L>  |  37<H>  ----------------------------<  143<H>  4.6   |  20<L>  |  2.60<H>    Ca    8.7      09 Sep 2018 12:15  Phos  5.0       Mg     2.2         TPro  7.3  /  Alb  2.9<L>  /  TBili  0.2  /  DBili  x   /  AST  8   /  ALT  7   /  AlkPhos  195<H>  09-09      LIVER FUNCTIONS - ( 09 Sep 2018 12:15 )  Alb: 2.9 g/dL / Pro: 7.3 g/dL / ALK PHOS: 195 u/L / ALT: 7 u/L / AST: 8 u/L / GGT: x                               MICROBIOLOGY:        Urinalysis Basic - ( 09 Sep 2018 05:39 )    Color: YELLOW / Appearance: TURBID / S.005 / pH: 8.0  Gluc: NEGATIVE / Ketone: NEGATIVE  / Bili: NEGATIVE / Urobili: NORMAL   Blood: NEGATIVE / Protein: NEGATIVE / Nitrite: NEGATIVE   Leuk Esterase: LARGE / RBC: 3-5 / WBC >50   Sq Epi: x / Non Sq Epi: FEW / Bacteria: LARGE        Culture - Urine (18 @ 09:10)    Culture - Urine:   EAER^Enterobacter aerogenes  COLONY COUNT: > = 100,000 CFU/ML    Specimen Source: URINE MIDSTREAM            RADIOLOGY:      < from: US Abdomen Limited (09.10.18 @ 11:49) >  IMPRESSION:     Normal right upper quadrant abdominal ultrasound.    < end of copied text >          < from: CT Abdomen and Pelvis No Cont (18 @ 07:06) >  IMPRESSION:  1.  No CT evidence of bowel obstruction, active inflammatory process or   intra-abdominal source for infection.  No hydronephrosis or renal stones.    Please note that pyelonephritis is a clinical diagnosis and cannot be   excluded based on radiologic imaging.  Correlate with clinical symptoms   and laboratory values.  2.  Dependent hyperattenuating material in the gallbladder fundus   probably reflects sludge.  Right upper quadrant ultrasound can be   performed as clinically warranted.  3.  An approximately 8 x 4 cm cystic lesion extending from the left   adnexa and in the cul-de-sac is not significantly changed from   2018.  Pelvic ultrasound can be performed for further   characterization.  4.  Approximately 6 mm right lower lobe pulmonary nodule is stable since   2018.  5.  Degenerative disc disease and lumbar spine with moderate spinal canal   stenosis at L2-L3 and L3-L4 and moderate to severe spinal canal stenosis   at L4-L5.  Lumbar spine MRI can be performed as clinically warranted.    < end of copied text >

## 2018-09-10 NOTE — CONSULT NOTE ADULT - SUBJECTIVE AND OBJECTIVE BOX
Chief Complaint:  Patient is a 78y old  Female who presents with a chief complaint of UTI sepsis    Neuropathy  GI bleed  Diabetes mellitus, type 2  Atrial fibrillation, unspecified type  CAD (coronary artery disease)  Paroxysmal atrial fibrillation  CAD (coronary artery disease)  Diabetic neuropathy  Type 2 diabetes mellitus with hyperglycemia, with long-term current use of insulin  HLD (hyperlipidemia)  Type 2 diabetes mellitus without complication  S/P coronary artery stent placement  H/O:  section  H/O  section  No significant past surgical history     HPI:  77 yo F s/p recent admission to [facility] from 8/15- for SOB & palp's, pt received 1U PRBC for anemia then and evaluated by GI. Pt now p/w chills, feeling cold since last night. Pt also started feeling Rt sided lateral abd pain last night. Pt also admits to feeling dysuria but unsure how long its been occurring. Pt denies feeling fevers, polyuria, hematuria, N/V/D. Pt admits to constipation, had a small BM  this AM, prior to that was 2 days ago. Unsure if she has had hematochezia or melena as pt states she does not check. Pt also admits to feeling SOB at times, going on for years, states it has not worsened recently. Also admits to orthopnea, sleeps with 2 pillows at baseline, denies CP, HARPER, LE edema, wheezing, or coughing. Pt also admits to feeling palp's sometimes but not sure how long it lasts. GI consulted for anemia and abdominal discomfort. She was seen in the ED laying in bed sleeping/arousable. She reports that at home she was experiencing right sided flank discomfort that was radiating to the middle of her back. She states that the flank pain has now disappeared and she is only experiencing back pain now. She has no episodes of hematochezia or melena. She does endorse black colored stools, but also states that she takes ferrous sulfate twice a day at home. She has no epigastric pain, nausea or vomiting. In 2018 EGD with nonbleeding duodenal ulcer and capsule study with questionable ileum ulcer. Most recent colonoscopy 3/2018 with poor prep and an EGD at that time with normal findings. Patient no nausea/vomiting, no melena/hematochezia, no hematemesis, no constipation/diarrhea, no weight loss or appetite changes, no bowel habit changes, no dysphagia/odynophagia, no fever/chills. No FHx of colon, gastric, pancreatic or gallbladder disease to her knowledge.       Carrots (Rash)  No Known Drug Allergies      acetaminophen   Tablet .. 650 milliGRAM(s) Oral every 6 hours PRN  atorvastatin 40 milliGRAM(s) Oral at bedtime  cefTRIAXone   IVPB 1 Gram(s) IV Intermittent every 24 hours  clopidogrel Tablet 75 milliGRAM(s) Oral daily  dextrose 40% Gel 15 Gram(s) Oral once PRN  dextrose 5%. 1000 milliLiter(s) IV Continuous <Continuous>  dextrose 50% Injectable 12.5 Gram(s) IV Push once  dextrose 50% Injectable 25 Gram(s) IV Push once  dextrose 50% Injectable 25 Gram(s) IV Push once  docusate sodium 100 milliGRAM(s) Oral three times a day  ferrous    sulfate 325 milliGRAM(s) Oral daily  furosemide    Tablet 40 milliGRAM(s) Oral two times a day  gabapentin 300 milliGRAM(s) Oral at bedtime  glucagon  Injectable 1 milliGRAM(s) IntraMuscular once PRN  influenza   Vaccine 0.5 milliLiter(s) IntraMuscular once  insulin glargine Injectable (LANTUS) 42 Unit(s) SubCutaneous at bedtime  insulin lispro (HumaLOG) corrective regimen sliding scale   SubCutaneous three times a day before meals  metoprolol tartrate 12.5 milliGRAM(s) Oral two times a day  multivitamin 1 Tablet(s) Oral daily  pantoprazole    Tablet 40 milliGRAM(s) Oral two times a day  senna 2 Tablet(s) Oral at bedtime        FAMILY HISTORY:  No pertinent family history in first degree relatives: no pertinent FH for current admission  No pertinent family history in first degree relatives        Review of Systems:    General:  No wt loss, fevers, chills, night sweats, fatigue  Eyes:  Good vision, no reported pain  ENT:  No sore throat, pain, runny nose, dysphagia  CV:  No pain, palpitations, no lightheadedness  Resp:  No dyspnea, cough, tachypnea, wheezing  GI: as above  :  No pain, bleeding, incontinence, nocturia  Muscle:  No pain, weakness  Neuro:  No weakness, tingling, memory problems  Psych:  No fatigue, insomnia, mood problems, depression  Endocrine:  No polyuria, polydypsia, cold/heat intolerance  Heme:  No petechiae, ecchymosis, easy bruisability  Skin:  No rash, tattoos, scars, edema    Relevant Family History:   n/c    Relevant Social History: n/c      Physical Exam:    Vital Signs:  Vital Signs Last 24 Hrs  T(C): 36.7 (10 Sep 2018 11:14), Max: 37.5 (10 Sep 2018 05:53)  T(F): 98 (10 Sep 2018 11:14), Max: 99.5 (10 Sep 2018 05:53)  HR: 70 (10 Sep 2018 11:14) (66 - 78)  BP: 128/49 (10 Sep 2018 11:14) (106/52 - 158/66)  BP(mean): --  RR: 17 (10 Sep 2018 11:14) (17 - 18)  SpO2: 98% (10 Sep 2018 11:14) (98% - 100%)  Daily Height in cm: 157.48 (09 Sep 2018 12:38)    Daily     General:  Appears stated age, well-groomed, nad  HEENT:  NC/AT,  conjunctivae clear and pink, no thyromegaly, nodules, adenopathy, no JVD  Chest:  Full & symmetric excursion, no increased effort, breath sounds clear  Cardiovascular:  Regular rhythm, S1, S2, no murmur/rub/S3/S4, no abdominal bruit, no edema  Abdomen:  Soft, non-tender, non-distended, normoactive bowel sounds,  no masses ,no hepatosplenomeagaly, no signs of chronic liver disease  Extremities:  no cyanosis,clubbing or edema  Skin:  No rash/erythema/ecchymoses/petechiae/wounds/abscess/warm/dry  Neuro/Psych:  A&Ox3  , no asterixis, no tremor, no encephalopathy    Laboratory:                            7.6    20.24 )-----------( 570      ( 09 Sep 2018 12:15 )             25.6     09-    132<L>  |  97<L>  |  37<H>  ----------------------------<  143<H>  4.6   |  20<L>  |  2.60<H>    Ca    8.7      09 Sep 2018 12:15  Phos  5.0     -  Mg     2.2     -    TPro  7.3  /  Alb  2.9<L>  /  TBili  0.2  /  DBili  x   /  AST  8   /  ALT  7   /  AlkPhos  195<H>      LIVER FUNCTIONS - ( 09 Sep 2018 12:15 )  Alb: 2.9 g/dL / Pro: 7.3 g/dL / ALK PHOS: 195 u/L / ALT: 7 u/L / AST: 8 u/L / GGT: x             Urinalysis Basic - ( 09 Sep 2018 05:39 )    Color: YELLOW / Appearance: TURBID / S.005 / pH: 8.0  Gluc: NEGATIVE / Ketone: NEGATIVE  / Bili: NEGATIVE / Urobili: NORMAL   Blood: NEGATIVE / Protein: NEGATIVE / Nitrite: NEGATIVE   Leuk Esterase: LARGE / RBC: 3-5 / WBC >50   Sq Epi: x / Non Sq Epi: FEW / Bacteria: LARGE        Imaging:    < from: CT Abdomen and Pelvis No Cont (18 @ 07:06) >    EXAM:  CT ABDOMEN AND PELVIS        PROCEDURE DATE:  Sep  9 2018         INTERPRETATION:  CLINICAL INFORMATION: Back pain and leukocytosis.    Leukocytosis.  Urinary tract infection.    COMPARISON: CT abdomen pelvis from 2018.    PROCEDURE:   CT of the Abdomen and Pelvis was performed without intravenous contrast   in the prone position.  Intravenous contrast: None.  Oral contrast: None.  Sagittal and coronal reformats were performed.    FINDINGS:    LOWER CHEST: An approximately 6 mm right lower lobe pulmonary nodule is   grossly stable since 2018.  Atherosclerotic calcifications of the   visualized coronary arteries.    LIVER: Right hepatic lobe measures 18.4 cm in length, mildly enlarged.  BILE DUCTS: Normal caliber.  GALLBLADDER: Small amount of dependent hyperattenuating material in the   gallbladder fundus likely represents sludge  SPLEEN: Within normal limits.  PANCREAS: Within normal limits.  ADRENALS: Within normal limits.  KIDNEYS/URETERS: No hydronephrosis or renalstones.    BLADDER: Within normal limits.  REPRODUCTIVE ORGANS: An approximately 8 x 4 cm cystic lesion extending   from the left adnexa into the cul-de-sac is not significantly changed   from 2018.  A partially calcified myometrial fibroid in the right   side of the fundus measures approximately 2.9 cm (2:104).    BOWEL: No bowel obstruction. Appendix is normal.  PERITONEUM: No ascites.  VESSELS:  Mild atherosclerotic changes.  RETROPERITONEUM: No lymphadenopathy.    ABDOMINAL WALL: Withinnormal limits.  BONES:  At L2-L3 and L3-L4, there are disc bulges and ligamentum flavum   hypertrophy associated with moderate spinal canal stenosis.  At L4-L5 there is loss of interval disc space height, vacuum disc   phenomenon, a small disc osteophyte complex and facet/ligamentum flavum   hypertrophy associated with moderate to severe spinal canal stenosis.    IMPRESSION:  1.  No CT evidence of bowel obstruction, active inflammatory process or   intra-abdominal source for infection.  No hydronephrosis or renal stones.    Please note that pyelonephritis is a clinical diagnosis and cannot be   excluded based on radiologic imaging.  Correlate with clinical symptoms   and laboratory values.  2.  Dependent hyperattenuating material in the gallbladder fundus   probably reflects sludge.  Right upper quadrant ultrasound can be   performed as clinically warranted.  3.  An approximately 8 x 4 cm cystic lesion extending from the left   adnexa and in the cul-de-sac is not significantly changed from   2018.  Pelvic ultrasound can be performed for further   characterization.  4.  Approximately 6 mm right lower lobe pulmonary nodule is stable since   2018.  5.  Degenerative disc disease and lumbar spine with moderate spinal canal   stenosis at L2-L3 and L3-L4 and moderate to severe spinal canal stenosis   at L4-L5.  Lumbar spine MRI can be performed as clinically warranted.    SAMAN RINCON M.D., RADIOLOGY RESIDENT  This document has been electronically signed.  JUAN PABLO JENKINS M.D.,ATTENDING RADIOLOGIST  This document has been electronically signed. Sep  9 2018  7:49AM    < end of copied text >

## 2018-09-11 DIAGNOSIS — N30.00 ACUTE CYSTITIS WITHOUT HEMATURIA: ICD-10-CM

## 2018-09-11 LAB
BUN SERPL-MCNC: 40 MG/DL — HIGH (ref 7–23)
CALCIUM SERPL-MCNC: 8.7 MG/DL — SIGNIFICANT CHANGE UP (ref 8.4–10.5)
CHLORIDE SERPL-SCNC: 101 MMOL/L — SIGNIFICANT CHANGE UP (ref 98–107)
CO2 SERPL-SCNC: 17 MMOL/L — LOW (ref 22–31)
CREAT SERPL-MCNC: 2.92 MG/DL — HIGH (ref 0.5–1.3)
GLUCOSE BLDC GLUCOMTR-MCNC: 118 MG/DL — HIGH (ref 70–99)
GLUCOSE BLDC GLUCOMTR-MCNC: 126 MG/DL — HIGH (ref 70–99)
GLUCOSE BLDC GLUCOMTR-MCNC: 153 MG/DL — HIGH (ref 70–99)
GLUCOSE BLDC GLUCOMTR-MCNC: 74 MG/DL — SIGNIFICANT CHANGE UP (ref 70–99)
GLUCOSE SERPL-MCNC: 58 MG/DL — LOW (ref 70–99)
HCT VFR BLD CALC: 30 % — LOW (ref 34.5–45)
HGB BLD-MCNC: 9 G/DL — LOW (ref 11.5–15.5)
MAGNESIUM SERPL-MCNC: 2.3 MG/DL — SIGNIFICANT CHANGE UP (ref 1.6–2.6)
MCHC RBC-ENTMCNC: 23.1 PG — LOW (ref 27–34)
MCHC RBC-ENTMCNC: 30 % — LOW (ref 32–36)
MCV RBC AUTO: 76.9 FL — LOW (ref 80–100)
METHOD TYPE: SIGNIFICANT CHANGE UP
NRBC # FLD: 0 — SIGNIFICANT CHANGE UP
ORGANISM # SPEC MICROSCOPIC CNT: SIGNIFICANT CHANGE UP
PLATELET # BLD AUTO: 524 K/UL — HIGH (ref 150–400)
PMV BLD: 9.5 FL — SIGNIFICANT CHANGE UP (ref 7–13)
POTASSIUM SERPL-MCNC: 4.6 MMOL/L — SIGNIFICANT CHANGE UP (ref 3.5–5.3)
POTASSIUM SERPL-SCNC: 4.6 MMOL/L — SIGNIFICANT CHANGE UP (ref 3.5–5.3)
RBC # BLD: 3.9 M/UL — SIGNIFICANT CHANGE UP (ref 3.8–5.2)
RBC # FLD: 20 % — HIGH (ref 10.3–14.5)
SODIUM SERPL-SCNC: 135 MMOL/L — SIGNIFICANT CHANGE UP (ref 135–145)
WBC # BLD: 8.31 K/UL — SIGNIFICANT CHANGE UP (ref 3.8–10.5)
WBC # FLD AUTO: 8.31 K/UL — SIGNIFICANT CHANGE UP (ref 3.8–10.5)

## 2018-09-11 RX ADMIN — Medication 40 MILLIGRAM(S): at 06:43

## 2018-09-11 RX ADMIN — Medication 12.5 MILLIGRAM(S): at 06:44

## 2018-09-11 RX ADMIN — CLOPIDOGREL BISULFATE 75 MILLIGRAM(S): 75 TABLET, FILM COATED ORAL at 12:42

## 2018-09-11 RX ADMIN — GABAPENTIN 300 MILLIGRAM(S): 400 CAPSULE ORAL at 22:08

## 2018-09-11 RX ADMIN — INSULIN GLARGINE 42 UNIT(S): 100 INJECTION, SOLUTION SUBCUTANEOUS at 22:00

## 2018-09-11 RX ADMIN — ERTAPENEM SODIUM 100 MILLIGRAM(S): 1 INJECTION, POWDER, LYOPHILIZED, FOR SOLUTION INTRAMUSCULAR; INTRAVENOUS at 17:15

## 2018-09-11 RX ADMIN — Medication 100 MILLIGRAM(S): at 15:03

## 2018-09-11 RX ADMIN — Medication 40 MILLIGRAM(S): at 17:14

## 2018-09-11 RX ADMIN — Medication 1 TABLET(S): at 12:42

## 2018-09-11 RX ADMIN — Medication 325 MILLIGRAM(S): at 12:42

## 2018-09-11 RX ADMIN — PANTOPRAZOLE SODIUM 40 MILLIGRAM(S): 20 TABLET, DELAYED RELEASE ORAL at 06:43

## 2018-09-11 RX ADMIN — Medication 12.5 MILLIGRAM(S): at 17:14

## 2018-09-11 RX ADMIN — PANTOPRAZOLE SODIUM 40 MILLIGRAM(S): 20 TABLET, DELAYED RELEASE ORAL at 17:14

## 2018-09-11 NOTE — PROGRESS NOTE ADULT - PROBLEM SELECTOR PLAN 1
- Hgb stable and at her baseline from previous admission   - improved after PRBC  - iron studies from May admission show iron deficiency  - would continue her home dose ferrous sulfate  - trend h/h daily   - transfuse prn   - no overt gi bleeding   - senna/colace   - miralax qd   - no role for endoscopic evaluation at this time   - will continue to follow

## 2018-09-11 NOTE — DIETITIAN INITIAL EVALUATION ADULT. - ADHERENCE
Pt reports monitoring BG daily with average FS range in 90-100s. (8/16) HbA1c 8.6. Pt does not take Metformin at home; takes Lantus at night. Denies adding salt to soups.

## 2018-09-11 NOTE — DIETITIAN INITIAL EVALUATION ADULT. - NS AS NUTRI INTERV MEALS SNACK
1. Continue consistent carbohydrate low sodium diet. 2. Monitor weights, labs, BM's, skin integrity, p.o. intake.

## 2018-09-11 NOTE — DIETITIAN INITIAL EVALUATION ADULT. - NS AS NUTRI INTERV ED CONTENT
Reviewed with pt importance of mixed meals, carbohydrate sources, and protein sources. Instructed pt to not add salt to cooked foods. Pt seems to have been improving diet adherence for T2DM.

## 2018-09-11 NOTE — DIETITIAN INITIAL EVALUATION ADULT. - ENERGY NEEDS
Ht:  62 in Wt: 154.3 pounds BMI: 28.2  IBW: 110  IBW%: 140  Skin: No edema, no pressure ulcers noted.

## 2018-09-11 NOTE — DIETITIAN INITIAL EVALUATION ADULT. - OTHER INFO
Pt seen for nutrition consult for RD. Pt is a 77 y/o F presenting with sepsis 2/2 UTI. PMH inclusive of CHF, T2DM, and CAD s/p MICHAEL. Pt reports good PO intake and appetite despite dislike of taste of foods. No GI distress (nausea/vomiting/diarrhea/constipation.) +BM overnight. Pt reports UBW ~157 pounds. Per previous RD note May 2018, pt weighed 176.3 pounds. Pt reports she lost weight intentionally 2/2 desire to control her diabetes and change her food choices.

## 2018-09-11 NOTE — DIETITIAN INITIAL EVALUATION ADULT. - ORAL INTAKE PTA
good/Pt eats 3 meals/ day. Breakfast she usually has toast or oatmeal. Sometimes add eggs. Lunch: She makes homemade fish soup/ beef broth soup. Dinner: soup and salad. Pt says she has rice occasionally.

## 2018-09-12 DIAGNOSIS — K59.01 SLOW TRANSIT CONSTIPATION: ICD-10-CM

## 2018-09-12 LAB
-  AMIKACIN: SIGNIFICANT CHANGE UP
-  AMIKACIN: SIGNIFICANT CHANGE UP
-  AMPICILLIN/SULBACTAM: SIGNIFICANT CHANGE UP
-  AMPICILLIN/SULBACTAM: SIGNIFICANT CHANGE UP
-  AMPICILLIN: SIGNIFICANT CHANGE UP
-  AMPICILLIN: SIGNIFICANT CHANGE UP
-  AZTREONAM: SIGNIFICANT CHANGE UP
-  AZTREONAM: SIGNIFICANT CHANGE UP
-  CEFAZOLIN: SIGNIFICANT CHANGE UP
-  CEFAZOLIN: SIGNIFICANT CHANGE UP
-  CEFEPIME: SIGNIFICANT CHANGE UP
-  CEFEPIME: SIGNIFICANT CHANGE UP
-  CEFOXITIN: SIGNIFICANT CHANGE UP
-  CEFOXITIN: SIGNIFICANT CHANGE UP
-  CEFTAZIDIME: SIGNIFICANT CHANGE UP
-  CEFTAZIDIME: SIGNIFICANT CHANGE UP
-  CEFTRIAXONE: SIGNIFICANT CHANGE UP
-  CEFTRIAXONE: SIGNIFICANT CHANGE UP
-  CIPROFLOXACIN: SIGNIFICANT CHANGE UP
-  CIPROFLOXACIN: SIGNIFICANT CHANGE UP
-  ERTAPENEM: SIGNIFICANT CHANGE UP
-  ERTAPENEM: SIGNIFICANT CHANGE UP
-  GENTAMICIN: SIGNIFICANT CHANGE UP
-  GENTAMICIN: SIGNIFICANT CHANGE UP
-  IMIPENEM: SIGNIFICANT CHANGE UP
-  IMIPENEM: SIGNIFICANT CHANGE UP
-  LEVOFLOXACIN: SIGNIFICANT CHANGE UP
-  LEVOFLOXACIN: SIGNIFICANT CHANGE UP
-  MEROPENEM: SIGNIFICANT CHANGE UP
-  MEROPENEM: SIGNIFICANT CHANGE UP
-  NITROFURANTOIN: SIGNIFICANT CHANGE UP
-  NITROFURANTOIN: SIGNIFICANT CHANGE UP
-  PIPERACILLIN/TAZOBACTAM: SIGNIFICANT CHANGE UP
-  PIPERACILLIN/TAZOBACTAM: SIGNIFICANT CHANGE UP
-  TIGECYCLINE: SIGNIFICANT CHANGE UP
-  TIGECYCLINE: SIGNIFICANT CHANGE UP
-  TOBRAMYCIN: SIGNIFICANT CHANGE UP
-  TOBRAMYCIN: SIGNIFICANT CHANGE UP
-  TRIMETHOPRIM/SULFAMETHOXAZOLE: SIGNIFICANT CHANGE UP
-  TRIMETHOPRIM/SULFAMETHOXAZOLE: SIGNIFICANT CHANGE UP
BACTERIA UR CULT: SIGNIFICANT CHANGE UP
BUN SERPL-MCNC: 40 MG/DL — HIGH (ref 7–23)
CALCIUM SERPL-MCNC: 8.6 MG/DL — SIGNIFICANT CHANGE UP (ref 8.4–10.5)
CHLORIDE SERPL-SCNC: 99 MMOL/L — SIGNIFICANT CHANGE UP (ref 98–107)
CO2 SERPL-SCNC: 18 MMOL/L — LOW (ref 22–31)
CREAT SERPL-MCNC: 2.91 MG/DL — HIGH (ref 0.5–1.3)
GLUCOSE BLDC GLUCOMTR-MCNC: 100 MG/DL — HIGH (ref 70–99)
GLUCOSE BLDC GLUCOMTR-MCNC: 117 MG/DL — HIGH (ref 70–99)
GLUCOSE BLDC GLUCOMTR-MCNC: 122 MG/DL — HIGH (ref 70–99)
GLUCOSE BLDC GLUCOMTR-MCNC: 211 MG/DL — HIGH (ref 70–99)
GLUCOSE SERPL-MCNC: 117 MG/DL — HIGH (ref 70–99)
HCT VFR BLD CALC: 29.1 % — LOW (ref 34.5–45)
HGB BLD-MCNC: 8.8 G/DL — LOW (ref 11.5–15.5)
MAGNESIUM SERPL-MCNC: 2.2 MG/DL — SIGNIFICANT CHANGE UP (ref 1.6–2.6)
MCHC RBC-ENTMCNC: 23.1 PG — LOW (ref 27–34)
MCHC RBC-ENTMCNC: 30.2 % — LOW (ref 32–36)
MCV RBC AUTO: 76.4 FL — LOW (ref 80–100)
METHOD TYPE: SIGNIFICANT CHANGE UP
NRBC # FLD: 0 — SIGNIFICANT CHANGE UP
PLATELET # BLD AUTO: 595 K/UL — HIGH (ref 150–400)
PMV BLD: 9.3 FL — SIGNIFICANT CHANGE UP (ref 7–13)
POTASSIUM SERPL-MCNC: 4.6 MMOL/L — SIGNIFICANT CHANGE UP (ref 3.5–5.3)
POTASSIUM SERPL-SCNC: 4.6 MMOL/L — SIGNIFICANT CHANGE UP (ref 3.5–5.3)
RBC # BLD: 3.81 M/UL — SIGNIFICANT CHANGE UP (ref 3.8–5.2)
RBC # FLD: 20.1 % — HIGH (ref 10.3–14.5)
SODIUM SERPL-SCNC: 135 MMOL/L — SIGNIFICANT CHANGE UP (ref 135–145)
SPECIMEN SOURCE: SIGNIFICANT CHANGE UP
SPECIMEN SOURCE: SIGNIFICANT CHANGE UP
WBC # BLD: 5.76 K/UL — SIGNIFICANT CHANGE UP (ref 3.8–10.5)
WBC # FLD AUTO: 5.76 K/UL — SIGNIFICANT CHANGE UP (ref 3.8–10.5)

## 2018-09-12 RX ADMIN — Medication 40 MILLIGRAM(S): at 17:58

## 2018-09-12 RX ADMIN — PANTOPRAZOLE SODIUM 40 MILLIGRAM(S): 20 TABLET, DELAYED RELEASE ORAL at 06:21

## 2018-09-12 RX ADMIN — GABAPENTIN 300 MILLIGRAM(S): 400 CAPSULE ORAL at 21:33

## 2018-09-12 RX ADMIN — CLOPIDOGREL BISULFATE 75 MILLIGRAM(S): 75 TABLET, FILM COATED ORAL at 12:07

## 2018-09-12 RX ADMIN — Medication 40 MILLIGRAM(S): at 06:21

## 2018-09-12 RX ADMIN — ERTAPENEM SODIUM 100 MILLIGRAM(S): 1 INJECTION, POWDER, LYOPHILIZED, FOR SOLUTION INTRAMUSCULAR; INTRAVENOUS at 18:00

## 2018-09-12 RX ADMIN — Medication 325 MILLIGRAM(S): at 12:07

## 2018-09-12 RX ADMIN — Medication 12.5 MILLIGRAM(S): at 17:59

## 2018-09-12 RX ADMIN — Medication 1 TABLET(S): at 12:06

## 2018-09-12 RX ADMIN — INSULIN GLARGINE 42 UNIT(S): 100 INJECTION, SOLUTION SUBCUTANEOUS at 22:40

## 2018-09-12 RX ADMIN — PANTOPRAZOLE SODIUM 40 MILLIGRAM(S): 20 TABLET, DELAYED RELEASE ORAL at 17:58

## 2018-09-12 RX ADMIN — Medication 12.5 MILLIGRAM(S): at 06:21

## 2018-09-12 NOTE — PROGRESS NOTE ADULT - PROBLEM SELECTOR PLAN 1
- no symptoms of obstruction   - senna/colace  - miralax bid  - dulcolax supp prn   - high fiber diet  - pt requesting tap water enema; ordered x 1 today, 9/12

## 2018-09-12 NOTE — PROGRESS NOTE ADULT - PROBLEM SELECTOR PLAN 2
- Hgb stable and at her baseline from previous admission   - improved after PRBC during admission  - iron studies from May admission show iron deficiency; continue home dose ferrous sulfate  - trend h/h daily   - transfuse prn   - no overt gi bleeding   - senna/colace   - miralax qd   - no role for endoscopic evaluation at this time   - will continue to follow

## 2018-09-13 ENCOUNTER — TRANSCRIPTION ENCOUNTER (OUTPATIENT)
Age: 78
End: 2018-09-13

## 2018-09-13 VITALS
RESPIRATION RATE: 18 BRPM | SYSTOLIC BLOOD PRESSURE: 149 MMHG | TEMPERATURE: 98 F | HEART RATE: 66 BPM | DIASTOLIC BLOOD PRESSURE: 73 MMHG | OXYGEN SATURATION: 100 %

## 2018-09-13 LAB
BUN SERPL-MCNC: 39 MG/DL — HIGH (ref 7–23)
CALCIUM SERPL-MCNC: 9 MG/DL — SIGNIFICANT CHANGE UP (ref 8.4–10.5)
CHLORIDE SERPL-SCNC: 102 MMOL/L — SIGNIFICANT CHANGE UP (ref 98–107)
CO2 SERPL-SCNC: 21 MMOL/L — LOW (ref 22–31)
CREAT SERPL-MCNC: 2.79 MG/DL — HIGH (ref 0.5–1.3)
GLUCOSE BLDC GLUCOMTR-MCNC: 191 MG/DL — HIGH (ref 70–99)
GLUCOSE BLDC GLUCOMTR-MCNC: 61 MG/DL — LOW (ref 70–99)
GLUCOSE BLDC GLUCOMTR-MCNC: 95 MG/DL — SIGNIFICANT CHANGE UP (ref 70–99)
GLUCOSE SERPL-MCNC: 69 MG/DL — LOW (ref 70–99)
HCT VFR BLD CALC: 33 % — LOW (ref 34.5–45)
HGB BLD-MCNC: 9.7 G/DL — LOW (ref 11.5–15.5)
MAGNESIUM SERPL-MCNC: 2.2 MG/DL — SIGNIFICANT CHANGE UP (ref 1.6–2.6)
MCHC RBC-ENTMCNC: 22.8 PG — LOW (ref 27–34)
MCHC RBC-ENTMCNC: 29.4 % — LOW (ref 32–36)
MCV RBC AUTO: 77.6 FL — LOW (ref 80–100)
NRBC # FLD: 0 — SIGNIFICANT CHANGE UP
PLATELET # BLD AUTO: 623 K/UL — HIGH (ref 150–400)
PMV BLD: 9.4 FL — SIGNIFICANT CHANGE UP (ref 7–13)
POTASSIUM SERPL-MCNC: 4.9 MMOL/L — SIGNIFICANT CHANGE UP (ref 3.5–5.3)
POTASSIUM SERPL-SCNC: 4.9 MMOL/L — SIGNIFICANT CHANGE UP (ref 3.5–5.3)
RBC # BLD: 4.25 M/UL — SIGNIFICANT CHANGE UP (ref 3.8–5.2)
RBC # FLD: 20.1 % — HIGH (ref 10.3–14.5)
SODIUM SERPL-SCNC: 137 MMOL/L — SIGNIFICANT CHANGE UP (ref 135–145)
WBC # BLD: 5.34 K/UL — SIGNIFICANT CHANGE UP (ref 3.8–10.5)
WBC # FLD AUTO: 5.34 K/UL — SIGNIFICANT CHANGE UP (ref 3.8–10.5)

## 2018-09-13 RX ORDER — DOCUSATE SODIUM 100 MG
1 CAPSULE ORAL
Qty: 90 | Refills: 0
Start: 2018-09-13 | End: 2018-10-12

## 2018-09-13 RX ORDER — DOCUSATE SODIUM 100 MG
1 CAPSULE ORAL
Qty: 90 | Refills: 0 | OUTPATIENT
Start: 2018-09-13 | End: 2018-10-12

## 2018-09-13 RX ORDER — METOPROLOL TARTRATE 50 MG
0.5 TABLET ORAL
Qty: 30 | Refills: 0
Start: 2018-09-13 | End: 2018-10-12

## 2018-09-13 RX ORDER — CIPROFLOXACIN LACTATE 400MG/40ML
1 VIAL (ML) INTRAVENOUS
Qty: 4 | Refills: 0
Start: 2018-09-13 | End: 2018-09-16

## 2018-09-13 RX ORDER — CIPROFLOXACIN LACTATE 400MG/40ML
1 VIAL (ML) INTRAVENOUS
Qty: 4 | Refills: 0 | OUTPATIENT
Start: 2018-09-13 | End: 2018-09-16

## 2018-09-13 RX ORDER — METOPROLOL TARTRATE 50 MG
0.5 TABLET ORAL
Qty: 0 | Refills: 0 | COMMUNITY

## 2018-09-13 RX ORDER — METOPROLOL TARTRATE 50 MG
0.5 TABLET ORAL
Qty: 30 | Refills: 0 | OUTPATIENT
Start: 2018-09-13 | End: 2018-10-12

## 2018-09-13 RX ADMIN — Medication 1 TABLET(S): at 12:47

## 2018-09-13 RX ADMIN — Medication 12.5 MILLIGRAM(S): at 06:54

## 2018-09-13 RX ADMIN — PANTOPRAZOLE SODIUM 40 MILLIGRAM(S): 20 TABLET, DELAYED RELEASE ORAL at 06:54

## 2018-09-13 RX ADMIN — Medication 325 MILLIGRAM(S): at 12:47

## 2018-09-13 RX ADMIN — Medication 40 MILLIGRAM(S): at 06:54

## 2018-09-13 RX ADMIN — CLOPIDOGREL BISULFATE 75 MILLIGRAM(S): 75 TABLET, FILM COATED ORAL at 12:47

## 2018-09-13 RX ADMIN — Medication 1: at 12:47

## 2018-09-13 NOTE — DISCHARGE NOTE ADULT - SECONDARY DIAGNOSIS.
CAD (coronary artery disease) Diabetes mellitus, type 2 Paroxysmal atrial fibrillation Pyelonephritis Constipation by delayed colonic transit Anemia

## 2018-09-13 NOTE — DISCHARGE NOTE ADULT - PLAN OF CARE
To relieve and prevent worsening symptoms associated with congestive heart failure, to improve quality of life, and to treat underlying conditions such as coronary heart disease, high blood pressure, or diabetes, and to maintain euvolemia. Low salt diet, fluid restriction to 1500 ml daily, monitor your fluid intake and weight daily, exercise as tolerated 30 minutes daily, and follow up with your physician within 7 days. Please call Dr Hooper to schedule an outpatient echocardiogram. To be asymptomatic, to reduce risks factors such as hypertension, diabetes and hyperlipidemia to lower the risk of blood clots formation; and to prevent complications of coronary artery disease such as worsening chest pain, heart attack and death. Continue Plavix, do not stop unless instructed by your physician.  Continue low salt, fat, cholesterol and carbohydrate diet. Follow up with cardiologist and primary care physician's routine appointment. You should plan for an echocardiogram on discharge To maintain a normal blood glucose level and HgA1C level < 5.7 and to prevent diabetic complications such as uncontrolled diabetes, diabetic coma, blindness, renal failure, and amputations. Monitor finger sticks pre-meal and bedtime, low salt, fat and carbohydrate diet, minimize glucose intake.  Exercise daily for at least 30 minutes and weight loss.  Follow up with primary care physician and endocrinologist for routine Hemoglobin A1C checks and management.  Follow up with your ophthalmologist for routine yearly vision exams. To restore or maintain a normal heart rate and rhythm, to prevent blood clots, and decrease the risks of stroke CVA/TIA. Please take your medications as prescribed.  Continue taking plavix. Follow up with your cardiologist next week. Low fat diet, reduce caffeine intake, and exercise at least 30 minutes daily. Pt will no longer have symptoms of urinary tract infection Take all antibiotic Ciprofloxacin as prescribed. This is very important. If any urinary symptoms return or if you develop fever please call your doctor Pt will have improvement in symptoms Continue to take stool softeners which have been prescribed. To treat the underlying cause and restore a normal red blood cells level, to improve  the amount of oxygen that your blood can carry by increasing your hemoglobin and hematocrit level, and to prevent signs and symptoms such as shortness of breath, dizziness, weakness, congestive heart failure and death. Continue to take current medications as prescribed.  Follow up your routine physician's appointments.  If you notice any bleeding, please notify your doctor immediately or go to the nearest emergency room. You should not take any anticoagulants however it is safe to take Plavix at this time. Make an appointment with your gastroenterologist. There is no need for endoscopy at this time

## 2018-09-13 NOTE — PROGRESS NOTE ADULT - ASSESSMENT
· Assessment	  79 yo F p/w chills, dysuria, Rt sided lateral abd pain r/o Pyelonephritis, as well as int. SOB/ orthopnea r/o CHF exac    EKG- ST @ 102 <1mm ST dep v3-6     Problem/Plan - 1:  ·  Problem: CHF (congestive heart failure).  Plan: Admit to Telemetry, serial CE's, EKG's, FLP, continue IV Lasix, monitor daily weights, strict I's & O's. F/U Cardiology note.      Problem/Plan - 2:  ·  Problem: Pyelonephritis.  Plan: F/U Urine Cx, continue IV ABx. ID eval noted.     Problem/Plan - 3:  ·  Problem: Anemia.  Plan: Monitor CBC, check stool for occult blood,      Problem/Plan - 4:  ·  Problem: Abdominal pain.  Plan: CT Abd done, check Abd sonogram.      Problem/Plan - 5:  ·  Problem: CAD (coronary artery disease).  Plan: continue plavix, BB, statin.      Problem/Plan - 6:  Problem: Diabetes mellitus, type 2. Plan: continue lantus, Check Fingersticks with Meals and cover with ISS TID, check HgbA1C.     Problem/Plan - 7:  ·  Problem: Paroxysmal atrial fibrillation.  Plan: pt not on A/C due to h/o GIB.
77 yo F s/p recent admission to [facility] from 8/15-18 for SOB & palp's, pt received 1U PRBC for anemia then and evaluated by GI. Pt now p/w chills, feeling cold since last night. Pt also started feeling Rt sided lateral abd pain last night. Pt also admits to feeling dysuria but unsure how long its been occurring. Pt denies feeling fevers, polyuria, hematuria, N/V/D. Pt admits to constipation, had a small BM  this AM, prior to that was 2 days ago. Unsure if she has had hematochezia or melena as pt states she does not check. Pt also admits to feeling SOB at times, going on for years, states it has not worsened recently. Also admits to orthopnea, sleeps with 2 pillows at baseline, denies CP, HARPER, LE edema, wheezing, or coughing. Pt also admits to feeling palp's sometimes but not sure how long it lasts. (09 Sep 2018 12:38)    WBC on admission 24.  UA large LE and pyuria.  Urine cultures growing Enterobacter aerogenes.  Pt with prior h/o enterobacter sepsis in May, at that time treated with invanz x 2 week course.      ID consult called for further abx managment.        Problem/Plan - 1:    ·	Acute cystitis/UTI    - Pt with leukocytosis and positive urinary symptoms.  No fevers.  CT ap without acute pathology or sequestered focus.  Urine cultures growing Enterobacter and <50K E.coli.  Cont ertapenem for now.  Can change to cipro 250mg Qdaily to complete through 9/17 for total 7 days.     - blood cultures ngtd    - Monitor WBC, and temp curve.    Will follow,    Lisa Lopez  [phone]
78 year old female h/o uncontrolled IDDM, diastolic CHF, AFib, CAD s/p MICHAEL placed to LCx and OM1 on 12/20/2017 (on plavix) and HTN who presented to [facility] ED on 9/9/2018 with back pain and generalized weakness found to have sepsis UTI. GI consulted for anemia; in 1/2018 EGD with nonbleeding duodenal ulcer and capsule study with questionable ileum ulcer. Most recent colonoscopy 3/2018 with poor prep and an EGD at that time with normal findings.
78 year old female h/o uncontrolled IDDM, diastolic CHF, AFib, CAD s/p MICHAEL placed to LCx and OM1 on 12/20/2017 (on plavix) and HTN who presented to [facility] ED on 9/9/2018 with back pain and generalized weakness found to have sepsis UTI. GI consulted for anemia; in 1/2018 EGD with nonbleeding duodenal ulcer and capsule study with questionable ileum ulcer. Most recent colonoscopy 3/2018 with poor prep and an EGD at that time with normal findings.
78 year old female h/o uncontrolled IDDM, diastolic CHF, AFib, CAD s/p MICHAEL placed to LCx and OM1 on 12/20/2017 (on plavix) and HTN who presented to [facility] ED on 9/9/2018 with back pain and generalized weakness found to have sepsis UTI. GI consulted for anemia; in 1/2018 EGD with nonbleeding duodenal ulcer and capsule study with questionable ileum ulcer. Most recent colonoscopy 3/2018 with poor prep and an EGD at that time with normal findings.
79 yo F s/p recent admission to [facility] from 8/15-18 for SOB & palp's, pt received 1U PRBC for anemia then and evaluated by GI. Pt now p/w chills, feeling cold since last night. Pt also started feeling Rt sided lateral abd pain last night. Pt also admits to feeling dysuria but unsure how long its been occurring. Pt denies feeling fevers, polyuria, hematuria, N/V/D. Pt admits to constipation, had a small BM  this AM, prior to that was 2 days ago. Unsure if she has had hematochezia or melena as pt states she does not check. Pt also admits to feeling SOB at times, going on for years, states it has not worsened recently. Also admits to orthopnea, sleeps with 2 pillows at baseline, denies CP, HARPER, LE edema, wheezing, or coughing. Pt also admits to feeling palp's sometimes but not sure how long it lasts. (09 Sep 2018 12:38)    WBC on admission 24.  UA large LE and pyuria.  Urine cultures growing Enterobacter aerogenes.  Pt with prior h/o enterobacter sepsis in May, at that time treated with invanz x 2 week course.      ID consult called for further abx managment.        Problem/Plan - 1:    ·	Acute cystitis/UTI    - Pt with leukocytosis and positive urinary symptoms.  No fevers.  CT ap without acute pathology or sequestered focus.  Urine cultures growing Enterobacter and <50K E.coli.  Cont ertapenem, f/u senstitivities.    - f/u blood cultures    - Monitor WBC, and temp curve.    Will follow,    Lisa Lopez  [phone]
79 yo F s/p recent admission to [facility] from 8/15-18 for SOB & palp's, pt received 1U PRBC for anemia then and evaluated by GI. Pt now p/w chills, feeling cold since last night. Pt also started feeling Rt sided lateral abd pain last night. Pt also admits to feeling dysuria but unsure how long its been occurring. Pt denies feeling fevers, polyuria, hematuria, N/V/D. Pt admits to constipation, had a small BM  this AM, prior to that was 2 days ago. Unsure if she has had hematochezia or melena as pt states she does not check. Pt also admits to feeling SOB at times, going on for years, states it has not worsened recently. Also admits to orthopnea, sleeps with 2 pillows at baseline, denies CP, HARPER, LE edema, wheezing, or coughing. Pt also admits to feeling palp's sometimes but not sure how long it lasts. (09 Sep 2018 12:38)    WBC on admission 24.  UA large LE and pyuria.  Urine cultures growing Enterobacter aerogenes.  Pt with prior h/o enterobacter sepsis in May, at that time treated with invanz x 2 week course.      ID consult called for further abx managment.        Problem/Plan - 1:    ·	Acute cystitis/UTI    - Pt with leukocytosis and positive urinary symptoms.  No fevers.  CT ap without acute pathology or sequestered focus.  Urine cultures growing Enterobacter and <50K E.coli.  Cont ertapenem for now.  Can change to cipro 250mg Qdaily to complete through 9/17 for total 7 days.     - blood cultures ngtd    - Monitor WBC, and temp curve.    d/c planning as per primary team    Will follow,    Lisa Lopez  [phone]
· Assessment	  77 yo F p/w chills, dysuria, Rt sided lateral abd pain r/o Pyelonephritis, as well as int. SOB/ orthopnea r/o CHF exac    EKG- ST @ 102 <1mm ST dep v3-6     Problem/Plan - 1:  ·  Problem: CHF (congestive heart failure).  Plan: Admit to Telemetry, Cardio f/up.     Problem/Plan - 2:  ·  Problem: Pyelonephritis.  Plan: Continue IV ABx. ID f/up noted.     Problem/Plan - 3:  ·  Problem: Anemia.  Plan: Monitor CBC,       Problem/Plan - 5:  ·  Problem: CAD (coronary artery disease).  Plan: continue plavix, BB, statin.      Problem/Plan - 6:  Problem: Diabetes mellitus, type 2. Plan: continue lantus, Check Fingersticks with Meals and cover with ISS TID, check HgbA1C.     Problem/Plan - 7:  ·  Problem: Paroxysmal atrial fibrillation.  Plan: pt not on A/C due to h/o GIB.
· Assessment	  79 yo F p/w chills, dysuria, Rt sided lateral abd pain r/o Pyelonephritis, as well as int. SOB/ orthopnea r/o CHF exac    EKG- ST @ 102 <1mm ST dep v3-6     Problem/Plan - 1:  ·  Problem: CHF (congestive heart failure).  Plan: Admit to Telemetry, Cardio f/up.     Problem/Plan - 2:  ·  Problem: Pyelonephritis.  Plan: Continue IV ABx. ID f/up noted.     Problem/Plan - 3:  ·  Problem: Anemia.  Plan: Monitor CBC,       Problem/Plan - 5:  ·  Problem: CAD (coronary artery disease).  Plan: continue plavix, BB, statin.      Problem/Plan - 6:  Problem: Diabetes mellitus, type 2. Plan: continue lantus, Check Fingersticks with Meals and cover with ISS TID, check HgbA1C.     Problem/Plan - 7:  ·  Problem: Paroxysmal atrial fibrillation.  Plan: pt not on A/C due to h/o GIB.
· Assessment	  79 yo F p/w chills, dysuria, Rt sided lateral abd pain r/o Pyelonephritis, as well as int. SOB/ orthopnea r/o CHF exac    EKG- ST @ 102 <1mm ST dep v3-6     Problem/Plan - 1:  ·  Problem: CHF (congestive heart failure).  Plan: Admit to Telemetry, Cardio f/up.     Problem/Plan - 2:  ·  Problem: Pyelonephritis.  Plan: Continue IV ABx. ID f/up noted.     Problem/Plan - 3:  ·  Problem: Anemia.  Plan: Monitor CBC,       Problem/Plan - 5:  ·  Problem: CAD (coronary artery disease).  Plan: continue plavix, BB, statin.      Problem/Plan - 6:  Problem: Diabetes mellitus, type 2. Plan: continue lantus, Check Fingersticks with Meals and cover with ISS TID, check HgbA1C.     Problem/Plan - 7:  ·  Problem: Paroxysmal atrial fibrillation.  Plan: pt not on A/C due to h/o GIB.     Dc planning.

## 2018-09-13 NOTE — PROGRESS NOTE ADULT - PROVIDER SPECIALTY LIST ADULT
Cardiology
Gastroenterology
Infectious Disease
Internal Medicine

## 2018-09-13 NOTE — DISCHARGE NOTE ADULT - HOME CARE AGENCY
[facility] [phone] VISITING NURSE WILL CALL FOR APPOINTMENT [facility]NS CHOICE WILL RESUME [facility] FOR TOMORROW

## 2018-09-13 NOTE — DISCHARGE NOTE ADULT - PATIENT PORTAL LINK FT
You can access the [facility] Patient Portal, offered by [facility], by registering with the following website: [email or web address]

## 2018-09-13 NOTE — DISCHARGE NOTE ADULT - ADDITIONAL INSTRUCTIONS
As per attending pt is stable for discharge today and should follow up with primary care doctor, cardiologist and Infectious disease specialist within the next 1-2 weeks.

## 2018-09-13 NOTE — PROGRESS NOTE ADULT - PROBLEM SELECTOR PLAN 1
- no symptoms of obstruction; resolved after enema  - senna/colace  - miralax bid  - dulcolax supp prn   - high fiber diet

## 2018-09-13 NOTE — DISCHARGE NOTE ADULT - MEDICATION SUMMARY - MEDICATIONS TO TAKE
I will START or STAY ON the medications listed below when I get home from the hospital:    acetaminophen 325 mg oral tablet  -- 2 tab(s) by mouth every 6 hours, As needed, Mild and mod Pain  -- Indication: For Pain    Aleve 220 mg oral tablet  -- 1 tab(s) by mouth every 8 hours, As Needed  -- Indication: For Pain     gabapentin 300 mg oral capsule  -- 2 cap(s) by mouth 2 times a day  -- Indication: For Neuropathy     Lantus 100 units/mL subcutaneous solution  -- 42 unit(s) subcutaneous once a day (at bedtime)  -- Indication: For Diabetes mellitus, type 2    NovoLOG FlexPen 100 units/mL injectable solution  -- 5 unit(s) injectable 3 times a day   -- Check with your doctor before becoming pregnant.  Do not drink alcoholic beverages when taking this medication.  Keep in refrigerator.  Do not freeze.  Obtain medical advice before taking any non-prescription drugs as some may affect the action of this medication.    -- Indication: For Diabetes mellitus, type 2    Benadryl 25 mg oral tablet  -- 1 tab(s) by mouth 3 times a day, As Needed - for itching  -- Indication: For Itch    atorvastatin 40 mg oral tablet  -- 1 tab(s) by mouth once a day (at bedtime)  -- Indication: For Hyperlipidemia     Plavix 75 mg oral tablet  -- 1 tab(s) by mouth once a day   -- Do not take aspirin or aspirin containing products without knowledge and consent of your physician.    -- Indication: For Paroxysmal atrial fibrillation    metoprolol tartrate 25 mg oral tablet  -- 0.5 tab(s) by mouth 2 times a day   -- It is very important that you take or use this exactly as directed.  Do not skip doses or discontinue unless directed by your doctor.  May cause drowsiness.  Alcohol may intensify this effect.  Use care when operating dangerous machinery.  Some non-prescription drugs may aggravate your condition.  Read all labels carefully.  If a warning appears, check with your doctor before taking.  Take with food or milk.  This drug may impair the ability to drive or operate machinery.  Use care until you become familiar with its effects.    -- Indication: For Paroxysmal atrial fibrillation    furosemide 40 mg oral tablet  -- 1 tab(s) by mouth 2 times a day  -- Indication: For Heart failure    ferrous sulfate 325 mg (65 mg elemental iron) oral tablet  -- 1 tab(s) by mouth 3 times a day  -- Indication: For Anemia     Senna 8.6 mg oral tablet  -- 2 tab(s) by mouth once a day (at bedtime)  -- Indication: For Constipation by delayed colonic transit    pantoprazole 40 mg oral delayed release tablet  -- 1 tab(s) by mouth 2 times a day   -- It is very important that you take or use this exactly as directed.  Do not skip doses or discontinue unless directed by your doctor.  Obtain medical advice before taking any non-prescription drugs as some may affect the action of this medication.  Swallow whole.  Do not crush.    -- Indication: For Gastroesophageal reflux     Multiple Vitamins oral tablet  -- 1 tab(s) by mouth once a day  -- Indication: For Need for prophylactic     B Complex 50 oral tablet, extended release  -- 1 tab(s) by mouth once a day  -- Indication: For Need for prophylactic I will START or STAY ON the medications listed below when I get home from the hospital:    acetaminophen 325 mg oral tablet  -- 2 tab(s) by mouth every 6 hours, As needed, Mild and mod Pain  -- Indication: For Pain    Aleve 220 mg oral tablet  -- 1 tab(s) by mouth every 8 hours, As Needed  -- Indication: For Pain     gabapentin 300 mg oral capsule  -- 2 cap(s) by mouth 2 times a day  -- Indication: For Neuropathy     Lantus 100 units/mL subcutaneous solution  -- 42 unit(s) subcutaneous once a day (at bedtime)  -- Indication: For Diabetes mellitus, type 2    NovoLOG FlexPen 100 units/mL injectable solution  -- 5 unit(s) injectable 3 times a day   -- Check with your doctor before becoming pregnant.  Do not drink alcoholic beverages when taking this medication.  Keep in refrigerator.  Do not freeze.  Obtain medical advice before taking any non-prescription drugs as some may affect the action of this medication.    -- Indication: For Diabetes mellitus, type 2    Benadryl 25 mg oral tablet  -- 1 tab(s) by mouth 3 times a day, As Needed - for itching  -- Indication: For Itch    atorvastatin 40 mg oral tablet  -- 1 tab(s) by mouth once a day (at bedtime)  -- Indication: For Hyperlipidemia     Plavix 75 mg oral tablet  -- 1 tab(s) by mouth once a day   -- Do not take aspirin or aspirin containing products without knowledge and consent of your physician.    -- Indication: For Paroxysmal atrial fibrillation    metoprolol tartrate 25 mg oral tablet  -- 0.5 tab(s) by mouth 2 times a day   -- It is very important that you take or use this exactly as directed.  Do not skip doses or discontinue unless directed by your doctor.  May cause drowsiness.  Alcohol may intensify this effect.  Use care when operating dangerous machinery.  Some non-prescription drugs may aggravate your condition.  Read all labels carefully.  If a warning appears, check with your doctor before taking.  Take with food or milk.  This drug may impair the ability to drive or operate machinery.  Use care until you become familiar with its effects.    -- Indication: For Paroxysmal atrial fibrillation    furosemide 40 mg oral tablet  -- 1 tab(s) by mouth 2 times a day  -- Indication: For Heart failure    ferrous sulfate 325 mg (65 mg elemental iron) oral tablet  -- 1 tab(s) by mouth 3 times a day  -- Indication: For Anemia     docusate sodium 100 mg oral capsule  -- 1 cap(s) by mouth 3 times a day  -- Indication: For Constipation by delayed colonic transit    Senna 8.6 mg oral tablet  -- 2 tab(s) by mouth once a day (at bedtime)  -- Indication: For Constipation by delayed colonic transit    pantoprazole 40 mg oral delayed release tablet  -- 1 tab(s) by mouth 2 times a day   -- It is very important that you take or use this exactly as directed.  Do not skip doses or discontinue unless directed by your doctor.  Obtain medical advice before taking any non-prescription drugs as some may affect the action of this medication.  Swallow whole.  Do not crush.    -- Indication: For Gastroesophageal reflux     Cipro 250 mg oral tablet  -- 1 tab(s) by mouth once a day   -- Avoid prolonged or excessive exposure to direct and/or artificial sunlight while taking this medication.  Check with your doctor before becoming pregnant.  Do not take dairy products, antacids, or iron preparations within one hour of this medication.  Finish all this medication unless otherwise directed by prescriber.  Medication should be taken with plenty of water.    -- Indication: For Pyelonephritis    Multiple Vitamins oral tablet  -- 1 tab(s) by mouth once a day  -- Indication: For Need for prophylactic     B Complex 50 oral tablet, extended release  -- 1 tab(s) by mouth once a day  -- Indication: For Need for prophylactic

## 2018-09-13 NOTE — DISCHARGE NOTE ADULT - MEDICATION SUMMARY - MEDICATIONS TO CHANGE
I will SWITCH the dose or number of times a day I take the medications listed below when I get home from the hospital:    Metoprolol Tartrate 25 mg oral tablet  -- 0.5 tab(s) (12.5mg) by mouth 2 times a day

## 2018-09-13 NOTE — PROGRESS NOTE ADULT - PROBLEM SELECTOR PLAN 2
- Hgb stable and at her baseline from previous admission   - improved after PRBC during admission  - iron studies from May admission show iron deficiency; continue home dose ferrous sulfate  - trend h/h daily   - transfuse prn   - no overt gi bleeding   - no role for endoscopic evaluation at this time   - will continue to follow

## 2018-09-13 NOTE — DISCHARGE NOTE ADULT - CARE PROVIDER_API CALL
Julio César Hooper), Internal Medicine  [address] [place] [address]  Phone: [phone]  Fax: [phone]    Lisa Lopez), Infectious Disease; Internal Medicine  [address] [place] [address]  Phone: [phone]  Fax: [phone]    Curtis Daigle (DO), Gastroenterology; Internal Medicine  [address] [place] [address]  Phone: [phone]  Fax: [phone]

## 2018-09-13 NOTE — PROGRESS NOTE ADULT - SUBJECTIVE AND OBJECTIVE BOX
Cardiovascular Disease Initial Evaluation    CHIEF COMPLAINT: Back pain and weakness    HISTORY OF PRESENT ILLNESS:  This is a 78 year old woman with uncontrolled IDDM (KyE1y-7.6%), diastolic CHF and HTN who presented to [facility] ED on 2018 with back pain and generalized weakness found to have sepsis UTI. In 2017, Ms. Tsang underwent cath with Dr. Zulay Mckinnon and had MICHAEL placed to LCx and OM1 on 2017. She was also found to have PAF on that admission. Since then, she has had multiple hospitalizations for blood loss anemia resulting in discontinuation of ASA and Xarelto.   She underwent EGD by Dr. Curtis Daigle revealing one non-bleeding duodenal ulcer. Capsule study revealed questionable ulcer in ileum.  Currently she is complaining of back pain and weakness. Furthermore, she is constipated.  She denies chest pain or SOB.        Allergies    Carrots (Rash)      	    MEDICATIONS:  Reviewed      PAST MEDICAL & SURGICAL HISTORY:  Neuropathy  GI bleed  Diabetes mellitus, type 2  Atrial fibrillation, unspecified type  CAD (coronary artery disease)  Paroxysmal atrial fibrillation  CAD (coronary artery disease)  Diabetic neuropathy  Type 2 diabetes mellitus with hyperglycemia, with long-term current use of insulin  HLD (hyperlipidemia)  H/O:  section  H/O  section      FAMILY HISTORY:  No pertinent family history in first degree relatives: no pertinent FH for current admission  No pertinent family history in first degree relatives      SOCIAL HISTORY:    Non-smoker        REVIEW OF SYSTEMS:  See HPI, otherwise complete 10 point review of systems negative    PHYSICAL EXAM:  T(C): 36.4 (18 @ 09:10), Max: 37.7 (18 @ 00:41)  HR: 66 (18 @ 09:10) (62 - 106)  BP: 149/66 (18 @ 09:10) (118/46 - 156/95)  RR: 20 (18 @ 09:10) (16 - 22)  SpO2: 100% (18 @ 09:10) (97% - 100%)  Wt(kg): --  I&O's Summary      Appearance: No Acute Distress	  HEENT:  Normal oral mucosa, PERRL, EOMI	  Cardiovascular: Normal S1 S2, No JVD, No murmurs/rubs/gallops  Respiratory: Lungs clear to auscultation bilaterally  Gastrointestinal:  Soft, Non-tender, + BS	  Skin: No rashes, No ecchymoses, No cyanosis	  Neurologic: Non-focal  Extremities: No clubbing, cyanosis or edema  Vascular: Peripheral pulses palpable 2+ bilaterally  Psychiatry: A & O x 3, Mood & affect appropriate    Laboratory Data:	 	    CBC Full  -  ( 09 Sep 2018 04:29 )  WBC Count : 24.33 K/uL  Hemoglobin : 7.4 g/dL  Hematocrit : 24.5 %  Platelet Count - Automated : 545 K/uL  Mean Cell Volume : 75.2 fL  Mean Cell Hemoglobin : 22.7 pg  Mean Cell Hemoglobin Concentration : 30.2 %  Auto Neutrophil # : 22.03 K/uL  Auto Lymphocyte # : 0.80 K/uL  Auto Monocyte # : 1.23 K/uL  Auto Eosinophil # : 0.01 K/uL  Auto Basophil # : 0.06 K/uL  Auto Neutrophil % : 90.6 %  Auto Lymphocyte % : 3.3 %  Auto Monocyte % : 5.1 %  Auto Eosinophil % : 0.0 %  Auto Basophil % : 0.2 %        131<L>  |  98  |  36<H>  ----------------------------<  162<H>  4.9   |  18<L>  |  2.45<H>    Ca    8.3<L>      09 Sep 2018 04:29    TPro  7.0  /  Alb  2.7<L>  /  TBili  < 0.2<L>  /  DBili  x   /  AST  10  /  ALT  7   /  AlkPhos  196<H>        proBNP: Serum Pro-Brain Natriuretic Peptide: 9975 pg/mL ( @ 04:29)      Interpretation of Telemetry: Sinus	    ECG:  	Sinus tachycardia; non-specific t-wave changes.    Assessment: 78 year old woman with CAD s/p MICHAEL x2 in 2017, a-fib not on AC due to GI bleed, and compensated diastolic CHF presents with supply demand ischemia and acute blood loss anemia.    Plan of Care:    #Supply demand ischemia-  Due to sepsis secondary to UTI with concern for pyelonephritis.  No ischemic changes on EKG and patient denies chest pain.  S/p coronary MICHAEL x2 in 2017.  Patient with multiple hospitalizations for recurrent life threatening bleeding.  Agree with just Plavix for antiplatelet therapy at this time.  Treatment of UTI/pyelonephritis as per primary team (patient with h/o enterobacter UTI in 2018)    #Paroxymal a-fib-  Currently in sinus  No AC given h/o GI bleed.    #Compensated diastolic CHF-  Euvolemic on exam today.  Close attention to volume status with administration of ABx.    62 minutes spent on total encounter; more than 50% of the visit was spent counseling and/or coordinating care by the attending physician.   	  Julio César Hooper MD [facility]  Cardiovascular Diseases  [phone]
Cardiovascular Disease Progress Note    Overnight events: No acute events overnight.  Ms. Tsang denies chest pain or SOB.   Otherwise review of systems negative    Objective Findings:  T(C): 36.6 (18 @ 06:51), Max: 36.9 (18 @ 12:09)  HR: 63 (18 @ 06:51) (63 - 73)  BP: 135/51 (18 @ 06:51) (135/51 - 157/75)  RR: 17 (18 @ 06:51) (17 - 18)  SpO2: 97% (18 @ 06:51) (97% - 98%)  Wt(kg): --  Daily     Daily Weight in k.5 (13 Sep 2018 06:16)      Physical Exam:  Gen: NAD  HEENT: EOMI  CV: RRR, normal S1 + S2, no m/r/g  Lungs: CTAB  Abd: soft, non-tender  Ext: No edema    Telemetry: Sinus; no ectopy    Laboratory Data:                        9.7    5.34  )-----------( 623      ( 13 Sep 2018 06:14 )             33.0         137  |  102  |  39<H>  ----------------------------<  69<L>  4.9   |  21<L>  |  2.79<H>    Ca    9.0      13 Sep 2018 06:14  Mg     2.2                     Inpatient Medications:  MEDICATIONS  (STANDING):  atorvastatin 40 milliGRAM(s) Oral at bedtime  clopidogrel Tablet 75 milliGRAM(s) Oral daily  dextrose 5%. 1000 milliLiter(s) (50 mL/Hr) IV Continuous <Continuous>  dextrose 50% Injectable 12.5 Gram(s) IV Push once  dextrose 50% Injectable 25 Gram(s) IV Push once  dextrose 50% Injectable 25 Gram(s) IV Push once  docusate sodium 100 milliGRAM(s) Oral three times a day  ertapenem  IVPB 500 milliGRAM(s) IV Intermittent every 24 hours  ferrous    sulfate 325 milliGRAM(s) Oral daily  furosemide    Tablet 40 milliGRAM(s) Oral two times a day  gabapentin 300 milliGRAM(s) Oral at bedtime  influenza   Vaccine 0.5 milliLiter(s) IntraMuscular once  insulin glargine Injectable (LANTUS) 42 Unit(s) SubCutaneous at bedtime  insulin lispro (HumaLOG) corrective regimen sliding scale   SubCutaneous three times a day before meals  metoprolol tartrate 12.5 milliGRAM(s) Oral two times a day  multivitamin 1 Tablet(s) Oral daily  pantoprazole    Tablet 40 milliGRAM(s) Oral two times a day  senna 2 Tablet(s) Oral at bedtime      Assessment: 78 year old woman with CAD s/p MICHAEL x2 in 2017, a-fib not on AC due to GI bleed, and compensated diastolic CHF presents with supply demand ischemia and acute blood loss anemia.    Plan of Care:    #Supply demand ischemia-  Due to sepsis secondary to UTI.  S/p coronary MICHAEL x2 in 2017.  Patient with multiple hospitalizations for recurrent life threatening bleeding.  Agree with just Plavix for antiplatelet therapy at this time.    #Paroxymal a-fib-  Currently in sinus  No AC given h/o GI bleed.    #Compensated diastolic CHF-  Euvolemic on exam today.  Close attention to volume status with administration of ABx.    Okay to switch to oral ABx, as per ID.  Discharge planning as per primary team.     Over 25 minutes spent on total encounter; more than 50% of the visit was spent counseling and/or coordinating care by the attending physician.      Julio César Hooper MD [facility]  Cardiovascular Disease  [phone]
Cardiovascular Disease Progress Note    Overnight events: No acute events overnight. Ms. Tsang denies chest pain or SOB  Otherwise review of systems negative    Objective Findings:  T(C): 36.7 (18 @ 05:49), Max: 36.9 (18 @ 17:13)  HR: 64 (18 @ 05:49) (64 - 70)  BP: 128/68 (18 @ 05:49) (128/68 - 151/63)  RR: 17 (18 @ 05:49) (17 - 18)  SpO2: 98% (18 @ 05:49) (98% - 100%)  Wt(kg): --  Daily     Daily Weight in k (12 Sep 2018 07:34)      Physical Exam:  Gen: NAD  HEENT: EOMI  CV: RRR, normal S1 + S2, no m/r/g  Lungs: CTAB  Abd: soft, non-tender  Ext: No edema    Telemetry: Sinus    Laboratory Data:                        8.8    5.76  )-----------( 595      ( 12 Sep 2018 07:00 )             29.1         135  |  99  |  40<H>  ----------------------------<  117<H>  4.6   |  18<L>  |  2.91<H>    Ca    8.6      12 Sep 2018 07:00  Phos  4.9     09-10  Mg     2.2         TPro  7.2  /  Alb  2.5<L>  /  TBili  0.3  /  DBili  x   /  AST  11  /  ALT  10  /  AlkPhos  232<H>  09-10              Inpatient Medications:  MEDICATIONS  (STANDING):  atorvastatin 40 milliGRAM(s) Oral at bedtime  clopidogrel Tablet 75 milliGRAM(s) Oral daily  dextrose 5%. 1000 milliLiter(s) (50 mL/Hr) IV Continuous <Continuous>  dextrose 50% Injectable 12.5 Gram(s) IV Push once  dextrose 50% Injectable 25 Gram(s) IV Push once  dextrose 50% Injectable 25 Gram(s) IV Push once  docusate sodium 100 milliGRAM(s) Oral three times a day  ertapenem  IVPB 500 milliGRAM(s) IV Intermittent every 24 hours  ferrous    sulfate 325 milliGRAM(s) Oral daily  furosemide    Tablet 40 milliGRAM(s) Oral two times a day  gabapentin 300 milliGRAM(s) Oral at bedtime  influenza   Vaccine 0.5 milliLiter(s) IntraMuscular once  insulin glargine Injectable (LANTUS) 42 Unit(s) SubCutaneous at bedtime  insulin lispro (HumaLOG) corrective regimen sliding scale   SubCutaneous three times a day before meals  metoprolol tartrate 12.5 milliGRAM(s) Oral two times a day  multivitamin 1 Tablet(s) Oral daily  pantoprazole    Tablet 40 milliGRAM(s) Oral two times a day  senna 2 Tablet(s) Oral at bedtime      Assessment:  78 year old woman with CAD s/p MICHAEL x2 in 2017, a-fib not on AC due to GI bleed, and compensated diastolic CHF presents with supply demand ischemia and acute blood loss anemia.    Plan of Care:    #Supply demand ischemia-  Due to sepsis secondary to UTI.  S/p coronary MICHAEL x2 in 2017.  Patient with multiple hospitalizations for recurrent life threatening bleeding.  Agree with just Plavix for antiplatelet therapy at this time.  Treatment of UTI/pyelonephritis as per primary team (patient with recurrent enterobacter UTI).  Blood cultures negative thus far.    #Paroxymal a-fib-  Currently in sinus  No AC given h/o GI bleed.    #Compensated diastolic CHF-  Euvolemic on exam today.  Close attention to volume status with administration of ABx.    Discharge planning as per ID and primary team.     Over 25 minutes spent on total encounter; more than 50% of the visit was spent counseling and/or coordinating care by the attending physician.      Julio César Hooper MD [facility]  Cardiovascular Disease  [phone]
Cardiovascular Disease Progress Note    Overnight events: No acute events overnight. Ms. Tsang denies chest pain or SOB.   Otherwise review of systems negative    Objective Findings:  T(C): 36.7 (09-10-18 @ 11:14), Max: 37.5 (09-10-18 @ 05:53)  HR: 70 (09-10-18 @ 11:14) (66 - 78)  BP: 128/49 (09-10-18 @ 11:14) (106/52 - 158/66)  RR: 17 (09-10-18 @ 11:14) (17 - 18)  SpO2: 98% (09-10-18 @ 11:14) (98% - 100%)  Wt(kg): --  Daily     Daily       Physical Exam:  Gen: NAD  HEENT: EOMI  CV: RRR, normal S1 + S2, no m/r/g  Lungs: CTAB  Abd: soft, non-tender  Ext: No edema    Telemetry: Sinus    Laboratory Data:                        7.6    20.24 )-----------( 570      ( 09 Sep 2018 12:15 )             25.6     09-09    132<L>  |  97<L>  |  37<H>  ----------------------------<  143<H>  4.6   |  20<L>  |  2.60<H>    Ca    8.7      09 Sep 2018 12:15  Phos  5.0     09-09  Mg     2.2     09-09    TPro  7.3  /  Alb  2.9<L>  /  TBili  0.2  /  DBili  x   /  AST  8   /  ALT  7   /  AlkPhos  195<H>  09-09      CARDIAC MARKERS ( 09 Sep 2018 12:15 )  x     / x     / 72 u/L / x     / x              Inpatient Medications:  MEDICATIONS  (STANDING):  atorvastatin 40 milliGRAM(s) Oral at bedtime  cefTRIAXone   IVPB 1 Gram(s) IV Intermittent every 24 hours  clopidogrel Tablet 75 milliGRAM(s) Oral daily  dextrose 5%. 1000 milliLiter(s) (50 mL/Hr) IV Continuous <Continuous>  dextrose 50% Injectable 12.5 Gram(s) IV Push once  dextrose 50% Injectable 25 Gram(s) IV Push once  dextrose 50% Injectable 25 Gram(s) IV Push once  docusate sodium 100 milliGRAM(s) Oral three times a day  ferrous    sulfate 325 milliGRAM(s) Oral daily  furosemide    Tablet 40 milliGRAM(s) Oral two times a day  gabapentin 300 milliGRAM(s) Oral at bedtime  influenza   Vaccine 0.5 milliLiter(s) IntraMuscular once  insulin glargine Injectable (LANTUS) 42 Unit(s) SubCutaneous at bedtime  insulin lispro (HumaLOG) corrective regimen sliding scale   SubCutaneous three times a day before meals  metoprolol tartrate 12.5 milliGRAM(s) Oral two times a day  multivitamin 1 Tablet(s) Oral daily  pantoprazole    Tablet 40 milliGRAM(s) Oral two times a day  senna 2 Tablet(s) Oral at bedtime      Assessment: 78 year old woman with CAD s/p MICHAEL x2 in 12/2017, a-fib not on AC due to GI bleed, and compensated diastolic CHF presents with supply demand ischemia and acute blood loss anemia.    Plan of Care:    #Supply demand ischemia-  Due to sepsis secondary to UTI with concern for pyelonephritis.  No ischemic changes on EKG and patient denies chest pain.  S/p coronary MICHAEL x2 in 12/2017.  Patient with multiple hospitalizations for recurrent life threatening bleeding.  Agree with just Plavix for antiplatelet therapy at this time.  Treatment of UTI/pyelonephritis as per primary team (patient with h/o enterobacter UTI in 5/2018).  Please check blood cultures.    #Paroxymal a-fib-  Currently in sinus  No AC given h/o GI bleed.    #Compensated diastolic CHF-  Euvolemic on exam today.  Close attention to volume status with administration of ABx.    Over 25 minutes spent on total encounter; more than 50% of the visit was spent counseling and/or coordinating care by the attending physician.      JulioC ésar Hooper MD [facility]  Cardiovascular Disease  [phone]
Cardiovascular Disease Progress Note    Overnight events: No acute events overnight. Ms. Tsang says the back pain is improved. No chest pain or SOB.    Otherwise review of systems negative    Objective Findings:  T(C): 36.8 (18 @ 06:42), Max: 37.3 (09-10-18 @ 16:05)  HR: 62 (18 @ 06:42) (62 - 70)  BP: 141/69 (18 @ 06:42) (128/49 - 156/60)  RR: 18 (18 @ 06:42) (16 - 18)  SpO2: 98% (18 @ 06:42) (98% - 100%)  Wt(kg): --  Daily     Daily Weight in k (11 Sep 2018 01:35)      Physical Exam:  Gen: NAD  HEENT: EOMI  CV: RRR, normal S1 + S2, no m/r/g  Lungs: CTAB  Abd: soft, non-tender  Ext: No edema    Telemetry: Sinus 50-60s.    Laboratory Data:                        9.0    8.31  )-----------( 524      ( 11 Sep 2018 06:00 )             30.0         135  |  101  |  40<H>  ----------------------------<  58<L>  4.6   |  17<L>  |  2.92<H>    Ca    8.7      11 Sep 2018 06:00  Phos  4.9     09-10  Mg     2.3         TPro  7.2  /  Alb  2.5<L>  /  TBili  0.3  /  DBili  x   /  AST  11  /  ALT  10  /  AlkPhos  232<H>  09-10      CARDIAC MARKERS ( 09 Sep 2018 12:15 )  x     / x     / 72 u/L / x     / x              Inpatient Medications:  MEDICATIONS  (STANDING):  atorvastatin 40 milliGRAM(s) Oral at bedtime  clopidogrel Tablet 75 milliGRAM(s) Oral daily  dextrose 5%. 1000 milliLiter(s) (50 mL/Hr) IV Continuous <Continuous>  dextrose 50% Injectable 12.5 Gram(s) IV Push once  dextrose 50% Injectable 25 Gram(s) IV Push once  dextrose 50% Injectable 25 Gram(s) IV Push once  docusate sodium 100 milliGRAM(s) Oral three times a day  ertapenem  IVPB 500 milliGRAM(s) IV Intermittent every 24 hours  ferrous    sulfate 325 milliGRAM(s) Oral daily  furosemide    Tablet 40 milliGRAM(s) Oral two times a day  gabapentin 300 milliGRAM(s) Oral at bedtime  influenza   Vaccine 0.5 milliLiter(s) IntraMuscular once  insulin glargine Injectable (LANTUS) 42 Unit(s) SubCutaneous at bedtime  insulin lispro (HumaLOG) corrective regimen sliding scale   SubCutaneous three times a day before meals  metoprolol tartrate 12.5 milliGRAM(s) Oral two times a day  multivitamin 1 Tablet(s) Oral daily  pantoprazole    Tablet 40 milliGRAM(s) Oral two times a day  senna 2 Tablet(s) Oral at bedtime      Assessment: 78 year old woman with CAD s/p MICHAEL x2 in 2017, a-fib not on AC due to GI bleed, and compensated diastolic CHF presents with supply demand ischemia and acute blood loss anemia.    Plan of Care:    #Supply demand ischemia-  Due to sepsis secondary to UTI with concern for pyelonephritis.  S/p coronary MICHAEL x2 in 2017.  Patient with multiple hospitalizations for recurrent life threatening bleeding.  Agree with just Plavix for antiplatelet therapy at this time.  Treatment of UTI/pyelonephritis as per primary team (patient with recurrent enterobacter UTI).  F/U Blood Cultures.    #Paroxymal a-fib-  Currently in sinus  No AC given h/o GI bleed.    #Compensated diastolic CHF-  Euvolemic on exam today.  Close attention to volume status with administration of ABx.    Over 25 minutes spent on total encounter; more than 50% of the visit was spent counseling and/or coordinating care by the attending physician.      Julio César Hooper MD [facility]  Cardiovascular Disease  [phone]
INTERVAL HPI/OVERNIGHT EVENTS:    (+) bm yesterday and this morning; nonbloody   denies n/v/d/c, abdominal pain, melena or brbpr     MEDICATIONS  (STANDING):  atorvastatin 40 milliGRAM(s) Oral at bedtime  clopidogrel Tablet 75 milliGRAM(s) Oral daily  dextrose 5%. 1000 milliLiter(s) (50 mL/Hr) IV Continuous <Continuous>  dextrose 50% Injectable 12.5 Gram(s) IV Push once  dextrose 50% Injectable 25 Gram(s) IV Push once  dextrose 50% Injectable 25 Gram(s) IV Push once  docusate sodium 100 milliGRAM(s) Oral three times a day  ertapenem  IVPB 500 milliGRAM(s) IV Intermittent every 24 hours  ferrous    sulfate 325 milliGRAM(s) Oral daily  furosemide    Tablet 40 milliGRAM(s) Oral two times a day  gabapentin 300 milliGRAM(s) Oral at bedtime  influenza   Vaccine 0.5 milliLiter(s) IntraMuscular once  insulin glargine Injectable (LANTUS) 42 Unit(s) SubCutaneous at bedtime  insulin lispro (HumaLOG) corrective regimen sliding scale   SubCutaneous three times a day before meals  metoprolol tartrate 12.5 milliGRAM(s) Oral two times a day  multivitamin 1 Tablet(s) Oral daily  pantoprazole    Tablet 40 milliGRAM(s) Oral two times a day  senna 2 Tablet(s) Oral at bedtime    MEDICATIONS  (PRN):  acetaminophen   Tablet .. 650 milliGRAM(s) Oral every 6 hours PRN Temp greater or equal to 38C (100.4F), Mild Pain (1 - 3), Moderate Pain (4 - 6)  dextrose 40% Gel 15 Gram(s) Oral once PRN Blood Glucose LESS THAN 70 milliGRAM(s)/deciliter  glucagon  Injectable 1 milliGRAM(s) IntraMuscular once PRN Glucose LESS THAN 70 milligrams/deciliter      Allergies    Carrots (Rash)  No Known Drug Allergies    Intolerances        Review of Systems:    General:  No wt loss, fevers, chills, night sweats, fatigue   Eyes:  Good vision, no reported pain  ENT:  No sore throat, pain, runny nose, dysphagia  CV:  No pain, palpitations, hypo/hypertension  Resp:  No dyspnea, cough, tachypnea, wheezing  GI:  No pain, No nausea, No vomiting, No diarrhea, No constipation, No weight loss, No fever, No pruritis, No rectal bleeding, No melena, No dysphagia  :  No pain, bleeding, incontinence, nocturia  Muscle:  No pain, weakness  Neuro:  No weakness, tingling, memory problems  Psych:  No fatigue, insomnia, mood problems, depression  Endocrine:  No polyuria, polydypsia, cold/heat intolerance  Heme:  No petechiae, ecchymosis, easy bruisability  Skin:  No rash, tattoos, scars, edema      Vital Signs Last 24 Hrs  T(C): 36.6 (13 Sep 2018 06:51), Max: 36.9 (12 Sep 2018 12:09)  T(F): 97.9 (13 Sep 2018 06:51), Max: 98.5 (12 Sep 2018 12:09)  HR: 63 (13 Sep 2018 06:51) (63 - 73)  BP: 135/51 (13 Sep 2018 06:51) (135/51 - 157/75)  BP(mean): --  RR: 17 (13 Sep 2018 06:51) (17 - 18)  SpO2: 97% (13 Sep 2018 06:51) (97% - 98%)    PHYSICAL EXAM:    Constitutional: NAD  HEENT: EOMI, throat clear  Neck: No LAD, supple  Respiratory: CTA and P  Cardiovascular: S1 and S2, RRR, no M  Gastrointestinal: BS+, soft, NT/ND, neg HSM,  Extremities: No peripheral edema, neg clubbing, cyanosis  Vascular: 2+ peripheral pulses  Neurological: A/O x 3, no focal deficits  Psychiatric: Normal mood, normal affect  Skin: No rashes      LABS:                        9.7    5.34  )-----------( 623      ( 13 Sep 2018 06:14 )             33.0     09-13    137  |  102  |  39<H>  ----------------------------<  69<L>  4.9   |  21<L>  |  2.79<H>    Ca    9.0      13 Sep 2018 06:14  Mg     2.2     09-13            RADIOLOGY & ADDITIONAL TESTS:
INTERVAL HPI/OVERNIGHT EVENTS:    pt is without abdominal discomfort this morning  no n/v  passing flatus; bm overnight per pt  tolerating PO intake     MEDICATIONS  (STANDING):  atorvastatin 40 milliGRAM(s) Oral at bedtime  clopidogrel Tablet 75 milliGRAM(s) Oral daily  dextrose 5%. 1000 milliLiter(s) (50 mL/Hr) IV Continuous <Continuous>  dextrose 50% Injectable 12.5 Gram(s) IV Push once  dextrose 50% Injectable 25 Gram(s) IV Push once  dextrose 50% Injectable 25 Gram(s) IV Push once  docusate sodium 100 milliGRAM(s) Oral three times a day  ertapenem  IVPB 500 milliGRAM(s) IV Intermittent every 24 hours  ferrous    sulfate 325 milliGRAM(s) Oral daily  furosemide    Tablet 40 milliGRAM(s) Oral two times a day  gabapentin 300 milliGRAM(s) Oral at bedtime  influenza   Vaccine 0.5 milliLiter(s) IntraMuscular once  insulin glargine Injectable (LANTUS) 42 Unit(s) SubCutaneous at bedtime  insulin lispro (HumaLOG) corrective regimen sliding scale   SubCutaneous three times a day before meals  metoprolol tartrate 12.5 milliGRAM(s) Oral two times a day  multivitamin 1 Tablet(s) Oral daily  pantoprazole    Tablet 40 milliGRAM(s) Oral two times a day  senna 2 Tablet(s) Oral at bedtime    MEDICATIONS  (PRN):  acetaminophen   Tablet .. 650 milliGRAM(s) Oral every 6 hours PRN Temp greater or equal to 38C (100.4F), Mild Pain (1 - 3), Moderate Pain (4 - 6)  dextrose 40% Gel 15 Gram(s) Oral once PRN Blood Glucose LESS THAN 70 milliGRAM(s)/deciliter  glucagon  Injectable 1 milliGRAM(s) IntraMuscular once PRN Glucose LESS THAN 70 milligrams/deciliter      Allergies    Carrots (Rash)  No Known Drug Allergies    Intolerances        Review of Systems:    General:  No wt loss, fevers, chills, night sweats, fatigue   Eyes:  Good vision, no reported pain  ENT:  No sore throat, pain, runny nose, dysphagia  CV:  No pain, palpitations, hypo/hypertension  Resp:  No dyspnea, cough, tachypnea, wheezing  GI:  No pain, No nausea, No vomiting, No diarrhea, No constipation, No weight loss, No fever, No pruritis, No rectal bleeding, No melena, No dysphagia  :  No pain, bleeding, incontinence, nocturia  Muscle:  No pain, weakness  Neuro:  No weakness, tingling, memory problems  Psych:  No fatigue, insomnia, mood problems, depression  Endocrine:  No polyuria, polydypsia, cold/heat intolerance  Heme:  No petechiae, ecchymosis, easy bruisability  Skin:  No rash, tattoos, scars, edema      Vital Signs Last 24 Hrs  T(C): 36.8 (11 Sep 2018 06:42), Max: 37.3 (10 Sep 2018 16:05)  T(F): 98.3 (11 Sep 2018 06:42), Max: 99.1 (10 Sep 2018 16:05)  HR: 62 (11 Sep 2018 06:42) (62 - 70)  BP: 141/69 (11 Sep 2018 06:42) (128/49 - 156/60)  BP(mean): --  RR: 18 (11 Sep 2018 06:42) (16 - 18)  SpO2: 98% (11 Sep 2018 06:42) (98% - 100%)    PHYSICAL EXAM:    Constitutional: NAD  HEENT: EOMI, throat clear  Neck: No LAD, supple  Respiratory: CTA and P  Cardiovascular: S1 and S2, RRR, no M  Gastrointestinal: BS+, soft, NT/ND, neg HSM,  Extremities: No peripheral edema, neg clubbing, cyanosis  Vascular: 2+ peripheral pulses  Neurological: A/O x 3, no focal deficits  Psychiatric: Normal mood, normal affect  Skin: No rashes      LABS:                        9.0    8.31  )-----------( 524      ( 11 Sep 2018 06:00 )             30.0     09-11    135  |  101  |  40<H>  ----------------------------<  58<L>  4.6   |  17<L>  |  2.92<H>    Ca    8.7      11 Sep 2018 06:00  Phos  4.9     09-10  Mg     2.3     09-11    TPro  7.2  /  Alb  2.5<L>  /  TBili  0.3  /  DBili  x   /  AST  11  /  ALT  10  /  AlkPhos  232<H>  09-10          RADIOLOGY & ADDITIONAL TESTS:
INTERVAL HPI/OVERNIGHT EVENTS:    reports constipation x 2 days; requesting Enema  no abdominal pain   feeling well   tolerating PO intake  denies n/v/d, abdominal pain, melena or brbpr     MEDICATIONS  (STANDING):  atorvastatin 40 milliGRAM(s) Oral at bedtime  clopidogrel Tablet 75 milliGRAM(s) Oral daily  dextrose 5%. 1000 milliLiter(s) (50 mL/Hr) IV Continuous <Continuous>  dextrose 50% Injectable 12.5 Gram(s) IV Push once  dextrose 50% Injectable 25 Gram(s) IV Push once  dextrose 50% Injectable 25 Gram(s) IV Push once  docusate sodium 100 milliGRAM(s) Oral three times a day  ertapenem  IVPB 500 milliGRAM(s) IV Intermittent every 24 hours  ferrous    sulfate 325 milliGRAM(s) Oral daily  furosemide    Tablet 40 milliGRAM(s) Oral two times a day  gabapentin 300 milliGRAM(s) Oral at bedtime  influenza   Vaccine 0.5 milliLiter(s) IntraMuscular once  insulin glargine Injectable (LANTUS) 42 Unit(s) SubCutaneous at bedtime  insulin lispro (HumaLOG) corrective regimen sliding scale   SubCutaneous three times a day before meals  metoprolol tartrate 12.5 milliGRAM(s) Oral two times a day  multivitamin 1 Tablet(s) Oral daily  pantoprazole    Tablet 40 milliGRAM(s) Oral two times a day  senna 2 Tablet(s) Oral at bedtime    MEDICATIONS  (PRN):  acetaminophen   Tablet .. 650 milliGRAM(s) Oral every 6 hours PRN Temp greater or equal to 38C (100.4F), Mild Pain (1 - 3), Moderate Pain (4 - 6)  dextrose 40% Gel 15 Gram(s) Oral once PRN Blood Glucose LESS THAN 70 milliGRAM(s)/deciliter  glucagon  Injectable 1 milliGRAM(s) IntraMuscular once PRN Glucose LESS THAN 70 milligrams/deciliter      Allergies    Carrots (Rash)  No Known Drug Allergies    Intolerances        Review of Systems:    General:  No wt loss, fevers, chills, night sweats, fatigue   Eyes:  Good vision, no reported pain  ENT:  No sore throat, pain, runny nose, dysphagia  CV:  No pain, palpitations, hypo/hypertension  Resp:  No dyspnea, cough, tachypnea, wheezing  GI:  No pain, No nausea, No vomiting, No diarrhea, +constipation, No weight loss, No fever, No pruritis, No rectal bleeding, No melena, No dysphagia  :  No pain, bleeding, incontinence, nocturia  Muscle:  No pain, weakness  Neuro:  No weakness, tingling, memory problems  Psych:  No fatigue, insomnia, mood problems, depression  Endocrine:  No polyuria, polydypsia, cold/heat intolerance  Heme:  No petechiae, ecchymosis, easy bruisability  Skin:  No rash, tattoos, scars, edema      Vital Signs Last 24 Hrs  T(C): 36.7 (12 Sep 2018 05:49), Max: 36.9 (11 Sep 2018 17:13)  T(F): 98 (12 Sep 2018 05:49), Max: 98.4 (11 Sep 2018 17:13)  HR: 64 (12 Sep 2018 05:49) (64 - 70)  BP: 128/68 (12 Sep 2018 05:49) (128/68 - 151/63)  BP(mean): --  RR: 17 (12 Sep 2018 05:49) (17 - 18)  SpO2: 98% (12 Sep 2018 05:49) (98% - 100%)    PHYSICAL EXAM:    Constitutional: NAD  HEENT: EOMI, throat clear  Neck: No LAD, supple  Respiratory: CTA and P  Cardiovascular: S1 and S2, RRR, no M  Gastrointestinal: BS+, soft, NT/ND, neg HSM,  Extremities: No peripheral edema, neg clubbing, cyanosis  Vascular: 2+ peripheral pulses  Neurological: A/O x 3, no focal deficits  Psychiatric: Normal mood, normal affect  Skin: No rashes      LABS:                        8.8    5.76  )-----------( 595      ( 12 Sep 2018 07:00 )             29.1     09-12    135  |  99  |  40<H>  ----------------------------<  117<H>  4.6   |  18<L>  |  2.91<H>    Ca    8.6      12 Sep 2018 07:00  Phos  4.9     09-10  Mg     2.2     09-12    TPro  7.2  /  Alb  2.5<L>  /  TBili  0.3  /  DBili  x   /  AST  11  /  ALT  10  /  AlkPhos  232<H>  09-10          RADIOLOGY & ADDITIONAL TESTS:
Infectious Diseases progress note:    Subjective: Denies abd pain, no dysuria or urgency.  Afebrile.      ROS:  CONSTITUTIONAL:  No fever, chills, rigors  CARDIOVASCULAR:  No chest pain or palpitations  RESPIRATORY:   No SOB, cough, dyspnea on exertion.  No wheezing  GASTROINTESTINAL:  No abd pain, N/V, diarrhea/constipation  EXTREMITIES:  No swelling or joint pain  GENITOURINARY:  No burning on urination, increased frequency or urgency.  No flank pain  NEUROLOGIC:  No HA, visual disturbances  SKIN: No rashes    Allergies    Carrots (Rash)  No Known Drug Allergies    Intolerances        ANTIBIOTICS/RELEVANT:  antimicrobials  ertapenem  IVPB 500 milliGRAM(s) IV Intermittent every 24 hours    immunologic:  influenza   Vaccine 0.5 milliLiter(s) IntraMuscular once    OTHER:  acetaminophen   Tablet .. 650 milliGRAM(s) Oral every 6 hours PRN  atorvastatin 40 milliGRAM(s) Oral at bedtime  clopidogrel Tablet 75 milliGRAM(s) Oral daily  dextrose 40% Gel 15 Gram(s) Oral once PRN  dextrose 5%. 1000 milliLiter(s) IV Continuous <Continuous>  dextrose 50% Injectable 12.5 Gram(s) IV Push once  dextrose 50% Injectable 25 Gram(s) IV Push once  dextrose 50% Injectable 25 Gram(s) IV Push once  docusate sodium 100 milliGRAM(s) Oral three times a day  ferrous    sulfate 325 milliGRAM(s) Oral daily  furosemide    Tablet 40 milliGRAM(s) Oral two times a day  gabapentin 300 milliGRAM(s) Oral at bedtime  glucagon  Injectable 1 milliGRAM(s) IntraMuscular once PRN  insulin glargine Injectable (LANTUS) 42 Unit(s) SubCutaneous at bedtime  insulin lispro (HumaLOG) corrective regimen sliding scale   SubCutaneous three times a day before meals  metoprolol tartrate 12.5 milliGRAM(s) Oral two times a day  multivitamin 1 Tablet(s) Oral daily  pantoprazole    Tablet 40 milliGRAM(s) Oral two times a day  senna 2 Tablet(s) Oral at bedtime      Objective:  Vital Signs Last 24 Hrs  T(C): 36.9 (12 Sep 2018 12:09), Max: 36.9 (11 Sep 2018 17:13)  T(F): 98.5 (12 Sep 2018 12:09), Max: 98.5 (12 Sep 2018 12:09)  HR: 69 (12 Sep 2018 12:09) (64 - 69)  BP: 136/70 (12 Sep 2018 12:09) (128/68 - 151/63)  BP(mean): --  RR: 18 (12 Sep 2018 12:09) (17 - 18)  SpO2: 98% (12 Sep 2018 12:09) (98% - 100%)    PHYSICAL EXAM:  Constitutional:NAD  Eyes:EDIN, EOMI  Ear/Nose/Throat: no thrush, mucositis.  Moist mucous membranes	  Neck:no JVD, no lymphadenopathy, supple  Respiratory: CTA carlos manuel  Cardiovascular: S1S2 RRR, no murmurs  Gastrointestinal:soft, nontender,  nondistended (+) BS  Extremities:no e/e/c  Skin:  no rashes, open wounds or ulcerations        LABS:                        8.8    5.76  )-----------( 595      ( 12 Sep 2018 07:00 )             29.1     09-12    135  |  99  |  40<H>  ----------------------------<  117<H>  4.6   |  18<L>  |  2.91<H>    Ca    8.6      12 Sep 2018 07:00  Mg     2.2     09-12                              MICROBIOLOGY:    Culture - Blood (09.11.18 @ 06:33)    Culture - Blood:   NO ORGANISMS ISOLATED  NO ORGANISMS ISOLATED AT 24 HOURS    Specimen Source: BLOOD VENOUS    Culture - Blood (09.11.18 @ 06:33)    Culture - Blood:   NO ORGANISMS ISOLATED  NO ORGANISMS ISOLATED AT 24 HOURS    Specimen Source: BLOOD PERIPHERAL    Culture - Urine (09.09.18 @ 09:10)    -  Tobramycin: S <=2 CAITLYN    -  Tobramycin: S <=2 CAITLYN    -  Trimethoprim/Sulfamethoxazole: S <=0.5/9.5 CAITLYN    -  Trimethoprim/Sulfamethoxazole: S <=0.5/9.5 CAITLYN    Culture - Urine:   COLONY COUNT: > = 100,000 CFU/ML    -  Nitrofurantoin: I 64 CAITLYN    -  Nitrofurantoin: S <=32 CAITLYN    -  Piperacillin/Tazobactam: S <=8 CAITLYN    -  Piperacillin/Tazobactam: S <=8 CAITLYN    -  Tigecycline: S <=1 CAITLYN    -  Tigecycline: S <=1 CAITLYN    -  Meropenem: S <=1 CAITLYN    -  Meropenem: S <=1 CAITLYN    -  Gentamicin: S <=1 CAITLYN    -  Gentamicin: S <=1 CAITLYN    -  Imipenem: S <=1 CAITLYN    -  Imipenem: S <=1 CAITLYN    -  Levofloxacin: S <=1 CAITLYN    -  Levofloxacin: S <=1 CAITLYN    -  Ertapenem: S <=0.5 CAITLYN    -  Ertapenem: S <=0.5 CAITLYN    -  Ceftriaxone: S <=1 CAITLYN    -  Ceftriaxone: S <=1 CAITLYN    -  Ciprofloxacin: S <=0.5 CAITLYN    -  Ciprofloxacin: S <=0.5 CAITLYN    -  Amikacin: S <=8 CAITLYN    -  Amikacin: S <=8 CAITLYN    -  Ampicillin: R 16 CAITLYN    -  Ampicillin: S <=2 CAITLYN    -  Ampicillin/Sulbactam: R 8/4 CAITLYN    -  Ampicillin/Sulbactam: S <=4/2 CAITLYN    -  Aztreonam: S <=4 CAITLYN    -  Aztreonam: S <=4 CAITLYN    -  Cefazolin: R >16 CIATLYN    -  Cefazolin: S <=2 CAITLYN    -  Cefepime: S <=2 CAITLYN    -  Cefepime: S <=2 CAITLYN    -  Cefoxitin: R >16 CAITLYN    -  Cefoxitin: S <=4 CAITLYN    -  Ceftazidime: S <=1 CAITLYN    -  Ceftazidime: S <=1 CAITLYN    Specimen Source: URINE MIDSTREAM    Organism Identification: Enterobacter aerogenes  Escherichia coli    Organism: Enterobacter aerogenes    Organism: Escherichia coli  COLONY COUNT: 10,000-49,000 CFU/mL    Method Type: NEGATIVE CAITLYN 43    Method Type: NEGATIVE CAITLYN 43          RADIOLOGY & ADDITIONAL STUDIES:    < from: US Abdomen Limited (09.10.18 @ 11:49) >  IMPRESSION:     Normal right upper quadrant abdominal ultrasound.    < end of copied text >
Infectious Diseases progress note:    Subjective: Feels well.  No abd pain/dysuria/fevers    ROS:  CONSTITUTIONAL:  No fever, chills, rigors  CARDIOVASCULAR:  No chest pain or palpitations  RESPIRATORY:   No SOB, cough, dyspnea on exertion.  No wheezing  GASTROINTESTINAL:  No abd pain, N/V, diarrhea/constipation  EXTREMITIES:  No swelling or joint pain  GENITOURINARY:  No burning on urination, increased frequency or urgency.  No flank pain  NEUROLOGIC:  No HA, visual disturbances  SKIN: No rashes    Allergies    Carrots (Rash)  No Known Drug Allergies    Intolerances        ANTIBIOTICS/RELEVANT:  antimicrobials  ertapenem  IVPB 500 milliGRAM(s) IV Intermittent every 24 hours    immunologic:  influenza   Vaccine 0.5 milliLiter(s) IntraMuscular once    OTHER:  acetaminophen   Tablet .. 650 milliGRAM(s) Oral every 6 hours PRN  atorvastatin 40 milliGRAM(s) Oral at bedtime  clopidogrel Tablet 75 milliGRAM(s) Oral daily  dextrose 40% Gel 15 Gram(s) Oral once PRN  dextrose 5%. 1000 milliLiter(s) IV Continuous <Continuous>  dextrose 50% Injectable 12.5 Gram(s) IV Push once  dextrose 50% Injectable 25 Gram(s) IV Push once  dextrose 50% Injectable 25 Gram(s) IV Push once  docusate sodium 100 milliGRAM(s) Oral three times a day  ferrous    sulfate 325 milliGRAM(s) Oral daily  furosemide    Tablet 40 milliGRAM(s) Oral two times a day  gabapentin 300 milliGRAM(s) Oral at bedtime  glucagon  Injectable 1 milliGRAM(s) IntraMuscular once PRN  insulin glargine Injectable (LANTUS) 42 Unit(s) SubCutaneous at bedtime  insulin lispro (HumaLOG) corrective regimen sliding scale   SubCutaneous three times a day before meals  metoprolol tartrate 12.5 milliGRAM(s) Oral two times a day  multivitamin 1 Tablet(s) Oral daily  pantoprazole    Tablet 40 milliGRAM(s) Oral two times a day  senna 2 Tablet(s) Oral at bedtime      Objective:  Vital Signs Last 24 Hrs  T(C): 36.5 (13 Sep 2018 12:41), Max: 36.7 (12 Sep 2018 16:54)  T(F): 97.7 (13 Sep 2018 12:41), Max: 98 (12 Sep 2018 16:54)  HR: 66 (13 Sep 2018 12:41) (63 - 73)  BP: 149/73 (13 Sep 2018 12:41) (135/51 - 157/75)  BP(mean): --  RR: 18 (13 Sep 2018 12:41) (17 - 18)  SpO2: 100% (13 Sep 2018 12:41) (97% - 100%)    PHYSICAL EXAM:  Constitutional:NAD  Eyes:EDIN, EOMI  Ear/Nose/Throat: no thrush, mucositis.  Moist mucous membranes	  Neck:no JVD, no lymphadenopathy, supple  Respiratory: CTA carlos manuel  Cardiovascular: S1S2 RRR, no murmurs  Gastrointestinal:soft, nontender,  nondistended (+) BS  Extremities:no e/e/c  Skin:  no rashes, open wounds or ulcerations        LABS:                        9.7    5.34  )-----------( 623      ( 13 Sep 2018 06:14 )             33.0     09-13    137  |  102  |  39<H>  ----------------------------<  69<L>  4.9   |  21<L>  |  2.79<H>    Ca    9.0      13 Sep 2018 06:14  Mg     2.2     09-13                              MICROBIOLOGY:      Culture - Blood (09.11.18 @ 06:33)    Culture - Blood:   NO ORGANISMS ISOLATED  NO ORGANISMS ISOLATED AT 48 HRS.    Specimen Source: BLOOD VENOUS    Culture - Blood (09.11.18 @ 06:33)    Culture - Blood:   NO ORGANISMS ISOLATED  NO ORGANISMS ISOLATED AT 48 HRS.    Specimen Source: BLOOD PERIPHERAL    Culture - Urine (09.09.18 @ 09:10)    -  Trimethoprim/Sulfamethoxazole: S <=0.5/9.5 CAITLYN    -  Trimethoprim/Sulfamethoxazole: S <=0.5/9.5 CAITLYN    Culture - Urine:   COLONY COUNT: > = 100,000 CFU/ML    -  Piperacillin/Tazobactam: S <=8 CAITLYN    -  Piperacillin/Tazobactam: S <=8 CAITLYN    -  Tigecycline: S <=1 CAITLYN    -  Tigecycline: S <=1 CAITLYN    -  Tobramycin: S <=2 CAITLYN    -  Tobramycin: S <=2 CAITLYN    -  Gentamicin: S <=1 CAITLYN    -  Gentamicin: S <=1 CAITLYN    -  Imipenem: S <=1 CAITLYN    -  Imipenem: S <=1 CAITLYN    -  Levofloxacin: S <=1 CAITLYN    -  Levofloxacin: S <=1 CAITLYN    -  Meropenem: S <=1 CAITLYN    -  Meropenem: S <=1 CAITLYN    -  Nitrofurantoin: I 64 CAITLYN    -  Nitrofurantoin: S <=32 CAITLYN    -  Ertapenem: S <=0.5 CAITLYN    -  Ertapenem: S <=0.5 CAITLYN    -  Cefepime: S <=2 CAITLYN    -  Cefepime: S <=2 CAITLYN    -  Cefoxitin: R >16 CAITLYN    -  Cefoxitin: S <=4 CAITLYN    -  Ceftazidime: S <=1 CAITLYN    -  Ceftazidime: S <=1 CAITLYN    -  Ceftriaxone: S <=1 CAITLYN    -  Ceftriaxone: S <=1 CAITLYN    -  Ciprofloxacin: S <=0.5 CAITLYN    -  Ciprofloxacin: S <=0.5 CAITLYN    -  Amikacin: S <=8 CAITLYN    -  Amikacin: S <=8 CAITLYN    -  Ampicillin: R 16 CAITLYN    -  Ampicillin: S <=2 CAITLYN    -  Ampicillin/Sulbactam: R 8/4 CAITLYN    -  Ampicillin/Sulbactam: S <=4/2 CAITLYN    -  Aztreonam: S <=4 CAITLYN    -  Aztreonam: S <=4 CAITLYN    -  Cefazolin: R >16 CAITLYN    -  Cefazolin: S <=2 CAITLYN    Specimen Source: URINE MIDSTREAM    Organism Identification: Enterobacter aerogenes  Escherichia coli    Organism: Enterobacter aerogenes    Organism: Escherichia coli  COLONY COUNT: 10,000-49,000 CFU/mL    Method Type: NEGATIVE CAITLYN 43    Method Type: NEGATIVE CAITLYN 43        RADIOLOGY & ADDITIONAL STUDIES:
Infectious Diseases progress note:    Subjective: No abd pain/dysuria.  WBC normalized.  Afebrile.  Urine cultures growing enterobacter and small colony count E.coli.  On ertapenem    ROS:  CONSTITUTIONAL:  No fever, chills, rigors  CARDIOVASCULAR:  No chest pain or palpitations  RESPIRATORY:   No SOB, cough, dyspnea on exertion.  No wheezing  GASTROINTESTINAL:  No abd pain, N/V, diarrhea/constipation  EXTREMITIES:  No swelling or joint pain  GENITOURINARY:  No burning on urination, increased frequency or urgency.  No flank pain  NEUROLOGIC:  No HA, visual disturbances  SKIN: No rashes    Allergies    Carrots (Rash)  No Known Drug Allergies    Intolerances        ANTIBIOTICS/RELEVANT:  antimicrobials  ertapenem  IVPB 500 milliGRAM(s) IV Intermittent every 24 hours    immunologic:  influenza   Vaccine 0.5 milliLiter(s) IntraMuscular once    OTHER:  acetaminophen   Tablet .. 650 milliGRAM(s) Oral every 6 hours PRN  atorvastatin 40 milliGRAM(s) Oral at bedtime  clopidogrel Tablet 75 milliGRAM(s) Oral daily  dextrose 40% Gel 15 Gram(s) Oral once PRN  dextrose 5%. 1000 milliLiter(s) IV Continuous <Continuous>  dextrose 50% Injectable 12.5 Gram(s) IV Push once  dextrose 50% Injectable 25 Gram(s) IV Push once  dextrose 50% Injectable 25 Gram(s) IV Push once  docusate sodium 100 milliGRAM(s) Oral three times a day  ferrous    sulfate 325 milliGRAM(s) Oral daily  furosemide    Tablet 40 milliGRAM(s) Oral two times a day  gabapentin 300 milliGRAM(s) Oral at bedtime  glucagon  Injectable 1 milliGRAM(s) IntraMuscular once PRN  insulin glargine Injectable (LANTUS) 42 Unit(s) SubCutaneous at bedtime  insulin lispro (HumaLOG) corrective regimen sliding scale   SubCutaneous three times a day before meals  metoprolol tartrate 12.5 milliGRAM(s) Oral two times a day  multivitamin 1 Tablet(s) Oral daily  pantoprazole    Tablet 40 milliGRAM(s) Oral two times a day  senna 2 Tablet(s) Oral at bedtime      Objective:  Vital Signs Last 24 Hrs  T(C): 36.7 (11 Sep 2018 12:41), Max: 37.3 (10 Sep 2018 16:05)  T(F): 98 (11 Sep 2018 12:41), Max: 99.1 (10 Sep 2018 16:05)  HR: 70 (11 Sep 2018 12:41) (62 - 70)  BP: 140/89 (11 Sep 2018 12:41) (140/89 - 156/60)  BP(mean): --  RR: 18 (11 Sep 2018 12:41) (16 - 18)  SpO2: 100% (11 Sep 2018 12:41) (98% - 100%)    PHYSICAL EXAM:  Constitutional:NAD  Eyes:EDIN, EOMI  Ear/Nose/Throat: no thrush, mucositis.  Moist mucous membranes	  Neck:no JVD, no lymphadenopathy, supple  Respiratory: CTA carlos manuel  Cardiovascular: S1S2 RRR, no murmurs  Gastrointestinal:soft, nontender,  nondistended (+) BS  Extremities:no e/e/c  Skin:  no rashes, open wounds or ulcerations        LABS:                        9.0    8.31  )-----------( 524      ( 11 Sep 2018 06:00 )             30.0     09-11    135  |  101  |  40<H>  ----------------------------<  58<L>  4.6   |  17<L>  |  2.92<H>    Ca    8.7      11 Sep 2018 06:00  Phos  4.9     09-10  Mg     2.3     09-11    TPro  7.2  /  Alb  2.5<L>  /  TBili  0.3  /  DBili  x   /  AST  11  /  ALT  10  /  AlkPhos  232<H>  09-10                            MICROBIOLOGY:    Culture - Urine (09.09.18 @ 09:10)    Culture - Urine:   EAER^Enterobacter aerogenes  COLONY COUNT: > = 100,000 CFU/ML  EC^Escherichia coli  COLONY COUNT: 10,000-49,000 CFU/mL    Specimen Source: URINE MIDSTREAM          RADIOLOGY & ADDITIONAL STUDIES:    < from: US Abdomen Limited (09.10.18 @ 11:49) >    FINDINGS:    Liver: Within normal limits.    Bile ducts: Normal caliber.     Gallbladder: Within normal limits.        Pancreas: Visualized portions are within normal limits.    Right kidney: 10.8 cm. No hydronephrosis.    Ascites: None.    IVC: Visualized portions are within normal limits.    No focal site of tenderness was elicited during the examination.    IMPRESSION:     Normal right upper quadrant abdominal ultrasound.    < end of copied text >
Patient is a 78y old  Female who presents with a chief complaint of uti (11 Sep 2018 15:23)      SUBJECTIVE / OVERNIGHT EVENTS:   Feels better.  Denies CP/SOB/Palpitation/HA.    MEDICATIONS  (STANDING):  atorvastatin 40 milliGRAM(s) Oral at bedtime  clopidogrel Tablet 75 milliGRAM(s) Oral daily  dextrose 5%. 1000 milliLiter(s) (50 mL/Hr) IV Continuous <Continuous>  dextrose 50% Injectable 12.5 Gram(s) IV Push once  dextrose 50% Injectable 25 Gram(s) IV Push once  dextrose 50% Injectable 25 Gram(s) IV Push once  docusate sodium 100 milliGRAM(s) Oral three times a day  ertapenem  IVPB 500 milliGRAM(s) IV Intermittent every 24 hours  ferrous    sulfate 325 milliGRAM(s) Oral daily  furosemide    Tablet 40 milliGRAM(s) Oral two times a day  gabapentin 300 milliGRAM(s) Oral at bedtime  influenza   Vaccine 0.5 milliLiter(s) IntraMuscular once  insulin glargine Injectable (LANTUS) 42 Unit(s) SubCutaneous at bedtime  insulin lispro (HumaLOG) corrective regimen sliding scale   SubCutaneous three times a day before meals  metoprolol tartrate 12.5 milliGRAM(s) Oral two times a day  multivitamin 1 Tablet(s) Oral daily  pantoprazole    Tablet 40 milliGRAM(s) Oral two times a day  senna 2 Tablet(s) Oral at bedtime    MEDICATIONS  (PRN):  acetaminophen   Tablet .. 650 milliGRAM(s) Oral every 6 hours PRN Temp greater or equal to 38C (100.4F), Mild Pain (1 - 3), Moderate Pain (4 - 6)  dextrose 40% Gel 15 Gram(s) Oral once PRN Blood Glucose LESS THAN 70 milliGRAM(s)/deciliter  glucagon  Injectable 1 milliGRAM(s) IntraMuscular once PRN Glucose LESS THAN 70 milligrams/deciliter        CAPILLARY BLOOD GLUCOSE      POCT Blood Glucose.: 153 mg/dL (11 Sep 2018 21:30)  POCT Blood Glucose.: 126 mg/dL (11 Sep 2018 17:04)  POCT Blood Glucose.: 118 mg/dL (11 Sep 2018 11:44)  POCT Blood Glucose.: 74 mg/dL (11 Sep 2018 08:16)    I&O's Summary    10 Sep 2018 07:01  -  11 Sep 2018 07:00  --------------------------------------------------------  IN: 200 mL / OUT: 800 mL / NET: -600 mL    11 Sep 2018 07:01  -  12 Sep 2018 00:39  --------------------------------------------------------  IN: 360 mL / OUT: 0 mL / NET: 360 mL        PHYSICAL EXAM:  GENERAL: NAD, well-developed  HEAD:  Atraumatic, Normocephalic  NECK: Supple, No JVD  CHEST/LUNG: Clear to auscultation bilaterally; No wheezing.  HEART: Regular rate and rhythm; No murmurs, rubs, or gallops  ABDOMEN: Soft, Nontender, Nondistended; Bowel sounds present  EXTREMITIES:   No clubbing, cyanosis, or edema  NEUROLOGY: AAO X 3  SKIN: No rashes    LABS:                        9.0    8.31  )-----------( 524      ( 11 Sep 2018 06:00 )             30.0     09-11    135  |  101  |  40<H>  ----------------------------<  58<L>  4.6   |  17<L>  |  2.92<H>    Ca    8.7      11 Sep 2018 06:00  Phos  4.9     09-10  Mg     2.3     09-11    TPro  7.2  /  Alb  2.5<L>  /  TBili  0.3  /  DBili  x   /  AST  11  /  ALT  10  /  AlkPhos  232<H>  09-10            CAPILLARY BLOOD GLUCOSE      POCT Blood Glucose.: 153 mg/dL (11 Sep 2018 21:30)  POCT Blood Glucose.: 126 mg/dL (11 Sep 2018 17:04)  POCT Blood Glucose.: 118 mg/dL (11 Sep 2018 11:44)  POCT Blood Glucose.: 74 mg/dL (11 Sep 2018 08:16)    09-09 @ 09:10  Culture-urine   COLONY COUNT: > = 100,000 CFU/ML  Culture results --  method type NEGATIVE CAITLYN 43  Organism Enterobacter aerogenes  Organism Identification Enterobacter aerogenes  Escherichia coli  Specimen source URINE MIDSTREAM           09-09 @ 09:10  Culture blood --  Culture results --  Gram stain --  Gram stain blood --  Method type NEGATIVE CAITLYN 43  Organism Enterobacter aerogenes  Organism identification Enterobacter aerogenes  Escherichia coli  Specimen source URINE MIDSTREAM      RADIOLOGY & ADDITIONAL TESTS:    Imaging Personally Reviewed:    Consultant(s) Notes Reviewed:      Care Discussed with Consultants/Other Providers:
Patient is a 78y old  Female who presents with a chief complaint of uti (12 Sep 2018 15:38)      SUBJECTIVE / OVERNIGHT EVENTS:   Feels better.  Denies CP/SOB/Palpitation/HA.    MEDICATIONS  (STANDING):  atorvastatin 40 milliGRAM(s) Oral at bedtime  clopidogrel Tablet 75 milliGRAM(s) Oral daily  dextrose 5%. 1000 milliLiter(s) (50 mL/Hr) IV Continuous <Continuous>  dextrose 50% Injectable 12.5 Gram(s) IV Push once  dextrose 50% Injectable 25 Gram(s) IV Push once  dextrose 50% Injectable 25 Gram(s) IV Push once  docusate sodium 100 milliGRAM(s) Oral three times a day  ertapenem  IVPB 500 milliGRAM(s) IV Intermittent every 24 hours  ferrous    sulfate 325 milliGRAM(s) Oral daily  furosemide    Tablet 40 milliGRAM(s) Oral two times a day  gabapentin 300 milliGRAM(s) Oral at bedtime  influenza   Vaccine 0.5 milliLiter(s) IntraMuscular once  insulin glargine Injectable (LANTUS) 42 Unit(s) SubCutaneous at bedtime  insulin lispro (HumaLOG) corrective regimen sliding scale   SubCutaneous three times a day before meals  metoprolol tartrate 12.5 milliGRAM(s) Oral two times a day  multivitamin 1 Tablet(s) Oral daily  pantoprazole    Tablet 40 milliGRAM(s) Oral two times a day  senna 2 Tablet(s) Oral at bedtime    MEDICATIONS  (PRN):  acetaminophen   Tablet .. 650 milliGRAM(s) Oral every 6 hours PRN Temp greater or equal to 38C (100.4F), Mild Pain (1 - 3), Moderate Pain (4 - 6)  dextrose 40% Gel 15 Gram(s) Oral once PRN Blood Glucose LESS THAN 70 milliGRAM(s)/deciliter  glucagon  Injectable 1 milliGRAM(s) IntraMuscular once PRN Glucose LESS THAN 70 milligrams/deciliter        CAPILLARY BLOOD GLUCOSE      POCT Blood Glucose.: 211 mg/dL (12 Sep 2018 22:15)  POCT Blood Glucose.: 100 mg/dL (12 Sep 2018 17:17)  POCT Blood Glucose.: 122 mg/dL (12 Sep 2018 11:45)  POCT Blood Glucose.: 117 mg/dL (12 Sep 2018 08:16)    I&O's Summary    11 Sep 2018 07:01  -  12 Sep 2018 07:00  --------------------------------------------------------  IN: 360 mL / OUT: 0 mL / NET: 360 mL    12 Sep 2018 07:01  -  12 Sep 2018 23:38  --------------------------------------------------------  IN: 488 mL / OUT: 0 mL / NET: 488 mL        PHYSICAL EXAM:  GENERAL: NAD, well-developed  HEAD:  Atraumatic, Normocephalic  NECK: Supple, No JVD  CHEST/LUNG: Clear to auscultation bilaterally; No wheezing.  HEART: Regular rate and rhythm; No murmurs, rubs, or gallops  ABDOMEN: Soft, Nontender, Nondistended; Bowel sounds present  EXTREMITIES:   No clubbing, cyanosis, or edema  NEUROLOGY: AAO X 3  SKIN: No rashes    LABS:                        8.8    5.76  )-----------( 595      ( 12 Sep 2018 07:00 )             29.1     09-12    135  |  99  |  40<H>  ----------------------------<  117<H>  4.6   |  18<L>  |  2.91<H>    Ca    8.6      12 Sep 2018 07:00  Mg     2.2     09-12              CAPILLARY BLOOD GLUCOSE      POCT Blood Glucose.: 211 mg/dL (12 Sep 2018 22:15)  POCT Blood Glucose.: 100 mg/dL (12 Sep 2018 17:17)  POCT Blood Glucose.: 122 mg/dL (12 Sep 2018 11:45)  POCT Blood Glucose.: 117 mg/dL (12 Sep 2018 08:16)    09-11 @ 06:33  Culture-urine --  Culture results --  method type --  Organism --  Organism Identification --  Specimen source BLOOD PERIPHERAL  09-09 @ 09:10  Culture-urine   COLONY COUNT: > = 100,000 CFU/ML  Culture results --  method type NEGATIVE CAITLYN 43  Organism Enterobacter aerogenes  Organism Identification Enterobacter aerogenes  Escherichia coli  Specimen source URINE MIDSTREAM           09-11 @ 06:33  Culture blood   NO ORGANISMS ISOLATED  NO ORGANISMS ISOLATED AT 24 HOURS  Culture results --  Gram stain --  Gram stain blood --  Method type --  Organism --  Organism identification --  Specimen source BLOOD PERIPHERAL   09-09 @ 09:10  Culture blood --  Culture results --  Gram stain --  Gram stain blood --  Method type NEGATIVE CAITLYN 43  Organism Enterobacter aerogenes  Organism identification Enterobacter aerogenes  Escherichia coli  Specimen source URINE MIDSTREAM      RADIOLOGY & ADDITIONAL TESTS:    Imaging Personally Reviewed:    Consultant(s) Notes Reviewed:      Care Discussed with Consultants/Other Providers:
Patient is a 78y old  Female who presents with a chief complaint of uti (13 Sep 2018 16:37)      SUBJECTIVE / OVERNIGHT EVENTS:   Feels better.  Denies CP/SOB/Palpitation/HA.    MEDICATIONS  (STANDING):  atorvastatin 40 milliGRAM(s) Oral at bedtime  clopidogrel Tablet 75 milliGRAM(s) Oral daily  dextrose 5%. 1000 milliLiter(s) (50 mL/Hr) IV Continuous <Continuous>  dextrose 50% Injectable 12.5 Gram(s) IV Push once  dextrose 50% Injectable 25 Gram(s) IV Push once  dextrose 50% Injectable 25 Gram(s) IV Push once  docusate sodium 100 milliGRAM(s) Oral three times a day  ertapenem  IVPB 500 milliGRAM(s) IV Intermittent every 24 hours  ferrous    sulfate 325 milliGRAM(s) Oral daily  furosemide    Tablet 40 milliGRAM(s) Oral two times a day  gabapentin 300 milliGRAM(s) Oral at bedtime  influenza   Vaccine 0.5 milliLiter(s) IntraMuscular once  insulin glargine Injectable (LANTUS) 42 Unit(s) SubCutaneous at bedtime  insulin lispro (HumaLOG) corrective regimen sliding scale   SubCutaneous three times a day before meals  metoprolol tartrate 12.5 milliGRAM(s) Oral two times a day  multivitamin 1 Tablet(s) Oral daily  pantoprazole    Tablet 40 milliGRAM(s) Oral two times a day  senna 2 Tablet(s) Oral at bedtime    MEDICATIONS  (PRN):  acetaminophen   Tablet .. 650 milliGRAM(s) Oral every 6 hours PRN Temp greater or equal to 38C (100.4F), Mild Pain (1 - 3), Moderate Pain (4 - 6)  dextrose 40% Gel 15 Gram(s) Oral once PRN Blood Glucose LESS THAN 70 milliGRAM(s)/deciliter  glucagon  Injectable 1 milliGRAM(s) IntraMuscular once PRN Glucose LESS THAN 70 milligrams/deciliter        CAPILLARY BLOOD GLUCOSE      POCT Blood Glucose.: 191 mg/dL (13 Sep 2018 11:50)  POCT Blood Glucose.: 95 mg/dL (13 Sep 2018 08:53)  POCT Blood Glucose.: 61 mg/dL (13 Sep 2018 08:26)    I&O's Summary    12 Sep 2018 07:01  -  13 Sep 2018 07:00  --------------------------------------------------------  IN: 538 mL / OUT: 0 mL / NET: 538 mL        PHYSICAL EXAM:  GENERAL: NAD, well-developed  HEAD:  Atraumatic, Normocephalic  NECK: Supple, No JVD  CHEST/LUNG: Clear to auscultation bilaterally; No wheezing.  HEART: Regular rate and rhythm; No murmurs, rubs, or gallops  ABDOMEN: Soft, Nontender, Nondistended; Bowel sounds present  EXTREMITIES:   No clubbing, cyanosis, or edema  NEUROLOGY: AAO X 3  SKIN: No rashes    LABS:                        9.7    5.34  )-----------( 623      ( 13 Sep 2018 06:14 )             33.0     09-13    137  |  102  |  39<H>  ----------------------------<  69<L>  4.9   |  21<L>  |  2.79<H>    Ca    9.0      13 Sep 2018 06:14  Mg     2.2     09-13              CAPILLARY BLOOD GLUCOSE      POCT Blood Glucose.: 191 mg/dL (13 Sep 2018 11:50)  POCT Blood Glucose.: 95 mg/dL (13 Sep 2018 08:53)  POCT Blood Glucose.: 61 mg/dL (13 Sep 2018 08:26)    09-11 @ 06:33  Culture-urine --  Culture results --  method type --  Organism --  Organism Identification --  Specimen source BLOOD PERIPHERAL  09-09 @ 09:10  Culture-urine   COLONY COUNT: > = 100,000 CFU/ML  Culture results --  method type NEGATIVE CAITLYN 43  Organism Enterobacter aerogenes  Organism Identification Enterobacter aerogenes  Escherichia coli  Specimen source URINE MIDSTREAM           09-11 @ 06:33  Culture blood   NO ORGANISMS ISOLATED  NO ORGANISMS ISOLATED AT 48 HRS.  Culture results --  Gram stain --  Gram stain blood --  Method type --  Organism --  Organism identification --  Specimen source BLOOD PERIPHERAL   09-09 @ 09:10  Culture blood --  Culture results --  Gram stain --  Gram stain blood --  Method type NEGATIVE CAITLYN 43  Organism Enterobacter aerogenes  Organism identification Enterobacter aerogenes  Escherichia coli  Specimen source URINE MIDSTREAM      RADIOLOGY & ADDITIONAL TESTS:    Imaging Personally Reviewed:    Consultant(s) Notes Reviewed:      Care Discussed with Consultants/Other Providers:
Patient is a 78y old  Female who presents with a chief complaint of Sepsis (10 Sep 2018 14:12)      SUBJECTIVE / OVERNIGHT EVENTS:   Feels better.  Denies CP/SOB/Palpitation/HA.    MEDICATIONS  (STANDING):  atorvastatin 40 milliGRAM(s) Oral at bedtime  clopidogrel Tablet 75 milliGRAM(s) Oral daily  dextrose 5%. 1000 milliLiter(s) (50 mL/Hr) IV Continuous <Continuous>  dextrose 50% Injectable 12.5 Gram(s) IV Push once  dextrose 50% Injectable 25 Gram(s) IV Push once  dextrose 50% Injectable 25 Gram(s) IV Push once  docusate sodium 100 milliGRAM(s) Oral three times a day  ertapenem  IVPB 500 milliGRAM(s) IV Intermittent every 24 hours  ferrous    sulfate 325 milliGRAM(s) Oral daily  furosemide    Tablet 40 milliGRAM(s) Oral two times a day  gabapentin 300 milliGRAM(s) Oral at bedtime  influenza   Vaccine 0.5 milliLiter(s) IntraMuscular once  insulin glargine Injectable (LANTUS) 42 Unit(s) SubCutaneous at bedtime  insulin lispro (HumaLOG) corrective regimen sliding scale   SubCutaneous three times a day before meals  metoprolol tartrate 12.5 milliGRAM(s) Oral two times a day  multivitamin 1 Tablet(s) Oral daily  pantoprazole    Tablet 40 milliGRAM(s) Oral two times a day  senna 2 Tablet(s) Oral at bedtime    MEDICATIONS  (PRN):  acetaminophen   Tablet .. 650 milliGRAM(s) Oral every 6 hours PRN Temp greater or equal to 38C (100.4F), Mild Pain (1 - 3), Moderate Pain (4 - 6)  dextrose 40% Gel 15 Gram(s) Oral once PRN Blood Glucose LESS THAN 70 milliGRAM(s)/deciliter  glucagon  Injectable 1 milliGRAM(s) IntraMuscular once PRN Glucose LESS THAN 70 milligrams/deciliter        CAPILLARY BLOOD GLUCOSE      POCT Blood Glucose.: 90 mg/dL (10 Sep 2018 12:54)  POCT Blood Glucose.: 78 mg/dL (10 Sep 2018 09:07)  POCT Blood Glucose.: 83 mg/dL (10 Sep 2018 07:46)  POCT Blood Glucose.: 130 mg/dL (09 Sep 2018 23:20)    I&O's Summary      PHYSICAL EXAM:  GENERAL: NAD, well-developed  HEAD:  Atraumatic, Normocephalic  NECK: Supple, No JVD  CHEST/LUNG: Clear to auscultation bilaterally; No wheezing.  HEART: Regular rate and rhythm; No murmurs, rubs, or gallops  ABDOMEN: Soft, Nontender, Nondistended; Bowel sounds present  EXTREMITIES:   No clubbing, cyanosis, or edema  NEUROLOGY: AAO X 3  SKIN: No rashes    LABS:                        8.8    16.24 )-----------( 611      ( 10 Sep 2018 14:45 )             29.2     09-10    131<L>  |  101  |  37<H>  ----------------------------<  67<L>  5.0   |  16<L>  |  2.87<H>    Ca    8.7      10 Sep 2018 14:45  Phos  4.9     09-10  Mg     2.3     09-10    TPro  7.2  /  Alb  2.5<L>  /  TBili  0.3  /  DBili  x   /  AST  11  /  ALT  10  /  AlkPhos  232<H>  09-10      CARDIAC MARKERS ( 09 Sep 2018 12:15 )  x     / x     / 72 u/L / x     / x          Urinalysis Basic - ( 09 Sep 2018 05:39 )    Color: YELLOW / Appearance: TURBID / S.005 / pH: 8.0  Gluc: NEGATIVE / Ketone: NEGATIVE  / Bili: NEGATIVE / Urobili: NORMAL   Blood: NEGATIVE / Protein: NEGATIVE / Nitrite: NEGATIVE   Leuk Esterase: LARGE / RBC: 3-5 / WBC >50   Sq Epi: x / Non Sq Epi: FEW / Bacteria: LARGE      CAPILLARY BLOOD GLUCOSE      POCT Blood Glucose.: 90 mg/dL (10 Sep 2018 12:54)  POCT Blood Glucose.: 78 mg/dL (10 Sep 2018 09:07)  POCT Blood Glucose.: 83 mg/dL (10 Sep 2018 07:46)  POCT Blood Glucose.: 130 mg/dL (09 Sep 2018 23:20)     @ 09:10  Culture-urine   EAER^Enterobacter aerogenes  COLONY COUNT: > = 100,000 CFU/ML  Culture results --  method type --  Organism --  Organism Identification --  Specimen source URINE MIDSTREAM            @ 09:10  Culture blood --  Culture results --  Gram stain --  Gram stain blood --  Method type --  Organism --  Organism identification --  Specimen source URINE MIDSTREAM      RADIOLOGY & ADDITIONAL TESTS:    Imaging Personally Reviewed:    Consultant(s) Notes Reviewed:      Care Discussed with Consultants/Other Providers:

## 2018-09-13 NOTE — DISCHARGE NOTE ADULT - CARE PLAN
Principal Discharge DX:	CHF (congestive heart failure)  Goal:	To relieve and prevent worsening symptoms associated with congestive heart failure, to improve quality of life, and to treat underlying conditions such as coronary heart disease, high blood pressure, or diabetes, and to maintain euvolemia.  Assessment and plan of treatment:	Low salt diet, fluid restriction to 1500 ml daily, monitor your fluid intake and weight daily, exercise as tolerated 30 minutes daily, and follow up with your physician within 7 days. Please call Dr Hooper to schedule an outpatient echocardiogram.  Secondary Diagnosis:	CAD (coronary artery disease)  Goal:	To be asymptomatic, to reduce risks factors such as hypertension, diabetes and hyperlipidemia to lower the risk of blood clots formation; and to prevent complications of coronary artery disease such as worsening chest pain, heart attack and death.  Assessment and plan of treatment:	Continue Plavix, do not stop unless instructed by your physician.  Continue low salt, fat, cholesterol and carbohydrate diet. Follow up with cardiologist and primary care physician's routine appointment. You should plan for an echocardiogram on discharge  Secondary Diagnosis:	Diabetes mellitus, type 2  Goal:	To maintain a normal blood glucose level and HgA1C level < 5.7 and to prevent diabetic complications such as uncontrolled diabetes, diabetic coma, blindness, renal failure, and amputations.  Assessment and plan of treatment:	Monitor finger sticks pre-meal and bedtime, low salt, fat and carbohydrate diet, minimize glucose intake.  Exercise daily for at least 30 minutes and weight loss.  Follow up with primary care physician and endocrinologist for routine Hemoglobin A1C checks and management.  Follow up with your ophthalmologist for routine yearly vision exams.  Secondary Diagnosis:	Paroxysmal atrial fibrillation  Goal:	To restore or maintain a normal heart rate and rhythm, to prevent blood clots, and decrease the risks of stroke CVA/TIA.  Assessment and plan of treatment:	Please take your medications as prescribed.  Continue taking plavix. Follow up with your cardiologist next week. Low fat diet, reduce caffeine intake, and exercise at least 30 minutes daily.  Secondary Diagnosis:	Pyelonephritis  Goal:	Pt will no longer have symptoms of urinary tract infection  Assessment and plan of treatment:	Take all antibiotic Ciprofloxacin as prescribed. This is very important. If any urinary symptoms return or if you develop fever please call your doctor  Secondary Diagnosis:	Constipation by delayed colonic transit  Goal:	Pt will have improvement in symptoms  Assessment and plan of treatment:	Continue to take stool softeners which have been prescribed.  Secondary Diagnosis:	Anemia  Goal:	To treat the underlying cause and restore a normal red blood cells level, to improve  the amount of oxygen that your blood can carry by increasing your hemoglobin and hematocrit level, and to prevent signs and symptoms such as shortness of breath, dizziness, weakness, congestive heart failure and death.  Assessment and plan of treatment:	Continue to take current medications as prescribed.  Follow up your routine physician's appointments.  If you notice any bleeding, please notify your doctor immediately or go to the nearest emergency room. You should not take any anticoagulants however it is safe to take Plavix at this time. Make an appointment with your gastroenterologist. There is no need for endoscopy at this time

## 2018-09-16 LAB
BACTERIA BLD CULT: SIGNIFICANT CHANGE UP
BACTERIA BLD CULT: SIGNIFICANT CHANGE UP

## 2019-01-29 NOTE — H&P ADULT - NSHPSOCIALHISTORY_GEN_ALL_CORE
Denies alcohol and illicit substance use. Previously used tobacco for 40 years, quit 10 years ago. Lives with her granddaughter. no

## 2019-05-22 NOTE — ED ADULT NURSE NOTE - NS ED NURSE LEVEL OF CONSCIOUSNESS AFFECT
Calm
I have reviewed and confirmed nurses' notes for patient's medications, allergies, medical history, and surgical history.

## 2020-01-15 NOTE — H&P ADULT - NSHPPHYSICALEXAM_GEN_ALL_CORE
PHYSICAL EXAM:      Constitutional: NAD, well-groomed, well-developed  HEENT: EOMI, Normal Hearing  Neck: No LAD, No JVD  Back: Normal spine flexure, No CVA tenderness  Respiratory: CTAB  Cardiovascular: S1 and S2, RRR  Gastrointestinal: BS+, soft, NT/ND  Extremities: No peripheral edema  Vascular: 2+ peripheral pulses  Neurological: A/O x 3, no focal deficits  Psychiatric: Normal mood, normal affect  Musculoskeletal: 5/5 strength b/l upper and lower extremities  Skin: No rashes
Admitted

## 2020-02-06 NOTE — ED PROVIDER NOTE - ATTENDING CONTRIBUTION TO CARE
79 y/o F with h/o HTN, hyperlipidemia, DM, CAD s/p stent here with low back pain.  Pt reports 1 day of constant low back pain and pain to bilateral flanks.  No associated leg pain or weakness, abd pain, n/v/d, urinary sxs.  No bladder or bowel dysfunction or saddle anesthesia.  Also reports subjective fevers today.  Of note, pt also c/o SOB.  Initially reports that her SOB is "always there" but later describing worsening exertional SOB tonight.  No fever, cough, palpitations, chest pain.  Well appearing, sitting comfortably in stretcher, awake and alert, nontoxic.  Low grade fever oral, VSS.  Lungs cta bl.  Cards nl S1/S2, RRR, no MRG.  Abd soft ntnd, mild flank tenderness bilaterally, bilat cva tenderness.  No pedal edema or calf tenderness.  Neg straight leg raise.  Plan for labs, ekg, cxr, urine.  Pt refusing rectal temp, consider  vs GI source of infection will obtain CT to eval.

## 2020-10-30 ENCOUNTER — INPATIENT (INPATIENT)
Facility: HOSPITAL | Age: 80
LOS: 4 days | Discharge: HOME | End: 2020-11-04
Attending: INTERNAL MEDICINE | Admitting: INTERNAL MEDICINE
Payer: MEDICARE

## 2020-10-30 VITALS
SYSTOLIC BLOOD PRESSURE: 129 MMHG | RESPIRATION RATE: 26 BRPM | HEIGHT: 62 IN | OXYGEN SATURATION: 100 % | HEART RATE: 82 BPM | TEMPERATURE: 98 F | DIASTOLIC BLOOD PRESSURE: 57 MMHG

## 2020-10-30 DIAGNOSIS — Z98.891 HISTORY OF UTERINE SCAR FROM PREVIOUS SURGERY: Chronic | ICD-10-CM

## 2020-10-30 DIAGNOSIS — Z95.5 PRESENCE OF CORONARY ANGIOPLASTY IMPLANT AND GRAFT: Chronic | ICD-10-CM

## 2020-10-30 LAB
ALBUMIN SERPL ELPH-MCNC: 4.3 G/DL — SIGNIFICANT CHANGE UP (ref 3.3–5)
ALP SERPL-CCNC: 167 U/L — HIGH (ref 40–120)
ALT FLD-CCNC: 94 U/L — HIGH (ref 4–33)
ANION GAP SERPL CALC-SCNC: 18 MMO/L — HIGH (ref 7–14)
APTT BLD: 30.6 SEC — SIGNIFICANT CHANGE UP (ref 27–36.3)
AST SERPL-CCNC: 109 U/L — HIGH (ref 4–32)
BASOPHILS # BLD AUTO: 0.01 K/UL — SIGNIFICANT CHANGE UP (ref 0–0.2)
BASOPHILS NFR BLD AUTO: 0.2 % — SIGNIFICANT CHANGE UP (ref 0–2)
BILIRUB SERPL-MCNC: 0.2 MG/DL — SIGNIFICANT CHANGE UP (ref 0.2–1.2)
BUN SERPL-MCNC: 69 MG/DL — HIGH (ref 7–23)
CALCIUM SERPL-MCNC: 8.1 MG/DL — LOW (ref 8.4–10.5)
CHLORIDE SERPL-SCNC: 103 MMOL/L — SIGNIFICANT CHANGE UP (ref 98–107)
CO2 SERPL-SCNC: 18 MMOL/L — LOW (ref 22–31)
CREAT SERPL-MCNC: 3.58 MG/DL — HIGH (ref 0.5–1.3)
EOSINOPHIL # BLD AUTO: 0.04 K/UL — SIGNIFICANT CHANGE UP (ref 0–0.5)
EOSINOPHIL NFR BLD AUTO: 0.7 % — SIGNIFICANT CHANGE UP (ref 0–6)
GLUCOSE SERPL-MCNC: 206 MG/DL — HIGH (ref 70–99)
HCT VFR BLD CALC: 15.2 % — CRITICAL LOW (ref 34.5–45)
HGB BLD-MCNC: 4.1 G/DL — CRITICAL LOW (ref 11.5–15.5)
IMM GRANULOCYTES NFR BLD AUTO: 0.5 % — SIGNIFICANT CHANGE UP (ref 0–1.5)
INR BLD: 1.16 — SIGNIFICANT CHANGE UP (ref 0.88–1.16)
LACTATE BLDV-MCNC: 3.3 MMOL/L — HIGH (ref 0.5–2)
LIDOCAIN IGE QN: 60.8 U/L — HIGH (ref 7–60)
LYMPHOCYTES # BLD AUTO: 0.84 K/UL — LOW (ref 1–3.3)
LYMPHOCYTES # BLD AUTO: 14.3 % — SIGNIFICANT CHANGE UP (ref 13–44)
MCHC RBC-ENTMCNC: 23.8 PG — LOW (ref 27–34)
MCHC RBC-ENTMCNC: 27 % — LOW (ref 32–36)
MCV RBC AUTO: 88.4 FL — SIGNIFICANT CHANGE UP (ref 80–100)
MONOCYTES # BLD AUTO: 0.52 K/UL — SIGNIFICANT CHANGE UP (ref 0–0.9)
MONOCYTES NFR BLD AUTO: 8.9 % — SIGNIFICANT CHANGE UP (ref 2–14)
NEUTROPHILS # BLD AUTO: 4.42 K/UL — SIGNIFICANT CHANGE UP (ref 1.8–7.4)
NEUTROPHILS NFR BLD AUTO: 75.4 % — SIGNIFICANT CHANGE UP (ref 43–77)
NRBC # FLD: 0.02 K/UL — SIGNIFICANT CHANGE UP (ref 0–0)
PLATELET # BLD AUTO: 345 K/UL — SIGNIFICANT CHANGE UP (ref 150–400)
PMV BLD: 12.1 FL — SIGNIFICANT CHANGE UP (ref 7–13)
POTASSIUM SERPL-MCNC: 4.7 MMOL/L — SIGNIFICANT CHANGE UP (ref 3.5–5.3)
POTASSIUM SERPL-SCNC: 4.7 MMOL/L — SIGNIFICANT CHANGE UP (ref 3.5–5.3)
PROT SERPL-MCNC: 7.4 G/DL — SIGNIFICANT CHANGE UP (ref 6–8.3)
PROTHROM AB SERPL-ACNC: 13.3 SEC — SIGNIFICANT CHANGE UP (ref 10.6–13.6)
RBC # BLD: 1.72 M/UL — LOW (ref 3.8–5.2)
RBC # FLD: 16.9 % — HIGH (ref 10.3–14.5)
SODIUM SERPL-SCNC: 139 MMOL/L — SIGNIFICANT CHANGE UP (ref 135–145)
TROPONIN T, HIGH SENSITIVITY: 37 NG/L — SIGNIFICANT CHANGE UP (ref ?–14)
WBC # BLD: 5.86 K/UL — SIGNIFICANT CHANGE UP (ref 3.8–10.5)
WBC # FLD AUTO: 5.86 K/UL — SIGNIFICANT CHANGE UP (ref 3.8–10.5)

## 2020-10-30 PROCEDURE — 71045 X-RAY EXAM CHEST 1 VIEW: CPT | Mod: 26

## 2020-10-30 NOTE — ED ADULT NURSE NOTE - OBJECTIVE STATEMENT
Charlotte RN: Pt presents to RM 4 AO4 complaining of "weak heart." Pt states "my heart hurts and my diabetes, I have no blood." Pt poor historian. Pt endorsing PMH of anemia requiring transfusion of "2 pints of blood." Pt pigmentation appropriate to race. Endorses dyspnea on exertion, presents tachypneic, but respirations even. endorses chest pain but points to epigastric area upon examination, also endorses palpitations. 20g IV placed to L AC, labs drawn and sent. Appears in no obv distress.

## 2020-10-30 NOTE — ED ADULT TRIAGE NOTE - CHIEF COMPLAINT QUOTE
Patient experiencing epigastric pain with associated SOB, weakness and difficulty ambulating for 2 days.  Respirations equal but labored.

## 2020-10-31 DIAGNOSIS — K92.1 MELENA: ICD-10-CM

## 2020-10-31 DIAGNOSIS — I25.118 ATHEROSCLEROTIC HEART DISEASE OF NATIVE CORONARY ARTERY WITH OTHER FORMS OF ANGINA PECTORIS: ICD-10-CM

## 2020-10-31 DIAGNOSIS — K92.2 GASTROINTESTINAL HEMORRHAGE, UNSPECIFIED: ICD-10-CM

## 2020-10-31 DIAGNOSIS — D50.0 IRON DEFICIENCY ANEMIA SECONDARY TO BLOOD LOSS (CHRONIC): ICD-10-CM

## 2020-10-31 DIAGNOSIS — I10 ESSENTIAL (PRIMARY) HYPERTENSION: ICD-10-CM

## 2020-10-31 DIAGNOSIS — E11.65 TYPE 2 DIABETES MELLITUS WITH HYPERGLYCEMIA: ICD-10-CM

## 2020-10-31 DIAGNOSIS — I20.8 OTHER FORMS OF ANGINA PECTORIS: ICD-10-CM

## 2020-10-31 DIAGNOSIS — N17.9 ACUTE KIDNEY FAILURE, UNSPECIFIED: ICD-10-CM

## 2020-10-31 DIAGNOSIS — Z29.9 ENCOUNTER FOR PROPHYLACTIC MEASURES, UNSPECIFIED: ICD-10-CM

## 2020-10-31 LAB
ALBUMIN SERPL ELPH-MCNC: 4.1 G/DL — SIGNIFICANT CHANGE UP (ref 3.3–5)
ALP SERPL-CCNC: 149 U/L — HIGH (ref 40–120)
ALT FLD-CCNC: 93 U/L — HIGH (ref 4–33)
ANION GAP SERPL CALC-SCNC: 13 MMO/L — SIGNIFICANT CHANGE UP (ref 7–14)
ANISOCYTOSIS BLD QL: SIGNIFICANT CHANGE UP
APPEARANCE UR: SIGNIFICANT CHANGE UP
AST SERPL-CCNC: 81 U/L — HIGH (ref 4–32)
B PERT DNA SPEC QL NAA+PROBE: SIGNIFICANT CHANGE UP
BACTERIA # UR AUTO: HIGH
BASE EXCESS BLDV CALC-SCNC: -3.6 MMOL/L — SIGNIFICANT CHANGE UP
BASOPHILS NFR SPEC: 1.8 % — SIGNIFICANT CHANGE UP (ref 0–2)
BILIRUB SERPL-MCNC: 0.6 MG/DL — SIGNIFICANT CHANGE UP (ref 0.2–1.2)
BILIRUB UR-MCNC: NEGATIVE — SIGNIFICANT CHANGE UP
BLD GP AB SCN SERPL QL: NEGATIVE — SIGNIFICANT CHANGE UP
BLOOD GAS VENOUS - CREATININE: 4.02 MG/DL — HIGH (ref 0.5–1.3)
BLOOD GAS VENOUS - FIO2: 30 — SIGNIFICANT CHANGE UP
BLOOD UR QL VISUAL: NEGATIVE — SIGNIFICANT CHANGE UP
BUN SERPL-MCNC: 70 MG/DL — HIGH (ref 7–23)
C PNEUM DNA SPEC QL NAA+PROBE: SIGNIFICANT CHANGE UP
CALCIUM SERPL-MCNC: 7.9 MG/DL — LOW (ref 8.4–10.5)
CHLORIDE BLDV-SCNC: 108 MMOL/L — SIGNIFICANT CHANGE UP (ref 96–108)
CHLORIDE SERPL-SCNC: 107 MMOL/L — SIGNIFICANT CHANGE UP (ref 98–107)
CHLORIDE UR-SCNC: 75 MMOL/L — SIGNIFICANT CHANGE UP
CO2 SERPL-SCNC: 20 MMOL/L — LOW (ref 22–31)
COLOR SPEC: SIGNIFICANT CHANGE UP
CREAT SERPL-MCNC: 3.62 MG/DL — HIGH (ref 0.5–1.3)
DACRYOCYTES BLD QL SMEAR: SLIGHT — SIGNIFICANT CHANGE UP
ELLIPTOCYTES BLD QL SMEAR: SLIGHT — SIGNIFICANT CHANGE UP
EOSINOPHIL NFR FLD: 0.9 % — SIGNIFICANT CHANGE UP (ref 0–6)
FLUAV H1 2009 PAND RNA SPEC QL NAA+PROBE: SIGNIFICANT CHANGE UP
FLUAV H1 RNA SPEC QL NAA+PROBE: SIGNIFICANT CHANGE UP
FLUAV H3 RNA SPEC QL NAA+PROBE: SIGNIFICANT CHANGE UP
FLUAV SUBTYP SPEC NAA+PROBE: SIGNIFICANT CHANGE UP
FLUBV RNA SPEC QL NAA+PROBE: SIGNIFICANT CHANGE UP
GAS PNL BLDV: 142 MMOL/L — SIGNIFICANT CHANGE UP (ref 136–146)
GIANT PLATELETS BLD QL SMEAR: PRESENT — SIGNIFICANT CHANGE UP
GLUCOSE BLDC GLUCOMTR-MCNC: 151 MG/DL — HIGH (ref 70–99)
GLUCOSE BLDC GLUCOMTR-MCNC: 175 MG/DL — HIGH (ref 70–99)
GLUCOSE BLDC GLUCOMTR-MCNC: 196 MG/DL — HIGH (ref 70–99)
GLUCOSE BLDC GLUCOMTR-MCNC: 289 MG/DL — HIGH (ref 70–99)
GLUCOSE BLDV-MCNC: 143 MG/DL — HIGH (ref 70–99)
GLUCOSE SERPL-MCNC: 149 MG/DL — HIGH (ref 70–99)
GLUCOSE UR-MCNC: NEGATIVE — SIGNIFICANT CHANGE UP
HADV DNA SPEC QL NAA+PROBE: SIGNIFICANT CHANGE UP
HBA1C BLD-MCNC: 6.1 % — HIGH (ref 4–5.6)
HCO3 BLDV-SCNC: 21 MMOL/L — SIGNIFICANT CHANGE UP (ref 20–27)
HCOV PNL SPEC NAA+PROBE: SIGNIFICANT CHANGE UP
HCT VFR BLD CALC: 21 % — CRITICAL LOW (ref 34.5–45)
HCT VFR BLD CALC: 23.6 % — LOW (ref 34.5–45)
HCT VFR BLDV CALC: 20.3 % — CRITICAL LOW (ref 34.5–45)
HGB BLD-MCNC: 6 G/DL — CRITICAL LOW (ref 11.5–15.5)
HGB BLD-MCNC: 7 G/DL — CRITICAL LOW (ref 11.5–15.5)
HGB BLDV-MCNC: 6.5 G/DL — CRITICAL LOW (ref 11.5–15.5)
HMPV RNA SPEC QL NAA+PROBE: SIGNIFICANT CHANGE UP
HPIV1 RNA SPEC QL NAA+PROBE: SIGNIFICANT CHANGE UP
HPIV2 RNA SPEC QL NAA+PROBE: SIGNIFICANT CHANGE UP
HPIV3 RNA SPEC QL NAA+PROBE: SIGNIFICANT CHANGE UP
HPIV4 RNA SPEC QL NAA+PROBE: SIGNIFICANT CHANGE UP
HYALINE CASTS # UR AUTO: NEGATIVE — SIGNIFICANT CHANGE UP
HYPOCHROMIA BLD QL: SLIGHT — SIGNIFICANT CHANGE UP
KETONES UR-MCNC: NEGATIVE — SIGNIFICANT CHANGE UP
LACTATE BLDV-MCNC: 0.8 MMOL/L — SIGNIFICANT CHANGE UP (ref 0.5–2)
LEUKOCYTE ESTERASE UR-ACNC: SIGNIFICANT CHANGE UP
LYMPHOCYTES NFR SPEC AUTO: 8.8 % — LOW (ref 13–44)
MAGNESIUM SERPL-MCNC: 2.4 MG/DL — SIGNIFICANT CHANGE UP (ref 1.6–2.6)
MCHC RBC-ENTMCNC: 25 PG — LOW (ref 27–34)
MCHC RBC-ENTMCNC: 26 PG — LOW (ref 27–34)
MCHC RBC-ENTMCNC: 28.6 % — LOW (ref 32–36)
MCHC RBC-ENTMCNC: 29.7 % — LOW (ref 32–36)
MCV RBC AUTO: 87.5 FL — SIGNIFICANT CHANGE UP (ref 80–100)
MCV RBC AUTO: 87.7 FL — SIGNIFICANT CHANGE UP (ref 80–100)
MONOCYTES NFR BLD: 10.6 % — HIGH (ref 2–9)
NEUTROPHIL AB SER-ACNC: 77.9 % — HIGH (ref 43–77)
NITRITE UR-MCNC: NEGATIVE — SIGNIFICANT CHANGE UP
NRBC # FLD: 0.03 K/UL — SIGNIFICANT CHANGE UP (ref 0–0)
NRBC # FLD: 0.07 K/UL — SIGNIFICANT CHANGE UP (ref 0–0)
NRBC FLD-RTO: 1.2 — SIGNIFICANT CHANGE UP
NT-PROBNP SERPL-SCNC: 7673 PG/ML — SIGNIFICANT CHANGE UP
PCO2 BLDV: 54 MMHG — HIGH (ref 41–51)
PH BLDV: 7.25 PH — LOW (ref 7.32–7.43)
PH UR: 6 — SIGNIFICANT CHANGE UP (ref 5–8)
PHOSPHATE SERPL-MCNC: 5.3 MG/DL — HIGH (ref 2.5–4.5)
PLATELET # BLD AUTO: 269 K/UL — SIGNIFICANT CHANGE UP (ref 150–400)
PLATELET # BLD AUTO: 310 K/UL — SIGNIFICANT CHANGE UP (ref 150–400)
PLATELET COUNT - ESTIMATE: NORMAL — SIGNIFICANT CHANGE UP
PMV BLD: 11.8 FL — SIGNIFICANT CHANGE UP (ref 7–13)
PMV BLD: 12.2 FL — SIGNIFICANT CHANGE UP (ref 7–13)
PO2 BLDV: 39 MMHG — SIGNIFICANT CHANGE UP (ref 35–40)
POIKILOCYTOSIS BLD QL AUTO: SIGNIFICANT CHANGE UP
POLYCHROMASIA BLD QL SMEAR: SLIGHT — SIGNIFICANT CHANGE UP
POTASSIUM BLDV-SCNC: 4.5 MMOL/L — SIGNIFICANT CHANGE UP (ref 3.4–4.5)
POTASSIUM SERPL-MCNC: 4.5 MMOL/L — SIGNIFICANT CHANGE UP (ref 3.5–5.3)
POTASSIUM SERPL-SCNC: 4.5 MMOL/L — SIGNIFICANT CHANGE UP (ref 3.5–5.3)
POTASSIUM UR-SCNC: 33 MMOL/L — SIGNIFICANT CHANGE UP
PROT SERPL-MCNC: 7.1 G/DL — SIGNIFICANT CHANGE UP (ref 6–8.3)
PROT UR-MCNC: 30 — SIGNIFICANT CHANGE UP
PROT UR-MCNC: 39.8 MG/DL — SIGNIFICANT CHANGE UP
RAPID RVP RESULT: SIGNIFICANT CHANGE UP
RBC # BLD: 2.4 M/UL — LOW (ref 3.8–5.2)
RBC # BLD: 2.69 M/UL — LOW (ref 3.8–5.2)
RBC # FLD: 15.5 % — HIGH (ref 10.3–14.5)
RBC # FLD: 15.8 % — HIGH (ref 10.3–14.5)
RBC CASTS # UR COMP ASSIST: SIGNIFICANT CHANGE UP (ref 0–?)
RH IG SCN BLD-IMP: POSITIVE — SIGNIFICANT CHANGE UP
RH IG SCN BLD-IMP: POSITIVE — SIGNIFICANT CHANGE UP
RSV RNA SPEC QL NAA+PROBE: SIGNIFICANT CHANGE UP
RV+EV RNA SPEC QL NAA+PROBE: SIGNIFICANT CHANGE UP
SAO2 % BLDV: 62.3 % — SIGNIFICANT CHANGE UP (ref 60–85)
SARS-COV-2 RNA SPEC QL NAA+PROBE: SIGNIFICANT CHANGE UP
SODIUM SERPL-SCNC: 140 MMOL/L — SIGNIFICANT CHANGE UP (ref 135–145)
SODIUM UR-SCNC: 70 MMOL/L — SIGNIFICANT CHANGE UP
SP GR SPEC: 1.01 — SIGNIFICANT CHANGE UP (ref 1–1.04)
SQUAMOUS # UR AUTO: SIGNIFICANT CHANGE UP
TROPONIN T, HIGH SENSITIVITY: 34 NG/L — SIGNIFICANT CHANGE UP (ref ?–14)
UROBILINOGEN FLD QL: NORMAL — SIGNIFICANT CHANGE UP
UUN UR-MCNC: 345 MG/DL — SIGNIFICANT CHANGE UP
WBC # BLD: 5.68 K/UL — SIGNIFICANT CHANGE UP (ref 3.8–10.5)
WBC # BLD: 5.98 K/UL — SIGNIFICANT CHANGE UP (ref 3.8–10.5)
WBC # FLD AUTO: 5.68 K/UL — SIGNIFICANT CHANGE UP (ref 3.8–10.5)
WBC # FLD AUTO: 5.98 K/UL — SIGNIFICANT CHANGE UP (ref 3.8–10.5)
WBC UR QL: >50 — HIGH (ref 0–?)

## 2020-10-31 PROCEDURE — 99223 1ST HOSP IP/OBS HIGH 75: CPT

## 2020-10-31 PROCEDURE — 99285 EMERGENCY DEPT VISIT HI MDM: CPT

## 2020-10-31 PROCEDURE — 71045 X-RAY EXAM CHEST 1 VIEW: CPT | Mod: 26

## 2020-10-31 PROCEDURE — 71045 X-RAY EXAM CHEST 1 VIEW: CPT | Mod: 26,77

## 2020-10-31 RX ORDER — SENNA PLUS 8.6 MG/1
2 TABLET ORAL
Qty: 0 | Refills: 0 | DISCHARGE

## 2020-10-31 RX ORDER — ASPIRIN/CALCIUM CARB/MAGNESIUM 324 MG
81 TABLET ORAL DAILY
Refills: 0 | Status: DISCONTINUED | OUTPATIENT
Start: 2020-10-31 | End: 2020-10-31

## 2020-10-31 RX ORDER — PANTOPRAZOLE SODIUM 20 MG/1
80 TABLET, DELAYED RELEASE ORAL ONCE
Refills: 0 | Status: COMPLETED | OUTPATIENT
Start: 2020-10-31 | End: 2020-10-31

## 2020-10-31 RX ORDER — FUROSEMIDE 40 MG
20 TABLET ORAL ONCE
Refills: 0 | Status: COMPLETED | OUTPATIENT
Start: 2020-10-31 | End: 2020-10-31

## 2020-10-31 RX ORDER — ATORVASTATIN CALCIUM 80 MG/1
40 TABLET, FILM COATED ORAL AT BEDTIME
Refills: 0 | Status: DISCONTINUED | OUTPATIENT
Start: 2020-10-31 | End: 2020-11-04

## 2020-10-31 RX ORDER — DEXTROSE 50 % IN WATER 50 %
12.5 SYRINGE (ML) INTRAVENOUS ONCE
Refills: 0 | Status: DISCONTINUED | OUTPATIENT
Start: 2020-10-31 | End: 2020-11-04

## 2020-10-31 RX ORDER — INSULIN GLARGINE 100 [IU]/ML
42 INJECTION, SOLUTION SUBCUTANEOUS
Qty: 0 | Refills: 0 | DISCHARGE

## 2020-10-31 RX ORDER — FUROSEMIDE 40 MG
40 TABLET ORAL ONCE
Refills: 0 | Status: COMPLETED | OUTPATIENT
Start: 2020-10-31 | End: 2020-10-31

## 2020-10-31 RX ORDER — INSULIN LISPRO 100/ML
VIAL (ML) SUBCUTANEOUS EVERY 6 HOURS
Refills: 0 | Status: DISCONTINUED | OUTPATIENT
Start: 2020-10-31 | End: 2020-10-31

## 2020-10-31 RX ORDER — INSULIN LISPRO 100/ML
VIAL (ML) SUBCUTANEOUS
Refills: 0 | Status: DISCONTINUED | OUTPATIENT
Start: 2020-10-31 | End: 2020-11-04

## 2020-10-31 RX ORDER — GABAPENTIN 400 MG/1
300 CAPSULE ORAL DAILY
Refills: 0 | Status: DISCONTINUED | OUTPATIENT
Start: 2020-10-31 | End: 2020-11-04

## 2020-10-31 RX ORDER — INSULIN GLARGINE 100 [IU]/ML
6 INJECTION, SOLUTION SUBCUTANEOUS AT BEDTIME
Refills: 0 | Status: DISCONTINUED | OUTPATIENT
Start: 2020-10-31 | End: 2020-11-04

## 2020-10-31 RX ORDER — DEXTROSE 50 % IN WATER 50 %
25 SYRINGE (ML) INTRAVENOUS ONCE
Refills: 0 | Status: DISCONTINUED | OUTPATIENT
Start: 2020-10-31 | End: 2020-11-04

## 2020-10-31 RX ORDER — METOPROLOL TARTRATE 50 MG
25 TABLET ORAL DAILY
Refills: 0 | Status: DISCONTINUED | OUTPATIENT
Start: 2020-10-31 | End: 2020-11-04

## 2020-10-31 RX ORDER — FUROSEMIDE 40 MG
1 TABLET ORAL
Qty: 0 | Refills: 0 | DISCHARGE

## 2020-10-31 RX ORDER — PANTOPRAZOLE SODIUM 20 MG/1
40 TABLET, DELAYED RELEASE ORAL
Refills: 0 | Status: DISCONTINUED | OUTPATIENT
Start: 2020-10-31 | End: 2020-11-02

## 2020-10-31 RX ORDER — SODIUM CHLORIDE 9 MG/ML
1000 INJECTION, SOLUTION INTRAVENOUS
Refills: 0 | Status: DISCONTINUED | OUTPATIENT
Start: 2020-10-31 | End: 2020-11-04

## 2020-10-31 RX ORDER — DEXTROSE 50 % IN WATER 50 %
15 SYRINGE (ML) INTRAVENOUS ONCE
Refills: 0 | Status: DISCONTINUED | OUTPATIENT
Start: 2020-10-31 | End: 2020-11-04

## 2020-10-31 RX ORDER — GLUCAGON INJECTION, SOLUTION 0.5 MG/.1ML
1 INJECTION, SOLUTION SUBCUTANEOUS ONCE
Refills: 0 | Status: DISCONTINUED | OUTPATIENT
Start: 2020-10-31 | End: 2020-11-04

## 2020-10-31 RX ORDER — INSULIN LISPRO 100/ML
VIAL (ML) SUBCUTANEOUS AT BEDTIME
Refills: 0 | Status: DISCONTINUED | OUTPATIENT
Start: 2020-10-31 | End: 2020-11-04

## 2020-10-31 RX ADMIN — PANTOPRAZOLE SODIUM 40 MILLIGRAM(S): 20 TABLET, DELAYED RELEASE ORAL at 12:34

## 2020-10-31 RX ADMIN — PANTOPRAZOLE SODIUM 40 MILLIGRAM(S): 20 TABLET, DELAYED RELEASE ORAL at 17:23

## 2020-10-31 RX ADMIN — Medication 1: at 05:38

## 2020-10-31 RX ADMIN — ATORVASTATIN CALCIUM 40 MILLIGRAM(S): 80 TABLET, FILM COATED ORAL at 21:36

## 2020-10-31 RX ADMIN — Medication 1: at 22:16

## 2020-10-31 RX ADMIN — Medication 1: at 17:40

## 2020-10-31 RX ADMIN — Medication 40 MILLIGRAM(S): at 06:31

## 2020-10-31 RX ADMIN — Medication 20 MILLIGRAM(S): at 19:57

## 2020-10-31 RX ADMIN — Medication 20 MILLIGRAM(S): at 17:05

## 2020-10-31 RX ADMIN — Medication 25 MILLIGRAM(S): at 05:38

## 2020-10-31 RX ADMIN — PANTOPRAZOLE SODIUM 80 MILLIGRAM(S): 20 TABLET, DELAYED RELEASE ORAL at 04:38

## 2020-10-31 RX ADMIN — INSULIN GLARGINE 6 UNIT(S): 100 INJECTION, SOLUTION SUBCUTANEOUS at 21:50

## 2020-10-31 NOTE — CONSULT NOTE ADULT - ATTENDING COMMENTS
Thank you for allowing me to participate in the care of your patient    For any question, call:  Cell # [phone]  Pager # [phone]  Callback # [phone]

## 2020-10-31 NOTE — H&P ADULT - NSHPPHYSICALEXAM_GEN_ALL_CORE
Physical exam:  Vital signs reviewed as below:  T(C): 36.5 (10-31-20 @ 03:41), Max: 36.5 (10-31-20 @ 02:00)  HR: 75 (10-31-20 @ 03:41) (72 - 82)  BP: 122/48 (10-31-20 @ 03:41) (110/51 - 148/56)  RR: 14 (10-31-20 @ 03:41) (14 - 26)  SpO2: 100% (10-31-20 @ 03:41) (100% - 100%)  Constitutional-awake, alert, not in acute distress  Neuro-awake, alert, appropriately interactive, moving all extremities,  face symmetric  Eyes-pupils equally round and reactive to light, non icteric, no discharge noted  Ears, nose, mouth, throat-no abnormal discharge, moist mucous membranes, oropharynx clear without noted exudate  CV-regular rate and rhythm, no rubs, murmurs, gallops appreciated, 1+ b/l lower extremity edema  Resp-clear to auscultation bilaterally, normal respiratory effort,   GI-abdomen soft and nontender, no rebound or guarding present  -not examined  MSK-normal range of motion of bilateral elbows and knees, no obvious deformities   Skin-warm, dry, no rash appreciated  Psych-calm, cooperative, normal affect, not attending to internal stimuli

## 2020-10-31 NOTE — H&P ADULT - PROBLEM SELECTOR PLAN 6
-Reduce lantus to 6 units  -SSI, acck, NPO given bleed but DM/renal diet when able to take PO  -Check a1c

## 2020-10-31 NOTE — H&P ADULT - PROBLEM SELECTOR PLAN 3
-Suspect prerenal from anemia  -To receive 2 units of pRBC, monitor for improvement with blood  -Check UA/UCx, UProtein, Creatinine, Na, Urea nitrogen  -Hold home lasix given bleeding, monitor phos, consider renal consult

## 2020-10-31 NOTE — CONSULT NOTE ADULT - SUBJECTIVE AND OBJECTIVE BOX
Cardiovascular Disease Initial Evaluation    CHIEF COMPLAINT: Weakness; dark stools    HISTORY OF PRESENT ILLNESS:  This is a 80 year old woman with IDDM ([id number]-4.1%), diastolic CHF and HTN who presented to [facility] ED on 10/30/2020 with weakness and dark stools.   Mr. Tsang has a history of CAD and recurrent GI bleed. She underwent cath with Dr. Zulay Mckinnon and had MICHAEL placed to LCx and OM1 on 2017. On 2018, she presented to [facility] with progressive SOB. She was found to have a Hb of 4.6 and was transfused multiple units of pRBCs. She underwent EGD by Dr. Curtis Daigle revealing one non-bleeding duodenal ulcer. Xarelto was discontinued (paroxysmal a-fib) and ASA with Plavix was resumed. She again presented with similar symptoms in 3/2018 with Hb-5. Capsule study revealed questionable ulcer in ileum and patient was started on mesalamine. ASA was d/c'ed.    On this admission, she was found to have a Hb of 4 and was transfused. Transfusion was complicated by SOB and she was diuresed.       Allergies  Carrots (Rash)  No Known Drug Allergies        MEDICATIONS:  aspirin enteric coated 81 milliGRAM(s) Oral daily  metoprolol succinate ER 25 milliGRAM(s) Oral daily  gabapentin 300 milliGRAM(s) Oral daily  pantoprazole  Injectable 40 milliGRAM(s) IV Push two times a day  atorvastatin 40 milliGRAM(s) Oral at bedtime  dextrose 40% Gel 15 Gram(s) Oral once PRN  dextrose 50% Injectable 12.5 Gram(s) IV Push once  dextrose 50% Injectable 25 Gram(s) IV Push once  dextrose 50% Injectable 25 Gram(s) IV Push once  glucagon  Injectable 1 milliGRAM(s) IntraMuscular once PRN  insulin glargine Injectable (LANTUS) 6 Unit(s) SubCutaneous at bedtime  insulin lispro (ADMELOG) corrective regimen sliding scale   SubCutaneous every 6 hours    dextrose 5%. 1000 milliLiter(s) IV Continuous <Continuous>      PAST MEDICAL & SURGICAL HISTORY:  Neuropathy    GI bleed    Diabetes mellitus, type 2    Paroxysmal atrial fibrillation    CAD (coronary artery disease)    Diabetic neuropathy    HLD (hyperlipidemia)    S/P coronary artery stent placement  x2 in     H/O  section        FAMILY HISTORY:  FH: diabetes mellitus        SOCIAL HISTORY:    The patient is a nonsmoker       REVIEW OF SYSTEMS:  See HPI, otherwise complete 14 point review of systems negative      PHYSICAL EXAM:  T(C): 36.4 (10-31-20 @ 08:00), Max: 36.5 (10-31-20 @ 02:00)  HR: 65 (10-31-20 @ 08:00) (65 - 82)  BP: 118/66 (10-31-20 @ 08:00) (110/51 - 149/60)  RR: 20 (10-31-20 @ 08:00) (14 - 26)  SpO2: 100% (10-31-20 @ 08:00) (100% - 100%)  Wt(kg): --  I&O's Summary      Appearance: No Acute Distress; resting comfortably  HEENT:  Normal oral mucosa, PERRL, EOMI	  Cardiovascular: Normal S1 S2, No JVD, No murmurs/rubs/gallops  Respiratory: Normal respiratory effort; Lungs clear to auscultation bilaterally  Gastrointestinal:  Soft, Non-tender, + BS	  Skin: No rashes, No ecchymoses, No cyanosis	  Neurologic: Non-focal; no weakness  Extremities: No clubbing, cyanosis or edema  Vascular: Peripheral pulses palpable 2+ bilaterally  Psychiatry: A & O x 3, Mood & affect appropriate    Laboratory Data:	 	    CBC Full  -  ( 31 Oct 2020 07:00 )  WBC Count : 5.68 K/uL  Hemoglobin : 6.0 g/dL  Hematocrit : 21.0 %  Platelet Count - Automated : 310 K/uL  Mean Cell Volume : 87.5 fL  Mean Cell Hemoglobin : 25.0 pg  Mean Cell Hemoglobin Concentration : 28.6 %  Auto Neutrophil # : x  Auto Lymphocyte # : x  Auto Monocyte # : x  Auto Eosinophil # : x  Auto Basophil # : x  Auto Neutrophil % : x  Auto Lymphocyte % : x  Auto Monocyte % : x  Auto Eosinophil % : x  Auto Basophil % : x    10-31    140  |  107  |  70<H>  ----------------------------<  149<H>  4.5   |  20<L>  |  3.62<H>  10    139  |  103  |  69<H>  ----------------------------<  206<H>  4.7   |  18<L>  |  3.58<H>    Ca    7.9<L>      31 Oct 2020 07:00  Ca    8.1<L>      30 Oct 2020 23:00  Phos  5.3     10-31  Mg     2.4     10-31    TPro  7.1  /  Alb  4.1  /  TBili  0.6  /  DBili  x   /  AST  81<H>  /  ALT  93<H>  /  AlkPhos  149<H>  10-31  TPro  7.4  /  Alb  4.3  /  TBili  0.2  /  DBili  x   /  AST  109<H>  /  ALT  94<H>  /  AlkPhos  167<H>  10-30      proBNP: Serum Pro-Brain Natriuretic Peptide: 7673 pg/mL (10-31 @ 07:00)    Interpretation of Telemetry: Sinus 	    ECG:  	Sinus diffuse t-wave inversions.     Assessment: 80 year old woman with CAD s/p MICHAEL x2 in 2017, a-fib not on AC due to prior GI bleed, and diastolic CHF presents with acute blood loss anemia.     Plan of Care:    #CAD- s/p MICHAEL x2 in 2017.  Ms. Tsang also has lower extremity stenting for peripheral vascular disease in 2019.  I will hold ASA given history of recurrent life threatening anemia and h/o duodenal ulcer.   EKG reveals new t-wave inversions, but patient is chest pain free.  Awaiting GI evaluation.     #Decompensated diastolic CHF-  Fluid overload post pRBC transfusion.  IV Lasix given this morning.       #PVD-  Patient status post RLE angiogram with 2 stents placed in 2019.   Antiplatelet therapy on hold as stated above.         #Paroxymal a-fib-  No AC given h/o GI bleed.        Julio César Hooper MD [facility]  Cardiovascular Diseases  [phone]        72 minutes spent on total encounter; more than 50% of the visit was spent counseling and/or coordinating care by the attending physician.   	  Julio César Hooper MD [facility]  Cardiovascular Diseases  [phone]     Cardiovascular Disease Initial Evaluation    CHIEF COMPLAINT: Weakness; dark stools    HISTORY OF PRESENT ILLNESS:  This is a 80 year old woman with IDDM ([id number]-9.1%), diastolic CHF and HTN who presented to [facility] ED on 10/30/2020 with weakness and dark stools.   Mr. Tsang has a history of CAD and recurrent GI bleed. She underwent cath with Dr. Zulay Mckinnon and had MICHAEL placed to LCx and OM1 on 2017. On 2018, she presented to [facility] with progressive SOB. She was found to have a Hb of 4.6 and was transfused multiple units of pRBCs. She underwent EGD by Dr. Curtis Daigel revealing one non-bleeding duodenal ulcer. Xarelto was discontinued (paroxysmal a-fib) and ASA with Plavix was resumed. She again presented with similar symptoms in 3/2018 with Hb-5. Capsule study revealed questionable ulcer in ileum and patient was started on mesalamine. ASA was d/c'ed.    On this admission, she was found to have a Hb of 4 and was transfused. Transfusion was complicated by SOB and she was diuresed.       Allergies  Carrots (Rash)  No Known Drug Allergies        MEDICATIONS:  aspirin enteric coated 81 milliGRAM(s) Oral daily  metoprolol succinate ER 25 milliGRAM(s) Oral daily  gabapentin 300 milliGRAM(s) Oral daily  pantoprazole  Injectable 40 milliGRAM(s) IV Push two times a day  atorvastatin 40 milliGRAM(s) Oral at bedtime  dextrose 40% Gel 15 Gram(s) Oral once PRN  dextrose 50% Injectable 12.5 Gram(s) IV Push once  dextrose 50% Injectable 25 Gram(s) IV Push once  dextrose 50% Injectable 25 Gram(s) IV Push once  glucagon  Injectable 1 milliGRAM(s) IntraMuscular once PRN  insulin glargine Injectable (LANTUS) 6 Unit(s) SubCutaneous at bedtime  insulin lispro (ADMELOG) corrective regimen sliding scale   SubCutaneous every 6 hours    dextrose 5%. 1000 milliLiter(s) IV Continuous <Continuous>      PAST MEDICAL & SURGICAL HISTORY:  Neuropathy    GI bleed    Diabetes mellitus, type 2    Paroxysmal atrial fibrillation    CAD (coronary artery disease)    Diabetic neuropathy    HLD (hyperlipidemia)    S/P coronary artery stent placement  x2 in     H/O  section        FAMILY HISTORY:  FH: diabetes mellitus        SOCIAL HISTORY:    The patient is a nonsmoker       REVIEW OF SYSTEMS:  See HPI, otherwise complete 14 point review of systems negative      PHYSICAL EXAM:  T(C): 36.4 (10-31-20 @ 08:00), Max: 36.5 (10-31-20 @ 02:00)  HR: 65 (10-31-20 @ 08:00) (65 - 82)  BP: 118/66 (10-31-20 @ 08:00) (110/51 - 149/60)  RR: 20 (10-31-20 @ 08:00) (14 - 26)  SpO2: 100% (10-31-20 @ 08:00) (100% - 100%)  Wt(kg): --  I&O's Summary      Appearance: No Acute Distress; resting comfortably  HEENT:  Normal oral mucosa, PERRL, EOMI	  Cardiovascular: Normal S1 S2, No JVD, No murmurs/rubs/gallops  Respiratory: Normal respiratory effort; Lungs clear to auscultation bilaterally  Gastrointestinal:  Soft, Non-tender, + BS	  Skin: No rashes, No ecchymoses, No cyanosis	  Neurologic: Non-focal; no weakness  Extremities: No clubbing, cyanosis or edema  Vascular: Peripheral pulses palpable 2+ bilaterally  Psychiatry: A & O x 3, Mood & affect appropriate    Laboratory Data:	 	    CBC Full  -  ( 31 Oct 2020 07:00 )  WBC Count : 5.68 K/uL  Hemoglobin : 6.0 g/dL  Hematocrit : 21.0 %  Platelet Count - Automated : 310 K/uL  Mean Cell Volume : 87.5 fL  Mean Cell Hemoglobin : 25.0 pg  Mean Cell Hemoglobin Concentration : 28.6 %  Auto Neutrophil # : x  Auto Lymphocyte # : x  Auto Monocyte # : x  Auto Eosinophil # : x  Auto Basophil # : x  Auto Neutrophil % : x  Auto Lymphocyte % : x  Auto Monocyte % : x  Auto Eosinophil % : x  Auto Basophil % : x    10-31    140  |  107  |  70<H>  ----------------------------<  149<H>  4.5   |  20<L>  |  3.62<H>  10    139  |  103  |  69<H>  ----------------------------<  206<H>  4.7   |  18<L>  |  3.58<H>    Ca    7.9<L>      31 Oct 2020 07:00  Ca    8.1<L>      30 Oct 2020 23:00  Phos  5.3     10-31  Mg     2.4     10-31    TPro  7.1  /  Alb  4.1  /  TBili  0.6  /  DBili  x   /  AST  81<H>  /  ALT  93<H>  /  AlkPhos  149<H>  10-31  TPro  7.4  /  Alb  4.3  /  TBili  0.2  /  DBili  x   /  AST  109<H>  /  ALT  94<H>  /  AlkPhos  167<H>  10-30      proBNP: Serum Pro-Brain Natriuretic Peptide: 7673 pg/mL (10-31 @ 07:00)    Interpretation of Telemetry: Sinus 	    ECG:  	Sinus diffuse t-wave inversions.     Assessment: 80 year old woman with CAD s/p MICHAEL x2 in 2017, a-fib not on AC due to prior GI bleed, and diastolic CHF presents with acute blood loss anemia.     Plan of Care:    #CAD- s/p MICHAEL x2 in 2017.  Acute blood loss anemia in the setting of DAPT.   I will hold ASA and Plavix given recurrent life threatening anemia and h/o duodenal ulcer.   EKG reveals new t-wave inversions, but patient is chest pain free.  Awaiting GI evaluation.     #Decompensated diastolic CHF-  Fluid overload post pRBC transfusion.  IV Lasix given this morning.   Repeat echo.       #PVD-  Patient status post RLE angiogram with 2 stents placed in 2019.   Antiplatelet therapy on hold as stated above.         #Paroxymal a-fib-  No AC given h/o GI bleed.      Care discussed at length with Ms. Tsang. All questions answered.     Julio César Hooper MD [facility]  Cardiovascular Diseases  [phone]        72 minutes spent on total encounter; more than 50% of the visit was spent counseling and/or coordinating care by the attending physician.   	  Julio César Hooper MD [facility]  Cardiovascular Diseases  [phone]

## 2020-10-31 NOTE — PROVIDER CONTACT NOTE (OTHER) - RECOMMENDATIONS
Provider to assess patient at bedside. Obtain EKG. Administer furosemide. Possible transfer to telemetry floor

## 2020-10-31 NOTE — ED ADULT NURSE REASSESSMENT NOTE - CONDITION
unchanged/Pt calm pleasant affect axox4. Pt education provided via print out regarding Blood tranfusion care. Also discussed s&s of transfusion related reactions . to incluce chills, rash, chestpain, shortness of breath, back pain. any discomfrt. Pt verbalizing understanding via teach back method. Pt st' I have received blood transfusions many many times I never had any reactions."

## 2020-10-31 NOTE — CONSULT NOTE ADULT - REASON FOR ADMISSION
CC: chest pain and shortness of breath

## 2020-10-31 NOTE — H&P ADULT - ASSESSMENT
80 year old woman with a history of CAD s/p stents on ASA/plavix (last stent per patient in early 2019, chart reports 12/2017), T2DM, HTN, prior GI bleeding/duodenal ulcer who presents for chest pain and shortness of breath with exertion and melena.

## 2020-10-31 NOTE — ED PROVIDER NOTE - ATTENDING CONTRIBUTION TO CARE
I have seen and examined the patient on the patient´s visit date. I have reviewed the note written by Kendall Denson DO, on that visit day. I have supervised and participated as necessary in the performance of procedures indicated for patient management and was available at all phases of the patient´s visit when needed. We discussed the history, physical exam findings, management plan, and  medical decision making. I have made my additions, exceptions, and revisions within the chart and I agree with H and P as documented in its entirety. The data and my interpretation of any data collected from labs, interventions and imaging appear below as well as my independent medical decision making and considerations.      The patient is a 80y Female who has a past medical and surgery history of DM HLD c/b neuropathy, PAF CAD/Stents and prior GI bleed    H/O  section     PTED with   129/  57 mm Hg P 82 /min RR 26 /min 97.5 Degrees F SPO2 100 % I have seen and examined the patient on the patient´s visit date. I have reviewed the note written by Kendall Denson DO, on that visit day. I have supervised and participated as necessary in the performance of procedures indicated for patient management and was available at all phases of the patient´s visit when needed. We discussed the history, physical exam findings, management plan, and  medical decision making. I have made my additions, exceptions, and revisions within the chart and I agree with H and P as documented in its entirety. The data and my interpretation of any data collected from labs, interventions and imaging appear below as well as my independent medical decision making and considerations.      The patient is a 80y Female who has a past medical and surgery history of DM HLD c/b neuropathy, PAF CAD/Stents and prior GI bleed PTED with GIB   for days and dark red BMs as described with epigastric pain  Vital Signs /  57 mm Hg P 8  PE: as described; my additions and exceptions are noted in the chart; gross blood/clots on rectal  DATA:   EKG: NSR@78; inferolateral ST changes   LABs relevant/abnormal labs removed via pullset  IMP  moderate volume gibleed in symptomatic patient with borderline vs and risk factors placing her at risk for ischemia (see EKG changes)/shock MOF  Plan   Supportive volume/transfusion keeping HB >7 but preferably above 8  GI consult  serial cbc's   monitor vs   admit

## 2020-10-31 NOTE — ED ADULT NURSE REASSESSMENT NOTE - REASSESS COMMUNICATION
MD Mcintyre notified./ED physician notified
ED physician notified/MD Amaya notifed need consent for transfusion

## 2020-10-31 NOTE — H&P ADULT - NSHPREVIEWOFSYSTEMS_GEN_ALL_CORE
ROS:  Constitutional:  (+) fatigue, (-) fevers, chills, sweats, unintentional weight loss, sick contacts, recent travel, trauma  Ears/Nose/Mouth/Throat: (+) none; (-) throat pain, rhinorrhea, dysphagia/odynophagia  CV: (+) chest pain, lower extremity edema, (-)  palpitations, orthopnea, PND  Resp: (+) shortness of breath,(-)  cough  GI: (+) nausea, melena, (-) abdominal pain, vomiting, diarrhea, constipation, hematochezia  : (+) none; (-) dysuria, hematuria  MSK: (+) none; (-) joint pain, joint swelling  Skin: (+) none; (-) rash, new yellowing/darkening of skin  Neuro: (+) none; (-) HA, vision changes, weakness, confusion, lightheadedness/dizziness  Endo: (+) none; (-) heat/cold intolerance, recent skin/hair/nail changes  Heme/Lymph: (+) none; (-) swollen lymph nodes, night sweats

## 2020-10-31 NOTE — H&P ADULT - PROBLEM SELECTOR PLAN 7
Hold amlodipine due to acute bleed to help avoid hypotension. Continue metoprolol with hold parameters

## 2020-10-31 NOTE — H&P ADULT - PROBLEM SELECTOR PLAN 5
-As above, suspect chest pain is due to demand ischemia  -Continue aspirin, atorvastatin, metoprolol hold plavix

## 2020-10-31 NOTE — CONSULT NOTE ADULT - SUBJECTIVE AND OBJECTIVE BOX
Patient is a 80y old  Female who presents with a chief complaint of CC: chest pain and shortness of breath (31 Oct 2020 11:04)     .     HPI:    HPI:  Nikunj Tsang is a 80 year old woman with a history of CAD s/p stents on ASA/plavix (last stent per patient in early , chart reports 2017), T2DM, HTN, prior GI bleeding/duodenal ulcer who presents for chest pain and shortness of breath with exertion and melena.    She reports she has been having intermittent black tarry stools for several months. She did not seek care for this. She denied any abdominal pain associated. About 1 week ago, she noticed severe weakness as well as 10/10 squeezing substernal chest pain, shortness of breath, and nausea with exertion. The symptoms would resolve with rest. She denies any diaphoresis, chills, cough. The symptoms did not present at rest. Due to these symptoms, she presented to [facility] ED for evaluation.    In the ED, she was afebrile, tachypneic satting well on RA and 3L NC. Diagnostics revealed hgb of 4.1, FOBT positive, and creatinine elevated beyond baseline. She was given pantoprazole IV and 2 units of pRBC and admitted for further management.    On evaluation, she gave the above history and denied any current chest pain, shortness of breath. (31 Oct 2020 03:56)          REVIEW OF SYSTEMS  Constitutional:   No fever, no fatigue, no pallor, no night sweats, no weight loss.  HEENT:   No eye pain, no vision changes, no icterus, no mouth ulcers.  Respiratory:   No shortness of breath, no cough, no respiratory distress.   Cardiovascular:   No chest pain, no palpitations.   Gastrointestinal: No abdominal pain, no nausea, no vomiting , no diahrrea, no constipation, no hematochezia,+ melena.  Skin:   No rashes, no jaundice, no eczema.   Musculoskeletal:   No joint pain, no swelling, no myalgia.   Neurologic:   No headache, no seizure, no weakness.   Genitourinary:   No dysuria, no decreased urine output.  Psychiatric:  No depression, no anxiety,   Endocrine:   No thyroid disease, no diabetes.  Heme/Lymphatic:   No anemia, no blood transfusions, no lymph node enlargement, no bleeding, no bruising.  ___________________________________________________________________________________________  Allergies    Carrots (Rash)  No Known Drug Allergies    Intolerances      MEDICATIONS  (STANDING):  atorvastatin 40 milliGRAM(s) Oral at bedtime  dextrose 5%. 1000 milliLiter(s) (50 mL/Hr) IV Continuous <Continuous>  dextrose 50% Injectable 12.5 Gram(s) IV Push once  dextrose 50% Injectable 25 Gram(s) IV Push once  dextrose 50% Injectable 25 Gram(s) IV Push once  gabapentin 300 milliGRAM(s) Oral daily  insulin glargine Injectable (LANTUS) 6 Unit(s) SubCutaneous at bedtime  insulin lispro (ADMELOG) corrective regimen sliding scale   SubCutaneous three times a day before meals  insulin lispro (ADMELOG) corrective regimen sliding scale   SubCutaneous at bedtime  metoprolol succinate ER 25 milliGRAM(s) Oral daily  pantoprazole  Injectable 40 milliGRAM(s) IV Push two times a day    MEDICATIONS  (PRN):  dextrose 40% Gel 15 Gram(s) Oral once PRN Blood Glucose LESS THAN 70 milliGRAM(s)/deciliter  glucagon  Injectable 1 milliGRAM(s) IntraMuscular once PRN Glucose LESS THAN 70 milligrams/deciliter      PAST MEDICAL & SURGICAL HISTORY:  Neuropathy    GI bleed    Diabetes mellitus, type 2    Paroxysmal atrial fibrillation    CAD (coronary artery disease)    Diabetic neuropathy    HLD (hyperlipidemia)    S/P coronary artery stent placement  x2 in     H/O  section      FAMILY HISTORY:  FH: diabetes mellitus      Social History: No hsitory of : Tobacco use, IVDA, EToH  ______________________________________________________________________________________    PHYSICAL EXAM    Daily     Daily   BMI: 28.9 (10-31 @ 04:58)  Change in Weight:  Vital Signs Last 24 Hrs  T(C): 36.6 (31 Oct 2020 22:45), Max: 37 (31 Oct 2020 11:10)  T(F): 97.8 (31 Oct 2020 22:45), Max: 98.6 (31 Oct 2020 11:10)  HR: 65 (31 Oct 2020 21:45) (65 - 82)  BP: 146/66 (31 Oct 2020 21:45) (110/51 - 149/60)  BP(mean): --  RR: 20 (31 Oct 2020 21:45) (14 - 24)  SpO2: 100% (31 Oct 2020 21:45) (100% - 100%)    General:  Well developed, well nourished, alert and active, no pallor, NAD.  HEENT:    Normal appearance of conjunctiva, ears, nose, lips, oropharynx, and oral mucosa, anicteric.  Neck:  No masses, no asymmetry.  Lymph Nodes:  No lymphadenopathy.   Cardiovascular:  RRR normal S1/S2, no murmur.  Respiratory:  CTA B/L, normal respiratory effort.   Abdominal:   soft, no masses or tenderness, normoactive BS, NT/ND, no HSM.  Extremities:   No clubbing or cyanosis, normal capillary refill, no edema.   Skin:   No rash, jaundice, lesions, eczema.   Musculoskeletal:  No joint swelling, erythema or tenderness.   Neuro: No focal deficits.   Other:   _______________________________________________________________________________________________  Lab Results:                          7.0    5.98  )-----------( 269      ( 31 Oct 2020 23:20 )             23.6     10-31    140  |  107  |  70<H>  ----------------------------<  149<H>  4.5   |  20<L>  |  3.62<H>    Ca    7.9<L>      31 Oct 2020 07:00  Phos  5.3     1031  Mg     2.4     10-31    TPro  7.1  /  Alb  4.1  /  TBili  0.6  /  DBili  x   /  AST  81<H>  /  ALT  93<H>  /  AlkPhos  149<H>  10-31    LIVER FUNCTIONS - ( 31 Oct 2020 07:00 )  Alb: 4.1 g/dL / Pro: 7.1 g/dL / ALK PHOS: 149 u/L / ALT: 93 u/L / AST: 81 u/L / GGT: x           PT/INR - ( 30 Oct 2020 23:00 )   PT: 13.3 SEC;   INR: 1.16          PTT - ( 30 Oct 2020 23:00 )  PTT:30.6 SEC        Stool Results:          RADIOLOGY RESULTS:    SURGICAL PATHOLOGY:

## 2020-10-31 NOTE — PROVIDER CONTACT NOTE (OTHER) - ACTION/TREATMENT ORDERED:
Provider aware and came to assess patient at beside. Obtain EKG. Administer furosemide. Transfer patient to telemetry floor

## 2020-10-31 NOTE — H&P ADULT - PROBLEM SELECTOR PLAN 4
-Suspect chest pain due to demand myocardial ischemia in the setting of anemia  -Transfuse and GI bleed workup as above  -Continue aspirin, hold plavix due to bleeding, continue atorvastatin and metoprolol  -Trend troponin

## 2020-10-31 NOTE — ED PROVIDER NOTE - OBJECTIVE STATEMENT
80y female with PMH of htn, cad s/p stent x 2, DM2, GI bleed presenting with epigastric abdominal pain and exertional SOB. Symptoms have been progressive over the past week. She has also noticed black stools. No nausea, vomiting, fevers, cough, chest pain.

## 2020-10-31 NOTE — H&P ADULT - HISTORY OF PRESENT ILLNESS
Nikunj Tsang is a 80 year old woman with a history of CAD s/p stents on ASA/plavix (last stent per patient in early 2019, chart reports 12/2017), T2DM, HTN, prior GI bleeding/duodenal ulcer who presents for chest pain and shortness of breath with exertion and melena.    She reports she has been having intermittent black tarry stools for several months. She did not seek care for this. She denied any abdominal pain associated. About 1 week ago, she noticed severe weakness as well as 10/10 squeezing substernal chest pain, shortness of breath, and nausea with exertion. The symptoms would resolve with rest. She denies any diaphoresis, chills, cough. The symptoms did not present at rest. Due to these symptoms, she presented to [facility] ED for evaluation.    In the ED, she was afebrile, tachypneic satting well on RA and 3L NC. Diagnostics revealed hgb of 4.1, FOBT positive, and creatinine elevated beyond baseline. She was given pantoprazole IV and 2 units of pRBC and admitted for further management.    On evaluation, she gave the above history and denied any current chest pain, shortness of breath.

## 2020-10-31 NOTE — H&P ADULT - NSICDXPASTMEDICALHX_GEN_ALL_CORE_FT
PAST MEDICAL HISTORY:  CAD (coronary artery disease)     Diabetes mellitus, type 2     Diabetic neuropathy     GI bleed     HLD (hyperlipidemia)     Neuropathy     Paroxysmal atrial fibrillation

## 2020-10-31 NOTE — CONSULT NOTE ADULT - SUBJECTIVE AND OBJECTIVE BOX
[facility] - LISA Myers / LISA Gates / ADAM Jackson/ SJessica Tsang/ LISA Torres/ JULIO Chavez / ALEXANDRA Clinton / CAITIE Lanza  -------------------------------------------------------------------------------------------------------  The patient seen and examined today.  HPI:  Nikunj Tsang is a 80 year old woman with a history of CAD s/p stents on ASA/plavix (last stent per patient in early , chart reports 2017), T2DM, HTN, prior GI bleeding/duodenal ulcer who presents for chest pain and shortness of breath with exertion and melena.    She reports she has been having intermittent black tarry stools for several months. She did not seek care for this. She denied any abdominal pain associated. About 1 week ago, she noticed severe weakness as well as 10/10 squeezing substernal chest pain, shortness of breath, and nausea with exertion. The symptoms would resolve with rest. She denies any diaphoresis, chills, cough. The symptoms did not present at rest. Due to these symptoms, she presented to [facility] ED for evaluation.    In the ED, she was afebrile, tachypneic satting well on RA and 3L NC. Diagnostics revealed hgb of 4.1, FOBT positive, and creatinine elevated beyond baseline. She was given pantoprazole IV and 2 units of pRBC and admitted for further management.    On evaluation, she gave the above history and denied any current chest pain, shortness of breath. (31 Oct 2020 03:56)      PAST MEDICAL & SURGICAL HISTORY:  Neuropathy    GI bleed    Diabetes mellitus, type 2    Paroxysmal atrial fibrillation    CAD (coronary artery disease)    Diabetic neuropathy    HLD (hyperlipidemia)    S/P coronary artery stent placement  x2 in     H/O  section      Allergies :- Carrots (Rash)  No Known Drug Allergies    Home Medications Reviewed  Hospital Medications:   MEDICATIONS  (STANDING):  aspirin enteric coated 81 milliGRAM(s) Oral daily  atorvastatin 40 milliGRAM(s) Oral at bedtime  dextrose 5%. 1000 milliLiter(s) (50 mL/Hr) IV Continuous <Continuous>  dextrose 50% Injectable 12.5 Gram(s) IV Push once  dextrose 50% Injectable 25 Gram(s) IV Push once  dextrose 50% Injectable 25 Gram(s) IV Push once  gabapentin 300 milliGRAM(s) Oral daily  insulin glargine Injectable (LANTUS) 6 Unit(s) SubCutaneous at bedtime  insulin lispro (ADMELOG) corrective regimen sliding scale   SubCutaneous every 6 hours  metoprolol succinate ER 25 milliGRAM(s) Oral daily  pantoprazole  Injectable 40 milliGRAM(s) IV Push two times a day    SOCIAL HISTORY:  Denies ETOh,Smoking,   FAMILY HISTORY:  FH: diabetes mellitus        REVIEW OF SYSTEMS:  CONSTITUTIONAL: No weakness, fevers or chills  EYES/ENT: No visual changes;  No vertigo or throat pain   NECK: No pain or stiffness  RESPIRATORY: SOB +  CARDIOVASCULAR: HARPER +  GASTROINTESTINAL Melena +  GENITOURINARY: No dysuria, frequency, foamy urine, urinary urgency, incontinence or hematuria  NEUROLOGICAL: No numbness or weakness  SKIN: No itching, burning, rashes, or lesions   VASCULAR: No bilateral lower extremity edema.   All other review of systems is negative unless indicated above.    VITALS:  T(F): 97.6 (10-31-20 @ 08:00), Max: 97.7 (10-31-20 @ 02:00)  HR: 65 (10-31-20 @ 08:00)  BP: 118/66 (10-31-20 @ 08:00)  RR: 20 (10-31-20 @ 08:00)  SpO2: 100% (10-31-20 @ 08:00)  Wt(kg): --    Height (cm): 157.5 (10-30 @ 22:13)  Weight (kg): 71.8 (10-31 @ 04:58)  BMI (kg/m2): 28.9 (10-31 @ :58)  BSA (m2): 1.73 (10-31 @ :58)    PHYSICAL EXAM:  Constitutional: NAD  HEENT: anicteric sclera, oropharynx clear, MMM  Neck: supple.   Respiratory: Bilateral equal breath sounds , no wheezes, crackles +  Cardiovascular: S1, S2, Regular, Murmur present.  Gastrointestinal: Bowel Sound present, soft, NT/ND  Extremities: No cyanosis or clubbing. peripheral edema +  Neurological: Alert and oriented x 3, no focal deficits  Psychiatric: Normal mood, normal affect  : No CVA tenderness. No alba.   Skin: No rashes    Data:  10-31    140  |  107  |  70<H>  ----------------------------<  149<H>  4.5   |  20<L>  |  3.62<H>    Ca    7.9<L>      31 Oct 2020 07:00  Phos  5.3     10-31  Mg     2.4     10-31    TPro  7.1  /  Alb  4.1  /  TBili  0.6  /  DBili      /  AST  81<H>  /  ALT  93<H>  /  AlkPhos  149<H>  10-31    Creatinine Trend: 3.62 <--, 3.58 <--                        6.0    5.68  )-----------( 310      ( 31 Oct 2020 07:00 )             21.0     Urine Studies:

## 2020-10-31 NOTE — ED ADULT NURSE REASSESSMENT NOTE - GENERAL PATIENT STATE
comfortable appearance
comfortable appearance/resting/sleeping

## 2020-10-31 NOTE — ED PROVIDER NOTE - CLINICAL SUMMARY MEDICAL DECISION MAKING FREE TEXT BOX
80y female with epigastric pain, black stools, and exertional dyspnea. Concern for GI bleed, anemia, not tender so GB pathology less likely but will get US if abnormal LFT. Will get labs, ekg, admit.

## 2020-10-31 NOTE — H&P ADULT - NSHPLABSRESULTS_GEN_ALL_CORE
Diagnostics reviewed and notable for:  CBC w/ hgb 4.1, MCV 88.4, lymphopenia 0.84  FOBT positive  Coags wnl  CMP w/ bicarb 18, AG 18, BUN 69, Cr 3.58, glucose 206, Ca 8.1, alk phos 167, , ALT 94  Lipase 60.8  Trop 37  VBG w/ Lactate 3.3  RVP negative  CXR personally reviewed and clear lungs, aortic calcifications, possible cardiomegaly  UA/UCx pending   EKG personally reviewed and NSR, TWI in I-II, AVL, AVF, V4-6, delayed R wave progression, QTc 426

## 2020-10-31 NOTE — CONSULT NOTE ADULT - PROBLEM SELECTOR RECOMMENDATION 9
Serum creatinine 2.9 in 2018  Likely worsening of CKD 4  Check Urinalysis with Microscopy, ULytes, UPC  Check Kidney USG, alba in place  pulm congestion +, Lasix 40mg IV Once recommended

## 2020-10-31 NOTE — ED PROVIDER NOTE - NS ED ROS FT
Gen: No fever, No chills  Eyes: No vision changes, eye irritation or discharge  ENT: No congestion, sore throat  Resp: No cough. +SOB  Cardiovascular: No chest pain or palpitations  GI: +abdominal pain. No nausea/vomiting, diarrhea, constipation  :  No change in urine output or frequency; no dysuria  MS: No joint or muscle pain  Skin: No rashes  Neuro: No headache; No numbness or weakness  Remainder negative, except as per the HPI

## 2020-10-31 NOTE — CONSULT NOTE ADULT - ASSESSMENT
Nikunj Tsang is a 80 year old woman with a history of CAD s/p stents on ASA/plavix (last stent per patient in early 2019, chart reports 12/2017), T2DM, HTN, prior GI bleeding/duodenal ulcer who presents for chest pain and shortness of breath with exertion and melena.

## 2020-10-31 NOTE — ED PROVIDER NOTE - PROGRESS NOTE DETAILS
pt signed out to me pending consent for PRBC, pt has had transfusion in the past. pt explained and consented for blood.

## 2020-10-31 NOTE — H&P ADULT - PROBLEM SELECTOR PLAN 1
-History suggestive of subacute GI bleeding likely from upper GI source given melena  -Previously with known duodenal ulcer, so possibly from ulcer. Differential also includes AVM, gastritis, malignancy.  -Transfuse for hgb < 7, consent in chart  -IV PPI BID, PIV x2, NPO, maintain t/s  -GI consult for EGD (email sent overnight)

## 2020-10-31 NOTE — ED ADULT NURSE REASSESSMENT NOTE - CONDITION
coverage rpt to YOKASTA Jackson. transfusion consent in chart. Blood Bank notified to release 1st unit.

## 2020-10-31 NOTE — ED PROVIDER NOTE - PHYSICAL EXAMINATION
Gen: AAOx3, non-toxic  Head: NCAT  HEENT: EOMI, oral mucosa moist, normal conjunctiva  Lung: CTAB, no respiratory distress, no wheezes/rhonchi/rales B/L, speaking in full sentences  CV: RRR, no murmurs, rubs or gallops  Abd: soft, non-tender  MSK: no visible deformities  Neuro: No focal sensory or motor deficits  Skin: Warm, well perfused, no rash  Psych: normal affect.   ~Casey Danielle PGY3

## 2020-11-01 DIAGNOSIS — I48.0 PAROXYSMAL ATRIAL FIBRILLATION: ICD-10-CM

## 2020-11-01 LAB
ANION GAP SERPL CALC-SCNC: 13 MMO/L — SIGNIFICANT CHANGE UP (ref 7–14)
BASOPHILS # BLD AUTO: 0.05 K/UL — SIGNIFICANT CHANGE UP (ref 0–0.2)
BASOPHILS NFR BLD AUTO: 0.8 % — SIGNIFICANT CHANGE UP (ref 0–2)
BUN SERPL-MCNC: 66 MG/DL — HIGH (ref 7–23)
CALCIUM SERPL-MCNC: 8.3 MG/DL — LOW (ref 8.4–10.5)
CHLORIDE SERPL-SCNC: 106 MMOL/L — SIGNIFICANT CHANGE UP (ref 98–107)
CO2 SERPL-SCNC: 22 MMOL/L — SIGNIFICANT CHANGE UP (ref 22–31)
CREAT SERPL-MCNC: 3.43 MG/DL — HIGH (ref 0.5–1.3)
EOSINOPHIL # BLD AUTO: 0.16 K/UL — SIGNIFICANT CHANGE UP (ref 0–0.5)
EOSINOPHIL NFR BLD AUTO: 2.5 % — SIGNIFICANT CHANGE UP (ref 0–6)
GLUCOSE BLDC GLUCOMTR-MCNC: 130 MG/DL — HIGH (ref 70–99)
GLUCOSE BLDC GLUCOMTR-MCNC: 139 MG/DL — HIGH (ref 70–99)
GLUCOSE BLDC GLUCOMTR-MCNC: 173 MG/DL — HIGH (ref 70–99)
GLUCOSE BLDC GLUCOMTR-MCNC: 186 MG/DL — HIGH (ref 70–99)
GLUCOSE BLDC GLUCOMTR-MCNC: 225 MG/DL — HIGH (ref 70–99)
GLUCOSE SERPL-MCNC: 134 MG/DL — HIGH (ref 70–99)
HAV IGM SER-ACNC: NONREACTIVE — SIGNIFICANT CHANGE UP
HBV CORE IGM SER-ACNC: NONREACTIVE — SIGNIFICANT CHANGE UP
HBV SURFACE AG SER-ACNC: NONREACTIVE — SIGNIFICANT CHANGE UP
HCT VFR BLD CALC: 25 % — LOW (ref 34.5–45)
HCV AB S/CO SERPL IA: 0.52 S/CO — SIGNIFICANT CHANGE UP (ref 0–0.99)
HCV AB SERPL-IMP: SIGNIFICANT CHANGE UP
HGB BLD-MCNC: 7.1 G/DL — LOW (ref 11.5–15.5)
IMM GRANULOCYTES NFR BLD AUTO: 1.1 % — SIGNIFICANT CHANGE UP (ref 0–1.5)
LYMPHOCYTES # BLD AUTO: 0.93 K/UL — LOW (ref 1–3.3)
LYMPHOCYTES # BLD AUTO: 14.7 % — SIGNIFICANT CHANGE UP (ref 13–44)
MAGNESIUM SERPL-MCNC: 2.3 MG/DL — SIGNIFICANT CHANGE UP (ref 1.6–2.6)
MCHC RBC-ENTMCNC: 25.2 PG — LOW (ref 27–34)
MCHC RBC-ENTMCNC: 28.4 % — LOW (ref 32–36)
MCV RBC AUTO: 88.7 FL — SIGNIFICANT CHANGE UP (ref 80–100)
MONOCYTES # BLD AUTO: 0.62 K/UL — SIGNIFICANT CHANGE UP (ref 0–0.9)
MONOCYTES NFR BLD AUTO: 9.8 % — SIGNIFICANT CHANGE UP (ref 2–14)
NEUTROPHILS # BLD AUTO: 4.49 K/UL — SIGNIFICANT CHANGE UP (ref 1.8–7.4)
NEUTROPHILS NFR BLD AUTO: 71.1 % — SIGNIFICANT CHANGE UP (ref 43–77)
NRBC # FLD: 0.06 K/UL — SIGNIFICANT CHANGE UP (ref 0–0)
PLATELET # BLD AUTO: 281 K/UL — SIGNIFICANT CHANGE UP (ref 150–400)
PMV BLD: 12.1 FL — SIGNIFICANT CHANGE UP (ref 7–13)
POTASSIUM SERPL-MCNC: 4.3 MMOL/L — SIGNIFICANT CHANGE UP (ref 3.5–5.3)
POTASSIUM SERPL-SCNC: 4.3 MMOL/L — SIGNIFICANT CHANGE UP (ref 3.5–5.3)
PROT UR-MCNC: 59.4 MG/DL — SIGNIFICANT CHANGE UP
RBC # BLD: 2.82 M/UL — LOW (ref 3.8–5.2)
RBC # FLD: 15.9 % — HIGH (ref 10.3–14.5)
SARS-COV-2 IGG SERPL QL IA: POSITIVE
SARS-COV-2 IGM SERPL IA-ACNC: 1.92 INDEX — HIGH
SODIUM SERPL-SCNC: 141 MMOL/L — SIGNIFICANT CHANGE UP (ref 135–145)
SODIUM UR-SCNC: 68 MMOL/L — SIGNIFICANT CHANGE UP
UUN UR-MCNC: 423 MG/DL — SIGNIFICANT CHANGE UP
WBC # BLD: 6.32 K/UL — SIGNIFICANT CHANGE UP (ref 3.8–10.5)
WBC # FLD AUTO: 6.32 K/UL — SIGNIFICANT CHANGE UP (ref 3.8–10.5)

## 2020-11-01 RX ORDER — FUROSEMIDE 40 MG
20 TABLET ORAL
Refills: 0 | Status: DISCONTINUED | OUTPATIENT
Start: 2020-11-01 | End: 2020-11-03

## 2020-11-01 RX ORDER — FUROSEMIDE 40 MG
40 TABLET ORAL ONCE
Refills: 0 | Status: COMPLETED | OUTPATIENT
Start: 2020-11-01 | End: 2020-11-01

## 2020-11-01 RX ADMIN — PANTOPRAZOLE SODIUM 40 MILLIGRAM(S): 20 TABLET, DELAYED RELEASE ORAL at 18:00

## 2020-11-01 RX ADMIN — GABAPENTIN 300 MILLIGRAM(S): 400 CAPSULE ORAL at 11:49

## 2020-11-01 RX ADMIN — Medication 20 MILLIGRAM(S): at 11:49

## 2020-11-01 RX ADMIN — INSULIN GLARGINE 6 UNIT(S): 100 INJECTION, SOLUTION SUBCUTANEOUS at 22:56

## 2020-11-01 RX ADMIN — Medication 20 MILLIGRAM(S): at 18:09

## 2020-11-01 RX ADMIN — Medication 2: at 12:59

## 2020-11-01 RX ADMIN — PANTOPRAZOLE SODIUM 40 MILLIGRAM(S): 20 TABLET, DELAYED RELEASE ORAL at 05:50

## 2020-11-01 RX ADMIN — Medication 25 MILLIGRAM(S): at 05:50

## 2020-11-01 RX ADMIN — Medication 40 MILLIGRAM(S): at 22:28

## 2020-11-01 RX ADMIN — ATORVASTATIN CALCIUM 40 MILLIGRAM(S): 80 TABLET, FILM COATED ORAL at 22:28

## 2020-11-01 NOTE — PROGRESS NOTE ADULT - ASSESSMENT
Gi bleed :  Plan for EGD tomorrow   NPO post MN     Afib  AC is on hold     Advanced care planning was discussed with patient and family.  Advanced care planning forms were reviewed and discussed.  Risks, benefits and alternatives of gastroenterologic procedures were discussed in detail and all questions were answered.

## 2020-11-01 NOTE — PROGRESS NOTE ADULT - SUBJECTIVE AND OBJECTIVE BOX
[facility] - LISA Myers / LISA Gates / ADAM Jackson/ LISA Tsang/ LISA Torres/ JULIO Chavez / ALEXANDRA Clinton / CAITIE Lanza  ---------------------------------------------------------------------------------------------------------------  seen and examined today for CKD 4  Interval : NAD  VITALS:  T(F): 97.7 (11-01-20 @ 05:45), Max: 98.6 (10-31-20 @ 11:10)  HR: 69 (11-01-20 @ 05:45)  BP: 157/68 (11-01-20 @ 05:45)  RR: 20 (11-01-20 @ 05:45)  SpO2: 100% (11-01-20 @ 05:45)  Wt(kg): --    Physical Exam :-  Constitutional: NAD  Neck: Supple.  Respiratory: Bilateral equal breath sounds,  Cardiovascular: S1, S2 normal,  Gastrointestinal: Bowel Sounds present, soft, non tender.  Extremities: No edema  Neurological: Alert and Oriented x 3, no focal deficits  Psychiatric: Normal mood, normal affect  Data:-  Allergies :   Carrots (Rash)  No Known Drug Allergies    Hospital Medications:   MEDICATIONS  (STANDING):  atorvastatin 40 milliGRAM(s) Oral at bedtime  dextrose 5%. 1000 milliLiter(s) (50 mL/Hr) IV Continuous <Continuous>  dextrose 50% Injectable 12.5 Gram(s) IV Push once  dextrose 50% Injectable 25 Gram(s) IV Push once  dextrose 50% Injectable 25 Gram(s) IV Push once  gabapentin 300 milliGRAM(s) Oral daily  insulin glargine Injectable (LANTUS) 6 Unit(s) SubCutaneous at bedtime  insulin lispro (ADMELOG) corrective regimen sliding scale   SubCutaneous three times a day before meals  insulin lispro (ADMELOG) corrective regimen sliding scale   SubCutaneous at bedtime  metoprolol succinate ER 25 milliGRAM(s) Oral daily  pantoprazole  Injectable 40 milliGRAM(s) IV Push two times a day    11-01    141  |  106  |  66<H>  ----------------------------<  134<H>  4.3   |  22  |  3.43<H>    Ca    8.3<L>      01 Nov 2020 04:43  Phos  5.3     10-31  Mg     2.3     11-01    TPro  7.1  /  Alb  4.1  /  TBili  0.6  /  DBili      /  AST  81<H>  /  ALT  93<H>  /  AlkPhos  149<H>  10-31    Creatinine Trend: 3.43 <--, 3.62 <--, 3.58 <--                        7.1    6.32  )-----------( 281      ( 01 Nov 2020 04:43 )             25.0

## 2020-11-01 NOTE — PROVIDER CONTACT NOTE (CRITICAL VALUE NOTIFICATION) - ACTION/TREATMENT ORDERED:
no actions at this time
no actions at this time
Monitor pt overnight and recheck labs in morning as per PA

## 2020-11-01 NOTE — PROGRESS NOTE ADULT - SUBJECTIVE AND OBJECTIVE BOX
Patient is a 80y old  Female who presents with a chief complaint of CC: chest pain and shortness of breath (2020 11:06)      SUBJECTIVE / OVERNIGHT EVENTS:    Events noted.  CONSTITUTIONAL: No fever,  or fatigue  RESPIRATORY: No cough, wheezing,  No shortness of breath  CARDIOVASCULAR: No chest pain, palpitations  GASTROINTESTINAL: No abdominal or epigastric pain.   NEUROLOGICAL: No headaches,     MEDICATIONS  (STANDING):  atorvastatin 40 milliGRAM(s) Oral at bedtime  dextrose 5%. 1000 milliLiter(s) (50 mL/Hr) IV Continuous <Continuous>  dextrose 50% Injectable 12.5 Gram(s) IV Push once  dextrose 50% Injectable 25 Gram(s) IV Push once  dextrose 50% Injectable 25 Gram(s) IV Push once  furosemide   Injectable 20 milliGRAM(s) IV Push two times a day  gabapentin 300 milliGRAM(s) Oral daily  insulin glargine Injectable (LANTUS) 6 Unit(s) SubCutaneous at bedtime  insulin lispro (ADMELOG) corrective regimen sliding scale   SubCutaneous three times a day before meals  insulin lispro (ADMELOG) corrective regimen sliding scale   SubCutaneous at bedtime  metoprolol succinate ER 25 milliGRAM(s) Oral daily  pantoprazole  Injectable 40 milliGRAM(s) IV Push two times a day    MEDICATIONS  (PRN):  dextrose 40% Gel 15 Gram(s) Oral once PRN Blood Glucose LESS THAN 70 milliGRAM(s)/deciliter  glucagon  Injectable 1 milliGRAM(s) IntraMuscular once PRN Glucose LESS THAN 70 milligrams/deciliter        CAPILLARY BLOOD GLUCOSE      POCT Blood Glucose.: 225 mg/dL (2020 12:12)  POCT Blood Glucose.: 139 mg/dL (2020 08:32)  POCT Blood Glucose.: 289 mg/dL (31 Oct 2020 21:30)  POCT Blood Glucose.: 196 mg/dL (31 Oct 2020 17:32)    I&O's Summary      PHYSICAL EXAM:    NECK: Supple, No JVD  CHEST/LUNG: Clear to auscultation bilaterally; No wheezing.  HEART: Regular rate and rhythm; No murmurs, rubs, or gallops  ABDOMEN: Soft, Nontender, Nondistended; Bowel sounds present  EXTREMITIES:   No edema  NEUROLOGY: AAO       LABS:                        7.1    6.32  )-----------( 281      ( 2020 04:43 )             25.0         141  |  106  |  66<H>  ----------------------------<  134<H>  4.3   |  22  |  3.43<H>    Ca    8.3<L>      2020 04:43  Phos  5.3     10-31  Mg     2.3         TPro  7.1  /  Alb  4.1  /  TBili  0.6  /  DBili  x   /  AST  81<H>  /  ALT  93<H>  /  AlkPhos  149<H>  10-31    PT/INR - ( 30 Oct 2020 23:00 )   PT: 13.3 SEC;   INR: 1.16          PTT - ( 30 Oct 2020 23:00 )  PTT:30.6 SEC      Urinalysis Basic - ( 31 Oct 2020 15:49 )    Color: LIGHT YELLOW / Appearance: Lt TURBID / S.011 / pH: 6.0  Gluc: NEGATIVE / Ketone: NEGATIVE  / Bili: NEGATIVE / Urobili: NORMAL   Blood: NEGATIVE / Protein: 30 / Nitrite: NEGATIVE   Leuk Esterase: LARGE / RBC: 0-2 / WBC >50   Sq Epi: OCC / Non Sq Epi: x / Bacteria: MODERATE      CAPILLARY BLOOD GLUCOSE      POCT Blood Glucose.: 225 mg/dL (2020 12:12)  POCT Blood Glucose.: 139 mg/dL (2020 08:32)  POCT Blood Glucose.: 289 mg/dL (31 Oct 2020 21:30)  POCT Blood Glucose.: 196 mg/dL (31 Oct 2020 17:32)                RADIOLOGY & ADDITIONAL TESTS:    Imaging Personally Reviewed:    Consultant(s) Notes Reviewed:      Care Discussed with Consultants/Other Providers:    Parminder Goldberg MD, CMD, FACP    [id number] [place] [address]  Office Tel: [phone]  Cell: [phone]

## 2020-11-01 NOTE — PROVIDER CONTACT NOTE (CRITICAL VALUE NOTIFICATION) - ASSESSMENT
pt is not in distress. Pt being transferred to 
pt not in distress.  pt being transported 4N
Pt asymptomatic, VSS as per flowsheet

## 2020-11-01 NOTE — PROGRESS NOTE ADULT - SUBJECTIVE AND OBJECTIVE BOX
Cardiovascular Disease Progress Note    Overnight events: No acute events overnight. Ms. Tsang is having mild SOB.   Otherwise review of systems negative    Objective Findings:  T(C): 36.5 (11-01-20 @ 05:45), Max: 37 (10-31-20 @ 11:10)  HR: 69 (11-01-20 @ 05:45) (65 - 69)  BP: 157/68 (11-01-20 @ 05:45) (134/58 - 157/68)  RR: 20 (11-01-20 @ 05:45) (20 - 20)  SpO2: 100% (11-01-20 @ 05:45) (100% - 100%)  Wt(kg): --  Daily     Daily       Physical Exam:  Gen: NAD; Patient resting comfortably  HEENT: EOMI, Normocephalic/ atraumatic  CV: RRR, normal S1 + S2, no m/r/g  Lungs:  Normal respiratory effort; mild crackles to auscultation bilaterally  Abd: soft, non-tender; bowel sounds present  Ext: No edema; warm and well perfused    Telemetry: Sinus    Laboratory Data:                        7.1    6.32  )-----------( 281      ( 01 Nov 2020 04:43 )             25.0     11-01    141  |  106  |  66<H>  ----------------------------<  134<H>  4.3   |  22  |  3.43<H>    Ca    8.3<L>      01 Nov 2020 04:43  Phos  5.3     10-31  Mg     2.3     11-01    TPro  7.1  /  Alb  4.1  /  TBili  0.6  /  DBili  x   /  AST  81<H>  /  ALT  93<H>  /  AlkPhos  149<H>  10-31    PT/INR - ( 30 Oct 2020 23:00 )   PT: 13.3 SEC;   INR: 1.16          PTT - ( 30 Oct 2020 23:00 )  PTT:30.6 SEC          Inpatient Medications:  MEDICATIONS  (STANDING):  atorvastatin 40 milliGRAM(s) Oral at bedtime  dextrose 5%. 1000 milliLiter(s) (50 mL/Hr) IV Continuous <Continuous>  dextrose 50% Injectable 12.5 Gram(s) IV Push once  dextrose 50% Injectable 25 Gram(s) IV Push once  dextrose 50% Injectable 25 Gram(s) IV Push once  furosemide   Injectable 20 milliGRAM(s) IV Push two times a day  gabapentin 300 milliGRAM(s) Oral daily  insulin glargine Injectable (LANTUS) 6 Unit(s) SubCutaneous at bedtime  insulin lispro (ADMELOG) corrective regimen sliding scale   SubCutaneous three times a day before meals  insulin lispro (ADMELOG) corrective regimen sliding scale   SubCutaneous at bedtime  metoprolol succinate ER 25 milliGRAM(s) Oral daily  pantoprazole  Injectable 40 milliGRAM(s) IV Push two times a day      Assessment: 80 year old woman with CAD s/p MICHAEL x2 in 12/2017, a-fib not on AC due to prior GI bleed, and diastolic CHF presents with acute blood loss anemia.     Plan of Care:    #CAD- s/p MICHAEL x2 in 12/2017.  Acute blood loss anemia in the setting of DAPT.   I will hold ASA and Plavix given recurrent life threatening anemia and h/o duodenal ulcer.   EKG reveals new t-wave inversions, but patient is chest pain free.  No cardiac objection to EGD in the setting of life threatening blood loss anemia.  Continue beta-blocker throughout the selene-procedural period.     #Decompensated diastolic CHF-  Fluid overload post pRBC transfusion.  I will start Lasix 20 mg IV BID to optimize fluid status prior to EGD.   Repeat echo.       #PVD-  Patient status post RLE angiogram with 2 stents placed in 11/2019.   Antiplatelet therapy on hold as stated above.         #Paroxymal a-fib-  No AC given h/o GI bleed.      Care discussed at length with Ms. Tsang. All questions answered.       Over 25 minutes spent on total encounter; more than 50% of the visit was spent counseling and/or coordinating care by the attending physician.      Julio César Hooper MD [facility]  Cardiovascular Disease  [phone] Cardiovascular Disease Progress Note    Overnight events: No acute events overnight. Ms. Tsang is having mild SOB and orthopnea.   Otherwise review of systems negative    Objective Findings:  T(C): 36.5 (11-01-20 @ 05:45), Max: 37 (10-31-20 @ 11:10)  HR: 69 (11-01-20 @ 05:45) (65 - 69)  BP: 157/68 (11-01-20 @ 05:45) (134/58 - 157/68)  RR: 20 (11-01-20 @ 05:45) (20 - 20)  SpO2: 100% (11-01-20 @ 05:45) (100% - 100%)  Wt(kg): --  Daily     Daily       Physical Exam:  Gen: NAD; Patient resting comfortably  HEENT: EOMI, Normocephalic/ atraumatic  CV: RRR, normal S1 + S2, no m/r/g  Lungs:  Normal respiratory effort; mild crackles to auscultation bilaterally  Abd: soft, non-tender; bowel sounds present  Ext: No edema; warm and well perfused    Telemetry: Sinus    Laboratory Data:                        7.1    6.32  )-----------( 281      ( 01 Nov 2020 04:43 )             25.0     11-01    141  |  106  |  66<H>  ----------------------------<  134<H>  4.3   |  22  |  3.43<H>    Ca    8.3<L>      01 Nov 2020 04:43  Phos  5.3     10-31  Mg     2.3     11-01    TPro  7.1  /  Alb  4.1  /  TBili  0.6  /  DBili  x   /  AST  81<H>  /  ALT  93<H>  /  AlkPhos  149<H>  10-31    PT/INR - ( 30 Oct 2020 23:00 )   PT: 13.3 SEC;   INR: 1.16          PTT - ( 30 Oct 2020 23:00 )  PTT:30.6 SEC          Inpatient Medications:  MEDICATIONS  (STANDING):  atorvastatin 40 milliGRAM(s) Oral at bedtime  dextrose 5%. 1000 milliLiter(s) (50 mL/Hr) IV Continuous <Continuous>  dextrose 50% Injectable 12.5 Gram(s) IV Push once  dextrose 50% Injectable 25 Gram(s) IV Push once  dextrose 50% Injectable 25 Gram(s) IV Push once  furosemide   Injectable 20 milliGRAM(s) IV Push two times a day  gabapentin 300 milliGRAM(s) Oral daily  insulin glargine Injectable (LANTUS) 6 Unit(s) SubCutaneous at bedtime  insulin lispro (ADMELOG) corrective regimen sliding scale   SubCutaneous three times a day before meals  insulin lispro (ADMELOG) corrective regimen sliding scale   SubCutaneous at bedtime  metoprolol succinate ER 25 milliGRAM(s) Oral daily  pantoprazole  Injectable 40 milliGRAM(s) IV Push two times a day      Assessment: 80 year old woman with CAD s/p MICHAEL x2 in 12/2017, a-fib not on AC due to prior GI bleed, and diastolic CHF presents with acute blood loss anemia.     Plan of Care:    #CAD- s/p MICHAEL x2 in 12/2017.  Acute blood loss anemia in the setting of DAPT.   I will hold ASA and Plavix given recurrent life threatening anemia and h/o duodenal ulcer.   EKG reveals new t-wave inversions, but patient is chest pain free.  No cardiac objection to EGD in the setting of life threatening blood loss anemia.  Continue beta-blocker throughout the selene-procedural period.     #Decompensated diastolic CHF-  Fluid overload post pRBC transfusion.  I will start Lasix 20 mg IV BID to optimize fluid status prior to EGD.   Repeat echo.       #PVD-  Patient status post RLE angiogram with 2 stents placed in 11/2019.   Antiplatelet therapy on hold as stated above.         #Paroxymal a-fib-  No AC given h/o GI bleed.      Care discussed at length with Ms. Tsang. All questions answered.       Over 25 minutes spent on total encounter; more than 50% of the visit was spent counseling and/or coordinating care by the attending physician.      Julio César Hooper MD [facility]  Cardiovascular Disease  [phone]

## 2020-11-01 NOTE — PROGRESS NOTE ADULT - SUBJECTIVE AND OBJECTIVE BOX
Summary:   80y  Female      Subjective:   No evidence of bleeding     Objective:    MEDICATIONS  (STANDING):  atorvastatin 40 milliGRAM(s) Oral at bedtime  dextrose 5%. 1000 milliLiter(s) (50 mL/Hr) IV Continuous <Continuous>  dextrose 50% Injectable 12.5 Gram(s) IV Push once  dextrose 50% Injectable 25 Gram(s) IV Push once  dextrose 50% Injectable 25 Gram(s) IV Push once  furosemide   Injectable 20 milliGRAM(s) IV Push two times a day  gabapentin 300 milliGRAM(s) Oral daily  insulin glargine Injectable (LANTUS) 6 Unit(s) SubCutaneous at bedtime  insulin lispro (ADMELOG) corrective regimen sliding scale   SubCutaneous three times a day before meals  insulin lispro (ADMELOG) corrective regimen sliding scale   SubCutaneous at bedtime  metoprolol succinate ER 25 milliGRAM(s) Oral daily  pantoprazole  Injectable 40 milliGRAM(s) IV Push two times a day    MEDICATIONS  (PRN):  dextrose 40% Gel 15 Gram(s) Oral once PRN Blood Glucose LESS THAN 70 milliGRAM(s)/deciliter  glucagon  Injectable 1 milliGRAM(s) IntraMuscular once PRN Glucose LESS THAN 70 milligrams/deciliter              Vital Signs Last 24 Hrs  T(C): 36.4 (2020 11:47), Max: 36.6 (31 Oct 2020 22:45)  T(F): 97.6 (2020 11:47), Max: 97.8 (31 Oct 2020 22:45)  HR: 66 (2020 11:47) (65 - 69)  BP: 142/58 (2020 11:47) (142/58 - 157/68)  BP(mean): --  RR: 20 (2020 11:47) (20 - 20)  SpO2: 100% (2020 11:47) (100% - 100%)      General:  Well developed, well nourished, alert and active, no pallor, NAD.  HEENT:    Normal appearance of conjunctiva, ears, nose, lips, oropharynx, and oral mucosa, anicteric.  Neck:  No masses, no asymmetry.  Lymph Nodes:  No lymphadenopathy.   Cardiovascular:  RRR normal S1/S2, no murmur.  Respiratory:  CTA B/L, normal respiratory effort.   Abdominal:   soft, no masses or tenderness, normoactive BS, NT/ND, no HSM.  Extremities:   No clubbing or cyanosis, normal capillary refill, no edema.   Skin:   No rash, jaundice, lesions, eczema.   Musculoskeletal:  No joint swelling, erythema or tenderness.   Neuro: No focal deficits.   Other:       LABS:                        7.1    6.32  )-----------( 281      ( 2020 04:43 )             25.0     11    141  |  106  |  66<H>  ----------------------------<  134<H>  4.3   |  22  |  3.43<H>    Ca    8.3<L>      2020 04:43  Phos  5.3     10  Mg     2.3         TPro  7.1  /  Alb  4.1  /  TBili  0.6  /  DBili  x   /  AST  81<H>  /  ALT  93<H>  /  AlkPhos  149<H>  10    PT/INR - ( 30 Oct 2020 23:00 )   PT: 13.3 SEC;   INR: 1.16          PTT - ( 30 Oct 2020 23:00 )  PTT:30.6 SEC  Urinalysis Basic - ( 31 Oct 2020 15:49 )    Color: LIGHT YELLOW / Appearance: Lt TURBID / S.011 / pH: 6.0  Gluc: NEGATIVE / Ketone: NEGATIVE  / Bili: NEGATIVE / Urobili: NORMAL   Blood: NEGATIVE / Protein: 30 / Nitrite: NEGATIVE   Leuk Esterase: LARGE / RBC: 0-2 / WBC >50   Sq Epi: OCC / Non Sq Epi: x / Bacteria: MODERATE        RADIOLOGY & ADDITIONAL TESTS:

## 2020-11-02 ENCOUNTER — RESULT REVIEW (OUTPATIENT)
Age: 80
End: 2020-11-02

## 2020-11-02 LAB
ANION GAP SERPL CALC-SCNC: 13 MMO/L — SIGNIFICANT CHANGE UP (ref 7–14)
BUN SERPL-MCNC: 58 MG/DL — HIGH (ref 7–23)
CALCIUM SERPL-MCNC: 8.3 MG/DL — LOW (ref 8.4–10.5)
CHLORIDE SERPL-SCNC: 106 MMOL/L — SIGNIFICANT CHANGE UP (ref 98–107)
CO2 SERPL-SCNC: 22 MMOL/L — SIGNIFICANT CHANGE UP (ref 22–31)
CREAT SERPL-MCNC: 3.51 MG/DL — HIGH (ref 0.5–1.3)
GLUCOSE BLDC GLUCOMTR-MCNC: 127 MG/DL — HIGH (ref 70–99)
GLUCOSE BLDC GLUCOMTR-MCNC: 136 MG/DL — HIGH (ref 70–99)
GLUCOSE BLDC GLUCOMTR-MCNC: 138 MG/DL — HIGH (ref 70–99)
GLUCOSE BLDC GLUCOMTR-MCNC: 144 MG/DL — HIGH (ref 70–99)
GLUCOSE BLDC GLUCOMTR-MCNC: 150 MG/DL — HIGH (ref 70–99)
GLUCOSE BLDC GLUCOMTR-MCNC: 154 MG/DL — HIGH (ref 70–99)
GLUCOSE SERPL-MCNC: 155 MG/DL — HIGH (ref 70–99)
HCT VFR BLD CALC: 29 % — LOW (ref 34.5–45)
HGB BLD-MCNC: 8.5 G/DL — LOW (ref 11.5–15.5)
MAGNESIUM SERPL-MCNC: 2.3 MG/DL — SIGNIFICANT CHANGE UP (ref 1.6–2.6)
MCHC RBC-ENTMCNC: 24.7 PG — LOW (ref 27–34)
MCHC RBC-ENTMCNC: 29.3 % — LOW (ref 32–36)
MCV RBC AUTO: 84.3 FL — SIGNIFICANT CHANGE UP (ref 80–100)
NRBC # FLD: 0.05 K/UL — SIGNIFICANT CHANGE UP (ref 0–0)
PHOSPHATE SERPL-MCNC: 5.4 MG/DL — HIGH (ref 2.5–4.5)
PLATELET # BLD AUTO: 277 K/UL — SIGNIFICANT CHANGE UP (ref 150–400)
PMV BLD: 12.4 FL — SIGNIFICANT CHANGE UP (ref 7–13)
POTASSIUM SERPL-MCNC: 4.7 MMOL/L — SIGNIFICANT CHANGE UP (ref 3.5–5.3)
POTASSIUM SERPL-SCNC: 4.7 MMOL/L — SIGNIFICANT CHANGE UP (ref 3.5–5.3)
RBC # BLD: 3.44 M/UL — LOW (ref 3.8–5.2)
RBC # FLD: 18 % — HIGH (ref 10.3–14.5)
SODIUM SERPL-SCNC: 141 MMOL/L — SIGNIFICANT CHANGE UP (ref 135–145)
WBC # BLD: 6.96 K/UL — SIGNIFICANT CHANGE UP (ref 3.8–10.5)
WBC # FLD AUTO: 6.96 K/UL — SIGNIFICANT CHANGE UP (ref 3.8–10.5)

## 2020-11-02 PROCEDURE — 93306 TTE W/DOPPLER COMPLETE: CPT | Mod: 26

## 2020-11-02 PROCEDURE — 71045 X-RAY EXAM CHEST 1 VIEW: CPT | Mod: 26

## 2020-11-02 PROCEDURE — 88305 TISSUE EXAM BY PATHOLOGIST: CPT | Mod: 26

## 2020-11-02 PROCEDURE — 76770 US EXAM ABDO BACK WALL COMP: CPT | Mod: 26

## 2020-11-02 PROCEDURE — 88312 SPECIAL STAINS GROUP 1: CPT | Mod: 26

## 2020-11-02 RX ORDER — IPRATROPIUM/ALBUTEROL SULFATE 18-103MCG
3 AEROSOL WITH ADAPTER (GRAM) INHALATION ONCE
Refills: 0 | Status: COMPLETED | OUTPATIENT
Start: 2020-11-02 | End: 2020-11-02

## 2020-11-02 RX ORDER — ACETAMINOPHEN 500 MG
650 TABLET ORAL ONCE
Refills: 0 | Status: COMPLETED | OUTPATIENT
Start: 2020-11-02 | End: 2020-11-02

## 2020-11-02 RX ORDER — HYDRALAZINE HCL 50 MG
5 TABLET ORAL ONCE
Refills: 0 | Status: DISCONTINUED | OUTPATIENT
Start: 2020-11-02 | End: 2020-11-02

## 2020-11-02 RX ORDER — SIMETHICONE 80 MG/1
80 TABLET, CHEWABLE ORAL EVERY 6 HOURS
Refills: 0 | Status: DISCONTINUED | OUTPATIENT
Start: 2020-11-02 | End: 2020-11-04

## 2020-11-02 RX ORDER — PANTOPRAZOLE SODIUM 20 MG/1
40 TABLET, DELAYED RELEASE ORAL
Refills: 0 | Status: DISCONTINUED | OUTPATIENT
Start: 2020-11-02 | End: 2020-11-04

## 2020-11-02 RX ORDER — SOD SULF/SODIUM/NAHCO3/KCL/PEG
1 SOLUTION, RECONSTITUTED, ORAL ORAL EVERY 4 HOURS
Refills: 0 | Status: COMPLETED | OUTPATIENT
Start: 2020-11-02 | End: 2020-11-02

## 2020-11-02 RX ORDER — FUROSEMIDE 40 MG
40 TABLET ORAL ONCE
Refills: 0 | Status: COMPLETED | OUTPATIENT
Start: 2020-11-02 | End: 2020-11-02

## 2020-11-02 RX ADMIN — Medication 1 LITER(S): at 18:20

## 2020-11-02 RX ADMIN — Medication 650 MILLIGRAM(S): at 23:34

## 2020-11-02 RX ADMIN — Medication 40 MILLIGRAM(S): at 00:59

## 2020-11-02 RX ADMIN — Medication 20 MILLIGRAM(S): at 18:20

## 2020-11-02 RX ADMIN — Medication 25 MILLIGRAM(S): at 06:52

## 2020-11-02 RX ADMIN — ATORVASTATIN CALCIUM 40 MILLIGRAM(S): 80 TABLET, FILM COATED ORAL at 23:18

## 2020-11-02 RX ADMIN — Medication 20 MILLIGRAM(S): at 06:52

## 2020-11-02 RX ADMIN — PANTOPRAZOLE SODIUM 40 MILLIGRAM(S): 20 TABLET, DELAYED RELEASE ORAL at 06:52

## 2020-11-02 RX ADMIN — SIMETHICONE 80 MILLIGRAM(S): 80 TABLET, CHEWABLE ORAL at 23:34

## 2020-11-02 RX ADMIN — INSULIN GLARGINE 6 UNIT(S): 100 INJECTION, SOLUTION SUBCUTANEOUS at 23:16

## 2020-11-02 RX ADMIN — Medication 10 MILLIGRAM(S): at 18:19

## 2020-11-02 RX ADMIN — Medication 3 MILLILITER(S): at 03:15

## 2020-11-02 RX ADMIN — SIMETHICONE 80 MILLIGRAM(S): 80 TABLET, CHEWABLE ORAL at 18:19

## 2020-11-02 RX ADMIN — GABAPENTIN 300 MILLIGRAM(S): 400 CAPSULE ORAL at 13:34

## 2020-11-02 NOTE — PROGRESS NOTE ADULT - SUBJECTIVE AND OBJECTIVE BOX
Patient is a 80y old  Female who presents with a chief complaint of CC: chest pain and shortness of breath (02 Nov 2020 08:33)      SUBJECTIVE / OVERNIGHT EVENTS:    Events noted.  CONSTITUTIONAL: No fever,  or fatigue  RESPIRATORY: No cough, wheezing,  No shortness of breath  CARDIOVASCULAR: No chest pain, palpitations, dizziness, or leg swelling  GASTROINTESTINAL: No abdominal or epigastric pain. No nausea, vomiting.  NEUROLOGICAL: No headaches,     MEDICATIONS  (STANDING):  acetaminophen   Tablet .. 650 milliGRAM(s) Oral once  atorvastatin 40 milliGRAM(s) Oral at bedtime  bisacodyl 10 milliGRAM(s) Oral every 4 hours  dextrose 5%. 1000 milliLiter(s) (50 mL/Hr) IV Continuous <Continuous>  dextrose 50% Injectable 12.5 Gram(s) IV Push once  dextrose 50% Injectable 25 Gram(s) IV Push once  dextrose 50% Injectable 25 Gram(s) IV Push once  furosemide   Injectable 20 milliGRAM(s) IV Push two times a day  gabapentin 300 milliGRAM(s) Oral daily  insulin glargine Injectable (LANTUS) 6 Unit(s) SubCutaneous at bedtime  insulin lispro (ADMELOG) corrective regimen sliding scale   SubCutaneous three times a day before meals  insulin lispro (ADMELOG) corrective regimen sliding scale   SubCutaneous at bedtime  metoprolol succinate ER 25 milliGRAM(s) Oral daily  pantoprazole    Tablet 40 milliGRAM(s) Oral before breakfast  polyethylene glycol/electrolyte Solution 1 Liter(s) Oral every 4 hours  simethicone 80 milliGRAM(s) Chew every 6 hours    MEDICATIONS  (PRN):  aluminum hydroxide/magnesium hydroxide/simethicone Suspension 30 milliLiter(s) Oral every 6 hours PRN Dyspepsia  dextrose 40% Gel 15 Gram(s) Oral once PRN Blood Glucose LESS THAN 70 milliGRAM(s)/deciliter  glucagon  Injectable 1 milliGRAM(s) IntraMuscular once PRN Glucose LESS THAN 70 milligrams/deciliter        CAPILLARY BLOOD GLUCOSE      POCT Blood Glucose.: 154 mg/dL (02 Nov 2020 21:46)  POCT Blood Glucose.: 127 mg/dL (02 Nov 2020 18:03)  POCT Blood Glucose.: 150 mg/dL (02 Nov 2020 13:16)  POCT Blood Glucose.: 138 mg/dL (02 Nov 2020 12:19)  POCT Blood Glucose.: 144 mg/dL (02 Nov 2020 08:36)    I&O's Summary    01 Nov 2020 07:01  -  02 Nov 2020 07:00  --------------------------------------------------------  IN: 2200 mL / OUT: 950 mL / NET: 1250 mL        PHYSICAL EXAM:  GENERAL: NAD  NECK: Supple, No JVD  CHEST/LUNG: Clear to auscultation bilaterally; No wheezing.  HEART: Regular rate and rhythm; No murmurs, rubs, or gallops  ABDOMEN: Soft, Nontender, Nondistended; Bowel sounds present  EXTREMITIES:   No edema  NEUROLOGY: AAO X 3      LABS:                        8.5    6.96  )-----------( 277      ( 02 Nov 2020 00:40 )             29.0     11-02    141  |  106  |  58<H>  ----------------------------<  155<H>  4.7   |  22  |  3.51<H>    Ca    8.3<L>      02 Nov 2020 00:40  Phos  5.4     11-02  Mg     2.3     11-02              CAPILLARY BLOOD GLUCOSE      POCT Blood Glucose.: 154 mg/dL (02 Nov 2020 21:46)  POCT Blood Glucose.: 127 mg/dL (02 Nov 2020 18:03)  POCT Blood Glucose.: 150 mg/dL (02 Nov 2020 13:16)  POCT Blood Glucose.: 138 mg/dL (02 Nov 2020 12:19)  POCT Blood Glucose.: 144 mg/dL (02 Nov 2020 08:36)                RADIOLOGY & ADDITIONAL TESTS:    Imaging Personally Reviewed:    Consultant(s) Notes Reviewed:      Care Discussed with Consultants/Other Providers:    Parminder Goldberg MD, CMD, FACP    [address] [address]  Office Tel: [phone]  Cell: [phone]

## 2020-11-02 NOTE — CHART NOTE - NSCHARTNOTEFT_GEN_A_CORE
SUBJECTIVE:    CC: SOB  HPI: Notified by RN that Pt w/ SOB s/p 1U pRBC transfusion. Pt assessed at bedside. Pt endorsing moderate SOB, slightly more severe than what she experienced previously. She notes occasional, mild palpitations but admits they may be anxiety related. She also reports new b/l mild-moderate lower back pain that she has experienced before and had been told by outpatient providers that it was her kidneys. Denies F/C, N/V, CP, cough, diaphoresis, abd pain, diarrhea, numbness, weakness, rashes, pain.       Objective:    Vital Signs Last 24 Hrs  T(C): 36.7 (01 Nov 2020 23:58), Max: 36.7 (01 Nov 2020 17:35)  T(F): 98 (01 Nov 2020 23:58), Max: 98.1 (01 Nov 2020 17:35)  HR: 65 (01 Nov 2020 23:58) (64 - 69)  BP: 177/63 (01 Nov 2020 23:58) (142/58 - 177/63)  RR: 24 (01 Nov 2020 23:58) (16 - 24)  SpO2: 100% (01 Nov 2020 23:58) (100% - 100%)      Physical Exam:   General: NAD, A&Ox3, WN/WD  Resp: moderate crackles, able to speak in full sentences, O2 Sat 100% on monitor, RR: 22, no accessory muscle use   Cardio: RRR, nl S1/2  Abd: Soft, NT, +BS                          8.5    6.96  )-----------( 277      ( 02 Nov 2020 00:40 )             29.0     11-02    141  |  106  |  58<H>  ----------------------------<  155<H>  4.7   |  22  |  3.51<H>    Ca    8.3<L>      02 Nov 2020 00:40  Phos  5.4     11-02  Mg     2.3     11-02    TPro  7.1  /  Alb  4.1  /  TBili  0.6  /  DBili  x   /  AST  81<H>  /  ALT  93<H>  /  AlkPhos  149<H>  10-31        Reviewed all relevant lab results, tests, radiology studies, medications, telemetry, nursing assessments, and EKG.     ASSESSMENT:    80y Female admitted with complaints of Patient is a 80y old  Female who presents with a chief complaint of CC: chest pain and shortness of breath (31 Oct 2020 23:00)  Now c/o SOB     Problem:   1) Fluid Overload    2) anemia w/ palpitations     3) HTN    PLAN:  1) O2 Sat stable on 2L NC  CXR: prelim (3143), ?unchanged or slightly worse pleural effusions/pulm edema compared to previous CXR, will f/u official read  40 IV Lasix given without relief of symptomatic SOB, will give additional 40mg IV lasix per Dr Ashlyn Jaffe x1  enforced HOB elevation  F/u GI/Nephro recs in am    2) post transfusion CBC: 8.5  EKG NSR w/ re-demonstrated chronic T wave inversions, largely unchanged from previous.   BMP, replete lytes prn   continue to monitor on tele    3) Will recheck s/p 2nd dose, IV Lasix 40mg   may given Hydralazine prn    Case d/w Dr Goldberg/RN/Pt, in agreement w/ assessment and plan.     Will continue to monitor closely. SUBJECTIVE:    CC: SOB  HPI: Notified by RN that Pt w/ SOB s/p 1U pRBC transfusion. Pt assessed at bedside. Pt endorsing moderate SOB, slightly more severe than what she experienced previously. She notes occasional, mild palpitations but admits they may be anxiety related. She also reports new b/l mild-moderate lower back pain that she has experienced before and had been told by outpatient providers that it was her kidneys. Denies F/C, N/V, CP, cough, diaphoresis, abd pain, diarrhea, numbness, weakness, rashes, pain.       Objective:    Vital Signs Last 24 Hrs  T(C): 36.7 (01 Nov 2020 23:58), Max: 36.7 (01 Nov 2020 17:35)  T(F): 98 (01 Nov 2020 23:58), Max: 98.1 (01 Nov 2020 17:35)  HR: 65 (01 Nov 2020 23:58) (64 - 69)  BP: 177/63 (01 Nov 2020 23:58) (142/58 - 177/63)  RR: 24 (01 Nov 2020 23:58) (16 - 24)  SpO2: 100% (01 Nov 2020 23:58) (100% - 100%)      Physical Exam:   General: NAD, A&Ox3, WN/WD  Resp: moderate crackles, able to speak in full sentences, O2 Sat 100% on monitor, RR: 22, no accessory muscle use   Cardio: RRR, nl S1/2  Abd: Soft, NT, +BS                          8.5    6.96  )-----------( 277      ( 02 Nov 2020 00:40 )             29.0     11-02    141  |  106  |  58<H>  ----------------------------<  155<H>  4.7   |  22  |  3.51<H>    Ca    8.3<L>      02 Nov 2020 00:40  Phos  5.4     11-02  Mg     2.3     11-02    TPro  7.1  /  Alb  4.1  /  TBili  0.6  /  DBili  x   /  AST  81<H>  /  ALT  93<H>  /  AlkPhos  149<H>  10-31        Reviewed all relevant lab results, tests, radiology studies, medications, telemetry, nursing assessments, and EKG.     ASSESSMENT:    80y Female admitted with complaints of Patient is a 80y old  Female who presents with a chief complaint of CC: chest pain and shortness of breath (31 Oct 2020 23:00)  Now c/o SOB     Problem:   1) Fluid Overload    2) anemia w/ palpitations     3) HTN    PLAN:  1) O2 Sat stable on 2L NC  CXR: prelim (2703), ?unchanged or slightly worse pleural effusions/pulm edema compared to previous CXR, will f/u official read  40 IV Lasix given without relief of symptomatic SOB, will give additional 40mg IV lasix per Dr Ashlyn Jaffe x1  enforced HOB elevation  F/u GI/Nephro recs in am    2) post transfusion CBC: 8.5  EKG NSR w/ re-demonstrated chronic T wave inversions, largely unchanged from previous.   BMP, replete lytes prn   Renal US for back pain, r/o hydronephrosis in setting of elevated Cr  continue to monitor on tele    3) Will recheck s/p 2nd dose, IV Lasix 40mg   may given Hydralazine prn    Case d/w Dr Goldberg/RN/Pt, in agreement w/ assessment and plan.     Will continue to monitor closely.

## 2020-11-02 NOTE — PHARMACOTHERAPY INTERVENTION NOTE - COMMENTS
Pharmacy intern counseled the patient on her heart failure and medications used for it. Explained what heart failure means and signs/symptoms of acute exacerbation of heart failure. Informed the patient what the diuretics and beta blocker are used for and how it helps her heart failure. Also discussed indication, side effects and administration of current medications. Discussed warning signs for fluid accumulation (needing more pillows at night, daily weights, etc). Patient is frustrated she is taking so much medication and claims all the pills are giving her constipation. Explained the importance of each drug and that none of them have a high incidence of constipation. Explained the importance of fiber for constipation and that she is receiving senna here to help her have a bowel movement.      Tess Zelaya, PharmD  PGY-1 Pharmacy Resident  Spectra: [phone]

## 2020-11-02 NOTE — PROGRESS NOTE ADULT - SUBJECTIVE AND OBJECTIVE BOX
Cardiovascular Disease Progress Note    Overnight events: No acute events overnight.  Ms. Tsang denies SOB or orthopnea.   Otherwise review of systems negative    Objective Findings:  T(C): 36.9 (11-02-20 @ 06:47), Max: 36.9 (11-02-20 @ 06:47)  HR: 72 (11-02-20 @ 06:47) (64 - 76)  BP: 141/56 (11-02-20 @ 06:47) (141/56 - 177/63)  RR: 20 (11-02-20 @ 06:47) (16 - 24)  SpO2: 100% (11-02-20 @ 06:47) (98% - 100%)  Wt(kg): --  Daily     Daily       Physical Exam:  Gen: NAD; Patient resting comfortably  HEENT: EOMI, Normocephalic/ atraumatic  CV: RRR, normal S1 + S2, no m/r/g  Lungs:  Normal respiratory effort; clear to auscultation bilaterally  Abd: soft, non-tender; bowel sounds present  Ext: No edema; warm and well perfused    Telemetry: Sinus    Laboratory Data:                        8.5    6.96  )-----------( 277      ( 02 Nov 2020 00:40 )             29.0     11-02    141  |  106  |  58<H>  ----------------------------<  155<H>  4.7   |  22  |  3.51<H>    Ca    8.3<L>      02 Nov 2020 00:40  Phos  5.4     11-02  Mg     2.3     11-02                Inpatient Medications:  MEDICATIONS  (STANDING):  atorvastatin 40 milliGRAM(s) Oral at bedtime  dextrose 5%. 1000 milliLiter(s) (50 mL/Hr) IV Continuous <Continuous>  dextrose 50% Injectable 12.5 Gram(s) IV Push once  dextrose 50% Injectable 25 Gram(s) IV Push once  dextrose 50% Injectable 25 Gram(s) IV Push once  furosemide   Injectable 20 milliGRAM(s) IV Push two times a day  gabapentin 300 milliGRAM(s) Oral daily  insulin glargine Injectable (LANTUS) 6 Unit(s) SubCutaneous at bedtime  insulin lispro (ADMELOG) corrective regimen sliding scale   SubCutaneous three times a day before meals  insulin lispro (ADMELOG) corrective regimen sliding scale   SubCutaneous at bedtime  metoprolol succinate ER 25 milliGRAM(s) Oral daily  pantoprazole  Injectable 40 milliGRAM(s) IV Push two times a day      Assessment:  80 year old woman with CAD s/p MICHAEL x2 in 12/2017, a-fib not on AC due to prior GI bleed, and diastolic CHF presents with acute blood loss anemia.     Plan of Care:    #CAD- s/p MICHAEL x2 in 12/2017.  Acute blood loss anemia in the setting of DAPT.   I will hold ASA and Plavix given recurrent life threatening anemia and h/o duodenal ulcer.   EKG reveals new t-wave inversions, but patient is chest pain free.  No cardiac objection to EGD in the setting of life threatening blood loss anemia.  Continue beta-blocker throughout the selene-procedural period.     #Decompensated diastolic CHF-  Volume status now much improved after IV diuresis.   Repeat echo.       #PVD-  Patient status post RLE angiogram with 2 stents placed in 11/2019.   Antiplatelet therapy on hold as stated above.     #Paroxymal a-fib-  No AC given h/o GI bleed.    Care discussed at length with MsJessica Hutchinsonan. All questions answered.       Over 25 minutes spent on total encounter; more than 50% of the visit was spent counseling and/or coordinating care by the attending physician.      Julio César Hooper MD [facility]  Cardiovascular Disease  [phone]

## 2020-11-02 NOTE — PROGRESS NOTE ADULT - ASSESSMENT
80 year old woman with a history of CAD s/p stents on ASA/plavix (last stent per patient in early 2019, chart reports 12/2017), T2DM, HTN, prior GI bleeding/duodenal ulcer who presents for chest pain and shortness of breath with exertion and melena.    Acute pul edema sec to volume overload:    IV Lasix

## 2020-11-03 DIAGNOSIS — Z71.89 OTHER SPECIFIED COUNSELING: ICD-10-CM

## 2020-11-03 LAB
GLUCOSE BLDC GLUCOMTR-MCNC: 103 MG/DL — HIGH (ref 70–99)
GLUCOSE BLDC GLUCOMTR-MCNC: 143 MG/DL — HIGH (ref 70–99)
GLUCOSE BLDC GLUCOMTR-MCNC: 175 MG/DL — HIGH (ref 70–99)
GLUCOSE BLDC GLUCOMTR-MCNC: 95 MG/DL — SIGNIFICANT CHANGE UP (ref 70–99)

## 2020-11-03 RX ADMIN — SIMETHICONE 80 MILLIGRAM(S): 80 TABLET, CHEWABLE ORAL at 17:36

## 2020-11-03 RX ADMIN — Medication 25 MILLIGRAM(S): at 06:20

## 2020-11-03 RX ADMIN — PANTOPRAZOLE SODIUM 40 MILLIGRAM(S): 20 TABLET, DELAYED RELEASE ORAL at 06:20

## 2020-11-03 RX ADMIN — GABAPENTIN 300 MILLIGRAM(S): 400 CAPSULE ORAL at 12:40

## 2020-11-03 RX ADMIN — Medication 20 MILLIGRAM(S): at 06:20

## 2020-11-03 RX ADMIN — SIMETHICONE 80 MILLIGRAM(S): 80 TABLET, CHEWABLE ORAL at 06:20

## 2020-11-03 RX ADMIN — Medication 10 MILLIGRAM(S): at 02:28

## 2020-11-03 RX ADMIN — Medication 650 MILLIGRAM(S): at 00:34

## 2020-11-03 RX ADMIN — SIMETHICONE 80 MILLIGRAM(S): 80 TABLET, CHEWABLE ORAL at 12:39

## 2020-11-03 NOTE — PROGRESS NOTE ADULT - SUBJECTIVE AND OBJECTIVE BOX
Nephrology Followup Note - [phone] - Dr Gates / Dr Myers / Dr Torres / Dr Jackson / Dr Tsang / Dr Lanza / Dr Chavez / Dr Clinton  Pt seen and examined at bedside  No acute events overnight. c/o back pain, Denies abd pain or diarrhea.     Allergies:  Carrots (Rash)  No Known Drug Allergies    Hospital Medications:   MEDICATIONS  (STANDING):  atorvastatin 40 milliGRAM(s) Oral at bedtime  dextrose 5%. 1000 milliLiter(s) (50 mL/Hr) IV Continuous <Continuous>  dextrose 50% Injectable 12.5 Gram(s) IV Push once  dextrose 50% Injectable 25 Gram(s) IV Push once  dextrose 50% Injectable 25 Gram(s) IV Push once  gabapentin 300 milliGRAM(s) Oral daily  insulin glargine Injectable (LANTUS) 6 Unit(s) SubCutaneous at bedtime  insulin lispro (ADMELOG) corrective regimen sliding scale   SubCutaneous three times a day before meals  insulin lispro (ADMELOG) corrective regimen sliding scale   SubCutaneous at bedtime  metoprolol succinate ER 25 milliGRAM(s) Oral daily  pantoprazole    Tablet 40 milliGRAM(s) Oral before breakfast  simethicone 80 milliGRAM(s) Chew every 6 hours      VITALS:  T(F): 98 (20 @ 14:43), Max: 98.6 (20 @ 06:11)  HR: 74 (20 @ 14:43)  BP: 151/60 (20 @ 14:43)  RR: 18 (20 @ 14:43)  SpO2: 96% (20 @ 14:43)  Wt(kg): --     @ 07:01  -   @ 07:00  --------------------------------------------------------  IN: 0 mL / OUT: 1100 mL / NET: -1100 mL        PHYSICAL EXAM:  Constitutional: NAD  HEENT: anicteric sclera, oropharynx clear, MMM  Neck: No JVD  Respiratory: CTAB, no wheezes, rales or rhonchi  Cardiovascular: S1, S2, RRR  Gastrointestinal: BS+, soft, NT/ND  Extremities: No cyanosis or clubbing. No peripheral edema  Neurological: A/O x 3, no focal deficits  Psychiatric: Normal mood, normal affect  : No CVA tenderness. No alba.   Skin: No rashes    LABS:      141  |  106  |  58<H>  ----------------------------<  155<H>  4.7   |  22  |  3.51<H>    Ca    8.3<L>      2020 00:40  Phos  5.4       Mg     2.3           Creatinine Trend: 3.51 <--, 3.43 <--, 3.62 <--, 3.58 <--                        8.5    6.96  )-----------( 277      ( 2020 00:40 )             29.0     Urine Studies:  Urinalysis Basic - ( 31 Oct 2020 15:49 )    Color: LIGHT YELLOW / Appearance: Lt TURBID / S.011 / pH: 6.0  Gluc: NEGATIVE / Ketone: NEGATIVE  / Bili: NEGATIVE / Urobili: NORMAL   Blood: NEGATIVE / Protein: 30 / Nitrite: NEGATIVE   Leuk Esterase: LARGE / RBC: 0-2 / WBC >50   Sq Epi: OCC / Non Sq Epi:  / Bacteria: MODERATE      Protein, Random Urine: 59.4 mg/dL ( @ 16:44)  Sodium, Random Urine: 68 mmol/L ( @ 16:44)  Sodium, Random Urine: 70 mmol/L (10-31 @ 15:49)  Potassium, Random Urine: 33.0 mmol/L (10-31 @ 15:49)  Chloride, Random Urine: 75 mmol/L (10-31 @ 15:49)  Protein, Random Urine: 39.8 mg/dL (10-31 @ 15:49)    RADIOLOGY & ADDITIONAL STUDIES:  r< from: US Kidney and Bladder (20 @ 13:29) >  FINDINGS:    Right kidney: 9.1. No renal mass, hydronephrosis or calculi.    Left kidney: 8.3. No renal mass, hydronephrosis or calculi.    Urinary bladder: Within normal limits.    IMPRESSION:    No hydronephrosis    < end of copied text >

## 2020-11-03 NOTE — PROGRESS NOTE ADULT - SUBJECTIVE AND OBJECTIVE BOX
Patient is a 80y old  Female who presents with a chief complaint of CC: chest pain and shortness of breath (03 Nov 2020 08:47)      SUBJECTIVE / OVERNIGHT EVENTS:    Events noted.  CONSTITUTIONAL: No fever,  or fatigue  RESPIRATORY: No cough, wheezing,  No shortness of breath  CARDIOVASCULAR: No chest pain, palpitations, dizziness, or leg swelling  GASTROINTESTINAL: No abdominal or epigastric pain. No nausea, vomiting.  NEUROLOGICAL: No headaches,     MEDICATIONS  (STANDING):  atorvastatin 40 milliGRAM(s) Oral at bedtime  dextrose 5%. 1000 milliLiter(s) (50 mL/Hr) IV Continuous <Continuous>  dextrose 50% Injectable 12.5 Gram(s) IV Push once  dextrose 50% Injectable 25 Gram(s) IV Push once  dextrose 50% Injectable 25 Gram(s) IV Push once  gabapentin 300 milliGRAM(s) Oral daily  insulin glargine Injectable (LANTUS) 6 Unit(s) SubCutaneous at bedtime  insulin lispro (ADMELOG) corrective regimen sliding scale   SubCutaneous three times a day before meals  insulin lispro (ADMELOG) corrective regimen sliding scale   SubCutaneous at bedtime  metoprolol succinate ER 25 milliGRAM(s) Oral daily  pantoprazole    Tablet 40 milliGRAM(s) Oral before breakfast  simethicone 80 milliGRAM(s) Chew every 6 hours    MEDICATIONS  (PRN):  aluminum hydroxide/magnesium hydroxide/simethicone Suspension 30 milliLiter(s) Oral every 6 hours PRN Dyspepsia  dextrose 40% Gel 15 Gram(s) Oral once PRN Blood Glucose LESS THAN 70 milliGRAM(s)/deciliter  glucagon  Injectable 1 milliGRAM(s) IntraMuscular once PRN Glucose LESS THAN 70 milligrams/deciliter        CAPILLARY BLOOD GLUCOSE      POCT Blood Glucose.: 103 mg/dL (03 Nov 2020 08:28)  POCT Blood Glucose.: 136 mg/dL (02 Nov 2020 23:15)  POCT Blood Glucose.: 154 mg/dL (02 Nov 2020 21:46)  POCT Blood Glucose.: 127 mg/dL (02 Nov 2020 18:03)  POCT Blood Glucose.: 150 mg/dL (02 Nov 2020 13:16)  POCT Blood Glucose.: 138 mg/dL (02 Nov 2020 12:19)    I&O's Summary    02 Nov 2020 07:01  -  03 Nov 2020 07:00  --------------------------------------------------------  IN: 0 mL / OUT: 1100 mL / NET: -1100 mL        PHYSICAL EXAM:  GENERAL: NAD  NECK: Supple, No JVD  CHEST/LUNG: Clear to auscultation bilaterally; No wheezing.  HEART: Regular rate and rhythm; No murmurs, rubs, or gallops  ABDOMEN: Soft, Nontender, Nondistended; Bowel sounds present  EXTREMITIES:   No edema  NEUROLOGY: AAO X 3      LABS:                        8.5    6.96  )-----------( 277      ( 02 Nov 2020 00:40 )             29.0     11-02    141  |  106  |  58<H>  ----------------------------<  155<H>  4.7   |  22  |  3.51<H>    Ca    8.3<L>      02 Nov 2020 00:40  Phos  5.4     11-02  Mg     2.3     11-02              CAPILLARY BLOOD GLUCOSE      POCT Blood Glucose.: 103 mg/dL (03 Nov 2020 08:28)  POCT Blood Glucose.: 136 mg/dL (02 Nov 2020 23:15)  POCT Blood Glucose.: 154 mg/dL (02 Nov 2020 21:46)  POCT Blood Glucose.: 127 mg/dL (02 Nov 2020 18:03)  POCT Blood Glucose.: 150 mg/dL (02 Nov 2020 13:16)  POCT Blood Glucose.: 138 mg/dL (02 Nov 2020 12:19)                RADIOLOGY & ADDITIONAL TESTS:    Imaging Personally Reviewed:    Consultant(s) Notes Reviewed:      Care Discussed with Consultants/Other Providers:    Parminder Goldberg MD, CMD, FACP    [address] [address]  Office Tel: [phone]  Cell: [phone]

## 2020-11-03 NOTE — PROGRESS NOTE ADULT - ASSESSMENT
80 year old woman with a history of CAD s/p stents on ASA/plavix (last stent per patient in early 2019, chart reports 12/2017), T2DM, HTN, prior GI bleeding/duodenal ulcer who presents for chest pain and shortness of breath with exertion and melena.    Acute pul edema sec to volume overload:    S/p IV Lasix  Cardio f/up noted

## 2020-11-03 NOTE — PROGRESS NOTE ADULT - SUBJECTIVE AND OBJECTIVE BOX
Cardiovascular Disease Progress Note    Overnight events: No acute events overnight. Patient denies chest pain or SOB.    Otherwise review of systems negative    Objective Findings:  T(C): 37 (20 @ 06:11), Max: 37.5 (20 @ 10:29)  HR: 66 (20 @ 06:11) (66 - 80)  BP: 142/58 (20 @ 06:11) (132/65 - 155/70)  RR: 17 (20 @ 06:11) (17 - 20)  SpO2: 100% (20 @ 06:11) (96% - 100%)  Wt(kg): --  Daily Height in cm: 157.5 (2020 10:29)    Daily Weight in k (2020 01:41)      Physical Exam:  Gen: NAD; Patient resting comfortably  HEENT: EOMI, Normocephalic/ atraumatic  CV: RRR, normal S1 + S2, no m/r/g  Lungs:  Normal respiratory effort; clear to auscultation bilaterally  Abd: soft, non-tender; bowel sounds present  Ext: No edema; warm and well perfused    Telemetry: Sinus    Laboratory Data:                        8.5    6.96  )-----------( 277      ( 2020 00:40 )             29.0         141  |  106  |  58<H>  ----------------------------<  155<H>  4.7   |  22  |  3.51<H>    Ca    8.3<L>      2020 00:40  Phos  5.4       Mg     2.3                     Inpatient Medications:  MEDICATIONS  (STANDING):  atorvastatin 40 milliGRAM(s) Oral at bedtime  dextrose 5%. 1000 milliLiter(s) (50 mL/Hr) IV Continuous <Continuous>  dextrose 50% Injectable 12.5 Gram(s) IV Push once  dextrose 50% Injectable 25 Gram(s) IV Push once  dextrose 50% Injectable 25 Gram(s) IV Push once  furosemide   Injectable 20 milliGRAM(s) IV Push two times a day  gabapentin 300 milliGRAM(s) Oral daily  insulin glargine Injectable (LANTUS) 6 Unit(s) SubCutaneous at bedtime  insulin lispro (ADMELOG) corrective regimen sliding scale   SubCutaneous three times a day before meals  insulin lispro (ADMELOG) corrective regimen sliding scale   SubCutaneous at bedtime  metoprolol succinate ER 25 milliGRAM(s) Oral daily  pantoprazole    Tablet 40 milliGRAM(s) Oral before breakfast  simethicone 80 milliGRAM(s) Chew every 6 hours      Assessment: 80 year old woman with CAD s/p MICHAEL x2 in 2017, a-fib not on AC due to prior GI bleed, and diastolic CHF presents with acute blood loss anemia.     Plan of Care:    #CAD- s/p MICHAEL x2 in 2017.  Acute blood loss anemia in the setting of DAPT.   I will hold ASA and Plavix given recurrent life threatening anemia and h/o duodenal ulcer.   Echo with mild LV dysfunction.  Patient tolerated EGD well.   No cardiac objection to colonoscopy today.   Continue beta-blocker throughout the selene-procedural period.     #Decompensated diastolic CHF-  Volume status now much improved after IV diuresis.   Echo findings noted.       #PVD-  Patient status post RLE angiogram with 2 stents placed in 2019.   Antiplatelet therapy on hold as stated above.     #Paroxymal a-fib-  No AC given h/o GI bleed.    Care discussed at length with Ms. Tsang. All questions answered.       Over 25 minutes spent on total encounter; more than 50% of the visit was spent counseling and/or coordinating care by the attending physician.      Julio César Hooper MD [facility]  Cardiovascular Disease  [phone] Cardiovascular Disease Progress Note    Overnight events: No acute events overnight. Patient denies chest pain or SOB.    Otherwise review of systems negative    Objective Findings:  T(C): 37 (20 @ 06:11), Max: 37.5 (20 @ 10:29)  HR: 66 (20 @ 06:11) (66 - 80)  BP: 142/58 (20 @ 06:11) (132/65 - 155/70)  RR: 17 (20 @ 06:11) (17 - 20)  SpO2: 100% (20 @ 06:11) (96% - 100%)  Wt(kg): --  Daily Height in cm: 157.5 (2020 10:29)    Daily Weight in k (2020 01:41)      Physical Exam:  Gen: NAD; Patient resting comfortably  HEENT: EOMI, Normocephalic/ atraumatic  CV: RRR, normal S1 + S2, no m/r/g  Lungs:  Normal respiratory effort; clear to auscultation bilaterally  Abd: soft, non-tender; bowel sounds present  Ext: No edema; warm and well perfused    Telemetry: Sinus    Laboratory Data:                        8.5    6.96  )-----------( 277      ( 2020 00:40 )             29.0         141  |  106  |  58<H>  ----------------------------<  155<H>  4.7   |  22  |  3.51<H>    Ca    8.3<L>      2020 00:40  Phos  5.4       Mg     2.3                     Inpatient Medications:  MEDICATIONS  (STANDING):  atorvastatin 40 milliGRAM(s) Oral at bedtime  dextrose 5%. 1000 milliLiter(s) (50 mL/Hr) IV Continuous <Continuous>  dextrose 50% Injectable 12.5 Gram(s) IV Push once  dextrose 50% Injectable 25 Gram(s) IV Push once  dextrose 50% Injectable 25 Gram(s) IV Push once  furosemide   Injectable 20 milliGRAM(s) IV Push two times a day  gabapentin 300 milliGRAM(s) Oral daily  insulin glargine Injectable (LANTUS) 6 Unit(s) SubCutaneous at bedtime  insulin lispro (ADMELOG) corrective regimen sliding scale   SubCutaneous three times a day before meals  insulin lispro (ADMELOG) corrective regimen sliding scale   SubCutaneous at bedtime  metoprolol succinate ER 25 milliGRAM(s) Oral daily  pantoprazole    Tablet 40 milliGRAM(s) Oral before breakfast  simethicone 80 milliGRAM(s) Chew every 6 hours      Assessment: 80 year old woman with CAD s/p MICHAEL x2 in 2017, a-fib not on AC due to prior GI bleed, and diastolic CHF presents with acute blood loss anemia.     Plan of Care:    #CAD- s/p MICHAEL x2 in 2017.  Acute blood loss anemia in the setting of DAPT.   I will hold ASA and Plavix given recurrent life threatening anemia and h/o duodenal ulcer.   Echo with mild LV dysfunction.  Patient tolerated EGD well.   No cardiac objection to colonoscopy today.   Continue beta-blocker throughout the selene-procedural period.     #Decompensated diastolic CHF-  Volume status now much improved.  I will hold Lasix given NPO status and bowel prep.   Echo findings noted.       #PVD-  Patient status post RLE angiogram with 2 stents placed in 2019.   Antiplatelet therapy on hold as stated above.     #Paroxymal a-fib-  No AC given h/o GI bleed.    Care discussed at length with Ms. Tsang. All questions answered.       Over 25 minutes spent on total encounter; more than 50% of the visit was spent counseling and/or coordinating care by the attending physician.      Julio César Hooper MD [facility]  Cardiovascular Disease  [phone]

## 2020-11-03 NOTE — PROGRESS NOTE ADULT - SUBJECTIVE AND OBJECTIVE BOX
INTERVAL HPI/OVERNIGHT EVENTS:    did not complete bowel prep and refusing labs; colonoscopy cancelled   denies n/v/d/c, abdominal pain, melena or brbpr     MEDICATIONS  (STANDING):  atorvastatin 40 milliGRAM(s) Oral at bedtime  dextrose 5%. 1000 milliLiter(s) (50 mL/Hr) IV Continuous <Continuous>  dextrose 50% Injectable 12.5 Gram(s) IV Push once  dextrose 50% Injectable 25 Gram(s) IV Push once  dextrose 50% Injectable 25 Gram(s) IV Push once  gabapentin 300 milliGRAM(s) Oral daily  insulin glargine Injectable (LANTUS) 6 Unit(s) SubCutaneous at bedtime  insulin lispro (ADMELOG) corrective regimen sliding scale   SubCutaneous three times a day before meals  insulin lispro (ADMELOG) corrective regimen sliding scale   SubCutaneous at bedtime  metoprolol succinate ER 25 milliGRAM(s) Oral daily  pantoprazole    Tablet 40 milliGRAM(s) Oral before breakfast  simethicone 80 milliGRAM(s) Chew every 6 hours    MEDICATIONS  (PRN):  aluminum hydroxide/magnesium hydroxide/simethicone Suspension 30 milliLiter(s) Oral every 6 hours PRN Dyspepsia  dextrose 40% Gel 15 Gram(s) Oral once PRN Blood Glucose LESS THAN 70 milliGRAM(s)/deciliter  glucagon  Injectable 1 milliGRAM(s) IntraMuscular once PRN Glucose LESS THAN 70 milligrams/deciliter      Allergies    Carrots (Rash)  No Known Drug Allergies    Intolerances        Review of Systems:    General:  No wt loss, fevers, chills, night sweats, fatigue   Eyes:  Good vision, no reported pain  ENT:  No sore throat, pain, runny nose, dysphagia  CV:  No pain, palpitations, hypo/hypertension  Resp:  No dyspnea, cough, tachypnea, wheezing  GI:  No pain, No nausea, No vomiting, No diarrhea, No constipation, No weight loss, No fever, No pruritis, No rectal bleeding, No melena, No dysphagia  :  No pain, bleeding, incontinence, nocturia  Muscle:  No pain, weakness  Neuro:  No weakness, tingling, memory problems  Psych:  No fatigue, insomnia, mood problems, depression  Endocrine:  No polyuria, polydypsia, cold/heat intolerance  Heme:  No petechiae, ecchymosis, easy bruisability  Skin:  No rash, tattoos, scars, edema      Vital Signs Last 24 Hrs  T(C): 37 (03 Nov 2020 06:11), Max: 37 (03 Nov 2020 06:11)  T(F): 98.6 (03 Nov 2020 06:11), Max: 98.6 (03 Nov 2020 06:11)  HR: 66 (03 Nov 2020 06:11) (66 - 80)  BP: 142/58 (03 Nov 2020 06:11) (139/47 - 155/70)  BP(mean): --  RR: 17 (03 Nov 2020 06:11) (17 - 20)  SpO2: 100% (03 Nov 2020 06:11) (96% - 100%)    PHYSICAL EXAM:    Constitutional: NAD  HEENT: EOMI, throat clear  Neck: No LAD, supple  Respiratory: CTA and P  Cardiovascular: S1 and S2, RRR, no M  Gastrointestinal: BS+, soft, NT/ND, neg HSM,  Extremities: No peripheral edema, neg clubbing, cyanosis  Vascular: 2+ peripheral pulses  Neurological: A/O x 3, no focal deficits  Psychiatric: Normal mood, normal affect  Skin: No rashes      LABS:                        8.5    6.96  )-----------( 277      ( 02 Nov 2020 00:40 )             29.0     11-02    141  |  106  |  58<H>  ----------------------------<  155<H>  4.7   |  22  |  3.51<H>    Ca    8.3<L>      02 Nov 2020 00:40  Phos  5.4     11-02  Mg     2.3     11-02            RADIOLOGY & ADDITIONAL TESTS:    < from: Upper Endoscopy (11.02.20 @ 11:01) >    [facility]  _______________________________________________________________________________  Patient Name: Nikunj Tsang             Procedure Date: 11/2/2020 11:01 AM  MRN: [id number]                     Account Number: [id number]  YOB: 1940              Admit Type: Inpatient  Room: [place] [address]                         Gender: Female  Attending MD: Will Slater MD      _______________________________________________________________________________     Procedure:           Upper GI endoscopy  Indications:         Melena  Providers:           Will Slater MD  Medicines:           Monitored Anesthesia Care  Complications:       No immediate complications.  Procedure:           After obtaining informed consent, the endoscope was                        passed under direct vision. Throughout the procedure,                        the patient's blood pressure, pulse, and oxygen                        saturations were monitored continuously. The Endoscope                       was introduced through the mouth, and advanced to the                        third part of duodenum. The upper GI endoscopy was                        accomplished without difficulty. The patient tolerated    the procedure well.                                                                                   Findings:       The examined esophagus was normal.       Moderate inflammation characterized by erosions and erythema was found        in the gastric antrum. Biopsies were taken with a cold forceps for        Helicobacter pylori testing. Estimated blood loss: none.       The examined duodenum was normal.                                                                                   Moderate Sedation:       Moderate (conscious) sedation was personally administered by the        endoscopist. The following parameters were monitored: oxygen saturation,        heart rate, blood pressure, respiratory rate, EKG, adequacy of pulmonary        ventilation, and response to care.  Impression:          - Normal esophagus.                       - Gastritis. Biopsied.                       - Normal examined duodenum.  Recommendation:      - Return patient to hospital [facility] for ongoing care.              - Clear liquid diet.                       - Perform a colonoscopy tomorrow.                                                                                   Attending Participation:       I personally performed the entire procedure.                                                                                  Will Slatre  ___________________  Will Slater MD  11/2/2020 11:27:42 AM  This report has been signed electronically.  Number of Addenda: 0    Note Initiated On: 11/2/2020 11:01 AM    < end of copied text >

## 2020-11-03 NOTE — PROGRESS NOTE ADULT - ATTENDING COMMENTS
Joshua Gates MD  [facility]  Office [phone]  Ans Serv [phone] cell [phone]

## 2020-11-04 ENCOUNTER — TRANSCRIPTION ENCOUNTER (OUTPATIENT)
Age: 80
End: 2020-11-04

## 2020-11-04 VITALS
TEMPERATURE: 98 F | RESPIRATION RATE: 18 BRPM | DIASTOLIC BLOOD PRESSURE: 65 MMHG | HEART RATE: 66 BPM | SYSTOLIC BLOOD PRESSURE: 156 MMHG | OXYGEN SATURATION: 100 %

## 2020-11-04 LAB
ANION GAP SERPL CALC-SCNC: 10 MMO/L — SIGNIFICANT CHANGE UP (ref 7–14)
BLD GP AB SCN SERPL QL: NEGATIVE — SIGNIFICANT CHANGE UP
BUN SERPL-MCNC: 47 MG/DL — HIGH (ref 7–23)
CALCIUM SERPL-MCNC: 8.5 MG/DL — SIGNIFICANT CHANGE UP (ref 8.4–10.5)
CHLORIDE SERPL-SCNC: 104 MMOL/L — SIGNIFICANT CHANGE UP (ref 98–107)
CO2 SERPL-SCNC: 27 MMOL/L — SIGNIFICANT CHANGE UP (ref 22–31)
CREAT SERPL-MCNC: 3.2 MG/DL — HIGH (ref 0.5–1.3)
GLUCOSE BLDC GLUCOMTR-MCNC: 136 MG/DL — HIGH (ref 70–99)
GLUCOSE BLDC GLUCOMTR-MCNC: 156 MG/DL — HIGH (ref 70–99)
GLUCOSE BLDC GLUCOMTR-MCNC: 211 MG/DL — HIGH (ref 70–99)
GLUCOSE SERPL-MCNC: 180 MG/DL — HIGH (ref 70–99)
HCT VFR BLD CALC: 29.5 % — LOW (ref 34.5–45)
HGB BLD-MCNC: 8.5 G/DL — LOW (ref 11.5–15.5)
MAGNESIUM SERPL-MCNC: 2 MG/DL — SIGNIFICANT CHANGE UP (ref 1.6–2.6)
MCHC RBC-ENTMCNC: 24.9 PG — LOW (ref 27–34)
MCHC RBC-ENTMCNC: 28.8 % — LOW (ref 32–36)
MCV RBC AUTO: 86.5 FL — SIGNIFICANT CHANGE UP (ref 80–100)
NRBC # FLD: 0 K/UL — SIGNIFICANT CHANGE UP (ref 0–0)
PLATELET # BLD AUTO: 247 K/UL — SIGNIFICANT CHANGE UP (ref 150–400)
PMV BLD: 12.3 FL — SIGNIFICANT CHANGE UP (ref 7–13)
POTASSIUM SERPL-MCNC: 3.8 MMOL/L — SIGNIFICANT CHANGE UP (ref 3.5–5.3)
POTASSIUM SERPL-SCNC: 3.8 MMOL/L — SIGNIFICANT CHANGE UP (ref 3.5–5.3)
RBC # BLD: 3.41 M/UL — LOW (ref 3.8–5.2)
RBC # FLD: 17.2 % — HIGH (ref 10.3–14.5)
RH IG SCN BLD-IMP: POSITIVE — SIGNIFICANT CHANGE UP
SODIUM SERPL-SCNC: 141 MMOL/L — SIGNIFICANT CHANGE UP (ref 135–145)
SURGICAL PATHOLOGY STUDY: SIGNIFICANT CHANGE UP
WBC # BLD: 5.44 K/UL — SIGNIFICANT CHANGE UP (ref 3.8–10.5)
WBC # FLD AUTO: 5.44 K/UL — SIGNIFICANT CHANGE UP (ref 3.8–10.5)

## 2020-11-04 RX ORDER — INSULIN GLARGINE 100 [IU]/ML
10 INJECTION, SOLUTION SUBCUTANEOUS
Qty: 0 | Refills: 0 | DISCHARGE

## 2020-11-04 RX ORDER — POTASSIUM CHLORIDE 20 MEQ
1 PACKET (EA) ORAL
Qty: 0 | Refills: 0 | DISCHARGE

## 2020-11-04 RX ORDER — FUROSEMIDE 40 MG
1 TABLET ORAL
Qty: 0 | Refills: 0 | DISCHARGE

## 2020-11-04 RX ORDER — GABAPENTIN 400 MG/1
1 CAPSULE ORAL
Qty: 0 | Refills: 0 | DISCHARGE
Start: 2020-11-04

## 2020-11-04 RX ORDER — FUROSEMIDE 40 MG
1 TABLET ORAL
Qty: 60 | Refills: 0
Start: 2020-11-04 | End: 2020-12-03

## 2020-11-04 RX ORDER — PANTOPRAZOLE SODIUM 20 MG/1
1 TABLET, DELAYED RELEASE ORAL
Qty: 30 | Refills: 0
Start: 2020-11-04 | End: 2020-12-03

## 2020-11-04 RX ADMIN — SIMETHICONE 80 MILLIGRAM(S): 80 TABLET, CHEWABLE ORAL at 00:24

## 2020-11-04 RX ADMIN — ATORVASTATIN CALCIUM 40 MILLIGRAM(S): 80 TABLET, FILM COATED ORAL at 00:25

## 2020-11-04 RX ADMIN — SIMETHICONE 80 MILLIGRAM(S): 80 TABLET, CHEWABLE ORAL at 11:33

## 2020-11-04 RX ADMIN — GABAPENTIN 300 MILLIGRAM(S): 400 CAPSULE ORAL at 11:33

## 2020-11-04 RX ADMIN — SIMETHICONE 80 MILLIGRAM(S): 80 TABLET, CHEWABLE ORAL at 05:05

## 2020-11-04 RX ADMIN — Medication 25 MILLIGRAM(S): at 05:05

## 2020-11-04 RX ADMIN — INSULIN GLARGINE 6 UNIT(S): 100 INJECTION, SOLUTION SUBCUTANEOUS at 00:26

## 2020-11-04 RX ADMIN — Medication 2: at 13:08

## 2020-11-04 RX ADMIN — PANTOPRAZOLE SODIUM 40 MILLIGRAM(S): 20 TABLET, DELAYED RELEASE ORAL at 05:06

## 2020-11-04 NOTE — PROGRESS NOTE ADULT - SUBJECTIVE AND OBJECTIVE BOX
Patient is a 80y old  Female who presents with a chief complaint of CC: chest pain and shortness of breath (03 Nov 2020 08:47)      SUBJECTIVE / OVERNIGHT EVENTS:    Events noted.  CONSTITUTIONAL: No fever,  or fatigue  RESPIRATORY: No cough, wheezing,  No shortness of breath  CARDIOVASCULAR: No chest pain, palpitations, dizziness, or leg swelling  GASTROINTESTINAL: No abdominal or epigastric pain.   NEUROLOGICAL: No headaches,     MEDICATIONS  (STANDING):  atorvastatin 40 milliGRAM(s) Oral at bedtime  dextrose 5%. 1000 milliLiter(s) (50 mL/Hr) IV Continuous <Continuous>  dextrose 50% Injectable 12.5 Gram(s) IV Push once  dextrose 50% Injectable 25 Gram(s) IV Push once  dextrose 50% Injectable 25 Gram(s) IV Push once  gabapentin 300 milliGRAM(s) Oral daily  insulin glargine Injectable (LANTUS) 6 Unit(s) SubCutaneous at bedtime  insulin lispro (ADMELOG) corrective regimen sliding scale   SubCutaneous three times a day before meals  insulin lispro (ADMELOG) corrective regimen sliding scale   SubCutaneous at bedtime  metoprolol succinate ER 25 milliGRAM(s) Oral daily  pantoprazole    Tablet 40 milliGRAM(s) Oral before breakfast  simethicone 80 milliGRAM(s) Chew every 6 hours    MEDICATIONS  (PRN):  aluminum hydroxide/magnesium hydroxide/simethicone Suspension 30 milliLiter(s) Oral every 6 hours PRN Dyspepsia  dextrose 40% Gel 15 Gram(s) Oral once PRN Blood Glucose LESS THAN 70 milliGRAM(s)/deciliter  glucagon  Injectable 1 milliGRAM(s) IntraMuscular once PRN Glucose LESS THAN 70 milligrams/deciliter        CAPILLARY BLOOD GLUCOSE      POCT Blood Glucose.: 103 mg/dL (03 Nov 2020 08:28)  POCT Blood Glucose.: 136 mg/dL (02 Nov 2020 23:15)  POCT Blood Glucose.: 154 mg/dL (02 Nov 2020 21:46)  POCT Blood Glucose.: 127 mg/dL (02 Nov 2020 18:03)  POCT Blood Glucose.: 150 mg/dL (02 Nov 2020 13:16)  POCT Blood Glucose.: 138 mg/dL (02 Nov 2020 12:19)    I&O's Summary    02 Nov 2020 07:01  -  03 Nov 2020 07:00  --------------------------------------------------------  IN: 0 mL / OUT: 1100 mL / NET: -1100 mL        PHYSICAL EXAM:    NECK: Supple, No JVD  CHEST/LUNG: Clear to auscultation bilaterally; No wheezing.  HEART: Regular rate and rhythm; No murmurs, rubs, or gallops  ABDOMEN: Soft, Nontender, Nondistended; Bowel sounds present  EXTREMITIES:   No edema  NEUROLOGY: AAO       LABS:                        8.5    6.96  )-----------( 277      ( 02 Nov 2020 00:40 )             29.0     11-02    141  |  106  |  58<H>  ----------------------------<  155<H>  4.7   |  22  |  3.51<H>    Ca    8.3<L>      02 Nov 2020 00:40  Phos  5.4     11-02  Mg     2.3     11-02              CAPILLARY BLOOD GLUCOSE      POCT Blood Glucose.: 103 mg/dL (03 Nov 2020 08:28)  POCT Blood Glucose.: 136 mg/dL (02 Nov 2020 23:15)  POCT Blood Glucose.: 154 mg/dL (02 Nov 2020 21:46)  POCT Blood Glucose.: 127 mg/dL (02 Nov 2020 18:03)  POCT Blood Glucose.: 150 mg/dL (02 Nov 2020 13:16)  POCT Blood Glucose.: 138 mg/dL (02 Nov 2020 12:19)                RADIOLOGY & ADDITIONAL TESTS:    Imaging Personally Reviewed:    Consultant(s) Notes Reviewed:      Care Discussed with Consultants/Other Providers:    Parminder Goldberg MD, CMD, FACP    [address] [address]  Office Tel: [phone]  Cell: [phone]

## 2020-11-04 NOTE — PROGRESS NOTE ADULT - PROBLEM SELECTOR PLAN 2
-rate control   -protonix qd while on a/c for gi ppx
-rate control   -protonix qd while on a/c for gi ppx
-Likely from subacute GI bleed as above  -Transfuse for hgb<7
per primary team
s/p EGD, refused prep for cscope. trend hg

## 2020-11-04 NOTE — PHYSICAL THERAPY INITIAL EVALUATION ADULT - ADDITIONAL COMMENTS
Pt. reports owning a  rolling walker. Pt. has HHA 7 days/week 4 hours/day.    Pt. was left seated in chair post PT Evaluation, no apparent distress, call bell within reach. RN made aware of pt. status.

## 2020-11-04 NOTE — PROGRESS NOTE ADULT - SUBJECTIVE AND OBJECTIVE BOX
INTERVAL HPI/OVERNIGHT EVENTS:    denying rectal bleeding or abd pain   refusing bloodwork    MEDICATIONS  (STANDING):  atorvastatin 40 milliGRAM(s) Oral at bedtime  dextrose 5%. 1000 milliLiter(s) (50 mL/Hr) IV Continuous <Continuous>  dextrose 50% Injectable 12.5 Gram(s) IV Push once  dextrose 50% Injectable 25 Gram(s) IV Push once  dextrose 50% Injectable 25 Gram(s) IV Push once  gabapentin 300 milliGRAM(s) Oral daily  insulin glargine Injectable (LANTUS) 6 Unit(s) SubCutaneous at bedtime  insulin lispro (ADMELOG) corrective regimen sliding scale   SubCutaneous three times a day before meals  insulin lispro (ADMELOG) corrective regimen sliding scale   SubCutaneous at bedtime  metoprolol succinate ER 25 milliGRAM(s) Oral daily  pantoprazole    Tablet 40 milliGRAM(s) Oral before breakfast  simethicone 80 milliGRAM(s) Chew every 6 hours    MEDICATIONS  (PRN):  aluminum hydroxide/magnesium hydroxide/simethicone Suspension 30 milliLiter(s) Oral every 6 hours PRN Dyspepsia  dextrose 40% Gel 15 Gram(s) Oral once PRN Blood Glucose LESS THAN 70 milliGRAM(s)/deciliter  glucagon  Injectable 1 milliGRAM(s) IntraMuscular once PRN Glucose LESS THAN 70 milligrams/deciliter      Allergies    Carrots (Rash)  No Known Drug Allergies    Intolerances        Review of Systems:    General:  No wt loss, fevers, chills, night sweats, fatigue   Eyes:  Good vision, no reported pain  ENT:  No sore throat, pain, runny nose, dysphagia  CV:  No pain, palpitations, hypo/hypertension  Resp:  No dyspnea, cough, tachypnea, wheezing  GI:  No pain, No nausea, No vomiting, No diarrhea, No constipation, No weight loss, No fever, No pruritis, No rectal bleeding, No melena, No dysphagia  :  No pain, bleeding, incontinence, nocturia  Muscle:  No pain, weakness  Neuro:  No weakness, tingling, memory problems  Psych:  No fatigue, insomnia, mood problems, depression  Endocrine:  No polyuria, polydypsia, cold/heat intolerance  Heme:  No petechiae, ecchymosis, easy bruisability  Skin:  No rash, tattoos, scars, edema      Vital Signs Last 24 Hrs  T(C): 36.6 (04 Nov 2020 11:35), Max: 36.8 (03 Nov 2020 12:26)  T(F): 97.8 (04 Nov 2020 11:35), Max: 98.2 (03 Nov 2020 12:26)  HR: 66 (04 Nov 2020 11:35) (66 - 74)  BP: 156/65 (04 Nov 2020 11:35) (149/62 - 160/63)  BP(mean): --  RR: 18 (04 Nov 2020 11:35) (17 - 18)  SpO2: 100% (04 Nov 2020 11:35) (96% - 100%)    PHYSICAL EXAM:    Constitutional: NAD  HEENT: EOMI, throat clear  Neck: No LAD, supple  Respiratory: CTA and P  Cardiovascular: S1 and S2, RRR, no M  Gastrointestinal: BS+, soft, NT/ND, neg HSM,  Extremities: No peripheral edema, neg clubbing, cyanosis  Vascular: 2+ peripheral pulses  Neurological: A/O x 3, no focal deficits  Psychiatric: Normal mood, normal affect  Skin: No rashes      LABS:                RADIOLOGY & ADDITIONAL TESTS:

## 2020-11-04 NOTE — DISCHARGE NOTE PROVIDER - CARE PROVIDER_API CALL
Will Slater)  Gastroenterology; Internal Medicine  [address] [place] [address]  Phone: [phone]  Fax: [phone]  Follow Up Time:     Parminder Goldberg)  Internal Medicine  [address] [place] [address], [address] [place] [address]  Phone: [phone]  Fax: [phone]  Follow Up Time:     Jluio César Hooper  INTERNAL MEDICINE  [address] [address] [place] [address]  Phone: [phone]  Fax: [phone]  Follow Up Time:    Will Slater)  Gastroenterology; Internal Medicine  [address] [place] [address]  Phone: [phone]  Fax: [phone]  Follow Up Time:     Parminder Goldberg)  Internal Medicine  [address] [facility] [address] [place] [address]  Phone: [phone]  Fax: [phone]  Follow Up Time:     Julio César Hooper  INTERNAL MEDICINE  [address] [address] [place] [address]  Phone: [phone]  Fax: [phone]  Follow Up Time:     Patrice Muñoz  PODIATRIC MEDICINE AND SURGERY  [address] [place] [address]  Phone: [phone]  Fax: [phone]  Follow Up Time:

## 2020-11-04 NOTE — PHYSICAL THERAPY INITIAL EVALUATION ADULT - GAIT DEVIATIONS NOTED, PT EVAL
decreased stride length/decreased kim/decreased weight-shifting ability/decreased step length/increased time in double stance

## 2020-11-04 NOTE — PROGRESS NOTE ADULT - PROBLEM SELECTOR PROBLEM 3
ACP (advance care planning)
ACP (advance care planning)
Acute kidney injury superimposed on chronic kidney disease

## 2020-11-04 NOTE — PROGRESS NOTE ADULT - PROBLEM SELECTOR PLAN 3
Advanced care planning was discussed with patient and family.  Advanced care planning forms were reviewed and discussed.  Risks, benefits and alternatives of gastroenterologic procedures were discussed in detail and all questions were answered.  30 minutes spent.
Advanced care planning was discussed with patient and family.  Advanced care planning forms were reviewed and discussed.  Risks, benefits and alternatives of gastroenterologic procedures were discussed in detail and all questions were answered.  30 minutes spent.
-Suspect prerenal from anemia  Nephro f/up appreciated.
-Suspect prerenal from anemia  Nephro f/up appreciated.  Cr. 3.43

## 2020-11-04 NOTE — DISCHARGE NOTE PROVIDER - CARE PROVIDERS DIRECT ADDRESSES
[email or web address]

## 2020-11-04 NOTE — DISCHARGE NOTE PROVIDER - PROVIDER TOKENS
PROVIDER:[TOKEN:[[id number]]],PROVIDER:[TOKEN:[[id number]]],PROVIDER:[TOKEN:[[id number]]] PROVIDER:[TOKEN:[[id number]]],PROVIDER:[TOKEN:[[id number]]],PROVIDER:[TOKEN:[[id number]]],PROVIDER:[TOKEN:[[id number]]]

## 2020-11-04 NOTE — PHYSICAL THERAPY INITIAL EVALUATION ADULT - CRITERIA FOR SKILLED THERAPEUTIC INTERVENTIONS
therapy frequency/predicted duration of therapy intervention/impairments found/rehab potential/anticipated discharge recommendation

## 2020-11-04 NOTE — DISCHARGE NOTE NURSING/CASE MANAGEMENT/SOCIAL WORK - PATIENT PORTAL LINK FT
You can access the FollowMyHealth Patient Portal offered by [facility] by registering at the following website: [email or web address]. By joining [facility] FollowMyHealth portal, you will also be able to view your health information using other applications (apps) compatible with our system.

## 2020-11-04 NOTE — DISCHARGE NOTE PROVIDER - HOSPITAL COURSE
80 F PMHx CAD s/p stents, HTN, DM2, Hx of duodenal ulcer/GI bleeding who presents for chest pain, shortness of breath and melena, found to be anemic with HgB 4. S/p 5 units of PRBC,  S/P EGD 11/2- gastritis which was biopsied, recommending colonoscopy.  Patient refused movie prep and colonoscopy cancelled as per GI, H/H remained stable   KOJO on CKD   - Suspect prerenal from anemia  - Renal US without obstruction   Stable angina pectoris  - Suspect chest pain due to demand myocardial ischemia in the setting of anemia  - ASA and Plavix were on hold for the stay  and patient can be discharged on aspirin  as per G.I   - TTE 11/2 with mildly reduced LV function        80 F PMHx CAD s/p stents, HTN, DM2, Hx of duodenal ulcer/GI bleeding who presents for chest pain, shortness of breath and melena, found to be anemic with HgB 4. S/p 5 units of PRBC,  S/P EGD 11/2- gastritis which was biopsied, recommending colonoscopy.  Patient refused movi prep and colonoscopy cancelled as per GI, H/H remained stable     KOJO on CKD   - Suspect prerenal from anemia  - Renal US without obstruction     Stable angina pectoris  - Suspect chest pain due to demand myocardial ischemia in the setting of anemia  - ASA and Plavix were on hold for the stay  and patient can be discharged on aspirin  as per G.I   - TTE 11/2 with mildly reduced LV function     Diastolic heart failure  Discussed with Dr. Hooper, will restart lasix 40mg PO BID on discharge. Continue to hold plavix and restart asa if OK with GI    Discussed case with Dr. Goldberg, pt is stable for discharge home today. Discussed with GI, H/H stable, OK to resume aspirin as per GI.

## 2020-11-04 NOTE — DISCHARGE NOTE PROVIDER - NSDCCPCAREPLAN_GEN_ALL_CORE_FT
PRINCIPAL DISCHARGE DIAGNOSIS  Diagnosis: GI bleed  Assessment and Plan of Treatment:       SECONDARY DISCHARGE DIAGNOSES  Diagnosis: Anemia  Assessment and Plan of Treatment:      PRINCIPAL DISCHARGE DIAGNOSIS  Diagnosis: GI bleed  Assessment and Plan of Treatment: You came in with gastrointestinal bleeding and your blood count was low-4.0 . And you received 5 units of blood, altogether  to bring up the blood count . You had an endoscopy - which showed gastritis. You need to take protonix for that as prescribed. Report to ED or to your PMD if you have any vomiting blood, or coffee ground vomitus, and bowel movements that look like black tar.Lower GI Bleed signs and symptoms to notify your health care provider are bright red bloody bowel movements.   Take your medications as prescribed by your Gastroenterologist.  Follow up with your Gastroenterologist for Pathology results of Endoscopy.   Avoid NSAIDs- like motrin, aleve, unless your Health Care Provider tells you that it is ok . But you need to take your aspirin as per cardiology .  Follow up with your Gastroenterologist within 1-2 weeks of discharge.        SECONDARY DISCHARGE DIAGNOSES  Diagnosis: Heart failure, diastolic, chronic  Assessment and Plan of Treatment: You have heart failure . You have new prescription for lasix which has to be taken twice a day. Please take it as prescribed. Restrict your salt and water intake. Report to your PMD or ED  if you develop any SOBor leg swelling    Diagnosis: Anemia  Assessment and Plan of Treatment: When you came in your blood count was low. You were given blood transfusions to bring it up and it is stable now. Report to ED or your PMD if you develop any symptoms of vomiting blood, any tarry black stools, bright red stools. Follow up with your PMD in 1-2 weeks        PRINCIPAL DISCHARGE DIAGNOSIS  Diagnosis: GI bleed  Assessment and Plan of Treatment: You came in with gastrointestinal bleeding and your blood count was low-4.0 . And you received 5 units of blood, altogether  to bring up the blood count . You had an endoscopy - which showed gastritis or inflamation of the stomach. You need to take protonix for that as prescribed. Report to ED or to your PMD if you have any vomiting blood, or coffee ground vomitus, and bowel movements that look like black tar. Lower GI Bleed signs and symptoms to notify your health care provider are bright red bloody bowel movements.   Take your medications as prescribed by your Gastroenterologist.  Follow up with your Gastroenterologist for Pathology results of Endoscopy.   Avoid NSAIDs- like motrin, aleve, unless your Health Care Provider tells you that it is ok . But you need to take your aspirin as per cardiology .  Follow up with your Gastroenterologist within 1-2 weeks of discharge.        SECONDARY DISCHARGE DIAGNOSES  Diagnosis: Heart failure, diastolic, chronic  Assessment and Plan of Treatment: You have heart failure . You have new prescription for lasix which has to be taken twice a day. Please take it as prescribed. Restrict your salt and water intake. Report to your PMD or ED  if you develop any shortness of breath or leg swelling. Hold oral potassium supplementation for now and follow up with your primary care doctor for repeat your blood work. Your doctor will then prescribe you potassium supplement as needed.    Diagnosis: Anemia  Assessment and Plan of Treatment: When you came in your blood count was low. You were given blood transfusions to bring it up and it is stable now. Report to ED or your PMD if you develop any symptoms of vomiting blood, any tarry black stools, bright red stools. Follow up with your PMD in 1-2 weeks       Diagnosis: Coronary artery disease of native artery of native heart with stable angina pectoris  Assessment and Plan of Treatment: You have coronary artery disease and had stents in the past.  Stop taking your plavix for now as per your cardiologist bacuse of the bleed you had and follow up with him in 1-2 weeks. OK to continue Asprin.    Diagnosis: Paroxysmal atrial fibrillation  Assessment and Plan of Treatment: Continue your aspirin and follow up with your PMD and cardiologist in 1-2 weeks. Please follow up with your cardiologist to see if you need to start additional blood thinners.    Diagnosis: Type 2 diabetes mellitus with hyperglycemia, with long-term current use of insulin  Assessment and Plan of Treatment: Continue Lantus 6 units at home. Follow up with Dr. Goldberg to adjust your insulin as needed.   Monitor finger sticks pre-meal and bedtime, low salt, fat and carbohydrate diet, minimize glucose intake.  Exercise daily for at least 30 minutes and weight loss.  Follow up with primary care physician and endocrinologist for routine Hemoglobin A1C checks and management.  Follow up with your ophthalmologist for routine yearly vision exams.    Diagnosis: Essential hypertension  Assessment and Plan of Treatment: Continue taking your metoprolol and amlodipine and follow up with your PMD in 1-2 weeks

## 2020-11-04 NOTE — PROGRESS NOTE ADULT - PROBLEM SELECTOR PLAN 1
-trend h/h and transfuse prn for Hgb < 8  -s/p EGD with gastritis no acute bleeding noted  -refused to complete bowel prep   -protonix qd   -given no further bleeding and stabilizing hgb, will monitor closely   -diet as tolerated
-trend h/h and transfuse prn for Hgb < 8  -s/p EGD with gastritis no acute bleeding noted  -refused to complete bowel prep for colonoscopy  -protonix qd   -given no further bleeding and stabilizing hgb, will monitor closely   -no gi objection to ASA 81mg   -diet as tolerated  -dc plans per primary team
GI f/up appreciated.  EGD tomorrow  S/p PRBC
GI f/up appreciated.  EGD: gastritis  S/p PRBC
GI f/up appreciated.  EGD: gastritis  S/p PRBC/ For colonoscopy today
GI f/up appreciated.  EGD: gastritis  S/p PRBC/ For colonoscopy today
Serum creatinine stable,   Renal US without obstruction.   vol status OK, electrolytes within normal limits.   no urgent indication from HD  daily BMP
Serum creatinine stable,   pending urine tests (prior sample incorrectly collected)  lytes OK  vol status OK  no urgent indication from HD  daily BMP

## 2020-11-04 NOTE — PROGRESS NOTE ADULT - REASON FOR ADMISSION
CC: chest pain and shortness of breath

## 2020-11-04 NOTE — PROGRESS NOTE ADULT - PROBLEM SELECTOR PROBLEM 2
Paroxysmal atrial fibrillation
Paroxysmal atrial fibrillation
GI bleed
GI bleed
Iron deficiency anemia due to chronic blood loss

## 2020-11-04 NOTE — PROGRESS NOTE ADULT - SUBJECTIVE AND OBJECTIVE BOX
Cardiovascular Disease Progress Note    Overnight events: No acute events overnight. Patient denies chest pain or SOB.     Otherwise review of systems negative    Objective Findings:  T(C): 36.6 (11-04-20 @ 05:04), Max: 36.8 (11-03-20 @ 12:26)  HR: 67 (11-04-20 @ 05:04) (67 - 74)  BP: 150/69 (11-04-20 @ 05:04) (149/62 - 160/63)  RR: 17 (11-04-20 @ 05:04) (17 - 18)  SpO2: 100% (11-04-20 @ 05:04) (96% - 100%)  Wt(kg): --  Daily     Daily       Physical Exam:  Gen: NAD; Patient resting comfortably  HEENT: EOMI, Normocephalic/ atraumatic  CV: RRR, normal S1 + S2, no m/r/g  Lungs:  Normal respiratory effort; clear to auscultation bilaterally  Abd: soft, non-tender; bowel sounds present  Ext: No edema; warm and well perfused    Telemetry: Sinus    Laboratory Data:    No recent labs      Inpatient Medications:  MEDICATIONS  (STANDING):  atorvastatin 40 milliGRAM(s) Oral at bedtime  dextrose 5%. 1000 milliLiter(s) (50 mL/Hr) IV Continuous <Continuous>  dextrose 50% Injectable 12.5 Gram(s) IV Push once  dextrose 50% Injectable 25 Gram(s) IV Push once  dextrose 50% Injectable 25 Gram(s) IV Push once  gabapentin 300 milliGRAM(s) Oral daily  insulin glargine Injectable (LANTUS) 6 Unit(s) SubCutaneous at bedtime  insulin lispro (ADMELOG) corrective regimen sliding scale   SubCutaneous three times a day before meals  insulin lispro (ADMELOG) corrective regimen sliding scale   SubCutaneous at bedtime  metoprolol succinate ER 25 milliGRAM(s) Oral daily  pantoprazole    Tablet 40 milliGRAM(s) Oral before breakfast  simethicone 80 milliGRAM(s) Chew every 6 hours      Assessment: 80 year old woman with CAD s/p MICHAEL x2 in 12/2017, a-fib not on AC due to prior GI bleed, and diastolic CHF presents with acute blood loss anemia.     Plan of Care:    #CAD- s/p MICHAEL x2 in 12/2017.  Acute blood loss anemia in the setting of DAPT.   No obvious source of bleeding on EGD.  Patient refusing colonoscopy.   Would resume ASA 81 mg if no GI objection.  No further Plavix.     #Decompensated diastolic CHF-  Volume status now much improved.  Lasix on hold given patient is not on a regular diet.  Would discharge on Lasix 40 mg PO BID.     #PVD-  Patient status post RLE angiogram with 2 stents placed in 11/2019.   Antiplatelet therapy on hold as stated above.     #Paroxymal a-fib-  No AC given h/o GI bleed.    Discussed with patient and Tele PA.     Over 25 minutes spent on total encounter; more than 50% of the visit was spent counseling and/or coordinating care by the attending physician.      Julio César Hooper MD [facility]  Cardiovascular Disease  [phone]

## 2020-11-04 NOTE — DISCHARGE NOTE PROVIDER - NSDCMRMEDTOKEN_GEN_ALL_CORE_FT
amLODIPine 10 mg oral tablet: 1 tab(s) orally once a day  aspirin 81 mg oral delayed release tablet: 1 tab(s) orally once a day  atorvastatin 40 mg oral tablet: 1 tab(s) orally once a day (at bedtime)  furosemide 40 mg oral tablet: 1 tab(s) orally once a day  gabapentin 300 mg oral capsule: 1 cap(s) orally 2 times a day  Lantus 100 units/mL subcutaneous solution: 10 unit(s) subcutaneous once a day (at bedtime)  metoprolol succinate 25 mg oral tablet, extended release: 1 tab(s) orally once a day  Plavix 75 mg oral tablet: 1 tab(s) orally once a day   potassium chloride 10 mEq oral tablet, extended release: 1 tab(s) orally once a day   amLODIPine 10 mg oral tablet: 1 tab(s) orally once a day  aspirin 81 mg oral delayed release tablet: 1 tab(s) orally once a day  atorvastatin 40 mg oral tablet: 1 tab(s) orally once a day (at bedtime)  furosemide 40 mg oral tablet: 1 tab(s) orally 2 times a day   gabapentin 300 mg oral capsule: 1 cap(s) orally once a day  Lantus 100 units/mL subcutaneous solution: 10 unit(s) subcutaneous once a day (at bedtime)  metoprolol succinate 25 mg oral tablet, extended release: 1 tab(s) orally once a day  pantoprazole 40 mg oral delayed release tablet: 1 tab(s) orally once a day (before a meal)   amLODIPine 10 mg oral tablet: 1 tab(s) orally once a day  aspirin 81 mg oral delayed release tablet: 1 tab(s) orally once a day  atorvastatin 40 mg oral tablet: 1 tab(s) orally once a day (at bedtime)  furosemide 40 mg oral tablet: 1 tab(s) orally 2 times a day   gabapentin 300 mg oral capsule: 1 cap(s) orally once a day  Lantus 100 units/mL subcutaneous solution: 6 unit(s) subcutaneous once a day (at bedtime)  metoprolol succinate 25 mg oral tablet, extended release: 1 tab(s) orally once a day  pantoprazole 40 mg oral delayed release tablet: 1 tab(s) orally once a day (before a meal)

## 2020-11-04 NOTE — DISCHARGE NOTE PROVIDER - NSDCFUADDAPPT_GEN_ALL_CORE_FT
Follow up with medicine Dr Goldberg for follow up appointment in 1-2 weeks  Follow up with SHONDA Hull. Call for appointment in 1-2 weeks  Follow up with cardiology, Dr Hooper. Call for appointment in 1-2 weeks

## 2020-11-04 NOTE — DISCHARGE NOTE NURSING/CASE MANAGEMENT/SOCIAL WORK - NSDCFUADDAPPT_GEN_ALL_CORE_FT
Follow up with medicine Dr Glodberg for follow up appointment in 1-2 weeks  Follow up with SHONDA Hull. Call for appointment in 1-2 weeks  Follow up with cardiology, Dr Hooper. Call for appointment in 1-2 weeks

## 2021-06-28 ENCOUNTER — INPATIENT (INPATIENT)
Facility: HOSPITAL | Age: 81
LOS: 0 days | Discharge: HOME | End: 2021-06-29
Attending: INTERNAL MEDICINE | Admitting: INTERNAL MEDICINE
Payer: MEDICARE

## 2021-06-28 VITALS
OXYGEN SATURATION: 96 % | HEIGHT: 62 IN | TEMPERATURE: 99 F | HEART RATE: 69 BPM | SYSTOLIC BLOOD PRESSURE: 143 MMHG | RESPIRATION RATE: 18 BRPM | DIASTOLIC BLOOD PRESSURE: 47 MMHG

## 2021-06-28 DIAGNOSIS — Z95.5 PRESENCE OF CORONARY ANGIOPLASTY IMPLANT AND GRAFT: Chronic | ICD-10-CM

## 2021-06-28 DIAGNOSIS — Z98.890 OTHER SPECIFIED POSTPROCEDURAL STATES: Chronic | ICD-10-CM

## 2021-06-28 DIAGNOSIS — R06.00 DYSPNEA, UNSPECIFIED: ICD-10-CM

## 2021-06-28 DIAGNOSIS — N18.5 CHRONIC KIDNEY DISEASE, STAGE 5: ICD-10-CM

## 2021-06-28 DIAGNOSIS — I50.33 ACUTE ON CHRONIC DIASTOLIC (CONGESTIVE) HEART FAILURE: ICD-10-CM

## 2021-06-28 DIAGNOSIS — E87.5 HYPERKALEMIA: ICD-10-CM

## 2021-06-28 DIAGNOSIS — Z29.9 ENCOUNTER FOR PROPHYLACTIC MEASURES, UNSPECIFIED: ICD-10-CM

## 2021-06-28 DIAGNOSIS — D64.9 ANEMIA, UNSPECIFIED: ICD-10-CM

## 2021-06-28 DIAGNOSIS — Z98.891 HISTORY OF UTERINE SCAR FROM PREVIOUS SURGERY: Chronic | ICD-10-CM

## 2021-06-28 LAB
ALBUMIN SERPL ELPH-MCNC: 3.6 G/DL — SIGNIFICANT CHANGE UP (ref 3.3–5)
ALP SERPL-CCNC: 193 U/L — HIGH (ref 40–120)
ALT FLD-CCNC: 9 U/L — SIGNIFICANT CHANGE UP (ref 4–33)
ANION GAP SERPL CALC-SCNC: 8 MMOL/L — SIGNIFICANT CHANGE UP (ref 7–14)
AST SERPL-CCNC: 11 U/L — SIGNIFICANT CHANGE UP (ref 4–32)
BASOPHILS # BLD AUTO: 0.05 K/UL — SIGNIFICANT CHANGE UP (ref 0–0.2)
BASOPHILS NFR BLD AUTO: 0.9 % — SIGNIFICANT CHANGE UP (ref 0–2)
BILIRUB SERPL-MCNC: <0.2 MG/DL — SIGNIFICANT CHANGE UP (ref 0.2–1.2)
BLD GP AB SCN SERPL QL: NEGATIVE — SIGNIFICANT CHANGE UP
BUN SERPL-MCNC: 46 MG/DL — HIGH (ref 7–23)
CALCIUM SERPL-MCNC: 8.2 MG/DL — LOW (ref 8.4–10.5)
CHLORIDE SERPL-SCNC: 109 MMOL/L — HIGH (ref 98–107)
CO2 SERPL-SCNC: 23 MMOL/L — SIGNIFICANT CHANGE UP (ref 22–31)
CREAT SERPL-MCNC: 3.46 MG/DL — HIGH (ref 0.5–1.3)
EOSINOPHIL # BLD AUTO: 0.16 K/UL — SIGNIFICANT CHANGE UP (ref 0–0.5)
EOSINOPHIL NFR BLD AUTO: 2.9 % — SIGNIFICANT CHANGE UP (ref 0–6)
GLUCOSE SERPL-MCNC: 128 MG/DL — HIGH (ref 70–99)
HCT VFR BLD CALC: 25.3 % — LOW (ref 34.5–45)
HGB BLD-MCNC: 7.1 G/DL — LOW (ref 11.5–15.5)
IANC: 3.59 K/UL — SIGNIFICANT CHANGE UP (ref 1.5–8.5)
IMM GRANULOCYTES NFR BLD AUTO: 0.4 % — SIGNIFICANT CHANGE UP (ref 0–1.5)
LYMPHOCYTES # BLD AUTO: 1.15 K/UL — SIGNIFICANT CHANGE UP (ref 1–3.3)
LYMPHOCYTES # BLD AUTO: 20.8 % — SIGNIFICANT CHANGE UP (ref 13–44)
MCHC RBC-ENTMCNC: 26.1 PG — LOW (ref 27–34)
MCHC RBC-ENTMCNC: 28.1 GM/DL — LOW (ref 32–36)
MCV RBC AUTO: 93 FL — SIGNIFICANT CHANGE UP (ref 80–100)
MONOCYTES # BLD AUTO: 0.57 K/UL — SIGNIFICANT CHANGE UP (ref 0–0.9)
MONOCYTES NFR BLD AUTO: 10.3 % — SIGNIFICANT CHANGE UP (ref 2–14)
NEUTROPHILS # BLD AUTO: 3.59 K/UL — SIGNIFICANT CHANGE UP (ref 1.8–7.4)
NEUTROPHILS NFR BLD AUTO: 64.7 % — SIGNIFICANT CHANGE UP (ref 43–77)
NRBC # BLD: 0 /100 WBCS — SIGNIFICANT CHANGE UP
NRBC # FLD: 0 K/UL — SIGNIFICANT CHANGE UP
NT-PROBNP SERPL-SCNC: HIGH PG/ML
PLATELET # BLD AUTO: 319 K/UL — SIGNIFICANT CHANGE UP (ref 150–400)
POTASSIUM SERPL-MCNC: 6.8 MMOL/L — CRITICAL HIGH (ref 3.5–5.3)
POTASSIUM SERPL-SCNC: 6.8 MMOL/L — CRITICAL HIGH (ref 3.5–5.3)
PROT SERPL-MCNC: 6.5 G/DL — SIGNIFICANT CHANGE UP (ref 6–8.3)
RBC # BLD: 2.72 M/UL — LOW (ref 3.8–5.2)
RBC # FLD: 16.8 % — HIGH (ref 10.3–14.5)
RH IG SCN BLD-IMP: POSITIVE — SIGNIFICANT CHANGE UP
SODIUM SERPL-SCNC: 140 MMOL/L — SIGNIFICANT CHANGE UP (ref 135–145)
TROPONIN T, HIGH SENSITIVITY RESULT: 27 NG/L — SIGNIFICANT CHANGE UP
WBC # BLD: 5.54 K/UL — SIGNIFICANT CHANGE UP (ref 3.8–10.5)
WBC # FLD AUTO: 5.54 K/UL — SIGNIFICANT CHANGE UP (ref 3.8–10.5)

## 2021-06-28 PROCEDURE — 71046 X-RAY EXAM CHEST 2 VIEWS: CPT | Mod: 26

## 2021-06-28 PROCEDURE — 99223 1ST HOSP IP/OBS HIGH 75: CPT

## 2021-06-28 PROCEDURE — 99285 EMERGENCY DEPT VISIT HI MDM: CPT | Mod: CS,25

## 2021-06-28 PROCEDURE — 93010 ELECTROCARDIOGRAM REPORT: CPT

## 2021-06-28 RX ORDER — DEXTROSE 50 % IN WATER 50 %
50 SYRINGE (ML) INTRAVENOUS ONCE
Refills: 0 | Status: COMPLETED | OUTPATIENT
Start: 2021-06-28 | End: 2021-06-28

## 2021-06-28 RX ORDER — FUROSEMIDE 40 MG
40 TABLET ORAL ONCE
Refills: 0 | Status: COMPLETED | OUTPATIENT
Start: 2021-06-28 | End: 2021-06-28

## 2021-06-28 RX ORDER — INSULIN HUMAN 100 [IU]/ML
5 INJECTION, SOLUTION SUBCUTANEOUS ONCE
Refills: 0 | Status: DISCONTINUED | OUTPATIENT
Start: 2021-06-28 | End: 2021-06-28

## 2021-06-28 RX ORDER — INSULIN LISPRO 100/ML
5 VIAL (ML) SUBCUTANEOUS ONCE
Refills: 0 | Status: COMPLETED | OUTPATIENT
Start: 2021-06-28 | End: 2021-06-28

## 2021-06-28 RX ORDER — FUROSEMIDE 40 MG
40 TABLET ORAL EVERY 12 HOURS
Refills: 0 | Status: DISCONTINUED | OUTPATIENT
Start: 2021-06-28 | End: 2021-06-29

## 2021-06-28 RX ORDER — SODIUM ZIRCONIUM CYCLOSILICATE 10 G/10G
10 POWDER, FOR SUSPENSION ORAL ONCE
Refills: 0 | Status: COMPLETED | OUTPATIENT
Start: 2021-06-28 | End: 2021-06-28

## 2021-06-28 RX ORDER — DEXTROSE 50 % IN WATER 50 %
50 SYRINGE (ML) INTRAVENOUS ONCE
Refills: 0 | Status: DISCONTINUED | OUTPATIENT
Start: 2021-06-28 | End: 2021-06-28

## 2021-06-28 RX ORDER — INSULIN LISPRO 100/ML
5 VIAL (ML) SUBCUTANEOUS ONCE
Refills: 0 | Status: DISCONTINUED | OUTPATIENT
Start: 2021-06-28 | End: 2021-06-28

## 2021-06-28 RX ADMIN — Medication 5 UNIT(S): at 21:36

## 2021-06-28 RX ADMIN — Medication 40 MILLIGRAM(S): at 19:07

## 2021-06-28 RX ADMIN — Medication 40 MILLIGRAM(S): at 20:40

## 2021-06-28 RX ADMIN — SODIUM ZIRCONIUM CYCLOSILICATE 10 GRAM(S): 10 POWDER, FOR SUSPENSION ORAL at 21:54

## 2021-06-28 RX ADMIN — Medication 50 MILLILITER(S): at 20:40

## 2021-06-28 NOTE — H&P ADULT - PROBLEM SELECTOR PLAN 2
- in the setting of being fluid overloaded due to acute on chronic CHF as sequela of discontinuation of furosemide  - also could be complicated by anemia (Hgb = 7.1; although his could be dilutional)  - should improve as optimal diuresis achieved  - ambulate with walker, as tolerated  - Physical therapy  - management for CHF as above

## 2021-06-28 NOTE — H&P ADULT - PROBLEM SELECTOR PLAN 4
- glucose = 128 on CMP FS = 252 --> 224 --> 215  - reports non-compliance w/ Lantus 10 units (has not taken same in a "long time."  Self discontinued  - states finger-sticks range 120 to 130 at home  - last A1c = 6.1 on 10/31/2020  - Consistent carb diet  - m-ISS per FS  - evaluate for resumption of Lantus, or possibly giving oral medications upon discharge - to encourage medication compliance - glucose = 128 on CMP FS = 252 --> 224 --> 215  - related comorbidities of neuropathy, PVD, CAD, HTN  - reports non-compliance w/ Lantus 10 units (has not taken same in a "long time."  Self discontinued  - states finger-sticks range 120 to 130 at home  - last A1c = 6.1 on 10/31/2020  - Consistent carb diet  - m-ISS per FS  - evaluate for resumption of Lantus, or possibly giving oral medications upon discharge - to encourage medication compliance  - on gabapentin 300 mg QD (please f/u [facility] Pharmacist and complete Med-Rec)

## 2021-06-28 NOTE — ED PROVIDER NOTE - ATTENDING CONTRIBUTION TO CARE
Seen and examined, states worsened HARPER since yest., no assoc. CP/N/V/diaphoresis/palpitations. Pt. prev. on furosemide, had Rx for qod dosing but did not understand instructions and stopped taking entirely nearly 2 wks. ago. No recent illness, no fever/chills. Called PMD and came to ED on  their advice. Pt. has hx of stents/CAD but denies typical anginal pain. +orthopnea and mild pedal edema, no unilateral swelling. MMM, clear lungs, heart reg, abd soft, NT to palp, no CVAT, no edema, NT calves.

## 2021-06-28 NOTE — ED PROVIDER NOTE - PHYSICAL EXAMINATION
G: NAD, cooperative with exam   H: NCAT  E: EOMI, no conjunctival pallor   M: Mucous membranes moist   R: crackles in lower lung bases, nWOB  C: Nl S1/S2, no mrg  A: Soft, NT/ND, no rebound/guarding   MSK: BL LE edema 2+ pitting to knees

## 2021-06-28 NOTE — ED PROVIDER NOTE - PROGRESS NOTE DETAILS
Diana Amos MD (PGY1) - Spoke to Dr. Roney Rivera, patient to be admitted to him should patient agree to stay (patient verbalized to me that she does not want to stay, will reassess) Jose Luis, PGY1: Patient's labs significant for K+ of 6.8; repeat ekg unchanged from initial ekg; no peaked T waves or prolonged QRS; p waves evident. Will provide insulin, lasix and an amp of D50 and admit to Dr. Rivera

## 2021-06-28 NOTE — ED ADULT NURSE NOTE - NSIMPLEMENTINTERV_GEN_ALL_ED
Implemented All Fall Risk Interventions:  [place] to call system. Call bell, personal items and telephone within reach. Instruct patient to call for assistance. Room bathroom lighting operational. Non-slip footwear when patient is off stretcher. Physically safe environment: no spills, clutter or unnecessary equipment. Stretcher in lowest position, wheels locked, appropriate side rails in place. Provide visual cue, wrist band, yellow gown, etc. Monitor gait and stability. Monitor for mental status changes and reorient to person, place, and time. Review medications for side effects contributing to fall risk. Reinforce activity limits and safety measures with patient and family.

## 2021-06-28 NOTE — H&P ADULT - PROBLEM SELECTOR PROBLEM 4
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

## 2021-06-28 NOTE — H&P ADULT - HISTORY OF PRESENT ILLNESS
81 year old female, with past history significant for CAD - s/p Stents, Paroxysmal A-fib, HLD, Type-2 DM, Neuropathy, GIB, Duodenal ulcer, and PVD (s/p stents x 2 - 11/2019), presented to the ED secondary to palpitations and shortness of breath.  Seen and evaluated at bedside;    Vital signs upon ED presentation as follows: BP = 143/47, HR = 69, RR = 18, T = 37 C (98.6 F), O2 Sat = 96% on RA.  Potassium = 6.8 (non-hemolyzed).  Pro-BNP = 01220.  Diagnosed with Dyspnea and Hyperkalemia.  Prescribed Admelog 5 units IV, Lasix 40 mg IV x 2 and Lokelma 10 gram in the ED. 81 year old female, with past history significant for CAD - s/p Stents, Paroxysmal A-fib, HLD, Type-2 DM, Neuropathy, GIB, Duodenal ulcer, PVD (s/p stents x 2 - 11/2019), and Gait difficulty (uses walker) presented to the ED secondary to palpitations and shortness of breath.  Seen and evaluated at bedside; NAD.  Patient relates shortness of breath, orthopnea (despite using 2 pillows) and continued swelling of B/L lower extremities.  Indicated lack of sleep overnight due to difficulty breathing.  Unable to ambulate for any significant distance because of shortness of breath.  States furosemide 40 mg was discontinued approximately 2 to 3 weeks ago by a cardiology provider at [facility].  Comments on confusion relative to being told at times to increase fluid intake and at other times to desist.  No associated chest pain, palpitations, headache, dizziness reported.  Relative to the Type-2 DM, patient has self discontinued Lantus 10 units due to frustration re multiple health issues.  However, patient reports adhering to a low carbohydrate diet.  Finger-stick at home reportedly 120 to 130.    Vital signs upon ED presentation as follows: BP = 143/47, HR = 69, RR = 18, T = 37 C (98.6 F), O2 Sat = 96% on RA.  Potassium = 6.8 (non-hemolyzed).  Pro-BNP = 93275.  Diagnosed with Dyspnea and Hyperkalemia.  Prescribed Admelog 5 units IV, Lasix 40 mg IV x 2 and Lokelma 10 gram in the ED.

## 2021-06-28 NOTE — ED PROVIDER NOTE - OBJECTIVE STATEMENT
Nikunj Tsang is a 80 year old woman with a history of CAD s/p stents on ASA/plavix, T2DM, HTN, prior GI bleeding/duodenal ulcer who presents with palpitations and SOB. Acute onset since last night. Worsens with movement. Endorsing HARPER and orthopnea, BL LE edema to knees. No chest pain. No SOB, sick contacts, palpitations, abd pain, N/V/D. No new medications. New PCP decreased frequency of lasix, patient stopped taking it 2 wks ago.

## 2021-06-28 NOTE — H&P ADULT - NSHPPHYSICALEXAM_GEN_ALL_CORE
Vital Signs Last 24 Hrs  T(C): 37.1 (28 Jun 2021 19:00), Max: 37.1 (28 Jun 2021 19:00)  T(F): 98.7 (28 Jun 2021 19:00), Max: 98.7 (28 Jun 2021 19:00)  HR: 64 (28 Jun 2021 19:00) (64 - 69)  BP: 188/71 (28 Jun 2021 19:00) (143/47 - 188/71)  BP(mean): --  RR: 22 (28 Jun 2021 19:00) (15 - 22)  SpO2: 98% (28 Jun 2021 19:00) (96% - 98%)    ======================================================== Vital Signs Last 24 Hrs  T(C): 37.1 (28 Jun 2021 19:00), Max: 37.1 (28 Jun 2021 19:00)  T(F): 98.7 (28 Jun 2021 19:00), Max: 98.7 (28 Jun 2021 19:00)  HR: 64 (28 Jun 2021 19:00) (64 - 69)  BP: 188/71 (28 Jun 2021 19:00) (143/47 - 188/71)  BP(mean): --  RR: 22 (28 Jun 2021 19:00) (15 - 22)  SpO2: 98% (28 Jun 2021 19:00) (96% - 98%)    ========================================================    PHYSICAL EXAMINATION:    APPEARANCE: Adequately groomed, adequately nourished.  NAD	  HEENT: Moist oral mucosa, PERRL, EOMI	  LYMPHATIC: No lymphadenopathy appreciated  CARDIOVASCULAR: (+) S1 S2.  No JVD.  No murmurs.  No edema  RESPIRATORY: No wheezing, rhonchi, crackles appreciated  GASTROINTESTINAL:  Soft, Non-tender, (+) BS  GENITOURINARY: No suprapubic tenderness.  No CVA tenderness B/L  SKIN: No rashes. No ecchymoses.  No cyanosis  PSYCHIATRIC: A&O x 3.  Mood & affect appropriate to situation  NEUROLOGICAL: Non-focal, VENTURA x 4 against gravity  EXTREMITIES: Normal range of motion.  No clubbing, cyanosis or edema  VASCULAR: Peripheral pulses palpable Vital Signs Last 24 Hrs  T(C): 37.1 (28 Jun 2021 19:00), Max: 37.1 (28 Jun 2021 19:00)  T(F): 98.7 (28 Jun 2021 19:00), Max: 98.7 (28 Jun 2021 19:00)  HR: 64 (28 Jun 2021 19:00) (64 - 69)  BP: 188/71 (28 Jun 2021 19:00) (143/47 - 188/71)  BP(mean): --  RR: 22 (28 Jun 2021 19:00) (15 - 22)  SpO2: 98% (28 Jun 2021 19:00) (96% - 98%)    ========================================================    PHYSICAL EXAMINATION:    APPEARANCE: Adequately groomed, adequately nourished female, sitting propped up in stretcher in NAD	  HEENT: Moist oral mucosa, PERRL, EOMI	  LYMPHATIC: No lymphadenopathy appreciated  CARDIOVASCULAR: (+) S1 S2.  No JVD.  No gross murmurs appreciated.  Mild edema of B/L legs/feet  RESPIRATORY: No wheezing, rhonchi, crackles appreciated  GASTROINTESTINAL:  Soft, Non-tender, (+) BS  GENITOURINARY: No suprapubic tenderness.  No CVA tenderness B/L  SKIN: No rashes. No ecchymoses.  No cyanosis  PSYCHIATRIC: A&O x 3.  Mood & affect appropriate to situation  NEUROLOGICAL: Non-focal, VENTURA x 4 against gravity  EXTREMITIES: Normal range of motion.  No clubbing, cyanosis or edema  VASCULAR: Peripheral pulses palpable

## 2021-06-28 NOTE — H&P ADULT - ASSESSMENT
[  ]  Lab studies reviewed  [  ]  Radiology reviewed  [  ]  Old records reviewed    81 year old female, with past history significant for CAD - s/p Stents, Paroxysmal A-fib, HLD, Type-2 DM, Neuropathy, and GIB, Duodenal ulcer, presented to the ED secondary to palpitations and shortness of breath.  Vital signs upon ED presentation as follows: BP = 143/47, HR = 69, RR = 18, T = 37 C (98.6 F), O2 Sat = 96% on RA.  Potassium = 6.8 (non-hemolyzed).  Pro-BNP = 45371.  Diagnosed with Dyspnea and Hyperkalemia.  Admitted for evaluation/management of: [ x ]  Lab studies reviewed  [ x ]  Radiology reviewed  [ x ]  Old records reviewed    81 year old female, with past history significant for CAD - s/p Stents, Paroxysmal A-fib, HLD, Type-2 DM, Neuropathy, and GIB, Duodenal ulcer, presented to the ED secondary to palpitations and shortness of breath.  Vital signs upon ED presentation as follows: BP = 143/47, HR = 69, RR = 18, T = 37 C (98.6 F), O2 Sat = 96% on RA.  Potassium = 6.8 (non-hemolyzed).  Pro-BNP = 94165.  Diagnosed with Dyspnea and Hyperkalemia.  Admitted for evaluation/management of:

## 2021-06-28 NOTE — ED ADULT TRIAGE NOTE - CHIEF COMPLAINT QUOTE
pt c/o worsening SOB x 2 days. pt denies chest pain but endorses nausea. states her PMD d/c her lasix 2 weeks ago.

## 2021-06-28 NOTE — CONSULT NOTE ADULT - ASSESSMENT
82 yo woman w h/o CAD, s/p PCI on Plavix/ASA, CKD3-4, baseline Scr 3, DM2, DM neuropathy, HLD, HTN, bleeding DU in 11/2020, Anemia, PAF( not on AC prob 2/2 GI bledd), presents w sudden onset dyspnea which started 6/27, ptn endorses HARPER, Orthopnea, LE edema. ptn stopped taking Lasix [id number]. PCP Dr. Costa suggested the frequency during the week shoul be lowered but ptn misunderstood and completely stopped taking LASIX. Denies CP, has palps,   Denies N/V, HA, weakness, fevers, chills, cough  In the ED noted to have acute CHF exacerbation, hyperkalemia, anasarca, KOJO    PLAN: diurese w IV Lasix 40 mg q12H, cardiology and renal called. get TTE, hyperkalemia treated in the ED, will rpt, resume home Norvasc, Toprol, Lantus, place on Ins on a sliding scale, cont Neurontin, cont protonix   monitor strict Is and Os w indwelling alba at least overnight. document PVR when placing Alba.   DVT ppx w PAS, check stool guaiac check total iron

## 2021-06-28 NOTE — H&P ADULT - PROBLEM SELECTOR PLAN 7
- patient unaware of all medications currently taken  - please f/u [facility] Pharmacist in the AM and complete Med-rec

## 2021-06-28 NOTE — CONSULT NOTE ADULT - SUBJECTIVE AND OBJECTIVE BOX
CC: dyspnea  HPI/PMH: 80 yo woman w h/o CAD, s/p PCI on Plavix/ASA, CKD3-4, baseline Scr 3, DM2, DM neuropathy, HLD, HTN, bleeding DU in 11/2020, Anemia, PAF( not on AC prob 2/2 GI bledd), presents w sudden onset dyspnea which started 6/27, ptn endorses HARPER, Orthopnea, LE edema. ptn stopped taking Lasix [id number]. PCP Dr. Costa suggested the frequency during the week shoul be lowered but ptn misunderstood and completely stopped taking LASIX. Denies CP, has palps,   Denies N/V, HA, weakness, fevers, chills, cough  In the ED noted to have acute CHF, hyperkalemia, anasarca, KOJO    HOME MEDICATIONS: Norvasc 10, Lipitor 40, lasix 40( stopped x 2 weeks), ASA 81, Neurontin 300 qdaily, Toprol XL 25, Lantus 6U at hs    Allergies: NKDA  Social:     T(F): 98.7 (06-28-21 @ 19:00), Max: 98.7 (06-28-21 @ 19:00)  HR: 64 (06-28-21 @ 19:00) (64 - 69)  BP: 188/71 (06-28-21 @ 19:00) (143/47 - 188/71)  RR: 22 (06-28-21 @ 19:00) (15 - 22)  SpO2: 98% (06-28-21 @ 19:00) (96% - 98%)      PHYSICAL EXAM:  GENERAL: NAD, well-developed  HEAD:  Atraumatic, Normocephalic  EYES: EOMI, PERRLA, conjunctiva and sclera clear  NECK: Supple, No JVD  CHEST/LUNG:b/l rales  HEART: Regular rate and rhythm; No murmurs, rubs, or gallops  ABDOMEN: Soft, Nontender, Nondistended; Bowel sounds present  EXTREMITIES:  2+ LE edema  PSYCH: AAOx3  NEUROLOGY: non-focal  SKIN: No rashes or lesions    LABS:                        7.1    5.54  )-----------( 319      ( 28 Jun 2021 19:12 )             25.3     06-28    140  |  109<H>  |  46<H>  ----------------------------<  128<H>  6.8<HH>   |  23  |  3.46<H>    Ca    8.2<L>      28 Jun 2021 19:12    TPro  6.5  /  Alb  3.6  /  TBili  <0.2  /  DBili  x   /  AST  11  /  ALT  9   /  AlkPhos  193<H>  06-28

## 2021-06-28 NOTE — H&P ADULT - PROBLEM SELECTOR PLAN 6
- Hgb = 7.1, MCV = 93  - likely due to chronic disease, but may be multifactorial (history of GIB)  - values also may be hemodiluted  - no sign of occult blood loss presently  - has undergone transfusion in the past  - f/u w/ repeat lab-work  - T&S for possibility of need for transfusion

## 2021-06-28 NOTE — H&P ADULT - NSICDXPASTMEDICALHX_GEN_ALL_CORE_FT
PAST MEDICAL HISTORY:  Anemia     CAD (coronary artery disease) ~ s/p MICHAEL x2 in 12/2017    Diabetes mellitus, type 2     Diabetic neuropathy     Diastolic heart failure     Gastritis     GI bleed     HLD (hyperlipidemia)     Neuropathy     Paroxysmal atrial fibrillation     PVD (peripheral vascular disease) ~ RLE angiogram with 2 stents placed in 11/2019.    Stage 5 chronic kidney disease not on chronic dialysis     Transfusion history

## 2021-06-28 NOTE — H&P ADULT - NSHPLABSRESULTS_GEN_ALL_CORE
LAB-WORK/STUDIES:                          7.1    5.54  )-----------( 319      ( 28 Jun 2021 19:12 )             25.3     28 Jun 2021 19:12    140    |  109    |  46     ----------------------------<  128    6.8     |  23     |  3.46     Ca    8.2        28 Jun 2021 19:12    TPro  6.5    /  Alb  3.6    /  TBili  <0.2   /  DBili  x      /  AST  11     /  ALT  9      /  AlkPhos  193    28 Jun 2021 19:12    LIVER FUNCTIONS - ( 28 Jun 2021 19:12 )  Alb: 3.6 g/dL / Pro: 6.5 g/dL / ALK PHOS: 193 U/L / ALT: 9 U/L / AST: 11 U/L / GGT: x           CAPILLARY BLOOD GLUCOSE    POCT Blood Glucose.: 252 mg/dL (28 Jun 2021 21:04)    Pro-BNP = 67763    Troponin = 27    =======================================================        ======================================================= LAB-WORK/STUDIES:                          7.1    5.54  )-----------( 319      ( 28 Jun 2021 19:12 )             25.3     28 Jun 2021 19:12    140    |  109    |  46     ----------------------------<  128    6.8     |  23     |  3.46     Ca    8.2        28 Jun 2021 19:12    TPro  6.5    /  Alb  3.6    /  TBili  <0.2   /  DBili  x      /  AST  11     /  ALT  9      /  AlkPhos  193    28 Jun 2021 19:12    LIVER FUNCTIONS - ( 28 Jun 2021 19:12 )  Alb: 3.6 g/dL / Pro: 6.5 g/dL / ALK PHOS: 193 U/L / ALT: 9 U/L / AST: 11 U/L / GGT: x           CAPILLARY BLOOD GLUCOSE    POCT Blood Glucose.: 252 mg/dL (28 Jun 2021 21:04)    Pro-BNP = 42961    Troponin = 27    ========================================================================    ECG #1 (14:35:44) - NSR w/ sinus arrhythmia at 72 bpm, QTc = 421, Biphasic T-waves in I, II, aVL, V4, V5, V6    ECG #2 (29:? :?)    - NSR at 62 bpm, QTc = 434. Biphasic T-waves in I, II, aVL, aVF, V4, V5, V6    ========================================================================

## 2021-06-28 NOTE — ED ADULT NURSE NOTE - OBJECTIVE STATEMENT
pt received to room 15, a&ox 4, ambulatory with assistance , pmh of Diabetes type II, HTN, right mastectomy. Pt C/O of SOB on orthopnea, SOB on exertion. Per pt SOB with palpitation started last night. No use of accessory muscles noted. No respiratory distress noted. C/O Left calf pain. Pt noted with Bilateral legs edema.  Pt breathing even and unlabored with O2 sat 100% room air. Pt on cardiac monitor. Denies fever, chills, cough,  chest pain, N/V/D, constipation. IV placed. Labs collected and sent. EKG in chart. pending . pt received to room 15, a&ox 4, ambulatory with assistance , pmh of Diabetes type II, HTN, Verbalized right mastectomy. Pt C/O of SOB on orthopnea, SOB on exertion. Per pt SOB with palpitation started last night. No use of accessory muscles noted. No respiratory distress noted. C/O Left calf pain. Pt noted with Bilateral legs edema.  Pt breathing even and unlabored with O2 sat 100% room air. Pt on cardiac monitor. Denies fever, chills, cough, chest pain, N/V/D, constipation. labs pending. X-ray pending. Safety maintained. Call bell and personal belongings within reach. Will continue to monitor. pt received to room 15, a&ox 4, ambulatory with assistance , pmh of Diabetes type II, HTN, Verbalized right mastectomy. Per Pt, Mastectomy was many years ago and she was notified by her MD that both arms can be used. Pt C/O of SOB on orthopnea, SOB on exertion. Per pt SOB with palpitation started last night. No use of accessory muscles noted. No respiratory distress noted. C/O Left calf pain . Pt noted with Bilateral legs edema with weak pulses.  Pt breathing even and unlabored with O2 sat 100% room air. Pt on cardiac monitor. Denies fever, chills, cough, chest pain, N/V/D, constipation. labs pending. X-ray pending. Safety maintained. Call bell and personal belongings within reach. Will continue to monitor.

## 2021-06-28 NOTE — H&P ADULT - NSHPSOCIALHISTORY_GEN_ALL_CORE
SOCIAL HISTORY:    Marital Status:  (  )    (  ) Single        (  )        (  )        (  )   Occupation:   Lives with:        (  ) Alone       (  ) Spouse      (  ) Children        (  ) Parents           (  ) Other    No history of smoking  No history of alcohol abuse  No history of illegal drug use SOCIAL HISTORY:    Marital Status:  (  )    (  ) Single        (  )        (  )        ( x )   Occupation:   Lives with:        ( x ) Alone [Lives alone in the basement and family lives upstairs]      (  ) Spouse      (  ) Children        (  ) Parents           (  ) Other    History of smoking; quit in ~ 2011 after smoking up to 2 packs per day since ~ age 19 years  No history of alcohol abuse  No history of illegal drug use

## 2021-06-28 NOTE — ED PROVIDER NOTE - NS ED ROS FT
Gen: No F/C/NS  Eyes: No changes in vision    Resp: No cough. +trouble breathing  Cardiovascular: No chest pain. +palpitation  Gastroenteric: No N/V/D  :  No change in urine output, dysuria or hematuria   MS: +leg pain   Neuro: No headache; no abnormal movements

## 2021-06-28 NOTE — H&P ADULT - NSICDXPASTSURGICALHX_GEN_ALL_CORE_FT
PAST SURGICAL HISTORY:  H/O  section     History of esophagogastroduodenoscopy (EGD)     S/P angiogram of extremity     S/P coronary artery stent placement x2 in

## 2021-06-28 NOTE — H&P ADULT - PROBLEM SELECTOR PLAN 1
- presented w/ shortness of breath, HARPER, orthopnea  - pro-BNP = 29961.  Troponin = 27  - ECGs as noted above  - CXR w/ enlarged cardiac silhouette, no gross pleural effusion, calcification of the aortic bulb (personally reviewed)  - reportedly discontinued from furosemide, by Provider, approximately 2 to 3 weeks ago  - s/p furosemide 40 mg IV x 2 in the ED; continuing for now (may need increased dosing due to CKD)  - continuing on B-blocker (reports on 50 mg, but patient unsure of meds.  Prescribed 25 mg for now)  - prescribe also statin.  No ACEi, ARB due to renal function  - intake/output, weight daily, HOB elevation  - f/u AM lab-work, including TSH level  - would obtain outpatient TTE; if not available, would order In-house TTE  - continues on Telemetry monitoring  - Cardiologist resuming care in the AM

## 2021-06-28 NOTE — H&P ADULT - PROBLEM SELECTOR PLAN 3
- potassium = 6.8; non-hemolyzed  - in the setting of CKD  - prescribed diuretic and insulin in the ED  - Lokelma and Admelog added by House staff  - ECGs as above  - f/u BMP > 1 hour after Lokelma and treat as per potassium level

## 2021-06-28 NOTE — H&P ADULT - NSHPREVIEWOFSYSTEMS_GEN_ALL_CORE
REVIEW OF SYSTEMS:    CONSTITUTIONAL: No weakness, fever, chills or sweating  EYES/ENT: No visual changes.  No dysphagia  NECK: No pain or stiffness  RESPIRATORY: No cough or hemoptysis.  No shortness of breath  CARDIOVASCULAR: No chest pain or palpitation.  No lower extremity edema  GASTROINTESTINAL: No epigastric or abdominal pain. No nausea, vomiting or hematemesis.  No diarrhea or constipation. No melena or hematochezia.  GENITOURINARY: No dysuria, frequency or hematuria  MUSCULOSKELETAL: No joint pain, swelling, decreased ROM, erythema, warmth  NEUROLOGICAL: No numbness or weakness  PSYCHIATRY: No anxiety, or depression.  SKIN: No itching, burning, rashes, or lesions   All other review of systems is negative unless indicated above. REVIEW OF SYSTEMS:    CONSTITUTIONAL: No weakness, fever, chills or sweating  EYES/ENT: No visual changes.  No dysphagia  NECK: No pain or stiffness  RESPIRATORY: Shortness of breath, HARPER.  No cough or hemoptysis.  CARDIOVASCULAR: (+) orthopnea.  Uses 2 pillows.  No chest pain or palpitation.  Longstanding B/L lower extremity edema  GASTROINTESTINAL: No epigastric or abdominal pain. No nausea, vomiting or hematemesis.  Occasional constipation; relieved with bowel regimen - including senna.  No melena or hematochezia.  GENITOURINARY: No dysuria, frequency or hematuria  MUSCULOSKELETAL: Uses walker at baseline.  Edema of B/L LE - longstanding.  No erythema, warmth  NEUROLOGICAL: Cramping, numbness and pain of B/L upper and LE - most pronounced in the LUE (associated w/ DM, per patient).  PSYCHIATRY: No anxiety, or depression.  SKIN: No itching, burning, rashes, or lesions   All other review of systems is negative unless indicated above.

## 2021-06-28 NOTE — H&P ADULT - PROBLEM SELECTOR PLAN 5
- BUN/Cr = 46/3.46 (47/3.2 in 11/2020)  - followed by [facility] - LISA Myers / LISA Gates / ADAM Jackson/ LISA Tsang/ LISA Torres/ JULIO Chavez / ALEXANDRA Clinton / CAITIE Lanza [saw Dr Gates at lasts admission)  - f/u renal function, electrolytes  - limit medications that are dependent on kidney function

## 2021-06-28 NOTE — ED PROVIDER NOTE - CLINICAL SUMMARY MEDICAL DECISION MAKING FREE TEXT BOX
Nikunj Tsang is a 80 year old woman with a history of CAD s/p stents on ASA/plavix, T2DM, HTN, prior GI bleeding/duodenal ulcer who presents with palpitations and SOB. BL LE edema to knees. Lower lung bases with crackles. Plan: basic blood work, lasix, CXR, ECG, reassess.

## 2021-06-29 ENCOUNTER — TRANSCRIPTION ENCOUNTER (OUTPATIENT)
Age: 81
End: 2021-06-29

## 2021-06-29 DIAGNOSIS — Z79.899 OTHER LONG TERM (CURRENT) DRUG THERAPY: ICD-10-CM

## 2021-06-29 DIAGNOSIS — E11.42 TYPE 2 DIABETES MELLITUS WITH DIABETIC POLYNEUROPATHY: ICD-10-CM

## 2021-06-29 LAB
A1C WITH ESTIMATED AVERAGE GLUCOSE RESULT: 7.9 % — HIGH (ref 4–5.6)
ANION GAP SERPL CALC-SCNC: 14 MMOL/L — SIGNIFICANT CHANGE UP (ref 7–14)
ANION GAP SERPL CALC-SCNC: 15 MMOL/L — HIGH (ref 7–14)
BUN SERPL-MCNC: 44 MG/DL — HIGH (ref 7–23)
BUN SERPL-MCNC: 46 MG/DL — HIGH (ref 7–23)
CALCIUM SERPL-MCNC: 7.9 MG/DL — LOW (ref 8.4–10.5)
CALCIUM SERPL-MCNC: 8.3 MG/DL — LOW (ref 8.4–10.5)
CHLORIDE SERPL-SCNC: 106 MMOL/L — SIGNIFICANT CHANGE UP (ref 98–107)
CHLORIDE SERPL-SCNC: 107 MMOL/L — SIGNIFICANT CHANGE UP (ref 98–107)
CO2 SERPL-SCNC: 17 MMOL/L — LOW (ref 22–31)
CO2 SERPL-SCNC: 18 MMOL/L — LOW (ref 22–31)
COVID-19 SPIKE DOMAIN AB INTERP: POSITIVE
COVID-19 SPIKE DOMAIN ANTIBODY RESULT: 99.3 U/ML — HIGH
CREAT SERPL-MCNC: 3.28 MG/DL — HIGH (ref 0.5–1.3)
CREAT SERPL-MCNC: 3.32 MG/DL — HIGH (ref 0.5–1.3)
ESTIMATED AVERAGE GLUCOSE: 180 — SIGNIFICANT CHANGE UP
FERRITIN SERPL-MCNC: 19 NG/ML — SIGNIFICANT CHANGE UP (ref 15–150)
GLUCOSE SERPL-MCNC: 134 MG/DL — HIGH (ref 70–99)
GLUCOSE SERPL-MCNC: 204 MG/DL — HIGH (ref 70–99)
HCT VFR BLD CALC: 28.1 % — LOW (ref 34.5–45)
HGB BLD-MCNC: 7.9 G/DL — LOW (ref 11.5–15.5)
IRON SATN MFR SERPL: 26 UG/DL — LOW (ref 30–160)
IRON SATN MFR SERPL: 9 % — LOW (ref 14–50)
MAGNESIUM SERPL-MCNC: 2.2 MG/DL — SIGNIFICANT CHANGE UP (ref 1.6–2.6)
MAGNESIUM SERPL-MCNC: 2.2 MG/DL — SIGNIFICANT CHANGE UP (ref 1.6–2.6)
MCHC RBC-ENTMCNC: 26 PG — LOW (ref 27–34)
MCHC RBC-ENTMCNC: 28.1 GM/DL — LOW (ref 32–36)
MCV RBC AUTO: 92.4 FL — SIGNIFICANT CHANGE UP (ref 80–100)
NRBC # BLD: 0 /100 WBCS — SIGNIFICANT CHANGE UP
NRBC # FLD: 0 K/UL — SIGNIFICANT CHANGE UP
NT-PROBNP SERPL-SCNC: HIGH PG/ML
PHOSPHATE SERPL-MCNC: 4.8 MG/DL — HIGH (ref 2.5–4.5)
PHOSPHATE SERPL-MCNC: 5.5 MG/DL — HIGH (ref 2.5–4.5)
PLATELET # BLD AUTO: 243 K/UL — SIGNIFICANT CHANGE UP (ref 150–400)
POTASSIUM SERPL-MCNC: 5.7 MMOL/L — HIGH (ref 3.5–5.3)
POTASSIUM SERPL-MCNC: 5.8 MMOL/L — HIGH (ref 3.5–5.3)
POTASSIUM SERPL-SCNC: 5.7 MMOL/L — HIGH (ref 3.5–5.3)
POTASSIUM SERPL-SCNC: 5.8 MMOL/L — HIGH (ref 3.5–5.3)
RBC # BLD: 3.04 M/UL — LOW (ref 3.8–5.2)
RBC # BLD: 3.04 M/UL — LOW (ref 3.8–5.2)
RBC # FLD: 16.9 % — HIGH (ref 10.3–14.5)
RETICS #: 83.9 K/UL — SIGNIFICANT CHANGE UP (ref 25–125)
RETICS/RBC NFR: 2.8 % — HIGH (ref 0.5–2.5)
SARS-COV-2 IGG+IGM SERPL QL IA: 99.3 U/ML — HIGH
SARS-COV-2 IGG+IGM SERPL QL IA: POSITIVE
SARS-COV-2 RNA SPEC QL NAA+PROBE: SIGNIFICANT CHANGE UP
SODIUM SERPL-SCNC: 138 MMOL/L — SIGNIFICANT CHANGE UP (ref 135–145)
SODIUM SERPL-SCNC: 139 MMOL/L — SIGNIFICANT CHANGE UP (ref 135–145)
TIBC SERPL-MCNC: 290 UG/DL — SIGNIFICANT CHANGE UP (ref 220–430)
TROPONIN T, HIGH SENSITIVITY RESULT: 27 NG/L — SIGNIFICANT CHANGE UP
TSH SERPL-MCNC: 1.37 UIU/ML — SIGNIFICANT CHANGE UP (ref 0.27–4.2)
UIBC SERPL-MCNC: 264 UG/DL — SIGNIFICANT CHANGE UP (ref 110–370)
WBC # BLD: 4.8 K/UL — SIGNIFICANT CHANGE UP (ref 3.8–10.5)
WBC # FLD AUTO: 4.8 K/UL — SIGNIFICANT CHANGE UP (ref 3.8–10.5)

## 2021-06-29 RX ORDER — DEXTROSE 50 % IN WATER 50 %
25 SYRINGE (ML) INTRAVENOUS ONCE
Refills: 0 | Status: DISCONTINUED | OUTPATIENT
Start: 2021-06-29 | End: 2021-06-29

## 2021-06-29 RX ORDER — IRON SUCROSE 20 MG/ML
200 INJECTION, SOLUTION INTRAVENOUS EVERY 24 HOURS
Refills: 0 | Status: DISCONTINUED | OUTPATIENT
Start: 2021-06-29 | End: 2021-06-29

## 2021-06-29 RX ORDER — DEXTROSE 50 % IN WATER 50 %
15 SYRINGE (ML) INTRAVENOUS ONCE
Refills: 0 | Status: DISCONTINUED | OUTPATIENT
Start: 2021-06-29 | End: 2021-06-29

## 2021-06-29 RX ORDER — INSULIN LISPRO 100/ML
VIAL (ML) SUBCUTANEOUS
Refills: 0 | Status: DISCONTINUED | OUTPATIENT
Start: 2021-06-29 | End: 2021-06-29

## 2021-06-29 RX ORDER — FUROSEMIDE 40 MG
80 TABLET ORAL DAILY
Refills: 0 | Status: DISCONTINUED | OUTPATIENT
Start: 2021-06-30 | End: 2021-06-29

## 2021-06-29 RX ORDER — SEVELAMER CARBONATE 2400 MG/1
1 POWDER, FOR SUSPENSION ORAL
Qty: 90 | Refills: 0
Start: 2021-06-29 | End: 2021-07-28

## 2021-06-29 RX ORDER — FUROSEMIDE 40 MG
1 TABLET ORAL
Qty: 30 | Refills: 0
Start: 2021-06-29 | End: 2021-07-28

## 2021-06-29 RX ORDER — INSULIN LISPRO 100/ML
VIAL (ML) SUBCUTANEOUS AT BEDTIME
Refills: 0 | Status: DISCONTINUED | OUTPATIENT
Start: 2021-06-29 | End: 2021-06-29

## 2021-06-29 RX ORDER — SODIUM ZIRCONIUM CYCLOSILICATE 10 G/10G
5 POWDER, FOR SUSPENSION ORAL DAILY
Refills: 0 | Status: DISCONTINUED | OUTPATIENT
Start: 2021-06-29 | End: 2021-06-29

## 2021-06-29 RX ORDER — ATORVASTATIN CALCIUM 80 MG/1
40 TABLET, FILM COATED ORAL AT BEDTIME
Refills: 0 | Status: DISCONTINUED | OUTPATIENT
Start: 2021-06-29 | End: 2021-06-29

## 2021-06-29 RX ORDER — GLUCAGON INJECTION, SOLUTION 0.5 MG/.1ML
1 INJECTION, SOLUTION SUBCUTANEOUS ONCE
Refills: 0 | Status: DISCONTINUED | OUTPATIENT
Start: 2021-06-29 | End: 2021-06-29

## 2021-06-29 RX ORDER — SODIUM ZIRCONIUM CYCLOSILICATE 10 G/10G
5 POWDER, FOR SUSPENSION ORAL ONCE
Refills: 0 | Status: COMPLETED | OUTPATIENT
Start: 2021-06-29 | End: 2021-06-29

## 2021-06-29 RX ORDER — METOPROLOL TARTRATE 50 MG
25 TABLET ORAL EVERY 12 HOURS
Refills: 0 | Status: DISCONTINUED | OUTPATIENT
Start: 2021-06-29 | End: 2021-06-29

## 2021-06-29 RX ORDER — HEPARIN SODIUM 5000 [USP'U]/ML
5000 INJECTION INTRAVENOUS; SUBCUTANEOUS EVERY 8 HOURS
Refills: 0 | Status: DISCONTINUED | OUTPATIENT
Start: 2021-06-29 | End: 2021-06-29

## 2021-06-29 RX ORDER — PANTOPRAZOLE SODIUM 20 MG/1
40 TABLET, DELAYED RELEASE ORAL
Refills: 0 | Status: DISCONTINUED | OUTPATIENT
Start: 2021-06-29 | End: 2021-06-29

## 2021-06-29 RX ORDER — FERROUS SULFATE 325(65) MG
325 TABLET ORAL DAILY
Refills: 0 | Status: DISCONTINUED | OUTPATIENT
Start: 2021-06-29 | End: 2021-06-29

## 2021-06-29 RX ORDER — SODIUM CHLORIDE 9 MG/ML
1000 INJECTION, SOLUTION INTRAVENOUS
Refills: 0 | Status: DISCONTINUED | OUTPATIENT
Start: 2021-06-29 | End: 2021-06-29

## 2021-06-29 RX ORDER — SENNA PLUS 8.6 MG/1
2 TABLET ORAL AT BEDTIME
Refills: 0 | Status: DISCONTINUED | OUTPATIENT
Start: 2021-06-29 | End: 2021-06-29

## 2021-06-29 RX ORDER — ASPIRIN/CALCIUM CARB/MAGNESIUM 324 MG
81 TABLET ORAL DAILY
Refills: 0 | Status: DISCONTINUED | OUTPATIENT
Start: 2021-06-29 | End: 2021-06-29

## 2021-06-29 RX ORDER — AMLODIPINE BESYLATE 2.5 MG/1
10 TABLET ORAL DAILY
Refills: 0 | Status: DISCONTINUED | OUTPATIENT
Start: 2021-06-29 | End: 2021-06-29

## 2021-06-29 RX ORDER — GABAPENTIN 400 MG/1
300 CAPSULE ORAL AT BEDTIME
Refills: 0 | Status: DISCONTINUED | OUTPATIENT
Start: 2021-06-29 | End: 2021-06-29

## 2021-06-29 RX ORDER — SEVELAMER CARBONATE 2400 MG/1
800 POWDER, FOR SUSPENSION ORAL
Refills: 0 | Status: DISCONTINUED | OUTPATIENT
Start: 2021-06-29 | End: 2021-06-29

## 2021-06-29 RX ORDER — FERROUS SULFATE 325(65) MG
1 TABLET ORAL
Qty: 30 | Refills: 0
Start: 2021-06-29 | End: 2021-07-28

## 2021-06-29 RX ORDER — SENNA PLUS 8.6 MG/1
2 TABLET ORAL
Qty: 28 | Refills: 0
Start: 2021-06-29 | End: 2021-07-12

## 2021-06-29 RX ORDER — DEXTROSE 50 % IN WATER 50 %
12.5 SYRINGE (ML) INTRAVENOUS ONCE
Refills: 0 | Status: DISCONTINUED | OUTPATIENT
Start: 2021-06-29 | End: 2021-06-29

## 2021-06-29 RX ORDER — SODIUM ZIRCONIUM CYCLOSILICATE 10 G/10G
5 POWDER, FOR SUSPENSION ORAL
Qty: 75 | Refills: 0
Start: 2021-06-29 | End: 2021-07-28

## 2021-06-29 RX ADMIN — ATORVASTATIN CALCIUM 40 MILLIGRAM(S): 80 TABLET, FILM COATED ORAL at 01:14

## 2021-06-29 RX ADMIN — AMLODIPINE BESYLATE 10 MILLIGRAM(S): 2.5 TABLET ORAL at 05:38

## 2021-06-29 RX ADMIN — SODIUM ZIRCONIUM CYCLOSILICATE 5 GRAM(S): 10 POWDER, FOR SUSPENSION ORAL at 09:25

## 2021-06-29 RX ADMIN — Medication 6: at 12:25

## 2021-06-29 RX ADMIN — GABAPENTIN 300 MILLIGRAM(S): 400 CAPSULE ORAL at 01:14

## 2021-06-29 RX ADMIN — PANTOPRAZOLE SODIUM 40 MILLIGRAM(S): 20 TABLET, DELAYED RELEASE ORAL at 05:37

## 2021-06-29 RX ADMIN — SODIUM ZIRCONIUM CYCLOSILICATE 5 GRAM(S): 10 POWDER, FOR SUSPENSION ORAL at 03:21

## 2021-06-29 RX ADMIN — Medication 81 MILLIGRAM(S): at 09:25

## 2021-06-29 RX ADMIN — HEPARIN SODIUM 5000 UNIT(S): 5000 INJECTION INTRAVENOUS; SUBCUTANEOUS at 05:38

## 2021-06-29 RX ADMIN — Medication 325 MILLIGRAM(S): at 09:25

## 2021-06-29 RX ADMIN — Medication 40 MILLIGRAM(S): at 05:38

## 2021-06-29 RX ADMIN — SEVELAMER CARBONATE 800 MILLIGRAM(S): 2400 POWDER, FOR SUSPENSION ORAL at 09:25

## 2021-06-29 NOTE — CONSULT NOTE ADULT - TIME BILLING
agree with the above assessment and plan by KARMA Scott.  81 year old female, with past history significant for CAD - s/p Stents, Paroxysmal A-fib, HLD, Type-2 DM, Neuropathy, GIB, Duodenal ulcer, PVD (s/p stents x 2 - 11/2019), and Gait difficulty (uses walker) presented to the ED secondary to palpitations and shortness of breath.  -vol status improved with IVP lasix  -CXR w clear lungs   -agree with renal to transition lasix to PO 80 mg QD  -pt reported recent outpt echo/nst with no sig findings  -cont bb   No objection to DC and followup w Dr Costa

## 2021-06-29 NOTE — DISCHARGE NOTE PROVIDER - CARE PROVIDER_API CALL
Chris Rivera)  Cardiology  [address], [address]  [place] [address]  Phone: [phone]  Fax: [phone]  Follow Up Time:     Reid Solis)  Internal Medicine; Nephrology  [address], [address]  [place] [address]  Phone: [phone]  Fax: [phone]  Follow Up Time:

## 2021-06-29 NOTE — DISCHARGE NOTE PROVIDER - HOSPITAL COURSE
82 yo woman w h/o CAD, s/p PCI on Plavix/ASA, CKD3-4, baseline Scr 3, DM2, DM neuropathy, HLD, HTN, bleeding DU in 11/2020, Anemia, PAF( not on AC prob 2/2 GI bledd), presents w sudden onset dyspnea which started 6/27, ptn endorses HARPER, Orthopnea, LE edema. ptn stopped taking Lasix x2weeks.      Acute on chronic diastolic (congestive) heart failure.    - presented w/ shortness of breath, HARPER, orthopnea  - pro-BNP = 25462.  Troponin = 27  - ECGs as noted above  - CXR w/ enlarged cardiac silhouette, no gross pleural effusion, calcification of the aortic bulb (personally reviewed)  - reportedly discontinued from furosemide, by Provider, approximately 2 to 3 weeks ago  - s/p furosemide 40 mg IV x 2 in the ED; continuing for now (may need increased dosing due to CKD)  - continuing on B-blocker (reports on 50 mg, but patient unsure of meds.  Prescribed 25 mg for now)  - prescribe also statin.  No ACEi, ARB due to renal function  - intake/output, weight daily, HOB elevation    Dyspnea on exertion.    - in the setting of being fluid overloaded due to acute on chronic CHF as sequela of discontinuation of furosemide  - also could be complicated by anemia (Hgb = 7.1; although his could be dilutional)  - should improve as optimal diuresis achieved  - ambulate with walker, as tolerated  - Physical therapy    Hyperkalemia.    - potassium = 6.8; non-hemolyzed  - in the setting of CKD  - prescribed diuretic and insulin in the ED  - Lokelma and Admelog added   - ECGs as above  - repeat K 5.7 after medications, continue with lokelma 5mg daily     Type 2 diabetes mellitus with diabetic polyneuropathy, unspecified whether long term insulin use.   - glucose = 128 on CMP FS = 252 --> 224 --> 215  - reports non-compliance w/ Lantus 10 units (has not taken same in a "long time."  Self discontinued  - states finger-sticks range 120 to 130 at home  - last A1c = 6.1 on 10/31/2020  - Consistent carb diet  - m-ISS per FS  - evaluate for resumption of Lantus, or possibly giving oral medications upon discharge - to encourage medication compliance.   -A1c- 7.9    Stage 5 chronic kidney disease not on chronic dialysis.   - BUN/Cr = 46/3.46 (47/3.2 in 11/2020)  - followed by [facility] - LISA Myers / LISA Gates / ADAM Jackson/ LISA Tsang/ LISA Torres/ JULIO Chavez / ALEXANDRA Clinton / CAITIE Lanza [saw Dr Gates at lasts admission)  - limit medications that are dependent on kidney function.     stable for discharge on oral lasix 80mg daily and follow up with outpatient cardiologist within 1 week. Follow up with nephrologist outpatient within 1 week. d/w Dr. Rivera, renal cleared patient for discharge.  82 yo woman w h/o CAD, s/p PCI on Plavix/ASA, CKD3-4, baseline Scr 3, DM2, DM neuropathy, HLD, HTN, bleeding DU in 11/2020, Anemia, PAF( not on AC prob 2/2 GI bledd), presents w sudden onset dyspnea which started 6/27, ptn endorses HARPER, Orthopnea, LE edema. ptn stopped taking Lasix x2weeks.      Acute on chronic diastolic (congestive) heart failure.    - presented w/ shortness of breath, HARPER, orthopnea  - pro-BNP = 13332.  Troponin = 27  - ECGs as noted above  - CXR w/ enlarged cardiac silhouette, no gross pleural effusion, calcification of the aortic bulb (personally reviewed)  - reportedly discontinued from furosemide, by Provider, approximately 2 to 3 weeks ago  - s/p furosemide 40 mg IV x 2 in the ED; continuing for now (may need increased dosing due to CKD)  - continuing on B-blocker (reports on 50 mg, but patient unsure of meds.  Prescribed 25 mg for now)  - prescribe also statin.  No ACEi, ARB due to renal function  - intake/output, weight daily, HOB elevation    Dyspnea on exertion.    - in the setting of being fluid overloaded due to acute on chronic CHF as sequela of discontinuation of furosemide  - also could be complicated by anemia (Hgb = 7.1; although his could be dilutional)  - should improve as optimal diuresis achieved  - ambulate with walker, as tolerated  - Physical therapy    Hyperkalemia.    - potassium = 6.8; non-hemolyzed  - in the setting of CKD  - prescribed diuretic and insulin in the ED  - Lokelma and Admelog added   - ECGs as above  - repeat K 5.7 after medications, continue with lokelma 5mg daily     Type 2 diabetes mellitus with diabetic polyneuropathy, unspecified whether long term insulin use.   - glucose = 128 on CMP FS = 252 --> 224 --> 215  - reports non-compliance w/ Lantus 10 units (has not taken same in a "long time."  Self discontinued  - states finger-sticks range 120 to 130 at home  - last A1c = 6.1 on 10/31/2020  - Consistent carb diet  - m-ISS per FS  - evaluate for resumption of Lantus, or possibly giving oral medications upon discharge - to encourage medication compliance.   -A1c- 7.9    Stage 5 chronic kidney disease not on chronic dialysis.   - BUN/Cr = 46/3.46 (47/3.2 in 11/2020)  - [place] [facility] - LIAS Myers / LISA Gates / ADAM Jackson/ LISA Tsang/ LISA Torres/ JULIO Chavez / ALEXANDRA Clinton / CAITIE Lanza [saw Dr Gates at lasts admission)  - limit medications that are dependent on kidney function.     stable for discharge on oral lasix 80mg daily and lokelma 5mg daily and follow up with outpatient cardiologist within 1 week. Follow up with nephrologist outpatient within 1 week. d/w Dr. Rivera, renal cleared patient for discharge.

## 2021-06-29 NOTE — DISCHARGE NOTE NURSING/CASE MANAGEMENT/SOCIAL WORK - PATIENT PORTAL LINK FT
You can access the FollowMyHealth Patient Portal offered by [facility] by registering at the following website: [email or web address]. By joining [facility]’s FollowMyHealth portal, you will also be able to view your health information using other applications (apps) compatible with our system.

## 2021-06-29 NOTE — PHYSICAL THERAPY INITIAL EVALUATION ADULT - DISCHARGE DISPOSITION, PT EVAL
Anticipate no skilled PT needs upon discharge. Pt would benefit from PT in hospital to improve safety and functional mobility prior to discharge.

## 2021-06-29 NOTE — DISCHARGE NOTE PROVIDER - NSDCMRMEDTOKEN_GEN_ALL_CORE_FT
amLODIPine 10 mg oral tablet: 1 tab(s) orally once a day  aspirin 81 mg oral delayed release tablet: 1 tab(s) orally once a day  atorvastatin 40 mg oral tablet: 1 tab(s) orally once a day (at bedtime)  ferrous sulfate 325 mg (65 mg elemental iron) oral tablet: 1 tab(s) orally once a day  furosemide 80 mg oral tablet: 1 tab(s) orally once a day  gabapentin 300 mg oral capsule: 1 cap(s) orally once a day  Lantus 100 units/mL subcutaneous solution: 6 unit(s) subcutaneous once a day (at bedtime)  metoprolol succinate 25 mg oral tablet, extended release: 1 tab(s) orally once a day  Outpatient Physical Therapy 3 times per week for 4 weeks [id number]  :   pantoprazole 40 mg oral delayed release tablet: 1 tab(s) orally once a day (before a meal)  senna oral tablet: 2 tab(s) orally once a day (at bedtime)  sevelamer carbonate 800 mg oral tablet: 1 tab(s) orally 3 times a day (with meals)   amLODIPine 10 mg oral tablet: 1 tab(s) orally once a day  aspirin 81 mg oral delayed release tablet: 1 tab(s) orally once a day  atorvastatin 40 mg oral tablet: 1 tab(s) orally once a day (at bedtime)  ferrous sulfate 325 mg (65 mg elemental iron) oral tablet: 1 tab(s) orally once a day  furosemide 80 mg oral tablet: 1 tab(s) orally once a day  gabapentin 300 mg oral capsule: 1 cap(s) orally once a day  Lantus 100 units/mL subcutaneous solution: 6 unit(s) subcutaneous once a day (at bedtime)  metoprolol succinate 25 mg oral tablet, extended release: 1 tab(s) orally once a day  Outpatient Physical Therapy 3 times per week for 4 weeks [id number]  :   pantoprazole 40 mg oral delayed release tablet: 1 tab(s) orally once a day (before a meal)  senna oral tablet: 2 tab(s) orally once a day (at bedtime)  sevelamer carbonate 800 mg oral tablet: 1 tab(s) orally 3 times a day (with meals)  sodium zirconium cyclosilicate 5 g oral powder for reconstitution: 5 gram(s) orally every other day

## 2021-06-29 NOTE — CONSULT NOTE ADULT - SUBJECTIVE AND OBJECTIVE BOX
CARDIOLOGY CONSULT - Dr. Rivera         HPI:  81 year old female, with past history significant for CAD - s/p Stents, Paroxysmal A-fib, HLD, Type-2 DM, Neuropathy, GIB, Duodenal ulcer, PVD (s/p stents x 2 - 2019), and Gait difficulty (uses walker) presented to the ED secondary to palpitations and shortness of breath.  Seen and evaluated at bedside; NAD.  Patient relates shortness of breath, orthopnea (despite using 2 pillows) and continued swelling of B/L lower extremities.  Indicated lack of sleep overnight due to difficulty breathing.  Unable to ambulate for any significant distance because of shortness of breath.  States furosemide 40 mg was discontinued approximately 2 to 3 weeks ago by a cardiology provider at [facility].  Comments on confusion relative to being told at times to increase fluid intake and at other times to desist.  No associated chest pain, palpitations, headache, dizziness reported.  Relative to the Type-2 DM, patient has self discontinued Lantus 10 units due to frustration re multiple health issues.  However, patient reports adhering to a low carbohydrate diet.  Finger-stick at home reportedly 120 to 130.    Vital signs upon ED presentation as follows: BP = 143/47, HR = 69, RR = 18, T = 37 C (98.6 F), O2 Sat = 96% on RA.  Potassium = 6.8 (non-hemolyzed).  Pro-BNP = 36665.  Diagnosed with Dyspnea and Hyperkalemia.  Prescribed Admelog 5 units IV, Lasix 40 mg IV x 2 and Lokelma 10 gram in the ED.     Of note most recent echo noted with Mild MR, grossly mild global lv sys dysfx, min TR.  She reports recent echo and NST with outpt cardio. Currently reports much improvement with sob and le edema s/p IV lasix.  ROS otherwise neg         PAST MEDICAL & SURGICAL HISTORY:  HLD (hyperlipidemia)    Diabetic neuropathy    CAD (coronary artery disease)  ~ s/p MICHAEL x2 in 2017    Paroxysmal atrial fibrillation    Diabetes mellitus, type 2    GI bleed    Neuropathy    Gastritis    Diastolic heart failure    Transfusion history    Stage 5 chronic kidney disease not on chronic dialysis    Anemia    PVD (peripheral vascular disease)  ~ RLE angiogram with 2 stents placed in 2019.    H/O  section    S/P coronary artery stent placement  x2 in     History of esophagogastroduodenoscopy (EGD)    S/P angiogram of extremity            PREVIOUS DIAGNOSTIC TESTING:    [ x] Echocardiogram:   < from: TTE with Doppler (w/Cont) (20 @ 14:15) >  OBSERVATIONS:  Mitral Valve: Mitral annular calcification, otherwise  normal mitral valve. Mild mitral regurgitation.  Aortic Root: Normal aortic root.  Aortic Valve: Calcified trileaflet aortic valve with normal  opening.  Left Atrium: Normal left atrium.  Left Ventricle: Endocardium not well visualized; grossly  mild global left ventricular systolic dysfunction.  Endocardial visualization enhanced with intravenous  injection of echo contrast (Definity).  Right Heart: Normal right atrium. The right ventricle is  not well visualized; grossly normal right ventricular  systolic function. Normal tricuspid valve.  Minimal  tricuspid regurgitation. Normal pulmonic valve. Mild  pulmonic regurgitation.  Pericardium/PleuraNormal pericardium with no pericardial  effusion.  ------------------------------------------------------------------------  CONCLUSIONS:  1. Mitral annular calcification, otherwise normal mitral  valve. Mild mitral regurgitation.  2. Endocardium not well visualized; grossly mild global  left ventricular systolic dysfunction.  Endocardial  visualization enhanced with intravenous injection of echo  contrast (Definity).  3. The right ventricle is not well visualized; grossly  normal right ventricular systolic function.    < end of copied text >    [ ]  Catheterization:  [ ] Stress Test:  	    MEDICATIONS:  Home Medications:  amLODIPine 10 mg oral tablet: 1 tab(s) orally once a day (31 Oct 2020 04:00)  aspirin 81 mg oral delayed release tablet: 1 tab(s) orally once a day (31 Oct 2020 04:00)  gabapentin 300 mg oral capsule: 1 cap(s) orally once a day (2020 13:24)  Lantus 100 units/mL subcutaneous solution: 6 unit(s) subcutaneous once a day (at bedtime) (2020 13:38)  metoprolol succinate 25 mg oral tablet, extended release: 1 tab(s) orally once a day (31 Oct 2020 04:00)      MEDICATIONS  (STANDING):  amLODIPine   Tablet 10 milliGRAM(s) Oral daily  aspirin enteric coated 81 milliGRAM(s) Oral daily  atorvastatin 40 milliGRAM(s) Oral at bedtime  dextrose 40% Gel 15 Gram(s) Oral once  dextrose 5%. 1000 milliLiter(s) (50 mL/Hr) IV Continuous <Continuous>  dextrose 5%. 1000 milliLiter(s) (100 mL/Hr) IV Continuous <Continuous>  dextrose 50% Injectable 25 Gram(s) IV Push once  dextrose 50% Injectable 12.5 Gram(s) IV Push once  dextrose 50% Injectable 25 Gram(s) IV Push once  ferrous    sulfate 325 milliGRAM(s) Oral daily  furosemide   Injectable 40 milliGRAM(s) IV Push every 12 hours  gabapentin 300 milliGRAM(s) Oral at bedtime  glucagon  Injectable 1 milliGRAM(s) IntraMuscular once  heparin   Injectable 5000 Unit(s) SubCutaneous every 8 hours  insulin lispro (ADMELOG) corrective regimen sliding scale   SubCutaneous three times a day before meals  insulin lispro (ADMELOG) corrective regimen sliding scale   SubCutaneous at bedtime  metoprolol tartrate 25 milliGRAM(s) Oral every 12 hours  pantoprazole    Tablet 40 milliGRAM(s) Oral before breakfast  senna 2 Tablet(s) Oral at bedtime  sevelamer carbonate 800 milliGRAM(s) Oral three times a day with meals  sodium zirconium cyclosilicate 5 Gram(s) Oral daily      FAMILY HISTORY:  FH: diabetes mellitus        SOCIAL HISTORY:    [ ] Non-smoker  [ ] Smoker  [ ] Alcohol    Allergies    Carrots (Rash)  No Known Drug Allergies    Intolerances    	    REVIEW OF SYSTEMS:  CONSTITUTIONAL: No fever, weight loss, or fatigue  EYES: No eye pain, visual disturbances, or discharge  ENMT:  No difficulty hearing, tinnitus, vertigo; No sinus or throat pain  NECK: No pain or stiffness  RESPIRATORY: No cough, wheezing, chills or hemoptysis; +Shortness of Breath  CARDIOVASCULAR: No chest pain, palpitations, passing out, dizziness, +leg swelling  GASTROINTESTINAL: No abdominal or epigastric pain. No nausea, vomiting, or hematemesis; No diarrhea or constipation. No melena or hematochezia.  GENITOURINARY: No dysuria, frequency, hematuria, or incontinence  NEUROLOGICAL: No headaches, memory loss, loss of strength, numbness, or tremors  SKIN: No itching, burning, rashes, or lesions   	    [x ] All others negative	see hpi   [ ] Unable to obtain    PHYSICAL EXAM:  T(C): 36.5 (21 @ 08:51), Max: 37.1 (21 @ 19:00)  HR: 56 (21 @ 08:51) (56 - 69)  BP: 126/53 (21 @ 08:51) (126/53 - 188/71)  RR: 17 (21 @ 08:51) (15 - 22)  SpO2: 96% (21 @ 08:51) (95% - 98%)  Wt(kg): --  I&O's Summary      Appearance: Normal	  Psychiatry: A & O x 3, Mood & affect appropriate  HEENT:   Normal oral mucosa, PERRL, EOMI	  Lymphatic: No lymphadenopathy  Cardiovascular: Normal S1 S2,RRR, No JVD, No murmurs  Respiratory: Lungs clear to auscultation	  Gastrointestinal:  Soft, Non-tender, + BS	  Skin: No rashes, No ecchymoses, No cyanosis	  Neurologic: Non-focal  Extremities: Normal range of motion, No clubbing, cyanosis or edema  Vascular: Peripheral pulses palpable 2+ bilaterally    TELEMETRY: NSR 	    ECG:  	NSR 72 - TWI V4-V6 unchanged from prior   RADIOLOGY:  < from: Xray Chest 2 Views PA/Lat (21 @ 18:37) >  FINDINGS:    No acute osseous abnormality. The cardiac silhouette is enlarged. The lungs are clear. There is no pleural effusion or pneumothorax.    IMPRESSION:    Clear lungs.    < end of copied text >    OTHER: 	  	  LABS:	 	    CARDIAC MARKERS:  Troponin T, High Sensitivity Result: 27 ng/L ( @ 06:22)  Troponin T, High Sensitivity Result: 27 ng/L ( @ 19:12)                                  7.9    4.80  )-----------( 243      ( 2021 06:22 )             28.1         138  |  106  |  44<H>  ----------------------------<  134<H>  5.7<H>   |  17<L>  |  3.28<H>    Ca    8.3<L>      2021 06:22  Phos  5.5       Mg     2.20         TPro  6.5  /  Alb  3.6  /  TBili  <0.2  /  DBili  x   /  AST  11  /  ALT  9   /  AlkPhos  193<H>        proBNP: Serum Pro-Brain Natriuretic Peptide: 49290 pg/mL ( @ 06:22)  Serum Pro-Brain Natriuretic Peptide: 27673 pg/mL ( @ 19:12)    Lipid Profile:   HgA1c:   TSH: Thyroid Stimulating Hormone, Serum: 1.37 uIU/mL ( @ 08:35)

## 2021-06-29 NOTE — CONSULT NOTE ADULT - SUBJECTIVE AND OBJECTIVE BOX
HPI: Ms. Tsang is a 81 year-old woman with history of multiple medical issues including hypertension, type 2 diabetes mellitus, coronary artery disease, diastolic congestive heart failure, peripheral arterial disease, and chronic kidney disease. She presented last night to the [facility] ER with palpitations and progressively worsening shortness of breath and leg swelling. She shares that she discontinued Lasix 40mg daily as of 2-3 weeks ago. Her serum potassium in the ER was 6.8meq/L. She received Lasix 40mg iv x 2, Lokelma 10gm po x 1, and Admelog 5 units IV in the ER. Her serum potassium today is down to 5.7meq/L.    Given Ms. Tsang's hyperkalemia and azotemia, a renal consultation was requested.        PAST MEDICAL & SURGICAL HISTORY:  HLD (hyperlipidemia)  Diabetes mellitus, type 2  Diabetic neuropathy  CAD (coronary artery disease) ~ s/p MICHAEL x2 in 2017  Diastolic heart failure  Paroxysmal atrial fibrillation  GI bleed - gastritis  Stage 5 chronic kidney disease not on chronic dialysis  Anemia  PVD (peripheral vascular disease) ~ RLE angiogram with 2 stents placed in 2019.  H/O  section    Allergies  Carrots (Rash)    SOCIAL HISTORY:  Denies ETOh,Smoking,     FAMILY HISTORY:  FH: diabetes mellitus    REVIEW OF SYSTEMS:  CONSTITUTIONAL: No weakness, fevers or chills  EYES/ENT: No visual changes;  No vertigo or throat pain   NECK: No pain or stiffness  RESPIRATORY: No cough, wheezing, hemoptysis; (+) shortness of breath  CARDIOVASCULAR: No chest pain; (+)palpitations; (+)b/l leg swelling  GASTROINTESTINAL: No abdominal or epigastric pain. No nausea, vomiting, or hematemesis; No diarrhea or constipation. No melena or hematochezia.  GENITOURINARY: No dysuria, frequency or hematuria  NEUROLOGICAL: No numbness or weakness  SKIN: No itching, burning, rashes, or lesions   All other review of systems is negative unless indicated above.    VITAL:  T(C): , Max: 37.1 (21 @ 19:00)  T(F): , Max: 98.7 (21 @ 19:00)  HR: 59 (21 @ 05:29)  BP: 168/74 (21 @ 05:29)  RR: 18 (21 @ 05:29)  SpO2: 96% (21 @ 05:29)    PHYSICAL EXAM:  Constitutional: NAD, Alert  HEENT: NCAT, MMM  Neck: Supple, No JVD  Respiratory: CTA-b/l  Cardiovascular: RRR s1s2, no m/r/g  Gastrointestinal: BS+, soft, NT/ND  Extremities: No peripheral edema b/l  Neurological: no focal deficits; strength grossly intact  Back: no CVAT b/l  Skin: No rashes, no nevi    LABS:                        7.9    4.80  )-----------( 243      ( 2021 06:22 )             28.1     Na(138)/K(5.7)/Cl(106)/HCO3(17)/BUN(44)/Cr(3.28)Glu(134)/Ca(8.3)/Mg(2.20)/PO4(5.5)     @ 06:22  Na(139)/K(5.8)/Cl(107)/HCO3(18)/BUN(46)/Cr(3.32)Glu(204)/Ca(7.9)/Mg(2.20)/PO4(4.8)     @ 23:36  Na(140)/K(6.8)/Cl(109)/HCO3(23)/BUN(46)/Cr(3.46)Glu(128)/Ca(8.2)/Mg(--)/PO4(--)     @ 19:12    (2020) - BUN 47, Cr 3.20, K 3.8, HCO3 27      IMAGING:  < from: Xray Chest 2 Views PA/Lat (21 @ 18:37) >  Clear lungs.    < from: TTE with Doppler (w/Cont) (20 @ 14:15) >  1. Mitral annular calcification, otherwise normal mitral  valve. Mild mitral regurgitation.  2. Endocardium not well visualized; grossly mild global  left ventricular systolic dysfunction.  Endocardial  visualization enhanced with intravenous injection of echo  contrast (Definity).  3. The right ventricle is not well visualized; grossly  normal right ventricular systolic function.      ASSESSMENT:  (1)Renal - CKD4-5 - GFR ~15ml/min - due to diabetic nephropathy. At/near baseline GFR at present  (2)Hyperkalemia - severe, upon admission. K+ still high at present, but much improved relative to last night, with medical therapy.  (3)Metabolic acidosis - renally mediated   (4)Hyperphosphatemia - renally mediated  (5)Anemia - likely due to CKD  (6)CV - decompensated CHF upon admission    RECOMMEND:  (1)Low-K diet  (2)Lokelma 5gm po daily  (3)Renvela 800mg po tid with meals   (4)FeSO4 325mg po qd  (5)Lasix IV BID as ordered  (6)Amlodipine as ordered; defer to Cardiology regarding potential uptitration of beta-blockade  (7)Iron studies + PTH with a.m. labs   (8)Dose new meds for GFR 15ml/min  (9)No HD for now    Thank you for involving [facility] in this patient's care.    With warm regards,    Reid Solis MD   [facility]  Office: [phone]  Cell: [phone]               HPI: Ms. Tsang is a 81 year-old woman with history of multiple medical issues including hypertension, type 2 diabetes mellitus, coronary artery disease, diastolic congestive heart failure, peripheral arterial disease, and chronic kidney disease. She presented last night to the [facility] ER with palpitations and progressively worsening shortness of breath and leg swelling. She shares that she discontinued Lasix 40mg daily as of 2-3 weeks ago. Her serum potassium in the ER was 6.8meq/L. She received Lasix 40mg iv x 2, Lokelma 10gm po x 1, and Admelog 5 units IV in the ER. Her serum potassium today is down to 5.7meq/L.  Given Ms. Tsang's hyperkalemia and azotemia, a renal consultation was requested.  Ms. Tsang is aware that she has advanced CKD. She shares, however, that she has never followed with a nephrologist on the outside. She does not go out of her way to limit dietary potassium intake; that being said, she does not eat bananas or tomatoes, and she does not drink orange juice. She occasionally eats avocadoes. She does not use NSAIDs; she uses Neurontin for pain. She feels better today, s/p IV diuretics yesterday/overnight. She asks if she can go home today.  PAST MEDICAL & SURGICAL HISTORY: HLD (hyperlipidemia) Diabetes mellitus, type 2 Diabetic neuropathy CAD (coronary artery disease) ~ s/p MICHAEL x2 in 2017 Diastolic heart failure Paroxysmal atrial fibrillation GI bleed - gastritis Stage 5 chronic kidney disease not on chronic dialysis Anemia PVD (peripheral vascular disease) ~ RLE angiogram with 2 stents placed in 2019. H/O  section  Allergies Carrots (Rash)  SOCIAL HISTORY: Denies ETOh,Smoking,   FAMILY HISTORY: FH: diabetes mellitus  REVIEW OF SYSTEMS: CONSTITUTIONAL: No weakness, fevers or chills EYES/ENT: No visual changes;  No vertigo or throat pain  NECK: No pain or stiffness RESPIRATORY: No cough, wheezing, hemoptysis; (+) shortness of breath CARDIOVASCULAR: No chest pain; (+)palpitations; (+)b/l leg swelling GASTROINTESTINAL: No abdominal or epigastric pain. No nausea, vomiting, or hematemesis; No diarrhea or constipation. No melena or hematochezia. GENITOURINARY: No dysuria, frequency or hematuria NEUROLOGICAL: No numbness or weakness SKIN: No itching, burning, rashes, or lesions  All other review of systems is negative unless indicated above.  VITAL: T(C): , Max: 37.1 (21 @ 19:00) T(F): , Max: 98.7 (21 @ 19:00) HR: 59 (21 @ 05:29) BP: 168/74 (21 @ 05:29) RR: 18 (21 @ 05:29) SpO2: 96% (21 @ 05:29)  PHYSICAL EXAM: Constitutional: NAD, Alert HEENT: NCAT, MMM Neck: Supple, No JVD Respiratory: CTA-b/l Cardiovascular: RRR s1s2, no m/r/g Gastrointestinal: BS+, soft, NT/ND Extremities: No peripheral edema b/l Neurological: no focal deficits; strength grossly intact Back: no CVAT b/l Skin: No rashes, no nevi  LABS:                      7.9   4.80  )-----------( 243      ( 2021 06:22 )            28.1   Na(138)/K(5.7)/Cl(106)/HCO3(17)/BUN(44)/Cr(3.28)Glu(134)/Ca(8.3)/Mg(2.20)/PO4(5.5)     @ 06:22 Na(139)/K(5.8)/Cl(107)/HCO3(18)/BUN(46)/Cr(3.32)Glu(204)/Ca(7.9)/Mg(2.20)/PO4(4.8)     @ 23:36 Na(140)/K(6.8)/Cl(109)/HCO3(23)/BUN(46)/Cr(3.46)Glu(128)/Ca(8.2)/Mg(--)/PO4(--)     @ 19:12  (2020) - BUN 47, Cr 3.20, K 3.8, HCO3 27   IMAGING: < from: Xray Chest 2 Views PA/Lat (06.28.21 @ 18:37) > Clear lungs.  < from: TTE with Doppler (w/Cont) (11.02.20 @ 14:15) > 1. Mitral annular calcification, otherwise normal mitral valve. Mild mitral regurgitation. 2. Endocardium not well visualized; grossly mild global left ventricular systolic dysfunction.  Endocardial visualization enhanced with intravenous injection of echo contrast (Definity). 3. The right ventricle is not well visualized; grossly normal right ventricular systolic function.   ASSESSMENT: (1)Renal - CKD4-5 - GFR ~15ml/min - due to diabetic nephropathy. At/near baseline GFR at present (2)Hyperkalemia - severe, upon admission. K+ still high at present, but much improved relative to last night, with medical therapy. (3)Metabolic acidosis - renally mediated  (4)Hyperphosphatemia - renally mediated (5)Anemia - likely due to CKD (6)CV - decompensated CHF upon admission  RECOMMEND: (1)Low-K diet - (counseled; also counseled regarding continued NSAID avoidance) (2)Lokelma 5gm po daily (3)Renvela 800mg po tid with meals  (4)FeSO4 325mg po qd (5)Can convert to oral Lasix at this point - 80mg po qd? (6)Amlodipine as ordered; defer to Cardiology regarding potential uptitration of beta-blockade (7)Iron studies + PTH with a.m. labs  (if still admitted) (8)Dose new meds for GFR 15ml/min (9)No renal objection to discharge today; could f/u at my office in 1-2 weeks.  Thank you for involving [facility] in this patient's care.  With warm regards,  Reid Solis MD  [facility] Office: [phone] Cell: [phone]

## 2021-06-29 NOTE — PHYSICAL THERAPY INITIAL EVALUATION ADULT - ADDITIONAL COMMENTS
Pt resides in basement apartment of house, family lives upstairs. 3-4 steps to enter. Pt was independent in functional activities prior to admission without use of assistive device. States she occasionally utilized rolling walker.     Pt left seated at EOB, cardiac monitor intact, NAD. RN aware of session and pt positioning. +call bell.

## 2021-06-29 NOTE — CONSULT NOTE ADULT - ASSESSMENT
Echo 11/2/20: Mild MR, grossly mild global lv sys dysfx, min TR    a/p  81 year old female, with past history significant for CAD - s/p Stents, Paroxysmal A-fib, HLD, Type-2 DM, Neuropathy, GIB, Duodenal ulcer, PVD (s/p stents x 2 - 11/2019), and Gait difficulty (uses walker) presented to the ED secondary to palpitations and shortness of breath.    #Acute on Chronic Diastolic HF  -likely 2/2 no longer being on lasix  -vol status improved with IVP lasix  -CXR w clear lungs   -agree with renal to transition lasix to PO 80 mg QD  -pt reported recent outpt echo/nst with no sig findings  -cont bb     #CAD s/p stent  -cv stable  -no acs on ekg  -cont asa, statin, bb     #pafib  -currently in NSR  -no longer on a/c 2/2 hx of GIB  -cont bb     #CKD  -cr noted - on lasix   -hyperkalemia mgmt per renal  -renal f/u     #DM   -med f/u     dvt ppx  plan discussed with ACP  plan discussed with plan discussed with ACP chelsey mild global lv sys dysfx, min TR    a/p  81 year old female, with past history significant for CAD - s/p Stents, Paroxysmal A-fib, HLD, Type-2 DM, Neuropathy, GIB, Duodenal ulcer, PVD (s/p stents x 2 - 11/2019), and Gait difficulty (uses walker) presented to the ED secondary to palpitations and shortness of breath.    #Acute on Chronic Diastolic HF  -likely 2/2 no longer being on lasix  -vol status improved with IVP lasix  -CXR w clear lungs   -agree with renal to transition lasix to PO 80 mg QD  -pt reported recent outpt echo/nst with no sig findings  -cont bb     #CAD s/p stent  -cv stable  -no acs on ekg  -cont asa, statin, bb     #pafib  -currently in NSR  -no longer on a/c 2/2 hx of GIB  -cont bb     #CKD  -cr noted - on lasix   -hyperkalemia mgmt per renal  -renal cleared for DC     #DM   -med f/u     dvt ppx  plan discussed with ACP     DCP for today  pt to f/u w Dr. Costa upon DC

## 2021-06-29 NOTE — PROGRESS NOTE ADULT - SUBJECTIVE AND OBJECTIVE BOX
Patient is a 81y old  Female who presents with a chief complaint of Dyspnea, Hyperkalemia (29 Jun 2021 12:05)      SUBJECTIVE / OVERNIGHT EVENTS:    MEDICATIONS  (STANDING):  amLODIPine   Tablet 10 milliGRAM(s) Oral daily  aspirin enteric coated 81 milliGRAM(s) Oral daily  atorvastatin 40 milliGRAM(s) Oral at bedtime  dextrose 40% Gel 15 Gram(s) Oral once  dextrose 5%. 1000 milliLiter(s) (50 mL/Hr) IV Continuous <Continuous>  dextrose 5%. 1000 milliLiter(s) (100 mL/Hr) IV Continuous <Continuous>  dextrose 50% Injectable 25 Gram(s) IV Push once  dextrose 50% Injectable 12.5 Gram(s) IV Push once  dextrose 50% Injectable 25 Gram(s) IV Push once  ferrous    sulfate 325 milliGRAM(s) Oral daily  gabapentin 300 milliGRAM(s) Oral at bedtime  glucagon  Injectable 1 milliGRAM(s) IntraMuscular once  heparin   Injectable 5000 Unit(s) SubCutaneous every 8 hours  insulin lispro (ADMELOG) corrective regimen sliding scale   SubCutaneous three times a day before meals  insulin lispro (ADMELOG) corrective regimen sliding scale   SubCutaneous at bedtime  metoprolol tartrate 25 milliGRAM(s) Oral every 12 hours  pantoprazole    Tablet 40 milliGRAM(s) Oral before breakfast  senna 2 Tablet(s) Oral at bedtime  sevelamer carbonate 800 milliGRAM(s) Oral three times a day with meals  sodium zirconium cyclosilicate 5 Gram(s) Oral daily    MEDICATIONS  (PRN):  bisacodyl 5 milliGRAM(s) Oral at bedtime PRN Constipation      Vital Signs Last 24 Hrs  T(F): 97.7 (06-29-21 @ 12:49), Max: 98.7 (06-28-21 @ 19:00)  HR: 61 (06-29-21 @ 12:49) (56 - 64)  BP: 135/60 (06-29-21 @ 12:49) (126/53 - 188/71)  RR: 17 (06-29-21 @ 12:49) (17 - 22)  SpO2: 98% (06-29-21 @ 12:49) (95% - 98%)  Telemetry:   CAPILLARY BLOOD GLUCOSE      POCT Blood Glucose.: 277 mg/dL (29 Jun 2021 12:14)  POCT Blood Glucose.: 141 mg/dL (29 Jun 2021 09:04)  POCT Blood Glucose.: 215 mg/dL (29 Jun 2021 00:35)  POCT Blood Glucose.: 224 mg/dL (28 Jun 2021 22:21)  POCT Blood Glucose.: 252 mg/dL (28 Jun 2021 21:04)    I&O's Summary      PHYSICAL EXAM:  GENERAL: NAD, well-developed  HEAD:  Atraumatic, Normocephalic  EYES: EOMI, PERRLA, conjunctiva and sclera clear  NECK: Supple, No JVD  CHEST/LUNG: Clear to auscultation bilaterally; No wheeze  HEART: Regular rate and rhythm; No murmurs, rubs, or gallops  ABDOMEN: Soft, Nontender, Nondistended; Bowel sounds present  EXTREMITIES:  2+ Peripheral Pulses, No clubbing, cyanosis, or edema  PSYCH: AAOx3  NEUROLOGY: non-focal  SKIN: No rashes or lesions    LABS:                        7.9    4.80  )-----------( 243      ( 29 Jun 2021 06:22 )             28.1     06-29    138  |  106  |  44<H>  ----------------------------<  134<H>  5.7<H>   |  17<L>  |  3.28<H>    Ca    8.3<L>      29 Jun 2021 06:22  Phos  5.5     06-29  Mg     2.20     06-29    TPro  6.5  /  Alb  3.6  /  TBili  <0.2  /  DBili  x   /  AST  11  /  ALT  9   /  AlkPhos  193<H>  06-28              RADIOLOGY & ADDITIONAL TESTS:    Imaging Personally Reviewed:    Consultant(s) Notes Reviewed:      Care Discussed with Consultants/Other Providers:

## 2021-06-29 NOTE — DISCHARGE NOTE PROVIDER - NSDCCPCAREPLAN_GEN_ALL_CORE_FT
PRINCIPAL DISCHARGE DIAGNOSIS  Diagnosis: Dyspnea  Assessment and Plan of Treatment: you were given Lasix IV while in hospital.   Continue with oral lasix as prescribed and follow up with your outpatient cardiolgist within 1 week.      SECONDARY DISCHARGE DIAGNOSES  Diagnosis: Hyperkalemia  Assessment and Plan of Treatment: You received medication to lower the potassium in your body.   Nephrology followed Dr. Solis   follow up with primary care doctor/nephrologist within 1 week to continue to monitor your kidney function and potassium levels.    Diagnosis: Stage 5 chronic kidney disease not on chronic dialysis  Assessment and Plan of Treatment: continue with Renvela 800mg po 3x/day with meals follow up with primary care doctor/nephrologist within 1 week to continue to monitor your kidney function and potassium levels.    Diagnosis: Acute on chronic diastolic (congestive) heart failure  Assessment and Plan of Treatment: continue with lasix 80mg daily as prescribed and follow up with cardiologist within 1 week to continue to manage your diuretic.    Diagnosis: Type 2 diabetes mellitus with diabetic polyneuropathy, unspecified whether long term insulin use  Assessment and Plan of Treatment: A1c- 7.9  continue with home dose lantus and diabetic diet    Diagnosis: Normocytic anemia  Assessment and Plan of Treatment: continue taking iron supplement as prescribed.   follow up with primary care doctor to continue to monitor your blood count (CBC)     PRINCIPAL DISCHARGE DIAGNOSIS  Diagnosis: Dyspnea  Assessment and Plan of Treatment: you were given Lasix IV while in hospital.   Continue with oral lasix as prescribed and follow up with your outpatient cardiolgist within 1 week.      SECONDARY DISCHARGE DIAGNOSES  Diagnosis: Hyperkalemia  Assessment and Plan of Treatment: continue to take lokelma 5mg daily as prescribed and call dr. harrington office to make an appointment for July 15th as per Dr. Solis, will need to repeat BMP to monitor kidney function and potassium levels.    Diagnosis: Stage 5 chronic kidney disease not on chronic dialysis  Assessment and Plan of Treatment: continue with Renvela 800mg po 3x/day with meals follow up with primary care doctor/nephrologist within 1 week to continue to monitor your kidney function and potassium levels.    Diagnosis: Acute on chronic diastolic (congestive) heart failure  Assessment and Plan of Treatment: continue with lasix 80mg daily as prescribed and follow up with cardiologist within 1 week to continue to manage your diuretic.    Diagnosis: Type 2 diabetes mellitus with diabetic polyneuropathy, unspecified whether long term insulin use  Assessment and Plan of Treatment: A1c- 7.9  continue with home dose lantus and diabetic diet    Diagnosis: Normocytic anemia  Assessment and Plan of Treatment: continue taking iron supplement as prescribed.   follow up with primary care doctor to continue to monitor your blood count (CBC)

## 2021-07-04 VITALS
DIASTOLIC BLOOD PRESSURE: 50 MMHG | OXYGEN SATURATION: 99 % | TEMPERATURE: 98 F | SYSTOLIC BLOOD PRESSURE: 97 MMHG | HEART RATE: 60 BPM | RESPIRATION RATE: 16 BRPM

## 2021-07-16 PROBLEM — D64.9 ANEMIA, UNSPECIFIED: Chronic | Status: ACTIVE | Noted: 2021-06-28

## 2021-07-16 PROBLEM — Z92.89 PERSONAL HISTORY OF OTHER MEDICAL TREATMENT: Chronic | Status: ACTIVE | Noted: 2021-06-28

## 2021-07-16 PROBLEM — I73.9 PERIPHERAL VASCULAR DISEASE, UNSPECIFIED: Chronic | Status: ACTIVE | Noted: 2021-06-28

## 2021-07-16 PROBLEM — I50.30 UNSPECIFIED DIASTOLIC (CONGESTIVE) HEART FAILURE: Chronic | Status: ACTIVE | Noted: 2021-06-28

## 2021-07-16 PROBLEM — N18.5 CHRONIC KIDNEY DISEASE, STAGE 5: Chronic | Status: ACTIVE | Noted: 2021-06-28

## 2021-07-16 PROBLEM — I25.10 ATHEROSCLEROTIC HEART DISEASE OF NATIVE CORONARY ARTERY WITHOUT ANGINA PECTORIS: Chronic | Status: ACTIVE | Noted: 2018-01-21

## 2021-07-16 PROBLEM — K29.70 GASTRITIS, UNSPECIFIED, WITHOUT BLEEDING: Chronic | Status: ACTIVE | Noted: 2021-06-28

## 2021-07-20 ENCOUNTER — APPOINTMENT (OUTPATIENT)
Dept: CARE COORDINATION | Facility: HOME HEALTH | Age: 81
End: 2021-07-20
Payer: MEDICARE

## 2021-07-20 VITALS
HEIGHT: 62 IN | HEART RATE: 64 BPM | OXYGEN SATURATION: 96 % | RESPIRATION RATE: 16 BRPM | SYSTOLIC BLOOD PRESSURE: 140 MMHG | DIASTOLIC BLOOD PRESSURE: 80 MMHG | TEMPERATURE: 96.8 F | WEIGHT: 160 LBS | BODY MASS INDEX: 29.44 KG/M2

## 2021-07-20 DIAGNOSIS — E78.5 HYPERLIPIDEMIA, UNSPECIFIED: ICD-10-CM

## 2021-07-20 DIAGNOSIS — Z87.19 PERSONAL HISTORY OF OTHER DISEASES OF THE DIGESTIVE SYSTEM: ICD-10-CM

## 2021-07-20 DIAGNOSIS — I10 ESSENTIAL (PRIMARY) HYPERTENSION: ICD-10-CM

## 2021-07-20 DIAGNOSIS — K29.70 GASTRITIS, UNSPECIFIED, W/OUT BLEEDING: ICD-10-CM

## 2021-07-20 DIAGNOSIS — N18.5 OTHER SPECIFIED DIABETES MELLITUS WITH DIABETIC CHRONIC KIDNEY DISEASE: ICD-10-CM

## 2021-07-20 DIAGNOSIS — I12.0 OTHER SPECIFIED DIABETES MELLITUS WITH DIABETIC CHRONIC KIDNEY DISEASE: ICD-10-CM

## 2021-07-20 DIAGNOSIS — E87.5 HYPERKALEMIA: ICD-10-CM

## 2021-07-20 DIAGNOSIS — Z71.89 OTHER SPECIFIED COUNSELING: ICD-10-CM

## 2021-07-20 DIAGNOSIS — E13.22 OTHER SPECIFIED DIABETES MELLITUS WITH DIABETIC CHRONIC KIDNEY DISEASE: ICD-10-CM

## 2021-07-20 DIAGNOSIS — I25.10 ATHEROSCLEROTIC HEART DISEASE OF NATIVE CORONARY ARTERY W/OUT ANGINA PECTORIS: ICD-10-CM

## 2021-07-20 DIAGNOSIS — M15.9 POLYOSTEOARTHRITIS, UNSPECIFIED: ICD-10-CM

## 2021-07-20 DIAGNOSIS — D64.9 ANEMIA, UNSPECIFIED: ICD-10-CM

## 2021-07-20 PROCEDURE — 99350 HOME/RES VST EST HIGH MDM 60: CPT

## 2021-07-20 PROCEDURE — 99497 ADVNCD CARE PLAN 30 MIN: CPT

## 2021-07-20 RX ORDER — METOPROLOL SUCCINATE 25 MG/1
25 TABLET, EXTENDED RELEASE ORAL
Qty: 30 | Refills: 1 | Status: ACTIVE | COMMUNITY
Start: 2021-07-20

## 2021-07-20 RX ORDER — CHLORHEXIDINE GLUCONATE 4 %
325 (65 FE) LIQUID (ML) TOPICAL
Refills: 0 | Status: ACTIVE | COMMUNITY
Start: 2021-07-20

## 2021-07-20 RX ORDER — SODIUM ZIRCONIUM CYCLOSILICATE 5 G/5G
5 POWDER, FOR SUSPENSION ORAL DAILY
Refills: 0 | Status: ACTIVE | COMMUNITY
Start: 2021-07-20

## 2021-07-20 RX ORDER — ASPIRIN 81 MG/1
81 TABLET ORAL
Refills: 0 | Status: ACTIVE | COMMUNITY
Start: 2021-07-20

## 2021-07-20 RX ORDER — SEVELAMER CARBONATE 800 MG/1
800 TABLET, FILM COATED ORAL 3 TIMES DAILY
Qty: 1 | Refills: 0 | Status: ACTIVE | COMMUNITY
Start: 2021-07-20

## 2021-07-20 RX ORDER — SENNOSIDES 8.6 MG TABLETS 8.6 MG/1
8.6 TABLET ORAL
Qty: 60 | Refills: 0 | Status: ACTIVE | COMMUNITY
Start: 2021-07-20

## 2021-07-20 RX ORDER — PANTOPRAZOLE 40 MG/1
40 TABLET, DELAYED RELEASE ORAL
Qty: 1 | Refills: 3 | Status: ACTIVE | COMMUNITY
Start: 2021-07-20

## 2021-07-21 ENCOUNTER — TRANSCRIPTION ENCOUNTER (OUTPATIENT)
Age: 81
End: 2021-07-21

## 2021-07-21 PROBLEM — Z71.89 ADVANCED CARE PLANNING/COUNSELING DISCUSSION: Status: ACTIVE | Noted: 2021-07-20

## 2021-07-21 PROBLEM — E13.22 SECONDARY DM WITH CKD STAGE 5 AND HYPERTENSION: Status: ACTIVE | Noted: 2021-07-20

## 2021-07-21 NOTE — PHYSICAL EXAM
[No Acute Distress] : no acute distress [Normal Sclera/Conjunctiva] : normal sclera/conjunctiva [Normal Outer Ear/Nose] : the outer ears and nose were normal in appearance [No JVD] : no jugular venous distention [No Respiratory Distress] : no respiratory distress  [No Accessory Muscle Use] : no accessory muscle use [Clear to Auscultation] : lungs were clear to auscultation bilaterally [Normal Rate] : normal rate  [Regular Rhythm] : with a regular rhythm [Normal S1, S2] : normal S1 and S2 [No Murmur] : no murmur heard [Pedal Pulses Present] : the pedal pulses are present [No Extremity Clubbing/Cyanosis] : no extremity clubbing/cyanosis [Soft] : abdomen soft [Non Tender] : non-tender [Normal Bowel Sounds] : normal bowel sounds [No Rash] : no rash [No Skin Lesions] : no skin lesions [Alert and Oriented x3] : oriented to person, place, and time [Normal Mood] : the mood was normal [Right Foot Was Examined] : Right foot ~C was examined [Left Foot Was Examined] : left foot ~C was examined [de-identified] : 1+ edema

## 2021-07-21 NOTE — REVIEW OF SYSTEMS
[Vision Problems] : vision problems [Lower Ext Edema] : lower extremity edema [Constipation] : constipation [Joint Pain] : joint pain [Joint Stiffness] : joint stiffness [Muscle Weakness] : muscle weakness [Muscle Pain] : muscle pain [Unsteady Walking] : ataxia [Insomnia] : insomnia [Depression] : depression [Negative] : Heme/Lymph [Fever] : no fever [Chills] : no chills [Hot Flashes] : no hot flashes [Hearing Loss] : no hearing loss [Nosebleed] : no nosebleeds [Nasal Discharge] : no nasal discharge [Sore Throat] : no sore throat [Chest Pain] : no chest pain [Palpitations] : no palpitations [Shortness Of Breath] : no shortness of breath [Wheezing] : no wheezing [Cough] : no cough [Abdominal Pain] : no abdominal pain [Nausea] : no nausea [Vomiting] : no vomiting [Dysuria] : no dysuria [Incontinence] : no incontinence [Back Pain] : no back pain [Nocturia] : no nocturia [Itching] : no itching [Mole Changes] : no mole changes [Nail Changes] : no nail changes [Hair Changes] : no hair changes [Skin Rash] : no skin rash [Dizziness] : no dizziness [Headache] : no headache [Fainting] : no fainting [Confusion] : no confusion [Memory Loss] : no memory loss [Suicidal] : not suicidal [Anxiety] : no anxiety [FreeTextEntry2] : 89 yo female looks stated age, gait, balance and endurance impaired, NAD,  [FreeTextEntry3] : states she has to see OPH

## 2021-07-21 NOTE — HEALTH RISK ASSESSMENT
[Intercurrent hospitalizations] : was admitted to the hospital  [No] : No [Assistive Device] : Patient uses an assistive device [Patient/Caregiver not ready to engage] : , patient/caregiver not ready to engage [de-identified] : uses rollator walker [AdvancecareDate] : 07/21 [FreeTextEntry4] : wants Chest compression, does  not  wants intubation.

## 2021-07-21 NOTE — HISTORY OF PRESENT ILLNESS
[FreeTextEntry1] : Post hospital f/u for hyperkalemia [de-identified] : History of Present Illness:\par Reason for Admission: Dyspnea, Hyperkalemia\par History of Present Illness:\par 81 year old female, with past history significant for CAD - s/p Stents, Paroxysmal A-fib, HLD, Type-2 DM, Neuropathy, GIB, Duodenal ulcer, PVD (s/p stents x 2 - 11/2019), and Gait difficulty (uses walker) presented to the ED secondary to palpitations and shortness of breath. Seen and evaluated at bedside; NAD. Patient relates shortness of breath, orthopnea (despite using 2 pillows) and continued swelling of B/L lower extremities. Indicated lack of sleep overnight due to difficulty breathing. Unable to ambulate for any significant distance because of shortness of breath. States furosemide 40 mg was discontinued approximately 2 to 3 weeks ago by a cardiology provider at [facility]. Comments on confusion relative to being told at times to increase fluid intake and at other times to desist. No associated chest pain, palpitations, headache, dizziness reported. Relative to the Type-2 DM, patient has self discontinued Lantus 10 units due to frustration re multiple health issues. However, patient reports adhering to a low carbohydrate diet. Finger-stick at home reportedly 120 to 130.\par HOME VISIT: Pt greeted NP Navigator at the door, she ambulates in slow unsteady gait, has rollator walker but does not use it to answer the door. Pt is alert and oriented x 3. Denies CP, SOB recent falls, pain or burning with urination, is constipated at times. Has a hx of GI bleed worry she might start bleeding. Lives in the basement of her family home, states she had to move down because she could no longer navigate the stairs. Has 3 adult children 1 son who lives local, 1 dgtr in [place] and 1 in another state. Pt states her granddgtr works until late so they are not able to attend to her so she orders food from local restaurants. Instructed her on high salt content of restaurant foods which may not be good for her health. Son works at nights so he only visit to take her to the store or if she is having an issue. Offered Meals on Wheels, she refused. Pt is concerned about getting transportation to specialist appointments - discussed her PCP can assist her with applying for transportation.  Needs a hospital bed so she can elevate HOB and feet to assist with breathing and decrease edema. Could also benefit from having a shower chair and hand held shower head. Pt reports she is only taking Furosemide, Ferrous sulfate, Gabapentin and Senna. States "too much of those medications make you senile, so I'm not taking them". Risk and benefit discussed, pt has stopped taking Insulin and does not check her blood glucose. States she watches what she eats - mainly vegetables, ?able since pt orders food from restaurant. Discussed diet DASH low sodium and fluid restriction 4-5 cups daily.  Pt did not get home care services on d/c was instructed to go to out pt PT however pt does not have transportation. She could benefit from home PT/OT due to DM neuropathic pain to LLE and pain and stiffness to fingers and joints.  PERS since she lives alone and is at high risk for falls.\par Labs during hosp: BNP 13, 227, Top 27, A1c 7.9, K+ 5.8., RBC 3.04, H/H 7.9/28.1, GFR 13, Creat 3.28, Phosphorus 5.5, Iron 26, Iron sat 9, Lace 12. Pt is vaccinated 1 dose Pfizer, no COVID-19 symptoms, negative 6/29. \par Pt has PCP Dr. Zaidi [phone]. Pt states she was approved for HHA 5-6 hrs x 7 dys from [facility].\par Yellow card left with contact information and with name location and phone number of cardiologist and nephrologist on the back of the form.

## 2021-07-29 ENCOUNTER — APPOINTMENT (OUTPATIENT)
Dept: CARE COORDINATION | Facility: HOME HEALTH | Age: 81
End: 2021-07-29
Payer: MEDICARE

## 2021-07-29 DIAGNOSIS — K59.00 CONSTIPATION, UNSPECIFIED: ICD-10-CM

## 2021-07-29 DIAGNOSIS — Z74.09 OTHER REDUCED MOBILITY: ICD-10-CM

## 2021-07-29 PROCEDURE — 99349 HOME/RES VST EST MOD MDM 40: CPT | Mod: 95

## 2021-07-30 PROBLEM — Z74.09 IMPAIRED FUNCTIONAL MOBILITY, BALANCE, GAIT, AND ENDURANCE: Status: ACTIVE | Noted: 2021-07-20

## 2021-07-30 PROBLEM — K59.00 CONSTIPATION: Status: ACTIVE | Noted: 2021-07-20

## 2021-07-30 NOTE — HISTORY OF PRESENT ILLNESS
[Home] : at home, [unfilled] , at the time of the visit. [Other Location: e.g. Home (Enter Location, City,State)___] : at [unfilled] [Verbal consent obtained from patient] : the patient, [unfilled] [FreeTextEntry1] : f/u with pt post hosp  [de-identified] : History of Present Illness:\par Reason for Admission: Dyspnea, Hyperkalemia\par History of Present Illness:\par 81 year old female, with past history significant for CAD - s/p Stents, Paroxysmal A-fib, HLD, Type-2 DM, Neuropathy, GIB, Duodenal ulcer, PVD (s/p stents x 2 - 11/2019), and Gait difficulty (uses walker) presented to the ED secondary to palpitations and shortness of breath. Seen and evaluated at bedside; NAD. Patient relates shortness of breath, orthopnea (despite using 2 pillows) and continued swelling of B/L lower extremities. Indicated lack of sleep overnight due to difficulty breathing. Unable to ambulate for any significant distance because of shortness of breath. States furosemide 40 mg was discontinued approximately 2 to 3 weeks ago by a cardiology provider at [facility]. Comments on confusion relative to being told at times to increase fluid intake and at other times to desist. No associated chest pain, palpitations, headache, dizziness reported. Relative to the Type-2 DM, patient has self discontinued Lantus 10 units due to frustration re multiple health issues. However, patient reports adhering to a low carbohydrate diet. Finger-stick at home reportedly 120 to 130.\par HOME VISIT: Pt greeted NP Navigator at the door, she ambulates in slow unsteady gait, has rollator walker but does not use it to answer the door. Pt is alert and oriented x 3. Denies CP, SOB recent falls, pain or burning with urination, is constipated at times. Has a hx of GI bleed worry she might start bleeding. Lives in the basement of her family home, states she had to move down because she could no longer navigate the stairs. Has 3 adult children 1 son who lives local, 1 dgtr in [place] and 1 in another state. Pt states her granddgtr works until late so they are not able to attend to her so she orders food from local restaurants. Instructed her on high salt content of restaurant foods which may not be good for her health. Son works at nights so he only visit to take her to the store or if she is having an issue. Offered Meals on Wheels, she refused. Pt is concerned about getting transportation to specialist appointments - discussed her PCP can assist her with applying for transportation.  Needs a hospital bed so she can elevate HOB and feet to assist with breathing and decrease edema. Could also benefit from having a shower chair and hand held shower head. Pt reports she is only taking Furosemide, Ferrous sulfate, Gabapentin and Senna. States "too much of those medications make you senile, so I'm not taking them". Risk and benefit discussed, pt has stopped taking Insulin and does not check her blood glucose. States she watches what she eats - mainly vegetables, ?able since pt orders food from restaurant. Discussed diet DASH low sodium and fluid restriction 4-5 cups daily.  Pt did not get home care services on d/c was instructed to go to out pt PT however pt does not have transportation. She could benefit from home PT/OT due to DM neuropathic pain to LLE and pain and stiffness to fingers and joints.  PERS since she lives alone and is at high risk for falls.\par Labs during hosp: BNP 13, 227, Top 27, A1c 7.9, K+ 5.8., RBC 3.04, H/H 7.9/28.1, GFR 13, Creat 3.28, Phosphorus 5.5, Iron 26, Iron sat 9, Lace 12. Pt is vaccinated 1 dose Pfizer, no COVID-19 symptoms, negative 6/29. \par Pt has PCP Dr. Zaidi [phone]. Pt states she was approved for HHA 5-6 hrs x 7 dys from [facility].\par Yellow card left with contact information and with name location and phone number of cardiologist and nephrologist on the back of the form.\par 7/29/2021 Telephone visit: Pt is alert and oriented, denies CP, SOB, palp, nausea, vomiting, edema. C/o constipation, last bm  2 dys ago, states she has not had an aide, expect to begin getting services 8/1. Called [email or web address] during the visit, states "I will call the pt directly, she will be getting an aide. I will tell her all the information when I call". Pt also did not get home care, she had refused on discharge home. Realizes that she needs it now due to being unable to navigate the 7 steps to get out of her apartment. Referral made to home care, F2F done and sent.  She has medical appointment scheduled for 8/3 nephro, cards 8/9. Needs ENDO appt - called 3 - sites with pt earliest visit 10/5@1pm she refused wanting a earlier appt. She will speak with her PCP to get a referral. Running out of medications -Iron, Metoprolol, Lasix, Senna. Called MD office message left, she wants meds from him.

## 2021-07-30 NOTE — PHYSICAL EXAM
[No Acute Distress] : no acute distress [Normal Voice/Communication] : normal voice/communication [Normal Sclera/Conjunctiva] : normal sclera/conjunctiva [No Respiratory Distress] : no respiratory distress  [No Rash] : no rash [No Skin Lesions] : no skin lesions [Normal Affect] : the affect was normal [Alert and Oriented x3] : oriented to person, place, and time [Normal Mood] : the mood was normal [de-identified] : pt denies  [de-identified] : ambulates indoors with rollator walker

## 2021-07-30 NOTE — REVIEW OF SYSTEMS
[Vision Problems] : vision problems [Constipation] : constipation [Joint Pain] : joint pain [Joint Stiffness] : joint stiffness [Muscle Weakness] : muscle weakness [Muscle Pain] : muscle pain [Unsteady Walking] : ataxia [Insomnia] : insomnia [Depression] : depression [Negative] : Heme/Lymph [Fever] : no fever [Chills] : no chills [Hot Flashes] : no hot flashes [Hearing Loss] : no hearing loss [Nosebleed] : no nosebleeds [Nasal Discharge] : no nasal discharge [Sore Throat] : no sore throat [Chest Pain] : no chest pain [Palpitations] : no palpitations [Lower Ext Edema] : no lower extremity edema [Shortness Of Breath] : no shortness of breath [Wheezing] : no wheezing [Cough] : no cough [Abdominal Pain] : no abdominal pain [Nausea] : no nausea [Vomiting] : no vomiting [Dysuria] : no dysuria [Incontinence] : no incontinence [Nocturia] : no nocturia [Back Pain] : no back pain [Itching] : no itching [Mole Changes] : no mole changes [Nail Changes] : no nail changes [Hair Changes] : no hair changes [Skin Rash] : no skin rash [Headache] : no headache [Dizziness] : no dizziness [Fainting] : no fainting [Confusion] : no confusion [Memory Loss] : no memory loss [Suicidal] : not suicidal [Anxiety] : no anxiety [FreeTextEntry2] : 89 yo female , states she is doing as well as to be expected, NAD,  [FreeTextEntry3] : states she has to see OPH [de-identified] : being followed by [facility]

## 2021-08-01 ENCOUNTER — OUTPATIENT (OUTPATIENT)
Dept: OUTPATIENT SERVICES | Facility: HOSPITAL | Age: 81
LOS: 1 days | End: 2021-08-01
Payer: MEDICARE

## 2021-08-01 DIAGNOSIS — Z98.890 OTHER SPECIFIED POSTPROCEDURAL STATES: Chronic | ICD-10-CM

## 2021-08-01 DIAGNOSIS — Z98.891 HISTORY OF UTERINE SCAR FROM PREVIOUS SURGERY: Chronic | ICD-10-CM

## 2021-08-01 DIAGNOSIS — Z95.5 PRESENCE OF CORONARY ANGIOPLASTY IMPLANT AND GRAFT: Chronic | ICD-10-CM

## 2021-08-18 DIAGNOSIS — Z71.89 OTHER SPECIFIED COUNSELING: ICD-10-CM

## 2021-08-19 DIAGNOSIS — Z01.818 ENCOUNTER FOR OTHER PREPROCEDURAL EXAMINATION: ICD-10-CM

## 2021-08-20 ENCOUNTER — OUTPATIENT (OUTPATIENT)
Dept: OUTPATIENT SERVICES | Facility: HOSPITAL | Age: 81
LOS: 1 days | End: 2021-08-20
Payer: MEDICARE

## 2021-08-20 ENCOUNTER — APPOINTMENT (OUTPATIENT)
Dept: DISASTER EMERGENCY | Facility: CLINIC | Age: 81
End: 2021-08-20

## 2021-08-20 VITALS
WEIGHT: 160.06 LBS | HEART RATE: 63 BPM | OXYGEN SATURATION: 97 % | RESPIRATION RATE: 16 BRPM | SYSTOLIC BLOOD PRESSURE: 150 MMHG | DIASTOLIC BLOOD PRESSURE: 84 MMHG | TEMPERATURE: 97 F | HEIGHT: 61.5 IN

## 2021-08-20 DIAGNOSIS — E11.9 TYPE 2 DIABETES MELLITUS WITHOUT COMPLICATIONS: ICD-10-CM

## 2021-08-20 DIAGNOSIS — G56.02 CARPAL TUNNEL SYNDROME, LEFT UPPER LIMB: ICD-10-CM

## 2021-08-20 DIAGNOSIS — Z95.5 PRESENCE OF CORONARY ANGIOPLASTY IMPLANT AND GRAFT: ICD-10-CM

## 2021-08-20 DIAGNOSIS — Z98.891 HISTORY OF UTERINE SCAR FROM PREVIOUS SURGERY: Chronic | ICD-10-CM

## 2021-08-20 DIAGNOSIS — Z98.890 OTHER SPECIFIED POSTPROCEDURAL STATES: Chronic | ICD-10-CM

## 2021-08-20 DIAGNOSIS — Z95.5 PRESENCE OF CORONARY ANGIOPLASTY IMPLANT AND GRAFT: Chronic | ICD-10-CM

## 2021-08-20 LAB
A1C WITH ESTIMATED AVERAGE GLUCOSE RESULT: 8.9 % — HIGH (ref 4–5.6)
ANION GAP SERPL CALC-SCNC: 16 MMOL/L — HIGH (ref 7–14)
BUN SERPL-MCNC: 65 MG/DL — HIGH (ref 7–23)
CALCIUM SERPL-MCNC: 8.4 MG/DL — SIGNIFICANT CHANGE UP (ref 8.4–10.5)
CHLORIDE SERPL-SCNC: 104 MMOL/L — SIGNIFICANT CHANGE UP (ref 98–107)
CO2 SERPL-SCNC: 19 MMOL/L — LOW (ref 22–31)
CREAT SERPL-MCNC: 3.52 MG/DL — HIGH (ref 0.5–1.3)
ESTIMATED AVERAGE GLUCOSE: 209 — SIGNIFICANT CHANGE UP
GLUCOSE SERPL-MCNC: 234 MG/DL — HIGH (ref 70–99)
HCT VFR BLD CALC: 32.1 % — LOW (ref 34.5–45)
HGB BLD-MCNC: 9.9 G/DL — LOW (ref 11.5–15.5)
MCHC RBC-ENTMCNC: 27.8 PG — SIGNIFICANT CHANGE UP (ref 27–34)
MCHC RBC-ENTMCNC: 30.8 GM/DL — LOW (ref 32–36)
MCV RBC AUTO: 90.2 FL — SIGNIFICANT CHANGE UP (ref 80–100)
NRBC # BLD: 0 /100 WBCS — SIGNIFICANT CHANGE UP
NRBC # FLD: 0 K/UL — SIGNIFICANT CHANGE UP
PLATELET # BLD AUTO: 330 K/UL — SIGNIFICANT CHANGE UP (ref 150–400)
POTASSIUM SERPL-MCNC: 4.5 MMOL/L — SIGNIFICANT CHANGE UP (ref 3.5–5.3)
POTASSIUM SERPL-SCNC: 4.5 MMOL/L — SIGNIFICANT CHANGE UP (ref 3.5–5.3)
RBC # BLD: 3.56 M/UL — LOW (ref 3.8–5.2)
RBC # FLD: 14.6 % — HIGH (ref 10.3–14.5)
SODIUM SERPL-SCNC: 139 MMOL/L — SIGNIFICANT CHANGE UP (ref 135–145)
WBC # BLD: 4.78 K/UL — SIGNIFICANT CHANGE UP (ref 3.8–10.5)
WBC # FLD AUTO: 4.78 K/UL — SIGNIFICANT CHANGE UP (ref 3.8–10.5)

## 2021-08-20 PROCEDURE — 93010 ELECTROCARDIOGRAM REPORT: CPT

## 2021-08-20 RX ORDER — METOPROLOL TARTRATE 50 MG
1 TABLET ORAL
Qty: 0 | Refills: 0 | DISCHARGE

## 2021-08-20 RX ORDER — IBUPROFEN 200 MG
2 TABLET ORAL
Qty: 0 | Refills: 0 | DISCHARGE

## 2021-08-20 RX ORDER — IPRATROPIUM/ALBUTEROL SULFATE 18-103MCG
3 AEROSOL WITH ADAPTER (GRAM) INHALATION
Qty: 0 | Refills: 0 | DISCHARGE

## 2021-08-20 RX ORDER — ASPIRIN/CALCIUM CARB/MAGNESIUM 324 MG
1 TABLET ORAL
Qty: 0 | Refills: 0 | DISCHARGE

## 2021-08-20 RX ORDER — FERROUS SULFATE 325(65) MG
1 TABLET ORAL
Qty: 0 | Refills: 0 | DISCHARGE

## 2021-08-20 RX ORDER — INSULIN GLARGINE 100 [IU]/ML
6 INJECTION, SOLUTION SUBCUTANEOUS
Qty: 0 | Refills: 0 | DISCHARGE

## 2021-08-20 RX ORDER — PANTOPRAZOLE SODIUM 20 MG/1
1 TABLET, DELAYED RELEASE ORAL
Qty: 0 | Refills: 0 | DISCHARGE

## 2021-08-20 RX ORDER — DIPHENHYDRAMINE HCL 50 MG
1 CAPSULE ORAL
Qty: 0 | Refills: 0 | DISCHARGE

## 2021-08-20 RX ORDER — AMLODIPINE BESYLATE 2.5 MG/1
1 TABLET ORAL
Qty: 0 | Refills: 0 | DISCHARGE

## 2021-08-20 RX ORDER — ALBUTEROL 90 UG/1
2 AEROSOL, METERED ORAL
Qty: 0 | Refills: 0 | DISCHARGE

## 2021-08-20 NOTE — H&P PST ADULT - PROBLEM SELECTOR PLAN 3
- potassium = 6.8; non-hemolyzed  - in the setting of CKD  - prescribed diuretic and insulin in the ED  - Lokelma and Admelog added by House staff  - ECGs as above  - f/u BMP > 1 hour after Lokelma and treat as per potassium level Pt stated that MD stopped Aspirin a couple of months ago. Telephone call to Dr. Jerry Costa ([phone], NP unable to reach MD at this time. Pt instructed to start Aspirin 81 mg oral daily .  Pending last cardiology note

## 2021-08-20 NOTE — H&P PST ADULT - PROBLEM SELECTOR PLAN 1
- presented w/ shortness of breath, HARPER, orthopnea  - pro-BNP = 67696.  Troponin = 27  - ECGs as noted above  - CXR w/ enlarged cardiac silhouette, no gross pleural effusion, calcification of the aortic bulb (personally reviewed)  - reportedly discontinued from furosemide, by Provider, approximately 2 to 3 weeks ago  - s/p furosemide 40 mg IV x 2 in the ED; continuing for now (may need increased dosing due to CKD)  - continuing on B-blocker (reports on 50 mg, but patient unsure of meds.  Prescribed 25 mg for now)  - prescribe also statin.  No ACEi, ARB due to renal function  - intake/output, weight daily, HOB elevation  - f/u AM lab-work, including TSH level  - would obtain outpatient TTE; if not available, would order In-house TTE  - continues on Telemetry monitoring  - Cardiologist resuming care in the AM Scheduled for left endoscopic carpal tunnel release on 08/23/2021. Pre op instructions, famotidine , chlorhexidine gluconate soap given and explained. Pt verbalized understanding. Scheduled for left endoscopic carpal tunnel release on 08/23/2021. Pre op instructions, famotidine , chlorhexidine gluconate soap given and explained. Pt verbalized understanding.  Pt confirmed Covid 19 testing today

## 2021-08-20 NOTE — H&P PST ADULT - MUSCULOSKELETAL
details… detailed exam left hand/no joint swelling/no joint warmth/decreased ROM due to pain left hand/no joint swelling/no joint warmth/decreased ROM due to pain/diminished strength

## 2021-08-20 NOTE — H&P PST ADULT - NEGATIVE CARDIOVASCULAR SYMPTOMS
no chest pain/no palpitations/no orthopnea no chest pain/no palpitations/no orthopnea/no claudication

## 2021-08-20 NOTE — H&P PST ADULT - PROBLEM SELECTOR PLAN 4
- glucose = 128 on CMP FS = 252 --> 224 --> 215  - related comorbidities of neuropathy, PVD, CAD, HTN  - reports non-compliance w/ Lantus 10 units (has not taken same in a "long time."  Self discontinued  - states finger-sticks range 120 to 130 at home  - last A1c = 6.1 on 10/31/2020  - Consistent carb diet  - m-ISS per FS  - evaluate for resumption of Lantus, or possibly giving oral medications upon discharge - to encourage medication compliance  - on gabapentin 300 mg QD (please f/u [facility] Pharmacist and complete Med-Rec)

## 2021-08-20 NOTE — H&P PST ADULT - PROBLEM SELECTOR PLAN 5
- BUN/Cr = 46/3.46 (47/3.2 in 11/2020)  - followed by [facility] - LISA Myers / LISA Gates / ADAM Jackson/ LISA Tsang/ LISA Torres/ JULIO Chavez / ALEXANDRA Clinton / CAITIE Lanza [saw Dr Gates at lasts admission)  - f/u renal function, electrolytes  - limit medications that are dependent on kidney function

## 2021-08-20 NOTE — H&P PST ADULT - HISTORY OF PRESENT ILLNESS
81 year old female, with past history significant for CAD - s/p Stents, Paroxysmal A-fib, HLD, Type-2 DM, Neuropathy, GIB, Duodenal ulcer, PVD (s/p stents x 2 - 11/2019), and Gait difficulty (uses walker) present 81 year old female, with past history significant for CAD - s/p Stents, Paroxysmal A-fib, HLD, Type-2 DM, Neuropathy, GIB, Duodenal ulcer, PVD (s/p stents x 2 - 11/2019), and Gait difficulty (uses walker) presents to PST for pre op evaluation with bilateral hands pain and stiffnes. Scheduled for  81 year old female with H/O: CAD - s/p Stents, Paroxysmal A-fib, HLD, Type-2 DM, Neuropathy, GIB, Duodenal ulcer, PVD (s/p stents x 2 - 11/2019), and Gait difficulty presents to PST for pre op evaluation with bilateral hands pain and stiffnes. Scheduled for left endoscopic carpal tunnel release on 08/23/2021

## 2021-08-20 NOTE — H&P PST ADULT - NEGATIVE GENERAL GENITOURINARY SYMPTOMS
no hematuria/no renal colic/no flank pain L/no flank pain R/no dysuria no hematuria/no renal colic/no flank pain L/no flank pain R/no bladder infections/no dysuria

## 2021-08-20 NOTE — H&P PST ADULT - PROBLEM SELECTOR PLAN 2
- in the setting of being fluid overloaded due to acute on chronic CHF as sequela of discontinuation of furosemide  - also could be complicated by anemia (Hgb = 7.1; although his could be dilutional)  - should improve as optimal diuresis achieved  - ambulate with walker, as tolerated  - Physical therapy  - management for CHF as above Monitor BS on day of surgery. Pt instructed to decrease dose of Lantus by 20% the night before surgery, take 8 units. Pt verbalized understanding.

## 2021-08-21 LAB — SARS-COV-2 N GENE NPH QL NAA+PROBE: NOT DETECTED

## 2021-12-01 PROCEDURE — G9005: CPT

## 2022-01-01 NOTE — CHART NOTE - NSCHARTNOTEFT_GEN_A_CORE
Problem: Infant Inpatient Plan of Care  Goal: Plan of Care Review  Outcome: Ongoing, Progressing  Flowsheets (Taken 2022 1400)  Care Plan Reviewed With: mother    male rooms in with parents. He breastfeeds  well and takes formula by bottle when mom prefers to sleep. He has passed large stool and had first void.  His vitals and temp remain stable. Parents are hopeful for discharge this evening if 24 hour testing is wdl.   Patient noted to have what appears to be atrial fibrillation on telemetry.  She was ASx and no complaints of chest pain or shortness of breath.  Patient is s/p cardiac cath with resolute arcadio stent LCx, resolute arcadio stent OM1 95; LVEDP 30 given Lasix 40mg IV x1 in lab; RRA access    T(C): 36.9 (12-21-17 @ 01:25), Max: 37.1 (12-20-17 @ 20:12)  HR: 106 (12-21-17 @ 01:25) (74 - 106)  BP: 160/94 (12-21-17 @ 01:25) (139/64 - 161/70)  RR: 18 (12-21-17 @ 01:25) (18 - 23)  SpO2: 99% (12-21-17 @ 01:25) (96% - 100%)    EKG: AFib 114bpm ST depression V3-V6 (seen on previous EKGs)    Exam: Cardiac irreg irreg            Lungs no rhonchi            Abd: Soft NT ND            Ext: no edema            Right radial access: stable with no hematoma or active bleed noted.      76 y/o F with PMH of DM, HLD, ?COPD(not on home O2) presented with the complaint of SOB, cough and midsternal chest pain now with what appears to be New Onset Afib.  1. New onset AFib: Dr. Fallon was made aware and recommended start Heparin gtt for anticoagulation  2. Continue to monitor

## 2022-03-22 NOTE — PROGRESS NOTE ADULT - ASSESSMENT
You may receive a survey regarding the care you received during your visit. Your input is valuable to us. We encourage you to complete and return your survey. We hope you will choose us in the future for your healthcare needs.  80 yo woman w h/o CAD, s/p PCI on Plavix/ASA, CKD3-4, baseline Scr 3, DM2, DM neuropathy, HLD, HTN, bleeding DU in 11/2020, Anemia, PAF( not on AC prob 2/2 GI bledd), presents w sudden onset dyspnea which started 6/27, ptn endorses HARPER, Orthopnea, LE edema. ptn stopped taking Lasix [id number]. PCP Dr. Costa suggested the frequency during the week shoul be lowered but ptn misunderstood and completely stopped taking LASIX. Denies CP, has palps,   Denies N/V, HA, weakness, fevers, chills, cough  In the ED noted to have acute CHF exacerbation, hyperkalemia, anasarca, KOJO    A/PLAN:   ptn w acute on chronic dCHF, now feels much better post IV diuresis, cleared by renal and card to switch to po lasix  recent outptn TTE was stable, ptn can F/U w Dr. Costa  cont  Norvasc, Toprol, cont Neurontin, cont protonix   h/o PAF, not on AC 2/2 bleeding DU  DVT ppx w PAS, check stool guaiac check total iron

## 2022-03-29 NOTE — PROVIDER CONTACT NOTE (OTHER) - SITUATION
Patient's cardiac enzymes trending up. [Cervical Pap Smear] : cervical Pap smear [Liquid Base] : liquid base [Tolerated Well] : the patient tolerated the procedure well [No Complications] : there were no complications

## 2022-04-29 ENCOUNTER — INPATIENT (INPATIENT)
Facility: HOSPITAL | Age: 82
LOS: 2 days | Discharge: HOME HEALTH | End: 2022-05-02
Attending: INTERNAL MEDICINE | Admitting: INTERNAL MEDICINE
Payer: MEDICARE

## 2022-04-29 VITALS
HEIGHT: 61.5 IN | OXYGEN SATURATION: 99 % | TEMPERATURE: 98 F | HEART RATE: 73 BPM | DIASTOLIC BLOOD PRESSURE: 51 MMHG | RESPIRATION RATE: 18 BRPM | SYSTOLIC BLOOD PRESSURE: 194 MMHG

## 2022-04-29 DIAGNOSIS — Z98.891 HISTORY OF UTERINE SCAR FROM PREVIOUS SURGERY: Chronic | ICD-10-CM

## 2022-04-29 DIAGNOSIS — Z95.5 PRESENCE OF CORONARY ANGIOPLASTY IMPLANT AND GRAFT: Chronic | ICD-10-CM

## 2022-04-29 DIAGNOSIS — I50.30 UNSPECIFIED DIASTOLIC (CONGESTIVE) HEART FAILURE: ICD-10-CM

## 2022-04-29 DIAGNOSIS — E11.9 TYPE 2 DIABETES MELLITUS WITHOUT COMPLICATIONS: ICD-10-CM

## 2022-04-29 DIAGNOSIS — D63.8 ANEMIA IN OTHER CHRONIC DISEASES CLASSIFIED ELSEWHERE: ICD-10-CM

## 2022-04-29 DIAGNOSIS — B36.9 SUPERFICIAL MYCOSIS, UNSPECIFIED: ICD-10-CM

## 2022-04-29 DIAGNOSIS — Z98.890 OTHER SPECIFIED POSTPROCEDURAL STATES: Chronic | ICD-10-CM

## 2022-04-29 DIAGNOSIS — N89.8 OTHER SPECIFIED NONINFLAMMATORY DISORDERS OF VAGINA: ICD-10-CM

## 2022-04-29 DIAGNOSIS — I25.10 ATHEROSCLEROTIC HEART DISEASE OF NATIVE CORONARY ARTERY WITHOUT ANGINA PECTORIS: ICD-10-CM

## 2022-04-29 DIAGNOSIS — N18.30 CHRONIC KIDNEY DISEASE, STAGE 3 UNSPECIFIED: ICD-10-CM

## 2022-04-29 LAB
A1C WITH ESTIMATED AVERAGE GLUCOSE RESULT: 15.1 % — HIGH (ref 4–5.6)
ALBUMIN SERPL ELPH-MCNC: 3.3 G/DL — SIGNIFICANT CHANGE UP (ref 3.3–5)
ALP SERPL-CCNC: 224 U/L — HIGH (ref 40–120)
ALT FLD-CCNC: 7 U/L — SIGNIFICANT CHANGE UP (ref 4–33)
ANION GAP SERPL CALC-SCNC: 16 MMOL/L — HIGH (ref 7–14)
AST SERPL-CCNC: 10 U/L — SIGNIFICANT CHANGE UP (ref 4–32)
BASE EXCESS BLDV CALC-SCNC: 2.2 MMOL/L — SIGNIFICANT CHANGE UP (ref -2–3)
BASOPHILS # BLD AUTO: 0 K/UL — SIGNIFICANT CHANGE UP (ref 0–0.2)
BASOPHILS NFR BLD AUTO: 0 % — SIGNIFICANT CHANGE UP (ref 0–2)
BILIRUB SERPL-MCNC: <0.2 MG/DL — SIGNIFICANT CHANGE UP (ref 0.2–1.2)
BLOOD GAS VENOUS COMPREHENSIVE RESULT: SIGNIFICANT CHANGE UP
BUN SERPL-MCNC: 56 MG/DL — HIGH (ref 7–23)
CALCIUM SERPL-MCNC: 7.8 MG/DL — LOW (ref 8.4–10.5)
CHLORIDE BLDV-SCNC: 96 MMOL/L — SIGNIFICANT CHANGE UP (ref 96–108)
CHLORIDE SERPL-SCNC: 90 MMOL/L — LOW (ref 98–107)
CO2 BLDV-SCNC: 30.2 MMOL/L — HIGH (ref 22–26)
CO2 SERPL-SCNC: 24 MMOL/L — SIGNIFICANT CHANGE UP (ref 22–31)
CREAT SERPL-MCNC: 4.19 MG/DL — HIGH (ref 0.5–1.3)
EGFR: 10 ML/MIN/1.73M2 — LOW
ELLIPTOCYTES BLD QL SMEAR: SLIGHT — SIGNIFICANT CHANGE UP
EOSINOPHIL # BLD AUTO: 0 K/UL — SIGNIFICANT CHANGE UP (ref 0–0.5)
EOSINOPHIL NFR BLD AUTO: 0 % — SIGNIFICANT CHANGE UP (ref 0–6)
ESTIMATED AVERAGE GLUCOSE: 387 — SIGNIFICANT CHANGE UP
GAS PNL BLDV: 133 MMOL/L — LOW (ref 136–145)
GIANT PLATELETS BLD QL SMEAR: PRESENT — SIGNIFICANT CHANGE UP
GLUCOSE BLDC GLUCOMTR-MCNC: 351 MG/DL — HIGH (ref 70–99)
GLUCOSE BLDC GLUCOMTR-MCNC: 376 MG/DL — HIGH (ref 70–99)
GLUCOSE BLDC GLUCOMTR-MCNC: 385 MG/DL — HIGH (ref 70–99)
GLUCOSE BLDC GLUCOMTR-MCNC: 415 MG/DL — HIGH (ref 70–99)
GLUCOSE BLDV-MCNC: 547 MG/DL — CRITICAL HIGH (ref 70–99)
GLUCOSE SERPL-MCNC: 532 MG/DL — CRITICAL HIGH (ref 70–99)
HCO3 BLDV-SCNC: 28 MMOL/L — SIGNIFICANT CHANGE UP (ref 22–29)
HCT VFR BLD CALC: 24.1 % — LOW (ref 34.5–45)
HCT VFR BLDA CALC: 22 % — LOW (ref 34.5–46.5)
HGB BLD CALC-MCNC: 7.3 G/DL — LOW (ref 11.5–15.5)
HGB BLD-MCNC: 7.1 G/DL — LOW (ref 11.5–15.5)
IANC: 4.12 K/UL — SIGNIFICANT CHANGE UP (ref 1.8–7.4)
LACTATE BLDV-MCNC: 1.5 MMOL/L — SIGNIFICANT CHANGE UP (ref 0.5–2)
LYMPHOCYTES # BLD AUTO: 1.11 K/UL — SIGNIFICANT CHANGE UP (ref 1–3.3)
LYMPHOCYTES # BLD AUTO: 19.6 % — SIGNIFICANT CHANGE UP (ref 13–44)
MAGNESIUM SERPL-MCNC: 2 MG/DL — SIGNIFICANT CHANGE UP (ref 1.6–2.6)
MANUAL SMEAR VERIFICATION: SIGNIFICANT CHANGE UP
MCHC RBC-ENTMCNC: 24.8 PG — LOW (ref 27–34)
MCHC RBC-ENTMCNC: 29.4 GM/DL — LOW (ref 32–36)
MCV RBC AUTO: 84.5 FL — SIGNIFICANT CHANGE UP (ref 80–100)
MONOCYTES # BLD AUTO: 0.36 K/UL — SIGNIFICANT CHANGE UP (ref 0–0.9)
MONOCYTES NFR BLD AUTO: 6.3 % — SIGNIFICANT CHANGE UP (ref 2–14)
NEUTROPHILS # BLD AUTO: 4.13 K/UL — SIGNIFICANT CHANGE UP (ref 1.8–7.4)
NEUTROPHILS NFR BLD AUTO: 73.2 % — SIGNIFICANT CHANGE UP (ref 43–77)
NT-PROBNP SERPL-SCNC: 6024 PG/ML — HIGH
PCO2 BLDV: 54 MMHG — HIGH (ref 39–42)
PH BLDV: 7.33 — SIGNIFICANT CHANGE UP (ref 7.32–7.43)
PLAT MORPH BLD: NORMAL — SIGNIFICANT CHANGE UP
PLATELET # BLD AUTO: 318 K/UL — SIGNIFICANT CHANGE UP (ref 150–400)
PLATELET COUNT - ESTIMATE: NORMAL — SIGNIFICANT CHANGE UP
PO2 BLDV: 45 MMHG — SIGNIFICANT CHANGE UP
POIKILOCYTOSIS BLD QL AUTO: SLIGHT — SIGNIFICANT CHANGE UP
POTASSIUM BLDV-SCNC: 3.3 MMOL/L — LOW (ref 3.5–5.1)
POTASSIUM SERPL-MCNC: 3.2 MMOL/L — LOW (ref 3.5–5.3)
POTASSIUM SERPL-SCNC: 3.2 MMOL/L — LOW (ref 3.5–5.3)
PROT SERPL-MCNC: 7 G/DL — SIGNIFICANT CHANGE UP (ref 6–8.3)
RBC # BLD: 2.78 M/UL — LOW (ref 3.8–5.2)
RBC # FLD: 15 % — HIGH (ref 10.3–14.5)
RBC BLD AUTO: ABNORMAL
SAO2 % BLDV: 74.7 % — SIGNIFICANT CHANGE UP
SARS-COV-2 RNA SPEC QL NAA+PROBE: SIGNIFICANT CHANGE UP
SODIUM SERPL-SCNC: 130 MMOL/L — LOW (ref 135–145)
STOMATOCYTES BLD QL SMEAR: SLIGHT — SIGNIFICANT CHANGE UP
TROPONIN T, HIGH SENSITIVITY RESULT: 36 NG/L — SIGNIFICANT CHANGE UP
TROPONIN T, HIGH SENSITIVITY RESULT: 38 NG/L — SIGNIFICANT CHANGE UP
VARIANT LYMPHS # BLD: 0.9 % — SIGNIFICANT CHANGE UP (ref 0–6)
WBC # BLD: 5.64 K/UL — SIGNIFICANT CHANGE UP (ref 3.8–10.5)
WBC # FLD AUTO: 5.64 K/UL — SIGNIFICANT CHANGE UP (ref 3.8–10.5)

## 2022-04-29 PROCEDURE — 93306 TTE W/DOPPLER COMPLETE: CPT | Mod: 26

## 2022-04-29 PROCEDURE — 99285 EMERGENCY DEPT VISIT HI MDM: CPT | Mod: 25,GC

## 2022-04-29 PROCEDURE — 99223 1ST HOSP IP/OBS HIGH 75: CPT

## 2022-04-29 PROCEDURE — 71045 X-RAY EXAM CHEST 1 VIEW: CPT | Mod: 26

## 2022-04-29 PROCEDURE — 93010 ELECTROCARDIOGRAM REPORT: CPT | Mod: GC

## 2022-04-29 RX ORDER — INSULIN LISPRO 100/ML
VIAL (ML) SUBCUTANEOUS
Refills: 0 | Status: DISCONTINUED | OUTPATIENT
Start: 2022-04-29 | End: 2022-05-02

## 2022-04-29 RX ORDER — SODIUM CHLORIDE 9 MG/ML
1000 INJECTION, SOLUTION INTRAVENOUS
Refills: 0 | Status: DISCONTINUED | OUTPATIENT
Start: 2022-04-29 | End: 2022-05-02

## 2022-04-29 RX ORDER — ACETAMINOPHEN 500 MG
650 TABLET ORAL EVERY 6 HOURS
Refills: 0 | Status: DISCONTINUED | OUTPATIENT
Start: 2022-04-29 | End: 2022-05-02

## 2022-04-29 RX ORDER — INSULIN LISPRO 100/ML
10 VIAL (ML) SUBCUTANEOUS ONCE
Refills: 0 | Status: DISCONTINUED | OUTPATIENT
Start: 2022-04-29 | End: 2022-04-29

## 2022-04-29 RX ORDER — INSULIN GLARGINE 100 [IU]/ML
10 INJECTION, SOLUTION SUBCUTANEOUS
Qty: 0 | Refills: 0 | DISCHARGE

## 2022-04-29 RX ORDER — POTASSIUM CHLORIDE 20 MEQ
40 PACKET (EA) ORAL ONCE
Refills: 0 | Status: COMPLETED | OUTPATIENT
Start: 2022-04-29 | End: 2022-04-29

## 2022-04-29 RX ORDER — METOPROLOL TARTRATE 50 MG
25 TABLET ORAL DAILY
Refills: 0 | Status: DISCONTINUED | OUTPATIENT
Start: 2022-04-29 | End: 2022-05-02

## 2022-04-29 RX ORDER — GLUCAGON INJECTION, SOLUTION 0.5 MG/.1ML
1 INJECTION, SOLUTION SUBCUTANEOUS ONCE
Refills: 0 | Status: DISCONTINUED | OUTPATIENT
Start: 2022-04-29 | End: 2022-05-02

## 2022-04-29 RX ORDER — GABAPENTIN 400 MG/1
300 CAPSULE ORAL AT BEDTIME
Refills: 0 | Status: DISCONTINUED | OUTPATIENT
Start: 2022-04-29 | End: 2022-05-02

## 2022-04-29 RX ORDER — DEXTROSE 50 % IN WATER 50 %
15 SYRINGE (ML) INTRAVENOUS ONCE
Refills: 0 | Status: DISCONTINUED | OUTPATIENT
Start: 2022-04-29 | End: 2022-05-02

## 2022-04-29 RX ORDER — FUROSEMIDE 40 MG
80 TABLET ORAL ONCE
Refills: 0 | Status: COMPLETED | OUTPATIENT
Start: 2022-04-29 | End: 2022-04-29

## 2022-04-29 RX ORDER — METOPROLOL TARTRATE 50 MG
1 TABLET ORAL
Qty: 0 | Refills: 0 | DISCHARGE

## 2022-04-29 RX ORDER — DEXTROSE 50 % IN WATER 50 %
25 SYRINGE (ML) INTRAVENOUS ONCE
Refills: 0 | Status: DISCONTINUED | OUTPATIENT
Start: 2022-04-29 | End: 2022-05-02

## 2022-04-29 RX ORDER — HYDRALAZINE HCL 50 MG
10 TABLET ORAL THREE TIMES A DAY
Refills: 0 | Status: DISCONTINUED | OUTPATIENT
Start: 2022-04-29 | End: 2022-05-02

## 2022-04-29 RX ORDER — INSULIN GLARGINE 100 [IU]/ML
5 INJECTION, SOLUTION SUBCUTANEOUS AT BEDTIME
Refills: 0 | Status: DISCONTINUED | OUTPATIENT
Start: 2022-04-29 | End: 2022-05-01

## 2022-04-29 RX ORDER — ONDANSETRON 8 MG/1
4 TABLET, FILM COATED ORAL EVERY 8 HOURS
Refills: 0 | Status: DISCONTINUED | OUTPATIENT
Start: 2022-04-29 | End: 2022-05-02

## 2022-04-29 RX ORDER — INSULIN LISPRO 100/ML
5 VIAL (ML) SUBCUTANEOUS ONCE
Refills: 0 | Status: COMPLETED | OUTPATIENT
Start: 2022-04-29 | End: 2022-04-29

## 2022-04-29 RX ORDER — DEXTROSE 50 % IN WATER 50 %
12.5 SYRINGE (ML) INTRAVENOUS ONCE
Refills: 0 | Status: DISCONTINUED | OUTPATIENT
Start: 2022-04-29 | End: 2022-05-02

## 2022-04-29 RX ORDER — FUROSEMIDE 40 MG
1 TABLET ORAL
Qty: 0 | Refills: 0 | DISCHARGE

## 2022-04-29 RX ORDER — INSULIN LISPRO 100/ML
VIAL (ML) SUBCUTANEOUS AT BEDTIME
Refills: 0 | Status: DISCONTINUED | OUTPATIENT
Start: 2022-04-29 | End: 2022-05-02

## 2022-04-29 RX ORDER — LANOLIN ALCOHOL/MO/W.PET/CERES
3 CREAM (GRAM) TOPICAL AT BEDTIME
Refills: 0 | Status: DISCONTINUED | OUTPATIENT
Start: 2022-04-29 | End: 2022-05-02

## 2022-04-29 RX ADMIN — INSULIN GLARGINE 5 UNIT(S): 100 INJECTION, SOLUTION SUBCUTANEOUS at 22:38

## 2022-04-29 RX ADMIN — Medication 5 UNIT(S): at 13:09

## 2022-04-29 RX ADMIN — Medication 1 APPLICATION(S): at 19:01

## 2022-04-29 RX ADMIN — Medication 1 APPLICATORFUL: at 13:09

## 2022-04-29 RX ADMIN — Medication 10 MILLIGRAM(S): at 21:40

## 2022-04-29 RX ADMIN — Medication 1 APPLICATION(S): at 13:09

## 2022-04-29 RX ADMIN — Medication 4: at 22:21

## 2022-04-29 RX ADMIN — Medication 40 MILLIEQUIVALENT(S): at 13:10

## 2022-04-29 RX ADMIN — Medication 5: at 18:30

## 2022-04-29 RX ADMIN — Medication 80 MILLIGRAM(S): at 13:09

## 2022-04-29 RX ADMIN — GABAPENTIN 300 MILLIGRAM(S): 400 CAPSULE ORAL at 21:40

## 2022-04-29 NOTE — ED PROVIDER NOTE - PHYSICAL EXAMINATION
Vital signs reviewed  GENERAL: Patient nontoxic appearing, NAD  HEAD: NCAT  EYES: Anicteric  ENT: MMM  NECK: Supple, non tender  RESPIRATORY: crackles b/l  CARDIOVASCULAR: Regular rate and rhythm  Breast: RN Nadeen No b/l intertriginous erythematous rash, not raised. well demarcated. similar rash b/l inguinal fold.   ABDOMEN: Soft. Nondistended. Nontender. No guarding or rebound. No CVA tenderness.  MUSCULOSKELETAL/EXTREMITIES: Brisk cap refill. 2+ radial pulses. 2+ pitting edema   SKIN:  Warm and dry  NEURO: AAOx3. No gross FND.  PSYCHIATRIC: Cooperative. Affect appropriate.

## 2022-04-29 NOTE — ED PROVIDER NOTE - NS ED ROS FT
Constitutional: No fever, chills.  Eyes:  No visual changes  ENMT:  No neck pain  Cardiac:  No chest pain  Respiratory:  No cough, +SOB  GI:  No nausea, vomiting, diarrhea, abdominal pain.  :  No dysuria, hematuria  MS:  No back pain.  Neuro:  No headache or lightheadedness  Skin:  +skin rash

## 2022-04-29 NOTE — H&P ADULT - NSHPREVIEWOFSYSTEMS_GEN_ALL_CORE
CONSTITUTIONAL: No fever; No chills; No night sweats; No weight loss; No fatigue  EYES: No eye pain; No blurry vision  ENMT:  No difficulty hearing; No sinus or throat pain  NECK: No pain or stiffness  RESPIRATORY: No cough; No wheezing; No hemoptysis; +shortness of breath; No sputum production  CARDIOVASCULAR: No chest pain; No palpitations; +leg swelling  GASTROINTESTINAL: No abdominal pain; No nausea; No vomiting; No hematemesis; No diarrhea; No constipation. No melena or hematochezia.  GENITOURINARY: No dysuria; No frequency; No hematuria; No incontinence  NEUROLOGICAL: No headaches; No loss of strength; No numbness  SKIN: +itching/burning; +rashes or lesions   MUSCULOSKELETAL: No joint pain or swelling; No muscle, back, or extremity pain  HEME/LYMPH: No easy bruising, or bleeding gums

## 2022-04-29 NOTE — H&P ADULT - NSHPPHYSICALEXAM_GEN_ALL_CORE
T(C): 36.8 (04-29-22 @ 13:08), Max: 36.8 (04-29-22 @ 13:08)  HR: 66 (04-29-22 @ 13:08) (66 - 73)  BP: 140/64 (04-29-22 @ 13:08) (140/64 - 194/51)  RR: 18 (04-29-22 @ 13:08) (18 - 18)  SpO2: 99% (04-29-22 @ 13:08) (99% - 99%)    GENERAL: no apparent distress, on room air  HEAD:  Atraumatic, Normocephalic  EYES: EOMI, PERRLA, conjunctiva and sclera clear b/l  NECK: No cervical lymphadenopathy; no obvious masses.   CHEST/LUNG: Clear to auscultation bilaterally; No wheezing or crackles  HEART: Regular rate and rhythm; No obvious murmurs; nl S1/S2; No peripheral edema  ABDOMEN: Soft, Nontender, Nondistended; Bowel sounds present  EXTREMITIES:  2+ Peripheral Pulses; No joint swelling.  PSYCH: normal affect, calm demeanor  NEUROLOGY: Awake and alert; CN 2-12 grossly intact; no obvious FND  SKIN: hyperpigmented rash under b/l breast and suprapubic skin fold.

## 2022-04-29 NOTE — ED PROVIDER NOTE - ATTENDING CONTRIBUTION TO CARE
Attending note:   After face to face evaluation of this patient, I concur with above noted hx, pe, and care plan for this patient.  Barcenas: 82 yof with hx of CHF, CAD with stent, HTN, DM in insulin but has not been able to fill due to insurance issues. Pt notes itchy rash, and high BS. Pt also noted HARPER on exertion for months, No chest pain, no SOB at rest. Pt is well appearing, hypertensive, not tachy, crackles at bases, no distress, RRR, abd soft and non tender, 2+ pitting edema bilaterally, rash noted in under breast area mainly and groin creases - shiny red, no edema non palpable with few satellite lesions consistent with fungal rash.  Pt with multiple active issues - hyperglycemia due to lack of insulin meds - likely not DKA but will get labs,  check electrolytes, hold fluids given leg edema and basilar crackles. EKG, BNP, trop, CXR, antifungal cream. CDU vs admission.

## 2022-04-29 NOTE — H&P ADULT - PROBLEM SELECTOR PLAN 7
worsen renal function  unclear if progressive from DM vs KOJO on CKD.   nephro consulted, f/u recs.  would avoid IVF given concern for CHF.   oral hydration, monitor renal function.

## 2022-04-29 NOTE — ED PROVIDER NOTE - OBJECTIVE STATEMENT
83 y/o F with past history significant for CAD - s/p Stents, Paroxysmal A-fib, HLD, Type-2 DM, Neuropathy, GIB, Duodenal ulcer, PVD (s/p stents x 2 - 11/2019), and Gait difficulty (uses walker) p/w rash and elevated sugar. pt states she has been having rash underneath b/l breast and b/l groins for months. worsening redness and itching. also endorsing elevated glucose, states she needs to be on insulin however does not have an endo and has not had her insulin in months due to insurance/follow up difficulty. PMD Dr Goldberg currently on vacation  pt denies any fever, chills, cp, palpitations, sob, abdominal pain, v/d, dysuria, hematuria, vaginal bleeding/discharge

## 2022-04-29 NOTE — ED PROVIDER NOTE - CARE PLAN
Principal Discharge DX:	Fungal rash of torso  Secondary Diagnosis:	Pleural effusion  Secondary Diagnosis:	Elevated glucose   1

## 2022-04-29 NOTE — H&P ADULT - ASSESSMENT
82F with PMH CAD, s/p PCI on Plavix/ASA, CKD3-4, baseline Scr 3, DM2, DM neuropathy, HLD, HTN, bleeding DU in 11/2020, Anemia, PAF (not on ac due to GIB) now pw urticaric rash, found to have uncontrolled DM.

## 2022-04-29 NOTE — H&P ADULT - HISTORY OF PRESENT ILLNESS
82F h/o CAD, s/p PCI on Plavix/ASA, CKD3-4, baseline Scr 3, DM2, DM neuropathy, HLD, HTN, bleeding DU in 11/2020, Anemia, PAF( not on AC prob 2/2 GI bledd) presented today for rash. Patient reports She noticed the rashes under her breast and suprapubic area over the past few months, worsening, increased urticaria. no fever chills, or discharges from the rash. Patient reports she has not seen her PCP in the past year, She has stopped taking her diabetes medication an year ago because she was frustrated with them and decided to stop. She also endorsed b/l neuropathy and decreased vision in her eyes over the past year.   In ED, vss, patient found to be hyperglycemic to >500, w/o acidosis or AG. elevate proBNP.

## 2022-04-29 NOTE — H&P ADULT - PROBLEM SELECTOR PLAN 1
uncontrolled DM2 secondary to medication non-adherence.   was on Lantus 10U QHS until she stopped taking medications.   FS improved after 5U Amadelog, no signs of DKA  Will start patient on basal insulin with low SS  CC diet.   A1c >15, endocrine consult    Diabetic neuropathy  - on Gabapentin 300mg bid, will decrease to qd due to renal function.

## 2022-04-29 NOTE — ED ADULT NURSE REASSESSMENT NOTE - NS ED NURSE REASSESS COMMENT FT1
report given to RN in essu2. pt appears in NAD at this time. RR even and unlabored. pt placed in essu2 at this time.

## 2022-04-29 NOTE — ED ADULT NURSE NOTE - INTERVENTIONS DEFINITIONS
[place] to call system/Call bell, personal items and telephone within reach/Instruct patient to call for assistance/Non-slip footwear when patient is off stretcher/Physically safe environment: no spills, clutter or unnecessary equipment/Stretcher in lowest position, wheels locked, appropriate side rails in place/Monitor gait and stability/Reinforce activity limits and safety measures with patient and family

## 2022-04-29 NOTE — CONSULT NOTE ADULT - SUBJECTIVE AND OBJECTIVE BOX
HPI: Ms. Tsang is an 82 year-old woman with history of multiple medical issues including type 2 diabetes mellitus, coronary artery disease, congestive heart failure with preserved EF, peripheral arterial disease, and stage 5 chronic kidney disease. She is known to me from the inpatient and outpatient setting. I first met her back in 2021, when she was admitted with decompensated CHF, as well as hyperkalemia with a potassium of 6.8meq/L. More often than not of late, she has cancelled appointments the day of the appointment, or no-showed, when she has been scheduled to see me at the office. She presented today to the [facility] ER with a pruritic rash under her breasts and in the vaginal area. She was noted in the ER to have a FS >500mg/dL.          PAST MEDICAL & SURGICAL HISTORY:  HLD (hyperlipidemia)  DM2  Diabetic neuropathy  CAD - stents x2 2017  PAF  HFpEF  Gastritis  PAD - RLE stents x2 2019  CKD5 (not on dialysis)  Gastritis   section    Allergies  Carrots (Rash)    SOCIAL HISTORY:  Denies ETOh,Smoking,     FAMILY HISTORY:  FH: diabetes mellitus    REVIEW OF SYSTEMS:  CONSTITUTIONAL: No weakness, fevers or chills  EYES/ENT: No visual changes;  No vertigo or throat pain   NECK: No pain or stiffness  RESPIRATORY: No cough, wheezing, hemoptysis; No shortness of breath  CARDIOVASCULAR: No chest pain or palpitations  GASTROINTESTINAL: No abdominal or epigastric pain. No nausea, vomiting, or hematemesis; No diarrhea or constipation. No melena or hematochezia.  GENITOURINARY: No dysuria, frequency or hematuria  NEUROLOGICAL: No numbness or weakness  SKIN: No itching, burning, rashes, or lesions   All other review of systems is negative unless indicated above.    VITAL:  T(C): , Max: 36.8 (22 @ 13:08)  T(F): , Max: 98.2 (22 @ 13:08)  HR: 66 (22 @ 13:08)  BP: 140/64 (22 @ 13:08)  BP(mean): --  RR: 18 (22 @ 13:08)  SpO2: 99% (22 @ 13:08)  Wt(kg): --    PHYSICAL EXAM:  Constitutional: NAD, Alert  HEENT: NCAT, MMM  Neck: Supple, No JVD  Respiratory: CTA-b/l  Cardiovascular: RRR s1s2, no m/r/g  Gastrointestinal: BS+, soft, NT/ND  Extremities: No peripheral edema b/l  Neurological: no focal deficits; strength grossly intact  Back: no CVAT b/l  Skin: No rashes, no nevi    LABS:                        7.1    5.64  )-----------( 318      ( 2022 10:46 )             24.1     Na(130)/K(3.2)/Cl(90)/HCO3(24)/BUN(56)/Cr(4.19)Glu(532)/Ca(7.8)/Mg(2.00)/PO4(--)     @ 10:46    (2021) - BUN/creatinine: 65/3.52  (10/2020) - UA - 1+ prot  (21) - BUN 44, Cr 3.28, K 5.7, HCO3 17, PO4 5.5, Hb 7.9, TSat 9, TIBC 290, Ferr 19  (2020) - BUN 47, Cr 3.20, K 3.8, HCO3 27      IMAGING:  < from: Xray Chest 1 View- PORTABLE-Urgent (22 @ 12:24) >  Clear lungs.    from: US Kidney and Bladder (20 @ 13:29) >  Right kidney: 9.1. No renal mass, hydronephrosis or calculi.  Left kidney: 8.3. No renal mass, hydronephrosis or calculi.  Urinary bladder: Within normal limits.      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 Whereas she does not absolutely require dialysis at present, she is close to the point where she would in fact require it.    (2)Hypokalemia - mild - likely multifactorial from limited intake and from Lasix use.    (3)ID - fungal infection    (4)Endo - hyperglycemia    (5)Anemia - likely iron deficiency      RECOMMEND:  (1)K+ repletion - 20meq po x 2  (2)No need for low-K diet for now  (3)Check PO4, PTH, and iron studies with next bloodwork  (4)Meds for GFR <15ml/min  (5)No HD for now    Thank you for involving [facility] in this patient's care.    With warm regards,    Reid Solis MD   [facility]  Office: [phone]  Cell: [phone]             HPI: Ms. Tsang is an 82 year-old woman with history of multiple medical issues including type 2 diabetes mellitus, coronary artery disease, congestive heart failure with preserved EF, peripheral arterial disease, and stage 5 chronic kidney disease. She is known to me from the inpatient and outpatient setting. I first met her back in 2021, when she was admitted with decompensated CHF, as well as hyperkalemia with a potassium of 6.8meq/L. More often than not of late, she has cancelled appointments the day of the appointment, or no-showed, when she has been scheduled to see me at the office. She presented today to the [facility] ER with a pruritic rash under her breasts and in the vaginal area. She was noted in the ER to have a FS >500mg/dL.    Ms. Tsang attests to reasonable energy and appetite. She denies nausea, vomiting, shortness of breath, chest pain, or tremor.       PAST MEDICAL & SURGICAL HISTORY:  HLD (hyperlipidemia)  DM2  Diabetic neuropathy  CAD - stents x2 2017  PAF  HFpEF  Gastritis  PAD - RLE stents x2 2019  CKD5 (not on dialysis)  Gastritis   section    Allergies  Carrots (Rash)    SOCIAL HISTORY:  Denies ETOh,Smoking,     FAMILY HISTORY:  FH: diabetes mellitus    REVIEW OF SYSTEMS:  CONSTITUTIONAL: No weakness, fevers or chills  EYES/ENT: No visual changes;  No vertigo or throat pain   NECK: No pain or stiffness  RESPIRATORY: No cough, wheezing, hemoptysis; No shortness of breath  CARDIOVASCULAR: No chest pain or palpitations  GASTROINTESTINAL: No abdominal or epigastric pain. No nausea, vomiting, or hematemesis; No diarrhea or constipation. No melena or hematochezia.  GENITOURINARY: No dysuria, frequency or hematuria  NEUROLOGICAL: No numbness or weakness  SKIN: (+)pruritus/rash  All other review of systems is negative unless indicated above.    VITAL:  T(C): , Max: 36.8 (22 @ 13:08)  T(F): , Max: 98.2 (22 @ 13:08)  HR: 66 (22 @ 13:08)  BP: 140/64 (22 @ 13:08)  BP(mean): --  RR: 18 (22 @ 13:08)  SpO2: 99% (22 @ 13:08)  Wt(kg): --    PHYSICAL EXAM:  Constitutional: NAD, Alert  HEENT: NCAT, MMM  Neck: Supple, No JVD  Respiratory: CTA-b/l  Cardiovascular: RRR s1s2, no m/r/g  Gastrointestinal: BS+, soft, NT/ND  Extremities: No peripheral edema b/l  Neurological: no focal deficits; strength grossly intact  Back: no CVAT b/l  Skin: (+)erythema below breasts/genital area    LABS:                        7.1    5.64  )-----------( 318      ( 2022 10:46 )             24.1     Na(130)/K(3.2)/Cl(90)/HCO3(24)/BUN(56)/Cr(4.19)Glu(532)/Ca(7.8)/Mg(2.00)/PO4(--)     @ 10:46    (2021) - BUN/creatinine: 65/3.52  (10/2020) - UA - 1+ prot  (21) - BUN 44, Cr 3.28, K 5.7, HCO3 17, PO4 5.5, Hb 7.9, TSat 9, TIBC 290, Ferr 19  (2020) - BUN 47, Cr 3.20, K 3.8, HCO3 27      IMAGING:  < from: Xray Chest 1 View- PORTABLE-Urgent (22 @ 12:24) >  Clear lungs.    from: US Kidney and Bladder (20 @ 13:29) >  Right kidney: 9.1. No renal mass, hydronephrosis or calculi.  Left kidney: 8.3. No renal mass, hydronephrosis or calculi.  Urinary bladder: Within normal limits.      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 Whereas she does not absolutely require dialysis at present, she is close to the point where she would in fact require it.    (2)Hypokalemia - mild - likely multifactorial from limited intake and from Lasix use.    (3)ID - fungal infection    (4)Endo - hyperglycemia    (5)Anemia - likely iron deficiency      RECOMMEND:  (1)K+ repletion - 20meq po x 2  (2)No need for low-K diet for now  (3)Check PO4, PTH, and iron studies with next bloodwork  (4)Meds for GFR <15ml/min  (5)No HD for now    Thank you for involving [facility] in this patient's care.    With warm regards,    eRid Solis MD   [facility]  Office: [phone]  Cell: [phone]

## 2022-04-29 NOTE — ED ADULT TRIAGE NOTE - CHIEF COMPLAINT QUOTE
Patient c/o itchy rash under both breasts and groin/vaginal area x a month.  Diabetic type 2  FS = 544 .

## 2022-04-29 NOTE — ED ADULT NURSE NOTE - OBJECTIVE STATEMENT
pt received in rm 10 AAO x 3. pt reports "rash" underneath b/l breat and groin area x 1 month. pt with hx of DM not compliant on meds. pt denies sob, chest pain, n/v/d, fevers, chills, cough. respirations even and unlabored. redness noted underneath b/l breast. 20g iv placed to left ac.

## 2022-04-29 NOTE — H&P ADULT - PROBLEM SELECTOR PLAN 2
elevated proBNP  trop neg delta  EKG with inferalateral ischemic, no ekg for comparison. likely related to prior CAD and PCI, currently w/o chest pain.   s/p lasix in ED. clinically does not appear overloaded.   hold further diuretics given worsen renal function.   echo

## 2022-04-29 NOTE — ED PROVIDER NOTE - CLINICAL SUMMARY MEDICAL DECISION MAKING FREE TEXT BOX
83 y/o F p/w, elevated glucose and pittng edema. not on o2, vital stable. rash consistent with fungal dermatitis, likely due to hx of DM. elevated glucose due to poorly controlled DM, pt not taking her insulin. pitting edema and crackles likely due to worsening chf, fluid retension state, pending labs, insulin, anti fungal rx and admission for medical management

## 2022-04-30 LAB
A1C WITH ESTIMATED AVERAGE GLUCOSE RESULT: 15.6 % — HIGH (ref 4–5.6)
ALBUMIN SERPL ELPH-MCNC: 2.9 G/DL — LOW (ref 3.3–5)
ALP SERPL-CCNC: 201 U/L — HIGH (ref 40–120)
ALT FLD-CCNC: 8 U/L — SIGNIFICANT CHANGE UP (ref 4–33)
ANION GAP SERPL CALC-SCNC: 12 MMOL/L — SIGNIFICANT CHANGE UP (ref 7–14)
AST SERPL-CCNC: 10 U/L — SIGNIFICANT CHANGE UP (ref 4–32)
BASOPHILS # BLD AUTO: 0.04 K/UL — SIGNIFICANT CHANGE UP (ref 0–0.2)
BASOPHILS NFR BLD AUTO: 0.7 % — SIGNIFICANT CHANGE UP (ref 0–2)
BILIRUB SERPL-MCNC: <0.2 MG/DL — SIGNIFICANT CHANGE UP (ref 0.2–1.2)
BLD GP AB SCN SERPL QL: NEGATIVE — SIGNIFICANT CHANGE UP
BUN SERPL-MCNC: 52 MG/DL — HIGH (ref 7–23)
CALCIUM SERPL-MCNC: 7.9 MG/DL — LOW (ref 8.4–10.5)
CHLORIDE SERPL-SCNC: 99 MMOL/L — SIGNIFICANT CHANGE UP (ref 98–107)
CHOLEST SERPL-MCNC: 232 MG/DL — HIGH
CO2 SERPL-SCNC: 26 MMOL/L — SIGNIFICANT CHANGE UP (ref 22–31)
CREAT SERPL-MCNC: 4.28 MG/DL — HIGH (ref 0.5–1.3)
EGFR: 10 ML/MIN/1.73M2 — LOW
EOSINOPHIL # BLD AUTO: 0.17 K/UL — SIGNIFICANT CHANGE UP (ref 0–0.5)
EOSINOPHIL NFR BLD AUTO: 3 % — SIGNIFICANT CHANGE UP (ref 0–6)
ESTIMATED AVERAGE GLUCOSE: 401 — SIGNIFICANT CHANGE UP
FERRITIN SERPL-MCNC: 6 NG/ML — LOW (ref 15–150)
FOLATE SERPL-MCNC: 17.4 NG/ML — SIGNIFICANT CHANGE UP (ref 3.1–17.5)
GLUCOSE BLDC GLUCOMTR-MCNC: 253 MG/DL — HIGH (ref 70–99)
GLUCOSE BLDC GLUCOMTR-MCNC: 273 MG/DL — HIGH (ref 70–99)
GLUCOSE BLDC GLUCOMTR-MCNC: 405 MG/DL — HIGH (ref 70–99)
GLUCOSE SERPL-MCNC: 239 MG/DL — HIGH (ref 70–99)
HCT VFR BLD CALC: 23.3 % — LOW (ref 34.5–45)
HCT VFR BLD CALC: 24.7 % — LOW (ref 34.5–45)
HCT VFR BLD CALC: 26.9 % — LOW (ref 34.5–45)
HDLC SERPL-MCNC: 38 MG/DL — LOW
HGB BLD-MCNC: 6.9 G/DL — CRITICAL LOW (ref 11.5–15.5)
HGB BLD-MCNC: 7.2 G/DL — LOW (ref 11.5–15.5)
HGB BLD-MCNC: 8.2 G/DL — LOW (ref 11.5–15.5)
IANC: 3.54 K/UL — SIGNIFICANT CHANGE UP (ref 1.8–7.4)
IMM GRANULOCYTES NFR BLD AUTO: 0.9 % — SIGNIFICANT CHANGE UP (ref 0–1.5)
IRON SATN MFR SERPL: 17 UG/DL — LOW (ref 30–160)
IRON SATN MFR SERPL: 7 % — LOW (ref 14–50)
LIPID PNL WITH DIRECT LDL SERPL: 141 MG/DL — HIGH
LYMPHOCYTES # BLD AUTO: 1.33 K/UL — SIGNIFICANT CHANGE UP (ref 1–3.3)
LYMPHOCYTES # BLD AUTO: 23.8 % — SIGNIFICANT CHANGE UP (ref 13–44)
MCHC RBC-ENTMCNC: 24.8 PG — LOW (ref 27–34)
MCHC RBC-ENTMCNC: 24.9 PG — LOW (ref 27–34)
MCHC RBC-ENTMCNC: 25.7 PG — LOW (ref 27–34)
MCHC RBC-ENTMCNC: 29.1 GM/DL — LOW (ref 32–36)
MCHC RBC-ENTMCNC: 29.6 GM/DL — LOW (ref 32–36)
MCHC RBC-ENTMCNC: 30.5 GM/DL — LOW (ref 32–36)
MCV RBC AUTO: 84.1 FL — SIGNIFICANT CHANGE UP (ref 80–100)
MCV RBC AUTO: 84.3 FL — SIGNIFICANT CHANGE UP (ref 80–100)
MCV RBC AUTO: 85.2 FL — SIGNIFICANT CHANGE UP (ref 80–100)
MONOCYTES # BLD AUTO: 0.45 K/UL — SIGNIFICANT CHANGE UP (ref 0–0.9)
MONOCYTES NFR BLD AUTO: 8.1 % — SIGNIFICANT CHANGE UP (ref 2–14)
NEUTROPHILS # BLD AUTO: 3.54 K/UL — SIGNIFICANT CHANGE UP (ref 1.8–7.4)
NEUTROPHILS NFR BLD AUTO: 63.5 % — SIGNIFICANT CHANGE UP (ref 43–77)
NON HDL CHOLESTEROL: 194 MG/DL — HIGH
NRBC # BLD: 0 /100 WBCS — SIGNIFICANT CHANGE UP
NRBC # FLD: 0 K/UL — SIGNIFICANT CHANGE UP
PLATELET # BLD AUTO: 310 K/UL — SIGNIFICANT CHANGE UP (ref 150–400)
PLATELET # BLD AUTO: 319 K/UL — SIGNIFICANT CHANGE UP (ref 150–400)
PLATELET # BLD AUTO: 337 K/UL — SIGNIFICANT CHANGE UP (ref 150–400)
POTASSIUM SERPL-MCNC: 3.5 MMOL/L — SIGNIFICANT CHANGE UP (ref 3.5–5.3)
POTASSIUM SERPL-SCNC: 3.5 MMOL/L — SIGNIFICANT CHANGE UP (ref 3.5–5.3)
PROT SERPL-MCNC: 6.3 G/DL — SIGNIFICANT CHANGE UP (ref 6–8.3)
PROT SERPL-MCNC: 6.3 G/DL — SIGNIFICANT CHANGE UP (ref 6–8.3)
PTH-INTACT FLD-MCNC: 638 PG/ML — HIGH (ref 15–65)
RBC # BLD: 2.77 M/UL — LOW (ref 3.8–5.2)
RBC # BLD: 2.9 M/UL — LOW (ref 3.8–5.2)
RBC # BLD: 3.19 M/UL — LOW (ref 3.8–5.2)
RBC # FLD: 14.9 % — HIGH (ref 10.3–14.5)
RBC # FLD: 15.1 % — HIGH (ref 10.3–14.5)
RBC # FLD: 15.1 % — HIGH (ref 10.3–14.5)
RH IG SCN BLD-IMP: POSITIVE — SIGNIFICANT CHANGE UP
SODIUM SERPL-SCNC: 137 MMOL/L — SIGNIFICANT CHANGE UP (ref 135–145)
TIBC SERPL-MCNC: 245 UG/DL — SIGNIFICANT CHANGE UP (ref 220–430)
TRIGL SERPL-MCNC: 267 MG/DL — HIGH
TSH SERPL-MCNC: 0.91 UIU/ML — SIGNIFICANT CHANGE UP (ref 0.27–4.2)
UIBC SERPL-MCNC: 228 UG/DL — SIGNIFICANT CHANGE UP (ref 110–370)
VIT B12 SERPL-MCNC: 599 PG/ML — SIGNIFICANT CHANGE UP (ref 200–900)
WBC # BLD: 5.25 K/UL — SIGNIFICANT CHANGE UP (ref 3.8–10.5)
WBC # BLD: 5.58 K/UL — SIGNIFICANT CHANGE UP (ref 3.8–10.5)
WBC # BLD: 6.13 K/UL — SIGNIFICANT CHANGE UP (ref 3.8–10.5)
WBC # FLD AUTO: 5.25 K/UL — SIGNIFICANT CHANGE UP (ref 3.8–10.5)
WBC # FLD AUTO: 5.58 K/UL — SIGNIFICANT CHANGE UP (ref 3.8–10.5)
WBC # FLD AUTO: 6.13 K/UL — SIGNIFICANT CHANGE UP (ref 3.8–10.5)

## 2022-04-30 PROCEDURE — 84165 PROTEIN E-PHORESIS SERUM: CPT | Mod: 26

## 2022-04-30 RX ORDER — IRON SUCROSE 20 MG/ML
200 INJECTION, SOLUTION INTRAVENOUS ONCE
Refills: 0 | Status: COMPLETED | OUTPATIENT
Start: 2022-04-30 | End: 2022-04-30

## 2022-04-30 RX ORDER — POLYETHYLENE GLYCOL 3350 17 G/17G
17 POWDER, FOR SOLUTION ORAL DAILY
Refills: 0 | Status: DISCONTINUED | OUTPATIENT
Start: 2022-04-30 | End: 2022-05-02

## 2022-04-30 RX ORDER — INSULIN LISPRO 100/ML
4 VIAL (ML) SUBCUTANEOUS
Refills: 0 | Status: DISCONTINUED | OUTPATIENT
Start: 2022-04-30 | End: 2022-05-01

## 2022-04-30 RX ORDER — SENNA PLUS 8.6 MG/1
2 TABLET ORAL AT BEDTIME
Refills: 0 | Status: DISCONTINUED | OUTPATIENT
Start: 2022-04-30 | End: 2022-05-02

## 2022-04-30 RX ORDER — FUROSEMIDE 40 MG
80 TABLET ORAL
Refills: 0 | Status: DISCONTINUED | OUTPATIENT
Start: 2022-04-30 | End: 2022-05-02

## 2022-04-30 RX ADMIN — Medication 10 MILLIGRAM(S): at 05:53

## 2022-04-30 RX ADMIN — Medication 80 MILLIGRAM(S): at 17:13

## 2022-04-30 RX ADMIN — Medication 1 APPLICATION(S): at 06:00

## 2022-04-30 RX ADMIN — Medication 1 APPLICATORFUL: at 00:20

## 2022-04-30 RX ADMIN — Medication 1 APPLICATION(S): at 17:14

## 2022-04-30 RX ADMIN — Medication 3: at 17:37

## 2022-04-30 RX ADMIN — INSULIN GLARGINE 5 UNIT(S): 100 INJECTION, SOLUTION SUBCUTANEOUS at 22:13

## 2022-04-30 RX ADMIN — Medication 6: at 13:02

## 2022-04-30 RX ADMIN — Medication 1: at 22:14

## 2022-04-30 RX ADMIN — IRON SUCROSE 110 MILLIGRAM(S): 20 INJECTION, SOLUTION INTRAVENOUS at 18:18

## 2022-04-30 RX ADMIN — Medication 25 MILLIGRAM(S): at 05:54

## 2022-04-30 RX ADMIN — Medication 2: at 09:19

## 2022-04-30 RX ADMIN — GABAPENTIN 300 MILLIGRAM(S): 400 CAPSULE ORAL at 22:13

## 2022-04-30 RX ADMIN — Medication 10 MILLIGRAM(S): at 22:13

## 2022-04-30 RX ADMIN — Medication 4 UNIT(S): at 17:37

## 2022-04-30 RX ADMIN — Medication 10 MILLIGRAM(S): at 13:28

## 2022-04-30 NOTE — CONSULT NOTE ADULT - SUBJECTIVE AND OBJECTIVE BOX
Cardiovascular Disease Initial Evaluation    CHIEF COMPLAINT: Rash    HISTORY OF PRESENT ILLNESS:  This is an 82 year old woman with uncontrolled IDDM, compensated diastolic CHF, CAD s/p PCI, CKD V and HTN who presented to [facility] ED on 2022 with rash.  Ms. Tsang reports a rash under her breasts. Furthermore, she admits that she has not been compliant with medications and was found to have hyperglycemia in the ED.   Mr. Tsang has a history of CAD and recurrent GI bleed. She underwent cath with Dr. Zulay Mckinnon and had MICHAEL placed to LCx and OM1 on 2017. On 2018, she presented to [facility] with progressive SOB. She was found to have a Hb of 4.6 and was transfused multiple units of pRBCs. She underwent EGD by Dr. Curtis Daigle revealing one non-bleeding duodenal ulcer. Xarelto was discontinued (paroxysmal a-fib) and ASA with Plavix was resumed. She again presented with similar symptoms in 3/2018 with Hb-5. Capsule study revealed questionable ulcer in ileum and patient was started on mesalamine. ASA was d/c'ed.    Currently she is complaining of exertional SOB.       Allergies  Carrots (Rash)  No Known Drug Allergies    MEDICATIONS:  hydrALAZINE 10 milliGRAM(s) Oral three times a day  metoprolol succinate ER 25 milliGRAM(s) Oral daily        acetaminophen     Tablet .. 650 milliGRAM(s) Oral every 6 hours PRN  gabapentin 300 milliGRAM(s) Oral at bedtime  melatonin 3 milliGRAM(s) Oral at bedtime PRN  ondansetron Injectable 4 milliGRAM(s) IV Push every 8 hours PRN    aluminum hydroxide/magnesium hydroxide/simethicone Suspension 30 milliLiter(s) Oral every 4 hours PRN    dextrose 50% Injectable 25 Gram(s) IV Push once  dextrose 50% Injectable 12.5 Gram(s) IV Push once  dextrose 50% Injectable 25 Gram(s) IV Push once  dextrose Oral Gel 15 Gram(s) Oral once PRN  glucagon  Injectable 1 milliGRAM(s) IntraMuscular once  insulin glargine Injectable (LANTUS) 5 Unit(s) SubCutaneous at bedtime  insulin lispro (ADMELOG) corrective regimen sliding scale   SubCutaneous three times a day before meals  insulin lispro (ADMELOG) corrective regimen sliding scale   SubCutaneous at bedtime    clotrimazole 1% Cream 1 Application(s) Topical two times a day  clotrimazole 1% Vaginal Cream 1 Applicatorful Vaginal at bedtime  dextrose 5%. 1000 milliLiter(s) IV Continuous <Continuous>  dextrose 5%. 1000 milliLiter(s) IV Continuous <Continuous>      PAST MEDICAL & SURGICAL HISTORY:  HLD (hyperlipidemia)    Diabetic neuropathy    CAD (coronary artery disease)  ~ s/p MICHAEL x2 in 2017    Paroxysmal atrial fibrillation    Diabetes mellitus, type 2    GI bleed    Neuropathy    Gastritis    Diastolic heart failure    Transfusion history    Stage 5 chronic kidney disease not on chronic dialysis    Anemia    PVD (peripheral vascular disease)  ~ RLE angiogram with 2 stents placed in 2019.    H/O  section    S/P coronary artery stent placement  x2 in     History of esophagogastroduodenoscopy (EGD)    S/P angiogram of extremity        FAMILY HISTORY:  FH: diabetes mellitus        SOCIAL HISTORY:    The patient is a nonsmoker       REVIEW OF SYSTEMS:  See HPI, otherwise complete 14 point review of systems negative      PHYSICAL EXAM:  T(C): 36.8 (22 @ 09:31), Max: 36.8 (22 @ 13:08)  HR: 73 (22 @ 09:31) (64 - 74)  BP: 132/38 (22 @ 09:31) (126/57 - 165/65)  RR: 18 (22 @ 09:31) (18 - 20)  SpO2: 96% (22 @ 09:31) (96% - 100%)  Wt(kg): --  I&O's Summary      Appearance: No Acute Distress; resting comfortably  HEENT:  Normal oral mucosa, PERRL, EOMI	  Cardiovascular: Normal S1 S2, No JVD, No murmurs/rubs/gallops  Respiratory: Normal respiratory effort; Lungs clear to auscultation bilaterally  Gastrointestinal:  Soft, Non-tender, + BS	  Skin: No rashes, No ecchymoses, No cyanosis	  Neurologic: Non-focal; no weakness  Extremities: No clubbing, cyanosis or edema  Vascular: Peripheral pulses palpable 2+ bilaterally  Psychiatry: A & O x 3, Mood & affect appropriate    Laboratory Data:	 	    CBC Full  -  ( 2022 07:35 )  WBC Count : 5.58 K/uL  Hemoglobin : 6.9 g/dL  Hematocrit : 23.3 %  Platelet Count - Automated : 310 K/uL  Mean Cell Volume : 84.1 fL  Mean Cell Hemoglobin : 24.9 pg  Mean Cell Hemoglobin Concentration : 29.6 gm/dL  Auto Neutrophil # : 3.54 K/uL  Auto Lymphocyte # : 1.33 K/uL  Auto Monocyte # : 0.45 K/uL  Auto Eosinophil # : 0.17 K/uL  Auto Basophil # : 0.04 K/uL  Auto Neutrophil % : 63.5 %  Auto Lymphocyte % : 23.8 %  Auto Monocyte % : 8.1 %  Auto Eosinophil % : 3.0 %  Auto Basophil % : 0.7 %        137  |  99  |  52<H>  ----------------------------<  239<H>  3.5   |  26  |  4.28<H>      130<L>  |  90<L>  |  56<H>  ----------------------------<  532<HH>  3.2<L>   |  24  |  4.19<H>    Ca    7.9<L>      2022 07:35  Ca    7.8<L>      2022 10:46  Mg     2.00         TPro  6.3  /  Alb  2.9<L>  /  TBili  <0.2  /  DBili  x   /  AST  10  /  ALT  8   /  AlkPhos  201<H>    TPro  7.0  /  Alb  3.3  /  TBili  <0.2  /  DBili  x   /  AST  10  /  ALT  7   /  AlkPhos  224<H>        proBNP: Serum Pro-Brain Natriuretic Peptide: 6024 pg/mL ( @ 10:46)        Interpretation of Telemetry: Sinus; no ectopy	    ECG:  	Sinus; non specific t-wave changes.       Assessment: 82 year old woman with uncontrolled IDDM, compensated diastolic CHF, CAD s/p PCI, CKD V and HTN presents with rash and anemia.     Plan of Care:      #CAD- s/p MICHAEL x2 in 2017.  Anemia noted on admission, but patient denies any signs of bleeding.  Likely anemia in the setting of CKD.   No signs or symptoms of coronary ischemia.   If Hb remains stable, I would start Plavix 75 mg daily.     #Compensated diastolic CHF-  Patient is saturating well on room air.  She is not grossly fluid overloaded on exam.  I would resume home dose Lasix 80 mg PO BID.    #PVD-  Patient status post RLE angiogram with 2 stents placed in 2019.   Antiplatelet therapy management as stated above.     #Paroxymal a-fib-  No AC given h/o GI bleed.    Discussed with patient at length in the [facility].       52 minutes spent on total encounter; more than 50% of the visit was spent counseling and/or coordinating care by the attending physician.   	  Julio César Hooper MD [facility]  Cardiovascular Diseases  [phone]     Cardiovascular Disease Initial Evaluation    CHIEF COMPLAINT: Rash    HISTORY OF PRESENT ILLNESS:  This is an 82 year old woman with uncontrolled IDDM, compensated diastolic CHF, CAD s/p PCI, CKD V and HTN who presented to [facility] ED on 2022 with rash.  Ms. Tsang reports a rash under her breasts. Furthermore, she admits that she has not been compliant with medications and was found to have hyperglycemia in the ED.   Mr. Tsang has a history of CAD and recurrent GI bleed. She underwent cath with Dr. Zulay Mckinnon and had MICHAEL placed to LCx and OM1 on 2017. On 2018, she presented to [facility] with progressive SOB. She was found to have a Hb of 4.6 and was transfused multiple units of pRBCs. She underwent EGD by Dr. Curtis Daigle revealing one non-bleeding duodenal ulcer. Xarelto was discontinued (paroxysmal a-fib) and ASA with Plavix was resumed. She again presented with similar symptoms in 3/2018 with Hb-5. Capsule study revealed questionable ulcer in ileum and patient was started on mesalamine. ASA was d/c'ed.    Currently she is complaining of exertional SOB.       Allergies  Carrots (Rash)  No Known Drug Allergies    MEDICATIONS:  hydrALAZINE 10 milliGRAM(s) Oral three times a day  metoprolol succinate ER 25 milliGRAM(s) Oral daily        acetaminophen     Tablet .. 650 milliGRAM(s) Oral every 6 hours PRN  gabapentin 300 milliGRAM(s) Oral at bedtime  melatonin 3 milliGRAM(s) Oral at bedtime PRN  ondansetron Injectable 4 milliGRAM(s) IV Push every 8 hours PRN    aluminum hydroxide/magnesium hydroxide/simethicone Suspension 30 milliLiter(s) Oral every 4 hours PRN    dextrose 50% Injectable 25 Gram(s) IV Push once  dextrose 50% Injectable 12.5 Gram(s) IV Push once  dextrose 50% Injectable 25 Gram(s) IV Push once  dextrose Oral Gel 15 Gram(s) Oral once PRN  glucagon  Injectable 1 milliGRAM(s) IntraMuscular once  insulin glargine Injectable (LANTUS) 5 Unit(s) SubCutaneous at bedtime  insulin lispro (ADMELOG) corrective regimen sliding scale   SubCutaneous three times a day before meals  insulin lispro (ADMELOG) corrective regimen sliding scale   SubCutaneous at bedtime    clotrimazole 1% Cream 1 Application(s) Topical two times a day  clotrimazole 1% Vaginal Cream 1 Applicatorful Vaginal at bedtime  dextrose 5%. 1000 milliLiter(s) IV Continuous <Continuous>  dextrose 5%. 1000 milliLiter(s) IV Continuous <Continuous>      PAST MEDICAL & SURGICAL HISTORY:  HLD (hyperlipidemia)    Diabetic neuropathy    CAD (coronary artery disease)  ~ s/p MICHAEL x2 in 2017    Paroxysmal atrial fibrillation    Diabetes mellitus, type 2    GI bleed    Neuropathy    Gastritis    Diastolic heart failure    Transfusion history    Stage 5 chronic kidney disease not on chronic dialysis    Anemia    PVD (peripheral vascular disease)  ~ RLE angiogram with 2 stents placed in 2019.    H/O  section    S/P coronary artery stent placement  x2 in     History of esophagogastroduodenoscopy (EGD)    S/P angiogram of extremity        FAMILY HISTORY:  FH: diabetes mellitus        SOCIAL HISTORY:    The patient is a nonsmoker       REVIEW OF SYSTEMS:  See HPI, otherwise complete 14 point review of systems negative      PHYSICAL EXAM:  T(C): 36.8 (22 @ 09:31), Max: 36.8 (22 @ 13:08)  HR: 73 (22 @ 09:31) (64 - 74)  BP: 132/38 (22 @ 09:31) (126/57 - 165/65)  RR: 18 (22 @ 09:31) (18 - 20)  SpO2: 96% (22 @ 09:31) (96% - 100%)  Wt(kg): --  I&O's Summary      Appearance: No Acute Distress; resting comfortably  HEENT:  Normal oral mucosa, PERRL, EOMI	  Cardiovascular: Normal S1 S2, No JVD, No murmurs/rubs/gallops  Respiratory: Normal respiratory effort; Lungs clear to auscultation bilaterally  Gastrointestinal:  Soft, Non-tender, + BS	  Skin: No rashes, No ecchymoses, No cyanosis	  Neurologic: Non-focal; no weakness  Extremities: No clubbing, cyanosis or edema  Vascular: Peripheral pulses palpable 2+ bilaterally  Psychiatry: A & O x 3, Mood & affect appropriate    Laboratory Data:	 	    CBC Full  -  ( 2022 07:35 )  WBC Count : 5.58 K/uL  Hemoglobin : 6.9 g/dL  Hematocrit : 23.3 %  Platelet Count - Automated : 310 K/uL  Mean Cell Volume : 84.1 fL  Mean Cell Hemoglobin : 24.9 pg  Mean Cell Hemoglobin Concentration : 29.6 gm/dL  Auto Neutrophil # : 3.54 K/uL  Auto Lymphocyte # : 1.33 K/uL  Auto Monocyte # : 0.45 K/uL  Auto Eosinophil # : 0.17 K/uL  Auto Basophil # : 0.04 K/uL  Auto Neutrophil % : 63.5 %  Auto Lymphocyte % : 23.8 %  Auto Monocyte % : 8.1 %  Auto Eosinophil % : 3.0 %  Auto Basophil % : 0.7 %        137  |  99  |  52<H>  ----------------------------<  239<H>  3.5   |  26  |  4.28<H>      130<L>  |  90<L>  |  56<H>  ----------------------------<  532<HH>  3.2<L>   |  24  |  4.19<H>    Ca    7.9<L>      2022 07:35  Ca    7.8<L>      2022 10:46  Mg     2.00         TPro  6.3  /  Alb  2.9<L>  /  TBili  <0.2  /  DBili  x   /  AST  10  /  ALT  8   /  AlkPhos  201<H>    TPro  7.0  /  Alb  3.3  /  TBili  <0.2  /  DBili  x   /  AST  10  /  ALT  7   /  AlkPhos  224<H>        proBNP: Serum Pro-Brain Natriuretic Peptide: 6024 pg/mL ( @ 10:46)        Interpretation of Telemetry: Sinus; no ectopy	    ECG:  	Sinus; non specific t-wave changes.       Assessment: 82 year old woman with uncontrolled IDDM, compensated diastolic CHF, CAD s/p PCI, CKD V and HTN presents with rash and anemia.     Plan of Care:      #CAD- s/p MICHAEL x2 in 2017.  Anemia noted on admission, but patient denies any signs of bleeding.  Likely anemia in the setting of CKD.   No signs or symptoms of coronary ischemia.   If Hb remains stable, I would start Plavix 75 mg daily.     #Compensated diastolic CHF-  Patient is saturating well on room air.  She is not grossly fluid overloaded on exam.  Echo on this admission reviewed- no significant changes from prior.   I would resume home dose Lasix 80 mg PO BID.    #PVD-  Patient status post RLE angiogram with 2 stents placed in 2019.   Antiplatelet therapy management as stated above.     #Paroxymal a-fib-  No AC given h/o GI bleed.    Discussed with patient at length in the [facility].       52 minutes spent on total encounter; more than 50% of the visit was spent counseling and/or coordinating care by the attending physician.   	  Julio César Hooper MD [facility]  Cardiovascular Diseases  [phone]     Cardiovascular Disease Initial Evaluation    CHIEF COMPLAINT: Rash    HISTORY OF PRESENT ILLNESS:  This is an 82 year old woman with uncontrolled IDDM, compensated diastolic CHF, CAD s/p PCI, CKD V and HTN who presented to [facility] ED on 2022 with rash.  Ms. Tsang reports a rash under her breasts. Furthermore, she admits that she has not been compliant with medications and was found to have hyperglycemia in the ED.   Mr. Tsang has a history of CAD and recurrent GI bleed. She underwent cath with Dr. Zulay Mckinnon and had MICHAEL placed to LCx and OM1 on 2017. On 2018, she presented to [facility] with progressive SOB. She was found to have a Hb of 4.6 and was transfused multiple units of pRBCs. She underwent EGD by Dr. Curtis Daigle revealing one non-bleeding duodenal ulcer. Xarelto was discontinued (paroxysmal a-fib) and ASA with Plavix was resumed. She again presented with similar symptoms in 3/2018 with Hb-5. Capsule study revealed questionable ulcer in ileum and patient was started on mesalamine. ASA was d/c'ed.    Currently she is complaining of exertional SOB.       Allergies  Carrots (Rash)  No Known Drug Allergies    MEDICATIONS:  hydrALAZINE 10 milliGRAM(s) Oral three times a day  metoprolol succinate ER 25 milliGRAM(s) Oral daily        acetaminophen     Tablet .. 650 milliGRAM(s) Oral every 6 hours PRN  gabapentin 300 milliGRAM(s) Oral at bedtime  melatonin 3 milliGRAM(s) Oral at bedtime PRN  ondansetron Injectable 4 milliGRAM(s) IV Push every 8 hours PRN    aluminum hydroxide/magnesium hydroxide/simethicone Suspension 30 milliLiter(s) Oral every 4 hours PRN    dextrose 50% Injectable 25 Gram(s) IV Push once  dextrose 50% Injectable 12.5 Gram(s) IV Push once  dextrose 50% Injectable 25 Gram(s) IV Push once  dextrose Oral Gel 15 Gram(s) Oral once PRN  glucagon  Injectable 1 milliGRAM(s) IntraMuscular once  insulin glargine Injectable (LANTUS) 5 Unit(s) SubCutaneous at bedtime  insulin lispro (ADMELOG) corrective regimen sliding scale   SubCutaneous three times a day before meals  insulin lispro (ADMELOG) corrective regimen sliding scale   SubCutaneous at bedtime    clotrimazole 1% Cream 1 Application(s) Topical two times a day  clotrimazole 1% Vaginal Cream 1 Applicatorful Vaginal at bedtime  dextrose 5%. 1000 milliLiter(s) IV Continuous <Continuous>  dextrose 5%. 1000 milliLiter(s) IV Continuous <Continuous>      PAST MEDICAL & SURGICAL HISTORY:  HLD (hyperlipidemia)    Diabetic neuropathy    CAD (coronary artery disease)  ~ s/p MICHAEL x2 in 2017    Paroxysmal atrial fibrillation    Diabetes mellitus, type 2    GI bleed    Neuropathy    Gastritis    Diastolic heart failure    Transfusion history    Stage 5 chronic kidney disease not on chronic dialysis    Anemia    PVD (peripheral vascular disease)  ~ RLE angiogram with 2 stents placed in 2019.    H/O  section    S/P coronary artery stent placement  x2 in     History of esophagogastroduodenoscopy (EGD)    S/P angiogram of extremity        FAMILY HISTORY:  FH: diabetes mellitus        SOCIAL HISTORY:    The patient is a nonsmoker       REVIEW OF SYSTEMS:  See HPI, otherwise complete 14 point review of systems negative      PHYSICAL EXAM:  T(C): 36.8 (22 @ 09:31), Max: 36.8 (22 @ 13:08)  HR: 73 (22 @ 09:31) (64 - 74)  BP: 132/38 (22 @ 09:31) (126/57 - 165/65)  RR: 18 (22 @ 09:31) (18 - 20)  SpO2: 96% (22 @ 09:31) (96% - 100%)  Wt(kg): --  I&O's Summary      Appearance: No Acute Distress; resting comfortably  HEENT:  Normal oral mucosa, PERRL, EOMI	  Cardiovascular: Normal S1 S2, No JVD, No murmurs/rubs/gallops  Respiratory: Normal respiratory effort; Lungs clear to auscultation bilaterally  Gastrointestinal:  Soft, Non-tender, + BS	  Skin: No rashes, No ecchymoses, No cyanosis	  Neurologic: Non-focal; no weakness  Extremities: No clubbing, cyanosis or edema  Vascular: Peripheral pulses palpable 2+ bilaterally  Psychiatry: A & O x 3, Mood & affect appropriate    Laboratory Data:	 	    CBC Full  -  ( 2022 07:35 )  WBC Count : 5.58 K/uL  Hemoglobin : 6.9 g/dL  Hematocrit : 23.3 %  Platelet Count - Automated : 310 K/uL  Mean Cell Volume : 84.1 fL  Mean Cell Hemoglobin : 24.9 pg  Mean Cell Hemoglobin Concentration : 29.6 gm/dL  Auto Neutrophil # : 3.54 K/uL  Auto Lymphocyte # : 1.33 K/uL  Auto Monocyte # : 0.45 K/uL  Auto Eosinophil # : 0.17 K/uL  Auto Basophil # : 0.04 K/uL  Auto Neutrophil % : 63.5 %  Auto Lymphocyte % : 23.8 %  Auto Monocyte % : 8.1 %  Auto Eosinophil % : 3.0 %  Auto Basophil % : 0.7 %        137  |  99  |  52<H>  ----------------------------<  239<H>  3.5   |  26  |  4.28<H>      130<L>  |  90<L>  |  56<H>  ----------------------------<  532<HH>  3.2<L>   |  24  |  4.19<H>    Ca    7.9<L>      2022 07:35  Ca    7.8<L>      2022 10:46  Mg     2.00         TPro  6.3  /  Alb  2.9<L>  /  TBili  <0.2  /  DBili  x   /  AST  10  /  ALT  8   /  AlkPhos  201<H>    TPro  7.0  /  Alb  3.3  /  TBili  <0.2  /  DBili  x   /  AST  10  /  ALT  7   /  AlkPhos  224<H>        proBNP: Serum Pro-Brain Natriuretic Peptide: 6024 pg/mL ( @ 10:46)        Interpretation of Telemetry: Sinus; no ectopy	    ECG:  	Sinus; non specific t-wave changes.       Assessment: 82 year old woman with uncontrolled IDDM, compensated diastolic CHF, CAD s/p PCI, CKD V and HTN presents with rash and anemia.     Plan of Care:      #CAD- s/p MICHAEL x2 in 2017.  Anemia noted on admission, but patient denies any signs of bleeding.  Likely anemia in the setting of CKD.   No signs or symptoms of coronary ischemia.   If Hb remains stable, I would start Plavix 75 mg daily.     #Compensated diastolic CHF-  Patient is saturating well on room air.  She is not grossly fluid overloaded on exam.  Echo on this admission reviewed- no significant changes from prior.   I would resume home dose Lasix 80 mg PO BID.    #PVD-  Patient status post RLE angiogram with 2 stents placed in 2019.   Antiplatelet therapy management as stated above.     #Paroxymal a-fib-  Admission EKG is sinus.   No AC given h/o GI bleed.    Discussed with patient at length in the [facility].       52 minutes spent on total encounter; more than 50% of the visit was spent counseling and/or coordinating care by the attending physician.   	  Julio César Hooper MD [facility]  Cardiovascular Diseases  [phone]

## 2022-04-30 NOTE — PATIENT PROFILE ADULT - FALL HARM RISK - RISK INTERVENTIONS

## 2022-04-30 NOTE — PROGRESS NOTE ADULT - ASSESSMENT
on room air  afebrile  no complaints at present    acetaminophen     Tablet .. 650 milliGRAM(s) Oral every 6 hours PRN  aluminum hydroxide/magnesium hydroxide/simethicone Suspension 30 milliLiter(s) Oral every 4 hours PRN  clotrimazole 1% Cream 1 Application(s) Topical two times a day  clotrimazole 1% Vaginal Cream 1 Applicatorful Vaginal at bedtime  dextrose 5%. 1000 milliLiter(s) IV Continuous <Continuous>  dextrose 5%. 1000 milliLiter(s) IV Continuous <Continuous>  dextrose 50% Injectable 25 Gram(s) IV Push once  dextrose 50% Injectable 12.5 Gram(s) IV Push once  dextrose 50% Injectable 25 Gram(s) IV Push once  dextrose Oral Gel 15 Gram(s) Oral once PRN  furosemide    Tablet 80 milliGRAM(s) Oral two times a day  gabapentin 300 milliGRAM(s) Oral at bedtime  glucagon  Injectable 1 milliGRAM(s) IntraMuscular once  hydrALAZINE 10 milliGRAM(s) Oral three times a day  insulin glargine Injectable (LANTUS) 5 Unit(s) SubCutaneous at bedtime  insulin lispro (ADMELOG) corrective regimen sliding scale   SubCutaneous three times a day before meals  insulin lispro (ADMELOG) corrective regimen sliding scale   SubCutaneous at bedtime  insulin lispro Injectable (ADMELOG) 4 Unit(s) SubCutaneous three times a day before meals  melatonin 3 milliGRAM(s) Oral at bedtime PRN  metoprolol succinate ER 25 milliGRAM(s) Oral daily  ondansetron Injectable 4 milliGRAM(s) IV Push every 8 hours PRN      VITAL:  T(C): , Max: 36.8 (04-30-22 @ 00:33)  T(F): , Max: 98.3 (04-30-22 @ 09:31)  HR: 77 (04-30-22 @ 12:54)  BP: 160/62 (04-30-22 @ 12:54)  BP(mean): --  RR: 18 (04-30-22 @ 12:54)  SpO2: 98% (04-30-22 @ 12:54)  Wt(kg): --      PHYSICAL EXAM:  Constitutional: NAD, Alert  HEENT: NCAT, MMM  Neck: Supple, No JVD  Respiratory: CTA-b/l  Cardiovascular: RRR s1s2, no m/r/g  Gastrointestinal: BS+, soft, NT/ND  Extremities: No peripheral edema b/l  Neurological: no focal deficits; strength grossly intact  Back: no CVAT b/l  Skin: (+)erythema below breasts/genital area      LABS:                          7.2    5.25  )-----------( 337      ( 30 Apr 2022 10:43 )             24.7     Na(137)/K(3.5)/Cl(99)/HCO3(26)/BUN(52)/Cr(4.28)Glu(239)/Ca(7.9)/Mg(--)/PO4(--)    04-30 @ 07:35  Na(130)/K(3.2)/Cl(90)/HCO3(24)/BUN(56)/Cr(4.19)Glu(532)/Ca(7.8)/Mg(2.00)/PO4(--)    04-29 @ 10:46            ASSESSMENT/PLAN  ASSESSMENT:  (1)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 Whereas she does not absolutely require dialysis at present, she is close to the point where she would in fact require it.    (2)Lytes controlled    (3)ID - fungal infection    (4)Endo - hyperglycemia    (5)Anemia - likely iron deficiency; hgb improving- defer transfusion to primary team    (6) Blood pressure is labile      RECOMMEND:  (1) Venofer 200mg IV x 5 days  (2)No need for low-K diet for now  (3)Check PO4  (4)Meds for GFR <15ml/min  (4)No HD for now      d/w Dr. Jet Leong, NP-BC  [facility]  [phone]

## 2022-05-01 ENCOUNTER — TRANSCRIPTION ENCOUNTER (OUTPATIENT)
Age: 82
End: 2022-05-01

## 2022-05-01 LAB
ANION GAP SERPL CALC-SCNC: 13 MMOL/L — SIGNIFICANT CHANGE UP (ref 7–14)
BUN SERPL-MCNC: 49 MG/DL — HIGH (ref 7–23)
CALCIUM SERPL-MCNC: 8.5 MG/DL — SIGNIFICANT CHANGE UP (ref 8.4–10.5)
CHLORIDE SERPL-SCNC: 101 MMOL/L — SIGNIFICANT CHANGE UP (ref 98–107)
CO2 SERPL-SCNC: 24 MMOL/L — SIGNIFICANT CHANGE UP (ref 22–31)
CREAT SERPL-MCNC: 4.2 MG/DL — HIGH (ref 0.5–1.3)
EGFR: 10 ML/MIN/1.73M2 — LOW
GLUCOSE BLDC GLUCOMTR-MCNC: 160 MG/DL — HIGH (ref 70–99)
GLUCOSE BLDC GLUCOMTR-MCNC: 193 MG/DL — HIGH (ref 70–99)
GLUCOSE BLDC GLUCOMTR-MCNC: 221 MG/DL — HIGH (ref 70–99)
GLUCOSE BLDC GLUCOMTR-MCNC: 242 MG/DL — HIGH (ref 70–99)
GLUCOSE BLDC GLUCOMTR-MCNC: 297 MG/DL — HIGH (ref 70–99)
GLUCOSE BLDC GLUCOMTR-MCNC: 406 MG/DL — HIGH (ref 70–99)
GLUCOSE SERPL-MCNC: 209 MG/DL — HIGH (ref 70–99)
HCT VFR BLD CALC: 29.5 % — LOW (ref 34.5–45)
HGB BLD-MCNC: 8.9 G/DL — LOW (ref 11.5–15.5)
MAGNESIUM SERPL-MCNC: 2.1 MG/DL — SIGNIFICANT CHANGE UP (ref 1.6–2.6)
MCHC RBC-ENTMCNC: 25.8 PG — LOW (ref 27–34)
MCHC RBC-ENTMCNC: 30.2 GM/DL — LOW (ref 32–36)
MCV RBC AUTO: 85.5 FL — SIGNIFICANT CHANGE UP (ref 80–100)
NRBC # BLD: 0 /100 WBCS — SIGNIFICANT CHANGE UP
NRBC # FLD: 0 K/UL — SIGNIFICANT CHANGE UP
PHOSPHATE SERPL-MCNC: 5.6 MG/DL — HIGH (ref 2.5–4.5)
PLATELET # BLD AUTO: 327 K/UL — SIGNIFICANT CHANGE UP (ref 150–400)
POTASSIUM SERPL-MCNC: 3.6 MMOL/L — SIGNIFICANT CHANGE UP (ref 3.5–5.3)
POTASSIUM SERPL-SCNC: 3.6 MMOL/L — SIGNIFICANT CHANGE UP (ref 3.5–5.3)
RBC # BLD: 3.45 M/UL — LOW (ref 3.8–5.2)
RBC # FLD: 15.4 % — HIGH (ref 10.3–14.5)
SODIUM SERPL-SCNC: 138 MMOL/L — SIGNIFICANT CHANGE UP (ref 135–145)
WBC # BLD: 5.55 K/UL — SIGNIFICANT CHANGE UP (ref 3.8–10.5)
WBC # FLD AUTO: 5.55 K/UL — SIGNIFICANT CHANGE UP (ref 3.8–10.5)

## 2022-05-01 RX ORDER — INSULIN LISPRO 100/ML
5 VIAL (ML) SUBCUTANEOUS
Refills: 0 | Status: DISCONTINUED | OUTPATIENT
Start: 2022-05-01 | End: 2022-05-02

## 2022-05-01 RX ORDER — SEVELAMER CARBONATE 2400 MG/1
800 POWDER, FOR SUSPENSION ORAL THREE TIMES A DAY
Refills: 0 | Status: DISCONTINUED | OUTPATIENT
Start: 2022-05-01 | End: 2022-05-02

## 2022-05-01 RX ORDER — INSULIN GLARGINE 100 [IU]/ML
7 INJECTION, SOLUTION SUBCUTANEOUS AT BEDTIME
Refills: 0 | Status: DISCONTINUED | OUTPATIENT
Start: 2022-05-01 | End: 2022-05-02

## 2022-05-01 RX ORDER — CLOPIDOGREL BISULFATE 75 MG/1
75 TABLET, FILM COATED ORAL DAILY
Refills: 0 | Status: DISCONTINUED | OUTPATIENT
Start: 2022-05-01 | End: 2022-05-02

## 2022-05-01 RX ORDER — IRON SUCROSE 20 MG/ML
200 INJECTION, SOLUTION INTRAVENOUS ONCE
Refills: 0 | Status: COMPLETED | OUTPATIENT
Start: 2022-05-01 | End: 2022-05-01

## 2022-05-01 RX ADMIN — GABAPENTIN 300 MILLIGRAM(S): 400 CAPSULE ORAL at 21:15

## 2022-05-01 RX ADMIN — Medication 2: at 08:46

## 2022-05-01 RX ADMIN — CLOPIDOGREL BISULFATE 75 MILLIGRAM(S): 75 TABLET, FILM COATED ORAL at 13:59

## 2022-05-01 RX ADMIN — IRON SUCROSE 110 MILLIGRAM(S): 20 INJECTION, SOLUTION INTRAVENOUS at 15:12

## 2022-05-01 RX ADMIN — Medication 10 MILLIGRAM(S): at 05:28

## 2022-05-01 RX ADMIN — SENNA PLUS 2 TABLET(S): 8.6 TABLET ORAL at 21:15

## 2022-05-01 RX ADMIN — SEVELAMER CARBONATE 800 MILLIGRAM(S): 2400 POWDER, FOR SUSPENSION ORAL at 13:59

## 2022-05-01 RX ADMIN — Medication 4 UNIT(S): at 08:47

## 2022-05-01 RX ADMIN — Medication 10 MILLIGRAM(S): at 13:59

## 2022-05-01 RX ADMIN — Medication 650 MILLIGRAM(S): at 21:15

## 2022-05-01 RX ADMIN — Medication 80 MILLIGRAM(S): at 05:28

## 2022-05-01 RX ADMIN — Medication 30 MILLILITER(S): at 23:19

## 2022-05-01 RX ADMIN — Medication 1 APPLICATORFUL: at 21:38

## 2022-05-01 RX ADMIN — INSULIN GLARGINE 7 UNIT(S): 100 INJECTION, SOLUTION SUBCUTANEOUS at 21:55

## 2022-05-01 RX ADMIN — Medication 1 APPLICATION(S): at 17:45

## 2022-05-01 RX ADMIN — SEVELAMER CARBONATE 800 MILLIGRAM(S): 2400 POWDER, FOR SUSPENSION ORAL at 21:15

## 2022-05-01 RX ADMIN — Medication 1: at 17:44

## 2022-05-01 RX ADMIN — Medication 5 UNIT(S): at 17:45

## 2022-05-01 RX ADMIN — Medication 25 MILLIGRAM(S): at 05:28

## 2022-05-01 RX ADMIN — Medication 3: at 12:19

## 2022-05-01 RX ADMIN — Medication 650 MILLIGRAM(S): at 22:10

## 2022-05-01 RX ADMIN — Medication 1 APPLICATION(S): at 05:27

## 2022-05-01 RX ADMIN — Medication 80 MILLIGRAM(S): at 17:45

## 2022-05-01 RX ADMIN — Medication 10 MILLIGRAM(S): at 21:14

## 2022-05-01 RX ADMIN — Medication 4 UNIT(S): at 12:19

## 2022-05-01 NOTE — DISCHARGE NOTE PROVIDER - CARE PROVIDERS DIRECT ADDRESSES
,DirectAddress_Unknown,DirectAddress_Unknown ,DirectAddress_Unknown,DirectAddress_Unknown,[email or web address]

## 2022-05-01 NOTE — DISCHARGE NOTE PROVIDER - NSDCMRMEDTOKEN_GEN_ALL_CORE_FT
gabapentin 300 mg oral capsule: 1 cap(s) orally 2 times a day  hydrALAZINE 10 mg oral tablet: 1 tab(s) orally 3 times a day  Lasix 80 mg oral tablet: 1 tab(s) orally 2 times a day  Metoprolol Succinate ER 25 mg oral tablet, extended release: 1 tab(s) orally once a day   acetaminophen 325 mg oral tablet: 2 tab(s) orally every 6 hours, As needed, Temp greater or equal to 38C (100.4F), Mild Pain (1 - 3)  clopidogrel 75 mg oral tablet: 1 tab(s) orally once a day  clotrimazole 1% topical cream: 1 application topically 2 times a day  clotrimazole 1% vaginal cream with applicator: 1 applicatorful vaginal once a day (at bedtime)  gabapentin 300 mg oral capsule: 1 cap(s) orally 2 times a day  hydrALAZINE 10 mg oral tablet: 1 tab(s) orally 3 times a day  Januvia 50 mg oral tablet: 1 tab(s) orally once a day   Lantus 100 units/mL subcutaneous solution: 12 unit(s) subcutaneous once a day (at bedtime)   Lasix 80 mg oral tablet: 1 tab(s) orally 2 times a day  melatonin 3 mg oral tablet: 1 tab(s) orally once a day (at bedtime), As needed, Insomnia  Metoprolol Succinate ER 25 mg oral tablet, extended release: 1 tab(s) orally once a day  polyethylene glycol 3350 oral powder for reconstitution: 17 gram(s) orally once a day  senna oral tablet: 2 tab(s) orally once a day (at bedtime)  sevelamer carbonate 800 mg oral tablet: 1 tab(s) orally 3 times a day    acetaminophen 325 mg oral tablet: 2 tab(s) orally every 6 hours, As needed, Temp greater or equal to 38C (100.4F), Mild Pain (1 - 3)  clopidogrel 75 mg oral tablet: 1 tab(s) orally once a day  clotrimazole 1% topical cream: 1 application topically 2 times a day  clotrimazole 1% vaginal cream with applicator: 1 applicatorful vaginal once a day (at bedtime)  ferrous sulfate 325 mg (65 mg elemental iron) oral delayed release tablet: 1 tab(s) orally 2 times a day   gabapentin 300 mg oral capsule: 1 cap(s) orally 2 times a day  hydrALAZINE 10 mg oral tablet: 1 tab(s) orally 3 times a day  Januvia 50 mg oral tablet: 1 tab(s) orally once a day   Lantus 100 units/mL subcutaneous solution: 12 unit(s) subcutaneous once a day (at bedtime)   Lasix 80 mg oral tablet: 1 tab(s) orally 2 times a day  melatonin 3 mg oral tablet: 1 tab(s) orally once a day (at bedtime), As needed, Insomnia  Metoprolol Succinate ER 25 mg oral tablet, extended release: 1 tab(s) orally once a day  polyethylene glycol 3350 oral powder for reconstitution: 17 gram(s) orally once a day  senna oral tablet: 2 tab(s) orally once a day (at bedtime)  sevelamer carbonate 800 mg oral tablet: 1 tab(s) orally 3 times a day    acetaminophen 325 mg oral tablet: 2 tab(s) orally every 6 hours, As needed, Temp greater or equal to 38C (100.4F), Mild Pain (1 - 3)  clopidogrel 75 mg oral tablet: 1 tab(s) orally once a day  clotrimazole 1% topical cream: 1 application topically 2 times a day  clotrimazole 1% vaginal cream with applicator: 1 applicatorful vaginal once a day (at bedtime)  ferrous sulfate 325 mg (65 mg elemental iron) oral delayed release tablet: 1 tab(s) orally 2 times a day   gabapentin 300 mg oral capsule: 1 cap(s) orally 2 times a day  hydrALAZINE 10 mg oral tablet: 1 tab(s) orally 3 times a day  Lantus Solostar Pen 100 units/mL subcutaneous solution: 10 unit(s) subcutaneous once a day (at bedtime)   Lasix 80 mg oral tablet: 1 tab(s) orally 2 times a day  melatonin 3 mg oral tablet: 1 tab(s) orally once a day (at bedtime), As needed, Insomnia  Metoprolol Succinate ER 25 mg oral tablet, extended release: 1 tab(s) orally once a day  polyethylene glycol 3350 oral powder for reconstitution: 17 gram(s) orally once a day  senna oral tablet: 2 tab(s) orally once a day (at bedtime)  sevelamer carbonate 800 mg oral tablet: 1 tab(s) orally 3 times a day   Tradjenta 5 mg oral tablet: 1 tab(s) orally once a day

## 2022-05-01 NOTE — DISCHARGE NOTE PROVIDER - NSFOLLOWUPCLINICS_GEN_ALL_ED_FT
[facility]  Nephrology  [address] [place] [address], [address] [place] [address]  Phone: [phone]  Fax:   Follow Up Time: 2 weeks

## 2022-05-01 NOTE — CONSULT NOTE ADULT - SUBJECTIVE AND OBJECTIVE BOX
HPI:  82F h/o CAD, s/p PCI on Plavix/ASA, CKD3-4, baseline Scr 3, DM2, DM neuropathy, HLD, HTN, bleeding DU in 2020, Anemia, PAF( not on AC prob 2/2 GI bledd) presented today for rash. Patient reports She noticed the rashes under her breast and suprapubic area over the past few months, worsening, increased urticaria. no fever chills, or discharges from the rash. Patient reports she has not seen her PCP in the past year, She has stopped taking her diabetes medication an year ago because she was frustrated with them and decided to stop. She also endorsed b/l neuropathy and decreased vision in her eyes over the past year.   In ED, vss, patient found to be hyperglycemic to >500, w/o acidosis or AG. elevate proBNP. (2022 15:40)  Patient has history of diabetes, on oral meds at home, and insulin at home, no recent hypoglycemic episodes, no polyuria polydipsia. Patient follows up with PCP for diabetes management.    PAST MEDICAL & SURGICAL HISTORY:  HLD (hyperlipidemia)    Diabetic neuropathy    CAD (coronary artery disease)  ~ s/p MICHAEL x2 in 2017    Paroxysmal atrial fibrillation    Diabetes mellitus, type 2    GI bleed    Neuropathy    Gastritis    Diastolic heart failure    Transfusion history    Stage 5 chronic kidney disease not on chronic dialysis    Anemia    PVD (peripheral vascular disease)  ~ RLE angiogram with 2 stents placed in 2019.    H/O  section    S/P coronary artery stent placement  x2 in     History of esophagogastroduodenoscopy (EGD)    S/P angiogram of extremity        FAMILY HISTORY:  FH: diabetes mellitus        Social History:    Outpatient Medications:    MEDICATIONS  (STANDING):  clopidogrel Tablet 75 milliGRAM(s) Oral daily  clotrimazole 1% Cream 1 Application(s) Topical two times a day  clotrimazole 1% Vaginal Cream 1 Applicatorful Vaginal at bedtime  dextrose 5%. 1000 milliLiter(s) (50 mL/Hr) IV Continuous <Continuous>  dextrose 5%. 1000 milliLiter(s) (100 mL/Hr) IV Continuous <Continuous>  dextrose 50% Injectable 25 Gram(s) IV Push once  dextrose 50% Injectable 12.5 Gram(s) IV Push once  dextrose 50% Injectable 25 Gram(s) IV Push once  furosemide    Tablet 80 milliGRAM(s) Oral two times a day  gabapentin 300 milliGRAM(s) Oral at bedtime  glucagon  Injectable 1 milliGRAM(s) IntraMuscular once  hydrALAZINE 10 milliGRAM(s) Oral three times a day  insulin glargine Injectable (LANTUS) 7 Unit(s) SubCutaneous at bedtime  insulin lispro (ADMELOG) corrective regimen sliding scale   SubCutaneous three times a day before meals  insulin lispro (ADMELOG) corrective regimen sliding scale   SubCutaneous at bedtime  insulin lispro Injectable (ADMELOG) 5 Unit(s) SubCutaneous three times a day before meals  metoprolol succinate ER 25 milliGRAM(s) Oral daily  polyethylene glycol 3350 17 Gram(s) Oral daily  senna 2 Tablet(s) Oral at bedtime  sevelamer carbonate 800 milliGRAM(s) Oral three times a day    MEDICATIONS  (PRN):  acetaminophen     Tablet .. 650 milliGRAM(s) Oral every 6 hours PRN Temp greater or equal to 38C (100.4F), Mild Pain (1 - 3)  aluminum hydroxide/magnesium hydroxide/simethicone Suspension 30 milliLiter(s) Oral every 4 hours PRN Dyspepsia  dextrose Oral Gel 15 Gram(s) Oral once PRN Blood Glucose LESS THAN 70 milliGRAM(s)/deciliter  melatonin 3 milliGRAM(s) Oral at bedtime PRN Insomnia  ondansetron Injectable 4 milliGRAM(s) IV Push every 8 hours PRN Nausea and/or Vomiting      Allergies    Carrots (Rash)  No Known Drug Allergies    Intolerances      Review of Systems:  Constitutional: No fever, no chills  Eyes: No blurry vision  Neuro: No tremors  HEENT: No pain, no neck swelling  Cardiovascular: No chest pain, no palpitations  Respiratory: No SOB, no cough  GI: No nausea, vomiting, abdominal pain  : No dysuria  Skin: no rash  MSK: Has leg swelling.  Psych: no depression  Endocrine: no polyuria, polydipsia    UNABLE TO OBTAIN    ALL OTHER SYSTEMS REVIEWED AND NEGATIVE        PHYSICAL EXAM:  VITALS: T(C): 36.8 (22 @ 21:15)  T(F): 98.2 (22 @ 21:15), Max: 98.8 (22 @ 13:59)  HR: 64 (22 @ 21:15) (58 - 69)  BP: 150/68 (22 @ 21:15) (149/61 - 171/67)  RR:  (16 - 20)  SpO2:  (97% - 98%)  Wt(kg): --  GENERAL: NAD, well-groomed, well-developed  EYES: No proptosis, no lid lag  HEENT:  Atraumatic, Normocephalic  THYROID: Normal size, no palpable nodules  RESPIRATORY: Clear to auscultation bilaterally; No rales, rhonchi, wheezing  CARDIOVASCULAR: Si S2, No murmurs;  GI: Soft, non distended, normal bowel sounds  SKIN: Dry, intact, No rashes or lesions  MUSCULOSKELETAL: Has BL lower extremity edema.  NEURO:  no tremor, sensation decreased in feet BL,    POCT Blood Glucose.: 160 mg/dL (22 @ 21:43)  POCT Blood Glucose.: 193 mg/dL (22 @ 17:19)  POCT Blood Glucose.: 297 mg/dL (22 @ 12:03)  POCT Blood Glucose.: 221 mg/dL (22 @ 08:45)  POCT Blood Glucose.: 273 mg/dL (22 @ 21:52)  POCT Blood Glucose.: 253 mg/dL (22 @ 17:22)  POCT Blood Glucose.: 405 mg/dL (22 @ 13:00)  POCT Blood Glucose.: 406 mg/dL (22 @ 12:50)  POCT Blood Glucose.: 242 mg/dL (22 @ 08:46)  POCT Blood Glucose.: 376 mg/dL (22 @ 23:23)  POCT Blood Glucose.: 415 mg/dL (22 @ 22:06)  POCT Blood Glucose.: 385 mg/dL (22 @ 17:36)  POCT Blood Glucose.: 351 mg/dL (22 @ 15:26)  POCT Blood Glucose.: 468 mg/dL (22 @ 13:08)  POCT Blood Glucose.: 544 mg/dL (22 @ 09:36)                            8.9    5.55  )-----------( 327      ( 01 May 2022 07:54 )             29.5           138  |  101  |  49<H>  ----------------------------<  209<H>  3.6   |  24  |  4.20<H>    EGFR if : x   EGFR if non : x     Ca    8.5        Mg     2.10       Phos  5.6         TPro  6.3  /  Alb  2.9<L>  /  TBili  <0.2  /  DBili  x   /  AST  10  /  ALT  8   /  AlkPhos  201<H>        Thyroid Function Tests:   @ 07:35 TSH 0.91 FreeT4 -- T3 -- Anti TPO -- Anti Thyroglobulin Ab -- TSI --           Chol 232<H> Direct LDL -- LDL calculated 141<H> HDL 38<L> Trig 267<H>    Radiology:

## 2022-05-01 NOTE — DISCHARGE NOTE PROVIDER - HOSPITAL COURSE
82F with PMH CAD, s/p PCI on Plavix/ASA, CKD3-4, baseline Scr 3, DM2, DM neuropathy, HLD, HTN, bleeding DU in 11/2020, Anemia, PAF (not on ac due to GIB) now pw urticaric rash, found to have uncontrolled DM.     Type 2 diabetes mellitus.   uncontrolled DM2 secondary to medication non-adherence.   was on Lantus 10U QHS until she stopped taking medications.   FS improved after 5U Amadelog, no signs of DKA  A1c >15  endocrine rec:     Diabetic neuropathy  on Gabapentin 300mg bid, decrease to qhs due to CKD    Diastolic heart failure.   elevated proBNP-chronic diastolic CHF  trop neg delta  EKG with inferalateral ischemic, no ekg for comparison. likely related to prior CAD and PCI, currently w/o chest pain.   Lasix 80 mg po bid resumed, plavix resumed.    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   c/w clotrimazole vaginal cream x 7 days.    CAD (coronary artery disease).   h/o CAD.  Paroxysmal afib-not on AC sec to h/o GI Bleeding  was on ASA/Plaix.   ASA held due to hx of GI Bleeding.    Anemia of chronic disease.   h/o anemia.  s/p PRBC, hemoglobin stable  on IV venofer    Stage 3 chronic kidney disease.   worsen renal function  unclear if progressive from DM vs KOJO on CKD.   started on Renvela 800mg TID with meals  No plans for HD yet.    Case discussed with  ---- on ---- and patient cleared for discharge. Reviewed discharge medications with patient; All new medications requiring new prescription sent to pharmacy of patients choice. Reviewed need for prescription for previous home medications and new prescriptions sent if requested. Patient in agreement and understands.    82F with PMH CAD, s/p PCI on Plavix/ASA, CKD3-4, baseline Scr 3, DM2, DM neuropathy, HLD, HTN, bleeding DU in 11/2020, Anemia, PAF (not on ac due to GIB) now pw urticaric rash, found to have uncontrolled DM.     Type 2 diabetes mellitus.   uncontrolled DM2 secondary to medication non-adherence.   was on Lantus 10U QHS until she stopped taking medications.   FS improved after 5U Amadelog, no signs of DKA  A1c >15  endocrine rec: lantus 12 units and Januvia 50mg qd, with endocrine outpt follow up.    Diabetic neuropathy  on Gabapentin 300mg bid, decrease to qhs due to CKD    Diastolic heart failure.   elevated proBNP-chronic diastolic CHF  trop neg delta  EKG with inferalateral ischemic, no ekg for comparison. likely related to prior CAD and PCI, currently w/o chest pain.   Lasix 80 mg po bid resumed, plavix resumed.    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   c/w clotrimazole vaginal cream x 7 days.    CAD (coronary artery disease).   h/o CAD.  Paroxysmal afib-not on AC sec to h/o GI Bleeding  was on ASA/Plaix.   ASA held due to hx of GI Bleeding.    Anemia of chronic disease.   h/o anemia.  s/p PRBC, hemoglobin stable  treated with IV venofer, with outpt follow up.    Stage 3 chronic kidney disease.   worsen renal function  unclear if progressive from DM vs KOJO on CKD.   started on Renvela 800mg TID with meals  No plans for HD yet.    Case discussed with Dr. Goldberg on 5/2/22 and patient cleared for discharge. Reviewed discharge medications with patient; All new medications requiring new prescription sent to pharmacy of patients choice. Reviewed need for prescription for previous home medications and new prescriptions sent if requested. Patient in agreement and understands.    82F with PMH CAD, s/p PCI on Plavix/ASA, CKD3-4, baseline Scr 3, DM2, DM neuropathy, HLD, HTN, bleeding DU in 11/2020, Anemia, PAF (not on ac due to GIB) now pw urticaric rash, found to have uncontrolled DM.     Type 2 diabetes mellitus.   uncontrolled DM2 secondary to medication non-adherence.   was on Lantus 10U QHS until she stopped taking medications.   FS improved after 5U Amadelog, no signs of DKA  A1c >15  endocrine rec: Lantus 10u qhs Tradjenta 5mg PO daily, FU 4 weeks.with endocrine outpt follow up.    Diabetic neuropathy  on Gabapentin 300mg bid, decrease to qhs due to CKD    Diastolic heart failure.   elevated proBNP-chronic diastolic CHF  trop neg delta  EKG with inferalateral ischemic, no ekg for comparison. likely related to prior CAD and PCI, currently w/o chest pain.   Lasix 80 mg po bid resumed, plavix resumed.    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   c/w clotrimazole vaginal cream x 7 days.    CAD (coronary artery disease).   h/o CAD.  Paroxysmal afib-not on AC sec to h/o GI Bleeding  was on ASA/Plaix.   ASA held due to hx of GI Bleeding.    Anemia of chronic disease.   h/o anemia.  s/p PRBC, hemoglobin stable  treated with IV venofer, with outpt follow up.    Stage 3 chronic kidney disease.   worsen renal function  unclear if progressive from DM vs KOJO on CKD.   started on Renvela 800mg TID with meals  No plans for HD yet.    Case discussed with Dr. Goldberg on 5/2/22 and patient cleared for discharge. Reviewed discharge medications with patient; All new medications requiring new prescription sent to pharmacy of patients choice. Reviewed need for prescription for previous home medications and new prescriptions sent if requested. Patient in agreement and understands.

## 2022-05-01 NOTE — CONSULT NOTE ADULT - ASSESSMENT
Assessment  DMT2: 82y Female with DM T2 with hyperglycemia admitted with mycosis, A1C is 15.4%, patient was on oral hypoglycemic agents at home, was non compliant with diabetes medications, now on basal bolus and insulin coverage, blood sugars improving, trending up, trending down, within acceptable range, not at target,  fluctuating, had no hypoglycemic episode,  eating meals,  non compliant with low carb diet.  Patient is high risk with high level decision making due to uncontrolled diabetes, has increased risk for complications.  CAD: S/P CABG, on medications, monitored, asymptomatic.  HTN: On antihypertensive medications, monitored, asymptomatic.  Hypothyroidism: Newly diagnosed, No new thyroid function test, not on Synthroid  mcg po daily, compliant with Synthroid intake, asymptomatic.    ESRD CKD: On hemodialysis, labs, chart reviewed.  Overweight: No strict exercise routines, neither on low calorie diet.                 Luciana Tsang MD  Cell: [phone]  Office: [phone]               Assessment  DMT2: 82y Female with DM T2 with hyperglycemia admitted with mycosis, A1C is 15.4%, patient was on oral hypoglycemic agents at home, non compliant with diabetes medications, now on basal bolus and insulin coverage, blood sugars improving, no hypoglycemic episode,  eating meals.  CAD: On medications, monitored, asymptomatic.  HTN: On antihypertensive medications, monitored, asymptomatic.  Overweight: No strict exercise routines, neither on low calorie diet.                 Luciana Tsang MD  Cell: [phone]  Office: [phone]

## 2022-05-01 NOTE — DISCHARGE NOTE PROVIDER - CARE PROVIDER_API CALL
Julio César Hooper)  Internal Medicine  [address] [place] [address]  Phone: [phone]  Fax: [phone]  Follow Up Time: 1 week    Luciana Tsang)  EndocrinologyMetabDiabetes; Internal Medicine  [address] [place] [address]  Phone: [phone]  Fax: [phone]  Follow Up Time: 1 week   Julio César Hooper)  Internal Medicine  [address] [place] [address]  Phone: [phone]  Fax: [phone]  Follow Up Time: 1 week    Luciana Tsang)  EndocrinologyMetabDiabetes; Internal Medicine  [address] [place] [address]  Phone: [phone]  Fax: [phone]  Follow Up Time: 1 week    Reid Solis)  Internal Medicine; Nephrology  [address] [place] [address] [address]  [place] [address]  Phone: [phone]  Fax: [phone]  Follow Up Time: 1 month

## 2022-05-01 NOTE — PROGRESS NOTE ADULT - ASSESSMENT
No pain, no shortness of breath    Review of systems: All 10 points ROS was obtained except as above.     acetaminophen     Tablet .. 650 milliGRAM(s) Oral every 6 hours PRN  aluminum hydroxide/magnesium hydroxide/simethicone Suspension 30 milliLiter(s) Oral every 4 hours PRN  clotrimazole 1% Cream 1 Application(s) Topical two times a day  clotrimazole 1% Vaginal Cream 1 Applicatorful Vaginal at bedtime  dextrose 5%. 1000 milliLiter(s) IV Continuous <Continuous>  dextrose 5%. 1000 milliLiter(s) IV Continuous <Continuous>  dextrose 50% Injectable 25 Gram(s) IV Push once  dextrose 50% Injectable 12.5 Gram(s) IV Push once  dextrose 50% Injectable 25 Gram(s) IV Push once  dextrose Oral Gel 15 Gram(s) Oral once PRN  furosemide    Tablet 80 milliGRAM(s) Oral two times a day  gabapentin 300 milliGRAM(s) Oral at bedtime  glucagon  Injectable 1 milliGRAM(s) IntraMuscular once  hydrALAZINE 10 milliGRAM(s) Oral three times a day  insulin glargine Injectable (LANTUS) 5 Unit(s) SubCutaneous at bedtime  insulin lispro (ADMELOG) corrective regimen sliding scale   SubCutaneous three times a day before meals  insulin lispro (ADMELOG) corrective regimen sliding scale   SubCutaneous at bedtime  insulin lispro Injectable (ADMELOG) 4 Unit(s) SubCutaneous three times a day before meals  melatonin 3 milliGRAM(s) Oral at bedtime PRN  metoprolol succinate ER 25 milliGRAM(s) Oral daily  ondansetron Injectable 4 milliGRAM(s) IV Push every 8 hours PRN  polyethylene glycol 3350 17 Gram(s) Oral daily  senna 2 Tablet(s) Oral at bedtime      VITAL:  T(C): , Max: 36.7 (04-30-22 @ 12:54)  T(F): , Max: 98.1 (04-30-22 @ 12:54)  HR: 58 (05-01-22 @ 06:55)  BP: 149/61 (05-01-22 @ 06:55)  BP(mean): --  RR: 20 (05-01-22 @ 05:25)  SpO2: 97% (05-01-22 @ 05:25)  Wt(kg): --      PHYSICAL EXAM:  Constitutional: NAD, Alert  HEENT: NCAT, MMM  Neck: Supple, No JVD  Respiratory: CTA-b/l  Cardiovascular: RRR s1s2, no m/r/g  Gastrointestinal: BS+, soft, NT/ND  Extremities: No peripheral edema b/l  Neurological: no focal deficits; strength grossly intact  Back: no CVAT b/l  Skin: (+)erythema below breasts/genital area      LABS:                          8.9    5.55  )-----------( 327      ( 01 May 2022 07:54 )             29.5     Na(138)/K(3.6)/Cl(101)/HCO3(24)/BUN(49)/Cr(4.20)Glu(209)/Ca(8.5)/Mg(2.10)/PO4(5.6)    05-01 @ 07:54  Na(137)/K(3.5)/Cl(99)/HCO3(26)/BUN(52)/Cr(4.28)Glu(239)/Ca(7.9)/Mg(--)/PO4(--)    04-30 @ 07:35  Na(130)/K(3.2)/Cl(90)/HCO3(24)/BUN(56)/Cr(4.19)Glu(532)/Ca(7.8)/Mg(2.00)/PO4(--)    04-29 @ 10:46            ASSESSMENT/PLAN  ASSESSMENT:  (1)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 Whereas she does not absolutely require dialysis at present, she is close to the point where she would in fact require it. Scr plateauing in the 4s    (2)Lytes controlled    (3)ID - fungal infection    (4)Endo - hyperglycemia    (5)Anemia - likely iron deficiency; hgb improving; on IV  iron    (6) Blood pressure is labile    (7) Hyperphosphatemia renally mediated      RECOMMEND:  (1)Give Renvela 800mg TID with meeals  (2)continue Venofer 200mg IV x 5 days (day 2 of 5)  (3)No need for low-K diet for now  (4)Check PO4 with AM labs tomorrow  (5)Meds for GFR <15ml/min  (6)No HD for now      Nayan Leong, NP-BC  [facility] [place] [facility]  [phone]   attests to  much improved sob    acetaminophen     Tablet .. 650 milliGRAM(s) Oral every 6 hours PRN  aluminum hydroxide/magnesium hydroxide/simethicone Suspension 30 milliLiter(s) Oral every 4 hours PRN  clotrimazole 1% Cream 1 Application(s) Topical two times a day  clotrimazole 1% Vaginal Cream 1 Applicatorful Vaginal at bedtime  dextrose 5%. 1000 milliLiter(s) IV Continuous <Continuous>  dextrose 5%. 1000 milliLiter(s) IV Continuous <Continuous>  dextrose 50% Injectable 25 Gram(s) IV Push once  dextrose 50% Injectable 12.5 Gram(s) IV Push once  dextrose 50% Injectable 25 Gram(s) IV Push once  dextrose Oral Gel 15 Gram(s) Oral once PRN  furosemide    Tablet 80 milliGRAM(s) Oral two times a day  gabapentin 300 milliGRAM(s) Oral at bedtime  glucagon  Injectable 1 milliGRAM(s) IntraMuscular once  hydrALAZINE 10 milliGRAM(s) Oral three times a day  insulin glargine Injectable (LANTUS) 5 Unit(s) SubCutaneous at bedtime  insulin lispro (ADMELOG) corrective regimen sliding scale   SubCutaneous three times a day before meals  insulin lispro (ADMELOG) corrective regimen sliding scale   SubCutaneous at bedtime  insulin lispro Injectable (ADMELOG) 4 Unit(s) SubCutaneous three times a day before meals  melatonin 3 milliGRAM(s) Oral at bedtime PRN  metoprolol succinate ER 25 milliGRAM(s) Oral daily  ondansetron Injectable 4 milliGRAM(s) IV Push every 8 hours PRN  polyethylene glycol 3350 17 Gram(s) Oral daily  senna 2 Tablet(s) Oral at bedtime      VITAL:  T(C): , Max: 36.7 (04-30-22 @ 12:54)  T(F): , Max: 98.1 (04-30-22 @ 12:54)  HR: 58 (05-01-22 @ 06:55)  BP: 149/61 (05-01-22 @ 06:55)  BP(mean): --  RR: 20 (05-01-22 @ 05:25)  SpO2: 97% (05-01-22 @ 05:25)  Wt(kg): --      PHYSICAL EXAM:  Constitutional: NAD, Alert  HEENT: NCAT, MMM  Neck: Supple, No JVD  Respiratory: CTA-b/l  Cardiovascular: RRR s1s2, no m/r/g  Gastrointestinal: BS+, soft, NT/ND  Extremities: No peripheral edema b/l  Neurological: no focal deficits; strength grossly intact  Back: no CVAT b/l  Skin: (+)erythema below breasts/genital area      LABS:                          8.9    5.55  )-----------( 327      ( 01 May 2022 07:54 )             29.5     Na(138)/K(3.6)/Cl(101)/HCO3(24)/BUN(49)/Cr(4.20)Glu(209)/Ca(8.5)/Mg(2.10)/PO4(5.6)    05-01 @ 07:54  Na(137)/K(3.5)/Cl(99)/HCO3(26)/BUN(52)/Cr(4.28)Glu(239)/Ca(7.9)/Mg(--)/PO4(--)    04-30 @ 07:35  Na(130)/K(3.2)/Cl(90)/HCO3(24)/BUN(56)/Cr(4.19)Glu(532)/Ca(7.8)/Mg(2.00)/PO4(--)    04-29 @ 10:46            ASSESSMENT/PLAN  ASSESSMENT:  (1)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 Whereas she does not absolutely require dialysis at present, she is close to the point where she would in fact require it. Scr plateauing in the 4s    (2)Lytes controlled    (3)ID - fungal infection    (4)Endo - hyperglycemia    (5)Anemia - likely iron deficiency; hgb improving; on IV  iron    (6) Blood pressure is labile    (7) Hyperphosphatemia renally mediated      RECOMMEND:  (1)Give Renvela 800mg TID with meeals  (2)continue Venofer 200mg IV x 5 days (day 2 of 5)  (3)No need for low-K diet for now  (4)Check PO4 with AM labs tomorrow  (5)Meds for GFR <15ml/min  (6)No HD for now      Nayan Leong, NP-BC  [place]  [phone]

## 2022-05-01 NOTE — PROGRESS NOTE ADULT - PROBLEM SELECTOR PLAN 5
h/o CAD. ,Paroxysmal afib-not on AC sec to h/o GI Bleeding  was on ASA/Plaix.   -ASA hels-sec to h/o GI Bleeding.
h/o CAD. ,Paroxysmal afib-not on AC sec to h/o GI Bleeding  was on ASA/Plaix.   -ASA hels-sec to h/o GI Bleeding.

## 2022-05-01 NOTE — PROGRESS NOTE ADULT - PROBLEM SELECTOR PLAN 6
h/o anemia.s/p PRBC  monitor h/h. no signs of active bleed  iron study-iv venofer  likely due to CKD.
h/o anemia.s/p PRBC  monitor h/h. no signs of active bleed  iron study-iv venofer  likely due to CKD.

## 2022-05-01 NOTE — PROGRESS NOTE ADULT - PROBLEM SELECTOR PLAN 7
worsen renal function  unclear if progressive from DM vs KOJO on CKD.   -Renal consult appreciated
worsen renal function  unclear if progressive from DM vs KOJO on CKD.   -Renal consult appreciated

## 2022-05-01 NOTE — DISCHARGE NOTE PROVIDER - PROVIDER TOKENS
PROVIDER:[TOKEN:[[id number]],FOLLOWUP:[1 week]],PROVIDER:[TOKEN:[[id number]],FOLLOWUP:[1 week]] PROVIDER:[TOKEN:[[id number]],FOLLOWUP:[1 week]],PROVIDER:[TOKEN:[[id number]],FOLLOWUP:[1 week]],PROVIDER:[TOKEN:[[id number]],FOLLOWUP:[1 month]]

## 2022-05-01 NOTE — DISCHARGE NOTE PROVIDER - NSDCCPCAREPLAN_GEN_ALL_CORE_FT
PRINCIPAL DISCHARGE DIAGNOSIS  Diagnosis: Elevated glucose  Assessment and Plan of Treatment: Your A1c is 15.6. You report not taking your home lantus. Please take lantus ___ and Ademelog ___ as directed. Please closely follow up with endocrinology, for further glucose management.  Monitor finger sticks pre-meal and bedtime, low salt, fat and carbohydrate diet, minimize glucose intake.  Exercise daily for at least 30 minutes and weight loss.  Follow up with primary care physician and endocrinologist for routine Hemoglobin A1C checks and management.  Follow up with your ophthalmologist for routine yearly vision exams.        SECONDARY DISCHARGE DIAGNOSES  Diagnosis: Pleural effusion  Assessment and Plan of Treatment:     Diagnosis: Vaginal itching  Assessment and Plan of Treatment: Continue clobestol as directed. Please follow up with your primary care physician regarding your hospitalization      Diagnosis: Dermatomycosis  Assessment and Plan of Treatment: You report having rash under your breast, and under your abdominal pannus. Please continue to use the cream and powder as directed. Please use interdry to keep the area dry. Please follow up with your primary care physician regarding your hospitalization      Diagnosis: Anemia of chronic disease  Assessment and Plan of Treatment: Your hemoglobin was slightly low. You received 1 unit prbc. Your levels improved since. You also received iron. The low hemoglobin is likely due to your kidney disease. Please follow up closely with nephrology for futher management.    Diagnosis: Stage 3 chronic kidney disease  Assessment and Plan of Treatment: Continue blood pressure, cholesterol and diabetic medications. Goal of hemoglobin A1C (HgbA1C) < 7%.  Avoid nephrotoxic drugs such as nonsteroidal anti-inflammatory agents (NSAIDs).   Please follow up with your nephrologist to monitor your kidney function, continue with low protein and potassium diet.      Diagnosis: CAD (coronary artery disease)  Assessment and Plan of Treatment: You have had a history of stent. Please continue to take plavix as directed. Continue low salt, fat, cholesterol and carbohydrate diet. Follow up with cardiologist and primary care physician's routine appointment.     PRINCIPAL DISCHARGE DIAGNOSIS  Diagnosis: Elevated glucose  Assessment and Plan of Treatment: Your A1c is 15.6. You report not taking your home lantus. Please take lantus 14 units and Januvia. Please closely follow up with endocrinology, for further glucose management.  Monitor finger sticks pre-meal and bedtime, low salt, fat and carbohydrate diet, minimize glucose intake.  Exercise daily for at least 30 minutes and weight loss.  Follow up with primary care physician and endocrinologist for routine Hemoglobin A1C checks and management.  Follow up with your ophthalmologist for routine yearly vision exams.        SECONDARY DISCHARGE DIAGNOSES  Diagnosis: Vaginal itching  Assessment and Plan of Treatment: Continue clobestol as directed. Please follow up with your primary care physician regarding your hospitalization      Diagnosis: Dermatomycosis  Assessment and Plan of Treatment: You report having rash under your breast, and under your abdominal pannus. Please continue to use the cream and powder as directed. Please use interdry to keep the area dry. Please follow up with your primary care physician regarding your hospitalization      Diagnosis: Anemia of chronic disease  Assessment and Plan of Treatment: Your hemoglobin was slightly low. You received 1 unit prbc. Your levels improved since. You also received iron. The low hemoglobin is likely due to your kidney disease. Please follow up closely with nephrology for futher management.    Diagnosis: Stage 3 chronic kidney disease  Assessment and Plan of Treatment: Continue blood pressure, cholesterol and diabetic medications. Goal of hemoglobin A1C (HgbA1C) < 7%.  Avoid nephrotoxic drugs such as nonsteroidal anti-inflammatory agents (NSAIDs).   Please follow up with your nephrologist to monitor your kidney function, continue with low protein and potassium diet.      Diagnosis: CAD (coronary artery disease)  Assessment and Plan of Treatment: You have had a history of stent. Please continue to take plavix as directed. Continue low salt, fat, cholesterol and carbohydrate diet. Follow up with cardiologist and primary care physician's routine appointment.     PRINCIPAL DISCHARGE DIAGNOSIS  Diagnosis: Elevated glucose  Assessment and Plan of Treatment: Your A1c is 15.6. You report not taking your home lantus. Please take lantus 14 units and Januvia. Please closely follow up with endocrinology, for further glucose management.  Monitor finger sticks pre-meal and bedtime, low salt, fat and carbohydrate diet, minimize glucose intake.  Exercise daily for at least 30 minutes and weight loss.  Follow up with primary care physician and endocrinologist for routine Hemoglobin A1C checks and management.  Follow up with your ophthalmologist for routine yearly vision exams.        SECONDARY DISCHARGE DIAGNOSES  Diagnosis: Vaginal itching  Assessment and Plan of Treatment: Continue clobestol as directed. Please follow up with your primary care physician regarding your hospitalization      Diagnosis: Dermatomycosis  Assessment and Plan of Treatment: You report having rash under your breast, and under your abdominal pannus. Please continue to use the cream and powder as directed. Please use interdry to keep the area dry. Please follow up with your primary care physician regarding your hospitalization      Diagnosis: Anemia of chronic disease  Assessment and Plan of Treatment: Your hemoglobin was slightly low. You received 1 unit prbc. Your iron level is low, please continue to take iron for 6 months, and follow up with PCP to check your iron level. The low hemoglobin is also likely due to your kidney disease. Please follow up closely with nephrology for futher management. The iron causes constipation please continue to take bowel regimen as directed.    Diagnosis: Stage 3 chronic kidney disease  Assessment and Plan of Treatment: Continue blood pressure, cholesterol and diabetic medications. Goal of hemoglobin A1C (HgbA1C) < 7%.  Avoid nephrotoxic drugs such as nonsteroidal anti-inflammatory agents (NSAIDs).   Please follow up with your nephrologist to monitor your kidney function, continue with low protein and potassium diet. Please follow up with nephrology, Dr Solis, even if its telehealth.    Diagnosis: CAD (coronary artery disease)  Assessment and Plan of Treatment: You have had a history of stent. Please continue to take plavix as directed. Continue low salt, fat, cholesterol and carbohydrate diet. Follow up with cardiologist and primary care physician's routine appointment.

## 2022-05-02 ENCOUNTER — TRANSCRIPTION ENCOUNTER (OUTPATIENT)
Age: 82
End: 2022-05-02

## 2022-05-02 VITALS — DIASTOLIC BLOOD PRESSURE: 56 MMHG | HEART RATE: 60 BPM | SYSTOLIC BLOOD PRESSURE: 128 MMHG

## 2022-05-02 LAB
ANION GAP SERPL CALC-SCNC: 13 MMOL/L — SIGNIFICANT CHANGE UP (ref 7–14)
APPEARANCE UR: ABNORMAL
BACTERIA # UR AUTO: ABNORMAL
BILIRUB UR-MCNC: NEGATIVE — SIGNIFICANT CHANGE UP
BUN SERPL-MCNC: 55 MG/DL — HIGH (ref 7–23)
CALCIUM SERPL-MCNC: 8.4 MG/DL — SIGNIFICANT CHANGE UP (ref 8.4–10.5)
CHLORIDE SERPL-SCNC: 99 MMOL/L — SIGNIFICANT CHANGE UP (ref 98–107)
CO2 SERPL-SCNC: 27 MMOL/L — SIGNIFICANT CHANGE UP (ref 22–31)
COLOR SPEC: COLORLESS — SIGNIFICANT CHANGE UP
CREAT SERPL-MCNC: 4.45 MG/DL — HIGH (ref 0.5–1.3)
DIFF PNL FLD: ABNORMAL
EGFR: 9 ML/MIN/1.73M2 — LOW
EPI CELLS # UR: 3 /HPF — SIGNIFICANT CHANGE UP (ref 0–5)
GLUCOSE BLDC GLUCOMTR-MCNC: 160 MG/DL — HIGH (ref 70–99)
GLUCOSE BLDC GLUCOMTR-MCNC: 233 MG/DL — HIGH (ref 70–99)
GLUCOSE SERPL-MCNC: 151 MG/DL — HIGH (ref 70–99)
GLUCOSE UR QL: ABNORMAL
HCT VFR BLD CALC: 30.2 % — LOW (ref 34.5–45)
HGB BLD-MCNC: 8.9 G/DL — LOW (ref 11.5–15.5)
KETONES UR-MCNC: NEGATIVE — SIGNIFICANT CHANGE UP
LEUKOCYTE ESTERASE UR-ACNC: ABNORMAL
MAGNESIUM SERPL-MCNC: 2.3 MG/DL — SIGNIFICANT CHANGE UP (ref 1.6–2.6)
MCHC RBC-ENTMCNC: 25.8 PG — LOW (ref 27–34)
MCHC RBC-ENTMCNC: 29.5 GM/DL — LOW (ref 32–36)
MCV RBC AUTO: 87.5 FL — SIGNIFICANT CHANGE UP (ref 80–100)
NITRITE UR-MCNC: POSITIVE
NRBC # BLD: 0 /100 WBCS — SIGNIFICANT CHANGE UP
NRBC # FLD: 0 K/UL — SIGNIFICANT CHANGE UP
PH UR: 6.5 — SIGNIFICANT CHANGE UP (ref 5–8)
PHOSPHATE SERPL-MCNC: 6.3 MG/DL — HIGH (ref 2.5–4.5)
PLATELET # BLD AUTO: 335 K/UL — SIGNIFICANT CHANGE UP (ref 150–400)
POTASSIUM SERPL-MCNC: 3.7 MMOL/L — SIGNIFICANT CHANGE UP (ref 3.5–5.3)
POTASSIUM SERPL-SCNC: 3.7 MMOL/L — SIGNIFICANT CHANGE UP (ref 3.5–5.3)
PROT UR-MCNC: ABNORMAL
RBC # BLD: 3.45 M/UL — LOW (ref 3.8–5.2)
RBC # FLD: 15.7 % — HIGH (ref 10.3–14.5)
RBC CASTS # UR COMP ASSIST: SIGNIFICANT CHANGE UP /HPF (ref 0–4)
SODIUM SERPL-SCNC: 139 MMOL/L — SIGNIFICANT CHANGE UP (ref 135–145)
SP GR SPEC: 1.01 — SIGNIFICANT CHANGE UP (ref 1–1.05)
UROBILINOGEN FLD QL: SIGNIFICANT CHANGE UP
WBC # BLD: 5.17 K/UL — SIGNIFICANT CHANGE UP (ref 3.8–10.5)
WBC # FLD AUTO: 5.17 K/UL — SIGNIFICANT CHANGE UP (ref 3.8–10.5)
WBC UR QL: >50 /HPF — SIGNIFICANT CHANGE UP (ref 0–5)

## 2022-05-02 RX ORDER — LANOLIN ALCOHOL/MO/W.PET/CERES
1 CREAM (GRAM) TOPICAL
Qty: 0 | Refills: 0 | DISCHARGE
Start: 2022-05-02

## 2022-05-02 RX ORDER — CLOPIDOGREL BISULFATE 75 MG/1
1 TABLET, FILM COATED ORAL
Qty: 30 | Refills: 0
Start: 2022-05-02 | End: 2022-05-31

## 2022-05-02 RX ORDER — ENOXAPARIN SODIUM 100 MG/ML
10 INJECTION SUBCUTANEOUS
Qty: 300 | Refills: 0
Start: 2022-05-02 | End: 2022-05-31

## 2022-05-02 RX ORDER — ACETAMINOPHEN 500 MG
2 TABLET ORAL
Qty: 0 | Refills: 0 | DISCHARGE
Start: 2022-05-02

## 2022-05-02 RX ORDER — SENNA PLUS 8.6 MG/1
2 TABLET ORAL
Qty: 0 | Refills: 0 | DISCHARGE
Start: 2022-05-02

## 2022-05-02 RX ORDER — SEVELAMER CARBONATE 2400 MG/1
1 POWDER, FOR SUSPENSION ORAL
Qty: 90 | Refills: 0
Start: 2022-05-02 | End: 2022-05-31

## 2022-05-02 RX ORDER — INSULIN GLARGINE 100 [IU]/ML
12 INJECTION, SOLUTION SUBCUTANEOUS
Qty: 420 | Refills: 0
Start: 2022-05-02 | End: 2022-05-31

## 2022-05-02 RX ORDER — FERROUS SULFATE 325(65) MG
1 TABLET ORAL
Qty: 180 | Refills: 0
Start: 2022-05-02 | End: 2022-07-30

## 2022-05-02 RX ORDER — SITAGLIPTIN 50 MG/1
1 TABLET, FILM COATED ORAL
Qty: 30 | Refills: 0
Start: 2022-05-02 | End: 2022-05-31

## 2022-05-02 RX ORDER — POLYETHYLENE GLYCOL 3350 17 G/17G
17 POWDER, FOR SOLUTION ORAL
Qty: 0 | Refills: 0 | DISCHARGE
Start: 2022-05-02

## 2022-05-02 RX ORDER — LINAGLIPTIN 5 MG/1
1 TABLET, FILM COATED ORAL
Qty: 30 | Refills: 0
Start: 2022-05-02 | End: 2022-05-31

## 2022-05-02 RX ADMIN — SEVELAMER CARBONATE 800 MILLIGRAM(S): 2400 POWDER, FOR SUSPENSION ORAL at 13:07

## 2022-05-02 RX ADMIN — Medication 5 UNIT(S): at 08:47

## 2022-05-02 RX ADMIN — Medication 25 MILLIGRAM(S): at 06:14

## 2022-05-02 RX ADMIN — Medication 80 MILLIGRAM(S): at 06:15

## 2022-05-02 RX ADMIN — Medication 2: at 12:06

## 2022-05-02 RX ADMIN — Medication 1: at 08:46

## 2022-05-02 RX ADMIN — Medication 10 MILLIGRAM(S): at 13:08

## 2022-05-02 RX ADMIN — Medication 5 UNIT(S): at 12:07

## 2022-05-02 RX ADMIN — SEVELAMER CARBONATE 800 MILLIGRAM(S): 2400 POWDER, FOR SUSPENSION ORAL at 06:15

## 2022-05-02 RX ADMIN — Medication 1 APPLICATION(S): at 06:23

## 2022-05-02 RX ADMIN — CLOPIDOGREL BISULFATE 75 MILLIGRAM(S): 75 TABLET, FILM COATED ORAL at 13:07

## 2022-05-02 RX ADMIN — Medication 10 MILLIGRAM(S): at 06:15

## 2022-05-02 NOTE — PROGRESS NOTE ADULT - SUBJECTIVE AND OBJECTIVE BOX
Cardiovascular Disease Progress Note    Overnight events: No acute events overnight. Ms. Tsang has chronic SOB. No chest pain.    Otherwise review of systems negative    Objective Findings:  T(C): 36.7 (05-02-22 @ 06:20), Max: 37.1 (05-01-22 @ 13:59)  HR: 60 (05-02-22 @ 06:20) (60 - 64)  BP: 119/64 (05-02-22 @ 06:20) (119/64 - 152/57)  RR: 16 (05-02-22 @ 06:20) (16 - 18)  SpO2: 98% (05-02-22 @ 06:20) (97% - 98%)  Wt(kg): --  Daily     Daily       Physical Exam:  Gen: NAD; Patient resting comfortably  HEENT: EOMI, Normocephalic/ atraumatic  CV: RRR, normal S1 + S2, no m/r/g  Lungs:  Normal respiratory effort; clear to auscultation bilaterally  Abd: soft, non-tender; bowel sounds present  Ext: No edema; warm and well perfused    Telemetry: Sinus; no ectopy    Laboratory Data:                        8.9    5.55  )-----------( 327      ( 01 May 2022 07:54 )             29.5     05-01    138  |  101  |  49<H>  ----------------------------<  209<H>  3.6   |  24  |  4.20<H>    Ca    8.5      01 May 2022 07:54  Phos  5.6     05-01  Mg     2.10     05-01    TPro  6.3  /  Alb  2.9<L>  /  TBili  <0.2  /  DBili  x   /  AST  10  /  ALT  8   /  AlkPhos  201<H>  04-30              Inpatient Medications:  MEDICATIONS  (STANDING):  clopidogrel Tablet 75 milliGRAM(s) Oral daily  clotrimazole 1% Cream 1 Application(s) Topical two times a day  clotrimazole 1% Vaginal Cream 1 Applicatorful Vaginal at bedtime  dextrose 5%. 1000 milliLiter(s) (50 mL/Hr) IV Continuous <Continuous>  dextrose 5%. 1000 milliLiter(s) (100 mL/Hr) IV Continuous <Continuous>  dextrose 50% Injectable 25 Gram(s) IV Push once  dextrose 50% Injectable 12.5 Gram(s) IV Push once  dextrose 50% Injectable 25 Gram(s) IV Push once  furosemide    Tablet 80 milliGRAM(s) Oral two times a day  gabapentin 300 milliGRAM(s) Oral at bedtime  glucagon  Injectable 1 milliGRAM(s) IntraMuscular once  hydrALAZINE 10 milliGRAM(s) Oral three times a day  insulin glargine Injectable (LANTUS) 7 Unit(s) SubCutaneous at bedtime  insulin lispro (ADMELOG) corrective regimen sliding scale   SubCutaneous three times a day before meals  insulin lispro (ADMELOG) corrective regimen sliding scale   SubCutaneous at bedtime  insulin lispro Injectable (ADMELOG) 5 Unit(s) SubCutaneous three times a day before meals  metoprolol succinate ER 25 milliGRAM(s) Oral daily  polyethylene glycol 3350 17 Gram(s) Oral daily  senna 2 Tablet(s) Oral at bedtime  sevelamer carbonate 800 milliGRAM(s) Oral three times a day      Assessment: 82 year old woman with uncontrolled IDDM, compensated diastolic CHF, CAD s/p PCI, CKD V and HTN presents with rash and anemia.     Plan of Care:      #CAD- s/p MICHAEL x2 in 12/2017.  Anemia noted on admission, but patient denies any signs of bleeding.  Likely anemia in the setting of CKD.   No signs or symptoms of coronary ischemia.   S/p 1 Unit pRBCs on 4/30.   Given extensive vascular disease, continue Plavix 75 mg daily.     #Compensated diastolic CHF-  Patient is saturating well on room air.  She is not grossly fluid overloaded on exam.  Echo on this admission reviewed- no significant changes from prior.   Continue Lasix 80 mg PO BID.    #PVD-  Patient status post RLE angiogram with 2 stents placed in 11/2019.   Antiplatelet therapy management as stated above.     #Paroxymal a-fib-  Admission EKG is sinus.   No AC given h/o GI bleed.      Over 25 minutes spent on total encounter; more than 50% of the visit was spent counseling and/or coordinating care by the attending physician.      Julio César Hooper MD formerly [facility]  Cardiovascular Disease  [phone]
Cardiovascular Disease Progress Note    Overnight events: No acute events overnight. Ms. Tsang denies chest pain. She has chronic exertional SOB.     Otherwise review of systems negative    Objective Findings:  T(C): 36.4 (05-01-22 @ 05:25), Max: 36.8 (04-30-22 @ 09:31)  HR: 58 (05-01-22 @ 06:55) (58 - 81)  BP: 149/61 (05-01-22 @ 06:55) (132/38 - 171/67)  RR: 20 (05-01-22 @ 05:25) (18 - 20)  SpO2: 97% (05-01-22 @ 05:25) (96% - 99%)  Wt(kg): --  Daily     Daily       Physical Exam:  Gen: NAD; Patient resting comfortably  HEENT: EOMI, Normocephalic/ atraumatic  CV: RRR, normal S1 + S2, no m/r/g  Lungs:  Normal respiratory effort; clear to auscultation bilaterally  Abd: soft, non-tender; bowel sounds present  Ext: No edema; warm and well perfused    Telemetry: Sinus    Laboratory Data:                        8.2    6.13  )-----------( 319      ( 30 Apr 2022 19:54 )             26.9     04-30    137  |  99  |  52<H>  ----------------------------<  239<H>  3.5   |  26  |  4.28<H>    Ca    7.9<L>      30 Apr 2022 07:35  Mg     2.00     04-29    TPro  6.3  /  Alb  2.9<L>  /  TBili  <0.2  /  DBili  x   /  AST  10  /  ALT  8   /  AlkPhos  201<H>  04-30              Inpatient Medications:  MEDICATIONS  (STANDING):  clotrimazole 1% Cream 1 Application(s) Topical two times a day  clotrimazole 1% Vaginal Cream 1 Applicatorful Vaginal at bedtime  dextrose 5%. 1000 milliLiter(s) (100 mL/Hr) IV Continuous <Continuous>  dextrose 5%. 1000 milliLiter(s) (50 mL/Hr) IV Continuous <Continuous>  dextrose 50% Injectable 25 Gram(s) IV Push once  dextrose 50% Injectable 12.5 Gram(s) IV Push once  dextrose 50% Injectable 25 Gram(s) IV Push once  furosemide    Tablet 80 milliGRAM(s) Oral two times a day  gabapentin 300 milliGRAM(s) Oral at bedtime  glucagon  Injectable 1 milliGRAM(s) IntraMuscular once  hydrALAZINE 10 milliGRAM(s) Oral three times a day  insulin glargine Injectable (LANTUS) 5 Unit(s) SubCutaneous at bedtime  insulin lispro (ADMELOG) corrective regimen sliding scale   SubCutaneous three times a day before meals  insulin lispro (ADMELOG) corrective regimen sliding scale   SubCutaneous at bedtime  insulin lispro Injectable (ADMELOG) 4 Unit(s) SubCutaneous three times a day before meals  metoprolol succinate ER 25 milliGRAM(s) Oral daily  polyethylene glycol 3350 17 Gram(s) Oral daily  senna 2 Tablet(s) Oral at bedtime      Assessment: 82 year old woman with uncontrolled IDDM, compensated diastolic CHF, CAD s/p PCI, CKD V and HTN presents with rash and anemia.     Plan of Care:      #CAD- s/p MICHAEL x2 in 12/2017.  Anemia noted on admission, but patient denies any signs of bleeding.  Likely anemia in the setting of CKD.   No signs or symptoms of coronary ischemia.   S/p 1 Unit pRBCs on 4/30.   If Hb remains stable, I would start Plavix 75 mg daily.     #Compensated diastolic CHF-  Patient is saturating well on room air.  She is not grossly fluid overloaded on exam.  Echo on this admission reviewed- no significant changes from prior.   Continue Lasix 80 mg PO BID.    #PVD-  Patient status post RLE angiogram with 2 stents placed in 11/2019.   Antiplatelet therapy management as stated above.     #Paroxymal a-fib-  Admission EKG is sinus.   No AC given h/o GI bleed.        Over 25 minutes spent on total encounter; more than 50% of the visit was spent counseling and/or coordinating care by the attending physician.      Julio César Hooper MD [facility]  Cardiovascular Disease  [phone]
NEPHROLOGY-[facility] [phone]        Patient seen and examined no new complaints.         MEDICATIONS  (STANDING):  clopidogrel Tablet 75 milliGRAM(s) Oral daily  clotrimazole 1% Cream 1 Application(s) Topical two times a day  clotrimazole 1% Vaginal Cream 1 Applicatorful Vaginal at bedtime  dextrose 5%. 1000 milliLiter(s) (100 mL/Hr) IV Continuous <Continuous>  dextrose 5%. 1000 milliLiter(s) (50 mL/Hr) IV Continuous <Continuous>  dextrose 50% Injectable 25 Gram(s) IV Push once  dextrose 50% Injectable 12.5 Gram(s) IV Push once  dextrose 50% Injectable 25 Gram(s) IV Push once  furosemide    Tablet 80 milliGRAM(s) Oral two times a day  gabapentin 300 milliGRAM(s) Oral at bedtime  glucagon  Injectable 1 milliGRAM(s) IntraMuscular once  hydrALAZINE 10 milliGRAM(s) Oral three times a day  insulin glargine Injectable (LANTUS) 7 Unit(s) SubCutaneous at bedtime  insulin lispro (ADMELOG) corrective regimen sliding scale   SubCutaneous three times a day before meals  insulin lispro (ADMELOG) corrective regimen sliding scale   SubCutaneous at bedtime  insulin lispro Injectable (ADMELOG) 5 Unit(s) SubCutaneous three times a day before meals  metoprolol succinate ER 25 milliGRAM(s) Oral daily  polyethylene glycol 3350 17 Gram(s) Oral daily  senna 2 Tablet(s) Oral at bedtime  sevelamer carbonate 800 milliGRAM(s) Oral three times a day      VITAL:  T(C): , Max: 37.1 (22 @ 13:59)  T(F): , Max: 98.8 (22 @ 13:59)  HR: 68 (22 @ 11:40)  BP: 139/68 (22 @ 11:40)  BP(mean): --  RR: 16 (22 @ 11:40)  SpO2: 97% (22 @ 11:40)  Wt(kg): --    I and O's:        PHYSICAL EXAM:    Constitutional: NAD  Neck:  No JVD  Respiratory: CTAB/L  Cardiovascular: S1 and S2  Gastrointestinal: BS+, soft, NT/ND  Extremities: No peripheral edema  Neurological: A/O x 3, no focal deficits  Psychiatric: Normal mood, normal affect  : No Sylvester  Skin: No rashes  Access: Not applicable    LABS:                        8.9    5.17  )-----------( 335      ( 02 May 2022 07:48 )             30.2     05-02    139  |  99  |  55<H>  ----------------------------<  151<H>  3.7   |  27  |  4.45<H>    Ca    8.4      02 May 2022 07:48  Phos  6.3     05-02  Mg     2.30     05-02            Urine Studies:  Urinalysis Basic - ( 02 May 2022 03:09 )    Color: Colorless / Appearance: Slightly Turbid / S.012 / pH: x  Gluc: x / Ketone: Negative  / Bili: Negative / Urobili: <2 mg/dL   Blood: x / Protein: 300 mg/dL / Nitrite: Positive   Leuk Esterase: Large / RBC: 10-20 /HPF / WBC >50 /HPF   Sq Epi: x / Non Sq Epi: 3 /HPF / Bacteria: Many                      
  Chief complaint    Patient is a 82y old  Female who presents with a chief complaint of Rash, LE pain. (02 May 2022 13:21)   Review of systems  Patient in bed, appears comfortable.    Labs and Fingersticks  CAPILLARY BLOOD GLUCOSE      POCT Blood Glucose.: 233 mg/dL (02 May 2022 12:02)  POCT Blood Glucose.: 160 mg/dL (02 May 2022 08:31)  POCT Blood Glucose.: 160 mg/dL (01 May 2022 21:43)  POCT Blood Glucose.: 193 mg/dL (01 May 2022 17:19)      Anion Gap, Serum: 13 (05-02 @ 07:48)  Anion Gap, Serum: 13 (05-01 @ 07:54)      Calcium, Total Serum: 8.4 (05-02 @ 07:48)  Calcium, Total Serum: 8.5 (05-01 @ 07:54)          05-02    139  |  99  |  55<H>  ----------------------------<  151<H>  3.7   |  27  |  4.45<H>    Ca    8.4      02 May 2022 07:48  Phos  6.3     05-02  Mg     2.30     05-02                          8.9    5.17  )-----------( 335      ( 02 May 2022 07:48 )             30.2     Medications  MEDICATIONS  (STANDING):  clopidogrel Tablet 75 milliGRAM(s) Oral daily  clotrimazole 1% Cream 1 Application(s) Topical two times a day  clotrimazole 1% Vaginal Cream 1 Applicatorful Vaginal at bedtime  dextrose 5%. 1000 milliLiter(s) (100 mL/Hr) IV Continuous <Continuous>  dextrose 5%. 1000 milliLiter(s) (50 mL/Hr) IV Continuous <Continuous>  dextrose 50% Injectable 25 Gram(s) IV Push once  dextrose 50% Injectable 12.5 Gram(s) IV Push once  dextrose 50% Injectable 25 Gram(s) IV Push once  furosemide    Tablet 80 milliGRAM(s) Oral two times a day  gabapentin 300 milliGRAM(s) Oral at bedtime  glucagon  Injectable 1 milliGRAM(s) IntraMuscular once  hydrALAZINE 10 milliGRAM(s) Oral three times a day  insulin glargine Injectable (LANTUS) 7 Unit(s) SubCutaneous at bedtime  insulin lispro (ADMELOG) corrective regimen sliding scale   SubCutaneous three times a day before meals  insulin lispro (ADMELOG) corrective regimen sliding scale   SubCutaneous at bedtime  insulin lispro Injectable (ADMELOG) 5 Unit(s) SubCutaneous three times a day before meals  metoprolol succinate ER 25 milliGRAM(s) Oral daily  polyethylene glycol 3350 17 Gram(s) Oral daily  senna 2 Tablet(s) Oral at bedtime  sevelamer carbonate 800 milliGRAM(s) Oral three times a day      Physical Exam  General: Patient comfortable in bed  Vital Signs Last 12 Hrs  T(F): 98.1 (05-02-22 @ 11:40), Max: 98.1 (05-02-22 @ 11:40)  HR: 60 (05-02-22 @ 13:05) (60 - 68)  BP: 128/56 (05-02-22 @ 13:05) (119/64 - 139/68)  BP(mean): --  RR: 16 (05-02-22 @ 11:40) (16 - 16)  SpO2: 97% (05-02-22 @ 11:40) (97% - 98%)  Neck: No palpable thyroid nodules.  CVS: S1S2, No murmurs  Respiratory: No wheezing, no crepitations  GI: Abdomen soft, bowel sounds positive  Musculoskeletal:  edema lower extremities.     Diagnostics          
HOLTAMIE  82y  Female      Patient is a 82y old  Female who presents with a chief complaint of Rash, LE pain. (01 May 2022 22:05)  feels fine.no sob,no cp,no fever,no cough    REVIEW OF SYSTEMS:  CONSTITUTIONAL: No fever  RESPIRATORY: No cough, hemoptysis or shortness of breath  CARDIOVASCULAR: No chest pain, palpitations, dizziness, or leg swelling  GASTROINTESTINAL: No abdominal pain. nausea, vomiting, hematemesis  INTERVAL HPI/OVERNIGHT EVENTS:  T(C): 36.8 (22 @ 21:15), Max: 37.1 (22 @ 13:59)  HR: 64 (22 @ 21:15) (58 - 64)  BP: 150/68 (22 @ 21:15) (149/61 - 171/67)  RR: 16 (22 @ 21:15) (16 - 20)  SpO2: 97% (22 @ 21:15) (97% - 97%)  Wt(kg): --  I&O's Summary    T(C): 36.8 (22 @ 21:15), Max: 37.1 (22 @ 13:59)  HR: 64 (22 @ 21:15) (58 - 64)  BP: 150/68 (22 @ 21:15) (149/61 - 171/67)  RR: 16 (22 @ 21:15) (16 - 20)  SpO2: 97% (22 @ 21:15) (97% - 97%)  Wt(kg): --Vital Signs Last 24 Hrs  T(C): 36.8 (01 May 2022 21:15), Max: 37.1 (01 May 2022 13:59)  T(F): 98.2 (01 May 2022 21:15), Max: 98.8 (01 May 2022 13:59)  HR: 64 (01 May 2022 21:15) (58 - 64)  BP: 150/68 (01 May 2022 21:15) (149/61 - 171/67)  BP(mean): --  RR: 16 (01 May 2022 21:15) (16 - 20)  SpO2: 97% (01 May 2022 21:15) (97% - 97%)    LABS:                        8.9    5.55  )-----------( 327      ( 01 May 2022 07:54 )             29.5         138  |  101  |  49<H>  ----------------------------<  209<H>  3.6   |  24  |  4.20<H>    Ca    8.5      01 May 2022 07:54  Phos  5.6     05-  Mg     2.10         TPro  6.3  /  Alb  2.9<L>  /  TBili  <0.2  /  DBili  x   /  AST  10  /  ALT  8   /  AlkPhos  201<H>  04-30        CAPILLARY BLOOD GLUCOSE      POCT Blood Glucose.: 160 mg/dL (01 May 2022 21:43)  POCT Blood Glucose.: 193 mg/dL (01 May 2022 17:19)  POCT Blood Glucose.: 297 mg/dL (01 May 2022 12:03)  POCT Blood Glucose.: 221 mg/dL (01 May 2022 08:45)            PAST MEDICAL & SURGICAL HISTORY:  HLD (hyperlipidemia)    Diabetic neuropathy    CAD (coronary artery disease)  ~ s/p MICHAEL x2 in 2017    Paroxysmal atrial fibrillation    Diabetes mellitus, type 2    GI bleed    Neuropathy    Gastritis    Diastolic heart failure    Transfusion history    Stage 5 chronic kidney disease not on chronic dialysis    Anemia    PVD (peripheral vascular disease)  ~ RLE angiogram with 2 stents placed in 2019.    H/O  section    S/P coronary artery stent placement  x2 in     History of esophagogastroduodenoscopy (EGD)    S/P angiogram of extremity        MEDICATIONS  (STANDING):  clopidogrel Tablet 75 milliGRAM(s) Oral daily  clotrimazole 1% Cream 1 Application(s) Topical two times a day  clotrimazole 1% Vaginal Cream 1 Applicatorful Vaginal at bedtime  dextrose 5%. 1000 milliLiter(s) (100 mL/Hr) IV Continuous <Continuous>  dextrose 5%. 1000 milliLiter(s) (50 mL/Hr) IV Continuous <Continuous>  dextrose 50% Injectable 25 Gram(s) IV Push once  dextrose 50% Injectable 12.5 Gram(s) IV Push once  dextrose 50% Injectable 25 Gram(s) IV Push once  furosemide    Tablet 80 milliGRAM(s) Oral two times a day  gabapentin 300 milliGRAM(s) Oral at bedtime  glucagon  Injectable 1 milliGRAM(s) IntraMuscular once  hydrALAZINE 10 milliGRAM(s) Oral three times a day  insulin glargine Injectable (LANTUS) 7 Unit(s) SubCutaneous at bedtime  insulin lispro (ADMELOG) corrective regimen sliding scale   SubCutaneous three times a day before meals  insulin lispro (ADMELOG) corrective regimen sliding scale   SubCutaneous at bedtime  insulin lispro Injectable (ADMELOG) 5 Unit(s) SubCutaneous three times a day before meals  metoprolol succinate ER 25 milliGRAM(s) Oral daily  polyethylene glycol 3350 17 Gram(s) Oral daily  senna 2 Tablet(s) Oral at bedtime  sevelamer carbonate 800 milliGRAM(s) Oral three times a day    MEDICATIONS  (PRN):  acetaminophen     Tablet .. 650 milliGRAM(s) Oral every 6 hours PRN Temp greater or equal to 38C (100.4F), Mild Pain (1 - 3)  aluminum hydroxide/magnesium hydroxide/simethicone Suspension 30 milliLiter(s) Oral every 4 hours PRN Dyspepsia  dextrose Oral Gel 15 Gram(s) Oral once PRN Blood Glucose LESS THAN 70 milliGRAM(s)/deciliter  melatonin 3 milliGRAM(s) Oral at bedtime PRN Insomnia  ondansetron Injectable 4 milliGRAM(s) IV Push every 8 hours PRN Nausea and/or Vomiting        RADIOLOGY & ADDITIONAL TESTS:    Imaging Personally Reviewed:  [ ] YES  [ ] NO    Consultant(s) Notes Reviewed:  [x ] YES  [ ] NO    PHYSICAL EXAM:  GENERAL: Alert and awake lying in bed in no distress  HEAD:  Atraumatic, Normocephalic  EYES: EOMI, EDIN, conjunctiva and sclera clear  NECK: Supple, No JVD, Normal thyroid  NERVOUS SYSTEM:  Alert & Oriented X3, Motor and sensory systems are intact,   CHEST/LUNG: Bilateral clear breath sounds, no rhochi, no wheezing, no crepitations,  HEART: Regular rate and rhythm; No murmurs, rubs, or gallops  ABDOMEN: Soft, Nontender, Nondistended; Bowel sounds present  EXTREMITIES:   Peripheral Pulses are palpable, no  edema        Care Discussed with Consultants/Other Providers [ x] YES  [ ] NO      Code Status: [] Full Code [] DNR [] DNI [] Goals of Care:   Disposition: [] ICU [] Stroke Unit [] RCU []PCU []Floor [] Discharge Home         LEFTY LomeliP    
AMIE HOLT  82y  Female      Patient is a 82y old  Female who presents with a chief complaint of Rash, LE pain. (2022 15:27)  Patient was seen and examined.chart reviewed..c/o itchin under breasts,vaginal area upon admission.Uncontrolled DM.Anemia of chronic dis-s/p PRBC Transfusion.seen by Cardiology,Renal    REVIEW OF SYSTEMS:  CONSTITUTIONAL: No fever  RESPIRATORY: No cough, hemoptysis or shortness of breath  CARDIOVASCULAR: No chest pain, palpitations, dizziness, or leg swelling  GASTROINTESTINAL: No abdominal pain. nausea, vomiting, hematemesis  GENITOURINARY: No dysuria, frequency, hematuria   NEUROLOGICAL: No headaches, no dizziness  MUSCULOSKELETAL: No joint pain or swelling;     INTERVAL HPI/OVERNIGHT EVENTS:  T(C): 36.3 (-30-22 @ 18:09), Max: 36.8 (30-22 @ 00:33)  HR: 73 (30-22 @ 18:09) (65 - 81)  BP: 161/61 (30-22 @ 18:09) (132/38 - 165/65)  RR: 18 (-30-22 @ 18:09) (18 - 20)  SpO2: 99% (30-22 @ 18:09) (96% - 100%)  Wt(kg): --  I&O's Summary    T(C): 36.3 (-30-22 @ 18:09), Max: 36.8 (30-22 @ 00:33)  HR: 73 (3022 @ 18:09) (65 - 81)  BP: 161/61 (30-22 @ 18:09) (132/38 - 165/65)  RR: 18 (30-22 @ 18:09) (18 - 20)  SpO2: 99% (30-22 @ 18:09) (96% - 100%)  Wt(kg): --Vital Signs Last 24 Hrs  T(C): 36.3 (2022 18:09), Max: 36.8 (2022 00:33)  T(F): 97.4 (2022 18:09), Max: 98.3 (2022 09:31)  HR: 73 (2022 18:09) (65 - 81)  BP: 161/61 (2022 18:09) (132/38 - 165/65)  BP(mean): --  RR: 18 (2022 18:09) (18 - 20)  SpO2: 99% (2022 18:09) (96% - 100%)    LABS:                        8.2    6.13  )-----------( 319      ( 2022 19:54 )             26.9         137  |  99  |  52<H>  ----------------------------<  239<H>  3.5   |  26  |  4.28<H>    Ca    7.9<L>      2022 07:35  Mg     2.00         TPro  6.3  /  Alb  2.9<L>  /  TBili  <0.2  /  DBili  x   /  AST  10  /  ALT  8   /  AlkPhos  201<H>          CAPILLARY BLOOD GLUCOSE      POCT Blood Glucose.: 253 mg/dL (2022 17:22)  POCT Blood Glucose.: 405 mg/dL (2022 13:00)  POCT Blood Glucose.: 376 mg/dL (2022 23:23)  POCT Blood Glucose.: 415 mg/dL (2022 22:06)            PAST MEDICAL & SURGICAL HISTORY:  HLD (hyperlipidemia)    Diabetic neuropathy    CAD (coronary artery disease)  ~ s/p MICHAEL x2 in 2017    Paroxysmal atrial fibrillation    Diabetes mellitus, type 2    GI bleed    Neuropathy    Gastritis    Diastolic heart failure    Transfusion history    Stage 5 chronic kidney disease not on chronic dialysis    Anemia    PVD (peripheral vascular disease)  ~ RLE angiogram with 2 stents placed in 2019.    H/O  section    S/P coronary artery stent placement  x2 in     History of esophagogastroduodenoscopy (EGD)    S/P angiogram of extremity        MEDICATIONS  (STANDING):  clotrimazole 1% Cream 1 Application(s) Topical two times a day  clotrimazole 1% Vaginal Cream 1 Applicatorful Vaginal at bedtime  dextrose 5%. 1000 milliLiter(s) (100 mL/Hr) IV Continuous <Continuous>  dextrose 5%. 1000 milliLiter(s) (50 mL/Hr) IV Continuous <Continuous>  dextrose 50% Injectable 25 Gram(s) IV Push once  dextrose 50% Injectable 12.5 Gram(s) IV Push once  dextrose 50% Injectable 25 Gram(s) IV Push once  furosemide    Tablet 80 milliGRAM(s) Oral two times a day  gabapentin 300 milliGRAM(s) Oral at bedtime  glucagon  Injectable 1 milliGRAM(s) IntraMuscular once  hydrALAZINE 10 milliGRAM(s) Oral three times a day  insulin glargine Injectable (LANTUS) 5 Unit(s) SubCutaneous at bedtime  insulin lispro (ADMELOG) corrective regimen sliding scale   SubCutaneous three times a day before meals  insulin lispro (ADMELOG) corrective regimen sliding scale   SubCutaneous at bedtime  insulin lispro Injectable (ADMELOG) 4 Unit(s) SubCutaneous three times a day before meals  metoprolol succinate ER 25 milliGRAM(s) Oral daily  polyethylene glycol 3350 17 Gram(s) Oral daily  senna 2 Tablet(s) Oral at bedtime    MEDICATIONS  (PRN):  acetaminophen     Tablet .. 650 milliGRAM(s) Oral every 6 hours PRN Temp greater or equal to 38C (100.4F), Mild Pain (1 - 3)  aluminum hydroxide/magnesium hydroxide/simethicone Suspension 30 milliLiter(s) Oral every 4 hours PRN Dyspepsia  dextrose Oral Gel 15 Gram(s) Oral once PRN Blood Glucose LESS THAN 70 milliGRAM(s)/deciliter  melatonin 3 milliGRAM(s) Oral at bedtime PRN Insomnia  ondansetron Injectable 4 milliGRAM(s) IV Push every 8 hours PRN Nausea and/or Vomiting        RADIOLOGY & ADDITIONAL TESTS:    Imaging Personally Reviewed:  [ ] YES  [ ] NO    Consultant(s) Notes Reviewed:  [x ] YES  [ ] NO    PHYSICAL EXAM:  GENERAL: Alert and awake lying in bed in no distress  HEAD:  Atraumatic, Normocephalic  EYES: EOMI, EDIN, conjunctiva and sclera clear  NECK: Supple, No JVD, Normal thyroid  NERVOUS SYSTEM:  Alert & Oriented X3, Motor and sensory systems are intact,   CHEST/LUNG: Bilateral clear breath sounds, no rhochi, no wheezing, no crepitations,  HEART: Regular rate and rhythm; No murmurs, rubs, or gallops  ABDOMEN: Soft, Nontender, Nondistended; Bowel sounds present  EXTREMITIES:   Peripheral Pulses are palpable, no  edema        Care Discussed with Consultants/Other Providers [x ] YES  [ ] NO      Code Status: [] Full Code [] DNR [] DNI [] Goals of Care:   Disposition: [] ICU [] Stroke Unit [] RCU []PCU []Floor [] Discharge Home         JHONATAN [email or web address]

## 2022-05-02 NOTE — PROGRESS NOTE ADULT - PROBLEM SELECTOR PLAN 3
likely due to uncontrolled DM  c/w clotrimazole topical   monitor response.
Monitor labs, Renal FU
likely due to uncontrolled DM  c/w clotrimazole topical   monitor response.

## 2022-05-02 NOTE — PROGRESS NOTE ADULT - ASSESSMENT
ASSESSMENT/PLAN  ASSESSMENT:  82 year-old woman with history of DM, CAD, HFpEF, PAD and CKD stage 5. Presents with pruritic rash under her breasts and in the vaginal area.    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 Whereas she does not absolutely require dialysis at present, she is close to the point where she would in fact require it. 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 Solis in one month ( in office vs telehealth)    Alona Del Angel NP   [employer]  [phone]   ASSESSMENT/PLAN  ASSESSMENT:  82 year-old woman with history of DM, CAD, HFpEF, PAD and CKD stage 5. Presents with pruritic rash under her breasts and in the vaginal area.    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 Whereas she does not absolutely require dialysis at present, she is close to the point where she would in fact require it. Scr plateauing in the 4s    (2)ID - fungal infection    (3)Endo - hyperglycemia    (4)Anemia - likely iron deficiency; hgb stable; on IV  iron    (5) Blood pressure is labile    (6) Hyperphosphatemia renally mediated, phos rising on renvela       RECOMMEND:  (1)Continue Renvela 800mg TID with meals  (2)Start ferrous sulfate 325mg daily upon discharge, would give one more dose of venofer prior to d/c if able   (3)No need for low-K diet for now  (4)Meds for GFR <15ml/min  (5)No HD for now  (6)No objection to discharge, she should follow up with Dr. Solis in one month ( in office vs telehealth)    Alona Del Angel NP   [employer]  [phone]   ASSESSMENT/PLAN  ASSESSMENT:  82 year-old woman with history of DM, CAD, HFpEF, PAD and CKD stage 5. Presents with pruritic rash under her breasts and in the vaginal area.    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 Whereas she does not absolutely require dialysis at present, she is close to the point where she would in fact require it. Scr plateauing in the 4s    (2)ID - fungal infection    (3)Endo - hyperglycemia    (4)Anemia - likely iron deficiency; hgb stable; on IV  iron    (5) Blood pressure is labile    (6) Hyperphosphatemia renally mediated, phos rising on renvela       RECOMMEND:  (1)Continue Renvela 800mg TID with meals  (2)Start ferrous sulfate 325mg daily upon discharge, would give one more dose of venofer prior to d/c if able   (3)No need for low-K diet for now  (4)Meds for GFR <15ml/min  (5)No HD for now  (6)No objection to discharge, she should follow up with Dr. Solis in one month ( in office vs telehealth)    Alona Del Angel NP   [email or web address]  [phone]      RENAL ATTENDING NOTE  Patient seen and examined with NP. Agree with assessment and plan as above.    Reid Solis MD  [facility]  [phone]

## 2022-05-02 NOTE — PROGRESS NOTE ADULT - ASSESSMENT
Assessment  DMT2: 82y Female with DM T2 with hyperglycemia admitted with mycosis, A1C is 15.4%, patient was on oral hypoglycemic agents at home, non compliant with diabetes medications, on basal bolus and insulin coverage, blood sugars trending down, eating meals, has done insulin injections ion the past.  CAD: On medications, monitored, asymptomatic.  HTN: On antihypertensive medications, monitored, asymptomatic.  Overweight: No strict exercise routines, neither on low calorie diet.                 Luciana Tsang MD  Cell: [phone]  Office: [phone]

## 2022-05-02 NOTE — PROGRESS NOTE ADULT - PROVIDER SPECIALTY LIST ADULT
Cardiology
Internal Medicine
Nephrology
Nephrology
Cardiology
Nephrology
Endocrinology
Internal Medicine

## 2022-05-02 NOTE — PROGRESS NOTE ADULT - PROBLEM SELECTOR PLAN 2
elevated proBNP-chronic diastolic CHF  trop neg delta  EKG with inferalateral ischemic, no ekg for comparison. likely related to prior CAD and PCI, currently w/o chest pain.   s/p lasix in ED. clinically does not appear overloaded.   -Cardiology consult appreciated.Lasix 80 mg po bid,resumed,monitor BP
On medications,  no chest pain, stable, monitored and followed up by primary team/cardiology team
elevated proBNP-chronic diastolic CHF  trop neg delta  EKG with inferalateral ischemic, no ekg for comparison. likely related to prior CAD and PCI, currently w/o chest pain.   s/p lasix in ED. clinically does not appear overloaded.   -Cardiology consult appreciated.Lasix 80 mg po bid,resumed,monitor BP.plavix started

## 2022-05-02 NOTE — DISCHARGE NOTE NURSING/CASE MANAGEMENT/SOCIAL WORK - PATIENT PORTAL LINK FT
You can access the FollowMyHealth Patient Portal offered by [facility] by registering at the following website: [email or web address]. By joining [email or web address] FollowMyHealth portal, you will also be able to view your health information using other applications (apps) compatible with our system.

## 2022-05-02 NOTE — PROGRESS NOTE ADULT - PROBLEM SELECTOR PLAN 1
uncontrolled DM2 secondary to medication non-adherence.   was on Lantus 10U QHS until she stopped taking medications.   FS improved after 5U Amadelog, no signs of DKA     A1c >15, endocrine consult  -Lantus,plus coverage  Diabetic neuropathy  - on Gabapentin 300mg bid, will decrease to qd due to renal function.
Will continue current insulin regimen for now. Will continue monitoring  blood sugars, will Follow up.  DC home on Lantus 10u qhs Tradjenta 5mg PO daily, FU 4 weeks.  Patient counseled for compliance with consistent low carb diet.
uncontrolled DM2 secondary to medication non-adherence.   was on Lantus 10U QHS until she stopped taking medications.   FS improved after 5U Amadelog, no signs of DKA     A1c >15, endocrine consult  -Lantus,plus coverage  Diabetic neuropathy  - on Gabapentin 300mg bid, will decrease to qd due to renal function.

## 2022-05-03 LAB
% ALBUMIN: 44.1 % — SIGNIFICANT CHANGE UP
% ALPHA 1: 2.8 % — SIGNIFICANT CHANGE UP
% ALPHA 2: 15 % — SIGNIFICANT CHANGE UP
% BETA: 15 % — SIGNIFICANT CHANGE UP
% GAMMA: 23.2 % — SIGNIFICANT CHANGE UP
ALBUMIN SERPL ELPH-MCNC: 2.78 G/DL — LOW (ref 3.3–4.4)
ALBUMIN/GLOB SERPL ELPH: 0.8 RATIO — SIGNIFICANT CHANGE UP
ALPHA1 GLOB SERPL ELPH-MCNC: 0.18 G/DL — SIGNIFICANT CHANGE UP (ref 0.1–0.3)
ALPHA2 GLOB SERPL ELPH-MCNC: 0.9 G/DL — SIGNIFICANT CHANGE UP (ref 0.6–1)
B-GLOBULIN SERPL ELPH-MCNC: 0.94 G/DL — SIGNIFICANT CHANGE UP (ref 0.6–1.1)
GAMMA GLOBULIN: 1.46 G/DL — SIGNIFICANT CHANGE UP (ref 0.7–1.7)
PROT PATTERN SERPL ELPH-IMP: SIGNIFICANT CHANGE UP
PROT PATTERN SERPL ELPH-IMP: SIGNIFICANT CHANGE UP
PROT SERPL-MCNC: 6.3 G/DL — SIGNIFICANT CHANGE UP

## 2022-05-03 RX ORDER — ISOPROPYL ALCOHOL, BENZOCAINE .7; .06 ML/ML; ML/ML
1 SWAB TOPICAL
Qty: 100 | Refills: 1
Start: 2022-05-03 | End: 2022-06-21

## 2022-05-03 RX ORDER — GLUCAGON INJECTION, SOLUTION 0.5 MG/.1ML
1 INJECTION, SOLUTION SUBCUTANEOUS
Qty: 1 | Refills: 0
Start: 2022-05-03 | End: 2022-06-01

## 2022-06-10 NOTE — PROGRESS NOTE ADULT - SUBJECTIVE AND OBJECTIVE BOX
Patient  seen and examined  complains of swelling  denies any sob or cp    Carrots (Rash)  No Known Drug Allergies    Hospital Medications:   MEDICATIONS  (STANDING):  atorvastatin 40 milliGRAM(s) Oral at bedtime  clopidogrel Tablet 75 milliGRAM(s) Oral daily  dextrose 50% Injectable 12.5 Gram(s) IV Push once  dextrose 50% Injectable 25 Gram(s) IV Push once  dextrose 50% Injectable 25 Gram(s) IV Push once  docusate sodium 100 milliGRAM(s) Oral three times a day  ertapenem  IVPB 500 milliGRAM(s) IV Intermittent every 24 hours  gabapentin 300 milliGRAM(s) Oral daily  insulin glargine Injectable (LANTUS) 34 Unit(s) SubCutaneous at bedtime  insulin lispro (HumaLOG) corrective regimen sliding scale   SubCutaneous three times a day before meals  insulin lispro (HumaLOG) corrective regimen sliding scale   SubCutaneous at bedtime  insulin lispro Injectable (HumaLOG) 7 Unit(s) SubCutaneous three times a day before meals  metoprolol tartrate 12.5 milliGRAM(s) Oral two times a day  pantoprazole    Tablet 40 milliGRAM(s) Oral two times a day  polyethylene glycol 3350 17 Gram(s) Oral two times a day  senna 2 Tablet(s) Oral at bedtime        VITALS:  T(F): 98.6 (18 @ 20:42), Max: 98.6 (18 @ 20:42)  HR: 65 (18 @ 20:42)  BP: 157/63 (18 @ 20:42)  RR: 18 (18 @ 20:42)  SpO2: 96% (18 @ 20:42)  Wt(kg): --     @ 07:01  -   @ 07:00  --------------------------------------------------------  IN: 1980 mL / OUT: 3350 mL / NET: -1370 mL        PHYSICAL EXAM:  Constitutional: NAD  HEENT: anicteric sclera, oropharynx clear, MMM  Neck: No JVD  Respiratory: CTAB, no wheezes, rales or rhonchi  Cardiovascular: S1, S2, RRR  Gastrointestinal: BS+, soft, NT/ND  Extremities: +peripheral edema  Neurological: A/O x 3, no focal deficits  Psychiatric: Normal mood, normal affect  : +alba    LABS:      135  |  103  |  40<H>  ----------------------------<  160<H>  4.5   |  20<L>  |  3.22<H>    Ca    7.7<L>      17 May 2018 05:42  Phos  3.5       Mg     2.3           Creatinine Trend: 3.22 <--, 4.67 <--, 5.80 <--, 5.93 <--, 5.71 <--, 5.22 <--, 3.80 <--, 1.88 <--, 1.55 <--, 1.54 <--                        7.1    9.55  )-----------( 589      ( 17 May 2018 07:42 )             24.1     Urine Studies:  Urinalysis Basic - ( 14 May 2018 12:46 )    Color: Yellow / Appearance: Turbid / S.018 / pH:   Gluc:  / Ketone: Negative  / Bili: Negative / Urobili: Negative   Blood:  / Protein: 300 mg/dL / Nitrite: Negative   Leuk Esterase: Large / RBC: 25-50 /HPF / WBC >50 /HPF   Sq Epi:  / Non Sq Epi:  / Bacteria:       Osmolality, Random Urine: 238 mos/kg ( @ 23:08)  Sodium, Random Urine: 46 mmol/L ( @ 21:13)  Osmolality, Random Urine: 276 mos/kg ( @ 15:27)  Creatinine, Random Urine: 51 mg/dL ( @ 15:27)  Protein/Creatinine Ratio Calculation: 6.5 Ratio ( @ 15:27)  Chloride, Random Urine: 35 mmol/L ( @ 12:46)  Potassium, Random Urine: 42 mmol/L ( @ 12:46)  Sodium, Random Urine: 53 mmol/L ( @ 12:46)    RADIOLOGY & ADDITIONAL STUDIES: 10-Tate-2022 18:45

## 2022-06-16 NOTE — PATIENT PROFILE ADULT. - PRO ARRIVE FROM
Impression: Vitreous degeneration, bilateral: H43.813. Plan: Posterior vitreous detachment (floater) both eyes - reviewed the signs and symptoms of possible retinal detachment (flashes, floaters, change in peripheral vision, etc). Advised to contact us immediately for any of these or other new symptoms. home

## 2022-07-22 ENCOUNTER — APPOINTMENT (OUTPATIENT)
Dept: INTERNAL MEDICINE | Facility: CLINIC | Age: 82
End: 2022-07-22

## 2022-07-27 ENCOUNTER — APPOINTMENT (OUTPATIENT)
Dept: INTERNAL MEDICINE | Facility: CLINIC | Age: 82
End: 2022-07-27

## 2022-08-05 NOTE — ED PROVIDER NOTE - CARDIAC, MLM
[facility] Note  Subjective:      Sangita Mike is a 49 y.o. female who presents for follow up from hospital admission form 2022-2022 for left-sided weakness. She has history of asthma, arthritis, morbid obesity, joint pain heart palpitation and insomnia. She went to ER complaining of left sided weakness and facial drooping on 22, she was requested to be admitted to R/O stroke. In ER she had another episode of slurred speech and left sided weakness that resolves spontaneously. She denied headache, blurry vision, chest pain, SOB, chills, fever, nausea. She was seen by a neurologist and her brain MRI was normal, except she had sinus disease. Echocardiogram was negative she was started in on Statin, Lipitor 40 mg and ASA, and was discharged home . She comes in today stating that she is feeling fine and that drinking plenty of water and taking her medications. Her TSH was slightly high, will repeat her TSH and free T4 today. Requesting refill on Bumex, she takes as as needed for leg swellng. Past Medical History:   Diagnosis Date    Arthritis     Asthma     Depression     Heart palpitations     Heart palpitations     Hiatal hernia     Hiatal hernia     Insomnia     Joint pain     Morbid obesity with BMI of 40.0-44.9, adult ([address]     Morbid obesity with BMI of 45.0-49.9, adult ([facility])      Past Surgical History:   Procedure Laterality Date    HX  SECTION      HX CHOLECYSTECTOMY      HX KNEE ARTHROSCOPY      HX RHINOPLASTY         Current Outpatient Medications   Medication Sig Dispense Refill    coenzyme q10 (Co Q-10) 10 mg cap Take  by mouth. omega 3-dha-epa-fish oil (Fish OiL) 100-160-1,000 mg cap Take  by mouth. bumetanide (BUMEX) 2 mg tablet Take 1 Tablet by mouth in the morning. 90 Tablet 1    aspirin 81 mg chewable tablet Take 1 Tablet by mouth in the morning. 30 Tablet 0    atorvastatin (LIPITOR) 40 mg tablet Take 1 Tablet by mouth nightly.  27 Tablet 0    cyclobenzaprine (FLEXERIL) 5 mg tablet Take 1 Tablet by mouth nightly. 90 Tablet 0    LORazepam (ATIVAN) 0.5 mg tablet       diclofenac EC (VOLTAREN) 75 mg EC tablet Take 1 Tablet by mouth two (2) times a day. 30 Tablet 1    pantoprazole (PROTONIX) 40 mg tablet Take 1 Tablet by mouth daily. 90 Tablet 1    fluticasone propionate (FLONASE) 50 mcg/actuation nasal spray SPRAY 2 SPRAYS IN EACH NOSTRIL ONCE DAILY 1 Each 3    montelukast (SINGULAIR) 10 mg tablet TAKE ONE TABLET BY MOUTH DAILY 30 Tablet 3    topiramate (TOPAMAX) 50 mg tablet Take 50 mg by mouth two (2) times a day. metoprolol succinate (TOPROL-XL) 50 mg XL tablet Take 1.5 Tablets by mouth daily. 135 Tablet 1    zolpidem (AMBIEN) 10 mg tablet TAKE ONE TABLET BY MOUTH EVERY NIGHT AT BEDTIME AS NEEDED FOR SLEEP *MAXIMUM OF 1 TABLET DAILY* 90 Tablet 0    budesonide/glycopyr/formoterol (BREZTRI AEROSPHERE IN) Take  by inhalation. multivitamin (ONE A DAY) tablet Take 1 Tab by mouth daily. fexofenadine-pseudoephedrine (ALLEGRA-D)  mg Tb12 Take 1 Tab by mouth every twelve (12) hours. budesonide-formoteroL (SYMBICORT) 160-4.5 mcg/actuation HFAA Take 2 Puffs by inhalation. HYDROcodone-acetaminophen (NORCO) 5-325 mg per tablet Take 2 Tabs by mouth every four (4) hours as needed for Pain. albuterol (PROVENTIL HFA, VENTOLIN HFA, PROAIR HFA) 90 mcg/actuation inhaler Take 2 Puffs by inhalation every four (4) hours as needed for Wheezing. Allergies   Allergen Reactions    Prednisone Other (comments)    Amoxicillin Rash    Azithromycin Rash       ROS:   Complete review of systems was reviewed with pertinent information listed in HPI. Review of Systems   Constitutional: Negative. HENT: Negative. Respiratory: Negative. Cardiovascular: Negative. Gastrointestinal: Negative. Genitourinary: Negative. Musculoskeletal: Negative.       Objective:   Visit Vitals  /60 (BP 1 Location: Left upper arm, BP Patient Position: Sitting, BP Cuff Size: Large adult)   Pulse 88   Temp 98 °F (36.7 °C) (Temporal)   Resp 16   Ht 5' 5\" (1.651 m)   Wt 278 lb (126.1 kg)   SpO2 99%   BMI 46.26 kg/m²       Vitals and Nurse Documentation reviewed. Physical Exam  Constitutional:       Appearance: Normal appearance. She is obese. HENT:      Mouth/Throat:      Mouth: Mucous membranes are moist.   Cardiovascular:      Rate and Rhythm: Normal rate and regular rhythm. Pulses: Normal pulses. Heart sounds: Normal heart sounds. No murmur heard. Pulmonary:      Effort: Pulmonary effort is normal.      Breath sounds: Normal breath sounds. Abdominal:      General: Bowel sounds are normal.      Palpations: Abdomen is soft. Musculoskeletal:      Cervical back: Normal range of motion and neck supple. Neurological:      Mental Status: She is alert. Psychiatric:         Mood and Affect: Mood normal.         Thought Content: Thought content normal.       Assessment/Plan:     Diagnoses and all orders for this visit:    1. Encounter for support and coordination of transition of care    2. Pedal edema  -     bumetanide (BUMEX) 2 mg tablet; Take 1 Tablet by mouth in the morning. 3. Elevated TSH  -     TSH 3RD GENERATION; Future-       T4, FREE; Future    4. TIA (transient ischemic attack) resolved     Follow up in 4 months sooner if needed          Pt expressed understanding with the diagnosis and plan        Discussed expected course/resolution/complications of diagnosis in detail with patient. Medication risks/benefits/costs/interactions/alternatives discussed with patient. Pt was given an after visit summary which includes diagnoses, current medications & vitals.   Pt expressed understanding with the diagnosis and plan Normal rate, regular rhythm.  Heart sounds S1, S2.  No murmurs, rubs or gallops.

## 2022-08-10 ENCOUNTER — NON-APPOINTMENT (OUTPATIENT)
Age: 82
End: 2022-08-10

## 2022-08-10 ENCOUNTER — APPOINTMENT (OUTPATIENT)
Dept: INTERNAL MEDICINE | Facility: CLINIC | Age: 82
End: 2022-08-10
Payer: COMMERCIAL

## 2022-08-10 VITALS — HEART RATE: 71 BPM | SYSTOLIC BLOOD PRESSURE: 146 MMHG | DIASTOLIC BLOOD PRESSURE: 68 MMHG

## 2022-08-10 DIAGNOSIS — Z00.00 ENCOUNTER FOR GENERAL ADULT MEDICAL EXAMINATION W/OUT ABNORMAL FINDINGS: ICD-10-CM

## 2022-08-10 DIAGNOSIS — N18.30 CHRONIC KIDNEY DISEASE, STAGE 3 UNSPECIFIED: ICD-10-CM

## 2022-08-10 DIAGNOSIS — I50.9 HEART FAILURE, UNSPECIFIED: ICD-10-CM

## 2022-08-10 DIAGNOSIS — I73.9 PERIPHERAL VASCULAR DISEASE, UNSPECIFIED: ICD-10-CM

## 2022-08-10 DIAGNOSIS — E66.9 TYPE 2 DIABETES MELLITUS WITH OTHER SPECIFIED COMPLICATION: ICD-10-CM

## 2022-08-10 DIAGNOSIS — R26.81 UNSTEADINESS ON FEET: ICD-10-CM

## 2022-08-10 DIAGNOSIS — R53.1 WEAKNESS: ICD-10-CM

## 2022-08-10 DIAGNOSIS — E11.42 TYPE 2 DIABETES MELLITUS WITH DIABETIC POLYNEUROPATHY: ICD-10-CM

## 2022-08-10 DIAGNOSIS — I48.0 PAROXYSMAL ATRIAL FIBRILLATION: ICD-10-CM

## 2022-08-10 DIAGNOSIS — E11.69 TYPE 2 DIABETES MELLITUS WITH OTHER SPECIFIED COMPLICATION: ICD-10-CM

## 2022-08-10 PROCEDURE — 93000 ELECTROCARDIOGRAM COMPLETE: CPT | Mod: 59

## 2022-08-10 PROCEDURE — G0439: CPT

## 2022-08-10 PROCEDURE — 36415 COLL VENOUS BLD VENIPUNCTURE: CPT

## 2022-08-10 RX ORDER — HYDRALAZINE HYDROCHLORIDE 10 MG/1
10 TABLET ORAL
Qty: 270 | Refills: 0 | Status: ACTIVE | COMMUNITY
Start: 2022-07-28

## 2022-08-10 RX ORDER — FUROSEMIDE 80 MG/1
80 TABLET ORAL TWICE DAILY
Refills: 0 | Status: ACTIVE | COMMUNITY
Start: 2021-07-20

## 2022-08-10 RX ORDER — LANCETS 28 GAUGE
EACH MISCELLANEOUS
Qty: 100 | Refills: 0 | Status: ACTIVE | COMMUNITY
Start: 2022-05-03

## 2022-08-10 RX ORDER — GLUCAGON 1 MG
1 KIT INJECTION
Qty: 1 | Refills: 0 | Status: ACTIVE | COMMUNITY
Start: 2022-05-03

## 2022-08-10 RX ORDER — BLOOD-GLUCOSE METER
W/DEVICE KIT MISCELLANEOUS
Qty: 1 | Refills: 0 | Status: ACTIVE | COMMUNITY
Start: 2022-05-20

## 2022-08-10 RX ORDER — ALCOHOL ANTISEPTIC PADS
70 PADS, MEDICATED (EA) TOPICAL
Qty: 100 | Refills: 0 | Status: DISCONTINUED | COMMUNITY
Start: 2022-05-03

## 2022-08-10 RX ORDER — ATORVASTATIN CALCIUM 40 MG/1
40 TABLET, FILM COATED ORAL
Refills: 2 | Status: DISCONTINUED | COMMUNITY
Start: 2021-07-20 | End: 2022-08-10

## 2022-08-10 RX ORDER — BLOOD SUGAR DIAGNOSTIC
STRIP MISCELLANEOUS
Qty: 100 | Refills: 0 | Status: ACTIVE | COMMUNITY
Start: 2022-05-03

## 2022-08-10 RX ORDER — CLOPIDOGREL BISULFATE 75 MG/1
75 TABLET, FILM COATED ORAL
Qty: 90 | Refills: 0 | Status: ACTIVE | COMMUNITY
Start: 2022-07-28

## 2022-08-10 RX ORDER — LINAGLIPTIN 5 MG/1
5 TABLET, FILM COATED ORAL
Qty: 30 | Refills: 0 | Status: ACTIVE | COMMUNITY
Start: 2022-05-02

## 2022-08-10 RX ORDER — INSULIN GLARGINE 100 [IU]/ML
100 INJECTION, SOLUTION SUBCUTANEOUS
Qty: 1 | Refills: 0 | Status: ACTIVE | COMMUNITY
Start: 2021-07-20

## 2022-08-10 RX ORDER — AMLODIPINE BESYLATE 10 MG/1
10 TABLET ORAL
Qty: 30 | Refills: 0 | Status: DISCONTINUED | COMMUNITY
Start: 2021-07-20 | End: 2022-08-10

## 2022-08-10 RX ORDER — GABAPENTIN 300 MG/1
300 CAPSULE ORAL TWICE DAILY
Qty: 60 | Refills: 4 | Status: ACTIVE | COMMUNITY
Start: 2021-07-20

## 2022-08-10 NOTE — REVIEW OF SYSTEMS
[Fatigue] : fatigue [Muscle Weakness] : muscle weakness [Muscle Pain] : muscle pain [Negative] : Neurological

## 2022-08-11 ENCOUNTER — INPATIENT (INPATIENT)
Facility: HOSPITAL | Age: 82
LOS: 15 days | Discharge: HOME HEALTH | End: 2022-08-27
Attending: INTERNAL MEDICINE | Admitting: INTERNAL MEDICINE

## 2022-08-11 ENCOUNTER — CLINICAL ADVICE (OUTPATIENT)
Age: 82
End: 2022-08-11

## 2022-08-11 VITALS
TEMPERATURE: 98 F | DIASTOLIC BLOOD PRESSURE: 64 MMHG | SYSTOLIC BLOOD PRESSURE: 155 MMHG | HEART RATE: 67 BPM | RESPIRATION RATE: 16 BRPM | OXYGEN SATURATION: 99 % | HEIGHT: 61.5 IN

## 2022-08-11 DIAGNOSIS — Z98.890 OTHER SPECIFIED POSTPROCEDURAL STATES: Chronic | ICD-10-CM

## 2022-08-11 DIAGNOSIS — Z95.5 PRESENCE OF CORONARY ANGIOPLASTY IMPLANT AND GRAFT: Chronic | ICD-10-CM

## 2022-08-11 DIAGNOSIS — D64.9 ANEMIA, UNSPECIFIED: ICD-10-CM

## 2022-08-11 DIAGNOSIS — E11.65 TYPE 2 DIABETES MELLITUS WITH HYPERGLYCEMIA: ICD-10-CM

## 2022-08-11 DIAGNOSIS — R94.31 ABNORMAL ELECTROCARDIOGRAM [ECG] [EKG]: ICD-10-CM

## 2022-08-11 DIAGNOSIS — I48.0 PAROXYSMAL ATRIAL FIBRILLATION: ICD-10-CM

## 2022-08-11 DIAGNOSIS — Z29.9 ENCOUNTER FOR PROPHYLACTIC MEASURES, UNSPECIFIED: ICD-10-CM

## 2022-08-11 DIAGNOSIS — I25.10 ATHEROSCLEROTIC HEART DISEASE OF NATIVE CORONARY ARTERY WITHOUT ANGINA PECTORIS: ICD-10-CM

## 2022-08-11 DIAGNOSIS — N18.9 CHRONIC KIDNEY DISEASE, UNSPECIFIED: ICD-10-CM

## 2022-08-11 DIAGNOSIS — R06.09 OTHER FORMS OF DYSPNEA: ICD-10-CM

## 2022-08-11 DIAGNOSIS — Z98.891 HISTORY OF UTERINE SCAR FROM PREVIOUS SURGERY: Chronic | ICD-10-CM

## 2022-08-11 DIAGNOSIS — E78.5 HYPERLIPIDEMIA, UNSPECIFIED: ICD-10-CM

## 2022-08-11 DIAGNOSIS — Z87.19 PERSONAL HISTORY OF OTHER DISEASES OF THE DIGESTIVE SYSTEM: ICD-10-CM

## 2022-08-11 DIAGNOSIS — I48.91 UNSPECIFIED ATRIAL FIBRILLATION: ICD-10-CM

## 2022-08-11 LAB
25(OH)D3 SERPL-MCNC: 13.9 NG/ML
A1C WITH ESTIMATED AVERAGE GLUCOSE RESULT: 7.2 % — HIGH (ref 4–5.6)
ALBUMIN SERPL ELPH-MCNC: 3.7 G/DL
ALBUMIN SERPL ELPH-MCNC: 3.7 G/DL — SIGNIFICANT CHANGE UP (ref 3.3–5)
ALP BLD-CCNC: 139 U/L
ALP SERPL-CCNC: 141 U/L — HIGH (ref 40–120)
ALT FLD-CCNC: 6 U/L — SIGNIFICANT CHANGE UP (ref 4–33)
ALT SERPL-CCNC: 8 U/L
ANION GAP SERPL CALC-SCNC: 13 MMOL/L
ANION GAP SERPL CALC-SCNC: 21 MMOL/L — HIGH (ref 7–14)
AST SERPL-CCNC: 19 U/L — SIGNIFICANT CHANGE UP (ref 4–32)
AST SERPL-CCNC: 7 U/L
B-OH-BUTYR SERPL-SCNC: <0 MMOL/L — SIGNIFICANT CHANGE UP (ref 0–0.4)
BASE EXCESS BLDV CALC-SCNC: 9.5 MMOL/L — HIGH (ref -2–3)
BASOPHILS # BLD AUTO: 0.04 K/UL
BASOPHILS # BLD AUTO: 0.05 K/UL — SIGNIFICANT CHANGE UP (ref 0–0.2)
BASOPHILS NFR BLD AUTO: 1.1 %
BASOPHILS NFR BLD AUTO: 1.1 % — SIGNIFICANT CHANGE UP (ref 0–2)
BILIRUB DIRECT SERPL-MCNC: 0.1 MG/DL
BILIRUB INDIRECT SERPL-MCNC: 0.2 MG/DL
BILIRUB SERPL-MCNC: 0.3 MG/DL
BILIRUB SERPL-MCNC: 0.3 MG/DL — SIGNIFICANT CHANGE UP (ref 0.2–1.2)
BLD GP AB SCN SERPL QL: NEGATIVE — SIGNIFICANT CHANGE UP
BLOOD GAS VENOUS COMPREHENSIVE RESULT: SIGNIFICANT CHANGE UP
BUN SERPL-MCNC: 77 MG/DL
BUN SERPL-MCNC: 78 MG/DL — HIGH (ref 7–23)
CALCIUM SERPL-MCNC: 7.8 MG/DL
CALCIUM SERPL-MCNC: 7.8 MG/DL — LOW (ref 8.4–10.5)
CHLORIDE BLDV-SCNC: 102 MMOL/L — SIGNIFICANT CHANGE UP (ref 96–108)
CHLORIDE SERPL-SCNC: 95 MMOL/L — LOW (ref 98–107)
CHLORIDE SERPL-SCNC: 99 MMOL/L
CHOLEST SERPL-MCNC: 176 MG/DL
CK MB CFR SERPL CALC: SIGNIFICANT CHANGE UP NG/ML
CK SERPL-CCNC: 100 U/L — SIGNIFICANT CHANGE UP (ref 25–170)
CO2 BLDV-SCNC: 36.2 MMOL/L — HIGH (ref 22–26)
CO2 SERPL-SCNC: 24 MMOL/L — SIGNIFICANT CHANGE UP (ref 22–31)
CO2 SERPL-SCNC: 34 MMOL/L
CREAT SERPL-MCNC: 4.99 MG/DL — HIGH (ref 0.5–1.3)
CREAT SERPL-MCNC: 5.27 MG/DL
EGFR: 8 ML/MIN/1.73M2
EGFR: 8 ML/MIN/1.73M2 — LOW
EOSINOPHIL # BLD AUTO: 0.11 K/UL
EOSINOPHIL # BLD AUTO: 0.14 K/UL — SIGNIFICANT CHANGE UP (ref 0–0.5)
EOSINOPHIL NFR BLD AUTO: 3 %
EOSINOPHIL NFR BLD AUTO: 3.1 % — SIGNIFICANT CHANGE UP (ref 0–6)
ESTIMATED AVERAGE GLUCOSE: 160 — SIGNIFICANT CHANGE UP
ESTIMATED AVERAGE GLUCOSE: 171 MG/DL
FERRITIN SERPL-MCNC: 16 NG/ML — SIGNIFICANT CHANGE UP (ref 15–150)
FERRITIN SERPL-MCNC: 7 NG/ML
FOLATE SERPL-MCNC: 14.9 NG/ML
GAS PNL BLDV: 139 MMOL/L — SIGNIFICANT CHANGE UP (ref 136–145)
GLUCOSE BLDC GLUCOMTR-MCNC: 136 MG/DL — HIGH (ref 70–99)
GLUCOSE BLDV-MCNC: 148 MG/DL — HIGH (ref 70–99)
GLUCOSE SERPL-MCNC: 158 MG/DL
GLUCOSE SERPL-MCNC: 158 MG/DL — HIGH (ref 70–99)
HBA1C MFR BLD HPLC: 7.6 %
HCO3 BLDV-SCNC: 35 MMOL/L — HIGH (ref 22–29)
HCT VFR BLD CALC: 22.2 %
HCT VFR BLD CALC: 24.1 % — LOW (ref 34.5–45)
HCT VFR BLDA CALC: 20 % — CRITICAL LOW (ref 34.5–46.5)
HDLC SERPL-MCNC: 41 MG/DL
HGB BLD CALC-MCNC: 6.5 G/DL — CRITICAL LOW (ref 11.5–15.5)
HGB BLD-MCNC: 6 G/DL
HGB BLD-MCNC: 6.4 G/DL — CRITICAL LOW (ref 11.5–15.5)
IANC: 2.83 K/UL — SIGNIFICANT CHANGE UP (ref 1.8–7.4)
IMM GRANULOCYTES NFR BLD AUTO: 0.3 %
IMM GRANULOCYTES NFR BLD AUTO: 0.4 % — SIGNIFICANT CHANGE UP (ref 0–1.5)
IRON SATN MFR SERPL: 11 % — LOW (ref 14–50)
IRON SATN MFR SERPL: 40 UG/DL — SIGNIFICANT CHANGE UP (ref 30–160)
IRON SATN MFR SERPL: 7 %
IRON SERPL-MCNC: 22 UG/DL
LACTATE BLDV-MCNC: 1 MMOL/L — SIGNIFICANT CHANGE UP (ref 0.5–2)
LDLC SERPL CALC-MCNC: 113 MG/DL
LYMPHOCYTES # BLD AUTO: 0.83 K/UL
LYMPHOCYTES # BLD AUTO: 0.93 K/UL — LOW (ref 1–3.3)
LYMPHOCYTES # BLD AUTO: 20.5 % — SIGNIFICANT CHANGE UP (ref 13–44)
LYMPHOCYTES NFR BLD AUTO: 22.7 %
MAN DIFF?: NORMAL
MCHC RBC-ENTMCNC: 24 PG — LOW (ref 27–34)
MCHC RBC-ENTMCNC: 24.6 PG
MCHC RBC-ENTMCNC: 26.6 GM/DL — LOW (ref 32–36)
MCHC RBC-ENTMCNC: 27 GM/DL
MCV RBC AUTO: 90.3 FL — SIGNIFICANT CHANGE UP (ref 80–100)
MCV RBC AUTO: 91 FL
MONOCYTES # BLD AUTO: 0.47 K/UL
MONOCYTES # BLD AUTO: 0.57 K/UL — SIGNIFICANT CHANGE UP (ref 0–0.9)
MONOCYTES NFR BLD AUTO: 12.6 % — SIGNIFICANT CHANGE UP (ref 2–14)
MONOCYTES NFR BLD AUTO: 12.8 %
NEUTROPHILS # BLD AUTO: 2.2 K/UL
NEUTROPHILS # BLD AUTO: 2.83 K/UL — SIGNIFICANT CHANGE UP (ref 1.8–7.4)
NEUTROPHILS NFR BLD AUTO: 60.1 %
NEUTROPHILS NFR BLD AUTO: 62.3 % — SIGNIFICANT CHANGE UP (ref 43–77)
NONHDLC SERPL-MCNC: 136 MG/DL
NRBC # BLD: 0 /100 WBCS — SIGNIFICANT CHANGE UP
NRBC # FLD: 0 K/UL — SIGNIFICANT CHANGE UP
OB PNL STL: NEGATIVE — SIGNIFICANT CHANGE UP
PCO2 BLDV: 51 MMHG — HIGH (ref 39–42)
PH BLDV: 7.44 — HIGH (ref 7.32–7.43)
PLATELET # BLD AUTO: 287 K/UL
PLATELET # BLD AUTO: 299 K/UL — SIGNIFICANT CHANGE UP (ref 150–400)
PO2 BLDV: 41 MMHG — SIGNIFICANT CHANGE UP
POTASSIUM BLDV-SCNC: 4.1 MMOL/L — SIGNIFICANT CHANGE UP (ref 3.5–5.1)
POTASSIUM SERPL-MCNC: 4.5 MMOL/L — SIGNIFICANT CHANGE UP (ref 3.5–5.3)
POTASSIUM SERPL-SCNC: 4.5 MMOL/L — SIGNIFICANT CHANGE UP (ref 3.5–5.3)
POTASSIUM SERPL-SCNC: 4.6 MMOL/L
PROT SERPL-MCNC: 6.7 G/DL
PROT SERPL-MCNC: 7.3 G/DL — SIGNIFICANT CHANGE UP (ref 6–8.3)
RBC # BLD: 2.44 M/UL
RBC # BLD: 2.67 M/UL — LOW (ref 3.8–5.2)
RBC # FLD: 16.7 % — HIGH (ref 10.3–14.5)
RBC # FLD: 17 %
RH IG SCN BLD-IMP: POSITIVE — SIGNIFICANT CHANGE UP
SAO2 % BLDV: 70.4 % — SIGNIFICANT CHANGE UP
SODIUM SERPL-SCNC: 140 MMOL/L — SIGNIFICANT CHANGE UP (ref 135–145)
SODIUM SERPL-SCNC: 146 MMOL/L
TIBC SERPL-MCNC: 322 UG/DL
TIBC SERPL-MCNC: 372 UG/DL — SIGNIFICANT CHANGE UP (ref 220–430)
TRIGL SERPL-MCNC: 114 MG/DL
TROPONIN T, HIGH SENSITIVITY RESULT: 52 NG/L — HIGH
TSH SERPL-ACNC: 1.82 UIU/ML
UIBC SERPL-MCNC: 300 UG/DL
UIBC SERPL-MCNC: 332 UG/DL — SIGNIFICANT CHANGE UP (ref 110–370)
VIT B12 SERPL-MCNC: 611 PG/ML
WBC # BLD: 4.54 K/UL — SIGNIFICANT CHANGE UP (ref 3.8–10.5)
WBC # FLD AUTO: 3.66 K/UL
WBC # FLD AUTO: 4.54 K/UL — SIGNIFICANT CHANGE UP (ref 3.8–10.5)

## 2022-08-11 PROCEDURE — 99223 1ST HOSP IP/OBS HIGH 75: CPT

## 2022-08-11 PROCEDURE — 99285 EMERGENCY DEPT VISIT HI MDM: CPT

## 2022-08-11 PROCEDURE — 93970 EXTREMITY STUDY: CPT | Mod: 26

## 2022-08-11 RX ORDER — INSULIN GLARGINE 100 [IU]/ML
8 INJECTION, SOLUTION SUBCUTANEOUS AT BEDTIME
Refills: 0 | Status: DISCONTINUED | OUTPATIENT
Start: 2022-08-12 | End: 2022-08-19

## 2022-08-11 RX ORDER — METOPROLOL TARTRATE 50 MG
25 TABLET ORAL DAILY
Refills: 0 | Status: DISCONTINUED | OUTPATIENT
Start: 2022-08-12 | End: 2022-08-27

## 2022-08-11 RX ORDER — HYDRALAZINE HCL 50 MG
10 TABLET ORAL THREE TIMES A DAY
Refills: 0 | Status: DISCONTINUED | OUTPATIENT
Start: 2022-08-11 | End: 2022-08-27

## 2022-08-11 RX ORDER — PANTOPRAZOLE SODIUM 20 MG/1
40 TABLET, DELAYED RELEASE ORAL
Refills: 0 | Status: DISCONTINUED | OUTPATIENT
Start: 2022-08-11 | End: 2022-08-27

## 2022-08-11 RX ORDER — CLOPIDOGREL BISULFATE 75 MG/1
75 TABLET, FILM COATED ORAL DAILY
Refills: 0 | Status: DISCONTINUED | OUTPATIENT
Start: 2022-08-11 | End: 2022-08-27

## 2022-08-11 RX ORDER — INSULIN LISPRO 100/ML
VIAL (ML) SUBCUTANEOUS
Refills: 0 | Status: DISCONTINUED | OUTPATIENT
Start: 2022-08-11 | End: 2022-08-27

## 2022-08-11 RX ORDER — GABAPENTIN 400 MG/1
300 CAPSULE ORAL AT BEDTIME
Refills: 0 | Status: DISCONTINUED | OUTPATIENT
Start: 2022-08-11 | End: 2022-08-27

## 2022-08-11 RX ORDER — DEXTROSE 50 % IN WATER 50 %
12.5 SYRINGE (ML) INTRAVENOUS ONCE
Refills: 0 | Status: DISCONTINUED | OUTPATIENT
Start: 2022-08-11 | End: 2022-08-27

## 2022-08-11 RX ORDER — SENNA PLUS 8.6 MG/1
2 TABLET ORAL AT BEDTIME
Refills: 0 | Status: DISCONTINUED | OUTPATIENT
Start: 2022-08-11 | End: 2022-08-27

## 2022-08-11 RX ORDER — FUROSEMIDE 40 MG
80 TABLET ORAL
Refills: 0 | Status: DISCONTINUED | OUTPATIENT
Start: 2022-08-12 | End: 2022-08-15

## 2022-08-11 RX ORDER — METOPROLOL TARTRATE 50 MG
25 TABLET ORAL ONCE
Refills: 0 | Status: COMPLETED | OUTPATIENT
Start: 2022-08-11 | End: 2022-08-11

## 2022-08-11 RX ORDER — DEXTROSE 50 % IN WATER 50 %
15 SYRINGE (ML) INTRAVENOUS ONCE
Refills: 0 | Status: DISCONTINUED | OUTPATIENT
Start: 2022-08-11 | End: 2022-08-27

## 2022-08-11 RX ORDER — DEXTROSE 50 % IN WATER 50 %
25 SYRINGE (ML) INTRAVENOUS ONCE
Refills: 0 | Status: DISCONTINUED | OUTPATIENT
Start: 2022-08-11 | End: 2022-08-27

## 2022-08-11 RX ORDER — INSULIN LISPRO 100/ML
VIAL (ML) SUBCUTANEOUS AT BEDTIME
Refills: 0 | Status: DISCONTINUED | OUTPATIENT
Start: 2022-08-11 | End: 2022-08-27

## 2022-08-11 RX ORDER — SODIUM CHLORIDE 9 MG/ML
1000 INJECTION, SOLUTION INTRAVENOUS
Refills: 0 | Status: DISCONTINUED | OUTPATIENT
Start: 2022-08-11 | End: 2022-08-27

## 2022-08-11 RX ORDER — SEVELAMER CARBONATE 2400 MG/1
800 POWDER, FOR SUSPENSION ORAL THREE TIMES A DAY
Refills: 0 | Status: DISCONTINUED | OUTPATIENT
Start: 2022-08-11 | End: 2022-08-27

## 2022-08-11 RX ORDER — ACETAMINOPHEN 500 MG
650 TABLET ORAL EVERY 6 HOURS
Refills: 0 | Status: DISCONTINUED | OUTPATIENT
Start: 2022-08-11 | End: 2022-08-27

## 2022-08-11 RX ORDER — LANOLIN ALCOHOL/MO/W.PET/CERES
3 CREAM (GRAM) TOPICAL AT BEDTIME
Refills: 0 | Status: DISCONTINUED | OUTPATIENT
Start: 2022-08-11 | End: 2022-08-27

## 2022-08-11 RX ORDER — GLUCAGON INJECTION, SOLUTION 0.5 MG/.1ML
1 INJECTION, SOLUTION SUBCUTANEOUS ONCE
Refills: 0 | Status: DISCONTINUED | OUTPATIENT
Start: 2022-08-11 | End: 2022-08-27

## 2022-08-11 RX ORDER — HEPARIN SODIUM 5000 [USP'U]/ML
5000 INJECTION INTRAVENOUS; SUBCUTANEOUS EVERY 12 HOURS
Refills: 0 | Status: DISCONTINUED | OUTPATIENT
Start: 2022-08-11 | End: 2022-08-15

## 2022-08-11 RX ORDER — FUROSEMIDE 40 MG
80 TABLET ORAL ONCE
Refills: 0 | Status: COMPLETED | OUTPATIENT
Start: 2022-08-11 | End: 2022-08-11

## 2022-08-11 RX ORDER — INSULIN GLARGINE 100 [IU]/ML
8 INJECTION, SOLUTION SUBCUTANEOUS ONCE
Refills: 0 | Status: COMPLETED | OUTPATIENT
Start: 2022-08-11 | End: 2022-08-11

## 2022-08-11 RX ADMIN — SENNA PLUS 2 TABLET(S): 8.6 TABLET ORAL at 23:11

## 2022-08-11 RX ADMIN — Medication 80 MILLIGRAM(S): at 21:21

## 2022-08-11 RX ADMIN — CLOPIDOGREL BISULFATE 75 MILLIGRAM(S): 75 TABLET, FILM COATED ORAL at 21:21

## 2022-08-11 RX ADMIN — INSULIN GLARGINE 8 UNIT(S): 100 INJECTION, SOLUTION SUBCUTANEOUS at 23:11

## 2022-08-11 RX ADMIN — GABAPENTIN 300 MILLIGRAM(S): 400 CAPSULE ORAL at 21:21

## 2022-08-11 RX ADMIN — SEVELAMER CARBONATE 800 MILLIGRAM(S): 2400 POWDER, FOR SUSPENSION ORAL at 21:20

## 2022-08-11 RX ADMIN — Medication 10 MILLIGRAM(S): at 21:21

## 2022-08-11 RX ADMIN — Medication 25 MILLIGRAM(S): at 21:21

## 2022-08-11 NOTE — ED PROVIDER NOTE - NS ED ATTENDING STATEMENT MOD
This was a shared visit with the RAJAN. I reviewed and verified the documentation and independently performed the documented:

## 2022-08-11 NOTE — HISTORY OF PRESENT ILLNESS
[de-identified] : 82 female with h/o CHF, CAD, s/p PCI on Plavix/ASA, CKD stage 3-4, baseline Scr 3, DM2, DM neuropathy, HLD, HTN, bleeding DU in 11/2020, Chronic Anemia, PAF (not on ac due to GIB) presents for annual exam.  Accompanied by aide.\par \par Patient was hospitalized in May 2022 due to hyperglycemia.  Noted to have A1c-15 at that time.  Also history of stage III CKD, peripheral vascular disease, CHF never followed up with outpatient specialists\par \par Does not have a list or know her medications as she is taking.  States no changes from May.\par \par CAD (coronary artery disease). \par h/o CAD. \par Paroxysmal afib-not on AC sec to h/o GI Bleeding \par was on ASA/Plaix. \par ASA held due to hx of GI Bleeding. \par \par Anemia of chronic disease. s/p PRBC, IV venofer, during hospitalization.  \par \par Complaining of generalized weakness and lower left leg pain for several months.  States was following with vascular several years ago and doing" procedures but never finished"\par \par

## 2022-08-11 NOTE — H&P ADULT - NSHPPHYSICALEXAM_GEN_ALL_CORE
PHYSICAL EXAM:  Vital Signs Last 24 Hrs  T(C): 35.8 (08-11-22 @ 20:03)  T(F): 96.5 (08-11-22 @ 20:03), Max: 98 (08-11-22 @ 11:50)  HR: 73 (08-11-22 @ 20:03) (67 - 73)  BP: 187/79 (08-11-22 @ 20:03)  BP(mean): --  RR: 18 (08-11-22 @ 20:03) (16 - 18)  SpO2: 98% (08-11-22 @ 20:03) (96% - 99%)  Wt(kg): --    Constitutional: NAD, awake and alert, well developed  EYES: EOMI, conjunctiva clear  ENT:  Normal Hearing, no tonsillar exudates   Neck: Soft and supple , no thyromegaly   Respiratory: mildly decreased breath sounds at bases, No wheezing, rales or rhonchi, no tachypnea, no accessory muscle use  Cardiovascular: S1 and S2, irregular rhythm and tachycardic, no Murmurs, gallops or rubs, no JVD, 1+ pitting edema bilaterally  Gastrointestinal: Bowel Sounds present, soft, nontender, nondistended, no guarding, no rebound  Extremities: No cyanosis or clubbing; warm to touch  Vascular: 2+ peripheral pulses lower ex  Neurological: No focal deficits, CN II-XII intact bilaterally, sensation to light touch intact in all extremities.   Musculoskeletal: 5/5 strength b/l upper and lower extremities; no joint swelling.  Skin: No rashes, no ulcerations

## 2022-08-11 NOTE — ASSESSMENT
[FreeTextEntry1] : 82 female with h/o CHF, CAD, s/p PCI on Plavix/ASA, CKD stage 3-4, baseline Scr 3, DM2, DM neuropathy, HLD, HTN, bleeding DU in 11/2020, Anemia, PAF (not on ac due to GIB) presents for annual exam.  \par \par Advised to get copy of medication list to properly reconcile \par \par CHF, PAF(not on AC due to GIB)\par -cardio referral \par -Monitor in/out\par -Daily weights\par -Recommend COVID vaccine (has been in hospital in May told unable to get due to cardiac issues?\par \par DM on insulin.  uncontrolled in the past.  \par -cont lantus \par \par Stage III/IV CKD \par -nephro consult \par -avoid nephrotoxic agents \par \par Chronic anemia, s/p transfusion and iv iron in the past.  \par -check labs \par \par Annual \par -Advise to get yearly Flu shot\par -Advise Yearly Skin cancer screening with Dermatologist \par -Advise Yearly Eye exam with Ophthalmologist\par -Advise Yearly Dental exam\par -Educated of the importance of Healthy diet, such as Mediterranean Diet and Exercise, such as walking >20 minutes a day and increasing gradually as tolerated.  \par \par f/u in 1 month \par \par \par \par \par \par \par \par \par \par

## 2022-08-11 NOTE — ED PROVIDER NOTE - ATTENDING APP SHARED VISIT CONTRIBUTION OF CARE
Dr. Calero: 83 yo female with CAD s/p stents, pAfib, HLD, DM with neuropathy, duodenal ulcer with bleed in past, peripheral vascular disease, sent to ED by PMD due to low hemoglobin on routine labs at a physical exam done yesterday.  Pt notes that he only complaint is generalized leg swelling bilaterally, associated with pain in both legs.  No CP/SOB, N/V/D, abdominal pain, bloody stool, vomiting or other complaints.  On exam pt well appearing, in NAD, heart RRR, lungs CTAB, abd NTND, both legs 1+ pitting edema with associated TTP throughout, but no erythema or skin breakdown, strength 5/5 in all extremities and skin without rash.

## 2022-08-11 NOTE — H&P ADULT - NSHPLABSRESULTS_GEN_ALL_CORE
I have personally reviewed this patient's labs below:                        6.4    4.54  )-----------( 299      ( 11 Aug 2022 14:24 )             24.1     08-11-22 @ 14:24    140  |  95<L>  |  78<H>             --------------------------< 158<H>     4.5  |  24  | 4.99<H>    eGFR AA: --  eGFR N-AA: --    Calcium: 7.8<L>  Phosphorus: --  Magnesium: --    AST: 19    ALT: 6  AlkPhos: 141<H>  Protein: 7.3  Albumin: 3.7  TBili: 0.3  D-Bili: --        I have personally reviewed this patient's EKG and my independent interpretation is NSR     I have personally reviewed this patient's CXR and my independent interpretation is       Review and summation of old records:    Imaging reviewed below: I have personally reviewed this patient's labs below:                        6.4    4.54  )-----------( 299      ( 11 Aug 2022 14:24 )             24.1     08-11-22 @ 14:24    140  |  95<L>  |  78<H>             --------------------------< 158<H>     4.5  |  24  | 4.99<H>    eGFR AA: --  eGFR N-AA: --    Calcium: 7.8<L>  Phosphorus: --  Magnesium: --    AST: 19    ALT: 6  AlkPhos: 141<H>  Protein: 7.3  Albumin: 3.7  TBili: 0.3  D-Bili: --        I have personally reviewed this patient's EKG and my independent interpretation is NSR @69bpm, TWI in II, AVF, V4-V5    CXR ordered      Review and summation of old records:  TTE 4/2022:  CONCLUSIONS:  1. Mitral annular calcification, otherwise normal mitral  valve. Minimal mitral regurgitation.  2. Calcified trileaflet aortic valve with normal opening.  3. Eccentric left ventricular hypertrophy (dilated left  ventricle with normal relative wall thickness).  4. Endocardium not well visualized; grossly normal left  ventricular systolic function.  5. Normal right ventricular size and function.    EGD 2020  Impression:          - Normal esophagus.                       - Gastritis. Biopsied.                       - Normal examined duodenum.  C-scope 2018  Impression:          - Preparation of the colon was poor.                       - Stool in the entire examined colon.                       - Internal hemorrhoids.                       - No specimens collected.

## 2022-08-11 NOTE — PHYSICAL EXAM
[Normal] : normal rate, regular rhythm, normal S1 and S2 and no murmur heard [Soft] : abdomen soft [Non Tender] : non-tender [Non-distended] : non-distended [de-identified] : Seen in wheelchair [de-identified] : +2 pitting LE edema bilaterally

## 2022-08-11 NOTE — H&P ADULT - PROBLEM SELECTOR PLAN 3
NSR @69bpm, TWI in II, AVF, V4-V5. TWI appear new compared to EKG on last admission  May be related to demand ischemia in setting of anemia  -check troponin and CKMB  -echo  -cards consult in AM  -tele monitoring

## 2022-08-11 NOTE — H&P ADULT - PROBLEM SELECTOR PLAN 1
Hgb 6.4. HD stable. Denies active bleeding and FOBT neg  Likely related to CKD vs slow intermittent chronic GIB  -2u PRBC ordered  -active T&S  -transfuse if hgb <8 given hx CAD/PVD  -lasix 80mg IV ordered to be given with blood given hx CHF/advanced CKD  -GI c/s in AM though low suspicion for active bleeding at this time as patient denies and FOBT neg. May need eventual repeat EGD and C-scope depending on serial CBC stability. Can likely happen outpatient if CBCs stable  -encourage iron tabs

## 2022-08-11 NOTE — H&P ADULT - PROBLEM SELECTOR PLAN 4
Creatinine 4.99 up from 4.45 3 months ago  -bilateral LE edema  -lasix 80mg BID  -renal c/s in AM. Likely will need eventual HD

## 2022-08-11 NOTE — ED PROVIDER NOTE - OBJECTIVE STATEMENT
82 year old female with PMH of CAD s/p Stents, Paroxysmal A-fib, HLD, Type-2 DM, Neuropathy, GIB, Duodenal ulcer, PVD (s/p stents x 2 - 11/2019), and Gait difficulty (uses walker) presents to the ED referred by PMD for abnormal labs. Per Dr. Rios. Pt states she is unsure whether it was her blood glucose or hemoglobin that was abnormal. She complains of bilateral lower extremities for 2 months and said she was told it was as a result of her CKD. She denies chest pain, dyspnea, dizziness, abdominal pain, nausea, vomiting, weakness, numbness or tingling sensation, fevers, chills or any other associated complaints. 82 year old female with PMH of CAD s/p Stents, Paroxysmal A-fib, HLD, Type-2 DM, Neuropathy, GIB, Duodenal ulcer, PVD (s/p stents x 2 - 11/2019), and Gait difficulty (uses walker) presents to the ED referred by PMD for abnormal labs, low hemoglobin. Per Dr. Rios. Pt states she is unsure whether it was her blood glucose or hemoglobin that was abnormal. She complains of bilateral lower extremities for 2 months and said she was told it was as a result of her CKD. She denies chest pain, dyspnea, dizziness, abdominal pain, nausea, vomiting, weakness, numbness or tingling sensation, fevers, chills or any other associated complaints.

## 2022-08-11 NOTE — H&P ADULT - HISTORY OF PRESENT ILLNESS
82F with PMHx CAD s/p stents 2017, pAfib (not on AC due to hx GIB), HLD, DM2 on insulin c/b neuropathy, PVD s/p stents 2019, hx GIB/duodenal ulcer, CKD, anemia presenting with abnormal labs from PMD. Pt has hx anemia likely NICHOLAS per nephro and supposed to be on iron tabs but no longer takes due to constipation though she uses senna. Pt had recent labs and was told to come in for anemia. Hgb here 6.4. Baseline is 7-8s. Pt has required PRBC in the past (last several months ago) and has hx of GIB. She currently denies any hematochezia, melena, hemoptysis, hematemesis. FOBT in ED neg. Her last C-scope was 2018 with non-bleeding internal hemorrhoids and last EGD was 2020 with non-bleeding gastritis. Of note pt has chronic SOB for a year now and worsened over the last few days to weeks. She also had some lightheadedness 2 weeks ago but this resolved. She denies CP, LOC, palpitations, vomiting, diarrhea. She notes some LE edema in the feet with some associated pain that she was told is related to her kidneys. She has been told by renal she may need to be on HD at some point as well. She recently started home O2 PRN one week ago. Denies hx of COPD/asthma and is not on inhalers. She is on lasix 80mg BID but admits to sometimes missing doses. She also takes plavix 75mg qd but not on AC for afib due to hx GIB and not on ASA as well.    In ED, hgb 6.4 and currently getting first unit PRBC. She denies CP, SOB at rest, palpitations. -120s in afib on monitor. /79

## 2022-08-11 NOTE — ED ADULT NURSE NOTE - CHIEF COMPLAINT QUOTE
Patient brought to ER by EMS from home for hyperglycemia  type 2 DM .. Patient had blood test done yesterday and was sent to ER for abnormal results.  Dr. Juan J Rios  [phone]  Pt c/o bilateral leg/ankle swelling and pain. Pt on O2 at home PRN

## 2022-08-11 NOTE — ED PROVIDER NOTE - CLINICAL SUMMARY MEDICAL DECISION MAKING FREE TEXT BOX
82 year old female with PMH of CAD s/p Stents, Paroxysmal A-fib, HLD, Type-2 DM, Neuropathy, GIB, Duodenal ulcer, PVD (s/p stents x 2 - 11/2019), and Gait difficulty (uses walker) presents to the ED referred by PMD for abnormal labs. HD stable, well appearing, in no acute distress. Imp: 82 year old female with PMH of CAD s/p Stents, Paroxysmal A-fib, HLD, Type-2 DM, Neuropathy, GIB, Duodenal ulcer, PVD (s/p stents x 2 - 11/2019), and Gait difficulty (uses walker) presents to the ED referred by PMD for abnormal labs, low hemogloin. HD stable, well appearing, in no acute distress. Imp:

## 2022-08-11 NOTE — H&P ADULT - PROBLEM SELECTOR PLAN 6
On lantus 20u qhs. On last admission 7u qhs and admelog 5u TID. Will give 8u qhs for now and uptitrate as needed. ISS as well. Hold orals

## 2022-08-11 NOTE — HEALTH RISK ASSESSMENT
[Fair] :  ~his/her~ mood as fair [No falls in past year] : Patient reported no falls in the past year [0] : 2) Feeling down, depressed, or hopeless: Not at all (0) [PHQ-2 Negative - No further assessment needed] : PHQ-2 Negative - No further assessment needed [[id number]] : 0 [Change in mental status noted] : No change in mental status noted [de-identified] : Requires assistance

## 2022-08-11 NOTE — H&P ADULT - PROBLEM SELECTOR PLAN 2
-120s and asymptomatic. HD stable  EKG NSR, on tele afib  -give metoprolol 25mg PO stat now  -resume metoprolol succinate 25mg qd  -tele monitoring  -not on AC due to hx GIB per prior cards notes

## 2022-08-11 NOTE — H&P ADULT - ASSESSMENT
82F with PMHx CAD s/p stents 2017, pAfib (not on AC due to hx GIB), HLD, DM2 on insulin c/b neuropathy, PVD s/p stents 2019, hx GIB/duodenal ulcer, CKD, anemia presenting with abnormal labs from PMD. Admitted for anemia

## 2022-08-11 NOTE — ED ADULT NURSE NOTE - NSFALLRSKASSESASSIST_ED_ALL_ED
[Change in Activity] : no change in activity [Fever] : no fever [Rash] : no rash [Skin Lesions] : no skin lesions [Back Pain] : ~T no back pain [Chest Pain] : no chest pain or discomfort [Cough] : no cough [Shortness of Breath] : no shortness of breath [Change in Appetite] : no change in appetite [Abdominal Pain] : no abdominal pain yes [Constipation] : no constipation [Sleep Disturbances] : ~T no sleep disturbances [Headache] : no headache [Cold Intolerance] : cold tolerant [Heat Intolerance] : heat tolerant

## 2022-08-11 NOTE — H&P ADULT - PROBLEM SELECTOR PLAN 5
Chronic HARPER  -echo  -tele  -likely related to anemia  -PRBCs Chronic HARPER  -echo  -tele  -likely related to anemia vs chronic CHF  -PRBCs  -CXR ordered

## 2022-08-11 NOTE — ED ADULT NURSE NOTE - OBJECTIVE STATEMENT
Facilitator RN: Patient is a 82-year-old female, alert and oriented, arrives with home health aide at bedside Phx DM II, PVD, anemia, heart failure, gastritis, GI bleed, CAD, diabetic neuropathy, HLD coming in sent in by Dr. Rios for abnormal lab result from blood work drawn yesterday. Unaware of blood test result that was abnormal. Pt denies any current complaints. Only is endorsing palpitations upon seeing needle for IV. No fevers, chills, Facilitator RN: Patient is a 82-year-old female, alert and oriented, arrives with home health aide at bedside Phx DM II, PVD, anemia, heart failure, gastritis, GI bleed, CAD (s/p stents), diabetic neuropathy, HLD, Afib, ambulates with a walker at baseline coming in sent in by Dr. Rios for abnormal lab result from blood work drawn yesterday. Unaware of blood test result that was abnormal. Pt states "either low hgb or high blood sugar". Also endorsing BLLE X 2 months. Pt denies any current complaints. Only is endorsing palpitations upon seeing needle for IV. No fevers, chills, numbness/tingling, abdominal pain, dizziness. Pt pending IV access. Primary RN Blanka aware and given report. Safety maintained. Will continue to monitor.

## 2022-08-11 NOTE — H&P ADULT - NSHPREVIEWOFSYSTEMS_GEN_ALL_CORE
ROS:    Constitutional: [ ] fevers [ ] chills   HEENT: [ ] postnasal drip [ ] nasal congestion  CV: [ ] chest pain [ ] orthopnea [ ] palpitations   Resp: [ ] cough [x ] shortness of breath [ ] dyspnea [ ] wheezing   GI: [ ] nausea [ ] vomiting [ ] diarrhea [x ] constipation [ ] abd pain    : [ ] dysuria [ ] increased urinary frequency  Musculoskeletal: [ ] back pain [ ] myalgias [ ] arthralgias   Skin: [ ] rash [ ] itch  Neurological: [ ] headache [ ] dizziness [ ] syncope  Endocrine: [x ] diabetes [ ] thyroid problem  Hematologic/Lymphatic: [ x] anemia [ ] bleeding problem  [x ] All other systems negative

## 2022-08-11 NOTE — ED ADULT TRIAGE NOTE - CHIEF COMPLAINT QUOTE
Patient brought to ER by EMS from home for hyperglycemia  type 2 DM .. Patient had blood test done yesterday and was sent to ER for abnormal results.  Dr. Juan J Rios  [phone]  Pt c/o bilateral leg/ankle swelling and pain. Patient brought to ER by EMS from home for hyperglycemia  type 2 DM .. Patient had blood test done yesterday and was sent to ER for abnormal results.  Dr. Juan J Rios  [phone]  Pt c/o bilateral leg/ankle swelling and pain. Pt on O2 at home PRN

## 2022-08-12 LAB
CK MB BLD-MCNC: 3 % — HIGH (ref 0–2.5)
CK MB CFR SERPL CALC: 3.4 NG/ML — SIGNIFICANT CHANGE UP
CK SERPL-CCNC: 112 U/L — SIGNIFICANT CHANGE UP (ref 25–170)
GLUCOSE BLDC GLUCOMTR-MCNC: 111 MG/DL — HIGH (ref 70–99)
GLUCOSE BLDC GLUCOMTR-MCNC: 117 MG/DL — HIGH (ref 70–99)
GLUCOSE BLDC GLUCOMTR-MCNC: 139 MG/DL — HIGH (ref 70–99)
GLUCOSE BLDC GLUCOMTR-MCNC: 143 MG/DL — HIGH (ref 70–99)
GLUCOSE BLDC GLUCOMTR-MCNC: 187 MG/DL — HIGH (ref 70–99)
GLUCOSE BLDC GLUCOMTR-MCNC: 194 MG/DL — HIGH (ref 70–99)
HCT VFR BLD CALC: 32 % — LOW (ref 34.5–45)
HGB BLD-MCNC: 9.2 G/DL — LOW (ref 11.5–15.5)
MCHC RBC-ENTMCNC: 25.8 PG — LOW (ref 27–34)
MCHC RBC-ENTMCNC: 28.8 GM/DL — LOW (ref 32–36)
MCV RBC AUTO: 89.6 FL — SIGNIFICANT CHANGE UP (ref 80–100)
NRBC # BLD: 0 /100 WBCS — SIGNIFICANT CHANGE UP
NRBC # FLD: 0 K/UL — SIGNIFICANT CHANGE UP
PLATELET # BLD AUTO: 301 K/UL — SIGNIFICANT CHANGE UP (ref 150–400)
RBC # BLD: 3.57 M/UL — LOW (ref 3.8–5.2)
RBC # FLD: 16.3 % — HIGH (ref 10.3–14.5)
SARS-COV-2 RNA SPEC QL NAA+PROBE: SIGNIFICANT CHANGE UP
TROPONIN T, HIGH SENSITIVITY RESULT: 49 NG/L — SIGNIFICANT CHANGE UP
WBC # BLD: 5.22 K/UL — SIGNIFICANT CHANGE UP (ref 3.8–10.5)
WBC # FLD AUTO: 5.22 K/UL — SIGNIFICANT CHANGE UP (ref 3.8–10.5)

## 2022-08-12 PROCEDURE — 71045 X-RAY EXAM CHEST 1 VIEW: CPT | Mod: 26

## 2022-08-12 PROCEDURE — 99222 1ST HOSP IP/OBS MODERATE 55: CPT | Mod: GC

## 2022-08-12 RX ORDER — IRON SUCROSE 20 MG/ML
200 INJECTION, SOLUTION INTRAVENOUS EVERY 24 HOURS
Refills: 0 | Status: COMPLETED | OUTPATIENT
Start: 2022-08-12 | End: 2022-08-16

## 2022-08-12 RX ADMIN — HEPARIN SODIUM 5000 UNIT(S): 5000 INJECTION INTRAVENOUS; SUBCUTANEOUS at 17:25

## 2022-08-12 RX ADMIN — Medication 1: at 12:15

## 2022-08-12 RX ADMIN — Medication 80 MILLIGRAM(S): at 05:30

## 2022-08-12 RX ADMIN — SEVELAMER CARBONATE 800 MILLIGRAM(S): 2400 POWDER, FOR SUSPENSION ORAL at 13:44

## 2022-08-12 RX ADMIN — SEVELAMER CARBONATE 800 MILLIGRAM(S): 2400 POWDER, FOR SUSPENSION ORAL at 05:29

## 2022-08-12 RX ADMIN — Medication 10 MILLIGRAM(S): at 13:45

## 2022-08-12 RX ADMIN — IRON SUCROSE 110 MILLIGRAM(S): 20 INJECTION, SOLUTION INTRAVENOUS at 14:10

## 2022-08-12 RX ADMIN — Medication 10 MILLIGRAM(S): at 05:31

## 2022-08-12 RX ADMIN — Medication 80 MILLIGRAM(S): at 13:44

## 2022-08-12 RX ADMIN — SEVELAMER CARBONATE 800 MILLIGRAM(S): 2400 POWDER, FOR SUSPENSION ORAL at 22:33

## 2022-08-12 RX ADMIN — Medication 10 MILLIGRAM(S): at 22:33

## 2022-08-12 RX ADMIN — PANTOPRAZOLE SODIUM 40 MILLIGRAM(S): 20 TABLET, DELAYED RELEASE ORAL at 13:44

## 2022-08-12 RX ADMIN — HEPARIN SODIUM 5000 UNIT(S): 5000 INJECTION INTRAVENOUS; SUBCUTANEOUS at 05:31

## 2022-08-12 RX ADMIN — CLOPIDOGREL BISULFATE 75 MILLIGRAM(S): 75 TABLET, FILM COATED ORAL at 12:25

## 2022-08-12 RX ADMIN — Medication 25 MILLIGRAM(S): at 05:30

## 2022-08-12 RX ADMIN — INSULIN GLARGINE 8 UNIT(S): 100 INJECTION, SOLUTION SUBCUTANEOUS at 22:33

## 2022-08-12 RX ADMIN — GABAPENTIN 300 MILLIGRAM(S): 400 CAPSULE ORAL at 22:33

## 2022-08-12 NOTE — PROGRESS NOTE ADULT - PROBLEM SELECTOR PLAN 3
NSR @69bpm, TWI in II, AVF, V4-V5. TWI appear new compared to EKG on last admission  May be related to demand ischemia in setting of anemia  -check troponin and CKMB  -echo  -cards consult appreciated  -tele monitoring

## 2022-08-12 NOTE — CONSULT NOTE ADULT - SUBJECTIVE AND OBJECTIVE BOX
NEPHROLOGY - NSN    Patient seen and examined.    HPI:  82F with PMHx CAD s/p stents , pAfib (not on AC due to hx GIB), HLD, DM2 on insulin c/b neuropathy, PVD s/p stents , hx GIB/duodenal ulcer, CKD, anemia presenting with abnormal labs from PMD. Pt has hx anemia likely NICHOLAS per nephro and supposed to be on iron tabs but no longer takes due to constipation though she uses senna. Pt had recent labs and was told to come in for anemia. Hgb here 6.4. Baseline is 7-8s. Pt has required PRBC in the past (last several months ago) and has hx of GIB. She currently denies any hematochezia, melena, hemoptysis, hematemesis. FOBT in ED neg. Her last C-scope was  with non-bleeding internal hemorrhoids and last EGD was  with non-bleeding gastritis. Of note pt has chronic SOB for a year now and worsened over the last few days to weeks. She also had some lightheadedness 2 weeks ago but this resolved. She denies CP, LOC, palpitations, vomiting, diarrhea. She notes some LE edema in the feet with some associated pain that she was told is related to her kidneys. She has been told by renal she may need to be on HD at some point as well. She recently started home O2 PRN one week ago. Denies hx of COPD/asthma and is not on inhalers. She is on lasix 80mg BID but admits to sometimes missing doses. She also takes plavix 75mg qd but not on AC for afib due to hx GIB and not on ASA as well.    Patient seen on 3L nasal cannula (wears 5 L at home) in no distress. Legs painful to touch and edematous. She reports she has been unable to walk due to pain, she notes edema worsened over the last couple of days. She has good urine output and denies any changes in medications.      PAST MEDICAL & SURGICAL HISTORY:  HLD (hyperlipidemia)      Diabetic neuropathy      CAD (coronary artery disease)  ~ s/p MICHAEL x2 in 2017      Paroxysmal atrial fibrillation      Diabetes mellitus, type 2      GI bleed      Neuropathy      Gastritis      Diastolic heart failure      Transfusion history      Stage 5 chronic kidney disease not on chronic dialysis      Anemia      PVD (peripheral vascular disease)  ~ RLE angiogram with 2 stents placed in 2019.      H/O  section      S/P coronary artery stent placement  x2 in       History of esophagogastroduodenoscopy (EGD)      S/P angiogram of extremity          MEDICATIONS  (STANDING):  clopidogrel Tablet 75 milliGRAM(s) Oral daily  dextrose 5%. 1000 milliLiter(s) (50 mL/Hr) IV Continuous <Continuous>  dextrose 5%. 1000 milliLiter(s) (100 mL/Hr) IV Continuous <Continuous>  dextrose 50% Injectable 25 Gram(s) IV Push once  dextrose 50% Injectable 12.5 Gram(s) IV Push once  dextrose 50% Injectable 25 Gram(s) IV Push once  furosemide    Tablet 80 milliGRAM(s) Oral two times a day  gabapentin 300 milliGRAM(s) Oral at bedtime  glucagon  Injectable 1 milliGRAM(s) IntraMuscular once  heparin   Injectable 5000 Unit(s) SubCutaneous every 12 hours  hydrALAZINE 10 milliGRAM(s) Oral three times a day  insulin glargine Injectable (LANTUS) 8 Unit(s) SubCutaneous at bedtime  insulin lispro (ADMELOG) corrective regimen sliding scale   SubCutaneous three times a day before meals  insulin lispro (ADMELOG) corrective regimen sliding scale   SubCutaneous at bedtime  metoprolol succinate ER 25 milliGRAM(s) Oral daily  pantoprazole    Tablet 40 milliGRAM(s) Oral before breakfast  senna 2 Tablet(s) Oral at bedtime  sevelamer carbonate 800 milliGRAM(s) Oral three times a day      Allergies    Carrots (Rash)  No Known Drug Allergies    Intolerances        SOCIAL HISTORY:  Denies alcohol abuse, drug abuse or tobacco usage.     FAMILY HISTORY:  FH: diabetes mellitus        VITALS:  T(C): 36.4 (22 @ 05:00), Max: 36.4 (22 @ 16:10)  HR: 65 (22 @ 08:48) (65 - 109)  BP: 153/94 (22 @ 08:48) (140/82 - 187/79)  RR: 18 (22 @ 08:48) (17 - 22)  SpO2: 100% (22 @ 05:00) (96% - 100%)    REVIEW OF SYSTEMS:  Denies any nausea, vomiting, diarrhea, fever or chills. Denies chest pain, SOB, focal weakness, hematuria or dysuria. Good oral intake and denies fatigue or weakness. All other pertinent systems are reviewed and are negative.    PHYSICAL EXAM:  Constitutional: NAD  HEENT: EOMI  Neck:  No JVD, supple   Respiratory: CTA B/L  Cardiovascular: S1 and S2, RRR  Gastrointestinal: + BS, soft, NT, ND  Extremities: + LE peripheral edema, + peripheral pulses  Neurological: A/O x 3, CN2-12 intact  Psychiatric: Normal mood, normal affect  : No Sylvester  Skin: No rashes, C/D/I  Access: Not applicable    I and O's:        LABS:                        9.2    5.22  )-----------( 301      ( 12 Aug 2022 08:10 )             32.0         140  |  95<L>  |  78<H>  ----------------------------<  158<H>  4.5   |  24  |  4.99<H>    Ca    7.8<L>      11 Aug 2022 14:24    TPro  7.3  /  Alb  3.7  /  TBili  0.3  /  DBili  x   /  AST  19  /  ALT  6   /  AlkPhos  141<H>      RADIOLOGY & ADDITIONAL STUDIES:    < from: US Duplex Venous Lower Ext Complete, Bilateral (22 @ 14:01) >    IMPRESSION:  No evidence of deep venous thrombosis in either lower extremity.    --- End of Report ---    < end of copied text >   NEPHROLOGY - NSN    Patient seen and examined.    HPI:  82F with PMHx CAD s/p stents 2017, pAfib (not on AC due to hx GIB), HLD, DM2 on insulin c/b neuropathy, PVD s/p stents , hx GIB/duodenal ulcer, CKD, anemia presenting with abnormal labs from PMD. Pt has hx anemia likely NICHOLAS per nephro and supposed to be on iron tabs but no longer takes due to constipation though she uses senna. Pt had recent labs and was told to come in for anemia. Hgb here 6.4. Baseline is 7-8s. Pt has required PRBC in the past (last several months ago) and has hx of GIB. She currently denies any hematochezia, melena, hemoptysis, hematemesis. FOBT in ED neg. Her last C-scope was  with non-bleeding internal hemorrhoids and last EGD was  with non-bleeding gastritis. Of note pt has chronic SOB for a year now and worsened over the last few days to weeks. She also had some lightheadedness 2 weeks ago but this resolved. She denies CP, LOC, palpitations, vomiting, diarrhea. She notes some LE edema in the feet with some associated pain that she was told is related to her kidneys. She has been told by renal she may need to be on HD at some point as well. She recently started home O2 PRN one week ago. Denies hx of COPD/asthma and is not on inhalers. She is on lasix 80mg BID but admits to sometimes missing doses. She also takes plavix 75mg qd but not on AC for afib due to hx GIB and not on ASA as well.    Patient seen on 3L nasal cannula (wears 5 L at home) in no distress. Legs painful to touch and edematous. She reports she has been unable to walk due to pain, she notes edema worsened over the last couple of days. She has good urine output and denies any changes in medications.  She is aware of her weak kidneys   There is no hematuria or bubbles in the urine.  No history of NSAIDS or nephrolithisis.  The patient urinates once or twice in the night and there is no incontinence.  No family hx or renal disease or back pain.    No recent abx use.  No alleviating or aggravating factors with respect to the kidneys.       PAST MEDICAL & SURGICAL HISTORY:  HLD (hyperlipidemia)      Diabetic neuropathy      CAD (coronary artery disease)  ~ s/p MICHAEL x2 in 2017      Paroxysmal atrial fibrillation      Diabetes mellitus, type 2      GI bleed      Neuropathy      Gastritis      Diastolic heart failure      Transfusion history      Stage 5 chronic kidney disease not on chronic dialysis      Anemia      PVD (peripheral vascular disease)  ~ RLE angiogram with 2 stents placed in 2019.      H/O  section      S/P coronary artery stent placement  x2 in       History of esophagogastroduodenoscopy (EGD)      S/P angiogram of extremity          MEDICATIONS  (STANDING):  clopidogrel Tablet 75 milliGRAM(s) Oral daily  dextrose 5%. 1000 milliLiter(s) (50 mL/Hr) IV Continuous <Continuous>  dextrose 5%. 1000 milliLiter(s) (100 mL/Hr) IV Continuous <Continuous>  dextrose 50% Injectable 25 Gram(s) IV Push once  dextrose 50% Injectable 12.5 Gram(s) IV Push once  dextrose 50% Injectable 25 Gram(s) IV Push once  furosemide    Tablet 80 milliGRAM(s) Oral two times a day  gabapentin 300 milliGRAM(s) Oral at bedtime  glucagon  Injectable 1 milliGRAM(s) IntraMuscular once  heparin   Injectable 5000 Unit(s) SubCutaneous every 12 hours  hydrALAZINE 10 milliGRAM(s) Oral three times a day  insulin glargine Injectable (LANTUS) 8 Unit(s) SubCutaneous at bedtime  insulin lispro (ADMELOG) corrective regimen sliding scale   SubCutaneous three times a day before meals  insulin lispro (ADMELOG) corrective regimen sliding scale   SubCutaneous at bedtime  metoprolol succinate ER 25 milliGRAM(s) Oral daily  pantoprazole    Tablet 40 milliGRAM(s) Oral before breakfast  senna 2 Tablet(s) Oral at bedtime  sevelamer carbonate 800 milliGRAM(s) Oral three times a day      Allergies    Carrots (Rash)  No Known Drug Allergies    Intolerances        SOCIAL HISTORY:  Denies alcohol abuse, drug abuse or tobacco usage.     FAMILY HISTORY:  FH: diabetes mellitus        VITALS:  T(C): 36.4 (22 @ 05:00), Max: 36.4 (22 @ 16:10)  HR: 65 (22 @ 08:48) (65 - 109)  BP: 153/94 (22 @ 08:48) (140/82 - 187/79)  RR: 18 (22 @ 08:48) (17 - 22)  SpO2: 100% (22 @ 05:00) (96% - 100%)    REVIEW OF SYSTEMS:  Denies any nausea, vomiting, diarrhea, fever or chills.+  fatigue or weakness. All other pertinent systems are reviewed and are negative.    PHYSICAL EXAM:  Constitutional: NAD  HEENT: EOMI  Neck:  No JVD, supple   Respiratory: CTA B/L  Cardiovascular: S1 and S2, RRR  Gastrointestinal: + BS, soft, NT, ND  Extremities: + LE peripheral edema, + peripheral pulses  Neurological: A/O x 3, CN2-12 intact  Psychiatric: Normal mood, normal affect  : No Sylvester  Skin: No rashes, C/D/I  Access: Not applicable    I and O's:        LABS:                        9.2    5.22  )-----------( 301      ( 12 Aug 2022 08:10 )             32.0         140  |  95<L>  |  78<H>  ----------------------------<  158<H>  4.5   |  24  |  4.99<H>    Ca    7.8<L>      11 Aug 2022 14:24    TPro  7.3  /  Alb  3.7  /  TBili  0.3  /  DBili  x   /  AST  19  /  ALT  6   /  AlkPhos  141<H>      RADIOLOGY & ADDITIONAL STUDIES:    < from: US Duplex Venous Lower Ext Complete, Bilateral (22 @ 14:01) >    IMPRESSION:  No evidence of deep venous thrombosis in either lower extremity.    --- End of Report ---    < end of copied text >

## 2022-08-12 NOTE — ED ADULT NURSE REASSESSMENT NOTE - NS ED NURSE REASSESS COMMENT FT1
20g placed in L FA, labs drawn as ordered. Pt denies complaints, NAD, respirations even and unlabored. Awaiting further orders.
Break coverage RN: Pt A&Ox4, respirations even and unlabored, sating 100% on 2L o2 via NC. IV site patent, no redness or swelling noted. Pt offers no complaints at this time, well appearing. NAD noted. Vital signs stable. Pending transport to bed assignment. bed in lowest position, side rails up, safety maintained.
Pt A&Ox4, resp equal but slightly labored. Pt on 2L NC 99%. Pt denies; dizziness, n/v, CP, blurred vision, body aches, fever/chills.
assumed care of patient, patient sitting on stretcher, NAD noted. 97% on 2L NC noted. awake alert and oriented x4. will continue to monitor

## 2022-08-12 NOTE — CONSULT NOTE ADULT - ATTENDING COMMENTS
82 F hx of CAD s/p stents (2017), pAfib (not on AC due to hx GIB), DM2,, PVD s/p stent (2019), PUD, CKD and anemia presents after being found to be anemic on outpatient bloodwork. Anemia was previously attributed to iron deficiency, but patient stopped taken iron tablets in the setting of constipation. No reported overt signs of bleeding. Last colonoscopy  2018 with internal hemorrhoids and last EGD was 2020 with non-bleeding gastritis.    Impression:  # Normocytic Anemia without overt signs of bleeding: Iron studies consistent with iron deficiency anemia. Likely component of CKD as well. Differential of occult GI losses includes PUD (given age, history of PUD and antiplatelet use), malignancy (ie gastric, colon) or AVMs. Unlikely celiac disease  # CAD s/p stent  # Hx of PUD  # CKD    Recs:  - PPI qd given multiple risk factors for PUD as above  - Offered endoscopic evaluation of NICHOLAS, but patient is adamantly refusing at this time  - Trend CBC  - IV iron  - Supportive care    Please call with questions    Amy Lara MD  [facility]

## 2022-08-12 NOTE — CONSULT NOTE ADULT - SUBJECTIVE AND OBJECTIVE BOX
Gastroenterology     HPI:  82F with PMHx CAD s/p stents 2017, pAfib (not on AC due to hx GIB), HLD, DM2 on insulin c/b neuropathy, PVD s/p stents , hx GIB/duodenal ulcer, CKD, anemia presenting with abnormal labs from PMD. Pt has hx anemia likely NICHOLAS per nephro and supposed to be on iron tabs but no longer takes due to constipation though she uses senna. Pt had recent labs and was told to come in for anemia. Hgb here 6.4. Baseline is 7-8s. Pt has required PRBC in the past (last several months ago) and has hx of GIB. She currently denies any hematochezia, melena, hemoptysis, hematemesis. FOBT in ED neg. Her last C-scope was  with non-bleeding internal hemorrhoids and last EGD was  with non-bleeding gastritis. Of note pt has chronic SOB for a year now and worsened over the last few days to weeks. She also had some lightheadedness 2 weeks ago but this resolved. She denies CP, LOC, palpitations, vomiting, diarrhea. She notes some LE edema in the feet with some associated pain that she was told is related to her kidneys. She has been told by renal she may need to be on HD at some point as well. She recently started home O2 PRN one week ago. Denies hx of COPD/asthma and is not on inhalers. She is on lasix 80mg BID but admits to sometimes missing doses. She also takes plavix 75mg qd but not on AC for afib due to hx GIB and not on ASA as well.    In ED, hgb 6.4 and currently getting first unit PRBC. She denies CP, SOB at rest, palpitations. -120s in afib on monitor. /79   (11 Aug 2022 21:12)    Patient states she has been asymptomatic from her anemia. Denies any fatigue, only was told to present to ED via outpatient labs. BMs have been normal soft and brown since previous GIB. No melena or hematochezia. Denies abdominal pain or heartburn. S/p 2 units of PRBC with exaggerated hemoglobin response to 9.2 from 6.4. Patient states she is supposed to be on IV iron, but has been unable to tolerate due to significant constipation and has not been taking it for awhile. Patient also notes abdominal distension for a few months. No abdominal pain, but states that her belly has grown and it usually is not as large as it currently is.       PAST MEDICAL & SURGICAL HISTORY:  HLD (hyperlipidemia)      Diabetic neuropathy      CAD (coronary artery disease)  ~ s/p MICHAEL x2 in 2017      Paroxysmal atrial fibrillation      Diabetes mellitus, type 2      GI bleed      Neuropathy      Gastritis      Diastolic heart failure      Transfusion history      Stage 5 chronic kidney disease not on chronic dialysis      Anemia      PVD (peripheral vascular disease)  ~ RLE angiogram with 2 stents placed in 2019.      H/O  section      S/P coronary artery stent placement  x2 in       History of esophagogastroduodenoscopy (EGD)      S/P angiogram of extremity          REVIEW OF SYSTEMS  14 point ROS negative unless otherwise noted in HPI.     MEDICATIONS  (STANDING):  clopidogrel Tablet 75 milliGRAM(s) Oral daily  dextrose 5%. 1000 milliLiter(s) (50 mL/Hr) IV Continuous <Continuous>  dextrose 5%. 1000 milliLiter(s) (100 mL/Hr) IV Continuous <Continuous>  dextrose 50% Injectable 25 Gram(s) IV Push once  dextrose 50% Injectable 12.5 Gram(s) IV Push once  dextrose 50% Injectable 25 Gram(s) IV Push once  furosemide    Tablet 80 milliGRAM(s) Oral two times a day  gabapentin 300 milliGRAM(s) Oral at bedtime  glucagon  Injectable 1 milliGRAM(s) IntraMuscular once  heparin   Injectable 5000 Unit(s) SubCutaneous every 12 hours  hydrALAZINE 10 milliGRAM(s) Oral three times a day  insulin glargine Injectable (LANTUS) 8 Unit(s) SubCutaneous at bedtime  insulin lispro (ADMELOG) corrective regimen sliding scale   SubCutaneous three times a day before meals  insulin lispro (ADMELOG) corrective regimen sliding scale   SubCutaneous at bedtime  metoprolol succinate ER 25 milliGRAM(s) Oral daily  pantoprazole    Tablet 40 milliGRAM(s) Oral before breakfast  senna 2 Tablet(s) Oral at bedtime  sevelamer carbonate 800 milliGRAM(s) Oral three times a day    MEDICATIONS  (PRN):  acetaminophen     Tablet .. 650 milliGRAM(s) Oral every 6 hours PRN Temp greater or equal to 38C (100.4F), Mild Pain (1 - 3)  dextrose Oral Gel 15 Gram(s) Oral once PRN Blood Glucose LESS THAN 70 milliGRAM(s)/deciliter  melatonin 3 milliGRAM(s) Oral at bedtime PRN Insomnia      Allergies    Carrots (Rash)  No Known Drug Allergies    Intolerances        SOCIAL HISTORY:  Former smoker, No alcohol or recreational drug use.    FAMILY HISTORY:  FH: diabetes mellitus    Vital Signs Last 24 Hrs  T(C): 36.4 (12 Aug 2022 05:00), Max: 36.7 (11 Aug 2022 11:50)  T(F): 97.5 (12 Aug 2022 05:00), Max: 98 (11 Aug 2022 11:50)  HR: 65 (12 Aug 2022 08:48) (65 - 109)  BP: 153/94 (12 Aug 2022 08:48) (140/82 - 187/79)  BP(mean): --  RR: 18 (12 Aug 2022 08:48) (16 - 22)  SpO2: 100% (12 Aug 2022 05:00) (96% - 100%)    Parameters below as of 12 Aug 2022 08:48  Patient On (Oxygen Delivery Method): nasal cannula  O2 Flow (L/min): 2      PHYSICAL EXAM:  GENERAL: NAD, well-groomed, well-developed  HEAD:  Atraumatic, Normocephalic  EYES: EOMI, PERRLA, conjunctiva and sclera clear  ENMT: No tonsillar erythema, exudates, or enlargement; Moist mucous membranes, Good dentition  NECK: Supple, No JVD  NERVOUS SYSTEM: AOX3, motor and sensation grossly intact in b/l UE and b/l LE  PSYCHIATRIC: Appropriate affect and mood  CHEST/LUNG: Clear to auscultation bilaterally; No rales, rhonchi, wheezing, or rubs  HEART: Regular rate and rhythm; No murmurs, rubs, or gallops. No LE edema  ABDOMEN: Soft, Nontender, distended; Bowel sounds present  RECTAL: deferred due to patient privacy  EXTREMITIES:  2+ Peripheral Pulses, No clubbing, cyanosis  SKIN: No rashes or lesions        LABS:                        9.2    5.22  )-----------( 301      ( 12 Aug 2022 08:10 )             32.0       Hemoglobin: 9.2 g/dL (22 @ 08:10)  Hemoglobin: 6.4 g/dL (22 @ 14:24)          140  |  95<L>  |  78<H>  ----------------------------<  158<H>  4.5   |  24  |  4.99<H>    Ca    7.8<L>      11 Aug 2022 14:24    TPro  7.3  /  Alb  3.7  /  TBili  0.3  /  DBili  x   /  AST  19  /  ALT  6   /  AlkPhos  141<H>            RADIOLOGY & ADDITIONAL STUDIES:    ENDOSCOPIES:

## 2022-08-12 NOTE — CONSULT NOTE ADULT - ASSESSMENT
82F with PMHx CAD s/p stents 2017, pAfib (not on AC due to hx GIB), HLD, DM2 on insulin c/b neuropathy, PVD s/p stents 2019, hx GIB/duodenal ulcer, CKD, anemia presenting with abnormal labs from PMD.    #Anemia  #Iron Deficiency Anemia  Patient hemodynamically stable, no hematochezia, no melena. BMs soft and brown. Iron studies suggest iron deficiency, patient unable to tolerate PO iron supplementation 2/2 constipation. Source of anemia likely CKD,NICHOLAS, less likely GI source given no signs or symptoms of bleeding.     Recommendations:      Recommendations not finalized until note signed by attending.     Jung Calero  PGY2  82F with PMHx CAD s/p stents 2017, pAfib (not on AC due to hx GIB), HLD, DM2 on insulin c/b neuropathy, PVD s/p stents 2019, hx GIB/duodenal ulcer, CKD, anemia presenting with abnormal labs from PMD.    #Anemia  #Iron Deficiency Anemia  #Abdominal Distension  Patient hemodynamically stable, no hematochezia, no melena. BMs soft and brown. Iron studies suggest iron deficiency, patient unable to tolerate PO iron supplementation 2/2 constipation. Source of anemia likely CKD,NICHOLAS, less likely GI source given no signs or symptoms of bleeding. Abdominal distension for a few months. No tenderness, no fluid wave.     Recommendations:  -Patient does not want endoscopy/colonoscopy at this time. Given no signs/symptoms of bleeding would not recommend inpatient endoscopy/colonoscopy  -given iron deficiency, CKD, and inability to tolerate PO iron, consider IV iron   -consider CT A/P for abdominal distension  -monitor CBC, transfuse to goal of 8 given CAD    Recommendations not finalized until note signed by attending.     Jung Calero  PGY2

## 2022-08-12 NOTE — CONSULT NOTE ADULT - ASSESSMENT
82F with PMHx CAD s/p stents 2017, pAfib (not on AC due to hx GIB), HLD, DM2 on insulin c/b neuropathy, PVD s/p stents 2019, hx GIB/duodenal ulcer, CKD, anemia presenting with abnormal labs from PMD  Anemia   CKD    Renal- azotemia rising likely due to anemia    Anemia- + iron deficiency anemia start venofer 200g IV x 5 days, s/p PRBC hgb improving  82F with PMHx CAD s/p stents 2017, pAfib (not on AC due to hx GIB), HLD, DM2 on insulin c/b neuropathy, PVD s/p stents 2019, hx GIB/duodenal ulcer, CKD, anemia presenting with abnormal labs from PMD  Anemia   CKD    Renal- azotemia rising likely due to anemia and obligate diuresis, s/p lasix 80 mg iv x1 yesterday and now on lasix 80 mg po bid, check BMP today   check bladder scan      Anemia- + iron deficiency anemia start venofer 200g IV x 5 days, s/p PRBC hgb improving     CV- Bp acceptable  82F with PMHx CAD s/p stents 2017, pAfib (not on AC due to hx GIB), HLD, DM2 on insulin c/b neuropathy, PVD s/p stents 2019, hx GIB/duodenal ulcer, CKD, anemia presenting with abnormal labs from PMD  Anemia   CKD    Renal- azotemia rising likely due to anemia and obligate diuresis, s/p lasix 80 mg iv x1 yesterday and now on lasix 80 mg po bid  check bladder scan  No need for renal sono  Quantify proteinuria;  Phos added to todays labs  Check PTH to assess for rocaltrol   HD in the next 3-6 months but no urgency to start while here.  Needs AV access as  outpt     Anemia- + iron deficiency anemia start venofer 200g IV x 5 days, s/p PRBC hgb improving   Then will start Retacrit     CV- Bp acceptable ;  Cards opal noted     Sayed [facility]   [phone]  82F with PMHx CAD s/p stents 2017, pAfib (not on AC due to hx GIB), HLD, DM2 on insulin c/b neuropathy, PVD s/p stents 2019, hx GIB/duodenal ulcer, CKD, anemia presenting with abnormal labs from PMD  Anemia   CKD    Renal- azotemia rising likely due to anemia and obligate diuresis, s/p lasix 80 mg iv x1 yesterday and now on lasix 80 mg po bid  check bladder scan  No need for renal sono  Quantify proteinuria;  Phos added to todays labs  Check PTH to assess for rocaltrol   HD in the next 3-6 months but no urgency to start while here.  Needs AV access as  outpt     Anemia- + iron deficiency anemia start venofer 200g IV x 5 days, s/p PRBC hgb improving   Then will start Retacrit     CV- Bp acceptable ;  Cards opal noted     Neuro-max dose of gabapentin up to 600mg a day    Sayed [facility]   [phone]

## 2022-08-12 NOTE — PATIENT PROFILE ADULT - FALL HARM RISK - RISK INTERVENTIONS

## 2022-08-12 NOTE — PROGRESS NOTE ADULT - SUBJECTIVE AND OBJECTIVE BOX
AMIE HOLT  82y  Female      Patient is a 82y old  Female who presents with a chief complaint of abnormal labs (12 Aug 2022 12:20)  Patient was seen and examined.chart reviewed..Admitted with anemia.abd distention    REVIEW OF SYSTEMS:  CONSTITUTIONAL: No fever  RESPIRATORY: No cough, hemoptysis or shortness of breath  CARDIOVASCULAR: No chest pain, palpitations, dizziness, or leg swelling  GASTROINTESTINAL: No abdominal pain. nausea, vomiting, hematemesis  GENITOURINARY: No dysuria, frequency, hematuria   NEUROLOGICAL: No headaches, no dizziness  MUSCULOSKELETAL: No joint pain or swelling;     INTERVAL HPI/OVERNIGHT EVENTS:  T(C): 37.1 (22 @ 16:28), Max: 37.1 (22 @ 16:28)  HR: 103 (22 @ 16:28) (65 - 109)  BP: 138/72 (22 @ 16:28) (121/75 - 187/79)  RR: 20 (22 @ 16:28) (16 - 22)  SpO2: 99% (22 @ 16:28) (98% - 100%)  Wt(kg): --  I&O's Summary    T(C): 37.1 (22 @ 16:28), Max: 37.1 (22 @ 16:28)  HR: 103 (22 @ 16:28) (65 - 109)  BP: 138/72 (22 @ 16:28) (121/75 - 187/79)  RR: 20 (22 @ 16:28) (16 - 22)  SpO2: 99% (22 @ 16:28) (98% - 100%)  Wt(kg): --Vital Signs Last 24 Hrs  T(C): 37.1 (12 Aug 2022 16:28), Max: 37.1 (12 Aug 2022 16:28)  T(F): 98.7 (12 Aug 2022 16:28), Max: 98.7 (12 Aug 2022 16:28)  HR: 103 (12 Aug 2022 16:28) (65 - 109)  BP: 138/72 (12 Aug 2022 16:28) (121/75 - 187/79)  BP(mean): --  RR: 20 (12 Aug 2022 16:28) (16 - 22)  SpO2: 99% (12 Aug 2022 16:28) (98% - 100%)    Parameters below as of 12 Aug 2022 16:28  Patient On (Oxygen Delivery Method): nasal cannula  O2 Flow (L/min): 2      LABS:                        9.2    5.22  )-----------( 301      ( 12 Aug 2022 08:10 )             32.0     08    140  |  95<L>  |  78<H>  ----------------------------<  158<H>  4.5   |  24  |  4.99<H>    Ca    7.8<L>      11 Aug 2022 14:24    TPro  7.3  /  Alb  3.7  /  TBili  0.3  /  DBili  x   /  AST  19  /  ALT  6   /  AlkPhos  141<H>          CAPILLARY BLOOD GLUCOSE      POCT Blood Glucose.: 143 mg/dL (12 Aug 2022 17:18)  POCT Blood Glucose.: 187 mg/dL (12 Aug 2022 15:19)  POCT Blood Glucose.: 194 mg/dL (12 Aug 2022 11:52)  POCT Blood Glucose.: 111 mg/dL (12 Aug 2022 08:11)  POCT Blood Glucose.: 117 mg/dL (12 Aug 2022 06:29)  POCT Blood Glucose.: 136 mg/dL (11 Aug 2022 23:13)            PAST MEDICAL & SURGICAL HISTORY:  HLD (hyperlipidemia)      Diabetic neuropathy      CAD (coronary artery disease)  ~ s/p MICHAEL x2 in 2017      Paroxysmal atrial fibrillation      Diabetes mellitus, type 2      GI bleed      Neuropathy      Gastritis      Diastolic heart failure      Transfusion history      Stage 5 chronic kidney disease not on chronic dialysis      Anemia      PVD (peripheral vascular disease)  ~ RLE angiogram with 2 stents placed in 2019.      H/O  section      S/P coronary artery stent placement  x2 in       History of esophagogastroduodenoscopy (EGD)      S/P angiogram of extremity          MEDICATIONS  (STANDING):  clopidogrel Tablet 75 milliGRAM(s) Oral daily  dextrose 5%. 1000 milliLiter(s) (50 mL/Hr) IV Continuous <Continuous>  dextrose 5%. 1000 milliLiter(s) (100 mL/Hr) IV Continuous <Continuous>  dextrose 50% Injectable 25 Gram(s) IV Push once  dextrose 50% Injectable 12.5 Gram(s) IV Push once  dextrose 50% Injectable 25 Gram(s) IV Push once  furosemide    Tablet 80 milliGRAM(s) Oral two times a day  gabapentin 300 milliGRAM(s) Oral at bedtime  glucagon  Injectable 1 milliGRAM(s) IntraMuscular once  heparin   Injectable 5000 Unit(s) SubCutaneous every 12 hours  hydrALAZINE 10 milliGRAM(s) Oral three times a day  insulin glargine Injectable (LANTUS) 8 Unit(s) SubCutaneous at bedtime  insulin lispro (ADMELOG) corrective regimen sliding scale   SubCutaneous three times a day before meals  insulin lispro (ADMELOG) corrective regimen sliding scale   SubCutaneous at bedtime  iron sucrose IVPB 200 milliGRAM(s) IV Intermittent every 24 hours  metoprolol succinate ER 25 milliGRAM(s) Oral daily  pantoprazole    Tablet 40 milliGRAM(s) Oral before breakfast  senna 2 Tablet(s) Oral at bedtime  sevelamer carbonate 800 milliGRAM(s) Oral three times a day    MEDICATIONS  (PRN):  acetaminophen     Tablet .. 650 milliGRAM(s) Oral every 6 hours PRN Temp greater or equal to 38C (100.4F), Mild Pain (1 - 3)  dextrose Oral Gel 15 Gram(s) Oral once PRN Blood Glucose LESS THAN 70 milliGRAM(s)/deciliter  melatonin 3 milliGRAM(s) Oral at bedtime PRN Insomnia        RADIOLOGY & ADDITIONAL TESTS:    Imaging Personally Reviewed:  [ ] YES  [ ] NO    Consultant(s) Notes Reviewed:  [ ] YES  [ ] NO    PHYSICAL EXAM:  GENERAL: Alert and awake lying in bed in no distress  HEAD:  Atraumatic, Normocephalic  EYES: EOMI, EDIN, conjunctiva and sclera clear  NECK: Supple, No JVD, Normal thyroid  NERVOUS SYSTEM:  Alert & Oriented X3, Motor and sensory systems are intact,   CHEST/LUNG: Bilateral clear breath sounds, no rhochi, no wheezing, no crepitations,  HEART: Regular rate and rhythm; No murmurs, rubs, or gallops  ABDOMEN: Soft, Nontender, Nondistended; Bowel sounds present  EXTREMITIES:   Peripheral Pulses are palpable, no  edema        Care Discussed with Consultants/Other Providers [ ] YES  [ ] NO      Code Status: [] Full Code [] DNR [] DNI [] Goals of Care:   Disposition: [] ICU [] Stroke Unit [] RCU []PCU []Floor [] Discharge Home         LEFTY [email or web address]

## 2022-08-12 NOTE — CONSULT NOTE ADULT - SUBJECTIVE AND OBJECTIVE BOX
Cardiovascular Disease Initial Evaluation    CHIEF COMPLAINT: Abnormal Labs    HISTORY OF PRESENT ILLNESS:  This is an 82 year old woman with uncontrolled IDDM, compensated diastolic CHF, CAD s/p PCI, CKD V and HTN who presented to [facility] ED on 2022 with abnormal labs.  Patient was noted to be profoundly anemic with associated exertional SOB.    Mr. Tsang has a history of CAD and recurrent GI bleed. She underwent cath with Dr. Zulay Mckinnon and had MICHAEL placed to LCx and OM1 on 2017. On 2018, she presented to [facility] with progressive SOB. She was found to have a Hb of 4.6 and was transfused multiple units of pRBCs. She underwent EGD by Dr. Curtis Daigle revealing one non-bleeding duodenal ulcer. Xarelto was discontinued (paroxysmal a-fib) and ASA with Plavix was resumed. She again presented with similar symptoms in 3/2018 with Hb-5. Capsule study revealed questionable ulcer in ileum and patient was started on mesalamine. ASA was d/c'ed.    Currently she says that her exertional SOB has improved post pRBC transfusion.      Allergies  Carrots (Rash)  No Known Drug Allergies        MEDICATIONS:  clopidogrel Tablet 75 milliGRAM(s) Oral daily  furosemide    Tablet 80 milliGRAM(s) Oral two times a day  heparin   Injectable 5000 Unit(s) SubCutaneous every 12 hours  hydrALAZINE 10 milliGRAM(s) Oral three times a day  metoprolol succinate ER 25 milliGRAM(s) Oral daily        acetaminophen     Tablet .. 650 milliGRAM(s) Oral every 6 hours PRN  gabapentin 300 milliGRAM(s) Oral at bedtime  melatonin 3 milliGRAM(s) Oral at bedtime PRN    pantoprazole    Tablet 40 milliGRAM(s) Oral before breakfast  senna 2 Tablet(s) Oral at bedtime    dextrose 50% Injectable 25 Gram(s) IV Push once  dextrose 50% Injectable 12.5 Gram(s) IV Push once  dextrose 50% Injectable 25 Gram(s) IV Push once  dextrose Oral Gel 15 Gram(s) Oral once PRN  glucagon  Injectable 1 milliGRAM(s) IntraMuscular once  insulin glargine Injectable (LANTUS) 8 Unit(s) SubCutaneous at bedtime  insulin lispro (ADMELOG) corrective regimen sliding scale   SubCutaneous three times a day before meals  insulin lispro (ADMELOG) corrective regimen sliding scale   SubCutaneous at bedtime    dextrose 5%. 1000 milliLiter(s) IV Continuous <Continuous>  dextrose 5%. 1000 milliLiter(s) IV Continuous <Continuous>      PAST MEDICAL & SURGICAL HISTORY:  HLD (hyperlipidemia)      Diabetic neuropathy      CAD (coronary artery disease)  ~ s/p MICHAEL x2 in 2017      Paroxysmal atrial fibrillation      Diabetes mellitus, type 2      GI bleed      Neuropathy      Gastritis      Diastolic heart failure      Transfusion history      Stage 5 chronic kidney disease not on chronic dialysis      Anemia      PVD (peripheral vascular disease)  ~ RLE angiogram with 2 stents placed in 2019.      H/O  section      S/P coronary artery stent placement  x2 in       History of esophagogastroduodenoscopy (EGD)      S/P angiogram of extremity          FAMILY HISTORY:  FH: diabetes mellitus        SOCIAL HISTORY:    The patient is a nonsmoker       REVIEW OF SYSTEMS:  See HPI, otherwise complete 14 point review of systems negative      PHYSICAL EXAM:  T(C): 36.4 (22 @ 05:00), Max: 36.7 (22 @ 11:50)  HR: 65 (22 @ 08:48) (65 - 109)  BP: 153/94 (22 @ 08:48) (140/82 - 187/79)  RR: 18 (22 @ 08:48) (16 - 22)  SpO2: 100% (22 @ 05:00) (96% - 100%)  Wt(kg): --  I&O's Summary      Appearance: No Acute Distress; resting comfortably  HEENT:  Normal oral mucosa, PERRL, EOMI	  Cardiovascular: Normal S1 S2, No JVD, No murmurs/rubs/gallops  Respiratory: Normal respiratory effort; Lungs clear to auscultation bilaterally  Gastrointestinal:  Soft, Non-tender, + BS	  Skin: No rashes, No ecchymoses, No cyanosis	  Neurologic: Non-focal; no weakness  Extremities: No clubbing, cyanosis or edema  Vascular: Peripheral pulses palpable 2+ bilaterally  Psychiatry: A & O x 3, Mood & affect appropriate    Laboratory Data:	 	    CBC Full  -  ( 12 Aug 2022 08:10 )  WBC Count : 5.22 K/uL  Hemoglobin : 9.2 g/dL  Hematocrit : 32.0 %  Platelet Count - Automated : 301 K/uL  Mean Cell Volume : 89.6 fL  Mean Cell Hemoglobin : 25.8 pg  Mean Cell Hemoglobin Concentration : 28.8 gm/dL  Auto Neutrophil # : x  Auto Lymphocyte # : x  Auto Monocyte # : x  Auto Eosinophil # : x  Auto Basophil # : x  Auto Neutrophil % : x  Auto Lymphocyte % : x  Auto Monocyte % : x  Auto Eosinophil % : x  Auto Basophil % : x    08-    140  |  95<L>  |  78<H>  ----------------------------<  158<H>  4.5   |  24  |  4.99<H>    Ca    7.8<L>      11 Aug 2022 14:24    TPro  7.3  /  Alb  3.7  /  TBili  0.3  /  DBili  x   /  AST  19  /  ALT  6   /  AlkPhos  141<H>  08    Interpretation of Telemetry: n/a	    ECG:  	Sinus; diffuse t-wave inversions  	    Assessment: 82 year old woman with uncontrolled IDDM, compensated diastolic CHF, CAD s/p PCI, CKD V and HTN presents with rash and anemia.     Plan of Care:      #CAD- s/p MICHAEL x2 in 2017.  Anemia noted on admission, but patient denies any signs of bleeding.  Likely anemia in the setting of CKD.   Exertional dyspnea now improved post pRBC transfusion in the ED.   Given extensive vascular disease, continue Plavix 75 mg daily.     #Compensated diastolic CHF-  Ms. Tsang is not fluid overloaded on exam.  Continue Lasix 80 mg PO BID.    #PVD-  Patient status post RLE angiogram with 2 stents placed in 2019.   Antiplatelet therapy management as stated above.     #Paroxymal a-fib-  Admission EKG is sinus.   No AC given h/o GI bleed.    #ACP (advance care planning)-  Advanced care planning was discussed with the patient.  Advanced care planning forms were discussed.  Risks, benefits and alternatives of medical treatment and procedures were discussed in detail and all questions were answered. 30 minutes spent addressing advance care plans.    72 minutes spent on total encounter; more than 50% of the visit was spent counseling and/or coordinating care by the attending physician.   	  Julio César Hooper MD [facility]  Cardiovascular Diseases  [phone]

## 2022-08-13 LAB
ANION GAP SERPL CALC-SCNC: 18 MMOL/L — HIGH (ref 7–14)
BUN SERPL-MCNC: 76 MG/DL — HIGH (ref 7–23)
CALCIUM SERPL-MCNC: 8 MG/DL — LOW (ref 8.4–10.5)
CHLORIDE SERPL-SCNC: 98 MMOL/L — SIGNIFICANT CHANGE UP (ref 98–107)
CO2 SERPL-SCNC: 27 MMOL/L — SIGNIFICANT CHANGE UP (ref 22–31)
CREAT SERPL-MCNC: 4.88 MG/DL — HIGH (ref 0.5–1.3)
EGFR: 8 ML/MIN/1.73M2 — LOW
GLUCOSE BLDC GLUCOMTR-MCNC: 102 MG/DL — HIGH (ref 70–99)
GLUCOSE BLDC GLUCOMTR-MCNC: 132 MG/DL — HIGH (ref 70–99)
GLUCOSE BLDC GLUCOMTR-MCNC: 201 MG/DL — HIGH (ref 70–99)
GLUCOSE BLDC GLUCOMTR-MCNC: 89 MG/DL — SIGNIFICANT CHANGE UP (ref 70–99)
GLUCOSE SERPL-MCNC: 89 MG/DL — SIGNIFICANT CHANGE UP (ref 70–99)
HCT VFR BLD CALC: 31.1 % — LOW (ref 34.5–45)
HGB BLD-MCNC: 8.6 G/DL — LOW (ref 11.5–15.5)
MAGNESIUM SERPL-MCNC: 2.2 MG/DL — SIGNIFICANT CHANGE UP (ref 1.6–2.6)
MCHC RBC-ENTMCNC: 25.6 PG — LOW (ref 27–34)
MCHC RBC-ENTMCNC: 27.7 GM/DL — LOW (ref 32–36)
MCV RBC AUTO: 92.6 FL — SIGNIFICANT CHANGE UP (ref 80–100)
NRBC # BLD: 0 /100 WBCS — SIGNIFICANT CHANGE UP
NRBC # FLD: 0 K/UL — SIGNIFICANT CHANGE UP
PHOSPHATE SERPL-MCNC: 6.9 MG/DL — HIGH (ref 2.5–4.5)
PLATELET # BLD AUTO: 276 K/UL — SIGNIFICANT CHANGE UP (ref 150–400)
POTASSIUM SERPL-MCNC: 4.2 MMOL/L — SIGNIFICANT CHANGE UP (ref 3.5–5.3)
POTASSIUM SERPL-SCNC: 4.2 MMOL/L — SIGNIFICANT CHANGE UP (ref 3.5–5.3)
RBC # BLD: 3.36 M/UL — LOW (ref 3.8–5.2)
RBC # FLD: 16.8 % — HIGH (ref 10.3–14.5)
SODIUM SERPL-SCNC: 143 MMOL/L — SIGNIFICANT CHANGE UP (ref 135–145)
WBC # BLD: 4.18 K/UL — SIGNIFICANT CHANGE UP (ref 3.8–10.5)
WBC # FLD AUTO: 4.18 K/UL — SIGNIFICANT CHANGE UP (ref 3.8–10.5)

## 2022-08-13 PROCEDURE — 93306 TTE W/DOPPLER COMPLETE: CPT | Mod: 26

## 2022-08-13 RX ADMIN — Medication 25 MILLIGRAM(S): at 06:59

## 2022-08-13 RX ADMIN — IRON SUCROSE 110 MILLIGRAM(S): 20 INJECTION, SOLUTION INTRAVENOUS at 13:11

## 2022-08-13 RX ADMIN — SEVELAMER CARBONATE 800 MILLIGRAM(S): 2400 POWDER, FOR SUSPENSION ORAL at 21:33

## 2022-08-13 RX ADMIN — PANTOPRAZOLE SODIUM 40 MILLIGRAM(S): 20 TABLET, DELAYED RELEASE ORAL at 06:59

## 2022-08-13 RX ADMIN — GABAPENTIN 300 MILLIGRAM(S): 400 CAPSULE ORAL at 21:34

## 2022-08-13 RX ADMIN — Medication 10 MILLIGRAM(S): at 21:34

## 2022-08-13 RX ADMIN — SEVELAMER CARBONATE 800 MILLIGRAM(S): 2400 POWDER, FOR SUSPENSION ORAL at 13:08

## 2022-08-13 RX ADMIN — Medication 80 MILLIGRAM(S): at 06:58

## 2022-08-13 RX ADMIN — SEVELAMER CARBONATE 800 MILLIGRAM(S): 2400 POWDER, FOR SUSPENSION ORAL at 06:58

## 2022-08-13 RX ADMIN — Medication 3 MILLIGRAM(S): at 21:34

## 2022-08-13 RX ADMIN — HEPARIN SODIUM 5000 UNIT(S): 5000 INJECTION INTRAVENOUS; SUBCUTANEOUS at 17:10

## 2022-08-13 RX ADMIN — Medication 80 MILLIGRAM(S): at 13:07

## 2022-08-13 RX ADMIN — CLOPIDOGREL BISULFATE 75 MILLIGRAM(S): 75 TABLET, FILM COATED ORAL at 11:25

## 2022-08-13 RX ADMIN — HEPARIN SODIUM 5000 UNIT(S): 5000 INJECTION INTRAVENOUS; SUBCUTANEOUS at 06:59

## 2022-08-13 RX ADMIN — INSULIN GLARGINE 8 UNIT(S): 100 INJECTION, SOLUTION SUBCUTANEOUS at 21:33

## 2022-08-13 RX ADMIN — Medication 10 MILLIGRAM(S): at 06:58

## 2022-08-13 RX ADMIN — Medication 10 MILLIGRAM(S): at 13:07

## 2022-08-13 NOTE — PROGRESS NOTE ADULT - SUBJECTIVE AND OBJECTIVE BOX
HOLT ALEXSANDERMARGARITO  82y  Female      Patient is a 82y old  Female who presents with a chief complaint of abnormal labs (13 Aug 2022 14:05)  feels ok.no sob,no cp,no abd pain,no fever    REVIEW OF SYSTEMS:  CONSTITUTIONAL: No fever  RESPIRATORY: No cough, hemoptysis or shortness of breath  CARDIOVASCULAR: No chest pain, palpitations, dizziness, or leg swelling  GASTROINTESTINAL: No abdominal pain. nausea, vomiting, hematemesis  GENITOURINARY: No dysuria, frequency, hematuria   NEUROLOGICAL: No headaches, no dizziness  MUSCULOSKELETAL: No joint pain or swelling;     INTERVAL HPI/OVERNIGHT EVENTS:  T(C): 36.3 (22 @ 14:38), Max: 36.9 (22 @ 22:00)  HR: 77 (22 @ :38) (65 - 106)  BP: 140/72 (22 @ :38) (120/65 - 150/65)  RR: 22 (22 @ :38) ( - )  SpO2: 100% (22 @ :38) (100% - 100%)  Wt(kg): --  I&O's Summary    12 Aug 2022 07:01  -  13 Aug 2022 07:00  --------------------------------------------------------  IN: 0 mL / OUT: 250 mL / NET: -250 mL      T(C): 36.3 (22 @ 14:38), Max: 36.9 (22 @ 22:00)  HR: 77 (22 @ :38) (65 - 106)  BP: 140/72 (22 @ 14:38) (120/65 - 150/65)  RR: 22 (22 @ :38) (20 - )  SpO2: 100% (22 @ 14:38) (100% - 100%)  Wt(kg): --Vital Signs Last 24 Hrs  T(C): 36.3 (13 Aug 2022 14:38), Max: 36.9 (12 Aug 2022 22:00)  T(F): 97.4 (13 Aug 2022 14:38), Max: 98.5 (12 Aug 2022 22:00)  HR: 77 (13 Aug 2022 14:38) (65 - 106)  BP: 140/72 (13 Aug 2022 14:38) (120/65 - 150/65)  BP(mean): --  RR: 22 (13 Aug 2022 14:38) (20 - 22)  SpO2: 100% (13 Aug 2022 14:38) (100% - 100%)    Parameters below as of 13 Aug 2022 14:38  Patient On (Oxygen Delivery Method): nasal cannula        LABS:                        8.6    4.18  )-----------( 276      ( 13 Aug 2022 06:20 )             31.1     08-13    143  |  98  |  76<H>  ----------------------------<  89  4.2   |  27  |  4.88<H>    Ca    8.0<L>      13 Aug 2022 06:20  Phos  6.9     08-13  Mg     2.20     08-13          CAPILLARY BLOOD GLUCOSE      POCT Blood Glucose.: 132 mg/dL (13 Aug 2022 17:04)  POCT Blood Glucose.: 89 mg/dL (13 Aug 2022 12:05)  POCT Blood Glucose.: 102 mg/dL (13 Aug 2022 07:44)  POCT Blood Glucose.: 139 mg/dL (12 Aug 2022 21:39)            PAST MEDICAL & SURGICAL HISTORY:  HLD (hyperlipidemia)      Diabetic neuropathy      CAD (coronary artery disease)  ~ s/p MICHAEL x2 in 2017      Paroxysmal atrial fibrillation      Diabetes mellitus, type 2      GI bleed      Neuropathy      Gastritis      Diastolic heart failure      Transfusion history      Stage 5 chronic kidney disease not on chronic dialysis      Anemia      PVD (peripheral vascular disease)  ~ RLE angiogram with 2 stents placed in 2019.      H/O  section      S/P coronary artery stent placement  x2 in       History of esophagogastroduodenoscopy (EGD)      S/P angiogram of extremity          MEDICATIONS  (STANDING):  clopidogrel Tablet 75 milliGRAM(s) Oral daily  dextrose 5%. 1000 milliLiter(s) (100 mL/Hr) IV Continuous <Continuous>  dextrose 5%. 1000 milliLiter(s) (50 mL/Hr) IV Continuous <Continuous>  dextrose 50% Injectable 25 Gram(s) IV Push once  dextrose 50% Injectable 12.5 Gram(s) IV Push once  dextrose 50% Injectable 25 Gram(s) IV Push once  furosemide    Tablet 80 milliGRAM(s) Oral two times a day  gabapentin 300 milliGRAM(s) Oral at bedtime  glucagon  Injectable 1 milliGRAM(s) IntraMuscular once  heparin   Injectable 5000 Unit(s) SubCutaneous every 12 hours  hydrALAZINE 10 milliGRAM(s) Oral three times a day  insulin glargine Injectable (LANTUS) 8 Unit(s) SubCutaneous at bedtime  insulin lispro (ADMELOG) corrective regimen sliding scale   SubCutaneous three times a day before meals  insulin lispro (ADMELOG) corrective regimen sliding scale   SubCutaneous at bedtime  iron sucrose IVPB 200 milliGRAM(s) IV Intermittent every 24 hours  metoprolol succinate ER 25 milliGRAM(s) Oral daily  pantoprazole    Tablet 40 milliGRAM(s) Oral before breakfast  senna 2 Tablet(s) Oral at bedtime  sevelamer carbonate 800 milliGRAM(s) Oral three times a day    MEDICATIONS  (PRN):  acetaminophen     Tablet .. 650 milliGRAM(s) Oral every 6 hours PRN Temp greater or equal to 38C (100.4F), Mild Pain (1 - 3)  dextrose Oral Gel 15 Gram(s) Oral once PRN Blood Glucose LESS THAN 70 milliGRAM(s)/deciliter  melatonin 3 milliGRAM(s) Oral at bedtime PRN Insomnia        RADIOLOGY & ADDITIONAL TESTS:    Imaging Personally Reviewed:  [ ] YES  [ ] NO    Consultant(s) Notes Reviewed:  [ ] YES  [ ] NO    PHYSICAL EXAM:  GENERAL: Alert and awake lying in bed in no distress  HEAD:  Atraumatic, Normocephalic  EYES: EOMI, EDIN, conjunctiva and sclera clear  NECK: Supple, No JVD, Normal thyroid  NERVOUS SYSTEM:  Alert & Oriented X3, Motor and sensory systems are intact,   CHEST/LUNG: Bilateral clear breath sounds, no rhochi, no wheezing, no crepitations,  HEART: Regular rate and rhythm; No murmurs, rubs, or gallops  ABDOMEN: Soft, Nontender, Nondistended; Bowel sounds present  EXTREMITIES:   Peripheral Pulses are palpable, no  edema        Care Discussed with Consultants/Other Providers [ ] YES  [ ] NO      Code Status: [] Full Code [] DNR [] DNI [] Goals of Care:   Disposition: [] ICU [] Stroke Unit [] RCU []PCU []Floor [] Discharge Home         LEFTY LomeliP

## 2022-08-13 NOTE — PROGRESS NOTE ADULT - SUBJECTIVE AND OBJECTIVE BOX
NEPHROLOGY - NSN    Patient seen and examined lying on bed. Denies fever, chills, nausea, vomiting, cough, sob or chest pain. Pt with reports of b/l foot pain that has been ongoing (chronic). No signs of acute distress.    HPI:  82F with PMHx CAD s/p stents , pAfib (not on AC due to hx GIB), HLD, DM2 on insulin c/b neuropathy, PVD s/p stents , hx GIB/duodenal ulcer, CKD, anemia presenting with abnormal labs from PMD. Pt has hx anemia likely NICHOLAS per nephro and supposed to be on iron tabs but no longer takes due to constipation though she uses senna. Pt had recent labs and was told to come in for anemia. Hgb here 6.4. Baseline is 7-8s. Pt has required PRBC in the past (last several months ago) and has hx of GIB. She currently denies any hematochezia, melena, hemoptysis, hematemesis. FOBT in ED neg. Her last C-scope was  with non-bleeding internal hemorrhoids and last EGD was  with non-bleeding gastritis. Of note pt has chronic SOB for a year now and worsened over the last few days to weeks. She also had some lightheadedness 2 weeks ago but this resolved. She denies CP, LOC, palpitations, vomiting, diarrhea. She notes some LE edema in the feet with some associated pain that she was told is related to her kidneys. She has been told by renal she may need to be on HD at some point as well. She recently started home O2 PRN one week ago. Denies hx of COPD/asthma and is not on inhalers. She is on lasix 80mg BID but admits to sometimes missing doses. She also takes plavix 75mg qd but not on AC for afib due to hx GIB and not on ASA as well.    Patient seen on 3L nasal cannula (wears 5 L at home) in no distress. Legs painful to touch and edematous. She reports she has been unable to walk due to pain, she notes edema worsened over the last couple of days. She has good urine output and denies any changes in medications.  She is aware of her weak kidneys   There is no hematuria or bubbles in the urine.  No history of NSAIDS or nephrolithisis.  The patient urinates once or twice in the night and there is no incontinence.  No family hx or renal disease or back pain.    No recent abx use.  No alleviating or aggravating factors with respect to the kidneys.       PAST MEDICAL & SURGICAL HISTORY:  HLD (hyperlipidemia)      Diabetic neuropathy      CAD (coronary artery disease)  ~ s/p MICHAEL x2 in 2017      Paroxysmal atrial fibrillation      Diabetes mellitus, type 2      GI bleed      Neuropathy      Gastritis      Diastolic heart failure      Transfusion history      Stage 5 chronic kidney disease not on chronic dialysis      Anemia      PVD (peripheral vascular disease)  ~ RLE angiogram with 2 stents placed in 2019.      H/O  section      S/P coronary artery stent placement  x2 in       History of esophagogastroduodenoscopy (EGD)      S/P angiogram of extremity          MEDICATIONS  (STANDING):  clopidogrel Tablet 75 milliGRAM(s) Oral daily  dextrose 5%. 1000 milliLiter(s) (50 mL/Hr) IV Continuous <Continuous>  dextrose 5%. 1000 milliLiter(s) (100 mL/Hr) IV Continuous <Continuous>  dextrose 50% Injectable 25 Gram(s) IV Push once  dextrose 50% Injectable 12.5 Gram(s) IV Push once  dextrose 50% Injectable 25 Gram(s) IV Push once  furosemide    Tablet 80 milliGRAM(s) Oral two times a day  gabapentin 300 milliGRAM(s) Oral at bedtime  glucagon  Injectable 1 milliGRAM(s) IntraMuscular once  heparin   Injectable 5000 Unit(s) SubCutaneous every 12 hours  hydrALAZINE 10 milliGRAM(s) Oral three times a day  insulin glargine Injectable (LANTUS) 8 Unit(s) SubCutaneous at bedtime  insulin lispro (ADMELOG) corrective regimen sliding scale   SubCutaneous three times a day before meals  insulin lispro (ADMELOG) corrective regimen sliding scale   SubCutaneous at bedtime  metoprolol succinate ER 25 milliGRAM(s) Oral daily  pantoprazole    Tablet 40 milliGRAM(s) Oral before breakfast  senna 2 Tablet(s) Oral at bedtime  sevelamer carbonate 800 milliGRAM(s) Oral three times a day      Allergies    Carrots (Rash)  No Known Drug Allergies    Intolerances        SOCIAL HISTORY:  Denies alcohol abuse, drug abuse or tobacco usage.     FAMILY HISTORY:  FH: diabetes mellitus        VITALS:  T(C): 36.4 (22 @ 05:00), Max: 36.4 (22 @ 16:10)  HR: 65 (22 @ 08:48) (65 - 109)  BP: 153/94 (22 @ 08:48) (140/82 - 187/79)  RR: 18 (22 @ 08:48) (17 - 22)  SpO2: 100% (22 @ 05:00) (96% - 100%)    REVIEW OF SYSTEMS:  Denies any nausea, vomiting, diarrhea, fever or chills.+  fatigue or weakness. All other pertinent systems are reviewed and are negative.    PHYSICAL EXAM:  Constitutional: NAD  HEENT: EOMI  Neck:  No JVD, supple   Respiratory: CTA B/L  Cardiovascular: S1 and S2, IRR  Gastrointestinal: + BS, soft, NT, ND  Extremities: + trace peripheral edema, + peripheral pulses  Neurological: A/O x 3, CN2-12 intact  Psychiatric: Normal mood, normal affect  : No Sylvester  Skin: No rashes, C/D/I  Access: Not applicable    I and O's:        LABS:                        9.2    5.22  )-----------( 301      ( 12 Aug 2022 08:10 )             32.0         140  |  95<L>  |  78<H>  ----------------------------<  158<H>  4.5   |  24  |  4.99<H>    Ca    7.8<L>      11 Aug 2022 14:24    TPro  7.3  /  Alb  3.7  /  TBili  0.3  /  DBili  x   /  AST  19  /  ALT  6   /  AlkPhos  141<H>      RADIOLOGY & ADDITIONAL STUDIES:    < from: US Duplex Venous Lower Ext Complete, Bilateral (22 @ 14:01) >    IMPRESSION:  No evidence of deep venous thrombosis in either lower extremity.    --- End of Report ---    < end of copied text >

## 2022-08-13 NOTE — PROGRESS NOTE ADULT - ASSESSMENT
82F with PMHx CAD s/p stents 2017, pAfib (not on AC due to hx GIB), HLD, DM2 on insulin c/b neuropathy, PVD s/p stents 2019, hx GIB/duodenal ulcer, CKD, anemia presenting with abnormal labs from PMD  Anemia   CKD    Renal- azotemia slightly improved, s/p lasix 80 mg iv x1 yesterday and now on lasix 80 mg po bid  check bladder scan  No need for renal sono  CV- Bp acceptable   Check PTH to assess for rocaltrol   HD in the next 3-6 months but no urgency to start while here.  Needs AV access as outpt   Anemia- hgb trending down, s/p transfusion receiving Venofer x 5 doses, then will start retacrit  Hyperphosphatemia- remains elevated on renvela    RECOMMENDATIONS  Continue venofer 200g IV x 5 days  Start Retacrit after Venofer infusions complete  Continue Renvela 800mg TID with meals  Daily CBC, CMP  Avoid nephrotoxins as able        Josué Goldberg. NP  [facility]   [phone]

## 2022-08-13 NOTE — PROGRESS NOTE ADULT - SUBJECTIVE AND OBJECTIVE BOX
Cardiovascular Disease Progress Note    Overnight events: No acute events overnight.   Ms. Tsang says her breathing is better post transfusion.        Objective Findings:  T(C): 36.4 (08-13-22 @ 02:05), Max: 37.1 (08-12-22 @ 16:28)  HR: 69 (08-13-22 @ 02:05) (65 - 104)  BP: 150/65 (08-13-22 @ 02:05) (121/75 - 153/94)  RR: 20 (08-13-22 @ 02:05) (16 - 22)  SpO2: 100% (08-13-22 @ 02:05) (99% - 100%)  Wt(kg): --  Daily     Daily       Physical Exam:  Gen: NAD; Patient resting comfortably  HEENT: EOMI, Normocephalic/ atraumatic  CV: RRR, normal S1 + S2, no m/r/g  Lungs:  Normal respiratory effort; clear to auscultation bilaterally  Abd: soft, non-tender; bowel sounds present  Ext: No edema; warm and well perfused    Telemetry: Sinus    Laboratory Data:                        9.2    5.22  )-----------( 301      ( 12 Aug 2022 08:10 )             32.0     08-11    140  |  95<L>  |  78<H>  ----------------------------<  158<H>  4.5   |  24  |  4.99<H>    Ca    7.8<L>      11 Aug 2022 14:24    TPro  7.3  /  Alb  3.7  /  TBili  0.3  /  DBili  x   /  AST  19  /  ALT  6   /  AlkPhos  141<H>  08-11      CARDIAC MARKERS ( 11 Aug 2022 22:51 )  x     / x     / 112 U/L / x     / 3.4 ng/mL  CARDIAC MARKERS ( 11 Aug 2022 14:24 )  x     / x     / 100 U/L / x     / TNP ng/mL          Inpatient Medications:  MEDICATIONS  (STANDING):  clopidogrel Tablet 75 milliGRAM(s) Oral daily  dextrose 5%. 1000 milliLiter(s) (50 mL/Hr) IV Continuous <Continuous>  dextrose 5%. 1000 milliLiter(s) (100 mL/Hr) IV Continuous <Continuous>  dextrose 50% Injectable 25 Gram(s) IV Push once  dextrose 50% Injectable 12.5 Gram(s) IV Push once  dextrose 50% Injectable 25 Gram(s) IV Push once  furosemide    Tablet 80 milliGRAM(s) Oral two times a day  gabapentin 300 milliGRAM(s) Oral at bedtime  glucagon  Injectable 1 milliGRAM(s) IntraMuscular once  heparin   Injectable 5000 Unit(s) SubCutaneous every 12 hours  hydrALAZINE 10 milliGRAM(s) Oral three times a day  insulin glargine Injectable (LANTUS) 8 Unit(s) SubCutaneous at bedtime  insulin lispro (ADMELOG) corrective regimen sliding scale   SubCutaneous three times a day before meals  insulin lispro (ADMELOG) corrective regimen sliding scale   SubCutaneous at bedtime  iron sucrose IVPB 200 milliGRAM(s) IV Intermittent every 24 hours  metoprolol succinate ER 25 milliGRAM(s) Oral daily  pantoprazole    Tablet 40 milliGRAM(s) Oral before breakfast  senna 2 Tablet(s) Oral at bedtime  sevelamer carbonate 800 milliGRAM(s) Oral three times a day      Assessment: 82 year old woman with uncontrolled IDDM, compensated diastolic CHF, CAD s/p PCI, CKD V and HTN presents with anemia.     Plan of Care:      #CAD- s/p MICHAEL x2 in 12/2017.  Anemia noted on admission, but patient denies any signs of bleeding.  Likely anemia in the setting of CKD.   Exertional dyspnea now improved post pRBC transfusion in the ED.   Given extensive vascular disease, continue Plavix 75 mg daily.     #Compensated diastolic CHF-  Ms. Tsang is not fluid overloaded on exam.  Continue Lasix 80 mg PO BID.    #PVD-  Patient status post RLE angiogram with 2 stents placed in 11/2019.   Antiplatelet therapy management as stated above.     #Paroxymal a-fib-  Admission EKG is sinus.   No AC given h/o GI bleed.      Over 25 minutes spent on total encounter; more than 50% of the visit was spent counseling and/or coordinating care by the attending physician.      Julio César Hooper MD [facility]  Cardiovascular Disease  [phone]

## 2022-08-14 LAB
ANION GAP SERPL CALC-SCNC: 13 MMOL/L — SIGNIFICANT CHANGE UP (ref 7–14)
BUN SERPL-MCNC: 81 MG/DL — HIGH (ref 7–23)
CALCIUM SERPL-MCNC: 7.7 MG/DL — LOW (ref 8.4–10.5)
CHLORIDE SERPL-SCNC: 98 MMOL/L — SIGNIFICANT CHANGE UP (ref 98–107)
CO2 SERPL-SCNC: 33 MMOL/L — HIGH (ref 22–31)
CREAT SERPL-MCNC: 5.27 MG/DL — HIGH (ref 0.5–1.3)
EGFR: 8 ML/MIN/1.73M2 — LOW
GLUCOSE BLDC GLUCOMTR-MCNC: 101 MG/DL — HIGH (ref 70–99)
GLUCOSE BLDC GLUCOMTR-MCNC: 109 MG/DL — HIGH (ref 70–99)
GLUCOSE BLDC GLUCOMTR-MCNC: 171 MG/DL — HIGH (ref 70–99)
GLUCOSE BLDC GLUCOMTR-MCNC: 98 MG/DL — SIGNIFICANT CHANGE UP (ref 70–99)
GLUCOSE SERPL-MCNC: 112 MG/DL — HIGH (ref 70–99)
HCT VFR BLD CALC: 29.1 % — LOW (ref 34.5–45)
HGB BLD-MCNC: 8 G/DL — LOW (ref 11.5–15.5)
MAGNESIUM SERPL-MCNC: 2.3 MG/DL — SIGNIFICANT CHANGE UP (ref 1.6–2.6)
MCHC RBC-ENTMCNC: 25.6 PG — LOW (ref 27–34)
MCHC RBC-ENTMCNC: 27.5 GM/DL — LOW (ref 32–36)
MCV RBC AUTO: 93.3 FL — SIGNIFICANT CHANGE UP (ref 80–100)
NRBC # BLD: 0 /100 WBCS — SIGNIFICANT CHANGE UP
NRBC # FLD: 0 K/UL — SIGNIFICANT CHANGE UP
PHOSPHATE SERPL-MCNC: 7.6 MG/DL — HIGH (ref 2.5–4.5)
PLATELET # BLD AUTO: 268 K/UL — SIGNIFICANT CHANGE UP (ref 150–400)
POTASSIUM SERPL-MCNC: 3.9 MMOL/L — SIGNIFICANT CHANGE UP (ref 3.5–5.3)
POTASSIUM SERPL-SCNC: 3.9 MMOL/L — SIGNIFICANT CHANGE UP (ref 3.5–5.3)
PTH-INTACT FLD-MCNC: 752 PG/ML — HIGH (ref 15–65)
RBC # BLD: 3.12 M/UL — LOW (ref 3.8–5.2)
RBC # FLD: 16.8 % — HIGH (ref 10.3–14.5)
SODIUM SERPL-SCNC: 144 MMOL/L — SIGNIFICANT CHANGE UP (ref 135–145)
WBC # BLD: 4.4 K/UL — SIGNIFICANT CHANGE UP (ref 3.8–10.5)
WBC # FLD AUTO: 4.4 K/UL — SIGNIFICANT CHANGE UP (ref 3.8–10.5)

## 2022-08-14 RX ORDER — METOPROLOL TARTRATE 50 MG
25 TABLET ORAL ONCE
Refills: 0 | Status: COMPLETED | OUTPATIENT
Start: 2022-08-14 | End: 2022-08-14

## 2022-08-14 RX ORDER — METOPROLOL TARTRATE 50 MG
5 TABLET ORAL ONCE
Refills: 0 | Status: COMPLETED | OUTPATIENT
Start: 2022-08-14 | End: 2022-08-14

## 2022-08-14 RX ADMIN — CLOPIDOGREL BISULFATE 75 MILLIGRAM(S): 75 TABLET, FILM COATED ORAL at 11:14

## 2022-08-14 RX ADMIN — Medication 80 MILLIGRAM(S): at 13:15

## 2022-08-14 RX ADMIN — Medication 10 MILLIGRAM(S): at 21:20

## 2022-08-14 RX ADMIN — IRON SUCROSE 110 MILLIGRAM(S): 20 INJECTION, SOLUTION INTRAVENOUS at 13:14

## 2022-08-14 RX ADMIN — SEVELAMER CARBONATE 800 MILLIGRAM(S): 2400 POWDER, FOR SUSPENSION ORAL at 13:14

## 2022-08-14 RX ADMIN — Medication 5 MILLIGRAM(S): at 22:32

## 2022-08-14 RX ADMIN — SEVELAMER CARBONATE 800 MILLIGRAM(S): 2400 POWDER, FOR SUSPENSION ORAL at 21:21

## 2022-08-14 RX ADMIN — Medication 25 MILLIGRAM(S): at 05:39

## 2022-08-14 RX ADMIN — PANTOPRAZOLE SODIUM 40 MILLIGRAM(S): 20 TABLET, DELAYED RELEASE ORAL at 05:40

## 2022-08-14 RX ADMIN — Medication 10 MILLIGRAM(S): at 05:39

## 2022-08-14 RX ADMIN — SEVELAMER CARBONATE 800 MILLIGRAM(S): 2400 POWDER, FOR SUSPENSION ORAL at 08:51

## 2022-08-14 RX ADMIN — Medication 80 MILLIGRAM(S): at 05:39

## 2022-08-14 RX ADMIN — INSULIN GLARGINE 8 UNIT(S): 100 INJECTION, SOLUTION SUBCUTANEOUS at 22:32

## 2022-08-14 RX ADMIN — GABAPENTIN 300 MILLIGRAM(S): 400 CAPSULE ORAL at 21:20

## 2022-08-14 RX ADMIN — HEPARIN SODIUM 5000 UNIT(S): 5000 INJECTION INTRAVENOUS; SUBCUTANEOUS at 05:39

## 2022-08-14 RX ADMIN — HEPARIN SODIUM 5000 UNIT(S): 5000 INJECTION INTRAVENOUS; SUBCUTANEOUS at 17:22

## 2022-08-14 RX ADMIN — Medication 10 MILLIGRAM(S): at 13:15

## 2022-08-14 RX ADMIN — SENNA PLUS 2 TABLET(S): 8.6 TABLET ORAL at 21:21

## 2022-08-14 RX ADMIN — Medication 25 MILLIGRAM(S): at 23:35

## 2022-08-14 NOTE — PROGRESS NOTE ADULT - SUBJECTIVE AND OBJECTIVE BOX
AMIE HOLT  82y  Female      Patient is a 82y old  Female who presents with a chief complaint of abnormal labs (13 Aug 2022 14:05)  feels ok.no sob,no cp,no abd pain,no fever    REVIEW OF SYSTEMS:  CONSTITUTIONAL: No fever  RESPIRATORY: No cough, hemoptysis or shortness of breath  CARDIOVASCULAR: No chest pain, palpitations, dizziness, or leg swelling  GASTROINTESTINAL: No abdominal pain. nausea, vomiting, hematemesis  GENITOURINARY: No dysuria, frequency, hematuria   NEUROLOGICAL: No headaches, no dizziness  MUSCULOSKELETAL: No joint pain or swelling;     T(C): 36.3 (13 Aug 2022 14:38), Max: 36.9 (12 Aug 2022 22:00)  T(F): 97.4 (13 Aug 2022 14:), Max: 98.5 (12 Aug 2022 22:00)  HR: 77 (13 Aug 2022 14:) (65 - 106)  BP: 140/72 (13 Aug 2022 14:) (120/65 - 150/65)  BP(mean): --  RR: 22 (13 Aug 2022 14:38) (20 - 22)  SpO2: 100% (13 Aug 2022 14:) (100% - 100%)    Parameters below as of 13 Aug 2022 14:38  Patient On (Oxygen Delivery Method): nasal cannula      T(C): 36.4 (22 @ 16:40), Max: 36.6 (22 @ 11:34)  HR: 64 (22 @ 16:40) (64 - 76)  BP: 140/61 (22 @ 16:40) (127/59 - 140/61)  RR: 18 (22 @ 16:40) (18 - 18)  SpO2: 100% (22 @ 16:40) (99% - 100%)    MEDICATIONS  (STANDING):  clopidogrel Tablet 75 milliGRAM(s) Oral daily  dextrose 5%. 1000 milliLiter(s) (100 mL/Hr) IV Continuous <Continuous>  dextrose 5%. 1000 milliLiter(s) (50 mL/Hr) IV Continuous <Continuous>  dextrose 50% Injectable 25 Gram(s) IV Push once  dextrose 50% Injectable 12.5 Gram(s) IV Push once  dextrose 50% Injectable 25 Gram(s) IV Push once  furosemide    Tablet 80 milliGRAM(s) Oral two times a day  gabapentin 300 milliGRAM(s) Oral at bedtime  glucagon  Injectable 1 milliGRAM(s) IntraMuscular once  heparin   Injectable 5000 Unit(s) SubCutaneous every 12 hours  hydrALAZINE 10 milliGRAM(s) Oral three times a day  insulin glargine Injectable (LANTUS) 8 Unit(s) SubCutaneous at bedtime  insulin lispro (ADMELOG) corrective regimen sliding scale   SubCutaneous at bedtime  insulin lispro (ADMELOG) corrective regimen sliding scale   SubCutaneous three times a day before meals  iron sucrose IVPB 200 milliGRAM(s) IV Intermittent every 24 hours  metoprolol succinate ER 25 milliGRAM(s) Oral daily  pantoprazole    Tablet 40 milliGRAM(s) Oral before breakfast  senna 2 Tablet(s) Oral at bedtime  sevelamer carbonate 800 milliGRAM(s) Oral three times a day    MEDICATIONS  (PRN):  acetaminophen     Tablet .. 650 milliGRAM(s) Oral every 6 hours PRN Temp greater or equal to 38C (100.4F), Mild Pain (1 - 3)  dextrose Oral Gel 15 Gram(s) Oral once PRN Blood Glucose LESS THAN 70 milliGRAM(s)/deciliter  melatonin 3 milliGRAM(s) Oral at bedtime PRN Insomnia      CAPILLARY BLOOD GLUCOSE    CAPILLARY BLOOD GLUCOSE      POCT Blood Glucose.: 101 mg/dL (14 Aug 2022 17:13)  POCT Blood Glucose.: 98 mg/dL (14 Aug 2022 11:47)  POCT Blood Glucose.: 109 mg/dL (14 Aug 2022 08:16)  POCT Blood Glucose.: 201 mg/dL (13 Aug 2022 21:22)      PAST MEDICAL & SURGICAL HISTORY:  HLD (hyperlipidemia)      Diabetic neuropathy      CAD (coronary artery disease)  ~ s/p MICHAEL x2 in 2017      Paroxysmal atrial fibrillation      Diabetes mellitus, type 2      GI bleed      Neuropathy      Gastritis      Diastolic heart failure      Transfusion history      Stage 5 chronic kidney disease not on chronic dialysis      Anemia      PVD (peripheral vascular disease)  ~ RLE angiogram with 2 stents placed in 2019.      H/O  section      S/P coronary artery stent placement  x2 in       History of esophagogastroduodenoscopy (EGD)      S/P angiogram of extremity        RADIOLOGY & ADDITIONAL TESTS:    Imaging Personally Reviewed:  [ ] YES  [ ] NO    Consultant(s) Notes Reviewed:  [ ] YES  [ ] NO    PHYSICAL EXAM:  GENERAL: Alert and awake lying in bed in no distress  HEAD:  Atraumatic, Normocephalic  EYES: EOMI, conjunctiva and sclera clear  NECK: Supple, No JVD, Normal thyroid  NERVOUS SYSTEM:  Alert & Oriented X3, Motor and sensory systems are intact,   CHEST/LUNG: Bilateral clear breath sounds, no rhochi, no wheezing, no crepitations,  HEART: Regular rate and rhythm; No murmurs, rubs, or gallops  ABDOMEN: Soft, Nontender, Nondistended; Bowel sounds present  EXTREMITIES:   Peripheral Pulses are palpable, no  edema        Care Discussed with Consultants/Other Providers [ ] YES  [ ] NO      Code Status: [] Full Code [] DNR [] DNI [] Goals of Care:   Disposition: [] ICU [] Stroke Unit [] RCU []PCU []Floor [] Discharge Home

## 2022-08-14 NOTE — PROGRESS NOTE ADULT - PROBLEM SELECTOR PLAN 3
NSR @69bpm, TWI in II, AVF, V4-V5. TWI appear new compared to EKG on last admission  May be related to demand ischemia in setting of anemia  -check troponin and CKMB  -echo   -cards following   -tele monitoring    Anemia Transfuse exertional Dyspnea   Extensive PAD Plavix

## 2022-08-14 NOTE — PROGRESS NOTE ADULT - SUBJECTIVE AND OBJECTIVE BOX
Cardiovascular Disease Progress Note    Overnight events: No acute events overnight.    Ms. Tsang denies chest pain. SOB is improved.   Otherwise review of systems negative    Objective Findings:  T(C): 36.5 (08-14-22 @ 05:38), Max: 36.5 (08-14-22 @ 05:38)  HR: 66 (08-14-22 @ 05:38) (64 - 106)  BP: 140/61 (08-14-22 @ 05:38) (120/65 - 150/64)  RR: 20 (08-14-22 @ 05:38) (20 - 22)  SpO2: 100% (08-14-22 @ 05:38) (100% - 100%)  Wt(kg): --  Daily     Daily       Physical Exam:  Gen: NAD; Patient resting comfortably  HEENT: EOMI, Normocephalic/ atraumatic  CV: RRR, normal S1 + S2, no m/r/g  Lungs:  Normal respiratory effort; clear to auscultation bilaterally  Abd: soft, non-tender; bowel sounds present  Ext: No edema; warm and well perfused    Telemetry: Sinus    Laboratory Data:                        8.6    4.18  )-----------( 276      ( 13 Aug 2022 06:20 )             31.1     08-13    143  |  98  |  76<H>  ----------------------------<  89  4.2   |  27  |  4.88<H>    Ca    8.0<L>      13 Aug 2022 06:20  Phos  6.9     08-13  Mg     2.20     08-13                Inpatient Medications:  MEDICATIONS  (STANDING):  clopidogrel Tablet 75 milliGRAM(s) Oral daily  dextrose 5%. 1000 milliLiter(s) (50 mL/Hr) IV Continuous <Continuous>  dextrose 5%. 1000 milliLiter(s) (100 mL/Hr) IV Continuous <Continuous>  dextrose 50% Injectable 25 Gram(s) IV Push once  dextrose 50% Injectable 12.5 Gram(s) IV Push once  dextrose 50% Injectable 25 Gram(s) IV Push once  furosemide    Tablet 80 milliGRAM(s) Oral two times a day  gabapentin 300 milliGRAM(s) Oral at bedtime  glucagon  Injectable 1 milliGRAM(s) IntraMuscular once  heparin   Injectable 5000 Unit(s) SubCutaneous every 12 hours  hydrALAZINE 10 milliGRAM(s) Oral three times a day  insulin glargine Injectable (LANTUS) 8 Unit(s) SubCutaneous at bedtime  insulin lispro (ADMELOG) corrective regimen sliding scale   SubCutaneous three times a day before meals  insulin lispro (ADMELOG) corrective regimen sliding scale   SubCutaneous at bedtime  iron sucrose IVPB 200 milliGRAM(s) IV Intermittent every 24 hours  metoprolol succinate ER 25 milliGRAM(s) Oral daily  pantoprazole    Tablet 40 milliGRAM(s) Oral before breakfast  senna 2 Tablet(s) Oral at bedtime  sevelamer carbonate 800 milliGRAM(s) Oral three times a day      Assessment: 82 year old woman with uncontrolled IDDM, compensated diastolic CHF, CAD s/p PCI, CKD V and HTN presents with anemia.     Plan of Care:      #CAD- s/p MICHAEL x2 in 12/2017.  Anemia noted on admission, but patient denies any signs of bleeding.  Likely anemia in the setting of CKD.   Exertional dyspnea now improved post pRBC transfusion in the ED.   Given extensive vascular disease, continue Plavix 75 mg daily.     #Compensated systolic CHF-  New inferior wall motion abnormality noted on echo  Ms. Tsang is not fluid overloaded on exam and she denies chest pain.  I would not pursue another ischemic evaluation, as Ms. Tsang cannot tolerate dual antiplatelet therapy due to bleeding history.   Continue Lasix 80 mg PO BID.    #PVD-  Patient status post RLE angiogram with 2 stents placed in 11/2019.   Antiplatelet therapy management as stated above.     #Paroxymal a-fib-  Admission EKG is sinus.   No AC given h/o GI bleed.        Over 25 minutes spent on total encounter; more than 50% of the visit was spent counseling and/or coordinating care by the attending physician.      Julio César Hooper MD [facility]  Cardiovascular Disease  [phone]

## 2022-08-15 LAB
ANION GAP SERPL CALC-SCNC: 14 MMOL/L — SIGNIFICANT CHANGE UP (ref 7–14)
BLD GP AB SCN SERPL QL: NEGATIVE — SIGNIFICANT CHANGE UP
BUN SERPL-MCNC: 86 MG/DL — HIGH (ref 7–23)
CALCIUM SERPL-MCNC: 7.8 MG/DL — LOW (ref 8.4–10.5)
CHLORIDE SERPL-SCNC: 96 MMOL/L — LOW (ref 98–107)
CO2 SERPL-SCNC: 31 MMOL/L — SIGNIFICANT CHANGE UP (ref 22–31)
CREAT SERPL-MCNC: 5.96 MG/DL — HIGH (ref 0.5–1.3)
EGFR: 7 ML/MIN/1.73M2 — LOW
GLUCOSE BLDC GLUCOMTR-MCNC: 110 MG/DL — HIGH (ref 70–99)
GLUCOSE BLDC GLUCOMTR-MCNC: 116 MG/DL — HIGH (ref 70–99)
GLUCOSE BLDC GLUCOMTR-MCNC: 130 MG/DL — HIGH (ref 70–99)
GLUCOSE BLDC GLUCOMTR-MCNC: 160 MG/DL — HIGH (ref 70–99)
GLUCOSE SERPL-MCNC: 107 MG/DL — HIGH (ref 70–99)
HCT VFR BLD CALC: 28.5 % — LOW (ref 34.5–45)
HCT VFR BLD CALC: 29.7 % — LOW (ref 34.5–45)
HGB BLD-MCNC: 7.9 G/DL — LOW (ref 11.5–15.5)
HGB BLD-MCNC: 8 G/DL — LOW (ref 11.5–15.5)
MAGNESIUM SERPL-MCNC: 2.4 MG/DL — SIGNIFICANT CHANGE UP (ref 1.6–2.6)
MCHC RBC-ENTMCNC: 25.5 PG — LOW (ref 27–34)
MCHC RBC-ENTMCNC: 25.7 PG — LOW (ref 27–34)
MCHC RBC-ENTMCNC: 26.9 GM/DL — LOW (ref 32–36)
MCHC RBC-ENTMCNC: 27.7 GM/DL — LOW (ref 32–36)
MCV RBC AUTO: 92.8 FL — SIGNIFICANT CHANGE UP (ref 80–100)
MCV RBC AUTO: 94.6 FL — SIGNIFICANT CHANGE UP (ref 80–100)
NRBC # BLD: 0 /100 WBCS — SIGNIFICANT CHANGE UP
NRBC # BLD: 0 /100 WBCS — SIGNIFICANT CHANGE UP
NRBC # FLD: 0 K/UL — SIGNIFICANT CHANGE UP
NRBC # FLD: 0 K/UL — SIGNIFICANT CHANGE UP
PHOSPHATE SERPL-MCNC: 7.6 MG/DL — HIGH (ref 2.5–4.5)
PLATELET # BLD AUTO: 254 K/UL — SIGNIFICANT CHANGE UP (ref 150–400)
PLATELET # BLD AUTO: 255 K/UL — SIGNIFICANT CHANGE UP (ref 150–400)
POTASSIUM SERPL-MCNC: 4.3 MMOL/L — SIGNIFICANT CHANGE UP (ref 3.5–5.3)
POTASSIUM SERPL-SCNC: 4.3 MMOL/L — SIGNIFICANT CHANGE UP (ref 3.5–5.3)
RBC # BLD: 3.07 M/UL — LOW (ref 3.8–5.2)
RBC # BLD: 3.14 M/UL — LOW (ref 3.8–5.2)
RBC # FLD: 16.8 % — HIGH (ref 10.3–14.5)
RBC # FLD: 16.9 % — HIGH (ref 10.3–14.5)
RH IG SCN BLD-IMP: POSITIVE — SIGNIFICANT CHANGE UP
SARS-COV-2 RNA SPEC QL NAA+PROBE: SIGNIFICANT CHANGE UP
SODIUM SERPL-SCNC: 141 MMOL/L — SIGNIFICANT CHANGE UP (ref 135–145)
WBC # BLD: 4.83 K/UL — SIGNIFICANT CHANGE UP (ref 3.8–10.5)
WBC # BLD: 5.09 K/UL — SIGNIFICANT CHANGE UP (ref 3.8–10.5)
WBC # FLD AUTO: 4.83 K/UL — SIGNIFICANT CHANGE UP (ref 3.8–10.5)
WBC # FLD AUTO: 5.09 K/UL — SIGNIFICANT CHANGE UP (ref 3.8–10.5)

## 2022-08-15 RX ORDER — TRAMADOL HYDROCHLORIDE 50 MG/1
25 TABLET ORAL ONCE
Refills: 0 | Status: DISCONTINUED | OUTPATIENT
Start: 2022-08-15 | End: 2022-08-16

## 2022-08-15 RX ADMIN — Medication 10 MILLIGRAM(S): at 14:07

## 2022-08-15 RX ADMIN — HEPARIN SODIUM 5000 UNIT(S): 5000 INJECTION INTRAVENOUS; SUBCUTANEOUS at 17:38

## 2022-08-15 RX ADMIN — SEVELAMER CARBONATE 800 MILLIGRAM(S): 2400 POWDER, FOR SUSPENSION ORAL at 14:00

## 2022-08-15 RX ADMIN — IRON SUCROSE 110 MILLIGRAM(S): 20 INJECTION, SOLUTION INTRAVENOUS at 13:59

## 2022-08-15 RX ADMIN — Medication 10 MILLIGRAM(S): at 22:07

## 2022-08-15 RX ADMIN — PANTOPRAZOLE SODIUM 40 MILLIGRAM(S): 20 TABLET, DELAYED RELEASE ORAL at 06:24

## 2022-08-15 RX ADMIN — SEVELAMER CARBONATE 800 MILLIGRAM(S): 2400 POWDER, FOR SUSPENSION ORAL at 06:00

## 2022-08-15 RX ADMIN — SENNA PLUS 2 TABLET(S): 8.6 TABLET ORAL at 22:08

## 2022-08-15 RX ADMIN — CLOPIDOGREL BISULFATE 75 MILLIGRAM(S): 75 TABLET, FILM COATED ORAL at 11:25

## 2022-08-15 RX ADMIN — HEPARIN SODIUM 5000 UNIT(S): 5000 INJECTION INTRAVENOUS; SUBCUTANEOUS at 06:03

## 2022-08-15 RX ADMIN — SEVELAMER CARBONATE 800 MILLIGRAM(S): 2400 POWDER, FOR SUSPENSION ORAL at 22:08

## 2022-08-15 RX ADMIN — Medication 25 MILLIGRAM(S): at 06:03

## 2022-08-15 RX ADMIN — Medication 80 MILLIGRAM(S): at 06:00

## 2022-08-15 RX ADMIN — GABAPENTIN 300 MILLIGRAM(S): 400 CAPSULE ORAL at 22:08

## 2022-08-15 RX ADMIN — Medication 10 MILLIGRAM(S): at 06:00

## 2022-08-15 NOTE — PROGRESS NOTE ADULT - SUBJECTIVE AND OBJECTIVE BOX
Cardiovascular Disease Progress Note    Overnight events: No acute events overnight.    Ms. Tsang denies chest pain. SOB is better after transfusion.   Otherwise review of systems negative    Objective Findings:  T(C): 36.7 (08-15-22 @ 05:45), Max: 36.7 (08-15-22 @ 05:45)  HR: 67 (08-15-22 @ 05:45) (64 - 99)  BP: 126/75 (08-15-22 @ 05:45) (125/79 - 140/61)  RR: 19 (08-15-22 @ 05:45) (18 - 19)  SpO2: 100% (08-15-22 @ 05:45) (99% - 100%)  Wt(kg): --  Daily     Daily       Physical Exam:  Gen: NAD; Patient resting comfortably  HEENT: EOMI, Normocephalic/ atraumatic  CV: RRR, normal S1 + S2, no m/r/g  Lungs:  Normal respiratory effort; clear to auscultation bilaterally  Abd: soft, non-tender; bowel sounds present  Ext: No edema; warm and well perfused    Telemetry: Sinus    Laboratory Data:                        8.0    4.83  )-----------( 255      ( 15 Aug 2022 06:42 )             29.7     08-14    144  |  98  |  81<H>  ----------------------------<  112<H>  3.9   |  33<H>  |  5.27<H>    Ca    7.7<L>      14 Aug 2022 06:58  Phos  7.6     08-14  Mg     2.30     08-14                Inpatient Medications:  MEDICATIONS  (STANDING):  clopidogrel Tablet 75 milliGRAM(s) Oral daily  dextrose 5%. 1000 milliLiter(s) (50 mL/Hr) IV Continuous <Continuous>  dextrose 5%. 1000 milliLiter(s) (100 mL/Hr) IV Continuous <Continuous>  dextrose 50% Injectable 25 Gram(s) IV Push once  dextrose 50% Injectable 12.5 Gram(s) IV Push once  dextrose 50% Injectable 25 Gram(s) IV Push once  furosemide    Tablet 80 milliGRAM(s) Oral two times a day  gabapentin 300 milliGRAM(s) Oral at bedtime  glucagon  Injectable 1 milliGRAM(s) IntraMuscular once  heparin   Injectable 5000 Unit(s) SubCutaneous every 12 hours  hydrALAZINE 10 milliGRAM(s) Oral three times a day  insulin glargine Injectable (LANTUS) 8 Unit(s) SubCutaneous at bedtime  insulin lispro (ADMELOG) corrective regimen sliding scale   SubCutaneous three times a day before meals  insulin lispro (ADMELOG) corrective regimen sliding scale   SubCutaneous at bedtime  iron sucrose IVPB 200 milliGRAM(s) IV Intermittent every 24 hours  metoprolol succinate ER 25 milliGRAM(s) Oral daily  pantoprazole    Tablet 40 milliGRAM(s) Oral before breakfast  senna 2 Tablet(s) Oral at bedtime  sevelamer carbonate 800 milliGRAM(s) Oral three times a day      Assessment: 82 year old woman with uncontrolled IDDM, compensated diastolic CHF, CAD s/p PCI, CKD V and HTN presents with anemia.     Plan of Care:      #CAD- s/p MICHAEL x2 in 12/2017.  Anemia noted on admission, but patient denies any signs of bleeding.  Likely anemia in the setting of CKD.   Exertional dyspnea now improved post pRBC transfusion in the ED.   Given extensive vascular disease, continue Plavix 75 mg daily.     #Compensated systolic CHF-  New inferior wall motion abnormality noted on echo  Ms. Tsang is not fluid overloaded on exam and she denies chest pain.  I would not pursue another ischemic evaluation, as Ms. Tsang cannot tolerate dual antiplatelet therapy due to bleeding history.   Continue Lasix 80 mg PO BID.    #PVD-  Patient status post RLE angiogram with 2 stents placed in 11/2019.   Antiplatelet therapy management as stated above.     #Paroxymal a-fib-  Admission EKG is sinus.   No AC given h/o GI bleed.      Over 25 minutes spent on total encounter; more than 50% of the visit was spent counseling and/or coordinating care by the attending physician.      Julio César Hooper MD [facility]  Cardiovascular Disease  [phone] Cardiovascular Disease Progress Note    Overnight events: A-fib with RVR overnight.    Ms. Tsang denies chest pain. SOB is better after transfusion.   Otherwise review of systems negative    Objective Findings:  T(C): 36.7 (08-15-22 @ 05:45), Max: 36.7 (08-15-22 @ 05:45)  HR: 67 (08-15-22 @ 05:45) (64 - 99)  BP: 126/75 (08-15-22 @ 05:45) (125/79 - 140/61)  RR: 19 (08-15-22 @ 05:45) (18 - 19)  SpO2: 100% (08-15-22 @ 05:45) (99% - 100%)  Wt(kg): --  Daily     Daily       Physical Exam:  Gen: NAD; Patient resting comfortably  HEENT: EOMI, Normocephalic/ atraumatic  CV: RRR, normal S1 + S2, no m/r/g  Lungs:  Normal respiratory effort; clear to auscultation bilaterally  Abd: soft, non-tender; bowel sounds present  Ext: No edema; warm and well perfused    Telemetry: Sinus; a-fib with RVR overnight.     Laboratory Data:                        8.0    4.83  )-----------( 255      ( 15 Aug 2022 06:42 )             29.7     08-14    144  |  98  |  81<H>  ----------------------------<  112<H>  3.9   |  33<H>  |  5.27<H>    Ca    7.7<L>      14 Aug 2022 06:58  Phos  7.6     08-14  Mg     2.30     08-14                Inpatient Medications:  MEDICATIONS  (STANDING):  clopidogrel Tablet 75 milliGRAM(s) Oral daily  dextrose 5%. 1000 milliLiter(s) (50 mL/Hr) IV Continuous <Continuous>  dextrose 5%. 1000 milliLiter(s) (100 mL/Hr) IV Continuous <Continuous>  dextrose 50% Injectable 25 Gram(s) IV Push once  dextrose 50% Injectable 12.5 Gram(s) IV Push once  dextrose 50% Injectable 25 Gram(s) IV Push once  furosemide    Tablet 80 milliGRAM(s) Oral two times a day  gabapentin 300 milliGRAM(s) Oral at bedtime  glucagon  Injectable 1 milliGRAM(s) IntraMuscular once  heparin   Injectable 5000 Unit(s) SubCutaneous every 12 hours  hydrALAZINE 10 milliGRAM(s) Oral three times a day  insulin glargine Injectable (LANTUS) 8 Unit(s) SubCutaneous at bedtime  insulin lispro (ADMELOG) corrective regimen sliding scale   SubCutaneous three times a day before meals  insulin lispro (ADMELOG) corrective regimen sliding scale   SubCutaneous at bedtime  iron sucrose IVPB 200 milliGRAM(s) IV Intermittent every 24 hours  metoprolol succinate ER 25 milliGRAM(s) Oral daily  pantoprazole    Tablet 40 milliGRAM(s) Oral before breakfast  senna 2 Tablet(s) Oral at bedtime  sevelamer carbonate 800 milliGRAM(s) Oral three times a day      Assessment: 82 year old woman with uncontrolled IDDM, compensated diastolic CHF, CAD s/p PCI, CKD V and HTN presents with anemia.     Plan of Care:      #CAD- s/p MICHAEL x2 in 12/2017.  Anemia noted on admission, but patient denies any signs of bleeding.  Likely anemia in the setting of CKD.   Exertional dyspnea now improved post pRBC transfusion.  Given extensive vascular disease, continue Plavix 75 mg daily.     #Compensated systolic CHF-  New inferior wall motion abnormality noted on echo  Ms. Tsang is not fluid overloaded on exam and she denies chest pain.  I would not pursue another ischemic evaluation, as Ms. Tsang cannot tolerate dual antiplatelet therapy due to bleeding history.   Continue Lasix 80 mg PO BID.    #PVD-  Patient status post RLE angiogram with 2 stents placed in 11/2019.   Antiplatelet therapy management as stated above.     #Paroxymal a-fib-  A-fib with RVR noted overnight, but now back to sinus.  Continue Toprol.   No AC given h/o GI bleed.      Over 25 minutes spent on total encounter; more than 50% of the visit was spent counseling and/or coordinating care by the attending physician.      Julio César Hooper MD [facility]  Cardiovascular Disease  [phone]

## 2022-08-15 NOTE — PROGRESS NOTE ADULT - SUBJECTIVE AND OBJECTIVE BOX
Patient is a 82y old  Female who presents with a chief complaint of abnormal labs (15 Aug 2022 13:21)      SUBJECTIVE / OVERNIGHT EVENTS:    Events noted.  CONSTITUTIONAL: No fever,  or fatigue  RESPIRATORY: No cough, wheezing,  No shortness of breath  CARDIOVASCULAR: No chest pain, palpitations, dizziness, or leg swelling  GASTROINTESTINAL: No abdominal or epigastric pain.       MEDICATIONS  (STANDING):  clopidogrel Tablet 75 milliGRAM(s) Oral daily  dextrose 5%. 1000 milliLiter(s) (50 mL/Hr) IV Continuous <Continuous>  dextrose 5%. 1000 milliLiter(s) (100 mL/Hr) IV Continuous <Continuous>  dextrose 50% Injectable 25 Gram(s) IV Push once  dextrose 50% Injectable 12.5 Gram(s) IV Push once  dextrose 50% Injectable 25 Gram(s) IV Push once  gabapentin 300 milliGRAM(s) Oral at bedtime  glucagon  Injectable 1 milliGRAM(s) IntraMuscular once  hydrALAZINE 10 milliGRAM(s) Oral three times a day  insulin glargine Injectable (LANTUS) 8 Unit(s) SubCutaneous at bedtime  insulin lispro (ADMELOG) corrective regimen sliding scale   SubCutaneous three times a day before meals  insulin lispro (ADMELOG) corrective regimen sliding scale   SubCutaneous at bedtime  iron sucrose IVPB 200 milliGRAM(s) IV Intermittent every 24 hours  metoprolol succinate ER 25 milliGRAM(s) Oral daily  pantoprazole    Tablet 40 milliGRAM(s) Oral before breakfast  senna 2 Tablet(s) Oral at bedtime  sevelamer carbonate 800 milliGRAM(s) Oral three times a day    MEDICATIONS  (PRN):  acetaminophen     Tablet .. 650 milliGRAM(s) Oral every 6 hours PRN Temp greater or equal to 38C (100.4F), Mild Pain (1 - 3)  dextrose Oral Gel 15 Gram(s) Oral once PRN Blood Glucose LESS THAN 70 milliGRAM(s)/deciliter  melatonin 3 milliGRAM(s) Oral at bedtime PRN Insomnia  traMADol 25 milliGRAM(s) Oral once PRN Severe Pain (7 - 10)        CAPILLARY BLOOD GLUCOSE      POCT Blood Glucose.: 160 mg/dL (15 Aug 2022 22:21)  POCT Blood Glucose.: 116 mg/dL (15 Aug 2022 17:06)  POCT Blood Glucose.: 130 mg/dL (15 Aug 2022 12:10)  POCT Blood Glucose.: 110 mg/dL (15 Aug 2022 07:15)    I&O's Summary    14 Aug 2022 07:01  -  15 Aug 2022 07:00  --------------------------------------------------------  IN: 0 mL / OUT: 350 mL / NET: -350 mL        T(C): 36.8 (08-15-22 @ 17:43), Max: 36.8 (08-15-22 @ 17:43)  HR: 65 (08-15-22 @ 17:43) (60 - 67)  BP: 131/59 (08-15-22 @ 17:43) (116/56 - 131/59)  RR: 17 (08-15-22 @ 17:43) (14 - 19)  SpO2: 100% (08-15-22 @ 17:43) (100% - 100%)    PHYSICAL EXAM:  GENERAL: NAD  NECK: Supple, No JVD  CHEST/LUNG: Clear to auscultation bilaterally; No wheezing.  HEART: Regular rate and rhythm; No murmurs, rubs, or gallops  ABDOMEN: Soft, Nontender, Nondistended; Bowel sounds present  EXTREMITIES:   No edema  NEUROLOGY: AAO       LABS:                        8.0    4.83  )-----------( 255      ( 15 Aug 2022 06:42 )             29.7     08-15    141  |  96<L>  |  86<H>  ----------------------------<  107<H>  4.3   |  31  |  5.96<H>    Ca    7.8<L>      15 Aug 2022 06:42  Phos  7.6     08-15  Mg     2.40     08-15              CAPILLARY BLOOD GLUCOSE      POCT Blood Glucose.: 160 mg/dL (15 Aug 2022 22:21)  POCT Blood Glucose.: 116 mg/dL (15 Aug 2022 17:06)  POCT Blood Glucose.: 130 mg/dL (15 Aug 2022 12:10)  POCT Blood Glucose.: 110 mg/dL (15 Aug 2022 07:15)        RADIOLOGY & ADDITIONAL TESTS:    Imaging Personally Reviewed:    Consultant(s) Notes Reviewed:      Care Discussed with Consultants/Other Providers:    Parminder Goldberg MD, CMD, FACP    [address] [place] [address]  Office Tel: [phone]  Cell: [phone]

## 2022-08-15 NOTE — PROGRESS NOTE ADULT - PROBLEM SELECTOR PLAN 8
PPI  May need eventual repeat EGD. Should have GI f/u

## 2022-08-15 NOTE — CHART NOTE - NSCHARTNOTEFT_GEN_A_CORE
-- Plan for non-tunneled central venous catheter placement on 8/16/2022. please place IR procedure request order under Dr. Rodriguez.  -- hold Px anticoagulation  -- please maintain COVID PCR within 72 hours of planned procedure   -- Write IR pre procedure note  -- AM labs and coags

## 2022-08-15 NOTE — PROGRESS NOTE ADULT - SUBJECTIVE AND OBJECTIVE BOX
Overnight events noted      VITAL:  T(C): , Max: 36.7 (08-15-22 @ 05:45)  T(F): , Max: 98.1 (08-15-22 @ 05:45)  HR: 60 (08-15-22 @ 12:58)  BP: 116/56 (08-15-22 @ 12:58)  BP(mean): --  RR: 14 (08-15-22 @ 12:58)  SpO2: 100% (08-15-22 @ 12:58)      PHYSICAL EXAM:  Constitutional: NAD  HEENT: EOMI  Neck:  No JVD, supple   Respiratory: CTA B/L  Cardiovascular: S1 and S2, IRR  Gastrointestinal: + BS, soft, NT, ND  Extremities: + trace peripheral edema, + peripheral pulses  Neurological: A/O x 3, CN2-12 intact  Psychiatric: Normal mood, normal affect  : No Sylvester  Skin: No rashes, C/D/I    LABS:                        8.0    4.83  )-----------( 255      ( 15 Aug 2022 06:42 )             29.7     Na(141)/K(4.3)/Cl(96)/HCO3(31)/BUN(86)/Cr(5.96)Glu(107)/Ca(7.8)/Mg(2.40)/PO4(7.6)    08-15 @ 06:42  Na(144)/K(3.9)/Cl(98)/HCO3(33)/BUN(81)/Cr(5.27)Glu(112)/Ca(7.7)/Mg(2.30)/PO4(7.6)    08-14 @ 06:58  Na(143)/K(4.2)/Cl(98)/HCO3(27)/BUN(76)/Cr(4.88)Glu(89)/Ca(8.0)/Mg(2.20)/PO4(6.9)    08-13 @ 06:20      IMPRESSION: 82F w/ DM2, CAD, PAD, PUD, and CKD5, 8/11/22 p/w abnormal labs    (1)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 Even if creatinine trends back down to baseline, she would be on the cusp of requiring chronic dialysis...  (3)Hyperphosphatemia - renally mediated - on  Renvela; warranting low-PO4 diet  (4)Anemia - s/p PRBCs; on Venofer        RECOMMENDATIONS:  (1)Low-PO4 diet  (2)Renvela as ordered  (3)Venofer as ordered  (4)D/C Lasix  (5)Will d/w patient regarding initiation of chronic HD          Reid Solis MD  [facility]  Office: [phone]  Cell: [phone]       Denies urinary changes  Fair energy  No pain  Complains of inability to walk and states that this is at least in part due to her leg swelling.      VITAL:  T(C): , Max: 36.7 (08-15-22 @ 05:45)  T(F): , Max: 98.1 (08-15-22 @ 05:45)  HR: 60 (08-15-22 @ 12:58)  BP: 116/56 (08-15-22 @ 12:58)  RR: 14 (08-15-22 @ 12:58)  SpO2: 100% (08-15-22 @ 12:58)      PHYSICAL EXAM:  Constitutional: NAD, alert  HEENT: MMM  Neck:  No JVD, supple   Respiratory: CTA B/L  Cardiovascular: S1 and S2, IRR  Gastrointestinal: + BS, soft, NT, ND  Extremities: 2+ b/l LE edema  Neurological: reduced generalized strength  : No Sylvester  Skin: No rashes, C/D/I    LABS:                        8.0    4.83  )-----------( 255      ( 15 Aug 2022 06:42 )             29.7     Na(141)/K(4.3)/Cl(96)/HCO3(31)/BUN(86)/Cr(5.96)Glu(107)/Ca(7.8)/Mg(2.40)/PO4(7.6)    08-15 @ 06:42  Na(144)/K(3.9)/Cl(98)/HCO3(33)/BUN(81)/Cr(5.27)Glu(112)/Ca(7.7)/Mg(2.30)/PO4(7.6)    08-14 @ 06:58  Na(143)/K(4.2)/Cl(98)/HCO3(27)/BUN(76)/Cr(4.88)Glu(89)/Ca(8.0)/Mg(2.20)/PO4(6.9)    08-13 @ 06:20      IMPRESSION: 82F w/ DM2, CAD, PAD, PUD, and CKD5, 8/11/22 p/w abnormal labs    (1)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 Even if creatinine trends back down to baseline, she would be on the cusp of requiring chronic dialysis...and given that her KOJO is likely due to diuresis, medical treatment of her KOJO would involve IVF administration, which would also serve to worsen her leg swelling and further prevent her from regaining ability to ambulate. Dialysis is the best option for her at this point.   (3)Hyperphosphatemia - renally mediated - on  Renvela; warranting low-PO4 diet  (4)Anemia - s/p PRBCs; on Venofer  (5)Psychosocial - this would be an appropriate time for initiation of chronic HD. She expresses fear of placement on chronic hemodialysis. After extended discussion with her today regarding risks and benefits of dialysis, she understands that (a)placement on HD may hasten her ability start walking again, and (b)ultimately she is making a choice between dialysis and death (and she would opt for dialysis over death).      RECOMMENDATIONS:  (1)Low-PO4 diet  (2)Renvela as ordered  (3)Venofer as ordered  (4)D/C Lasix  (5)HD catheter placement tomorrow with IR (favor tunneled cath, but no objection to non-tunneled cath if favored by IR)  (6)HBV/HCV studies  (7)HD x 3 consecutive treatments once HD catheter is in place          Reid Solis MD  [facility]  Office: [phone]  Cell: [phone]

## 2022-08-15 NOTE — PROGRESS NOTE ADULT - PROBLEM/PLAN-9
DISPLAY PLAN FREE TEXT
negative

## 2022-08-16 LAB
ANION GAP SERPL CALC-SCNC: 15 MMOL/L — HIGH (ref 7–14)
APTT BLD: 40.1 SEC — HIGH (ref 27–36.3)
BUN SERPL-MCNC: 93 MG/DL — HIGH (ref 7–23)
CALCIUM SERPL-MCNC: 7.3 MG/DL — LOW (ref 8.4–10.5)
CHLORIDE SERPL-SCNC: 94 MMOL/L — LOW (ref 98–107)
CO2 SERPL-SCNC: 28 MMOL/L — SIGNIFICANT CHANGE UP (ref 22–31)
CREAT SERPL-MCNC: 6.54 MG/DL — HIGH (ref 0.5–1.3)
DIALYSIS INSTRUMENT RESULT - HEPATITIS B SURFACE ANTIGEN: NEGATIVE — SIGNIFICANT CHANGE UP
EGFR: 6 ML/MIN/1.73M2 — LOW
GLUCOSE BLDC GLUCOMTR-MCNC: 112 MG/DL — HIGH (ref 70–99)
GLUCOSE BLDC GLUCOMTR-MCNC: 117 MG/DL — HIGH (ref 70–99)
GLUCOSE BLDC GLUCOMTR-MCNC: 79 MG/DL — SIGNIFICANT CHANGE UP (ref 70–99)
GLUCOSE BLDC GLUCOMTR-MCNC: 96 MG/DL — SIGNIFICANT CHANGE UP (ref 70–99)
GLUCOSE BLDC GLUCOMTR-MCNC: 98 MG/DL — SIGNIFICANT CHANGE UP (ref 70–99)
GLUCOSE SERPL-MCNC: 118 MG/DL — HIGH (ref 70–99)
HAV IGM SER-ACNC: SIGNIFICANT CHANGE UP
HBV CORE IGM SER-ACNC: SIGNIFICANT CHANGE UP
HBV SURFACE AB SER-ACNC: <3 MIU/ML — LOW
HBV SURFACE AG SER-ACNC: SIGNIFICANT CHANGE UP
HCT VFR BLD CALC: 28.7 % — LOW (ref 34.5–45)
HCV AB S/CO SERPL IA: 0.48 S/CO — SIGNIFICANT CHANGE UP (ref 0–0.99)
HCV AB SERPL-IMP: SIGNIFICANT CHANGE UP
HGB BLD-MCNC: 8.1 G/DL — LOW (ref 11.5–15.5)
INR BLD: 1.04 RATIO — SIGNIFICANT CHANGE UP (ref 0.88–1.16)
MAGNESIUM SERPL-MCNC: 2.4 MG/DL — SIGNIFICANT CHANGE UP (ref 1.6–2.6)
MCHC RBC-ENTMCNC: 26 PG — LOW (ref 27–34)
MCHC RBC-ENTMCNC: 28.2 GM/DL — LOW (ref 32–36)
MCV RBC AUTO: 92 FL — SIGNIFICANT CHANGE UP (ref 80–100)
MRSA PCR RESULT.: SIGNIFICANT CHANGE UP
NRBC # BLD: 0 /100 WBCS — SIGNIFICANT CHANGE UP
NRBC # FLD: 0 K/UL — SIGNIFICANT CHANGE UP
PHOSPHATE SERPL-MCNC: 8.3 MG/DL — HIGH (ref 2.5–4.5)
PLATELET # BLD AUTO: 234 K/UL — SIGNIFICANT CHANGE UP (ref 150–400)
POTASSIUM SERPL-MCNC: 4.7 MMOL/L — SIGNIFICANT CHANGE UP (ref 3.5–5.3)
POTASSIUM SERPL-SCNC: 4.7 MMOL/L — SIGNIFICANT CHANGE UP (ref 3.5–5.3)
PROTHROM AB SERPL-ACNC: 12.1 SEC — SIGNIFICANT CHANGE UP (ref 10.5–13.4)
RBC # BLD: 3.12 M/UL — LOW (ref 3.8–5.2)
RBC # FLD: 17.4 % — HIGH (ref 10.3–14.5)
S AUREUS DNA NOSE QL NAA+PROBE: DETECTED
SODIUM SERPL-SCNC: 137 MMOL/L — SIGNIFICANT CHANGE UP (ref 135–145)
WBC # BLD: 5.31 K/UL — SIGNIFICANT CHANGE UP (ref 3.8–10.5)
WBC # FLD AUTO: 5.31 K/UL — SIGNIFICANT CHANGE UP (ref 3.8–10.5)

## 2022-08-16 PROCEDURE — 77001 FLUOROGUIDE FOR VEIN DEVICE: CPT | Mod: 26,GC

## 2022-08-16 PROCEDURE — 36556 INSERT NON-TUNNEL CV CATH: CPT

## 2022-08-16 PROCEDURE — 76937 US GUIDE VASCULAR ACCESS: CPT | Mod: 26

## 2022-08-16 RX ORDER — CHLORHEXIDINE GLUCONATE 213 G/1000ML
1 SOLUTION TOPICAL
Refills: 0 | Status: DISCONTINUED | OUTPATIENT
Start: 2022-08-16 | End: 2022-08-17

## 2022-08-16 RX ORDER — MUPIROCIN 20 MG/G
1 OINTMENT TOPICAL
Refills: 0 | Status: COMPLETED | OUTPATIENT
Start: 2022-08-16 | End: 2022-08-21

## 2022-08-16 RX ORDER — SODIUM CHLORIDE 9 MG/ML
10 INJECTION INTRAMUSCULAR; INTRAVENOUS; SUBCUTANEOUS
Refills: 0 | Status: DISCONTINUED | OUTPATIENT
Start: 2022-08-16 | End: 2022-08-27

## 2022-08-16 RX ADMIN — SEVELAMER CARBONATE 800 MILLIGRAM(S): 2400 POWDER, FOR SUSPENSION ORAL at 05:56

## 2022-08-16 RX ADMIN — Medication 10 MILLIGRAM(S): at 05:56

## 2022-08-16 RX ADMIN — PANTOPRAZOLE SODIUM 40 MILLIGRAM(S): 20 TABLET, DELAYED RELEASE ORAL at 06:01

## 2022-08-16 RX ADMIN — CLOPIDOGREL BISULFATE 75 MILLIGRAM(S): 75 TABLET, FILM COATED ORAL at 19:19

## 2022-08-16 RX ADMIN — INSULIN GLARGINE 8 UNIT(S): 100 INJECTION, SOLUTION SUBCUTANEOUS at 21:50

## 2022-08-16 RX ADMIN — Medication 10 MILLIGRAM(S): at 21:30

## 2022-08-16 RX ADMIN — IRON SUCROSE 110 MILLIGRAM(S): 20 INJECTION, SOLUTION INTRAVENOUS at 21:44

## 2022-08-16 RX ADMIN — GABAPENTIN 300 MILLIGRAM(S): 400 CAPSULE ORAL at 21:29

## 2022-08-16 RX ADMIN — TRAMADOL HYDROCHLORIDE 25 MILLIGRAM(S): 50 TABLET ORAL at 08:45

## 2022-08-16 RX ADMIN — MUPIROCIN 1 APPLICATION(S): 20 OINTMENT TOPICAL at 18:00

## 2022-08-16 RX ADMIN — Medication 25 MILLIGRAM(S): at 05:55

## 2022-08-16 NOTE — PROGRESS NOTE ADULT - SUBJECTIVE AND OBJECTIVE BOX
Cardiovascular Disease Progress Note    Overnight events: No acute events overnight.  Ms. Tsang is complaining of back and leg pain.         Objective Findings:  T(C): 36.8 (08-15-22 @ 17:43), Max: 36.8 (08-15-22 @ 17:43)  HR: 65 (08-15-22 @ 17:43) (60 - 65)  BP: 131/59 (08-15-22 @ 17:43) (116/56 - 131/59)  RR: 17 (08-15-22 @ 17:43) (14 - 18)  SpO2: 100% (08-15-22 @ 17:43) (100% - 100%)  Wt(kg): --  Daily     Daily       Physical Exam:  Gen: NAD; Patient resting comfortably  HEENT: EOMI, Normocephalic/ atraumatic  CV: RRR, normal S1 + S2, no m/r/g  Lungs:  Normal respiratory effort; clear to auscultation bilaterally  Abd: soft, non-tender; bowel sounds present  Ext: No edema; warm and well perfused    Telemetry: Sinus    Laboratory Data:                        8.1    5.31  )-----------( 234      ( 16 Aug 2022 03:51 )             28.7     08-16    137  |  94<L>  |  93<H>  ----------------------------<  118<H>  4.7   |  28  |  6.54<H>    Ca    7.3<L>      16 Aug 2022 03:51  Phos  8.3     08-16  Mg     2.40     08-16      PT/INR - ( 16 Aug 2022 03:51 )   PT: 12.1 sec;   INR: 1.04 ratio         PTT - ( 16 Aug 2022 03:51 )  PTT:40.1 sec          Inpatient Medications:  MEDICATIONS  (STANDING):  clopidogrel Tablet 75 milliGRAM(s) Oral daily  dextrose 5%. 1000 milliLiter(s) (50 mL/Hr) IV Continuous <Continuous>  dextrose 5%. 1000 milliLiter(s) (100 mL/Hr) IV Continuous <Continuous>  dextrose 50% Injectable 25 Gram(s) IV Push once  dextrose 50% Injectable 12.5 Gram(s) IV Push once  dextrose 50% Injectable 25 Gram(s) IV Push once  gabapentin 300 milliGRAM(s) Oral at bedtime  glucagon  Injectable 1 milliGRAM(s) IntraMuscular once  hydrALAZINE 10 milliGRAM(s) Oral three times a day  insulin glargine Injectable (LANTUS) 8 Unit(s) SubCutaneous at bedtime  insulin lispro (ADMELOG) corrective regimen sliding scale   SubCutaneous three times a day before meals  insulin lispro (ADMELOG) corrective regimen sliding scale   SubCutaneous at bedtime  iron sucrose IVPB 200 milliGRAM(s) IV Intermittent every 24 hours  metoprolol succinate ER 25 milliGRAM(s) Oral daily  pantoprazole    Tablet 40 milliGRAM(s) Oral before breakfast  senna 2 Tablet(s) Oral at bedtime  sevelamer carbonate 800 milliGRAM(s) Oral three times a day      Assessment: 82 year old woman with uncontrolled IDDM, compensated diastolic CHF, CAD s/p PCI, CKD V and HTN presents with anemia.     Plan of Care:      #CAD- s/p MICHAEL x2 in 12/2017.  Anemia noted on admission, but patient denies any signs of bleeding.  Likely anemia in the setting of CKD.   Exertional dyspnea now improved post pRBC transfusion.  Given extensive vascular disease, continue Plavix 75 mg daily.     #Compensated systolic CHF-  New inferior wall motion abnormality noted on echo  Ms. Tsang is not fluid overloaded on exam and she denies chest pain.  I would not pursue another ischemic evaluation, as Ms. Tsang cannot tolerate dual antiplatelet therapy due to bleeding history.   Plan for HD initiation, as per the renal team.     #PVD-  Patient status post RLE angiogram with 2 stents placed in 11/2019.   Antiplatelet therapy management as stated above.     #Paroxymal a-fib-  A-fib with RVR noted overnight, but now back to sinus.  Continue Toprol.   No AC given h/o GI bleed.      Over 25 minutes spent on total encounter; more than 50% of the visit was spent counseling and/or coordinating care by the attending physician.      Julio César Hooper MD [facility]  Cardiovascular Disease  [phone]

## 2022-08-16 NOTE — PROGRESS NOTE ADULT - SUBJECTIVE AND OBJECTIVE BOX
Overnight events noted      VITAL:  T(C): , Max: 36.8 (08-15-22 @ 17:43)  T(F): , Max: 98.2 (08-15-22 @ 17:43)  HR: 61 (08-16-22 @ 12:33)  BP: 144/63 (08-16-22 @ 12:33)  BP(mean): --  RR: 20 (08-16-22 @ 12:33)  SpO2: 100% (08-16-22 @ 12:33)      PHYSICAL EXAM:  Constitutional: NAD, alert  HEENT: MMM  Neck:  No JVD, supple   Respiratory: CTA B/L  Cardiovascular: S1 and S2, IRR  Gastrointestinal: + BS, soft, NT, ND  Extremities: 2+ b/l LE edema  Neurological: reduced generalized strength  : No Sylvester  Skin: No rashes, C/D/I    LABS:                        8.1    5.31  )-----------( 234      ( 16 Aug 2022 03:51 )             28.7     Na(137)/K(4.7)/Cl(94)/HCO3(28)/BUN(93)/Cr(6.54)Glu(118)/Ca(7.3)/Mg(2.40)/PO4(8.3)    08-16 @ 03:51  Na(141)/K(4.3)/Cl(96)/HCO3(31)/BUN(86)/Cr(5.96)Glu(107)/Ca(7.8)/Mg(2.40)/PO4(7.6)    08-15 @ 06:42  Na(144)/K(3.9)/Cl(98)/HCO3(33)/BUN(81)/Cr(5.27)Glu(112)/Ca(7.7)/Mg(2.30)/PO4(7.6)    08-14 @ 06:58      IMPRESSION: 82F w/ DM2, CAD, PAD, PUD, and CKD5, 8/11/22 p/w abnormal labs    (1)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   (3)Hyperphosphatemia - renally mediated - worsening, despite Renvela/low-PO4 diet  (4)Anemia - s/p PRBCs; on Venofer  (5)Psychosocial - after a prolonged discussion yesterday between patient and myself, regarding benefit/risk of dialysis, she was agreeable to initiation of chronic HD. As of today, however, she is no longer agreeable.    RECOMMENDATIONS:  (1)          Reid Solis MD  [facility]  Office: [phone]  Cell: [phone]       Refused HD catheter placement as of earlier today. I had another extended conversation with her this afternoon, during which she repeatedly expressed her fear of dialysis. Expressed fear of having a catheter in her next, of dialysis itself, of not being able to walk in future, and of leg pain in the future. I reiterated that dialysis should not hinder her ability to walk nor should it worsen her leg pain; if anything it may help with help with these symptoms by allowing for removal of edema. I emphasized the need to choose here between dialysis and death/comfort measures. Provided emotional support as able. After this conversation, she again agrees to HD catheter placement/HD initiation.      VITAL:  T(C): , Max: 36.8 (08-15-22 @ 17:43)  T(F): , Max: 98.2 (08-15-22 @ 17:43)  HR: 61 (08-16-22 @ 12:33)  BP: 144/63 (08-16-22 @ 12:33)  BP(mean): --  RR: 20 (08-16-22 @ 12:33)  SpO2: 100% (08-16-22 @ 12:33)      PHYSICAL EXAM:  Constitutional: NAD, alert  HEENT: MMM  Neck:  No JVD, supple   Respiratory: CTA B/L  Cardiovascular: S1 and S2, IRR  Gastrointestinal: + BS, soft, NT, ND  Extremities: 2+ b/l LE edema  Neurological: reduced generalized strength  : No Sylvester  Skin: No rashes, C/D/I    LABS:                        8.1    5.31  )-----------( 234      ( 16 Aug 2022 03:51 )             28.7     Na(137)/K(4.7)/Cl(94)/HCO3(28)/BUN(93)/Cr(6.54)Glu(118)/Ca(7.3)/Mg(2.40)/PO4(8.3)    08-16 @ 03:51  Na(141)/K(4.3)/Cl(96)/HCO3(31)/BUN(86)/Cr(5.96)Glu(107)/Ca(7.8)/Mg(2.40)/PO4(7.6)    08-15 @ 06:42  Na(144)/K(3.9)/Cl(98)/HCO3(33)/BUN(81)/Cr(5.27)Glu(112)/Ca(7.7)/Mg(2.30)/PO4(7.6)    08-14 @ 06:58      IMPRESSION: 82F w/ DM2, CAD, PAD, PUD, and CKD5, 8/11/22 p/w abnormal labs    (1)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   (3)Hyperphosphatemia - renally mediated - worsening, despite Renvela/low-PO4 diet  (4)Anemia - s/p PRBCs; on Venofer  (5)Psychosocial - after a prolonged discussion yesterday between patient and myself, regarding benefit/risk of dialysis, she was agreeable to initiation of chronic HD. As of earlier today she was no longer agreeable. She is now again agreeable.    RECOMMENDATIONS:  (1)HD catheter placement by IR, provided that she remains agreeable  (2)HD x 3 consecutive days if/when catheter is placed            Reid Solis MD  [facility]  Office: [phone]  Cell: [phone]

## 2022-08-16 NOTE — PROGRESS NOTE ADULT - SUBJECTIVE AND OBJECTIVE BOX
Patient is a 82y old  Female who presents with a chief complaint of abnormal labs (16 Aug 2022 14:27)      SUBJECTIVE / OVERNIGHT EVENTS:    Events noted.  CONSTITUTIONAL: No fever,  or fatigue  RESPIRATORY: No cough, wheezing,  No shortness of breath  CARDIOVASCULAR: No chest pain, palpitations, dizziness, or leg swelling  GASTROINTESTINAL: No abdominal or epigastric pain.       MEDICATIONS  (STANDING):  chlorhexidine 4% Liquid 1 Application(s) Topical <User Schedule>  clopidogrel Tablet 75 milliGRAM(s) Oral daily  dextrose 5%. 1000 milliLiter(s) (50 mL/Hr) IV Continuous <Continuous>  dextrose 5%. 1000 milliLiter(s) (100 mL/Hr) IV Continuous <Continuous>  dextrose 50% Injectable 25 Gram(s) IV Push once  dextrose 50% Injectable 12.5 Gram(s) IV Push once  dextrose 50% Injectable 25 Gram(s) IV Push once  gabapentin 300 milliGRAM(s) Oral at bedtime  glucagon  Injectable 1 milliGRAM(s) IntraMuscular once  hydrALAZINE 10 milliGRAM(s) Oral three times a day  insulin glargine Injectable (LANTUS) 8 Unit(s) SubCutaneous at bedtime  insulin lispro (ADMELOG) corrective regimen sliding scale   SubCutaneous three times a day before meals  insulin lispro (ADMELOG) corrective regimen sliding scale   SubCutaneous at bedtime  metoprolol succinate ER 25 milliGRAM(s) Oral daily  mupirocin 2% Ointment 1 Application(s) Topical two times a day  pantoprazole    Tablet 40 milliGRAM(s) Oral before breakfast  senna 2 Tablet(s) Oral at bedtime  sevelamer carbonate 800 milliGRAM(s) Oral three times a day    MEDICATIONS  (PRN):  acetaminophen     Tablet .. 650 milliGRAM(s) Oral every 6 hours PRN Temp greater or equal to 38C (100.4F), Mild Pain (1 - 3)  dextrose Oral Gel 15 Gram(s) Oral once PRN Blood Glucose LESS THAN 70 milliGRAM(s)/deciliter  melatonin 3 milliGRAM(s) Oral at bedtime PRN Insomnia  sodium chloride 0.9% lock flush 10 milliLiter(s) IV Push every 1 hour PRN Pre/post blood products, medications, blood draw, and to maintain line patency        CAPILLARY BLOOD GLUCOSE      POCT Blood Glucose.: 112 mg/dL (16 Aug 2022 23:53)  POCT Blood Glucose.: 96 mg/dL (16 Aug 2022 21:43)  POCT Blood Glucose.: 79 mg/dL (16 Aug 2022 17:21)  POCT Blood Glucose.: 98 mg/dL (16 Aug 2022 11:47)  POCT Blood Glucose.: 117 mg/dL (16 Aug 2022 07:41)    I&O's Summary      T(C): 36.2 (08-16-22 @ 22:00), Max: 37.1 (08-16-22 @ 16:15)  HR: 62 (08-16-22 @ 22:00) (61 - 66)  BP: 146/65 (08-16-22 @ 22:00) (104/58 - 146/65)  RR: 17 (08-16-22 @ 22:00) (17 - 20)  SpO2: 98% (08-16-22 @ 22:00) (98% - 100%)    PHYSICAL EXAM:    NECK: Supple, No JVD  CHEST/LUNG: Clear to auscultation bilaterally; No wheezing.  HEART: Regular rate and rhythm; No murmurs, rubs, or gallops  ABDOMEN: Soft, Nontender, Nondistended; Bowel sounds present  EXTREMITIES:   No edema  NEUROLOGY: AAO X 3      LABS:                        8.1    5.31  )-----------( 234      ( 16 Aug 2022 03:51 )             28.7     08-16    137  |  94<L>  |  93<H>  ----------------------------<  118<H>  4.7   |  28  |  6.54<H>    Ca    7.3<L>      16 Aug 2022 03:51  Phos  8.3     08-16  Mg     2.40     08-16      PT/INR - ( 16 Aug 2022 03:51 )   PT: 12.1 sec;   INR: 1.04 ratio         PTT - ( 16 Aug 2022 03:51 )  PTT:40.1 sec        CAPILLARY BLOOD GLUCOSE      POCT Blood Glucose.: 112 mg/dL (16 Aug 2022 23:53)  POCT Blood Glucose.: 96 mg/dL (16 Aug 2022 21:43)  POCT Blood Glucose.: 79 mg/dL (16 Aug 2022 17:21)  POCT Blood Glucose.: 98 mg/dL (16 Aug 2022 11:47)  POCT Blood Glucose.: 117 mg/dL (16 Aug 2022 07:41)        RADIOLOGY & ADDITIONAL TESTS:    Imaging Personally Reviewed:    Consultant(s) Notes Reviewed:      Care Discussed with Consultants/Other Providers:    Parminder Goldberg MD, CMD, FACP    [address] [place] [address]  Office Tel: [phone]  Cell: [phone]

## 2022-08-16 NOTE — CHART NOTE - NSCHARTNOTEFT_GEN_A_CORE
Vital Signs Last 24 Hrs  T(C): 36.4 (16 Aug 2022 12:33), Max: 36.8 (15 Aug 2022 17:43)  T(F): 97.5 (16 Aug 2022 12:33), Max: 98.2 (15 Aug 2022 17:43)  HR: 61 (16 Aug 2022 12:33) (61 - 65)  BP: 144/63 (16 Aug 2022 12:33) (131/59 - 144/63)  BP(mean): --  RR: 20 (16 Aug 2022 12:33) (17 - 20)  SpO2: 100% (16 Aug 2022 12:33) (100% - 100%)    Parameters below as of 16 Aug 2022 12:33  Patient On (Oxygen Delivery Method): nasal cannula  O2 Flow (L/min): 2    Patient for Shiley placement with IR today, as per IR patient refused. As per Nephro Dr. Solis> had a prolonged discussion again this afternoon regarding dialysis, patient now agreeable for Shiley placement and HD. IR ([id number]) made aware by writer. Discussed case with nurse and Dr. Goldberg.

## 2022-08-16 NOTE — PRE PROCEDURE NOTE - PRE PROCEDURE EVALUATION
Interventional Radiology Pre-Procedure Note    Diagnosis/Indication: Patient is a 82y old female with KOJO presents for HD access. Patient discussed with team and family further, now agrees to HD. Plan for nontunneled catheter placement.       PAST MEDICAL & SURGICAL HISTORY:  HLD (hyperlipidemia)      Diabetic neuropathy      CAD (coronary artery disease)  ~ s/p MICHAEL x2 in 2017      Paroxysmal atrial fibrillation      Diabetes mellitus, type 2      GI bleed      Neuropathy      Gastritis      Diastolic heart failure      Transfusion history      Stage 5 chronic kidney disease not on chronic dialysis      Anemia      PVD (peripheral vascular disease)  ~ RLE angiogram with 2 stents placed in 2019.      H/O  section      S/P coronary artery stent placement  x2 in       History of esophagogastroduodenoscopy (EGD)      S/P angiogram of extremity           Allergies: Carrots (Rash)  No Known Drug Allergies      LABS:                        8.1    5.31  )-----------( 234      ( 16 Aug 2022 03:51 )             28.7     08-16    137  |  94<L>  |  93<H>  ----------------------------<  118<H>  4.7   |  28  |  6.54<H>    Ca    7.3<L>      16 Aug 2022 03:51  Phos  8.3     08-16  Mg     2.40     08-16      PT/INR - ( 16 Aug 2022 03:51 )   PT: 12.1 sec;   INR: 1.04 ratio         PTT - ( 16 Aug 2022 03:51 )  PTT:40.1 sec    Procedure/ risks/ benefits were explained, informed consent obtained from patient, verbalizes understanding.

## 2022-08-16 NOTE — CHART NOTE - NSCHARTNOTEFT_GEN_A_CORE
Patient in IR procedure room. Discussed details of procedure during consent. Patient tearful and repeatedly stating that she is scared of dialysis. Repeatedly stating that she has "heard bad things" with dialysis.     Reassured patient regarding starting HD however does not want to start HD due to concern that she would get worse and " if I die then I die".    Catheter deferred for now.     Recommend goals of care discussion. She also stated that she wanted to speak to the nephrologists again.     Can reschedule procedure once patient is in agreement with starting HD.

## 2022-08-16 NOTE — CHART NOTE - NSCHARTNOTEFT_GEN_A_CORE
PRE-INTERVENTIONAL RADIOLOGY PROCEDURE NOTE    Patient Age: 81yo Female    Procedure (including site / side if known):  non-tunneled central venous catheter placement     Diagnosis / Indication: To start new hemodialysis    Interventional Radiology Attending Physician: Dr. Rodriguez    Ordering Attending Physician: Dr. Solis (nephrologist)     Pertinent medical history:  HLD (hyperlipidemia)  Diabetic neuropathy    CAD (coronary artery disease)  ~ s/p MICHAEL x2 in 2017    Paroxysmal atrial fibrillation  Diabetes mellitus, type 2  GI bleed  Neuropathy  Gastritis  Diastolic heart failure  Anemia, Transfusion history    Stage 5 chronic kidney disease not on chronic dialysis    PVD (peripheral vascular disease)  ~ RLE angiogram with 2 stents placed in 2019.    H/O  section    S/P coronary artery stent placement, x2 in     History of esophagogastroduodenoscopy (EGD)    S/P angiogram of extremity    Pertinent labs:                       8.1    5.31  )-----------( 234      ( 16 Aug 2022 03:51 )             28.7   08-16    137  |  94<L>  |  93<H>  ----------------------------<  118<H>  4.7   |  28  |  6.54<H>    Ca    7.3<L>      16 Aug 2022 03:51  Phos  8.3     08-16  Mg     2.40     08-16    PT/INR - ( 16 Aug 2022 03:51 )   PT: 12.1 sec;   INR: 1.04 ratio       PTT - ( 16 Aug 2022 03:51 )  PTT:40.1 sec      Patient and Family aware? Yes     RAJINDER [facility] Medicine ACP  Pager [phone]

## 2022-08-17 LAB
ANION GAP SERPL CALC-SCNC: 14 MMOL/L — SIGNIFICANT CHANGE UP (ref 7–14)
BUN SERPL-MCNC: 64 MG/DL — HIGH (ref 7–23)
CALCIUM SERPL-MCNC: 7.6 MG/DL — LOW (ref 8.4–10.5)
CHLORIDE SERPL-SCNC: 95 MMOL/L — LOW (ref 98–107)
CO2 SERPL-SCNC: 28 MMOL/L — SIGNIFICANT CHANGE UP (ref 22–31)
CREAT SERPL-MCNC: 5.15 MG/DL — HIGH (ref 0.5–1.3)
EGFR: 8 ML/MIN/1.73M2 — LOW
GLUCOSE BLDC GLUCOMTR-MCNC: 112 MG/DL — HIGH (ref 70–99)
GLUCOSE BLDC GLUCOMTR-MCNC: 81 MG/DL — SIGNIFICANT CHANGE UP (ref 70–99)
GLUCOSE BLDC GLUCOMTR-MCNC: 92 MG/DL — SIGNIFICANT CHANGE UP (ref 70–99)
GLUCOSE BLDC GLUCOMTR-MCNC: 96 MG/DL — SIGNIFICANT CHANGE UP (ref 70–99)
GLUCOSE BLDC GLUCOMTR-MCNC: 97 MG/DL — SIGNIFICANT CHANGE UP (ref 70–99)
GLUCOSE BLDC GLUCOMTR-MCNC: 98 MG/DL — SIGNIFICANT CHANGE UP (ref 70–99)
GLUCOSE SERPL-MCNC: 103 MG/DL — HIGH (ref 70–99)
HCT VFR BLD CALC: 29 % — LOW (ref 34.5–45)
HGB BLD-MCNC: 8.3 G/DL — LOW (ref 11.5–15.5)
MAGNESIUM SERPL-MCNC: 2.2 MG/DL — SIGNIFICANT CHANGE UP (ref 1.6–2.6)
MCHC RBC-ENTMCNC: 26.3 PG — LOW (ref 27–34)
MCHC RBC-ENTMCNC: 28.6 GM/DL — LOW (ref 32–36)
MCV RBC AUTO: 91.8 FL — SIGNIFICANT CHANGE UP (ref 80–100)
NRBC # BLD: 0 /100 WBCS — SIGNIFICANT CHANGE UP (ref 0–0)
NRBC # FLD: 0 K/UL — SIGNIFICANT CHANGE UP (ref 0–0)
PHOSPHATE SERPL-MCNC: 5.9 MG/DL — HIGH (ref 2.5–4.5)
PLATELET # BLD AUTO: 195 K/UL — SIGNIFICANT CHANGE UP (ref 150–400)
POTASSIUM SERPL-MCNC: 4.3 MMOL/L — SIGNIFICANT CHANGE UP (ref 3.5–5.3)
POTASSIUM SERPL-SCNC: 4.3 MMOL/L — SIGNIFICANT CHANGE UP (ref 3.5–5.3)
RBC # BLD: 3.16 M/UL — LOW (ref 3.8–5.2)
RBC # FLD: 17.5 % — HIGH (ref 10.3–14.5)
SODIUM SERPL-SCNC: 137 MMOL/L — SIGNIFICANT CHANGE UP (ref 135–145)
WBC # BLD: 5.92 K/UL — SIGNIFICANT CHANGE UP (ref 3.8–10.5)
WBC # FLD AUTO: 5.92 K/UL — SIGNIFICANT CHANGE UP (ref 3.8–10.5)

## 2022-08-17 RX ORDER — INSULIN GLARGINE 100 [IU]/ML
5 INJECTION, SOLUTION SUBCUTANEOUS ONCE
Refills: 0 | Status: COMPLETED | OUTPATIENT
Start: 2022-08-17 | End: 2022-08-17

## 2022-08-17 RX ORDER — ERYTHROPOIETIN 10000 [IU]/ML
10000 INJECTION, SOLUTION INTRAVENOUS; SUBCUTANEOUS ONCE
Refills: 0 | Status: COMPLETED | OUTPATIENT
Start: 2022-08-17 | End: 2022-08-18

## 2022-08-17 RX ORDER — ACETAMINOPHEN 500 MG
1000 TABLET ORAL ONCE
Refills: 0 | Status: COMPLETED | OUTPATIENT
Start: 2022-08-17 | End: 2022-08-17

## 2022-08-17 RX ORDER — DIPHENHYDRAMINE HCL 50 MG
25 CAPSULE ORAL ONCE
Refills: 0 | Status: COMPLETED | OUTPATIENT
Start: 2022-08-17 | End: 2022-08-17

## 2022-08-17 RX ORDER — LIDOCAINE 4 G/100G
1 CREAM TOPICAL EVERY 24 HOURS
Refills: 0 | Status: DISCONTINUED | OUTPATIENT
Start: 2022-08-17 | End: 2022-08-27

## 2022-08-17 RX ORDER — POLYETHYLENE GLYCOL 3350 17 G/17G
17 POWDER, FOR SOLUTION ORAL ONCE
Refills: 0 | Status: COMPLETED | OUTPATIENT
Start: 2022-08-17 | End: 2022-08-17

## 2022-08-17 RX ORDER — POLYETHYLENE GLYCOL 3350 17 G/17G
17 POWDER, FOR SOLUTION ORAL DAILY
Refills: 0 | Status: DISCONTINUED | OUTPATIENT
Start: 2022-08-18 | End: 2022-08-21

## 2022-08-17 RX ORDER — HEPARIN SODIUM 5000 [USP'U]/ML
5000 INJECTION INTRAVENOUS; SUBCUTANEOUS EVERY 12 HOURS
Refills: 0 | Status: DISCONTINUED | OUTPATIENT
Start: 2022-08-17 | End: 2022-08-22

## 2022-08-17 RX ORDER — CHLORHEXIDINE GLUCONATE 213 G/1000ML
1 SOLUTION TOPICAL
Refills: 0 | Status: DISCONTINUED | OUTPATIENT
Start: 2022-08-17 | End: 2022-08-27

## 2022-08-17 RX ADMIN — SEVELAMER CARBONATE 800 MILLIGRAM(S): 2400 POWDER, FOR SUSPENSION ORAL at 09:12

## 2022-08-17 RX ADMIN — Medication 650 MILLIGRAM(S): at 00:41

## 2022-08-17 RX ADMIN — Medication 25 MILLIGRAM(S): at 02:48

## 2022-08-17 RX ADMIN — MUPIROCIN 1 APPLICATION(S): 20 OINTMENT TOPICAL at 05:23

## 2022-08-17 RX ADMIN — SEVELAMER CARBONATE 800 MILLIGRAM(S): 2400 POWDER, FOR SUSPENSION ORAL at 21:39

## 2022-08-17 RX ADMIN — MUPIROCIN 1 APPLICATION(S): 20 OINTMENT TOPICAL at 18:06

## 2022-08-17 RX ADMIN — INSULIN GLARGINE 5 UNIT(S): 100 INJECTION, SOLUTION SUBCUTANEOUS at 23:09

## 2022-08-17 RX ADMIN — Medication 10 MILLIGRAM(S): at 21:38

## 2022-08-17 RX ADMIN — SENNA PLUS 2 TABLET(S): 8.6 TABLET ORAL at 21:38

## 2022-08-17 RX ADMIN — PANTOPRAZOLE SODIUM 40 MILLIGRAM(S): 20 TABLET, DELAYED RELEASE ORAL at 05:24

## 2022-08-17 RX ADMIN — Medication 10 MILLIGRAM(S): at 18:05

## 2022-08-17 RX ADMIN — Medication 3 MILLIGRAM(S): at 21:37

## 2022-08-17 RX ADMIN — POLYETHYLENE GLYCOL 3350 17 GRAM(S): 17 POWDER, FOR SOLUTION ORAL at 21:39

## 2022-08-17 RX ADMIN — CHLORHEXIDINE GLUCONATE 1 APPLICATION(S): 213 SOLUTION TOPICAL at 18:11

## 2022-08-17 RX ADMIN — CLOPIDOGREL BISULFATE 75 MILLIGRAM(S): 75 TABLET, FILM COATED ORAL at 18:06

## 2022-08-17 RX ADMIN — Medication 25 MILLIGRAM(S): at 05:23

## 2022-08-17 RX ADMIN — GABAPENTIN 300 MILLIGRAM(S): 400 CAPSULE ORAL at 21:38

## 2022-08-17 RX ADMIN — SEVELAMER CARBONATE 800 MILLIGRAM(S): 2400 POWDER, FOR SUSPENSION ORAL at 18:10

## 2022-08-17 RX ADMIN — Medication 1000 MILLIGRAM(S): at 20:46

## 2022-08-17 RX ADMIN — Medication 10 MILLIGRAM(S): at 05:24

## 2022-08-17 RX ADMIN — HEPARIN SODIUM 5000 UNIT(S): 5000 INJECTION INTRAVENOUS; SUBCUTANEOUS at 18:06

## 2022-08-17 NOTE — PROGRESS NOTE ADULT - ASSESSMENT
IMPRESSION: 82F w/ DM2, CAD, PAD, PUD, and CKD5, 8/11/22 p/w abnormal labs    (1)CKD - stage 5 - diabetic nephropathy  (2)KOJO - numbers rapidly worsening; continuing to worsen as of yesterday despite discontinuation of diuretics over past 1-2 days. Improving now on HD  (3)Hyperphosphatemia - renally mediated - improving, on Renvela/low-PO4 diet  (4)Anemia - s/p PRBCs; on Venofer  (5)Psychosocial - after a prolonged discussion between patient and Dr. Solis yesterday, regarding benefit/risk of dialysis, she was agreeable to initiation of chronic HD    RECOMMENDATIONS:  (1)HD x 3 consecutive days - due for second session today and third session tomorrow.           IMPRESSION: 82F w/ DM2, CAD, PAD, PUD, and CKD5, 8/11/22 p/w abnormal labs    (1)CKD - stage 5 - diabetic nephropathy  (2)KOJO - numbers rapidly worsening; continuing to worsen as of yesterday despite discontinuation of diuretics over past 1-2 days. Improving now on HD  (3)Hyperphosphatemia - renally mediated - improving, on Renvela/low-PO4 diet  (4)Anemia - s/p PRBCs; on Venofer  (5)Psychosocial - after a prolonged discussion between patient and Dr. Solis yesterday, regarding benefit/risk of dialysis, she was agreeable to initiation of chronic HD    RECOMMENDATIONS:  (1)HD x 3 consecutive days      - due for second session today: 2.5 hrs, 1 kg      - third session tomorrow: 3 hrs, 1 kg  (2)Retacrit 10k units with HD today     AISHWARYA [facility]  [facility]  [phone]          IMPRESSION: 82F w/ DM2, CAD, PAD, PUD, and CKD5, 8/11/22 p/w abnormal labs    (1)CKD - stage 5 - diabetic nephropathy  (2)KOJO - numbers rapidly worsening; continuing to worsen as of yesterday despite discontinuation of diuretics over past 1-2 days. Improving now on HD  (3)Hyperphosphatemia - renally mediated - improving, on Renvela/low-PO4 diet  (4)Anemia - s/p PRBCs; on Venofer  (5)Psychosocial - after a prolonged discussion between patient and Dr. Solis yesterday, regarding benefit/risk of dialysis, she was agreeable to initiation of chronic HD    RECOMMENDATIONS:  (1)HD x 3 consecutive days      - due for second session today: 2.5 hrs, 1 kg      - third session tomorrow: 3 hrs, 1 kg  (2)Retacrit 10k units with HD today     Cathy Ureña NP-C  [facility]  [phone]       RENAL ATTENDING NOTE  Patient seen and examined with NP. Agree with assessment and plan as above.  Given her anxiety/fragile emotional state, I would not complicate things during the admission/would not seek out AV access placement. Would defer with access until after discharge. We can start process of setting her up with an outside HD facility.      Reid Solis MD  [facility]  [phone]

## 2022-08-17 NOTE — PROGRESS NOTE ADULT - SUBJECTIVE AND OBJECTIVE BOX
NEPHROLOGY    Patient seen and examined.    MEDICATIONS  (STANDING):  chlorhexidine 4% Liquid 1 Application(s) Topical <User Schedule>  clopidogrel Tablet 75 milliGRAM(s) Oral daily  dextrose 5%. 1000 milliLiter(s) (50 mL/Hr) IV Continuous <Continuous>  dextrose 5%. 1000 milliLiter(s) (100 mL/Hr) IV Continuous <Continuous>  dextrose 50% Injectable 25 Gram(s) IV Push once  dextrose 50% Injectable 12.5 Gram(s) IV Push once  dextrose 50% Injectable 25 Gram(s) IV Push once  gabapentin 300 milliGRAM(s) Oral at bedtime  glucagon  Injectable 1 milliGRAM(s) IntraMuscular once  heparin   Injectable 5000 Unit(s) SubCutaneous every 12 hours  hydrALAZINE 10 milliGRAM(s) Oral three times a day  insulin glargine Injectable (LANTUS) 8 Unit(s) SubCutaneous at bedtime  insulin lispro (ADMELOG) corrective regimen sliding scale   SubCutaneous three times a day before meals  insulin lispro (ADMELOG) corrective regimen sliding scale   SubCutaneous at bedtime  metoprolol succinate ER 25 milliGRAM(s) Oral daily  mupirocin 2% Ointment 1 Application(s) Topical two times a day  pantoprazole    Tablet 40 milliGRAM(s) Oral before breakfast  senna 2 Tablet(s) Oral at bedtime  sevelamer carbonate 800 milliGRAM(s) Oral three times a day    VITALS:  T(C): , Max: 37.1 (08-16-22 @ 16:15)  T(F): , Max: 98.7 (08-16-22 @ 16:15)  HR: 74 (08-17-22 @ 05:20)  BP: 150/69 (08-17-22 @ 05:20)  RR: 18 (08-17-22 @ 05:20)  SpO2: 98% (08-17-22 @ 05:20)    I and O's:    08-16 @ 07:01  -  08-17 @ 07:00  --------------------------------------------------------  IN: 400 mL / OUT: 1206 mL / NET: -806 mL    PHYSICAL EXAM:  Constitutional: NAD, alert  HEENT: MMM  Neck:  No JVD, supple   Respiratory: CTA B/L  Cardiovascular: S1 and S2, IRR  Gastrointestinal: + BS, soft, NT, ND  Extremities: 2+ b/l LE edema  Neurological: reduced generalized strength  : No Sylvester  Skin: No rashes, C/D/I  Vascular Access: R non tunneled HD catheter    LABS:                        8.3    5.92  )-----------( 195      ( 17 Aug 2022 05:20 )             29.0     08-17    137  |  95<L>  |  64<H>  ----------------------------<  103<H>  4.3   |  28  |  5.15<H>    Ca    7.6<L>      17 Aug 2022 05:20  Phos  5.9     08-17  Mg     2.20     08-17      RADIOLOGY & ADDITIONAL STUDIES:   NEPHROLOGY    Patient seen and examined, she reports feeling better, denies cp or sob, comfortable on 2L NC, currently in no acute distress. She has her first HD session yesterday and removed 0.8L, paolo    MEDICATIONS  (STANDING):  chlorhexidine 4% Liquid 1 Application(s) Topical <User Schedule>  clopidogrel Tablet 75 milliGRAM(s) Oral daily  dextrose 5%. 1000 milliLiter(s) (50 mL/Hr) IV Continuous <Continuous>  dextrose 5%. 1000 milliLiter(s) (100 mL/Hr) IV Continuous <Continuous>  dextrose 50% Injectable 25 Gram(s) IV Push once  dextrose 50% Injectable 12.5 Gram(s) IV Push once  dextrose 50% Injectable 25 Gram(s) IV Push once  gabapentin 300 milliGRAM(s) Oral at bedtime  glucagon  Injectable 1 milliGRAM(s) IntraMuscular once  heparin   Injectable 5000 Unit(s) SubCutaneous every 12 hours  hydrALAZINE 10 milliGRAM(s) Oral three times a day  insulin glargine Injectable (LANTUS) 8 Unit(s) SubCutaneous at bedtime  insulin lispro (ADMELOG) corrective regimen sliding scale   SubCutaneous three times a day before meals  insulin lispro (ADMELOG) corrective regimen sliding scale   SubCutaneous at bedtime  metoprolol succinate ER 25 milliGRAM(s) Oral daily  mupirocin 2% Ointment 1 Application(s) Topical two times a day  pantoprazole    Tablet 40 milliGRAM(s) Oral before breakfast  senna 2 Tablet(s) Oral at bedtime  sevelamer carbonate 800 milliGRAM(s) Oral three times a day    VITALS:  T(C): , Max: 37.1 (08-16-22 @ 16:15)  T(F): , Max: 98.7 (08-16-22 @ 16:15)  HR: 74 (08-17-22 @ 05:20)  BP: 150/69 (08-17-22 @ 05:20)  RR: 18 (08-17-22 @ 05:20)  SpO2: 98% (08-17-22 @ 05:20)    I and O's:    08-16 @ 07:01  -  08-17 @ 07:00  --------------------------------------------------------  IN: 400 mL / OUT: 1206 mL / NET: -806 mL    PHYSICAL EXAM:  Constitutional: NAD, alert  HEENT: MMM  Neck:  No JVD, supple   Respiratory: CTA B/L  Cardiovascular: S1 and S2, IRR  Gastrointestinal: + BS, soft, NT, ND  Extremities: 2+ b/l LE edema  Neurological: reduced generalized strength  : No Sylvester  Skin: No rashes, C/D/I  Vascular Access: R non tunneled HD catheter    LABS:                        8.3    5.92  )-----------( 195      ( 17 Aug 2022 05:20 )             29.0     08-17    137  |  95<L>  |  64<H>  ----------------------------<  103<H>  4.3   |  28  |  5.15<H>    Ca    7.6<L>      17 Aug 2022 05:20  Phos  5.9     08-17  Mg     2.20     08-17      RADIOLOGY & ADDITIONAL STUDIES:

## 2022-08-17 NOTE — PROGRESS NOTE ADULT - SUBJECTIVE AND OBJECTIVE BOX
Cardiovascular Disease Progress Note    Overnight events: No acute events overnight.    Ms. Tsang denies chest pain or SOB.   Otherwise review of systems negative    Objective Findings:  T(C): 36.7 (22 @ 05:20), Max: 37.1 (22 @ 16:15)  HR: 74 (22 @ 05:20) (61 - 74)  BP: 150/69 (22 @ 05:20) (104/58 - 150/69)  RR: 18 (22 @ 05:20) (17 - 20)  SpO2: 98% (22 @ 05:20) (98% - 100%)  Wt(kg): --  Daily     Daily Weight in k.2 (17 Aug 2022 00:16)      Physical Exam:  Gen: NAD; Patient resting comfortably  HEENT: EOMI, Normocephalic/ atraumatic  CV: RRR, normal S1 + S2, no m/r/g  Lungs:  Normal respiratory effort; clear to auscultation bilaterally  Abd: soft, non-tender; bowel sounds present  Ext: No edema; warm and well perfused    Telemetry: Sinus    Laboratory Data:                        8.3    5.92  )-----------( 195      ( 17 Aug 2022 05:20 )             29.0         137  |  95<L>  |  64<H>  ----------------------------<  103<H>  4.3   |  28  |  5.15<H>    Ca    7.6<L>      17 Aug 2022 05:20  Phos  5.9       Mg     2.20           PT/INR - ( 16 Aug 2022 03:51 )   PT: 12.1 sec;   INR: 1.04 ratio         PTT - ( 16 Aug 2022 03:51 )  PTT:40.1 sec          Inpatient Medications:  MEDICATIONS  (STANDING):  chlorhexidine 4% Liquid 1 Application(s) Topical <User Schedule>  clopidogrel Tablet 75 milliGRAM(s) Oral daily  dextrose 5%. 1000 milliLiter(s) (50 mL/Hr) IV Continuous <Continuous>  dextrose 5%. 1000 milliLiter(s) (100 mL/Hr) IV Continuous <Continuous>  dextrose 50% Injectable 25 Gram(s) IV Push once  dextrose 50% Injectable 12.5 Gram(s) IV Push once  dextrose 50% Injectable 25 Gram(s) IV Push once  gabapentin 300 milliGRAM(s) Oral at bedtime  glucagon  Injectable 1 milliGRAM(s) IntraMuscular once  hydrALAZINE 10 milliGRAM(s) Oral three times a day  insulin glargine Injectable (LANTUS) 8 Unit(s) SubCutaneous at bedtime  insulin lispro (ADMELOG) corrective regimen sliding scale   SubCutaneous three times a day before meals  insulin lispro (ADMELOG) corrective regimen sliding scale   SubCutaneous at bedtime  metoprolol succinate ER 25 milliGRAM(s) Oral daily  mupirocin 2% Ointment 1 Application(s) Topical two times a day  pantoprazole    Tablet 40 milliGRAM(s) Oral before breakfast  senna 2 Tablet(s) Oral at bedtime  sevelamer carbonate 800 milliGRAM(s) Oral three times a day      Assessment:82 year old woman with uncontrolled IDDM, compensated diastolic CHF, CAD s/p PCI, CKD V and HTN presents with anemia.     Plan of Care:      #CAD- s/p MICHAEL x2 in 2017.  Anemia noted on admission, but patient denies any signs of bleeding.  Likely anemia in the setting of CKD.   Exertional dyspnea now improved post pRBC transfusion.  Given extensive vascular disease, continue Plavix 75 mg daily.     #Compensated systolic CHF-  New inferior wall motion abnormality noted on echo  Ms. Tsang is not fluid overloaded on exam and she denies chest pain.  I would not pursue another ischemic evaluation, as Ms. Tsang cannot tolerate dual antiplatelet therapy due to bleeding history.     #ESRD-  S/P HD initiation on  by the renal team.     #PVD-  Patient status post RLE angiogram with 2 stents placed in 2019.   Antiplatelet therapy management as stated above.     #Paroxymal a-fib-  Currently in sinus.  Continue Toprol.   No AC given h/o GI bleed.      Over 25 minutes spent on total encounter; more than 50% of the visit was spent counseling and/or coordinating care by the attending physician.      Julio César Hooper MD [facility]  Cardiovascular Disease  [phone]

## 2022-08-17 NOTE — PHYSICAL THERAPY INITIAL EVALUATION ADULT - PERTINENT HX OF CURRENT PROBLEM, REHAB EVAL
Pt is an 82 year old male presenting with abnormal labs from PMD. Admitted with anemia s/p 2u PRBC. Course complicated by worsening KOJO on CKD, nephrology consulted, S/P IRMA Danielle on 8/16 and started on new Hemodialysis. PMH listed below. Pt is an 82 year old male presenting with abnormal labs from PMD. Admitted with anemia s/p 2u PRBC. Course complicated by worsening KOJO on CKD, nephrology consulted, s/p IRMA Danielle on 8/16 and started on new Hemodialysis. PMH listed below.

## 2022-08-17 NOTE — PROGRESS NOTE ADULT - SUBJECTIVE AND OBJECTIVE BOX
Patient is a 82y old  Female who presents with a chief complaint of abnormal labs (17 Aug 2022 10:35)      SUBJECTIVE / OVERNIGHT EVENTS:    Events noted.  CONSTITUTIONAL: No fever,  or fatigue  RESPIRATORY: No cough, wheezing,  No shortness of breath  CARDIOVASCULAR: No chest pain, palpitations, dizziness, or leg swelling  GASTROINTESTINAL: No abdominal or epigastric pain. No nausea, vomiting.  NEUROLOGICAL: No headache    MEDICATIONS  (STANDING):  chlorhexidine 2% Cloths 1 Application(s) Topical two times a day  clopidogrel Tablet 75 milliGRAM(s) Oral daily  dextrose 5%. 1000 milliLiter(s) (50 mL/Hr) IV Continuous <Continuous>  dextrose 5%. 1000 milliLiter(s) (100 mL/Hr) IV Continuous <Continuous>  dextrose 50% Injectable 25 Gram(s) IV Push once  dextrose 50% Injectable 12.5 Gram(s) IV Push once  dextrose 50% Injectable 25 Gram(s) IV Push once  epoetin sean-epbx (RETACRIT) Injectable 55032 Unit(s) IV Push once  gabapentin 300 milliGRAM(s) Oral at bedtime  glucagon  Injectable 1 milliGRAM(s) IntraMuscular once  heparin   Injectable 5000 Unit(s) SubCutaneous every 12 hours  hydrALAZINE 10 milliGRAM(s) Oral three times a day  insulin glargine Injectable (LANTUS) 8 Unit(s) SubCutaneous at bedtime  insulin lispro (ADMELOG) corrective regimen sliding scale   SubCutaneous three times a day before meals  insulin lispro (ADMELOG) corrective regimen sliding scale   SubCutaneous at bedtime  metoprolol succinate ER 25 milliGRAM(s) Oral daily  mupirocin 2% Ointment 1 Application(s) Topical two times a day  pantoprazole    Tablet 40 milliGRAM(s) Oral before breakfast  senna 2 Tablet(s) Oral at bedtime  sevelamer carbonate 800 milliGRAM(s) Oral three times a day    MEDICATIONS  (PRN):  acetaminophen     Tablet .. 650 milliGRAM(s) Oral every 6 hours PRN Temp greater or equal to 38C (100.4F), Mild Pain (1 - 3)  dextrose Oral Gel 15 Gram(s) Oral once PRN Blood Glucose LESS THAN 70 milliGRAM(s)/deciliter  lidocaine   4% Patch 1 Patch Transdermal every 24 hours PRN Back pain  melatonin 3 milliGRAM(s) Oral at bedtime PRN Insomnia  sodium chloride 0.9% lock flush 10 milliLiter(s) IV Push every 1 hour PRN Pre/post blood products, medications, blood draw, and to maintain line patency        CAPILLARY BLOOD GLUCOSE      POCT Blood Glucose.: 112 mg/dL (17 Aug 2022 22:32)  POCT Blood Glucose.: 98 mg/dL (17 Aug 2022 22:04)  POCT Blood Glucose.: 96 mg/dL (17 Aug 2022 17:21)  POCT Blood Glucose.: 97 mg/dL (17 Aug 2022 15:02)  POCT Blood Glucose.: 92 mg/dL (17 Aug 2022 13:24)  POCT Blood Glucose.: 81 mg/dL (17 Aug 2022 08:14)    I&O's Summary    16 Aug 2022 07:01  -  17 Aug 2022 07:00  --------------------------------------------------------  IN: 400 mL / OUT: 1206 mL / NET: -806 mL    17 Aug 2022 07:01  -  17 Aug 2022 23:59  --------------------------------------------------------  IN: 400 mL / OUT: 1400 mL / NET: -1000 mL        T(C): 36.7 (08-17-22 @ 18:05), Max: 36.8 (08-17-22 @ 00:16)  HR: 98 (08-17-22 @ 18:05) (63 - 98)  BP: 150/96 (08-17-22 @ 18:05) (131/52 - 161/65)  RR: 20 (08-17-22 @ 18:05) (17 - 20)  SpO2: 98% (08-17-22 @ 18:05) (98% - 99%)    PHYSICAL EXAM:  GENERAL: NAD  NECK: Supple, No JVD  CHEST/LUNG: Clear to auscultation bilaterally; No wheezing.  HEART: Regular rate and rhythm; No murmurs, rubs, or gallops  ABDOMEN: Soft, Nontender, Nondistended; Bowel sounds present  EXTREMITIES:   No edema  NEUROLOGY: AAO       LABS:                        8.3    5.92  )-----------( 195      ( 17 Aug 2022 05:20 )             29.0     08-17    137  |  95<L>  |  64<H>  ----------------------------<  103<H>  4.3   |  28  |  5.15<H>    Ca    7.6<L>      17 Aug 2022 05:20  Phos  5.9     08-17  Mg     2.20     08-17      PT/INR - ( 16 Aug 2022 03:51 )   PT: 12.1 sec;   INR: 1.04 ratio         PTT - ( 16 Aug 2022 03:51 )  PTT:40.1 sec        CAPILLARY BLOOD GLUCOSE      POCT Blood Glucose.: 112 mg/dL (17 Aug 2022 22:32)  POCT Blood Glucose.: 98 mg/dL (17 Aug 2022 22:04)  POCT Blood Glucose.: 96 mg/dL (17 Aug 2022 17:21)  POCT Blood Glucose.: 97 mg/dL (17 Aug 2022 15:02)  POCT Blood Glucose.: 92 mg/dL (17 Aug 2022 13:24)  POCT Blood Glucose.: 81 mg/dL (17 Aug 2022 08:14)        RADIOLOGY & ADDITIONAL TESTS:    Imaging Personally Reviewed:    Consultant(s) Notes Reviewed:      Care Discussed with Consultants/Other Providers:    Parminder Goldberg MD, CMD, FACP    [address] [place] [address]  Office Tel: [phone]  Cell: [phone]

## 2022-08-17 NOTE — PHYSICAL THERAPY INITIAL EVALUATION ADULT - GENERAL OBSERVATIONS, REHAB EVAL
Pt received semisupine in bed, +2L oxygen via nasal cannula, +telemetry/pulse ox, in NAD. Pt agreeable to participate in PT evaluation. Pt left semisupine in bed as found, all lines intact and RN aware.

## 2022-08-17 NOTE — PROVIDER CONTACT NOTE (OTHER) - ASSESSMENT
patient is being tachycardiac upto 120 , denies any chest pain . asymptomatic ,anxious at times . vital signs as per flowsheet .

## 2022-08-17 NOTE — PHYSICAL THERAPY INITIAL EVALUATION ADULT - PATIENT PROFILE REVIEW, REHAB EVAL
PT evaluate and treat orders received: ambulate with assistance. Consult with YOKASTA Mukherjee, pt may participate in PT evaluation./yes

## 2022-08-17 NOTE — PHYSICAL THERAPY INITIAL EVALUATION ADULT - ADDITIONAL COMMENTS
Pt lives in a basement with +flight of stairs down to enter. Prior to admission, pt endorses ambulating with a rolling walker independently. Pt has a home health aide 9-5 / 7 days per week.

## 2022-08-18 LAB
ANION GAP SERPL CALC-SCNC: 11 MMOL/L — SIGNIFICANT CHANGE UP (ref 7–14)
BUN SERPL-MCNC: 35 MG/DL — HIGH (ref 7–23)
CALCIUM SERPL-MCNC: 8.4 MG/DL — SIGNIFICANT CHANGE UP (ref 8.4–10.5)
CHLORIDE SERPL-SCNC: 96 MMOL/L — LOW (ref 98–107)
CO2 SERPL-SCNC: 30 MMOL/L — SIGNIFICANT CHANGE UP (ref 22–31)
CREAT SERPL-MCNC: 3.68 MG/DL — HIGH (ref 0.5–1.3)
EGFR: 12 ML/MIN/1.73M2 — LOW
GLUCOSE BLDC GLUCOMTR-MCNC: 110 MG/DL — HIGH (ref 70–99)
GLUCOSE BLDC GLUCOMTR-MCNC: 112 MG/DL — HIGH (ref 70–99)
GLUCOSE BLDC GLUCOMTR-MCNC: 141 MG/DL — HIGH (ref 70–99)
GLUCOSE BLDC GLUCOMTR-MCNC: 68 MG/DL — LOW (ref 70–99)
GLUCOSE BLDC GLUCOMTR-MCNC: 84 MG/DL — SIGNIFICANT CHANGE UP (ref 70–99)
GLUCOSE BLDC GLUCOMTR-MCNC: 87 MG/DL — SIGNIFICANT CHANGE UP (ref 70–99)
GLUCOSE BLDC GLUCOMTR-MCNC: 90 MG/DL — SIGNIFICANT CHANGE UP (ref 70–99)
GLUCOSE SERPL-MCNC: 64 MG/DL — LOW (ref 70–99)
HCT VFR BLD CALC: 30.9 % — LOW (ref 34.5–45)
HGB BLD-MCNC: 8.9 G/DL — LOW (ref 11.5–15.5)
MAGNESIUM SERPL-MCNC: 2 MG/DL — SIGNIFICANT CHANGE UP (ref 1.6–2.6)
MCHC RBC-ENTMCNC: 26.3 PG — LOW (ref 27–34)
MCHC RBC-ENTMCNC: 28.8 GM/DL — LOW (ref 32–36)
MCV RBC AUTO: 91.2 FL — SIGNIFICANT CHANGE UP (ref 80–100)
NRBC # BLD: 0 /100 WBCS — SIGNIFICANT CHANGE UP (ref 0–0)
NRBC # FLD: 0 K/UL — SIGNIFICANT CHANGE UP (ref 0–0)
PHOSPHATE SERPL-MCNC: 4.7 MG/DL — HIGH (ref 2.5–4.5)
PLATELET # BLD AUTO: 232 K/UL — SIGNIFICANT CHANGE UP (ref 150–400)
POTASSIUM SERPL-MCNC: 4.1 MMOL/L — SIGNIFICANT CHANGE UP (ref 3.5–5.3)
POTASSIUM SERPL-SCNC: 4.1 MMOL/L — SIGNIFICANT CHANGE UP (ref 3.5–5.3)
RBC # BLD: 3.39 M/UL — LOW (ref 3.8–5.2)
RBC # FLD: 17.7 % — HIGH (ref 10.3–14.5)
SODIUM SERPL-SCNC: 137 MMOL/L — SIGNIFICANT CHANGE UP (ref 135–145)
WBC # BLD: 5.81 K/UL — SIGNIFICANT CHANGE UP (ref 3.8–10.5)
WBC # FLD AUTO: 5.81 K/UL — SIGNIFICANT CHANGE UP (ref 3.8–10.5)

## 2022-08-18 RX ORDER — DEXTROSE 50 % IN WATER 50 %
12.5 SYRINGE (ML) INTRAVENOUS ONCE
Refills: 0 | Status: COMPLETED | OUTPATIENT
Start: 2022-08-18 | End: 2022-08-18

## 2022-08-18 RX ADMIN — LIDOCAINE 1 PATCH: 4 CREAM TOPICAL at 01:22

## 2022-08-18 RX ADMIN — Medication 10 MILLIGRAM(S): at 21:55

## 2022-08-18 RX ADMIN — Medication 12.5 GRAM(S): at 07:52

## 2022-08-18 RX ADMIN — INSULIN GLARGINE 8 UNIT(S): 100 INJECTION, SOLUTION SUBCUTANEOUS at 21:56

## 2022-08-18 RX ADMIN — PANTOPRAZOLE SODIUM 40 MILLIGRAM(S): 20 TABLET, DELAYED RELEASE ORAL at 06:29

## 2022-08-18 RX ADMIN — CHLORHEXIDINE GLUCONATE 1 APPLICATION(S): 213 SOLUTION TOPICAL at 05:12

## 2022-08-18 RX ADMIN — LIDOCAINE 1 PATCH: 4 CREAM TOPICAL at 04:56

## 2022-08-18 RX ADMIN — CHLORHEXIDINE GLUCONATE 1 APPLICATION(S): 213 SOLUTION TOPICAL at 19:30

## 2022-08-18 RX ADMIN — MUPIROCIN 1 APPLICATION(S): 20 OINTMENT TOPICAL at 05:13

## 2022-08-18 RX ADMIN — LIDOCAINE 1 PATCH: 4 CREAM TOPICAL at 14:18

## 2022-08-18 RX ADMIN — ERYTHROPOIETIN 10000 UNIT(S): 10000 INJECTION, SOLUTION INTRAVENOUS; SUBCUTANEOUS at 16:39

## 2022-08-18 RX ADMIN — Medication 650 MILLIGRAM(S): at 18:05

## 2022-08-18 RX ADMIN — SEVELAMER CARBONATE 800 MILLIGRAM(S): 2400 POWDER, FOR SUSPENSION ORAL at 21:56

## 2022-08-18 RX ADMIN — HEPARIN SODIUM 5000 UNIT(S): 5000 INJECTION INTRAVENOUS; SUBCUTANEOUS at 05:13

## 2022-08-18 RX ADMIN — SENNA PLUS 2 TABLET(S): 8.6 TABLET ORAL at 21:56

## 2022-08-18 RX ADMIN — HEPARIN SODIUM 5000 UNIT(S): 5000 INJECTION INTRAVENOUS; SUBCUTANEOUS at 21:56

## 2022-08-18 RX ADMIN — Medication 10 MILLIGRAM(S): at 06:01

## 2022-08-18 RX ADMIN — SEVELAMER CARBONATE 800 MILLIGRAM(S): 2400 POWDER, FOR SUSPENSION ORAL at 13:46

## 2022-08-18 RX ADMIN — MUPIROCIN 1 APPLICATION(S): 20 OINTMENT TOPICAL at 21:58

## 2022-08-18 RX ADMIN — Medication 10 MILLIGRAM(S): at 13:46

## 2022-08-18 RX ADMIN — GABAPENTIN 300 MILLIGRAM(S): 400 CAPSULE ORAL at 21:57

## 2022-08-18 RX ADMIN — CLOPIDOGREL BISULFATE 75 MILLIGRAM(S): 75 TABLET, FILM COATED ORAL at 11:38

## 2022-08-18 RX ADMIN — Medication 25 MILLIGRAM(S): at 06:00

## 2022-08-18 RX ADMIN — SEVELAMER CARBONATE 800 MILLIGRAM(S): 2400 POWDER, FOR SUSPENSION ORAL at 06:00

## 2022-08-18 NOTE — DIETITIAN INITIAL EVALUATION ADULT - ADD RECOMMEND
1. Encourage & assist Pt with meals; Monitor PO diet tolerance;   2. Honor food preferences;   3. Add Nephro-nilay 1 cap daily for micronutrient coverage;   4. Monitor labs, hydration status;

## 2022-08-18 NOTE — PROGRESS NOTE ADULT - SUBJECTIVE AND OBJECTIVE BOX
Patient is a 82y old  Female who presents with a chief complaint of abnormal labs (18 Aug 2022 18:56)      SUBJECTIVE / OVERNIGHT EVENTS:    Events noted.  CONSTITUTIONAL: No fever,  or fatigue  RESPIRATORY: No cough, wheezing,  No shortness of breath  CARDIOVASCULAR: No chest pain, palpitations, dizziness, or leg swelling  GASTROINTESTINAL: No abdominal or epigastric pain. No nausea, vomiting.  NEUROLOGICAL: No headache    MEDICATIONS  (STANDING):  chlorhexidine 2% Cloths 1 Application(s) Topical two times a day  clopidogrel Tablet 75 milliGRAM(s) Oral daily  dextrose 5%. 1000 milliLiter(s) (50 mL/Hr) IV Continuous <Continuous>  dextrose 5%. 1000 milliLiter(s) (100 mL/Hr) IV Continuous <Continuous>  dextrose 50% Injectable 25 Gram(s) IV Push once  dextrose 50% Injectable 12.5 Gram(s) IV Push once  dextrose 50% Injectable 25 Gram(s) IV Push once  gabapentin 300 milliGRAM(s) Oral at bedtime  glucagon  Injectable 1 milliGRAM(s) IntraMuscular once  heparin   Injectable 5000 Unit(s) SubCutaneous every 12 hours  hydrALAZINE 10 milliGRAM(s) Oral three times a day  insulin glargine Injectable (LANTUS) 8 Unit(s) SubCutaneous at bedtime  insulin lispro (ADMELOG) corrective regimen sliding scale   SubCutaneous three times a day before meals  insulin lispro (ADMELOG) corrective regimen sliding scale   SubCutaneous at bedtime  metoprolol succinate ER 25 milliGRAM(s) Oral daily  mupirocin 2% Ointment 1 Application(s) Topical two times a day  pantoprazole    Tablet 40 milliGRAM(s) Oral before breakfast  senna 2 Tablet(s) Oral at bedtime  sevelamer carbonate 800 milliGRAM(s) Oral three times a day    MEDICATIONS  (PRN):  acetaminophen     Tablet .. 650 milliGRAM(s) Oral every 6 hours PRN Temp greater or equal to 38C (100.4F), Mild Pain (1 - 3)  dextrose Oral Gel 15 Gram(s) Oral once PRN Blood Glucose LESS THAN 70 milliGRAM(s)/deciliter  lidocaine   4% Patch 1 Patch Transdermal every 24 hours PRN Back pain  melatonin 3 milliGRAM(s) Oral at bedtime PRN Insomnia  polyethylene glycol 3350 17 Gram(s) Oral daily PRN Constipation  sodium chloride 0.9% lock flush 10 milliLiter(s) IV Push every 1 hour PRN Pre/post blood products, medications, blood draw, and to maintain line patency        CAPILLARY BLOOD GLUCOSE      POCT Blood Glucose.: 141 mg/dL (18 Aug 2022 22:46)  POCT Blood Glucose.: 84 mg/dL (18 Aug 2022 21:36)  POCT Blood Glucose.: 90 mg/dL (18 Aug 2022 18:22)  POCT Blood Glucose.: 87 mg/dL (18 Aug 2022 16:14)  POCT Blood Glucose.: 112 mg/dL (18 Aug 2022 12:04)  POCT Blood Glucose.: 110 mg/dL (18 Aug 2022 08:23)  POCT Blood Glucose.: 68 mg/dL (18 Aug 2022 07:32)    I&O's Summary    17 Aug 2022 07:01  -  18 Aug 2022 07:00  --------------------------------------------------------  IN: 400 mL / OUT: 1400 mL / NET: -1000 mL    18 Aug 2022 07:01  -  18 Aug 2022 23:04  --------------------------------------------------------  IN: 400 mL / OUT: 1400 mL / NET: -1000 mL        T(C): 36.7 (08-18-22 @ 19:30), Max: 37.1 (08-18-22 @ 00:30)  HR: 63 (08-18-22 @ 19:30) (63 - 72)  BP: 155/65 (08-18-22 @ 19:30) (133/59 - 181/68)  RR: 18 (08-18-22 @ 19:30) (17 - 18)  SpO2: 98% (08-18-22 @ 12:31) (98% - 100%)    PHYSICAL EXAM:  GENERAL: NAD  NECK: Supple, No JVD  CHEST/LUNG: Clear to auscultation bilaterally; No wheezing.  HEART: Regular rate and rhythm; No murmurs, rubs, or gallops  ABDOMEN: Soft, Nontender, Nondistended; Bowel sounds present  EXTREMITIES:   No edema  NEUROLOGY: AAO       LABS:                        8.9    5.81  )-----------( 232      ( 18 Aug 2022 05:00 )             30.9     08-18    137  |  96<L>  |  35<H>  ----------------------------<  64<L>  4.1   |  30  |  3.68<H>    Ca    8.4      18 Aug 2022 05:00  Phos  4.7     08-18  Mg     2.00     08-18              CAPILLARY BLOOD GLUCOSE      POCT Blood Glucose.: 141 mg/dL (18 Aug 2022 22:46)  POCT Blood Glucose.: 84 mg/dL (18 Aug 2022 21:36)  POCT Blood Glucose.: 90 mg/dL (18 Aug 2022 18:22)  POCT Blood Glucose.: 87 mg/dL (18 Aug 2022 16:14)  POCT Blood Glucose.: 112 mg/dL (18 Aug 2022 12:04)  POCT Blood Glucose.: 110 mg/dL (18 Aug 2022 08:23)  POCT Blood Glucose.: 68 mg/dL (18 Aug 2022 07:32)        RADIOLOGY & ADDITIONAL TESTS:    Imaging Personally Reviewed:    Consultant(s) Notes Reviewed:      Care Discussed with Consultants/Other Providers:    Parminder Goldberg MD, CMD, FACP    [address] [place] [address]  Office Tel: [phone]  Cell: [phone]

## 2022-08-18 NOTE — DIETITIAN INITIAL EVALUATION ADULT - PERTINENT LABORATORY DATA
08-18    137  |  96<L>  |  35<H>  ----------------------------<  64<L>  4.1   |  30  |  3.68<H>    Ca    8.4      18 Aug 2022 05:00  Phos  4.7     08-18  Mg     2.00     08-18    POCT Blood Glucose.: 110 mg/dL (08-18-22 @ 08:23)  A1C with Estimated Average Glucose Result: 7.2 % (08-11-22 @ 16:10)  A1C with Estimated Average Glucose Result: 15.6 % (04-30-22 @ 07:35)  A1C with Estimated Average Glucose Result: 15.1 % (04-29-22 @ 10:46)   Attending Attestation (For Attendings USE Only)...

## 2022-08-18 NOTE — PROGRESS NOTE ADULT - ASSESSMENT
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 Improving now on HD  (3)Hyperphosphatemia - renally mediated - improving, on Renvela/low-PO4 diet  (4)Anemia - s/p PRBCs; on Venofer  (5)Psychosocial - Given her anxiety/fragile emotional state, would not seek out AV access placement. Would defer with access until after discharge.     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 Improving now on HD  (3)Hyperphosphatemia - renally mediated - improving, on Renvela/low-PO4 diet  (4)Anemia - s/p PRBCs; on Venofer  (5)Psychosocial - Given her anxiety/fragile emotional state, would not seek out AV access placement. Would defer with access until after discharge.     RECOMMENDATIONS:  (1)HD x 3 consecutive days:      - third session today: 3 hrs, 1 kg  (2)Subsequent HD Saturday: 3 hrs, 1kg; Retacrit with HD  (3)SW setting up with outside HD facility    Cathy Ureña NP-C  [facility]  [phone]       RENAL ATTENDING NOTE  Patient seen and examined with NP. Agree with assessment and plan as above.    Reid Solis MD  [facility]  [phone]

## 2022-08-18 NOTE — PROGRESS NOTE ADULT - SUBJECTIVE AND OBJECTIVE BOX
NEPHROLOGY     Patient seen and examined, resting comfortably, no sob, comfortable on 2L NC, in no acute distress. Tolerated second HD session yesterday and removed 1kg.     MEDICATIONS  (STANDING):  chlorhexidine 2% Cloths 1 Application(s) Topical two times a day  clopidogrel Tablet 75 milliGRAM(s) Oral daily  dextrose 5%. 1000 milliLiter(s) (50 mL/Hr) IV Continuous <Continuous>  dextrose 5%. 1000 milliLiter(s) (100 mL/Hr) IV Continuous <Continuous>  dextrose 50% Injectable 25 Gram(s) IV Push once  dextrose 50% Injectable 12.5 Gram(s) IV Push once  dextrose 50% Injectable 25 Gram(s) IV Push once  epoetin sean-epbx (RETACRIT) Injectable 58906 Unit(s) IV Push once  gabapentin 300 milliGRAM(s) Oral at bedtime  glucagon  Injectable 1 milliGRAM(s) IntraMuscular once  heparin   Injectable 5000 Unit(s) SubCutaneous every 12 hours  hydrALAZINE 10 milliGRAM(s) Oral three times a day  insulin glargine Injectable (LANTUS) 8 Unit(s) SubCutaneous at bedtime  insulin lispro (ADMELOG) corrective regimen sliding scale   SubCutaneous three times a day before meals  insulin lispro (ADMELOG) corrective regimen sliding scale   SubCutaneous at bedtime  metoprolol succinate ER 25 milliGRAM(s) Oral daily  mupirocin 2% Ointment 1 Application(s) Topical two times a day  pantoprazole    Tablet 40 milliGRAM(s) Oral before breakfast  senna 2 Tablet(s) Oral at bedtime  sevelamer carbonate 800 milliGRAM(s) Oral three times a day    VITALS:  T(C): , Max: 37.1 (08-18-22 @ 00:30)  T(F): , Max: 98.8 (08-18-22 @ 00:30)  HR: 64 (08-18-22 @ 12:31)  BP: 138/58 (08-18-22 @ 12:31)  BP(mean): 61 (08-17-22 @ 14:20)  RR: 17 (08-18-22 @ 12:31)  SpO2: 98% (08-18-22 @ 12:31)    I and O's:    08-17 @ 07:01  -  08-18 @ 07:00  --------------------------------------------------------  IN: 400 mL / OUT: 1400 mL / NET: -1000 mL    Height (cm): 156.2 (08-18 @ 11:31)  Weight (kg): 76 (08-18 @ 11:31)  BMI (kg/m2): 31.1 (08-18 @ 11:31)  BSA (m2): 1.76 (08-18 @ 11:31)    PHYSICAL EXAM:  Constitutional: NAD, alert  HEENT: MMM  Neck:  No JVD, supple   Respiratory: CTA B/L  Cardiovascular: S1 and S2, IRR  Gastrointestinal: + BS, soft, NT, ND  Extremities: 2+ b/l LE edema  Neurological: reduced generalized strength  : No Sylvester  Skin: No rashes, C/D/I  Vascular Access: R non tunneled HD catheter    LABS:                        8.9    5.81  )-----------( 232      ( 18 Aug 2022 05:00 )             30.9     08-18    137  |  96<L>  |  35<H>  ----------------------------<  64<L>  4.1   |  30  |  3.68<H>    Ca    8.4      18 Aug 2022 05:00  Phos  4.7     08-18  Mg     2.00     08-18

## 2022-08-18 NOTE — DIETITIAN INITIAL EVALUATION ADULT - OTHER INFO
Pt 81 yo female with PMHx of CAD s/p stents 2017, pAfib, HLD, DM2 on insulin c/b neuropathy, PVD s/p stents 2019, hx GIB/duodenal ulcer, CKD, anemia - per chart review.     At time of visit, Pt awake, alert. Per Pt, her appetite poor since admission. Food preferences discussed. Rec to add PO supplement: Nepro - 1x daily, to diet rx. No report of chewing or swallowing difficulty; no report of nausea, vomiting @ this time. Pt C/O constipation. Rec -> bowel regimen per MD discretion. Per Pt her height: ~60", but Pt not sure about her actual body weight. Pt not sure about any weight loss or weight changes PTA either.     Of note, Pt's HbA1c level 7.2% (8/11); Pt with CKD, started on HD; Pt S/P IR[facility] on 8/16, 3 consecutive HD on 8/16, 8/17, 8/18. Consistent Carbohydrate diet Renal Replacement (no prot restr, no conc K, no conc phos, low sodium) restrictions discussed with Pt including better food choices, foods to avoid. At home Pt tries to avoid salt & regular sugar reported. RDN answered concerns related to diet. Rec -> Pt to follow up with appropriate RDN upon discharge for the purposes of long-term nutrition evaluation and diet education. RDN remains available, Pt made aware.

## 2022-08-18 NOTE — PROGRESS NOTE ADULT - SUBJECTIVE AND OBJECTIVE BOX
Cardiovascular Disease Progress Note    Overnight events: Tachycardia noted overnight.    Ms. Tsang is in no distress.     Objective Findings:  T(C): 36.4 (22 @ 05:30), Max: 37.1 (22 @ 00:30)  HR: 65 (22 @ 05:30) (63 - 98)  BP: 133/59 (22 @ 05:30) (133/59 - 181/68)  RR: 18 (22 @ 05:30) (18 - 20)  SpO2: 100% (22 @ 05:30) (98% - 100%)  Wt(kg): --  Daily     Daily Weight in k (17 Aug 2022 14:20)      Physical Exam:  Gen: NAD; Patient resting comfortably  HEENT: EOMI, Normocephalic/ atraumatic  CV: RRR, normal S1 + S2, no m/r/g  Lungs:  Normal respiratory effort; clear to auscultation bilaterally  Abd: soft, non-tender; bowel sounds present  Ext: No edema; warm and well perfused    Telemetry: Sinus    Laboratory Data:                        8.3    5.92  )-----------( 195      ( 17 Aug 2022 05:20 )             29.0         137  |  95<L>  |  64<H>  ----------------------------<  103<H>  4.3   |  28  |  5.15<H>    Ca    7.6<L>      17 Aug 2022 05:20  Phos  5.9       Mg     2.20                     Inpatient Medications:  MEDICATIONS  (STANDING):  chlorhexidine 2% Cloths 1 Application(s) Topical two times a day  clopidogrel Tablet 75 milliGRAM(s) Oral daily  dextrose 5%. 1000 milliLiter(s) (50 mL/Hr) IV Continuous <Continuous>  dextrose 5%. 1000 milliLiter(s) (100 mL/Hr) IV Continuous <Continuous>  dextrose 50% Injectable 25 Gram(s) IV Push once  dextrose 50% Injectable 12.5 Gram(s) IV Push once  dextrose 50% Injectable 25 Gram(s) IV Push once  epoetin sean-epbx (RETACRIT) Injectable 94103 Unit(s) IV Push once  gabapentin 300 milliGRAM(s) Oral at bedtime  glucagon  Injectable 1 milliGRAM(s) IntraMuscular once  heparin   Injectable 5000 Unit(s) SubCutaneous every 12 hours  hydrALAZINE 10 milliGRAM(s) Oral three times a day  insulin glargine Injectable (LANTUS) 8 Unit(s) SubCutaneous at bedtime  insulin lispro (ADMELOG) corrective regimen sliding scale   SubCutaneous three times a day before meals  insulin lispro (ADMELOG) corrective regimen sliding scale   SubCutaneous at bedtime  metoprolol succinate ER 25 milliGRAM(s) Oral daily  mupirocin 2% Ointment 1 Application(s) Topical two times a day  pantoprazole    Tablet 40 milliGRAM(s) Oral before breakfast  senna 2 Tablet(s) Oral at bedtime  sevelamer carbonate 800 milliGRAM(s) Oral three times a day      Assessment: 82 year old woman with uncontrolled IDDM, compensated diastolic CHF, CAD s/p PCI, CKD V and HTN presents with anemia.     Plan of Care:      #CAD- s/p MICHAEL x2 in 2017.  Anemia noted on admission, but patient denies any signs of bleeding.  Likely anemia in the setting of CKD.   Exertional dyspnea now improved post pRBC transfusion.  Given extensive vascular disease, continue Plavix 75 mg daily.     #Compensated systolic CHF-  New inferior wall motion abnormality noted on echo  Ms. Tsang is not fluid overloaded on exam and she denies chest pain.  I would not pursue another ischemic evaluation, as Ms. Tsang cannot tolerate dual antiplatelet therapy due to bleeding history.     #ESRD-  S/P HD initiation on  by the renal team.     #PVD-  Patient status post RLE angiogram with 2 stents placed in 2019.   Antiplatelet therapy management as stated above.     #Paroxymal a-fib-  Rapid rates noted overnight.   Now back to sinus.  Continue Toprol.   No AC given h/o GI bleed.        Over 25 minutes spent on total encounter; more than 50% of the visit was spent counseling and/or coordinating care by the attending physician.      Julio César Hooepr MD [facility]  Cardiovascular Disease  [phone]

## 2022-08-18 NOTE — CONSULT NOTE ADULT - ASSESSMENT
-----------------------------------------------------------  Interventional Radiology Brief Consult Note  -----------------------------------------------------------    Reason for Referral: Conversion of non tunneled to tunneled cvc    Clinical Summary: 82y Female with CKD 5 on new dialysis, status post placement of a non-tunneled cvc. IR is consulted for conversion of non tunneled to tunneled cvc.     Vitals:  T(F): 98 (08-18-22 @ 15:51), Max: 98.8 (08-18-22 @ 00:30)  HR: 65 (08-18-22 @ 15:51) (64 - 72)  BP: 174/76 (08-18-22 @ 15:51) (133/59 - 181/68)  RR: 18 (08-18-22 @ 15:51) (17 - 18)  SpO2: 98% (08-18-22 @ 12:31) (98% - 100%)    Labs:           8.9  5.81)-----(232     (08-18-22 @ 05:00)         30.9     137 | 96 | 35  --------------------< 64     (08-18-22 @ 05:00)  4.1 | 30 | 3.68       PT: 12.1 08-16-22 @ 03:51  aPTT: 40.1<H> 08-16-22 @ 03:51   INR: 1.04 08-16-22 @ 03:51    Imaging: reviewed    Assessment: 82y Female on new HD status post non-tunneled central venous catheter placement. IR is consulted for conversion to a tunneled central venous catheter.     Recommendations:  -- Possible conversion of non tunneled to tunneled cvc tomorrow. Please place IR procedure order under Dr. Rodriguez.   -- NPO at midnight.   -- hold Px anticoagulation  -- please maintain COVID PCR within 72 hours of planned procedure   -- Write IR pre procedure note  -- AM labs and coags       -----------------------------------------------------------  Interventional Radiology Brief Consult Note  -----------------------------------------------------------    Reason for Referral: Conversion of non tunneled to tunneled cvc    Clinical Summary: 82y Female with CKD 5 on new dialysis, status post placement of a non-tunneled cvc. IR is consulted for conversion of non tunneled to tunneled cvc.     Vitals:  T(F): 98 (08-18-22 @ 15:51), Max: 98.8 (08-18-22 @ 00:30)  HR: 65 (08-18-22 @ 15:51) (64 - 72)  BP: 174/76 (08-18-22 @ 15:51) (133/59 - 181/68)  RR: 18 (08-18-22 @ 15:51) (17 - 18)  SpO2: 98% (08-18-22 @ 12:31) (98% - 100%)    Labs:           8.9  5.81)-----(232     (08-18-22 @ 05:00)         30.9     137 | 96 | 35  --------------------< 64     (08-18-22 @ 05:00)  4.1 | 30 | 3.68       PT: 12.1 08-16-22 @ 03:51  aPTT: 40.1<H> 08-16-22 @ 03:51   INR: 1.04 08-16-22 @ 03:51    Imaging: reviewed    Assessment: 82y Female on new HD status post non-tunneled central venous catheter placement. IR is consulted for conversion to a tunneled central venous catheter.     Recommendations:  -- Possible conversion of non tunneled to tunneled cvc tuesday. Please place IR procedure order under Dr. Rodriguez.   -- NPO at midnight.   -- hold Px anticoagulation  -- please maintain COVID PCR within 72 hours of planned procedure   -- Write IR pre procedure note  -- AM labs and coags

## 2022-08-19 LAB
ANION GAP SERPL CALC-SCNC: 12 MMOL/L — SIGNIFICANT CHANGE UP (ref 7–14)
BASOPHILS # BLD AUTO: 0.06 K/UL — SIGNIFICANT CHANGE UP (ref 0–0.2)
BASOPHILS NFR BLD AUTO: 1 % — SIGNIFICANT CHANGE UP (ref 0–2)
BUN SERPL-MCNC: 21 MG/DL — SIGNIFICANT CHANGE UP (ref 7–23)
CALCIUM SERPL-MCNC: 8.3 MG/DL — LOW (ref 8.4–10.5)
CHLORIDE SERPL-SCNC: 93 MMOL/L — LOW (ref 98–107)
CO2 SERPL-SCNC: 26 MMOL/L — SIGNIFICANT CHANGE UP (ref 22–31)
CREAT SERPL-MCNC: 2.79 MG/DL — HIGH (ref 0.5–1.3)
EGFR: 17 ML/MIN/1.73M2 — LOW
EOSINOPHIL # BLD AUTO: 0.1 K/UL — SIGNIFICANT CHANGE UP (ref 0–0.5)
EOSINOPHIL NFR BLD AUTO: 1.7 % — SIGNIFICANT CHANGE UP (ref 0–6)
GLUCOSE BLDC GLUCOMTR-MCNC: 118 MG/DL — HIGH (ref 70–99)
GLUCOSE BLDC GLUCOMTR-MCNC: 124 MG/DL — HIGH (ref 70–99)
GLUCOSE BLDC GLUCOMTR-MCNC: 124 MG/DL — HIGH (ref 70–99)
GLUCOSE BLDC GLUCOMTR-MCNC: 141 MG/DL — HIGH (ref 70–99)
GLUCOSE BLDC GLUCOMTR-MCNC: 92 MG/DL — SIGNIFICANT CHANGE UP (ref 70–99)
GLUCOSE SERPL-MCNC: 90 MG/DL — SIGNIFICANT CHANGE UP (ref 70–99)
HCT VFR BLD CALC: 29.3 % — LOW (ref 34.5–45)
HGB BLD-MCNC: 8.5 G/DL — LOW (ref 11.5–15.5)
IANC: 4.12 K/UL — SIGNIFICANT CHANGE UP (ref 1.8–7.4)
IMM GRANULOCYTES NFR BLD AUTO: 1.7 % — HIGH (ref 0–1.5)
LYMPHOCYTES # BLD AUTO: 0.68 K/UL — LOW (ref 1–3.3)
LYMPHOCYTES # BLD AUTO: 11.5 % — LOW (ref 13–44)
MAGNESIUM SERPL-MCNC: 2 MG/DL — SIGNIFICANT CHANGE UP (ref 1.6–2.6)
MCHC RBC-ENTMCNC: 26.9 PG — LOW (ref 27–34)
MCHC RBC-ENTMCNC: 29 GM/DL — LOW (ref 32–36)
MCV RBC AUTO: 92.7 FL — SIGNIFICANT CHANGE UP (ref 80–100)
MONOCYTES # BLD AUTO: 0.83 K/UL — SIGNIFICANT CHANGE UP (ref 0–0.9)
MONOCYTES NFR BLD AUTO: 14.1 % — HIGH (ref 2–14)
NEUTROPHILS # BLD AUTO: 4.12 K/UL — SIGNIFICANT CHANGE UP (ref 1.8–7.4)
NEUTROPHILS NFR BLD AUTO: 70 % — SIGNIFICANT CHANGE UP (ref 43–77)
NRBC # BLD: 0 /100 WBCS — SIGNIFICANT CHANGE UP (ref 0–0)
NRBC # FLD: 0.02 K/UL — HIGH (ref 0–0)
PHOSPHATE SERPL-MCNC: 3.7 MG/DL — SIGNIFICANT CHANGE UP (ref 2.5–4.5)
PLATELET # BLD AUTO: 233 K/UL — SIGNIFICANT CHANGE UP (ref 150–400)
POTASSIUM SERPL-MCNC: 3.7 MMOL/L — SIGNIFICANT CHANGE UP (ref 3.5–5.3)
POTASSIUM SERPL-SCNC: 3.7 MMOL/L — SIGNIFICANT CHANGE UP (ref 3.5–5.3)
PTH RELATED PROT SERPL-MCNC: <2 PMOL/L — SIGNIFICANT CHANGE UP
RBC # BLD: 3.16 M/UL — LOW (ref 3.8–5.2)
RBC # FLD: 17.5 % — HIGH (ref 10.3–14.5)
SODIUM SERPL-SCNC: 131 MMOL/L — LOW (ref 135–145)
WBC # BLD: 5.89 K/UL — SIGNIFICANT CHANGE UP (ref 3.8–10.5)
WBC # FLD AUTO: 5.89 K/UL — SIGNIFICANT CHANGE UP (ref 3.8–10.5)

## 2022-08-19 RX ORDER — INSULIN GLARGINE 100 [IU]/ML
5 INJECTION, SOLUTION SUBCUTANEOUS AT BEDTIME
Refills: 0 | Status: DISCONTINUED | OUTPATIENT
Start: 2022-08-19 | End: 2022-08-22

## 2022-08-19 RX ORDER — ERYTHROPOIETIN 10000 [IU]/ML
10000 INJECTION, SOLUTION INTRAVENOUS; SUBCUTANEOUS
Refills: 0 | Status: DISCONTINUED | OUTPATIENT
Start: 2022-08-20 | End: 2022-08-27

## 2022-08-19 RX ADMIN — CHLORHEXIDINE GLUCONATE 1 APPLICATION(S): 213 SOLUTION TOPICAL at 05:45

## 2022-08-19 RX ADMIN — Medication 10 MILLIGRAM(S): at 05:46

## 2022-08-19 RX ADMIN — CLOPIDOGREL BISULFATE 75 MILLIGRAM(S): 75 TABLET, FILM COATED ORAL at 13:15

## 2022-08-19 RX ADMIN — SEVELAMER CARBONATE 800 MILLIGRAM(S): 2400 POWDER, FOR SUSPENSION ORAL at 21:44

## 2022-08-19 RX ADMIN — INSULIN GLARGINE 5 UNIT(S): 100 INJECTION, SOLUTION SUBCUTANEOUS at 21:44

## 2022-08-19 RX ADMIN — HEPARIN SODIUM 5000 UNIT(S): 5000 INJECTION INTRAVENOUS; SUBCUTANEOUS at 19:05

## 2022-08-19 RX ADMIN — MUPIROCIN 1 APPLICATION(S): 20 OINTMENT TOPICAL at 19:05

## 2022-08-19 RX ADMIN — SEVELAMER CARBONATE 800 MILLIGRAM(S): 2400 POWDER, FOR SUSPENSION ORAL at 05:46

## 2022-08-19 RX ADMIN — PANTOPRAZOLE SODIUM 40 MILLIGRAM(S): 20 TABLET, DELAYED RELEASE ORAL at 09:30

## 2022-08-19 RX ADMIN — HEPARIN SODIUM 5000 UNIT(S): 5000 INJECTION INTRAVENOUS; SUBCUTANEOUS at 09:30

## 2022-08-19 RX ADMIN — Medication 1 ENEMA: at 15:50

## 2022-08-19 RX ADMIN — Medication 10 MILLIGRAM(S): at 13:16

## 2022-08-19 RX ADMIN — MUPIROCIN 1 APPLICATION(S): 20 OINTMENT TOPICAL at 05:46

## 2022-08-19 RX ADMIN — CHLORHEXIDINE GLUCONATE 1 APPLICATION(S): 213 SOLUTION TOPICAL at 19:08

## 2022-08-19 RX ADMIN — SEVELAMER CARBONATE 800 MILLIGRAM(S): 2400 POWDER, FOR SUSPENSION ORAL at 13:16

## 2022-08-19 RX ADMIN — Medication 25 MILLIGRAM(S): at 05:46

## 2022-08-19 RX ADMIN — Medication 10 MILLIGRAM(S): at 21:44

## 2022-08-19 NOTE — PROGRESS NOTE ADULT - SUBJECTIVE AND OBJECTIVE BOX
NEPHROLOGY     Patient seen and examined.    MEDICATIONS  (STANDING):  chlorhexidine 2% Cloths 1 Application(s) Topical two times a day  clopidogrel Tablet 75 milliGRAM(s) Oral daily  dextrose 5%. 1000 milliLiter(s) (50 mL/Hr) IV Continuous <Continuous>  dextrose 5%. 1000 milliLiter(s) (100 mL/Hr) IV Continuous <Continuous>  dextrose 50% Injectable 25 Gram(s) IV Push once  dextrose 50% Injectable 12.5 Gram(s) IV Push once  dextrose 50% Injectable 25 Gram(s) IV Push once  gabapentin 300 milliGRAM(s) Oral at bedtime  glucagon  Injectable 1 milliGRAM(s) IntraMuscular once  heparin   Injectable 5000 Unit(s) SubCutaneous every 12 hours  hydrALAZINE 10 milliGRAM(s) Oral three times a day  insulin glargine Injectable (LANTUS) 8 Unit(s) SubCutaneous at bedtime  insulin lispro (ADMELOG) corrective regimen sliding scale   SubCutaneous three times a day before meals  insulin lispro (ADMELOG) corrective regimen sliding scale   SubCutaneous at bedtime  metoprolol succinate ER 25 milliGRAM(s) Oral daily  mupirocin 2% Ointment 1 Application(s) Topical two times a day  pantoprazole    Tablet 40 milliGRAM(s) Oral before breakfast  senna 2 Tablet(s) Oral at bedtime  sevelamer carbonate 800 milliGRAM(s) Oral three times a day    VITALS:  T(C): , Max: 37 (08-19-22 @ 05:45)  T(F): , Max: 98.6 (08-19-22 @ 05:45)  HR: 61 (08-19-22 @ 05:45)  BP: 151/74 (08-19-22 @ 05:45)  RR: 18 (08-19-22 @ 05:45)  SpO2: 100% (08-19-22 @ 05:45)    I and O's:    08-18 @ 07:01  -  08-19 @ 07:00  --------------------------------------------------------  IN: 400 mL / OUT: 1400 mL / NET: -1000 mL    Height (cm): 156.2 (08-18 @ 11:31)    PHYSICAL EXAM:  Constitutional: NAD, alert  HEENT: MMM  Neck:  No JVD, supple   Respiratory: CTA B/L  Cardiovascular: S1 and S2, IRR  Gastrointestinal: + BS, soft, NT, ND  Extremities: 2+ b/l LE edema  Neurological: reduced generalized strength  : No Sylvester  Skin: No rashes, C/D/I  Vascular Access: R non tunneled HD     LABS:                        8.5    5.89  )-----------( 233      ( 19 Aug 2022 05:36 )             29.3     08-19    131<L>  |  93<L>  |  21  ----------------------------<  90  3.7   |  26  |  2.79<H>    Ca    8.3<L>      19 Aug 2022 05:36  Phos  3.7     08-19  Mg     2.00     08-19     NEPHROLOGY     Patient seen and examined, reports feeling tired, no sob, comfortable on 2L NC, currently in no acute distress. Tolerated third HD session yesterday and removed 1L.     MEDICATIONS  (STANDING):  chlorhexidine 2% Cloths 1 Application(s) Topical two times a day  clopidogrel Tablet 75 milliGRAM(s) Oral daily  dextrose 5%. 1000 milliLiter(s) (50 mL/Hr) IV Continuous <Continuous>  dextrose 5%. 1000 milliLiter(s) (100 mL/Hr) IV Continuous <Continuous>  dextrose 50% Injectable 25 Gram(s) IV Push once  dextrose 50% Injectable 12.5 Gram(s) IV Push once  dextrose 50% Injectable 25 Gram(s) IV Push once  gabapentin 300 milliGRAM(s) Oral at bedtime  glucagon  Injectable 1 milliGRAM(s) IntraMuscular once  heparin   Injectable 5000 Unit(s) SubCutaneous every 12 hours  hydrALAZINE 10 milliGRAM(s) Oral three times a day  insulin glargine Injectable (LANTUS) 8 Unit(s) SubCutaneous at bedtime  insulin lispro (ADMELOG) corrective regimen sliding scale   SubCutaneous three times a day before meals  insulin lispro (ADMELOG) corrective regimen sliding scale   SubCutaneous at bedtime  metoprolol succinate ER 25 milliGRAM(s) Oral daily  mupirocin 2% Ointment 1 Application(s) Topical two times a day  pantoprazole    Tablet 40 milliGRAM(s) Oral before breakfast  senna 2 Tablet(s) Oral at bedtime  sevelamer carbonate 800 milliGRAM(s) Oral three times a day    VITALS:  T(C): , Max: 37 (08-19-22 @ 05:45)  T(F): , Max: 98.6 (08-19-22 @ 05:45)  HR: 61 (08-19-22 @ 05:45)  BP: 151/74 (08-19-22 @ 05:45)  RR: 18 (08-19-22 @ 05:45)  SpO2: 100% (08-19-22 @ 05:45)    I and O's:    08-18 @ 07:01  -  08-19 @ 07:00  --------------------------------------------------------  IN: 400 mL / OUT: 1400 mL / NET: -1000 mL    Height (cm): 156.2 (08-18 @ 11:31)    PHYSICAL EXAM:  Constitutional: NAD, alert  HEENT: MMM  Neck:  No JVD, supple   Respiratory: CTA B/L  Cardiovascular: S1 and S2, IRR  Gastrointestinal: + BS, soft, NT, ND  Extremities: 2+ b/l LE edema  Neurological: reduced generalized strength  : No Sylvester  Skin: No rashes, C/D/I  Vascular Access: R non tunneled HD     LABS:                        8.5    5.89  )-----------( 233      ( 19 Aug 2022 05:36 )             29.3     08-19    131<L>  |  93<L>  |  21  ----------------------------<  90  3.7   |  26  |  2.79<H>    Ca    8.3<L>      19 Aug 2022 05:36  Phos  3.7     08-19  Mg     2.00     08-19

## 2022-08-19 NOTE — PROGRESS NOTE ADULT - SUBJECTIVE AND OBJECTIVE BOX
Patient is a 82y old  Female who presents with a chief complaint of abnormal labs (19 Aug 2022 10:48)      SUBJECTIVE / OVERNIGHT EVENTS:    Events noted.  CONSTITUTIONAL: No fever,  or fatigue  RESPIRATORY: No cough, wheezing,  No shortness of breath  CARDIOVASCULAR: No chest pain, palpitations, dizziness, or leg swelling  GASTROINTESTINAL: No abdominal or epigastric pain. No nausea, vomiting.  NEUROLOGICAL: No headache    MEDICATIONS  (STANDING):  chlorhexidine 2% Cloths 1 Application(s) Topical two times a day  clopidogrel Tablet 75 milliGRAM(s) Oral daily  dextrose 5%. 1000 milliLiter(s) (100 mL/Hr) IV Continuous <Continuous>  dextrose 5%. 1000 milliLiter(s) (50 mL/Hr) IV Continuous <Continuous>  dextrose 50% Injectable 25 Gram(s) IV Push once  dextrose 50% Injectable 12.5 Gram(s) IV Push once  dextrose 50% Injectable 25 Gram(s) IV Push once  gabapentin 300 milliGRAM(s) Oral at bedtime  glucagon  Injectable 1 milliGRAM(s) IntraMuscular once  heparin   Injectable 5000 Unit(s) SubCutaneous every 12 hours  hydrALAZINE 10 milliGRAM(s) Oral three times a day  insulin glargine Injectable (LANTUS) 5 Unit(s) SubCutaneous at bedtime  insulin lispro (ADMELOG) corrective regimen sliding scale   SubCutaneous three times a day before meals  insulin lispro (ADMELOG) corrective regimen sliding scale   SubCutaneous at bedtime  metoprolol succinate ER 25 milliGRAM(s) Oral daily  mupirocin 2% Ointment 1 Application(s) Topical two times a day  pantoprazole    Tablet 40 milliGRAM(s) Oral before breakfast  senna 2 Tablet(s) Oral at bedtime  sevelamer carbonate 800 milliGRAM(s) Oral three times a day    MEDICATIONS  (PRN):  acetaminophen     Tablet .. 650 milliGRAM(s) Oral every 6 hours PRN Temp greater or equal to 38C (100.4F), Mild Pain (1 - 3)  dextrose Oral Gel 15 Gram(s) Oral once PRN Blood Glucose LESS THAN 70 milliGRAM(s)/deciliter  lidocaine   4% Patch 1 Patch Transdermal every 24 hours PRN Back pain  melatonin 3 milliGRAM(s) Oral at bedtime PRN Insomnia  polyethylene glycol 3350 17 Gram(s) Oral daily PRN Constipation  sodium chloride 0.9% lock flush 10 milliLiter(s) IV Push every 1 hour PRN Pre/post blood products, medications, blood draw, and to maintain line patency        CAPILLARY BLOOD GLUCOSE      POCT Blood Glucose.: 124 mg/dL (19 Aug 2022 17:51)  POCT Blood Glucose.: 124 mg/dL (19 Aug 2022 12:01)  POCT Blood Glucose.: 92 mg/dL (19 Aug 2022 08:16)  POCT Blood Glucose.: 118 mg/dL (19 Aug 2022 02:26)  POCT Blood Glucose.: 141 mg/dL (18 Aug 2022 22:46)  POCT Blood Glucose.: 84 mg/dL (18 Aug 2022 21:36)    I&O's Summary    18 Aug 2022 07:01  -  19 Aug 2022 07:00  --------------------------------------------------------  IN: 400 mL / OUT: 1400 mL / NET: -1000 mL        T(C): 36.8 (08-19-22 @ 13:15), Max: 37 (08-19-22 @ 05:45)  HR: 62 (08-19-22 @ 13:15) (61 - 67)  BP: 156/60 (08-19-22 @ 13:15) (141/60 - 159/69)  RR: 14 (08-19-22 @ 13:15) (14 - 20)  SpO2: 100% (08-19-22 @ 13:15) (98% - 100%)    PHYSICAL EXAM:  GENERAL: NAD  NECK: Supple, No JVD  CHEST/LUNG: Clear to auscultation bilaterally; No wheezing.  HEART: Regular rate and rhythm; No murmurs, rubs, or gallops  ABDOMEN: Soft, Nontender, Nondistended; Bowel sounds present  EXTREMITIES:   No edema  NEUROLOGY: AAO X 3      LABS:                        8.5    5.89  )-----------( 233      ( 19 Aug 2022 05:36 )             29.3     08-19    131<L>  |  93<L>  |  21  ----------------------------<  90  3.7   |  26  |  2.79<H>    Ca    8.3<L>      19 Aug 2022 05:36  Phos  3.7     08-19  Mg     2.00     08-19              CAPILLARY BLOOD GLUCOSE      POCT Blood Glucose.: 124 mg/dL (19 Aug 2022 17:51)  POCT Blood Glucose.: 124 mg/dL (19 Aug 2022 12:01)  POCT Blood Glucose.: 92 mg/dL (19 Aug 2022 08:16)  POCT Blood Glucose.: 118 mg/dL (19 Aug 2022 02:26)  POCT Blood Glucose.: 141 mg/dL (18 Aug 2022 22:46)  POCT Blood Glucose.: 84 mg/dL (18 Aug 2022 21:36)        RADIOLOGY & ADDITIONAL TESTS:    Imaging Personally Reviewed:    Consultant(s) Notes Reviewed:      Care Discussed with Consultants/Other Providers:    Parminder Goldberg MD, CMD, FACP    [address] [address]  Office Tel: [phone]  Cell: [phone]

## 2022-08-19 NOTE — PROGRESS NOTE ADULT - SUBJECTIVE AND OBJECTIVE BOX
Cardiovascular Disease Progress Note    Overnight events: No acute events overnight.    Ms. Tsang is complaining of generalized weakness.     Objective Findings:  T(C): 36.6 (22 @ 02:35), Max: 36.7 (22 @ 15:51)  HR: 62 (22 @ 02:35) (62 - 67)  BP: 151/72 (22 @ 02:35) (138/58 - 174/76)  RR: 19 (22 @ 02:35) (17 - 20)  SpO2: 100% (22 @ 02:35) (98% - 100%)  Wt(kg): --  Daily Height in cm: 156.2 (18 Aug 2022 11:31)    Daily Weight in k (18 Aug 2022 19:30)      Physical Exam:  Gen: NAD; Patient resting comfortably  HEENT: EOMI, Normocephalic/ atraumatic  CV: RRR, normal S1 + S2, no m/r/g  Lungs:  Normal respiratory effort; clear to auscultation bilaterally  Abd: soft, non-tender; bowel sounds present  Ext: No edema; warm and well perfused     Telemetry: Sinus     Laboratory Data:                        8.9    5.81  )-----------( 232      ( 18 Aug 2022 05:00 )             30.9     18    137  |  96<L>  |  35<H>  ----------------------------<  64<L>  4.1   |  30  |  3.68<H>    Ca    8.4      18 Aug 2022 05:00  Phos  4.7       Mg     2.00     -18                Inpatient Medications:  MEDICATIONS  (STANDING):  chlorhexidine 2% Cloths 1 Application(s) Topical two times a day  clopidogrel Tablet 75 milliGRAM(s) Oral daily  dextrose 5%. 1000 milliLiter(s) (50 mL/Hr) IV Continuous <Continuous>  dextrose 5%. 1000 milliLiter(s) (100 mL/Hr) IV Continuous <Continuous>  dextrose 50% Injectable 25 Gram(s) IV Push once  dextrose 50% Injectable 12.5 Gram(s) IV Push once  dextrose 50% Injectable 25 Gram(s) IV Push once  gabapentin 300 milliGRAM(s) Oral at bedtime  glucagon  Injectable 1 milliGRAM(s) IntraMuscular once  heparin   Injectable 5000 Unit(s) SubCutaneous every 12 hours  hydrALAZINE 10 milliGRAM(s) Oral three times a day  insulin glargine Injectable (LANTUS) 8 Unit(s) SubCutaneous at bedtime  insulin lispro (ADMELOG) corrective regimen sliding scale   SubCutaneous three times a day before meals  insulin lispro (ADMELOG) corrective regimen sliding scale   SubCutaneous at bedtime  metoprolol succinate ER 25 milliGRAM(s) Oral daily  mupirocin 2% Ointment 1 Application(s) Topical two times a day  pantoprazole    Tablet 40 milliGRAM(s) Oral before breakfast  senna 2 Tablet(s) Oral at bedtime  sevelamer carbonate 800 milliGRAM(s) Oral three times a day      Assessment: 82 year old woman with uncontrolled IDDM, compensated diastolic CHF, CAD s/p PCI, CKD V and HTN presents with anemia.     Plan of Care:      #CAD- s/p MICHAEL x2 in 2017.  Anemia noted on admission, but patient denies any signs of bleeding.  Likely anemia in the setting of CKD.   Exertional dyspnea now improved post pRBC transfusion.  Given extensive vascular disease, continue Plavix 75 mg daily.     #Compensated systolic CHF-  New inferior wall motion abnormality noted on echo  Ms. Tsang is not fluid overloaded on exam and she denies chest pain.  I would not pursue another ischemic evaluation, as Ms. Tsang cannot tolerate dual antiplatelet therapy due to bleeding history.     #ESRD-  S/P HD initiation on  by the renal team.     #PVD-  Patient status post RLE angiogram with 2 stents placed in 2019.   Antiplatelet therapy management as stated above.     #Paroxymal a-fib-  Currently in sinus.  Continue Toprol.   No AC given h/o GI bleed.        Over 25 minutes spent on total encounter; more than 50% of the visit was spent counseling and/or coordinating care by the attending physician.      Julio Cséar Hooper MD [facility]  Cardiovascular Disease  [phone]

## 2022-08-19 NOTE — PROGRESS NOTE ADULT - ASSESSMENT
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 Improving now on HD  (3)Hyperphosphatemia - renally mediated - improving, on Renvela/low-PO4 diet  (4)Anemia - s/p PRBCs; on Venofer  (5)Psychosocial - Given her anxiety/fragile emotional state, would not seek out AV access placement. Would defer with access until after discharge.   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 Improving now on HD  (3)Hyperphosphatemia - renally mediated - improving, on Renvela/low-PO4 diet  (4)Anemia - s/p PRBCs; on Venofer  (5)Psychosocial - Given her anxiety/fragile emotional state, would not seek out AV access placement. Would defer with access until after discharge.   (6)IR- possible conversion of non tunneled to tunneled catheter early next week     RECOMMENDATIONS:  (1)Next HD tomorrow: 3 hrs, 1kg; Retacrit with HD  (2)SW setting up with outside HD facility    Cathy Ureña NP-C  [facility]  [phone]       RENAL ATTENDING NOTE  Patient seen and examined with NP. Agree with assessment and plan as above.    Reid Solis MD  [facility]  [phone]

## 2022-08-20 LAB
ANION GAP SERPL CALC-SCNC: 11 MMOL/L — SIGNIFICANT CHANGE UP (ref 7–14)
BASOPHILS # BLD AUTO: 0.05 K/UL — SIGNIFICANT CHANGE UP (ref 0–0.2)
BASOPHILS NFR BLD AUTO: 1 % — SIGNIFICANT CHANGE UP (ref 0–2)
BLD GP AB SCN SERPL QL: NEGATIVE — SIGNIFICANT CHANGE UP
BUN SERPL-MCNC: 26 MG/DL — HIGH (ref 7–23)
CALCIUM SERPL-MCNC: 8.3 MG/DL — LOW (ref 8.4–10.5)
CHLORIDE SERPL-SCNC: 91 MMOL/L — LOW (ref 98–107)
CO2 SERPL-SCNC: 27 MMOL/L — SIGNIFICANT CHANGE UP (ref 22–31)
CREAT SERPL-MCNC: 3.76 MG/DL — HIGH (ref 0.5–1.3)
EGFR: 12 ML/MIN/1.73M2 — LOW
EOSINOPHIL # BLD AUTO: 0.09 K/UL — SIGNIFICANT CHANGE UP (ref 0–0.5)
EOSINOPHIL NFR BLD AUTO: 1.8 % — SIGNIFICANT CHANGE UP (ref 0–6)
GLUCOSE BLDC GLUCOMTR-MCNC: 122 MG/DL — HIGH (ref 70–99)
GLUCOSE BLDC GLUCOMTR-MCNC: 145 MG/DL — HIGH (ref 70–99)
GLUCOSE BLDC GLUCOMTR-MCNC: 155 MG/DL — HIGH (ref 70–99)
GLUCOSE BLDC GLUCOMTR-MCNC: 94 MG/DL — SIGNIFICANT CHANGE UP (ref 70–99)
GLUCOSE SERPL-MCNC: 93 MG/DL — SIGNIFICANT CHANGE UP (ref 70–99)
HCT VFR BLD CALC: 33.2 % — LOW (ref 34.5–45)
HGB BLD-MCNC: 9.5 G/DL — LOW (ref 11.5–15.5)
IANC: 3.47 K/UL — SIGNIFICANT CHANGE UP (ref 1.8–7.4)
IMM GRANULOCYTES NFR BLD AUTO: 1.4 % — SIGNIFICANT CHANGE UP (ref 0–1.5)
LYMPHOCYTES # BLD AUTO: 0.79 K/UL — LOW (ref 1–3.3)
LYMPHOCYTES # BLD AUTO: 15.4 % — SIGNIFICANT CHANGE UP (ref 13–44)
MAGNESIUM SERPL-MCNC: 2.2 MG/DL — SIGNIFICANT CHANGE UP (ref 1.6–2.6)
MCHC RBC-ENTMCNC: 26.2 PG — LOW (ref 27–34)
MCHC RBC-ENTMCNC: 28.6 GM/DL — LOW (ref 32–36)
MCV RBC AUTO: 91.7 FL — SIGNIFICANT CHANGE UP (ref 80–100)
MONOCYTES # BLD AUTO: 0.66 K/UL — SIGNIFICANT CHANGE UP (ref 0–0.9)
MONOCYTES NFR BLD AUTO: 12.9 % — SIGNIFICANT CHANGE UP (ref 2–14)
NEUTROPHILS # BLD AUTO: 3.47 K/UL — SIGNIFICANT CHANGE UP (ref 1.8–7.4)
NEUTROPHILS NFR BLD AUTO: 67.5 % — SIGNIFICANT CHANGE UP (ref 43–77)
NRBC # BLD: 0 /100 WBCS — SIGNIFICANT CHANGE UP (ref 0–0)
NRBC # FLD: 0 K/UL — SIGNIFICANT CHANGE UP (ref 0–0)
PHOSPHATE SERPL-MCNC: 4.8 MG/DL — HIGH (ref 2.5–4.5)
PLATELET # BLD AUTO: 241 K/UL — SIGNIFICANT CHANGE UP (ref 150–400)
POTASSIUM SERPL-MCNC: 3.7 MMOL/L — SIGNIFICANT CHANGE UP (ref 3.5–5.3)
POTASSIUM SERPL-SCNC: 3.7 MMOL/L — SIGNIFICANT CHANGE UP (ref 3.5–5.3)
RBC # BLD: 3.62 M/UL — LOW (ref 3.8–5.2)
RBC # FLD: 18.3 % — HIGH (ref 10.3–14.5)
RH IG SCN BLD-IMP: POSITIVE — SIGNIFICANT CHANGE UP
SODIUM SERPL-SCNC: 129 MMOL/L — LOW (ref 135–145)
WBC # BLD: 5.13 K/UL — SIGNIFICANT CHANGE UP (ref 3.8–10.5)
WBC # FLD AUTO: 5.13 K/UL — SIGNIFICANT CHANGE UP (ref 3.8–10.5)

## 2022-08-20 RX ADMIN — Medication 1: at 11:43

## 2022-08-20 RX ADMIN — Medication 10 MILLIGRAM(S): at 22:42

## 2022-08-20 RX ADMIN — HEPARIN SODIUM 5000 UNIT(S): 5000 INJECTION INTRAVENOUS; SUBCUTANEOUS at 18:30

## 2022-08-20 RX ADMIN — CHLORHEXIDINE GLUCONATE 1 APPLICATION(S): 213 SOLUTION TOPICAL at 18:29

## 2022-08-20 RX ADMIN — INSULIN GLARGINE 5 UNIT(S): 100 INJECTION, SOLUTION SUBCUTANEOUS at 22:42

## 2022-08-20 RX ADMIN — SEVELAMER CARBONATE 800 MILLIGRAM(S): 2400 POWDER, FOR SUSPENSION ORAL at 14:52

## 2022-08-20 RX ADMIN — HEPARIN SODIUM 5000 UNIT(S): 5000 INJECTION INTRAVENOUS; SUBCUTANEOUS at 05:24

## 2022-08-20 RX ADMIN — PANTOPRAZOLE SODIUM 40 MILLIGRAM(S): 20 TABLET, DELAYED RELEASE ORAL at 05:30

## 2022-08-20 RX ADMIN — CLOPIDOGREL BISULFATE 75 MILLIGRAM(S): 75 TABLET, FILM COATED ORAL at 14:52

## 2022-08-20 RX ADMIN — MUPIROCIN 1 APPLICATION(S): 20 OINTMENT TOPICAL at 18:29

## 2022-08-20 RX ADMIN — Medication 10 MILLIGRAM(S): at 05:21

## 2022-08-20 RX ADMIN — SENNA PLUS 2 TABLET(S): 8.6 TABLET ORAL at 22:55

## 2022-08-20 RX ADMIN — Medication 10 MILLIGRAM(S): at 14:52

## 2022-08-20 RX ADMIN — ERYTHROPOIETIN 10000 UNIT(S): 10000 INJECTION, SOLUTION INTRAVENOUS; SUBCUTANEOUS at 11:26

## 2022-08-20 RX ADMIN — GABAPENTIN 300 MILLIGRAM(S): 400 CAPSULE ORAL at 22:42

## 2022-08-20 RX ADMIN — CHLORHEXIDINE GLUCONATE 1 APPLICATION(S): 213 SOLUTION TOPICAL at 05:30

## 2022-08-20 RX ADMIN — SEVELAMER CARBONATE 800 MILLIGRAM(S): 2400 POWDER, FOR SUSPENSION ORAL at 22:41

## 2022-08-20 RX ADMIN — Medication 25 MILLIGRAM(S): at 05:22

## 2022-08-20 RX ADMIN — SEVELAMER CARBONATE 800 MILLIGRAM(S): 2400 POWDER, FOR SUSPENSION ORAL at 05:22

## 2022-08-20 RX ADMIN — Medication 650 MILLIGRAM(S): at 14:52

## 2022-08-20 RX ADMIN — MUPIROCIN 1 APPLICATION(S): 20 OINTMENT TOPICAL at 05:25

## 2022-08-20 NOTE — PROGRESS NOTE ADULT - SUBJECTIVE AND OBJECTIVE BOX
Cardiovascular Disease Progress Note    Overnight events: No acute events overnight.  Ms. Tsang denies chest pain or SOB.     Otherwise review of systems negative    Objective Findings:  T(C): 36.6 (08-20-22 @ 05:20), Max: 36.8 (08-19-22 @ 09:45)  HR: 92 (08-20-22 @ 05:20) (62 - 110)  BP: 148/93 (08-20-22 @ 05:20) (121/80 - 156/60)  RR: 18 (08-20-22 @ 05:20) (14 - 18)  SpO2: 100% (08-20-22 @ 05:20) (98% - 100%)  Wt(kg): --  Daily     Daily       Physical Exam:  Gen: NAD; Patient resting comfortably  HEENT: EOMI, Normocephalic/ atraumatic  CV: RRR, normal S1 + S2, no m/r/g  Lungs:  Normal respiratory effort; clear to auscultation bilaterally  Abd: soft, non-tender; bowel sounds present  Ext: No edema; warm and well perfused    Telemetry: Sinus    Laboratory Data:                        9.5    5.13  )-----------( 241      ( 20 Aug 2022 06:04 )             33.2     08-20    129<L>  |  91<L>  |  26<H>  ----------------------------<  93  3.7   |  27  |  3.76<H>    Ca    8.3<L>      20 Aug 2022 06:04  Phos  4.8     08-20  Mg     2.20     08-20                Inpatient Medications:  MEDICATIONS  (STANDING):  chlorhexidine 2% Cloths 1 Application(s) Topical two times a day  clopidogrel Tablet 75 milliGRAM(s) Oral daily  dextrose 5%. 1000 milliLiter(s) (50 mL/Hr) IV Continuous <Continuous>  dextrose 5%. 1000 milliLiter(s) (100 mL/Hr) IV Continuous <Continuous>  dextrose 50% Injectable 25 Gram(s) IV Push once  dextrose 50% Injectable 12.5 Gram(s) IV Push once  dextrose 50% Injectable 25 Gram(s) IV Push once  epoetin sean-epbx (RETACRIT) Injectable 20545 Unit(s) IV Push <User Schedule>  gabapentin 300 milliGRAM(s) Oral at bedtime  glucagon  Injectable 1 milliGRAM(s) IntraMuscular once  heparin   Injectable 5000 Unit(s) SubCutaneous every 12 hours  hydrALAZINE 10 milliGRAM(s) Oral three times a day  insulin glargine Injectable (LANTUS) 5 Unit(s) SubCutaneous at bedtime  insulin lispro (ADMELOG) corrective regimen sliding scale   SubCutaneous three times a day before meals  insulin lispro (ADMELOG) corrective regimen sliding scale   SubCutaneous at bedtime  metoprolol succinate ER 25 milliGRAM(s) Oral daily  mupirocin 2% Ointment 1 Application(s) Topical two times a day  pantoprazole    Tablet 40 milliGRAM(s) Oral before breakfast  senna 2 Tablet(s) Oral at bedtime  sevelamer carbonate 800 milliGRAM(s) Oral three times a day      Assessment: 82 year old woman with uncontrolled IDDM, compensated diastolic CHF, CAD s/p PCI, CKD V and HTN presents with anemia.     Plan of Care:      #CAD- s/p MICHAEL x2 in 12/2017.  Anemia noted on admission, but patient denies any signs of bleeding.  Likely anemia in the setting of CKD.   Exertional dyspnea now improved post pRBC transfusion.  Given extensive vascular disease, continue Plavix 75 mg daily.     #Compensated systolic CHF-  New inferior wall motion abnormality noted on echo  Ms. Tsang is not fluid overloaded on exam and she denies chest pain.  I would not pursue another ischemic evaluation, as Ms. Tsang cannot tolerate dual antiplatelet therapy due to bleeding history.     #ESRD-  S/P HD initiation on 8/16 by the renal team.     #PVD-  Patient status post RLE angiogram with 2 stents placed in 11/2019.   Antiplatelet therapy management as stated above.     #Paroxymal a-fib-  Currently in sinus.  Continue Toprol.   No AC given h/o GI bleed.      Over 25 minutes spent on total encounter; more than 50% of the visit was spent counseling and/or coordinating care by the attending physician.      Julio César Hooper MD [facility]  Cardiovascular Disease  [phone] Cardiovascular Disease Progress Note    Overnight events: No acute events overnight.  Ms. Tsang denies chest pain or SOB.     Otherwise review of systems negative    Objective Findings:  T(C): 36.6 (08-20-22 @ 05:20), Max: 36.8 (08-19-22 @ 09:45)  HR: 92 (08-20-22 @ 05:20) (62 - 110)  BP: 148/93 (08-20-22 @ 05:20) (121/80 - 156/60)  RR: 18 (08-20-22 @ 05:20) (14 - 18)  SpO2: 100% (08-20-22 @ 05:20) (98% - 100%)  Wt(kg): --  Daily     Daily       Physical Exam:  Gen: NAD; Patient resting comfortably  HEENT: EOMI, Normocephalic/ atraumatic  CV: IIR, normal S1 + S2, no m/r/g  Lungs:  Normal respiratory effort; clear to auscultation bilaterally  Abd: soft, non-tender; bowel sounds present  Ext: No edema; warm and well perfused    Telemetry: a-fib    Laboratory Data:                        9.5    5.13  )-----------( 241      ( 20 Aug 2022 06:04 )             33.2     08-20    129<L>  |  91<L>  |  26<H>  ----------------------------<  93  3.7   |  27  |  3.76<H>    Ca    8.3<L>      20 Aug 2022 06:04  Phos  4.8     08-20  Mg     2.20     08-20                Inpatient Medications:  MEDICATIONS  (STANDING):  chlorhexidine 2% Cloths 1 Application(s) Topical two times a day  clopidogrel Tablet 75 milliGRAM(s) Oral daily  dextrose 5%. 1000 milliLiter(s) (50 mL/Hr) IV Continuous <Continuous>  dextrose 5%. 1000 milliLiter(s) (100 mL/Hr) IV Continuous <Continuous>  dextrose 50% Injectable 25 Gram(s) IV Push once  dextrose 50% Injectable 12.5 Gram(s) IV Push once  dextrose 50% Injectable 25 Gram(s) IV Push once  epoetin sean-epbx (RETACRIT) Injectable 62708 Unit(s) IV Push <User Schedule>  gabapentin 300 milliGRAM(s) Oral at bedtime  glucagon  Injectable 1 milliGRAM(s) IntraMuscular once  heparin   Injectable 5000 Unit(s) SubCutaneous every 12 hours  hydrALAZINE 10 milliGRAM(s) Oral three times a day  insulin glargine Injectable (LANTUS) 5 Unit(s) SubCutaneous at bedtime  insulin lispro (ADMELOG) corrective regimen sliding scale   SubCutaneous three times a day before meals  insulin lispro (ADMELOG) corrective regimen sliding scale   SubCutaneous at bedtime  metoprolol succinate ER 25 milliGRAM(s) Oral daily  mupirocin 2% Ointment 1 Application(s) Topical two times a day  pantoprazole    Tablet 40 milliGRAM(s) Oral before breakfast  senna 2 Tablet(s) Oral at bedtime  sevelamer carbonate 800 milliGRAM(s) Oral three times a day      Assessment: 82 year old woman with uncontrolled IDDM, compensated diastolic CHF, CAD s/p PCI, CKD V and HTN presents with anemia.     Plan of Care:      #CAD- s/p MICHAEL x2 in 12/2017.  Anemia noted on admission, but patient denies any signs of bleeding.  Likely anemia in the setting of CKD.   Exertional dyspnea now improved post pRBC transfusion.  Given extensive vascular disease, continue Plavix 75 mg daily.     #Compensated systolic CHF-  New inferior wall motion abnormality noted on echo  Ms. Tsang is not fluid overloaded on exam and she denies chest pain.  I would not pursue another ischemic evaluation, as Ms. Tsang cannot tolerate dual antiplatelet therapy due to bleeding history.     #ESRD-  S/P HD initiation on 8/16 by the renal team.     #PVD-  Patient status post RLE angiogram with 2 stents placed in 11/2019.   Antiplatelet therapy management as stated above.     #Paroxymal a-fib-  Currently in rate controlled a-fib.   Continue Toprol.   No AC given h/o GI bleed.      Over 25 minutes spent on total encounter; more than 50% of the visit was spent counseling and/or coordinating care by the attending physician.      Julio César Hooper MD [facility]  Cardiovascular Disease  [phone]

## 2022-08-20 NOTE — PROGRESS NOTE ADULT - SUBJECTIVE AND OBJECTIVE BOX
Patient is a 82y old  Female who presents with a chief complaint of abnormal labs (19 Aug 2022 10:48)      SUBJECTIVE / OVERNIGHT EVENTS:    Events noted.  CONSTITUTIONAL: No fever,  or fatigue  RESPIRATORY: No cough, wheezing,  No shortness of breath  CARDIOVASCULAR: No chest pain, palpitations, dizziness, or leg swelling  GASTROINTESTINAL: No abdominal or epigastric pain. No nausea, vomiting.  NEUROLOGICAL: No headache    MEDICATIONS  (STANDING):  chlorhexidine 2% Cloths 1 Application(s) Topical two times a day  clopidogrel Tablet 75 milliGRAM(s) Oral daily  dextrose 5%. 1000 milliLiter(s) (100 mL/Hr) IV Continuous <Continuous>  dextrose 5%. 1000 milliLiter(s) (50 mL/Hr) IV Continuous <Continuous>  dextrose 50% Injectable 25 Gram(s) IV Push once  dextrose 50% Injectable 12.5 Gram(s) IV Push once  dextrose 50% Injectable 25 Gram(s) IV Push once  gabapentin 300 milliGRAM(s) Oral at bedtime  glucagon  Injectable 1 milliGRAM(s) IntraMuscular once  heparin   Injectable 5000 Unit(s) SubCutaneous every 12 hours  hydrALAZINE 10 milliGRAM(s) Oral three times a day  insulin glargine Injectable (LANTUS) 5 Unit(s) SubCutaneous at bedtime  insulin lispro (ADMELOG) corrective regimen sliding scale   SubCutaneous three times a day before meals  insulin lispro (ADMELOG) corrective regimen sliding scale   SubCutaneous at bedtime  metoprolol succinate ER 25 milliGRAM(s) Oral daily  mupirocin 2% Ointment 1 Application(s) Topical two times a day  pantoprazole    Tablet 40 milliGRAM(s) Oral before breakfast  senna 2 Tablet(s) Oral at bedtime  sevelamer carbonate 800 milliGRAM(s) Oral three times a day    MEDICATIONS  (PRN):  acetaminophen     Tablet .. 650 milliGRAM(s) Oral every 6 hours PRN Temp greater or equal to 38C (100.4F), Mild Pain (1 - 3)  dextrose Oral Gel 15 Gram(s) Oral once PRN Blood Glucose LESS THAN 70 milliGRAM(s)/deciliter  lidocaine   4% Patch 1 Patch Transdermal every 24 hours PRN Back pain  melatonin 3 milliGRAM(s) Oral at bedtime PRN Insomnia  polyethylene glycol 3350 17 Gram(s) Oral daily PRN Constipation  sodium chloride 0.9% lock flush 10 milliLiter(s) IV Push every 1 hour PRN Pre/post blood products, medications, blood draw, and to maintain line patency        CAPILLARY BLOOD GLUCOSE      POCT Blood Glucose.: 124 mg/dL (19 Aug 2022 17:51)  POCT Blood Glucose.: 124 mg/dL (19 Aug 2022 12:01)  POCT Blood Glucose.: 92 mg/dL (19 Aug 2022 08:16)  POCT Blood Glucose.: 118 mg/dL (19 Aug 2022 02:26)  POCT Blood Glucose.: 141 mg/dL (18 Aug 2022 22:46)  POCT Blood Glucose.: 84 mg/dL (18 Aug 2022 21:36)    I&O's Summary    18 Aug 2022 07:01  -  19 Aug 2022 07:00  --------------------------------------------------------  IN: 400 mL / OUT: 1400 mL / NET: -1000 mL        T(C): 36.8 (08-19-22 @ 13:15), Max: 37 (08-19-22 @ 05:45)  HR: 62 (08-19-22 @ 13:15) (61 - 67)  BP: 156/60 (08-19-22 @ 13:15) (141/60 - 159/69)  RR: 14 (08-19-22 @ 13:15) (14 - 20)  SpO2: 100% (08-19-22 @ 13:15) (98% - 100%)    PHYSICAL EXAM:  GENERAL: NAD  NECK: Supple, No JVD  CHEST/LUNG: Clear to auscultation bilaterally; No wheezing.  HEART: Regular rate and rhythm; No murmurs, rubs, or gallops  ABDOMEN: Soft, Nontender, Nondistended; Bowel sounds present  EXTREMITIES:   No edema  NEUROLOGY: AAO X 3      LABS:                        8.5    5.89  )-----------( 233      ( 19 Aug 2022 05:36 )             29.3     08-19    131<L>  |  93<L>  |  21  ----------------------------<  90  3.7   |  26  |  2.79<H>    Ca    8.3<L>      19 Aug 2022 05:36  Phos  3.7     08-19  Mg     2.00     08-19              CAPILLARY BLOOD GLUCOSE      POCT Blood Glucose.: 124 mg/dL (19 Aug 2022 17:51)  POCT Blood Glucose.: 124 mg/dL (19 Aug 2022 12:01)  POCT Blood Glucose.: 92 mg/dL (19 Aug 2022 08:16)  POCT Blood Glucose.: 118 mg/dL (19 Aug 2022 02:26)  POCT Blood Glucose.: 141 mg/dL (18 Aug 2022 22:46)  POCT Blood Glucose.: 84 mg/dL (18 Aug 2022 21:36)        RADIOLOGY & ADDITIONAL TESTS:    Imaging Personally Reviewed:    Consultant(s) Notes Reviewed:      Care Discussed with Consultants/Other Providers:    Parminder Goldberg MD, CMD, FACP    [address] [place] [address]  Office Tel: [phone]  Cell: [phone]

## 2022-08-21 LAB
ANION GAP SERPL CALC-SCNC: 12 MMOL/L — SIGNIFICANT CHANGE UP (ref 7–14)
BUN SERPL-MCNC: 15 MG/DL — SIGNIFICANT CHANGE UP (ref 7–23)
CALCIUM SERPL-MCNC: 8.2 MG/DL — LOW (ref 8.4–10.5)
CHLORIDE SERPL-SCNC: 98 MMOL/L — SIGNIFICANT CHANGE UP (ref 98–107)
CO2 SERPL-SCNC: 23 MMOL/L — SIGNIFICANT CHANGE UP (ref 22–31)
CREAT SERPL-MCNC: 2.79 MG/DL — HIGH (ref 0.5–1.3)
EGFR: 17 ML/MIN/1.73M2 — LOW
GLUCOSE BLDC GLUCOMTR-MCNC: 110 MG/DL — HIGH (ref 70–99)
GLUCOSE BLDC GLUCOMTR-MCNC: 147 MG/DL — HIGH (ref 70–99)
GLUCOSE BLDC GLUCOMTR-MCNC: 185 MG/DL — HIGH (ref 70–99)
GLUCOSE BLDC GLUCOMTR-MCNC: 93 MG/DL — SIGNIFICANT CHANGE UP (ref 70–99)
GLUCOSE SERPL-MCNC: 83 MG/DL — SIGNIFICANT CHANGE UP (ref 70–99)
MAGNESIUM SERPL-MCNC: 2.1 MG/DL — SIGNIFICANT CHANGE UP (ref 1.6–2.6)
PHOSPHATE SERPL-MCNC: 3.4 MG/DL — SIGNIFICANT CHANGE UP (ref 2.5–4.5)
POTASSIUM SERPL-MCNC: 4.5 MMOL/L — SIGNIFICANT CHANGE UP (ref 3.5–5.3)
POTASSIUM SERPL-SCNC: 4.5 MMOL/L — SIGNIFICANT CHANGE UP (ref 3.5–5.3)
SODIUM SERPL-SCNC: 133 MMOL/L — LOW (ref 135–145)

## 2022-08-21 PROCEDURE — 74018 RADEX ABDOMEN 1 VIEW: CPT | Mod: 26

## 2022-08-21 RX ORDER — POLYETHYLENE GLYCOL 3350 17 G/17G
17 POWDER, FOR SOLUTION ORAL
Refills: 0 | Status: DISCONTINUED | OUTPATIENT
Start: 2022-08-21 | End: 2022-08-27

## 2022-08-21 RX ADMIN — CLOPIDOGREL BISULFATE 75 MILLIGRAM(S): 75 TABLET, FILM COATED ORAL at 11:30

## 2022-08-21 RX ADMIN — SEVELAMER CARBONATE 800 MILLIGRAM(S): 2400 POWDER, FOR SUSPENSION ORAL at 05:38

## 2022-08-21 RX ADMIN — GABAPENTIN 300 MILLIGRAM(S): 400 CAPSULE ORAL at 21:19

## 2022-08-21 RX ADMIN — Medication 1: at 12:32

## 2022-08-21 RX ADMIN — SENNA PLUS 2 TABLET(S): 8.6 TABLET ORAL at 21:25

## 2022-08-21 RX ADMIN — SEVELAMER CARBONATE 800 MILLIGRAM(S): 2400 POWDER, FOR SUSPENSION ORAL at 13:41

## 2022-08-21 RX ADMIN — CHLORHEXIDINE GLUCONATE 1 APPLICATION(S): 213 SOLUTION TOPICAL at 17:35

## 2022-08-21 RX ADMIN — HEPARIN SODIUM 5000 UNIT(S): 5000 INJECTION INTRAVENOUS; SUBCUTANEOUS at 17:35

## 2022-08-21 RX ADMIN — Medication 25 MILLIGRAM(S): at 05:38

## 2022-08-21 RX ADMIN — PANTOPRAZOLE SODIUM 40 MILLIGRAM(S): 20 TABLET, DELAYED RELEASE ORAL at 05:38

## 2022-08-21 RX ADMIN — MUPIROCIN 1 APPLICATION(S): 20 OINTMENT TOPICAL at 05:54

## 2022-08-21 RX ADMIN — Medication 10 MILLIGRAM(S): at 05:38

## 2022-08-21 RX ADMIN — Medication 10 MILLIGRAM(S): at 13:41

## 2022-08-21 RX ADMIN — INSULIN GLARGINE 5 UNIT(S): 100 INJECTION, SOLUTION SUBCUTANEOUS at 21:28

## 2022-08-21 RX ADMIN — Medication 3 MILLIGRAM(S): at 21:26

## 2022-08-21 RX ADMIN — Medication 10 MILLIGRAM(S): at 21:19

## 2022-08-21 RX ADMIN — SEVELAMER CARBONATE 800 MILLIGRAM(S): 2400 POWDER, FOR SUSPENSION ORAL at 21:26

## 2022-08-21 RX ADMIN — HEPARIN SODIUM 5000 UNIT(S): 5000 INJECTION INTRAVENOUS; SUBCUTANEOUS at 05:38

## 2022-08-21 NOTE — PROGRESS NOTE ADULT - SUBJECTIVE AND OBJECTIVE BOX
Cardiovascular Disease Progress Note    Overnight events: No acute events overnight.   Ms. Tsang denies chest pain or SOB.    Otherwise review of systems negative    Objective Findings:  T(C): 36.8 (22 @ 04:00), Max: 36.8 (22 @ 20:00)  HR: 69 (22 @ 04:00) (69 - 82)  BP: 106/54 (22 @ 04:00) (106/54 - 146/70)  RR: 20 (22 @ 04:00) (16 - 20)  SpO2: 98% (22 @ 04:00) (92% - 100%)  Wt(kg): --  Daily     Daily Weight in k.2 (20 Aug 2022 13:45)      Physical Exam:  Gen: NAD; Patient resting comfortably  HEENT: EOMI, Normocephalic/ atraumatic  CV: IIR, normal S1 + S2, no m/r/g  Lungs:  Normal respiratory effort; clear to auscultation bilaterally  Abd: soft, non-tender; bowel sounds present  Ext: No edema; warm and well perfused    Telemetry: a-fib    Laboratory Data:                        9.5    5.13  )-----------( 241      ( 20 Aug 2022 06:04 )             33.2         133<L>  |  98  |  15  ----------------------------<  83  4.5   |  23  |  2.79<H>    Ca    8.2<L>      21 Aug 2022 06:39  Phos  3.4       Mg     2.10                     Inpatient Medications:  MEDICATIONS  (STANDING):  chlorhexidine 2% Cloths 1 Application(s) Topical two times a day  clopidogrel Tablet 75 milliGRAM(s) Oral daily  dextrose 5%. 1000 milliLiter(s) (50 mL/Hr) IV Continuous <Continuous>  dextrose 5%. 1000 milliLiter(s) (100 mL/Hr) IV Continuous <Continuous>  dextrose 50% Injectable 25 Gram(s) IV Push once  dextrose 50% Injectable 12.5 Gram(s) IV Push once  dextrose 50% Injectable 25 Gram(s) IV Push once  epoetin sean-epbx (RETACRIT) Injectable 38498 Unit(s) IV Push <User Schedule>  gabapentin 300 milliGRAM(s) Oral at bedtime  glucagon  Injectable 1 milliGRAM(s) IntraMuscular once  heparin   Injectable 5000 Unit(s) SubCutaneous every 12 hours  hydrALAZINE 10 milliGRAM(s) Oral three times a day  insulin glargine Injectable (LANTUS) 5 Unit(s) SubCutaneous at bedtime  insulin lispro (ADMELOG) corrective regimen sliding scale   SubCutaneous three times a day before meals  insulin lispro (ADMELOG) corrective regimen sliding scale   SubCutaneous at bedtime  metoprolol succinate ER 25 milliGRAM(s) Oral daily  pantoprazole    Tablet 40 milliGRAM(s) Oral before breakfast  senna 2 Tablet(s) Oral at bedtime  sevelamer carbonate 800 milliGRAM(s) Oral three times a day      Assessment: 82 year old woman with uncontrolled IDDM, compensated diastolic CHF, CAD s/p PCI, CKD V and HTN presents with anemia.     Plan of Care:      #CAD- s/p MICHAEL x2 in 2017.  Anemia noted on admission, but patient denies any signs of bleeding.  Likely anemia in the setting of CKD.   Exertional dyspnea now improved post pRBC transfusion.  Given extensive vascular disease, continue Plavix 75 mg daily.     #Compensated systolic CHF-  New inferior wall motion abnormality noted on echo  Ms. Tsang is not fluid overloaded on exam and she denies chest pain.  I would not pursue another ischemic evaluation, as Ms. Tsang cannot tolerate dual antiplatelet therapy due to bleeding history.     #ESRD-  S/P HD initiation on  by the renal team.     #PVD-  Patient status post RLE angiogram with 2 stents placed in 2019.   Antiplatelet therapy management as stated above.     #Paroxymal a-fib-  Currently in rate controlled a-fib.   Continue Toprol.   No AC given h/o GI bleed.       #ACP (advance care planning)-  Advanced care planning was discussed with the patient.  Advanced care planning forms were discussed.  Risks, benefits and alternatives of medical treatment and procedures were discussed in detail and all questions were answered. 30 minutes spent addressing advance care plans.      Over 25 minutes spent on total encounter; more than 50% of the visit was spent counseling and/or coordinating care by the attending physician.      Julio César Hooper MD [facility]  Cardiovascular Disease  [phone] Cardiovascular Disease Progress Note    Overnight events: No acute events overnight.   Ms. Tsang is complaining of abdominal pain.  She denies chest pain or SOB.    Otherwise review of systems negative    Objective Findings:  T(C): 36.8 (22 @ 04:00), Max: 36.8 (22 @ 20:00)  HR: 69 (22 @ 04:00) (69 - 82)  BP: 106/54 (22 @ 04:00) (106/54 - 146/70)  RR: 20 (22 @ 04:00) (16 - 20)  SpO2: 98% (22 @ 04:00) (92% - 100%)  Wt(kg): --  Daily     Daily Weight in k.2 (20 Aug 2022 13:45)      Physical Exam:  Gen: NAD; Patient resting comfortably  HEENT: EOMI, Normocephalic/ atraumatic  CV: IIR, normal S1 + S2, no m/r/g  Lungs:  Normal respiratory effort; clear to auscultation bilaterally  Abd: soft, tender; bowel sounds present  Ext: No edema; warm and well perfused    Telemetry: a-fib    Laboratory Data:                        9.5    5.13  )-----------( 241      ( 20 Aug 2022 06:04 )             33.2         133<L>  |  98  |  15  ----------------------------<  83  4.5   |  23  |  2.79<H>    Ca    8.2<L>      21 Aug 2022 06:39  Phos  3.4       Mg     2.10     -                Inpatient Medications:  MEDICATIONS  (STANDING):  chlorhexidine 2% Cloths 1 Application(s) Topical two times a day  clopidogrel Tablet 75 milliGRAM(s) Oral daily  dextrose 5%. 1000 milliLiter(s) (50 mL/Hr) IV Continuous <Continuous>  dextrose 5%. 1000 milliLiter(s) (100 mL/Hr) IV Continuous <Continuous>  dextrose 50% Injectable 25 Gram(s) IV Push once  dextrose 50% Injectable 12.5 Gram(s) IV Push once  dextrose 50% Injectable 25 Gram(s) IV Push once  epoetin sean-epbx (RETACRIT) Injectable 59131 Unit(s) IV Push <User Schedule>  gabapentin 300 milliGRAM(s) Oral at bedtime  glucagon  Injectable 1 milliGRAM(s) IntraMuscular once  heparin   Injectable 5000 Unit(s) SubCutaneous every 12 hours  hydrALAZINE 10 milliGRAM(s) Oral three times a day  insulin glargine Injectable (LANTUS) 5 Unit(s) SubCutaneous at bedtime  insulin lispro (ADMELOG) corrective regimen sliding scale   SubCutaneous three times a day before meals  insulin lispro (ADMELOG) corrective regimen sliding scale   SubCutaneous at bedtime  metoprolol succinate ER 25 milliGRAM(s) Oral daily  pantoprazole    Tablet 40 milliGRAM(s) Oral before breakfast  senna 2 Tablet(s) Oral at bedtime  sevelamer carbonate 800 milliGRAM(s) Oral three times a day      Assessment: 82 year old woman with uncontrolled IDDM, compensated diastolic CHF, CAD s/p PCI, CKD V and HTN presents with anemia.     Plan of Care:    #Abdominal pain-  Patient complaining of constipation despite oral bowel regimen.  Bowel sounds present.  Consider enema.       #CAD- s/p MICHAEL x2 in 2017.  Anemia noted on admission, but patient denies any signs of bleeding.  Likely anemia in the setting of CKD.   Exertional dyspnea now improved post pRBC transfusion.  Given extensive vascular disease, continue Plavix 75 mg daily.     #Compensated systolic CHF-  New inferior wall motion abnormality noted on echo  Ms. Tsang is not fluid overloaded on exam and she denies chest pain.  I would not pursue another ischemic evaluation, as Ms. Tsang cannot tolerate dual antiplatelet therapy due to bleeding history.     #ESRD-  S/P HD initiation on  by the renal team.     #PVD-  Patient status post RLE angiogram with 2 stents placed in 2019.   Antiplatelet therapy management as stated above.     #Paroxymal a-fib-  Currently in rate controlled a-fib.   Continue Toprol.   No AC given h/o GI bleed.       #ACP (advance care planning)-  Advanced care planning was discussed with the patient.  Advanced care planning forms were discussed.  Risks, benefits and alternatives of medical treatment and procedures were discussed in detail and all questions were answered. 30 minutes spent addressing advance care plans.      Over 25 minutes spent on total encounter; more than 50% of the visit was spent counseling and/or coordinating care by the attending physician.      Julio César Hooper MD [facility]  Cardiovascular Disease  [phone]

## 2022-08-21 NOTE — PROGRESS NOTE ADULT - SUBJECTIVE AND OBJECTIVE BOX
Patient seen and examined in bed.  No new events.    REVIEW OF SYSTEMS:  As per HPI, otherwise 8 full 10 ROS were unremarkable    MEDICATIONS  (STANDING):  chlorhexidine 2% Cloths 1 Application(s) Topical two times a day  clopidogrel Tablet 75 milliGRAM(s) Oral daily  dextrose 5%. 1000 milliLiter(s) (100 mL/Hr) IV Continuous <Continuous>  dextrose 5%. 1000 milliLiter(s) (50 mL/Hr) IV Continuous <Continuous>  dextrose 50% Injectable 25 Gram(s) IV Push once  dextrose 50% Injectable 12.5 Gram(s) IV Push once  dextrose 50% Injectable 25 Gram(s) IV Push once  epoetin sean-epbx (RETACRIT) Injectable 84038 Unit(s) IV Push <User Schedule>  gabapentin 300 milliGRAM(s) Oral at bedtime  glucagon  Injectable 1 milliGRAM(s) IntraMuscular once  heparin   Injectable 5000 Unit(s) SubCutaneous every 12 hours  hydrALAZINE 10 milliGRAM(s) Oral three times a day  insulin glargine Injectable (LANTUS) 5 Unit(s) SubCutaneous at bedtime  insulin lispro (ADMELOG) corrective regimen sliding scale   SubCutaneous three times a day before meals  insulin lispro (ADMELOG) corrective regimen sliding scale   SubCutaneous at bedtime  metoprolol succinate ER 25 milliGRAM(s) Oral daily  pantoprazole    Tablet 40 milliGRAM(s) Oral before breakfast  polyethylene glycol 3350 17 Gram(s) Oral two times a day  senna 2 Tablet(s) Oral at bedtime  sevelamer carbonate 800 milliGRAM(s) Oral three times a day      VITAL:  T(C): , Max: 36.8 (08-20-22 @ 20:00)  T(F): , Max: 98.3 (08-21-22 @ 04:00)  HR: 72 (08-21-22 @ 13:10)  BP: 164/67 (08-21-22 @ 13:10)  BP(mean): --  RR: 19 (08-21-22 @ 13:10)  SpO2: 96% (08-21-22 @ 13:10)  Wt(kg): --    I and O's:    08-20 @ 07:01  -  08-21 @ 07:00  --------------------------------------------------------  IN: 500 mL / OUT: 1137 mL / NET: -637 mL          PHYSICAL EXAM:    Constitutional: NAD, alert  HEENT: MMM  Neck:  No JVD, supple   Respiratory: CTA B/L  Cardiovascular: S1 and S2, IRR  Gastrointestinal: + BS, soft, NT, ND  Extremities: 2+ b/l LE edema  Neurological: reduced generalized strength  : No Sylvester  Skin: No rashes, C/D/I  Vascular Access: R non tunneled HD     LABS:                        9.5    5.13  )-----------( 241      ( 20 Aug 2022 06:04 )             33.2     08-21    133<L>  |  98  |  15  ----------------------------<  83  4.5   |  23  |  2.79<H>    Ca    8.2<L>      21 Aug 2022 06:39  Phos  3.4     08-21  Mg     2.10     08-21      IMPRESSION: 82F w/ DM2, CAD, PAD, PUD, and CKD5, 8/11/22 p/w abnormal labs    (1)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 Would defer with access until after discharge.   (6)IR- possible conversion of non tunneled to tunneled catheter early next week     RECOMMENDATIONS:  (1)Subsequent HD Tuesday: 3 hrs, 1kg; Retacrit with HD  (2)SW setting up with outside HD facility  (3)Plan for permacath placement next week; IR consulted

## 2022-08-21 NOTE — PROGRESS NOTE ADULT - SUBJECTIVE AND OBJECTIVE BOX
Patient is a 82y old  Female who presents with a chief complaint of abnormal labs (22 Aug 2022 11:29)      SUBJECTIVE / OVERNIGHT EVENTS:    Events noted.  CONSTITUTIONAL: No fever,  or fatigue  RESPIRATORY: No cough, wheezing,  No shortness of breath  CARDIOVASCULAR: No chest pain, palpitations, dizziness, or leg swelling  GASTROINTESTINAL: No abdominal or epigastric pain. No nausea, vomiting.  NEUROLOGICAL: No headache    MEDICATIONS  (STANDING):  chlorhexidine 2% Cloths 1 Application(s) Topical two times a day  clopidogrel Tablet 75 milliGRAM(s) Oral daily  dextrose 5%. 1000 milliLiter(s) (50 mL/Hr) IV Continuous <Continuous>  dextrose 5%. 1000 milliLiter(s) (100 mL/Hr) IV Continuous <Continuous>  dextrose 50% Injectable 25 Gram(s) IV Push once  dextrose 50% Injectable 12.5 Gram(s) IV Push once  dextrose 50% Injectable 25 Gram(s) IV Push once  epoetin sean-epbx (RETACRIT) Injectable 29239 Unit(s) IV Push <User Schedule>  gabapentin 300 milliGRAM(s) Oral at bedtime  glucagon  Injectable 1 milliGRAM(s) IntraMuscular once  hydrALAZINE 10 milliGRAM(s) Oral three times a day  insulin lispro (ADMELOG) corrective regimen sliding scale   SubCutaneous three times a day before meals  insulin lispro (ADMELOG) corrective regimen sliding scale   SubCutaneous at bedtime  metoprolol succinate ER 25 milliGRAM(s) Oral daily  pantoprazole    Tablet 40 milliGRAM(s) Oral before breakfast  polyethylene glycol 3350 17 Gram(s) Oral two times a day  senna 2 Tablet(s) Oral at bedtime  sevelamer carbonate 800 milliGRAM(s) Oral three times a day    MEDICATIONS  (PRN):  acetaminophen     Tablet .. 650 milliGRAM(s) Oral every 6 hours PRN Temp greater or equal to 38C (100.4F), Mild Pain (1 - 3)  dextrose Oral Gel 15 Gram(s) Oral once PRN Blood Glucose LESS THAN 70 milliGRAM(s)/deciliter  lidocaine   4% Patch 1 Patch Transdermal every 24 hours PRN Back pain  melatonin 3 milliGRAM(s) Oral at bedtime PRN Insomnia  sodium chloride 0.9% lock flush 10 milliLiter(s) IV Push every 1 hour PRN Pre/post blood products, medications, blood draw, and to maintain line patency        CAPILLARY BLOOD GLUCOSE      POCT Blood Glucose.: 180 mg/dL (22 Aug 2022 21:06)  POCT Blood Glucose.: 112 mg/dL (22 Aug 2022 17:12)  POCT Blood Glucose.: 209 mg/dL (22 Aug 2022 12:19)  POCT Blood Glucose.: 89 mg/dL (22 Aug 2022 08:38)    I&O's Summary      T(C): 36.8 (08-22-22 @ 13:13), Max: 36.8 (08-22-22 @ 06:19)  HR: 79 (08-22-22 @ 21:09) (64 - 79)  BP: 157/65 (08-22-22 @ 21:09) (122/61 - 163/61)  RR: 20 (08-22-22 @ 21:09) (20 - 20)  SpO2: 96% (08-22-22 @ 21:09) (94% - 100%)    PHYSICAL EXAM:  GENERAL: NAD  NECK: Supple, No JVD  CHEST/LUNG: Clear to auscultation bilaterally; No wheezing.  HEART: Regular rate and rhythm; No murmurs, rubs, or gallops  ABDOMEN: Soft, Nontender, Nondistended; Bowel sounds present  EXTREMITIES:   No edema  NEUROLOGY: AAO X 3      LABS:                        9.6    4.24  )-----------( 231      ( 22 Aug 2022 06:35 )             33.4     08-22    135  |  98  |  20  ----------------------------<  89  3.9   |  26  |  3.54<H>    Ca    8.3<L>      22 Aug 2022 06:35  Phos  3.7     08-22  Mg     2.20     08-22              CAPILLARY BLOOD GLUCOSE      POCT Blood Glucose.: 180 mg/dL (22 Aug 2022 21:06)  POCT Blood Glucose.: 112 mg/dL (22 Aug 2022 17:12)  POCT Blood Glucose.: 209 mg/dL (22 Aug 2022 12:19)  POCT Blood Glucose.: 89 mg/dL (22 Aug 2022 08:38)        RADIOLOGY & ADDITIONAL TESTS:    Imaging Personally Reviewed:    Consultant(s) Notes Reviewed:      Care Discussed with Consultants/Other Providers:    Parminder Goldberg MD, CMD, FACP    [address] [place] [address]  Office Tel: [phone]  Cell: [phone]

## 2022-08-21 NOTE — PROGRESS NOTE ADULT - SUBJECTIVE AND OBJECTIVE BOX
Patient is a 82y old  Female who presents with a chief complaint of abnormal labs (19 Aug 2022 10:48)      SUBJECTIVE / OVERNIGHT EVENTS:    Events noted.  CONSTITUTIONAL: No fever,  or fatigue  RESPIRATORY: No cough, wheezing,  No shortness of breath  CARDIOVASCULAR: No chest pain, palpitations, dizziness, or leg swelling  GASTROINTESTINAL: No abdominal or epigastric pain. No nausea, vomiting.  NEUROLOGICAL: No headache    MEDICATIONS  (STANDING):  chlorhexidine 2% Cloths 1 Application(s) Topical two times a day  clopidogrel Tablet 75 milliGRAM(s) Oral daily  dextrose 5%. 1000 milliLiter(s) (100 mL/Hr) IV Continuous <Continuous>  dextrose 5%. 1000 milliLiter(s) (50 mL/Hr) IV Continuous <Continuous>  dextrose 50% Injectable 25 Gram(s) IV Push once  dextrose 50% Injectable 12.5 Gram(s) IV Push once  dextrose 50% Injectable 25 Gram(s) IV Push once  gabapentin 300 milliGRAM(s) Oral at bedtime  glucagon  Injectable 1 milliGRAM(s) IntraMuscular once  heparin   Injectable 5000 Unit(s) SubCutaneous every 12 hours  hydrALAZINE 10 milliGRAM(s) Oral three times a day  insulin glargine Injectable (LANTUS) 5 Unit(s) SubCutaneous at bedtime  insulin lispro (ADMELOG) corrective regimen sliding scale   SubCutaneous three times a day before meals  insulin lispro (ADMELOG) corrective regimen sliding scale   SubCutaneous at bedtime  metoprolol succinate ER 25 milliGRAM(s) Oral daily  mupirocin 2% Ointment 1 Application(s) Topical two times a day  pantoprazole    Tablet 40 milliGRAM(s) Oral before breakfast  senna 2 Tablet(s) Oral at bedtime  sevelamer carbonate 800 milliGRAM(s) Oral three times a day    MEDICATIONS  (PRN):  acetaminophen     Tablet .. 650 milliGRAM(s) Oral every 6 hours PRN Temp greater or equal to 38C (100.4F), Mild Pain (1 - 3)  dextrose Oral Gel 15 Gram(s) Oral once PRN Blood Glucose LESS THAN 70 milliGRAM(s)/deciliter  lidocaine   4% Patch 1 Patch Transdermal every 24 hours PRN Back pain  melatonin 3 milliGRAM(s) Oral at bedtime PRN Insomnia  polyethylene glycol 3350 17 Gram(s) Oral daily PRN Constipation  sodium chloride 0.9% lock flush 10 milliLiter(s) IV Push every 1 hour PRN Pre/post blood products, medications, blood draw, and to maintain line patency        CAPILLARY BLOOD GLUCOSE      POCT Blood Glucose.: 124 mg/dL (19 Aug 2022 17:51)  POCT Blood Glucose.: 124 mg/dL (19 Aug 2022 12:01)  POCT Blood Glucose.: 92 mg/dL (19 Aug 2022 08:16)  POCT Blood Glucose.: 118 mg/dL (19 Aug 2022 02:26)  POCT Blood Glucose.: 141 mg/dL (18 Aug 2022 22:46)  POCT Blood Glucose.: 84 mg/dL (18 Aug 2022 21:36)    I&O's Summary    18 Aug 2022 07:01  -  19 Aug 2022 07:00  --------------------------------------------------------  IN: 400 mL / OUT: 1400 mL / NET: -1000 mL        T(C): 36.8 (08-19-22 @ 13:15), Max: 37 (08-19-22 @ 05:45)  HR: 62 (08-19-22 @ 13:15) (61 - 67)  BP: 156/60 (08-19-22 @ 13:15) (141/60 - 159/69)  RR: 14 (08-19-22 @ 13:15) (14 - 20)  SpO2: 100% (08-19-22 @ 13:15) (98% - 100%)    PHYSICAL EXAM:  GENERAL: NAD  NECK: Supple, No JVD  CHEST/LUNG: Clear to auscultation bilaterally; No wheezing.  HEART: Regular rate and rhythm; No murmurs, rubs, or gallops  ABDOMEN: Soft, Nontender, Nondistended; Bowel sounds present  EXTREMITIES:   No edema  NEUROLOGY: AAO X 3      LABS:                        8.5    5.89  )-----------( 233      ( 19 Aug 2022 05:36 )             29.3     08-19    131<L>  |  93<L>  |  21  ----------------------------<  90  3.7   |  26  |  2.79<H>    Ca    8.3<L>      19 Aug 2022 05:36  Phos  3.7     08-19  Mg     2.00     08-19              CAPILLARY BLOOD GLUCOSE      POCT Blood Glucose.: 124 mg/dL (19 Aug 2022 17:51)  POCT Blood Glucose.: 124 mg/dL (19 Aug 2022 12:01)  POCT Blood Glucose.: 92 mg/dL (19 Aug 2022 08:16)  POCT Blood Glucose.: 118 mg/dL (19 Aug 2022 02:26)  POCT Blood Glucose.: 141 mg/dL (18 Aug 2022 22:46)  POCT Blood Glucose.: 84 mg/dL (18 Aug 2022 21:36)        RADIOLOGY & ADDITIONAL TESTS:    Imaging Personally Reviewed:    Consultant(s) Notes Reviewed:      Care Discussed with Consultants/Other Providers:    Parminder Goldberg MD, CMD, FACP    [address] [place] [address]  Office Tel: [phone]  Cell: [phone]

## 2022-08-21 NOTE — PROGRESS NOTE ADULT - SUBJECTIVE AND OBJECTIVE BOX
Patient is a 82y old  Female who presents with a chief complaint of abnormal labs (19 Aug 2022 10:48)      SUBJECTIVE / OVERNIGHT EVENTS:    Events noted.  CONSTITUTIONAL: No fever,  or fatigue  RESPIRATORY: No cough, wheezing,  No shortness of breath  CARDIOVASCULAR: No chest pain, palpitations, dizziness, or leg swelling  GASTROINTESTINAL: No abdominal or epigastric pain. No nausea, vomiting.  NEUROLOGICAL: No headache    MEDICATIONS  (STANDING):  chlorhexidine 2% Cloths 1 Application(s) Topical two times a day  clopidogrel Tablet 75 milliGRAM(s) Oral daily  dextrose 5%. 1000 milliLiter(s) (100 mL/Hr) IV Continuous <Continuous>  dextrose 5%. 1000 milliLiter(s) (50 mL/Hr) IV Continuous <Continuous>  dextrose 50% Injectable 25 Gram(s) IV Push once  dextrose 50% Injectable 12.5 Gram(s) IV Push once  dextrose 50% Injectable 25 Gram(s) IV Push once  gabapentin 300 milliGRAM(s) Oral at bedtime  glucagon  Injectable 1 milliGRAM(s) IntraMuscular once  heparin   Injectable 5000 Unit(s) SubCutaneous every 12 hours  hydrALAZINE 10 milliGRAM(s) Oral three times a day  insulin glargine Injectable (LANTUS) 5 Unit(s) SubCutaneous at bedtime  insulin lispro (ADMELOG) corrective regimen sliding scale   SubCutaneous three times a day before meals  insulin lispro (ADMELOG) corrective regimen sliding scale   SubCutaneous at bedtime  metoprolol succinate ER 25 milliGRAM(s) Oral daily  mupirocin 2% Ointment 1 Application(s) Topical two times a day  pantoprazole    Tablet 40 milliGRAM(s) Oral before breakfast  senna 2 Tablet(s) Oral at bedtime  sevelamer carbonate 800 milliGRAM(s) Oral three times a day    MEDICATIONS  (PRN):  acetaminophen     Tablet .. 650 milliGRAM(s) Oral every 6 hours PRN Temp greater or equal to 38C (100.4F), Mild Pain (1 - 3)  dextrose Oral Gel 15 Gram(s) Oral once PRN Blood Glucose LESS THAN 70 milliGRAM(s)/deciliter  lidocaine   4% Patch 1 Patch Transdermal every 24 hours PRN Back pain  melatonin 3 milliGRAM(s) Oral at bedtime PRN Insomnia  polyethylene glycol 3350 17 Gram(s) Oral daily PRN Constipation  sodium chloride 0.9% lock flush 10 milliLiter(s) IV Push every 1 hour PRN Pre/post blood products, medications, blood draw, and to maintain line patency        CAPILLARY BLOOD GLUCOSE      POCT Blood Glucose.: 124 mg/dL (19 Aug 2022 17:51)  POCT Blood Glucose.: 124 mg/dL (19 Aug 2022 12:01)  POCT Blood Glucose.: 92 mg/dL (19 Aug 2022 08:16)  POCT Blood Glucose.: 118 mg/dL (19 Aug 2022 02:26)  POCT Blood Glucose.: 141 mg/dL (18 Aug 2022 22:46)  POCT Blood Glucose.: 84 mg/dL (18 Aug 2022 21:36)    I&O's Summary    18 Aug 2022 07:01  -  19 Aug 2022 07:00  --------------------------------------------------------  IN: 400 mL / OUT: 1400 mL / NET: -1000 mL        T(C): 36.8 (08-19-22 @ 13:15), Max: 37 (08-19-22 @ 05:45)  HR: 62 (08-19-22 @ 13:15) (61 - 67)  BP: 156/60 (08-19-22 @ 13:15) (141/60 - 159/69)  RR: 14 (08-19-22 @ 13:15) (14 - 20)  SpO2: 100% (08-19-22 @ 13:15) (98% - 100%)    PHYSICAL EXAM:  GENERAL: NAD  NECK: Supple, No JVD  CHEST/LUNG: Clear to auscultation bilaterally; No wheezing.  HEART: Regular rate and rhythm; No murmurs, rubs, or gallops  ABDOMEN: Soft, Nontender, Nondistended; Bowel sounds present  EXTREMITIES:   No edema  NEUROLOGY: AAO X 3      LABS:                        8.5    5.89  )-----------( 233      ( 19 Aug 2022 05:36 )             29.3     08-19    131<L>  |  93<L>  |  21  ----------------------------<  90  3.7   |  26  |  2.79<H>    Ca    8.3<L>      19 Aug 2022 05:36  Phos  3.7     08-19  Mg     2.00     08-19              CAPILLARY BLOOD GLUCOSE      POCT Blood Glucose.: 124 mg/dL (19 Aug 2022 17:51)  POCT Blood Glucose.: 124 mg/dL (19 Aug 2022 12:01)  POCT Blood Glucose.: 92 mg/dL (19 Aug 2022 08:16)  POCT Blood Glucose.: 118 mg/dL (19 Aug 2022 02:26)  POCT Blood Glucose.: 141 mg/dL (18 Aug 2022 22:46)  POCT Blood Glucose.: 84 mg/dL (18 Aug 2022 21:36)        RADIOLOGY & ADDITIONAL TESTS:    Imaging Personally Reviewed:    Consultant(s) Notes Reviewed:      Care Discussed with Consultants/Other Providers:    Parminder Goldberg MD, CMD, FACP    [address] [address]  Office Tel: [phone]  Cell: [phone]

## 2022-08-22 LAB
ANION GAP SERPL CALC-SCNC: 11 MMOL/L — SIGNIFICANT CHANGE UP (ref 7–14)
BASOPHILS # BLD AUTO: 0.04 K/UL — SIGNIFICANT CHANGE UP (ref 0–0.2)
BASOPHILS NFR BLD AUTO: 0.9 % — SIGNIFICANT CHANGE UP (ref 0–2)
BUN SERPL-MCNC: 20 MG/DL — SIGNIFICANT CHANGE UP (ref 7–23)
CALCIUM SERPL-MCNC: 8.3 MG/DL — LOW (ref 8.4–10.5)
CHLORIDE SERPL-SCNC: 98 MMOL/L — SIGNIFICANT CHANGE UP (ref 98–107)
CO2 SERPL-SCNC: 26 MMOL/L — SIGNIFICANT CHANGE UP (ref 22–31)
CREAT SERPL-MCNC: 3.54 MG/DL — HIGH (ref 0.5–1.3)
EGFR: 12 ML/MIN/1.73M2 — LOW
EOSINOPHIL # BLD AUTO: 0.13 K/UL — SIGNIFICANT CHANGE UP (ref 0–0.5)
EOSINOPHIL NFR BLD AUTO: 3.1 % — SIGNIFICANT CHANGE UP (ref 0–6)
GLUCOSE BLDC GLUCOMTR-MCNC: 112 MG/DL — HIGH (ref 70–99)
GLUCOSE BLDC GLUCOMTR-MCNC: 180 MG/DL — HIGH (ref 70–99)
GLUCOSE BLDC GLUCOMTR-MCNC: 209 MG/DL — HIGH (ref 70–99)
GLUCOSE BLDC GLUCOMTR-MCNC: 89 MG/DL — SIGNIFICANT CHANGE UP (ref 70–99)
GLUCOSE SERPL-MCNC: 89 MG/DL — SIGNIFICANT CHANGE UP (ref 70–99)
HCT VFR BLD CALC: 33.4 % — LOW (ref 34.5–45)
HGB BLD-MCNC: 9.6 G/DL — LOW (ref 11.5–15.5)
IANC: 2.83 K/UL — SIGNIFICANT CHANGE UP (ref 1.8–7.4)
IMM GRANULOCYTES NFR BLD AUTO: 1.2 % — SIGNIFICANT CHANGE UP (ref 0–1.5)
LYMPHOCYTES # BLD AUTO: 0.65 K/UL — LOW (ref 1–3.3)
LYMPHOCYTES # BLD AUTO: 15.3 % — SIGNIFICANT CHANGE UP (ref 13–44)
MAGNESIUM SERPL-MCNC: 2.2 MG/DL — SIGNIFICANT CHANGE UP (ref 1.6–2.6)
MCHC RBC-ENTMCNC: 26.9 PG — LOW (ref 27–34)
MCHC RBC-ENTMCNC: 28.7 GM/DL — LOW (ref 32–36)
MCV RBC AUTO: 93.6 FL — SIGNIFICANT CHANGE UP (ref 80–100)
MONOCYTES # BLD AUTO: 0.54 K/UL — SIGNIFICANT CHANGE UP (ref 0–0.9)
MONOCYTES NFR BLD AUTO: 12.7 % — SIGNIFICANT CHANGE UP (ref 2–14)
NEUTROPHILS # BLD AUTO: 2.83 K/UL — SIGNIFICANT CHANGE UP (ref 1.8–7.4)
NEUTROPHILS NFR BLD AUTO: 66.8 % — SIGNIFICANT CHANGE UP (ref 43–77)
NRBC # BLD: 0 /100 WBCS — SIGNIFICANT CHANGE UP (ref 0–0)
NRBC # FLD: 0 K/UL — SIGNIFICANT CHANGE UP (ref 0–0)
PHOSPHATE SERPL-MCNC: 3.7 MG/DL — SIGNIFICANT CHANGE UP (ref 2.5–4.5)
PLATELET # BLD AUTO: 231 K/UL — SIGNIFICANT CHANGE UP (ref 150–400)
POTASSIUM SERPL-MCNC: 3.9 MMOL/L — SIGNIFICANT CHANGE UP (ref 3.5–5.3)
POTASSIUM SERPL-SCNC: 3.9 MMOL/L — SIGNIFICANT CHANGE UP (ref 3.5–5.3)
RBC # BLD: 3.57 M/UL — LOW (ref 3.8–5.2)
RBC # FLD: 19.7 % — HIGH (ref 10.3–14.5)
SARS-COV-2 RNA SPEC QL NAA+PROBE: SIGNIFICANT CHANGE UP
SODIUM SERPL-SCNC: 135 MMOL/L — SIGNIFICANT CHANGE UP (ref 135–145)
WBC # BLD: 4.24 K/UL — SIGNIFICANT CHANGE UP (ref 3.8–10.5)
WBC # FLD AUTO: 4.24 K/UL — SIGNIFICANT CHANGE UP (ref 3.8–10.5)

## 2022-08-22 RX ADMIN — Medication 10 MILLIGRAM(S): at 21:49

## 2022-08-22 RX ADMIN — Medication 25 MILLIGRAM(S): at 06:26

## 2022-08-22 RX ADMIN — Medication 10 MILLIGRAM(S): at 06:27

## 2022-08-22 RX ADMIN — CHLORHEXIDINE GLUCONATE 1 APPLICATION(S): 213 SOLUTION TOPICAL at 06:26

## 2022-08-22 RX ADMIN — Medication 10 MILLIGRAM(S): at 13:05

## 2022-08-22 RX ADMIN — SEVELAMER CARBONATE 800 MILLIGRAM(S): 2400 POWDER, FOR SUSPENSION ORAL at 06:26

## 2022-08-22 RX ADMIN — Medication 2: at 12:40

## 2022-08-22 RX ADMIN — GABAPENTIN 300 MILLIGRAM(S): 400 CAPSULE ORAL at 21:49

## 2022-08-22 RX ADMIN — POLYETHYLENE GLYCOL 3350 17 GRAM(S): 17 POWDER, FOR SOLUTION ORAL at 06:26

## 2022-08-22 RX ADMIN — CLOPIDOGREL BISULFATE 75 MILLIGRAM(S): 75 TABLET, FILM COATED ORAL at 13:05

## 2022-08-22 RX ADMIN — SEVELAMER CARBONATE 800 MILLIGRAM(S): 2400 POWDER, FOR SUSPENSION ORAL at 21:49

## 2022-08-22 RX ADMIN — SENNA PLUS 2 TABLET(S): 8.6 TABLET ORAL at 21:48

## 2022-08-22 RX ADMIN — HEPARIN SODIUM 5000 UNIT(S): 5000 INJECTION INTRAVENOUS; SUBCUTANEOUS at 06:27

## 2022-08-22 RX ADMIN — Medication 3 MILLIGRAM(S): at 21:49

## 2022-08-22 RX ADMIN — PANTOPRAZOLE SODIUM 40 MILLIGRAM(S): 20 TABLET, DELAYED RELEASE ORAL at 06:27

## 2022-08-22 RX ADMIN — CHLORHEXIDINE GLUCONATE 1 APPLICATION(S): 213 SOLUTION TOPICAL at 18:14

## 2022-08-22 RX ADMIN — HEPARIN SODIUM 5000 UNIT(S): 5000 INJECTION INTRAVENOUS; SUBCUTANEOUS at 18:13

## 2022-08-22 RX ADMIN — SEVELAMER CARBONATE 800 MILLIGRAM(S): 2400 POWDER, FOR SUSPENSION ORAL at 13:05

## 2022-08-22 NOTE — PROGRESS NOTE ADULT - SUBJECTIVE AND OBJECTIVE BOX
Patient is a 82y old  Female who presents with a chief complaint of abnormal labs (22 Aug 2022 11:29)      SUBJECTIVE / OVERNIGHT EVENTS:    Events noted.  CONSTITUTIONAL: No fever,  or fatigue  RESPIRATORY: No cough, wheezing,  No shortness of breath  CARDIOVASCULAR: No chest pain, palpitations, dizziness, or leg swelling  GASTROINTESTINAL: No abdominal or epigastric pain. No nausea, vomiting.  NEUROLOGICAL: No headache    MEDICATIONS  (STANDING):  chlorhexidine 2% Cloths 1 Application(s) Topical two times a day  clopidogrel Tablet 75 milliGRAM(s) Oral daily  dextrose 5%. 1000 milliLiter(s) (50 mL/Hr) IV Continuous <Continuous>  dextrose 5%. 1000 milliLiter(s) (100 mL/Hr) IV Continuous <Continuous>  dextrose 50% Injectable 25 Gram(s) IV Push once  dextrose 50% Injectable 12.5 Gram(s) IV Push once  dextrose 50% Injectable 25 Gram(s) IV Push once  epoetin sean-epbx (RETACRIT) Injectable 59561 Unit(s) IV Push <User Schedule>  gabapentin 300 milliGRAM(s) Oral at bedtime  glucagon  Injectable 1 milliGRAM(s) IntraMuscular once  hydrALAZINE 10 milliGRAM(s) Oral three times a day  insulin lispro (ADMELOG) corrective regimen sliding scale   SubCutaneous three times a day before meals  insulin lispro (ADMELOG) corrective regimen sliding scale   SubCutaneous at bedtime  metoprolol succinate ER 25 milliGRAM(s) Oral daily  pantoprazole    Tablet 40 milliGRAM(s) Oral before breakfast  polyethylene glycol 3350 17 Gram(s) Oral two times a day  senna 2 Tablet(s) Oral at bedtime  sevelamer carbonate 800 milliGRAM(s) Oral three times a day    MEDICATIONS  (PRN):  acetaminophen     Tablet .. 650 milliGRAM(s) Oral every 6 hours PRN Temp greater or equal to 38C (100.4F), Mild Pain (1 - 3)  dextrose Oral Gel 15 Gram(s) Oral once PRN Blood Glucose LESS THAN 70 milliGRAM(s)/deciliter  lidocaine   4% Patch 1 Patch Transdermal every 24 hours PRN Back pain  melatonin 3 milliGRAM(s) Oral at bedtime PRN Insomnia  sodium chloride 0.9% lock flush 10 milliLiter(s) IV Push every 1 hour PRN Pre/post blood products, medications, blood draw, and to maintain line patency        CAPILLARY BLOOD GLUCOSE      POCT Blood Glucose.: 180 mg/dL (22 Aug 2022 21:06)  POCT Blood Glucose.: 112 mg/dL (22 Aug 2022 17:12)  POCT Blood Glucose.: 209 mg/dL (22 Aug 2022 12:19)  POCT Blood Glucose.: 89 mg/dL (22 Aug 2022 08:38)    I&O's Summary      T(C): 36.8 (08-22-22 @ 13:13), Max: 36.8 (08-22-22 @ 06:19)  HR: 79 (08-22-22 @ 21:09) (64 - 79)  BP: 157/65 (08-22-22 @ 21:09) (122/61 - 163/61)  RR: 20 (08-22-22 @ 21:09) (20 - 20)  SpO2: 96% (08-22-22 @ 21:09) (94% - 100%)    PHYSICAL EXAM:  GENERAL: NAD  NECK: Supple, No JVD  CHEST/LUNG: Clear to auscultation bilaterally; No wheezing.  HEART: Regular rate and rhythm; No murmurs, rubs, or gallops  ABDOMEN: Soft, Nontender, Nondistended; Bowel sounds present  EXTREMITIES:   No edema  NEUROLOGY: AAO X 3      LABS:                        9.6    4.24  )-----------( 231      ( 22 Aug 2022 06:35 )             33.4     08-22    135  |  98  |  20  ----------------------------<  89  3.9   |  26  |  3.54<H>    Ca    8.3<L>      22 Aug 2022 06:35  Phos  3.7     08-22  Mg     2.20     08-22              CAPILLARY BLOOD GLUCOSE      POCT Blood Glucose.: 180 mg/dL (22 Aug 2022 21:06)  POCT Blood Glucose.: 112 mg/dL (22 Aug 2022 17:12)  POCT Blood Glucose.: 209 mg/dL (22 Aug 2022 12:19)  POCT Blood Glucose.: 89 mg/dL (22 Aug 2022 08:38)        RADIOLOGY & ADDITIONAL TESTS:    Imaging Personally Reviewed:    Consultant(s) Notes Reviewed:      Care Discussed with Consultants/Other Providers:    Parminder Goldberg MD, CMD, FACP    [address] [place] [address]  Office Tel: [phone]  Cell: [phone]

## 2022-08-22 NOTE — PROGRESS NOTE ADULT - SUBJECTIVE AND OBJECTIVE BOX
Cardiovascular Disease Progress Note    Overnight events: No acute events overnight.   Ms. Tsang denies chest pain or SOB.    Otherwise review of systems negative    Objective Findings:  T(C): 36.8 (08-22-22 @ 06:19), Max: 36.8 (08-22-22 @ 06:19)  HR: 64 (08-22-22 @ 06:19) (64 - 91)  BP: 122/61 (08-22-22 @ 06:19) (122/61 - 164/67)  RR: 20 (08-22-22 @ 06:19) (19 - 20)  SpO2: 100% (08-22-22 @ 06:19) (95% - 100%)  Wt(kg): --  Daily     Daily       Physical Exam:  Gen: NAD; Patient resting comfortably  HEENT: EOMI, Normocephalic/ atraumatic  CV: IIR, normal S1 + S2, no m/r/g  Lungs:  Normal respiratory effort; clear to auscultation bilaterally  Abd: soft, non-tender; bowel sounds present  Ext: No edema; warm and well perfused    Telemetry: a-fib 80s    Laboratory Data:                        9.6    4.24  )-----------( 231      ( 22 Aug 2022 06:35 )             33.4     08-21    133<L>  |  98  |  15  ----------------------------<  83  4.5   |  23  |  2.79<H>    Ca    8.2<L>      21 Aug 2022 06:39  Phos  3.4     08-21  Mg     2.10     08-21                Inpatient Medications:  MEDICATIONS  (STANDING):  chlorhexidine 2% Cloths 1 Application(s) Topical two times a day  clopidogrel Tablet 75 milliGRAM(s) Oral daily  dextrose 5%. 1000 milliLiter(s) (100 mL/Hr) IV Continuous <Continuous>  dextrose 5%. 1000 milliLiter(s) (50 mL/Hr) IV Continuous <Continuous>  dextrose 50% Injectable 25 Gram(s) IV Push once  dextrose 50% Injectable 12.5 Gram(s) IV Push once  dextrose 50% Injectable 25 Gram(s) IV Push once  epoetin sean-epbx (RETACRIT) Injectable 61266 Unit(s) IV Push <User Schedule>  gabapentin 300 milliGRAM(s) Oral at bedtime  glucagon  Injectable 1 milliGRAM(s) IntraMuscular once  heparin   Injectable 5000 Unit(s) SubCutaneous every 12 hours  hydrALAZINE 10 milliGRAM(s) Oral three times a day  insulin glargine Injectable (LANTUS) 5 Unit(s) SubCutaneous at bedtime  insulin lispro (ADMELOG) corrective regimen sliding scale   SubCutaneous three times a day before meals  insulin lispro (ADMELOG) corrective regimen sliding scale   SubCutaneous at bedtime  metoprolol succinate ER 25 milliGRAM(s) Oral daily  pantoprazole    Tablet 40 milliGRAM(s) Oral before breakfast  polyethylene glycol 3350 17 Gram(s) Oral two times a day  senna 2 Tablet(s) Oral at bedtime  sevelamer carbonate 800 milliGRAM(s) Oral three times a day      Assessment:  82 year old woman with uncontrolled IDDM, compensated diastolic CHF, CAD s/p PCI, CKD V and HTN presents with anemia.     Plan of Care:    #CAD- s/p MICHAEL x2 in 12/2017.  Anemia noted on admission, but patient denies any signs of bleeding.  Likely anemia in the setting of CKD.   Exertional dyspnea now improved post pRBC transfusion.  Given extensive vascular disease, continue Plavix 75 mg daily.     #Compensated systolic CHF-  New inferior wall motion abnormality noted on echo  Ms. Tsang is not fluid overloaded on exam and she denies chest pain.  I would not pursue another ischemic evaluation, as Ms. Tsang cannot tolerate dual antiplatelet therapy due to bleeding history.     #ESRD-  S/P HD initiation on 8/16 by the renal team.     #PVD-  Patient status post RLE angiogram with 2 stents placed in 11/2019.   Antiplatelet therapy management as stated above.     #Paroxymal a-fib-  Currently in rate controlled a-fib.   Continue Toprol.   No AC given h/o GI bleed.        Over 25 minutes spent on total encounter; more than 50% of the visit was spent counseling and/or coordinating care by the attending physician.      Julio César Hooper MD [facility]  Cardiovascular Disease  [phone]

## 2022-08-22 NOTE — PROGRESS NOTE ADULT - SUBJECTIVE AND OBJECTIVE BOX
Overnight events noted      VITAL:  T(C): , Max: 36.8 (08-22-22 @ 06:19)  T(F): , Max: 98.3 (08-22-22 @ 06:19)  HR: 71 (08-22-22 @ 10:11)  BP: 163/61 (08-22-22 @ 10:11)  BP(mean): --  RR: 20 (08-22-22 @ 10:11)  SpO2: 96% (08-22-22 @ 10:11)  Wt(kg): --      PHYSICAL EXAM:  Constitutional: NAD, alert  HEENT: MMM  Neck:  No JVD, supple   Respiratory: CTA B/L  Cardiovascular: S1 and S2, IRR  Gastrointestinal: + BS, soft, NT, ND  Extremities: 2+ b/l LE edema  Neurological: reduced generalized strength  : No Sylvester  Skin: No rashes, C/D/I  Vascular Access: R non tunneled HD     LABS:                        9.6    4.24  )-----------( 231      ( 22 Aug 2022 06:35 )             33.4     Na(135)/K(3.9)/Cl(98)/HCO3(26)/BUN(20)/Cr(3.54)Glu(89)/Ca(8.3)/Mg(2.20)/PO4(3.7)    08-22 @ 06:35  Na(133)/K(4.5)/Cl(98)/HCO3(23)/BUN(15)/Cr(2.79)Glu(83)/Ca(8.2)/Mg(2.10)/PO4(3.4)    08-21 @ 06:39  Na(129)/K(3.7)/Cl(91)/HCO3(27)/BUN(26)/Cr(3.76)Glu(93)/Ca(8.3)/Mg(2.20)/PO4(4.8)    08-20 @ 06:04      IMPRESSION: 82F w/ DM2, CAD, PAD, PUD, and CKD5, 8/11/22 p/w abnormal labs    (1)Renal - newly ESRD-HD as of this admission. Last dialyzed Saturday 8/20; due for next HD tomorrow  (3)Hyperphosphatemia - much improved, on Renvela/low-PO4 diet  (3)Anemia - improving, s/p PRBCs/on Venofer  (4)Vasc - awaiting conversion of shiley to tunneled cath  (5)Psychosocial - planning for HD at [facility] as outpatient    RECOMMENDATIONS:  (1) HD tomorrow - 3 hrs, 1kg; Retacrit with HD  (2)Tunneled cath placement per IR  (3)Setup of outside HD per SW                Reid Solis MD  [facility]  Office: [phone]  Cell: [phone]       no pain, no sob      VITAL:  T(C): , Max: 36.8 (08-22-22 @ 06:19)  T(F): , Max: 98.3 (08-22-22 @ 06:19)  HR: 71 (08-22-22 @ 10:11)  BP: 163/61 (08-22-22 @ 10:11)  BP(mean): --  RR: 20 (08-22-22 @ 10:11)  SpO2: 96% (08-22-22 @ 10:11)  Wt(kg): --      PHYSICAL EXAM:  Constitutional: NAD, alert  HEENT: MMM  Neck:  No JVD, supple   Respiratory: CTA B/L  Cardiovascular: S1 and S2, IRR  Gastrointestinal: + BS, soft, NT, ND  Extremities: 2+ b/l LE edema  Neurological: reduced generalized strength  : No Sylvester  Skin: No rashes, C/D/I  Vascular Access: R non tunneled HD     LABS:                        9.6    4.24  )-----------( 231      ( 22 Aug 2022 06:35 )             33.4     Na(135)/K(3.9)/Cl(98)/HCO3(26)/BUN(20)/Cr(3.54)Glu(89)/Ca(8.3)/Mg(2.20)/PO4(3.7)    08-22 @ 06:35  Na(133)/K(4.5)/Cl(98)/HCO3(23)/BUN(15)/Cr(2.79)Glu(83)/Ca(8.2)/Mg(2.10)/PO4(3.4)    08-21 @ 06:39  Na(129)/K(3.7)/Cl(91)/HCO3(27)/BUN(26)/Cr(3.76)Glu(93)/Ca(8.3)/Mg(2.20)/PO4(4.8)    08-20 @ 06:04      IMPRESSION: 82F w/ DM2, CAD, PAD, PUD, and CKD5, 8/11/22 p/w abnormal labs    (1)Renal - newly ESRD-HD as of this admission. Last dialyzed Saturday 8/20; due for next HD tomorrow  (3)Hyperphosphatemia - much improved, on Renvela/low-PO4 diet  (3)Anemia - improving, s/p PRBCs/on Venofer  (4)Vasc - awaiting conversion of shiley to tunneled cath  (5)Psychosocial - planning for HD at [facility] as outpatient    RECOMMENDATIONS:  (1) HD tomorrow - 3 hrs, 1kg; Retacrit with HD  (2)Tunneled cath placement per IR  (3)Setup of outside HD per SW                Reid Solis MD  [facility]  Office: [phone]  Cell: [phone]

## 2022-08-23 ENCOUNTER — TRANSCRIPTION ENCOUNTER (OUTPATIENT)
Age: 82
End: 2022-08-23

## 2022-08-23 LAB
ANION GAP SERPL CALC-SCNC: 9 MMOL/L — SIGNIFICANT CHANGE UP (ref 7–14)
APTT BLD: 29.6 SEC — SIGNIFICANT CHANGE UP (ref 27–36.3)
BLD GP AB SCN SERPL QL: NEGATIVE — SIGNIFICANT CHANGE UP
BUN SERPL-MCNC: 28 MG/DL — HIGH (ref 7–23)
CALCIUM SERPL-MCNC: 8.1 MG/DL — LOW (ref 8.4–10.5)
CHLORIDE SERPL-SCNC: 98 MMOL/L — SIGNIFICANT CHANGE UP (ref 98–107)
CO2 SERPL-SCNC: 27 MMOL/L — SIGNIFICANT CHANGE UP (ref 22–31)
CREAT SERPL-MCNC: 4.09 MG/DL — HIGH (ref 0.5–1.3)
EGFR: 11 ML/MIN/1.73M2 — LOW
GLUCOSE BLDC GLUCOMTR-MCNC: 107 MG/DL — HIGH (ref 70–99)
GLUCOSE BLDC GLUCOMTR-MCNC: 109 MG/DL — HIGH (ref 70–99)
GLUCOSE BLDC GLUCOMTR-MCNC: 155 MG/DL — HIGH (ref 70–99)
GLUCOSE BLDC GLUCOMTR-MCNC: 97 MG/DL — SIGNIFICANT CHANGE UP (ref 70–99)
GLUCOSE SERPL-MCNC: 99 MG/DL — SIGNIFICANT CHANGE UP (ref 70–99)
HCT VFR BLD CALC: 32.5 % — LOW (ref 34.5–45)
HGB BLD-MCNC: 9.2 G/DL — LOW (ref 11.5–15.5)
INR BLD: 0.98 RATIO — SIGNIFICANT CHANGE UP (ref 0.88–1.16)
MAGNESIUM SERPL-MCNC: 2.2 MG/DL — SIGNIFICANT CHANGE UP (ref 1.6–2.6)
MCHC RBC-ENTMCNC: 26.6 PG — LOW (ref 27–34)
MCHC RBC-ENTMCNC: 28.3 GM/DL — LOW (ref 32–36)
MCV RBC AUTO: 93.9 FL — SIGNIFICANT CHANGE UP (ref 80–100)
NRBC # BLD: 0 /100 WBCS — SIGNIFICANT CHANGE UP (ref 0–0)
NRBC # FLD: 0 K/UL — SIGNIFICANT CHANGE UP (ref 0–0)
PHOSPHATE SERPL-MCNC: 3.6 MG/DL — SIGNIFICANT CHANGE UP (ref 2.5–4.5)
PLATELET # BLD AUTO: 262 K/UL — SIGNIFICANT CHANGE UP (ref 150–400)
POTASSIUM SERPL-MCNC: 3.9 MMOL/L — SIGNIFICANT CHANGE UP (ref 3.5–5.3)
POTASSIUM SERPL-SCNC: 3.9 MMOL/L — SIGNIFICANT CHANGE UP (ref 3.5–5.3)
PROTHROM AB SERPL-ACNC: 11.4 SEC — SIGNIFICANT CHANGE UP (ref 10.5–13.4)
RBC # BLD: 3.46 M/UL — LOW (ref 3.8–5.2)
RBC # FLD: 20.6 % — HIGH (ref 10.3–14.5)
RH IG SCN BLD-IMP: POSITIVE — SIGNIFICANT CHANGE UP
SARS-COV-2 RNA SPEC QL NAA+PROBE: SIGNIFICANT CHANGE UP
SODIUM SERPL-SCNC: 134 MMOL/L — LOW (ref 135–145)
WBC # BLD: 4.38 K/UL — SIGNIFICANT CHANGE UP (ref 3.8–10.5)
WBC # FLD AUTO: 4.38 K/UL — SIGNIFICANT CHANGE UP (ref 3.8–10.5)

## 2022-08-23 RX ADMIN — Medication 25 MILLIGRAM(S): at 06:21

## 2022-08-23 RX ADMIN — GABAPENTIN 300 MILLIGRAM(S): 400 CAPSULE ORAL at 22:18

## 2022-08-23 RX ADMIN — Medication 10 MILLIGRAM(S): at 06:20

## 2022-08-23 RX ADMIN — CHLORHEXIDINE GLUCONATE 1 APPLICATION(S): 213 SOLUTION TOPICAL at 06:20

## 2022-08-23 RX ADMIN — PANTOPRAZOLE SODIUM 40 MILLIGRAM(S): 20 TABLET, DELAYED RELEASE ORAL at 06:21

## 2022-08-23 RX ADMIN — SENNA PLUS 2 TABLET(S): 8.6 TABLET ORAL at 22:18

## 2022-08-23 RX ADMIN — Medication 10 MILLIGRAM(S): at 22:18

## 2022-08-23 RX ADMIN — CLOPIDOGREL BISULFATE 75 MILLIGRAM(S): 75 TABLET, FILM COATED ORAL at 12:13

## 2022-08-23 RX ADMIN — SEVELAMER CARBONATE 800 MILLIGRAM(S): 2400 POWDER, FOR SUSPENSION ORAL at 22:18

## 2022-08-23 RX ADMIN — Medication 10 MILLIGRAM(S): at 17:38

## 2022-08-23 RX ADMIN — SEVELAMER CARBONATE 800 MILLIGRAM(S): 2400 POWDER, FOR SUSPENSION ORAL at 17:38

## 2022-08-23 RX ADMIN — SEVELAMER CARBONATE 800 MILLIGRAM(S): 2400 POWDER, FOR SUSPENSION ORAL at 06:21

## 2022-08-23 RX ADMIN — CHLORHEXIDINE GLUCONATE 1 APPLICATION(S): 213 SOLUTION TOPICAL at 17:38

## 2022-08-23 NOTE — PROGRESS NOTE ADULT - SUBJECTIVE AND OBJECTIVE BOX
Cardiovascular Disease Progress Note    Overnight events: No acute events overnight.  Ms. Tsang denies chest pain or SOB.       Objective Findings:  T(C): 36.8 (08-22-22 @ 13:13), Max: 36.8 (08-22-22 @ 13:13)  HR: 79 (08-22-22 @ 21:09) (71 - 79)  BP: 157/65 (08-22-22 @ 21:09) (143/64 - 163/61)  RR: 20 (08-22-22 @ 21:09) (20 - 20)  SpO2: 96% (08-22-22 @ 21:09) (94% - 96%)  Wt(kg): --  Daily     Daily       Physical Exam:  Gen: NAD; Patient resting comfortably  HEENT: EOMI, Normocephalic/ atraumatic  CV: RRR, normal S1 + S2, no m/r/g  Lungs:  Normal respiratory effort; clear to auscultation bilaterally  Abd: soft, non-tender; bowel sounds present  Ext: No edema; warm and well perfused    Telemetry: Sinus    Laboratory Data:                        9.6    4.24  )-----------( 231      ( 22 Aug 2022 06:35 )             33.4     08-22    135  |  98  |  20  ----------------------------<  89  3.9   |  26  |  3.54<H>    Ca    8.3<L>      22 Aug 2022 06:35  Phos  3.7     08-22  Mg     2.20     08-22                Inpatient Medications:  MEDICATIONS  (STANDING):  chlorhexidine 2% Cloths 1 Application(s) Topical two times a day  clopidogrel Tablet 75 milliGRAM(s) Oral daily  dextrose 5%. 1000 milliLiter(s) (50 mL/Hr) IV Continuous <Continuous>  dextrose 5%. 1000 milliLiter(s) (100 mL/Hr) IV Continuous <Continuous>  dextrose 50% Injectable 25 Gram(s) IV Push once  dextrose 50% Injectable 12.5 Gram(s) IV Push once  dextrose 50% Injectable 25 Gram(s) IV Push once  epoetin sean-epbx (RETACRIT) Injectable 49258 Unit(s) IV Push <User Schedule>  gabapentin 300 milliGRAM(s) Oral at bedtime  glucagon  Injectable 1 milliGRAM(s) IntraMuscular once  hydrALAZINE 10 milliGRAM(s) Oral three times a day  insulin lispro (ADMELOG) corrective regimen sliding scale   SubCutaneous three times a day before meals  insulin lispro (ADMELOG) corrective regimen sliding scale   SubCutaneous at bedtime  metoprolol succinate ER 25 milliGRAM(s) Oral daily  pantoprazole    Tablet 40 milliGRAM(s) Oral before breakfast  polyethylene glycol 3350 17 Gram(s) Oral two times a day  senna 2 Tablet(s) Oral at bedtime  sevelamer carbonate 800 milliGRAM(s) Oral three times a day      Assessment: 82 year old woman with uncontrolled IDDM, compensated diastolic CHF, CAD s/p PCI, CKD V and HTN presents with anemia.     Plan of Care:    #CAD- s/p MICHAEL x2 in 12/2017.  Anemia noted on admission, but patient denies any signs of bleeding.  Likely anemia in the setting of CKD.   Exertional dyspnea now improved post pRBC transfusion.  Given extensive vascular disease, continue Plavix 75 mg daily.     #Compensated systolic CHF-  New inferior wall motion abnormality noted on echo  Ms. Tsang is not fluid overloaded on exam and she denies chest pain.  I would not pursue another ischemic evaluation, as Ms. Tsang cannot tolerate dual antiplatelet therapy due to bleeding history.     #ESRD-  S/P HD initiation on 8/16 by the renal team.     #PVD-  Patient status post RLE angiogram with 2 stents placed in 11/2019.   Antiplatelet therapy management as stated above.     #Paroxymal a-fib-  Patient in and out of rate controlled a-fib.   Continue Toprol.   No AC given h/o GI bleed.      Over 25 minutes spent on total encounter; more than 50% of the visit was spent counseling and/or coordinating care by the attending physician.      Julio César Hooper MD [facility]  Cardiovascular Disease  [phone]

## 2022-08-23 NOTE — PROVIDER CONTACT NOTE (OTHER) - SITUATION
Refusing 2PM hydralazine
Elevated BP
patient is being tachycardiac upto 120 , denies any chest pain . asymptomatic

## 2022-08-23 NOTE — CHART NOTE - NSCHARTNOTEFT_GEN_A_CORE
- Patient going to dialysis today. Was initially scheduled for tunneled dialysis catheter today but due to scheduling conflicts will plan for tunneled dialysis catheter placement tomorrow 8/24.     Recommendations:  -- NPO at midnight.   -- hold Px anticoagulation  -- please maintain COVID PCR within 72 hours of planned procedure   -- AM labs and coags

## 2022-08-23 NOTE — DISCHARGE NOTE PROVIDER - NSDCMRMEDTOKEN_GEN_ALL_CORE_FT
clopidogrel 75 mg oral tablet: 1 tab(s) orally once a day  gabapentin 300 mg oral capsule: 1 cap(s) orally 2 times a day  hydrALAZINE 10 mg oral tablet: 1 tab(s) orally 3 times a day  Lantus 100 units/mL subcutaneous solution: 20 unit(s) subcutaneous once a day (at bedtime)  Lasix 80 mg oral tablet: 1 tab(s) orally 2 times a day  Metoprolol Succinate ER 25 mg oral tablet, extended release: 1 tab(s) orally once a day  senna oral tablet: 2 tab(s) orally once a day (at bedtime)  sevelamer carbonate 800 mg oral tablet: 1 tab(s) orally 3 times a day   Tradjenta 5 mg oral tablet: 1 tab(s) orally once a day    clopidogrel 75 mg oral tablet: 1 tab(s) orally once a day  gabapentin 300 mg oral capsule: 1 cap(s) orally once a day (at bedtime)  hydrALAZINE 10 mg oral tablet: 1 tab(s) orally 3 times a day  Lasix 80 mg oral tablet: 1 tab(s) orally twice daily on Monday, Wednesday, and Friday, Sunday (non dialysis days)  Metoprolol Succinate ER 25 mg oral tablet, extended release: 1 tab(s) orally once a day  pantoprazole 40 mg oral delayed release tablet: 1 tab(s) orally once a day (before a meal)  rolling walker : as directed for unsteady gait   RUSSEL 99  senna oral tablet: 2 tab(s) orally once a day (at bedtime)  sevelamer carbonate 800 mg oral tablet: 1 tab(s) orally 3 times a day   Tradjenta 5 mg oral tablet: 1 tab(s) orally once a day

## 2022-08-23 NOTE — PROGRESS NOTE ADULT - SUBJECTIVE AND OBJECTIVE BOX
NEPHROLOGY    Patient seen and examined sitting on bed, reports feeling better, denies pain, no sob, comfortable on 2L NC, currently in no acute distress.     MEDICATIONS  (STANDING):  chlorhexidine 2% Cloths 1 Application(s) Topical two times a day  clopidogrel Tablet 75 milliGRAM(s) Oral daily  dextrose 5%. 1000 milliLiter(s) (50 mL/Hr) IV Continuous <Continuous>  dextrose 5%. 1000 milliLiter(s) (100 mL/Hr) IV Continuous <Continuous>  dextrose 50% Injectable 25 Gram(s) IV Push once  dextrose 50% Injectable 12.5 Gram(s) IV Push once  dextrose 50% Injectable 25 Gram(s) IV Push once  epoetin sean-epbx (RETACRIT) Injectable 55684 Unit(s) IV Push <User Schedule>  gabapentin 300 milliGRAM(s) Oral at bedtime  glucagon  Injectable 1 milliGRAM(s) IntraMuscular once  hydrALAZINE 10 milliGRAM(s) Oral three times a day  insulin lispro (ADMELOG) corrective regimen sliding scale   SubCutaneous three times a day before meals  insulin lispro (ADMELOG) corrective regimen sliding scale   SubCutaneous at bedtime  metoprolol succinate ER 25 milliGRAM(s) Oral daily  pantoprazole    Tablet 40 milliGRAM(s) Oral before breakfast  polyethylene glycol 3350 17 Gram(s) Oral two times a day  senna 2 Tablet(s) Oral at bedtime  sevelamer carbonate 800 milliGRAM(s) Oral three times a day    VITALS:  T(C): , Max: 36.8 (08-22-22 @ 13:13)  T(F): , Max: 98.3 (08-22-22 @ 13:13)  HR: 79 (08-22-22 @ 21:09)  BP: 157/65 (08-22-22 @ 21:09)  RR: 20 (08-22-22 @ 21:09)  SpO2: 96% (08-22-22 @ 21:09)    PHYSICAL EXAM:  Constitutional: NAD, alert  HEENT: MMM  Neck:  No JVD, supple   Respiratory: CTA B/L  Cardiovascular: S1 and S2, IRR  Gastrointestinal: + BS, soft, NT, ND  Extremities: 2+ b/l LE edema  Neurological: reduced generalized strength  : No Sylvester  Skin: No rashes, C/D/I  Vascular Access: R non tunneled HD     LABS:                        9.2    4.38  )-----------( 262      ( 23 Aug 2022 06:50 )             32.5     08-23    134<L>  |  98  |  28<H>  ----------------------------<  99  3.9   |  27  |  4.09<H>    Ca    8.1<L>      23 Aug 2022 06:50  Phos  3.6     08-23  Mg     2.20     08-23

## 2022-08-23 NOTE — DISCHARGE NOTE PROVIDER - PROVIDER TOKENS
PROVIDER:[TOKEN:[[id number]]],PROVIDER:[TOKEN:[[id number]]] PROVIDER:[TOKEN:[[id number]]],PROVIDER:[TOKEN:[[id number]]],PROVIDER:[TOKEN:[[id number]]]

## 2022-08-23 NOTE — CHART NOTE - NSCHARTNOTEFT_GEN_A_CORE
Pre-Interventional Radiology Procedure Note    82y    Female    Procedure: Nontunneled CVC to tunneled CVC     Diagnosis/Indication: Patient is a 82y old  Female who presents with a chief complaint of anemia p/w anemia, s/p 2u PRBC and 5days of IV iron c/b c/b worseening KOJO now ESRD w/ NEW HD initiated, S/P IRMA Bliss on  awaiting [facility]      Interventional Radiology Attending Physician:    Ordering Attending Physician: Parminder Goldberg    PAST MEDICAL & SURGICAL HISTORY:  HLD (hyperlipidemia)      Diabetic neuropathy      CAD (coronary artery disease)  ~ s/p MICHAEL x2 in 2017      Paroxysmal atrial fibrillation      Diabetes mellitus, type 2      GI bleed      Neuropathy      Gastritis      Diastolic heart failure      Transfusion history      Stage 5 chronic kidney disease not on chronic dialysis      Anemia      PVD (peripheral vascular disease)  ~ RLE angiogram with 2 stents placed in 2019.      H/O  section      S/P coronary artery stent placement  x2 in       History of esophagogastroduodenoscopy (EGD)      S/P angiogram of extremity    CBC Full  -  ( 23 Aug 2022 06:50 )  WBC Count : 4.38 K/uL  RBC Count : 3.46 M/uL  Hemoglobin : 9.2 g/dL  Hematocrit : 32.5 %  Platelet Count - Automated : 262 K/uL  Mean Cell Volume : 93.9 fL  Mean Cell Hemoglobin : 26.6 pg  Mean Cell Hemoglobin Concentration : 28.3 gm/dL          135  |  98  |  20  ----------------------------<  89  3.9   |  26  |  3.54<H>    Ca    8.3<L>      22 Aug 2022 06:35  Phos  3.7       Mg     2.20         Patient and family aware? Yes Pre-Interventional Radiology Procedure Note    82y    Female    Procedure: Nontunneled CVC to tunneled CVC     Diagnosis/Indication: Patient is a 82y old  Female who presents with a chief complaint of anemia p/w anemia, s/p 2u PRBC and 5days of IV iron c/b c/b worseening KOJO now ESRD w/ NEW HD initiated, S/P IRMA Bliss on  awaiting permacath.      Interventional Radiology Attending Physician: Francois Rodriguez    Ordering Attending Physician: Parminder Goldberg    PAST MEDICAL & SURGICAL HISTORY:  HLD (hyperlipidemia)      Diabetic neuropathy      CAD (coronary artery disease)  ~ s/p MICHAEL x2 in 2017      Paroxysmal atrial fibrillation      Diabetes mellitus, type 2      GI bleed      Neuropathy      Gastritis      Diastolic heart failure      Transfusion history      Stage 5 chronic kidney disease not on chronic dialysis      Anemia      PVD (peripheral vascular disease)  ~ RLE angiogram with 2 stents placed in 2019.      H/O  section      S/P coronary artery stent placement  x2 in       History of esophagogastroduodenoscopy (EGD)      S/P angiogram of extremity    CBC Full  -  ( 23 Aug 2022 06:50 )  WBC Count : 4.38 K/uL  RBC Count : 3.46 M/uL  Hemoglobin : 9.2 g/dL  Hematocrit : 32.5 %  Platelet Count - Automated : 262 K/uL  Mean Cell Volume : 93.9 fL  Mean Cell Hemoglobin : 26.6 pg  Mean Cell Hemoglobin Concentration : 28.3 gm/dL          135  |  98  |  20  ----------------------------<  89  3.9   |  26  |  3.54<H>    Ca    8.3<L>      22 Aug 2022 06:35  Phos  3.7       Mg     2.20         Patient and family aware? Yes

## 2022-08-23 NOTE — ED PROVIDER NOTE - DISCUSSED CASE WITH MULTISELECT OPTIONS
Birth Control Pills Counseling: Birth Control Pill Counseling: I discussed with the patient the potential side effects of OCPs including but not limited to increased risk of stroke, heart attack, thrombophlebitis, deep venous thrombosis, hepatic adenomas, breast changes, GI upset, headaches, and depression.  The patient verbalized understanding of the proper use and possible adverse effects of OCPs. All of the patient's questions and concerns were addressed. Topical Clindamycin Counseling: Patient counseled that this medication may cause skin irritation or allergic reactions.  In the event of skin irritation, the patient was advised to reduce the amount of the drug applied or use it less frequently.   The patient verbalized understanding of the proper use and possible adverse effects of clindamycin.  All of the patient's questions and concerns were addressed. Winlevi Pregnancy And Lactation Text: This medication is considered safe during pregnancy and breastfeeding. Aklief Pregnancy And Lactation Text: It is unknown if this medication is safe to use during pregnancy.  It is unknown if this medication is excreted in breast milk.  Breastfeeding women should use the topical cream on the smallest area of the skin for the shortest time needed while breastfeeding.  Do not apply to nipple and areola. Tazorac Counseling:  Patient advised that medication is irritating and drying.  Patient may need to apply sparingly and wash off after an hour before eventually leaving it on overnight.  The patient verbalized understanding of the proper use and possible adverse effects of tazorac.  All of the patient's questions and concerns were addressed. Sarecycline Counseling: Patient advised regarding possible photosensitivity and discoloration of the teeth, skin, lips, tongue and gums.  Patient instructed to avoid sunlight, if possible.  When exposed to sunlight, patients should wear protective clothing, sunglasses, and sunscreen.  The patient was instructed to call the office immediately if the following severe adverse effects occur:  hearing changes, easy bruising/bleeding, severe headache, or vision changes.  The patient verbalized understanding of the proper use and possible adverse effects of sarecycline.  All of the patient's questions and concerns were addressed. Erythromycin Pregnancy And Lactation Text: This medication is Pregnancy Category B and is considered safe during pregnancy. It is also excreted in breast milk. Dapsone Pregnancy And Lactation Text: This medication is Pregnancy Category C and is not considered safe during pregnancy or breast feeding. High Dose Vitamin A Counseling: Side effects reviewed, pt to contact office should one occur. Detail Level: Simple Topical Clindamycin Pregnancy And Lactation Text: This medication is Pregnancy Category B and is considered safe during pregnancy. It is unknown if it is excreted in breast milk. Benzoyl Peroxide Counseling: Patient counseled that medicine may cause skin irritation and bleach clothing.  In the event of skin irritation, the patient was advised to reduce the amount of the drug applied or use it less frequently.   The patient verbalized understanding of the proper use and possible adverse effects of benzoyl peroxide.  All of the patient's questions and concerns were addressed. Spironolactone Pregnancy And Lactation Text: This medication can cause feminization of the male fetus and should be avoided during pregnancy. The active metabolite is also found in breast milk. Tetracycline Pregnancy And Lactation Text: This medication is Pregnancy Category D and not consider safe during pregnancy. It is also excreted in breast milk. Topical Retinoid counseling:  Patient advised to apply a pea-sized amount only at bedtime and wait 30 minutes after washing their face before applying.  If too drying, patient may add a non-comedogenic moisturizer. The patient verbalized understanding of the proper use and possible adverse effects of retinoids.  All of the patient's questions and concerns were addressed. Bactrim Counseling:  I discussed with the patient the risks of sulfa antibiotics including but not limited to GI upset, allergic reaction, drug rash, diarrhea, dizziness, photosensitivity, and yeast infections.  Rarely, more serious reactions can occur including but not limited to aplastic anemia, agranulocytosis, methemoglobinemia, blood dyscrasias, liver or kidney failure, lung infiltrates or desquamative/blistering drug rashes. Doxycycline Pregnancy And Lactation Text: This medication is Pregnancy Category D and not consider safe during pregnancy. It is also excreted in breast milk but is considered safe for shorter treatment courses. Minocycline Counseling: Patient advised regarding possible photosensitivity and discoloration of the teeth, skin, lips, tongue and gums.  Patient instructed to avoid sunlight, if possible.  When exposed to sunlight, patients should wear protective clothing, sunglasses, and sunscreen.  The patient was instructed to call the office immediately if the following severe adverse effects occur:  hearing changes, easy bruising/bleeding, severe headache, or vision changes.  The patient verbalized understanding of the proper use and possible adverse effects of minocycline.  All of the patient's questions and concerns were addressed. Topical Sulfur Applications Pregnancy And Lactation Text: This medication is Pregnancy Category C and has an unknown safety profile during pregnancy. It is unknown if this topical medication is excreted in breast milk. Azithromycin Counseling:  I discussed with the patient the risks of azithromycin including but not limited to GI upset, allergic reaction, drug rash, diarrhea, and yeast infections. Bactrim Pregnancy And Lactation Text: This medication is Pregnancy Category D and is known to cause fetal risk.  It is also excreted in breast milk. Erythromycin Counseling:  I discussed with the patient the risks of erythromycin including but not limited to GI upset, allergic reaction, drug rash, diarrhea, increase in liver enzymes, and yeast infections. Winlevi Counseling:  I discussed with the patient the risks of topical clascoterone including but not limited to erythema, scaling, itching, and stinging. Patient voiced their understanding. Aklief counseling:  Patient advised to apply a pea-sized amount only at bedtime and wait 30 minutes after washing their face before applying.  If too drying, patient may add a non-comedogenic moisturizer.  The most commonly reported side effects including irritation, redness, scaling, dryness, stinging, burning, itching, and increased risk of sunburn.  The patient verbalized understanding of the proper use and possible adverse effects of retinoids.  All of the patient's questions and concerns were addressed. Topical Retinoid Pregnancy And Lactation Text: This medication is Pregnancy Category C. It is unknown if this medication is excreted in breast milk. Birth Control Pills Pregnancy And Lactation Text: This medication should be avoided if pregnant and for the first 30 days post-partum. Isotretinoin Counseling: Patient should get monthly blood tests, not donate blood, not drive at night if vision affected, not share medication, and not undergo elective surgery for 6 months after tx completed. Side effects reviewed, pt to contact office should one occur. Azelaic Acid Counseling: Patient counseled that medicine may cause skin irritation and to avoid applying near the eyes.  In the event of skin irritation, the patient was advised to reduce the amount of the drug applied or use it less frequently.   The patient verbalized understanding of the proper use and possible adverse effects of azelaic acid.  All of the patient's questions and concerns were addressed. Tazorac Pregnancy And Lactation Text: This medication is not safe during pregnancy. It is unknown if this medication is excreted in breast milk. Azithromycin Pregnancy And Lactation Text: This medication is considered safe during pregnancy and is also secreted in breast milk. Doxycycline Counseling:  Patient counseled regarding possible photosensitivity and increased risk for sunburn.  Patient instructed to avoid sunlight, if possible.  When exposed to sunlight, patients should wear protective clothing, sunglasses, and sunscreen.  The patient was instructed to call the office immediately if the following severe adverse effects occur:  hearing changes, easy bruising/bleeding, severe headache, or vision changes.  The patient verbalized understanding of the proper use and possible adverse effects of doxycycline.  All of the patient's questions and concerns were addressed. High Dose Vitamin A Pregnancy And Lactation Text: High dose vitamin A therapy is contraindicated during pregnancy and breast feeding. Consultant Dapsone Counseling: I discussed with the patient the risks of dapsone including but not limited to hemolytic anemia, agranulocytosis, rashes, methemoglobinemia, kidney failure, peripheral neuropathy, headaches, GI upset, and liver toxicity.  Patients who start dapsone require monitoring including baseline LFTs and weekly CBCs for the first month, then every month thereafter.  The patient verbalized understanding of the proper use and possible adverse effects of dapsone.  All of the patient's questions and concerns were addressed. Isotretinoin Pregnancy And Lactation Text: This medication is Pregnancy Category X and is considered extremely dangerous during pregnancy. It is unknown if it is excreted in breast milk. Azelaic Acid Pregnancy And Lactation Text: This medication is considered safe during pregnancy and breast feeding. Spironolactone Counseling: Patient advised regarding risks of diarrhea, abdominal pain, hyperkalemia, birth defects (for female patients), liver toxicity and renal toxicity. The patient may need blood work to monitor liver and kidney function and potassium levels while on therapy. The patient verbalized understanding of the proper use and possible adverse effects of spironolactone.  All of the patient's questions and concerns were addressed. Include Pregnancy/Lactation Warning?: No Topical Sulfur Applications Counseling: Topical Sulfur Counseling: Patient counseled that this medication may cause skin irritation or allergic reactions.  In the event of skin irritation, the patient was advised to reduce the amount of the drug applied or use it less frequently.   The patient verbalized understanding of the proper use and possible adverse effects of topical sulfur application.  All of the patient's questions and concerns were addressed. Tetracycline Counseling: Patient counseled regarding possible photosensitivity and increased risk for sunburn.  Patient instructed to avoid sunlight, if possible.  When exposed to sunlight, patients should wear protective clothing, sunglasses, and sunscreen.  The patient was instructed to call the office immediately if the following severe adverse effects occur:  hearing changes, easy bruising/bleeding, severe headache, or vision changes.  The patient verbalized understanding of the proper use and possible adverse effects of tetracycline.  All of the patient's questions and concerns were addressed. Patient understands to avoid pregnancy while on therapy due to potential birth defects. Benzoyl Peroxide Pregnancy And Lactation Text: This medication is Pregnancy Category C. It is unknown if benzoyl peroxide is excreted in breast milk.

## 2022-08-23 NOTE — PROVIDER CONTACT NOTE (OTHER) - BACKGROUND
82F with PMHx CAD s/p stents 2017, pAfib (not on AC due to hx GIB), HLD, DM2 on insulin c/b neuropathy, PVD s/p stents 2019, hx GIB/duodenal ulcer, CKD,
Symptomatic anemia
admit dx: Symptomatic anemia

## 2022-08-23 NOTE — PROVIDER CONTACT NOTE (OTHER) - ACTION/TREATMENT ORDERED:
Fidel Aguilar PA notified & aware. PA agreeable to speak w/ patient at bedside.
Fidel HARRELL notified & aware. RN to administer hydralazine & wait 1 hr, recheck VS & cont to monitor.
EKG order ,and temp check ,PA ,made aware

## 2022-08-23 NOTE — DISCHARGE NOTE PROVIDER - HOSPITAL COURSE
82F PMHx CAD s/p stents 2017, pAfib (not on AC due to hx GIB), HLD, DM2 on insulin c/b neuropathy, PVD s/p stents 2019, hx GIB/duodenal ulcer, CKD, anemia p/w anemia, s/p 2u PRBC and 5days of IV iron, GI was consulted, patient refused endoscopic evaluation. Noted with abnormal EKG, cardiology was consulted, Echo with moderate LV systolic dysfunction, no further ischemic workup at this time. Course c/b worseening KOJO now ESRD w/ NEW HD initiated, S/P RIJ Izaiah on 8/16 awaiting permacath      Symptomatic anemia  Hgb 6.4 on admission. No active bleeding and FOBT neg  Likely related to CKD vs slow intermittent chronic GIB  Exertional dyspnea now improved post 2u pRBC transfusion.  GI consulted: Patient does not want endoscopy/colonoscopy at this time. Given no signs/symptoms of bleeding would not recommend inpatient endoscopy/colonoscopy  s/p IV Iron x5days  started Retacrit w/ HD   transfuse if hgb <8 given hx CAD/PVD    CAD/Abnormal EKG    EKG NSR @69bpm, TWI in II, AVF, V4-V5. TWI appear new compared to EKG on last admission>  May be related to demand ischemia in setting of anemia   Trop 52> 49  ECHO - EF 45% (from 70%), Moderate segmental LV systolic dysfxn. Inferolateral and lateral wall hypokinesis.  cardiology consulted- will not persue another ischemic work up/evaluation as pt cannot tolerate dual antiplatelet therapy due to bleeding history.     CKD now ESRD on NEW dialysis   bilateral LE edema, s/p IV lasix now euvolemic  IR consulted - s/p R Non-tunneled Shilley placement 8/16   Started on 3 consective HD on 8/16,8/17,8/18  Plan for permacath 8/23    Dyspnea on exertion/Compensated systolic CHF  weaned from NC-->RA  Chronic HARPER, also component of underlying anxiety   echo w/ severe LV systolic dysfunntion,New inferior wall motion abnormality  likely related to anemia vs chronic CHF   CXR 8/12- clear, enlarged heart   s/p IV lasix per nephro  cards- no further ischemic workup at this time      Type 2 diabetes mellitus   A1C 7.2%       ---  Anemia,s/p 2u prbc & IV iron,Defer Scope per GI-pt refused.Transfuse hgb <8 given CAD     Dyspnea weaned off NC to RA    ESRD- started NEW HD**, S/P RIKAL Danielle on 8/16, 3 consective HD on 8/16,8/17,8/18  - NPO p MN for Possible conversion of non tunneled to tunneled cvc tuesday 8/23 (hep ppx on hold, COVID 8/22- neg)    FS running low - Lantus DC'ed    PT- VINCE, SW huy aware of new HD. Likely DC planning Wednesday AM once permacath placed and working well   82F PMHx CAD s/p stents 2017, pAfib (not on AC due to hx GIB), HLD, DM2 on insulin c/b neuropathy, PVD s/p stents 2019, hx GIB/duodenal ulcer, CKD, anemia p/w anemia, s/p 2u PRBC and 5days of IV iron, GI was consulted, patient refused endoscopic evaluation. Noted with abnormal EKG, cardiology was consulted, Echo with moderate LV systolic dysfunction, no further ischemic workup at this time. Course c/b worseening KOJO now ESRD w/ NEW HD initiated    Hospital course:    Symptomatic anemia  Hgb 6.4 on admission. No active bleeding and FOBT neg  Likely related to CKD vs slow intermittent chronic GIB  Exertional dyspnea now improved post 2u pRBC transfusion.  GI consulted: Patient does not want endoscopy/colonoscopy at this time. Given no signs/symptoms of bleeding would not recommend inpatient endoscopy/colonoscopy  s/p IV Iron x5days  started Retacrit w/ HD   transfuse if hgb <8 given hx CAD/PVD    CAD/Abnormal EKG    EKG NSR @69bpm, TWI in II, AVF, V4-V5. TWI appear new compared to EKG on last admission>  May be related to demand ischemia in setting of anemia   Trop 52> 49  ECHO - EF 45% (from 70%), Moderate segmental LV systolic dysfxn. Inferolateral and lateral wall hypokinesis.  cardiology consulted: defer another ischemic work up/evaluation as pt cannot tolerate dual antiplatelet therapy due to bleeding history. c/w plavix     AFib  BB, no AC 2/2 GI bleed     CKD now ESRD on NEW dialysis   bilateral LE edema, s/p IV lasix now euvolemic  IR consulted - s/p R Non-tunneled Shilley placement 8/16, s/p permacath 8/24  continue HD per renal    Dyspnea on exertion/Compensated systolic CHF  weaned from NC-->RA  Chronic HARPER, also component of underlying anxiety   echo w/ severe LV systolic dysfunntion,New inferior wall motion abnormality  likely related to anemia vs chronic CHF   CXR 8/12- clear, enlarged heart   s/p IV lasix per nephro  cards- no further ischemic workup at this time      Type 2 diabetes mellitus   A1C 7.2%     PT recommended rehab but family wants home with service     Case discussed with Dr Goldberg on *****. Reviewed discharge medications with patient; All new medications requiring new prescription sent to pharmacy of patients choice. Reviewed need for prescription for previous home medication and new prescriptions sent if requested. Patient in agreement and understands.     INCOMPLETE    82F PMHx CAD s/p stents 2017, pAfib (not on AC due to hx GIB), HLD, DM2 on insulin c/b neuropathy, PVD s/p stents 2019, hx GIB/duodenal ulcer, CKD, anemia p/w anemia, s/p 2u PRBC and 5days of IV iron, GI was consulted, patient refused endoscopic evaluation. Noted with abnormal EKG, cardiology was consulted, Echo with moderate LV systolic dysfunction, no further ischemic workup at this time. Course c/b worseening KOJO now ESRD w/ NEW HD initiated    Hospital course:    Symptomatic anemia  Hgb 6.4 on admission. No active bleeding and FOBT neg  Likely related to CKD vs slow intermittent chronic GIB  Exertional dyspnea now improved post 2u pRBC transfusion.  GI consulted: Patient does not want endoscopy/colonoscopy at this time. Given no signs/symptoms of bleeding would not recommend inpatient endoscopy/colonoscopy  s/p IV Iron x5days  started Retacrit w/ HD   transfuse if hgb <8 given hx CAD/PVD    CAD/Abnormal EKG    EKG NSR @69bpm, TWI in II, AVF, V4-V5. TWI appear new compared to EKG on last admission>  May be related to demand ischemia in setting of anemia   Trop 52> 49  ECHO - EF 45% (from 70%), Moderate segmental LV systolic dysfxn. Inferolateral and lateral wall hypokinesis.  cardiology consulted: defer another ischemic work up/evaluation as pt cannot tolerate dual antiplatelet therapy due to bleeding history. c/w plavix     AFib  BB, no AC 2/2 GI bleed     CKD now ESRD on NEW dialysis   bilateral LE edema, s/p IV lasix now euvolemic  IR consulted - s/p R Non-tunneled Shilley placement 8/16, s/p permacath 8/24  continue HD per renal    Dyspnea on exertion/Compensated systolic CHF  weaned from NC-->RA  Chronic HARPER, also component of underlying anxiety   echo w/ severe LV systolic dysfunntion,New inferior wall motion abnormality  likely related to anemia vs chronic CHF   CXR 8/12- clear, enlarged heart   s/p IV lasix per nephro  cards- no further ischemic workup at this time      Type 2 diabetes mellitus   A1C 7.2%   pt with episodes of hypoglycemia, lantus discontinued  continue with conynda outpatient     PT recommended rehab but family wants home with service     Case discussed with Covering attending Dr Lomeli on 8/27. Reviewed discharge medications with patient; All new medications requiring new prescription sent to pharmacy of patients choice. Reviewed need for prescription for previous home medication and new prescriptions sent if requested. Patient in agreement and understands.

## 2022-08-23 NOTE — PROVIDER CONTACT NOTE (OTHER) - ASSESSMENT
See previous provider contact note.   Pt refused 2PM hydralazine dose, provider notified & spoke w/ pt @ bedside. Pt agreeable to take hydralazine now, RN checked BP which was 185/83. Pt denies pain, CP, SOB, or any other sx at this time. See previous provider contact note.   Pt refused 2PM hydralazine dose, provider notified & spoke w/ pt @ bedside. Pt now @ 5PM agreeable to take hydralazine now, RN checked BP which was 185/83. Pt denies pain, CP, SOB, or any other sx at this time.

## 2022-08-23 NOTE — DISCHARGE NOTE PROVIDER - NSDCCPCAREPLAN_GEN_ALL_CORE_FT
PRINCIPAL DISCHARGE DIAGNOSIS  Diagnosis: Symptomatic anemia  Assessment and Plan of Treatment: you were anemic and was transfused with blood this admission. You were given IV iron as well. You were seen by a gastroenterologist who recommended scope which you refused. Followup with your primary care provider in 1-2 weeks for continue monitor      SECONDARY DISCHARGE DIAGNOSES  Diagnosis: ESRD on dialysis  Assessment and Plan of Treatment: you were found with worsen kidney function now require dialysis. Followup with Dr Solis nephrology outpatient. You had permacath (catheter for dialysis) placed this admission for dialysis    Diagnosis: Atrial fibrillation with rapid ventricular response  Assessment and Plan of Treatment: continue metoprolol as directed, you are not on blood thinner given prior bleeding    Diagnosis: CAD (coronary artery disease)  Assessment and Plan of Treatment: you had a echo( heart ultrasound) noted worsen of heart muscle to pump, you were seen by a cardiologist (heart doctor) who recommended for conservative management with medication. Followup with Dr Hooper in 1-2 weeks    Diagnosis: Type 2 diabetes mellitus with hyperglycemia, with long-term current use of insulin  Assessment and Plan of Treatment: Goal A1C 7.0. Your A1C is 7.2  Hypoglycemia management: please check your fingerstick every morning or if you are not feeling well. If your FS is >300mg/dl X3 or more readings please contact your PMD/Endocrinologist. If your FS is low <70mg/dl and/or you have symptoms of very low blood sugar FIRST drink1/2 cup of apple juice (or take 4 glucose tab/tube of glucose gel) and recheck FS in 15mins. Repeat these steps until blood sugar is above 100mg/dl, if NECESSARY. Then call your provider to discuss low blood sugar.   What to expend at followup appointment: please bring a log of your fingerstick and/or your glucometer to you appointment. Your blood sugar tracking will help your diabetes team determine the best plan     PRINCIPAL DISCHARGE DIAGNOSIS  Diagnosis: Symptomatic anemia  Assessment and Plan of Treatment: you were anemic and was transfused with blood this admission. You were given IV iron as well. You were seen by a gastroenterologist who recommended scope which you refused. Followup with your primary care provider in 1-2 weeks for continue monitor  if you change your mind and agree to scope please see your primary care provider for referral      SECONDARY DISCHARGE DIAGNOSES  Diagnosis: ESRD on dialysis  Assessment and Plan of Treatment: you were found with worsen kidney function now require dialysis. Followup with Dr Solis nephrology outpatient. You had permacath (catheter for dialysis) placed this admission for dialysis    Diagnosis: Atrial fibrillation with rapid ventricular response  Assessment and Plan of Treatment: continue metoprolol as directed, you are not on blood thinner given prior bleeding    Diagnosis: CAD (coronary artery disease)  Assessment and Plan of Treatment: you had a echo( heart ultrasound) noted worsen of heart muscle to pump, you were seen by a cardiologist (heart doctor) who recommended for conservative management with medication. Followup with Dr Hooper in 1-2 weeks    Diagnosis: Type 2 diabetes mellitus with hyperglycemia, with long-term current use of insulin  Assessment and Plan of Treatment: Goal A1C 7.0. Your A1C is 7.2  Hypoglycemia management: please check your fingerstick every morning or if you are not feeling well. If your FS is >300mg/dl X3 or more readings please contact your PMD/Endocrinologist. If your FS is low <70mg/dl and/or you have symptoms of very low blood sugar FIRST drink1/2 cup of apple juice (or take 4 glucose tab/tube of glucose gel) and recheck FS in 15mins. Repeat these steps until blood sugar is above 100mg/dl, if NECESSARY. Then call your provider to discuss low blood sugar.   What to expend at followup appointment: please bring a log of your fingerstick and/or your glucometer to you appointment. Your blood sugar tracking will help your diabetes team determine the best plan     PRINCIPAL DISCHARGE DIAGNOSIS  Diagnosis: Symptomatic anemia  Assessment and Plan of Treatment: you were anemic and was transfused with blood this admission. You were given IV iron as well. You were seen by a gastroenterologist who recommended scope which you refused. Followup with your primary care provider in 1-2 weeks for continue monitor  if you change your mind and agree to scope please see your primary care provider for referral      SECONDARY DISCHARGE DIAGNOSES  Diagnosis: ESRD on dialysis  Assessment and Plan of Treatment: you were found with worsen kidney function now require dialysis. Followup with Dr Solis nephrology outpatient. You had permacath (catheter for dialysis) placed this admission for dialysis  continue lasix on nondialysis days    Diagnosis: Atrial fibrillation with rapid ventricular response  Assessment and Plan of Treatment: continue metoprolol as directed, you are not on blood thinner given prior bleeding    Diagnosis: CAD (coronary artery disease)  Assessment and Plan of Treatment: you had a echo( heart ultrasound) noted worsen of heart muscle to pump, you were seen by a cardiologist (heart doctor) who recommended for conservative management with medication. Followup with Dr Hooper in 1-2 weeks    Diagnosis: Type 2 diabetes mellitus with hyperglycemia, with long-term current use of insulin  Assessment and Plan of Treatment: Goal A1C 7.0. Your A1C is 7.2  you had episode of low blood sugar and lantus was discontinued. Followup with your primary care provider if it need to be resume outpatient at lower dose. Currently your blood glucose are stable at low 100s. low carbohydrate diet and continue Tradjenda   Hypoglycemia management: please check your fingerstick every morning or if you are not feeling well. If your FS is >300mg/dl X3 or more readings please contact your PMD/Endocrinologist. If your FS is low <70mg/dl and/or you have symptoms of very low blood sugar FIRST drink1/2 cup of apple juice (or take 4 glucose tab/tube of glucose gel) and recheck FS in 15mins. Repeat these steps until blood sugar is above 100mg/dl, if NECESSARY. Then call your provider to discuss low blood sugar.   What to expend at followup appointment: please bring a log of your fingerstick and/or your glucometer to you appointment. Your blood sugar tracking will help your diabetes team determine the best plan

## 2022-08-23 NOTE — PROVIDER CONTACT NOTE (OTHER) - ASSESSMENT
pt AOx4, elevated BP otherwise VSS, and resting in bed. Pt extremely frustrated over delayed permacath placement & uncertainty of planned discharge tmrw. Despite eduation of risk/benefit of taking BP medications pt refusing medication & refusing to speak with RN.

## 2022-08-23 NOTE — DISCHARGE NOTE PROVIDER - CARE PROVIDERS DIRECT ADDRESSES
,DirectAddress_Unknown,[email or web address] ,DirectAddress_Unknown,[email or web address]

## 2022-08-23 NOTE — PROGRESS NOTE ADULT - ASSESSMENT
IMPRESSION: 82F w/ DM2, CAD, PAD, PUD, and CKD5, 8/11/22 p/w abnormal labs    (1)Renal - newly ESRD-HD as of this admission. Last dialyzed Saturday 8/20; due for next HD today  (3)Hyperphosphatemia - much improved, on Renvela/low-PO4 diet  (3)Anemia - slightly lower than goal, s/p PRBCs/on Venofer  (4)Vasc - awaiting conversion of shiley to tunneled cath  (5)Psychosocial - planning for HD at [facility] as outpatient    RECOMMENDATIONS:  (1) HD today - 3 hrs, 1kg; Retacrit with HD  (2)Tunneled cath placement per IR  (3)Setup of outside HD per AISHWARYA FuentesC  [facility]  [phone]

## 2022-08-23 NOTE — DISCHARGE NOTE PROVIDER - CARE PROVIDER_API CALL
Julio César Hooper)  Internal Medicine  [address] [place] [address]  Phone: [phone]  Fax: [phone]  Follow Up Time:     Reid Solis)  Internal Medicine; Nephrology  [address] [facility] [address]  [place] [address]  Phone: [phone]  Fax: [phone]  Follow Up Time:    Julio César Hooper)  Internal Medicine  [address] [place] [address]  Phone: [phone]  Fax: [phone]  Follow Up Time:     Reid Solis)  Internal Medicine; Nephrology  [address] [facility] [address]  [place] [address]  Phone: [phone]  Fax: [phone]  Follow Up Time:     Parminder Goldberg)  Internal Medicine  [address] [facility] [place] [address]  Phone: [phone]  Fax: [phone]  Follow Up Time:

## 2022-08-23 NOTE — PROGRESS NOTE ADULT - SUBJECTIVE AND OBJECTIVE BOX
Patient is a 82y old  Female who presents with a chief complaint of abnormal labs (23 Aug 2022 09:57)      SUBJECTIVE / OVERNIGHT EVENTS:    Events noted.  CONSTITUTIONAL: No fever,  or fatigue  RESPIRATORY: No cough, wheezing,  No shortness of breath  CARDIOVASCULAR: No chest pain, palpitations, dizziness, or leg swelling  GASTROINTESTINAL: No abdominal or epigastric pain. No nausea, vomiting.  NEUROLOGICAL: No headache    MEDICATIONS  (STANDING):  chlorhexidine 2% Cloths 1 Application(s) Topical two times a day  clopidogrel Tablet 75 milliGRAM(s) Oral daily  dextrose 5%. 1000 milliLiter(s) (50 mL/Hr) IV Continuous <Continuous>  dextrose 5%. 1000 milliLiter(s) (100 mL/Hr) IV Continuous <Continuous>  dextrose 50% Injectable 25 Gram(s) IV Push once  dextrose 50% Injectable 12.5 Gram(s) IV Push once  dextrose 50% Injectable 25 Gram(s) IV Push once  epoetin sean-epbx (RETACRIT) Injectable 66525 Unit(s) IV Push <User Schedule>  gabapentin 300 milliGRAM(s) Oral at bedtime  glucagon  Injectable 1 milliGRAM(s) IntraMuscular once  hydrALAZINE 10 milliGRAM(s) Oral three times a day  insulin lispro (ADMELOG) corrective regimen sliding scale   SubCutaneous three times a day before meals  insulin lispro (ADMELOG) corrective regimen sliding scale   SubCutaneous at bedtime  metoprolol succinate ER 25 milliGRAM(s) Oral daily  pantoprazole    Tablet 40 milliGRAM(s) Oral before breakfast  polyethylene glycol 3350 17 Gram(s) Oral two times a day  senna 2 Tablet(s) Oral at bedtime  sevelamer carbonate 800 milliGRAM(s) Oral three times a day    MEDICATIONS  (PRN):  acetaminophen     Tablet .. 650 milliGRAM(s) Oral every 6 hours PRN Temp greater or equal to 38C (100.4F), Mild Pain (1 - 3)  dextrose Oral Gel 15 Gram(s) Oral once PRN Blood Glucose LESS THAN 70 milliGRAM(s)/deciliter  lidocaine   4% Patch 1 Patch Transdermal every 24 hours PRN Back pain  melatonin 3 milliGRAM(s) Oral at bedtime PRN Insomnia  sodium chloride 0.9% lock flush 10 milliLiter(s) IV Push every 1 hour PRN Pre/post blood products, medications, blood draw, and to maintain line patency        CAPILLARY BLOOD GLUCOSE      POCT Blood Glucose.: 155 mg/dL (23 Aug 2022 21:36)  POCT Blood Glucose.: 97 mg/dL (23 Aug 2022 17:12)  POCT Blood Glucose.: 109 mg/dL (23 Aug 2022 11:55)  POCT Blood Glucose.: 107 mg/dL (23 Aug 2022 07:43)    I&O's Summary      T(C): 36.8 (08-23-22 @ 22:05), Max: 36.8 (08-23-22 @ 07:45)  HR: 73 (08-23-22 @ 22:05) (69 - 76)  BP: 165/61 (08-23-22 @ 22:05) (132/57 - 186/83)  RR: 19 (08-23-22 @ 22:05) (18 - 26)  SpO2: 98% (08-23-22 @ 22:05) (96% - 99%)    PHYSICAL EXAM:  GENERAL: NAD  NECK: Supple, No JVD  CHEST/LUNG: Clear to auscultation bilaterally; No wheezing.  HEART: Regular rate and rhythm; No murmurs, rubs, or gallops  ABDOMEN: Soft, Nontender, Nondistended; Bowel sounds present  EXTREMITIES:   No edema  NEUROLOGY: AAO X 3      LABS:                        9.2    4.38  )-----------( 262      ( 23 Aug 2022 06:50 )             32.5     08-23    134<L>  |  98  |  28<H>  ----------------------------<  99  3.9   |  27  |  4.09<H>    Ca    8.1<L>      23 Aug 2022 06:50  Phos  3.6     08-23  Mg     2.20     08-23      PT/INR - ( 23 Aug 2022 06:50 )   PT: 11.4 sec;   INR: 0.98 ratio         PTT - ( 23 Aug 2022 06:50 )  PTT:29.6 sec        CAPILLARY BLOOD GLUCOSE      POCT Blood Glucose.: 155 mg/dL (23 Aug 2022 21:36)  POCT Blood Glucose.: 97 mg/dL (23 Aug 2022 17:12)  POCT Blood Glucose.: 109 mg/dL (23 Aug 2022 11:55)  POCT Blood Glucose.: 107 mg/dL (23 Aug 2022 07:43)        RADIOLOGY & ADDITIONAL TESTS:    Imaging Personally Reviewed:    Consultant(s) Notes Reviewed:      Care Discussed with Consultants/Other Providers:    Parminder Goldberg MD, CMD, FACP    [address] [place] [address]  Office Tel: [phone]  Cell: [phone]   none

## 2022-08-24 ENCOUNTER — TRANSCRIPTION ENCOUNTER (OUTPATIENT)
Age: 82
End: 2022-08-24

## 2022-08-24 LAB
ANION GAP SERPL CALC-SCNC: 10 MMOL/L — SIGNIFICANT CHANGE UP (ref 7–14)
APTT BLD: 31.3 SEC — SIGNIFICANT CHANGE UP (ref 27–36.3)
BUN SERPL-MCNC: 31 MG/DL — HIGH (ref 7–23)
CALCIUM SERPL-MCNC: 8.4 MG/DL — SIGNIFICANT CHANGE UP (ref 8.4–10.5)
CHLORIDE SERPL-SCNC: 101 MMOL/L — SIGNIFICANT CHANGE UP (ref 98–107)
CO2 SERPL-SCNC: 27 MMOL/L — SIGNIFICANT CHANGE UP (ref 22–31)
CREAT SERPL-MCNC: 4.38 MG/DL — HIGH (ref 0.5–1.3)
EGFR: 10 ML/MIN/1.73M2 — LOW
GLUCOSE BLDC GLUCOMTR-MCNC: 103 MG/DL — HIGH (ref 70–99)
GLUCOSE BLDC GLUCOMTR-MCNC: 107 MG/DL — HIGH (ref 70–99)
GLUCOSE BLDC GLUCOMTR-MCNC: 111 MG/DL — HIGH (ref 70–99)
GLUCOSE BLDC GLUCOMTR-MCNC: 127 MG/DL — HIGH (ref 70–99)
GLUCOSE BLDC GLUCOMTR-MCNC: 143 MG/DL — HIGH (ref 70–99)
GLUCOSE SERPL-MCNC: 120 MG/DL — HIGH (ref 70–99)
HCT VFR BLD CALC: 31.9 % — LOW (ref 34.5–45)
HGB BLD-MCNC: 9 G/DL — LOW (ref 11.5–15.5)
INR BLD: 0.98 RATIO — SIGNIFICANT CHANGE UP (ref 0.88–1.16)
MAGNESIUM SERPL-MCNC: 2.3 MG/DL — SIGNIFICANT CHANGE UP (ref 1.6–2.6)
MCHC RBC-ENTMCNC: 26.5 PG — LOW (ref 27–34)
MCHC RBC-ENTMCNC: 28.2 GM/DL — LOW (ref 32–36)
MCV RBC AUTO: 93.8 FL — SIGNIFICANT CHANGE UP (ref 80–100)
NRBC # BLD: 0 /100 WBCS — SIGNIFICANT CHANGE UP (ref 0–0)
NRBC # FLD: 0 K/UL — SIGNIFICANT CHANGE UP (ref 0–0)
PHOSPHATE SERPL-MCNC: 3.6 MG/DL — SIGNIFICANT CHANGE UP (ref 2.5–4.5)
PLATELET # BLD AUTO: 264 K/UL — SIGNIFICANT CHANGE UP (ref 150–400)
POTASSIUM SERPL-MCNC: 4.1 MMOL/L — SIGNIFICANT CHANGE UP (ref 3.5–5.3)
POTASSIUM SERPL-SCNC: 4.1 MMOL/L — SIGNIFICANT CHANGE UP (ref 3.5–5.3)
PROTHROM AB SERPL-ACNC: 11.4 SEC — SIGNIFICANT CHANGE UP (ref 10.5–13.4)
RBC # BLD: 3.4 M/UL — LOW (ref 3.8–5.2)
RBC # FLD: 20.6 % — HIGH (ref 10.3–14.5)
SODIUM SERPL-SCNC: 138 MMOL/L — SIGNIFICANT CHANGE UP (ref 135–145)
WBC # BLD: 4.78 K/UL — SIGNIFICANT CHANGE UP (ref 3.8–10.5)
WBC # FLD AUTO: 4.78 K/UL — SIGNIFICANT CHANGE UP (ref 3.8–10.5)

## 2022-08-24 PROCEDURE — 77001 FLUOROGUIDE FOR VEIN DEVICE: CPT | Mod: 26,GC

## 2022-08-24 PROCEDURE — 36558 INSERT TUNNELED CV CATH: CPT

## 2022-08-24 RX ORDER — HEPARIN SODIUM 5000 [USP'U]/ML
5000 INJECTION INTRAVENOUS; SUBCUTANEOUS EVERY 12 HOURS
Refills: 0 | Status: DISCONTINUED | OUTPATIENT
Start: 2022-08-24 | End: 2022-08-27

## 2022-08-24 RX ADMIN — ERYTHROPOIETIN 10000 UNIT(S): 10000 INJECTION, SOLUTION INTRAVENOUS; SUBCUTANEOUS at 19:19

## 2022-08-24 RX ADMIN — SEVELAMER CARBONATE 800 MILLIGRAM(S): 2400 POWDER, FOR SUSPENSION ORAL at 07:02

## 2022-08-24 RX ADMIN — SEVELAMER CARBONATE 800 MILLIGRAM(S): 2400 POWDER, FOR SUSPENSION ORAL at 14:18

## 2022-08-24 RX ADMIN — CHLORHEXIDINE GLUCONATE 1 APPLICATION(S): 213 SOLUTION TOPICAL at 20:25

## 2022-08-24 RX ADMIN — CLOPIDOGREL BISULFATE 75 MILLIGRAM(S): 75 TABLET, FILM COATED ORAL at 14:18

## 2022-08-24 RX ADMIN — PANTOPRAZOLE SODIUM 40 MILLIGRAM(S): 20 TABLET, DELAYED RELEASE ORAL at 07:02

## 2022-08-24 RX ADMIN — GABAPENTIN 300 MILLIGRAM(S): 400 CAPSULE ORAL at 22:40

## 2022-08-24 RX ADMIN — Medication 25 MILLIGRAM(S): at 07:02

## 2022-08-24 RX ADMIN — Medication 10 MILLIGRAM(S): at 07:02

## 2022-08-24 RX ADMIN — SEVELAMER CARBONATE 800 MILLIGRAM(S): 2400 POWDER, FOR SUSPENSION ORAL at 22:40

## 2022-08-24 RX ADMIN — Medication 10 MILLIGRAM(S): at 22:40

## 2022-08-24 RX ADMIN — CHLORHEXIDINE GLUCONATE 1 APPLICATION(S): 213 SOLUTION TOPICAL at 07:02

## 2022-08-24 RX ADMIN — POLYETHYLENE GLYCOL 3350 17 GRAM(S): 17 POWDER, FOR SOLUTION ORAL at 06:59

## 2022-08-24 NOTE — DISCHARGE NOTE NURSING/CASE MANAGEMENT/SOCIAL WORK - NSSCCARECORD_GEN_ALL_CORE
[facility]

## 2022-08-24 NOTE — PROGRESS NOTE ADULT - SUBJECTIVE AND OBJECTIVE BOX
Cardiovascular Disease Progress Note    Overnight events: No acute events overnight.    Ms. Tsang denies chest pain or SOB.     Objective Findings:  T(C): 36.6 (08-24-22 @ 05:06), Max: 37 (08-24-22 @ 02:00)  HR: 70 (08-24-22 @ 05:06) (69 - 104)  BP: 141/59 (08-24-22 @ 05:06) (132/57 - 186/83)  RR: 18 (08-24-22 @ 05:06) (18 - 26)  SpO2: 99% (08-24-22 @ 05:06) (96% - 99%)  Wt(kg): --  Daily Height in cm: 156.2 (23 Aug 2022 07:45)    Daily       Physical Exam:  Gen: NAD; Patient resting comfortably  HEENT: EOMI, Normocephalic/ atraumatic  CV: RRR, normal S1 + S2, no m/r/g  Lungs:  Normal respiratory effort; clear to auscultation bilaterally  Abd: soft, non-tender; bowel sounds present  Ext: No edema; warm and well perfused    Telemetry: Sinus    Laboratory Data:                        9.2    4.38  )-----------( 262      ( 23 Aug 2022 06:50 )             32.5     08-23    134<L>  |  98  |  28<H>  ----------------------------<  99  3.9   |  27  |  4.09<H>    Ca    8.1<L>      23 Aug 2022 06:50  Phos  3.6     08-23  Mg     2.20     08-23      PT/INR - ( 23 Aug 2022 06:50 )   PT: 11.4 sec;   INR: 0.98 ratio         PTT - ( 23 Aug 2022 06:50 )  PTT:29.6 sec          Inpatient Medications:  MEDICATIONS  (STANDING):  chlorhexidine 2% Cloths 1 Application(s) Topical two times a day  clopidogrel Tablet 75 milliGRAM(s) Oral daily  dextrose 5%. 1000 milliLiter(s) (50 mL/Hr) IV Continuous <Continuous>  dextrose 5%. 1000 milliLiter(s) (100 mL/Hr) IV Continuous <Continuous>  dextrose 50% Injectable 25 Gram(s) IV Push once  dextrose 50% Injectable 12.5 Gram(s) IV Push once  dextrose 50% Injectable 25 Gram(s) IV Push once  epoetin sean-epbx (RETACRIT) Injectable 18721 Unit(s) IV Push <User Schedule>  gabapentin 300 milliGRAM(s) Oral at bedtime  glucagon  Injectable 1 milliGRAM(s) IntraMuscular once  hydrALAZINE 10 milliGRAM(s) Oral three times a day  insulin lispro (ADMELOG) corrective regimen sliding scale   SubCutaneous three times a day before meals  insulin lispro (ADMELOG) corrective regimen sliding scale   SubCutaneous at bedtime  metoprolol succinate ER 25 milliGRAM(s) Oral daily  pantoprazole    Tablet 40 milliGRAM(s) Oral before breakfast  polyethylene glycol 3350 17 Gram(s) Oral two times a day  senna 2 Tablet(s) Oral at bedtime  sevelamer carbonate 800 milliGRAM(s) Oral three times a day      Assessment: 82 year old woman with uncontrolled IDDM, compensated diastolic CHF, CAD s/p PCI, CKD V and HTN presents with anemia.     Plan of Care:    #CAD- s/p MICHAEL x2 in 12/2017.  Anemia noted on admission, but patient denies any signs of bleeding.  Likely anemia in the setting of CKD.   Exertional dyspnea now improved post pRBC transfusion.  Given extensive vascular disease, continue Plavix 75 mg daily.     #Compensated systolic CHF-  New inferior wall motion abnormality noted on echo  Ms. Tsang is not fluid overloaded on exam and she denies chest pain.  I would not pursue another ischemic evaluation, as Ms. Tsang cannot tolerate dual antiplatelet therapy due to bleeding history.     #ESRD-  S/P HD initiation on 8/16 by the renal team.     #PVD-  Patient status post RLE angiogram with 2 stents placed in 11/2019.   Antiplatelet therapy management as stated above.     #Paroxymal a-fib-  Patient in and out of rate controlled a-fib.   Continue Toprol.   No AC given h/o GI bleed.      Over 25 minutes spent on total encounter; more than 50% of the visit was spent counseling and/or coordinating care by the attending physician.      Julio César Hooper MD [facility]  Cardiovascular Disease  [phone]

## 2022-08-24 NOTE — DISCHARGE NOTE NURSING/CASE MANAGEMENT/SOCIAL WORK - NSDCPEFALRISK_GEN_ALL_CORE
For information on Fall & Injury Prevention, visit: [email or web address] OR  [email or web address] OR  https://www.cdc.gov/steadi/patient.html

## 2022-08-24 NOTE — PROGRESS NOTE ADULT - SUBJECTIVE AND OBJECTIVE BOX
HOLTAMIE  82y  Female      Patient is a 82y old  Female who presents with a chief complaint of abnormal labs (24 Aug 2022 12:14)  feels fine.no sob,no cp,no fever,no cough    REVIEW OF SYSTEMS:  CONSTITUTIONAL: No fever  RESPIRATORY: No cough, hemoptysis or shortness of breath  CARDIOVASCULAR: No chest pain, palpitations, dizziness, or leg swelling    INTERVAL HPI/OVERNIGHT EVENTS:  T(C): 36.7 (22 @ 16:45), Max: 37 (22 @ 02:00)  HR: 68 (22 @ 16:45) (64 - 104)  BP: 145/62 (22 @ 16:45) (139/87 - 165/61)  RR: 18 (22 @ 16:45) (18 - 19)  SpO2: 100% (22 @ 13:09) (98% - 100%)  Wt(kg): --  I&O's Summary    24 Aug 2022 07:01  -  24 Aug 2022 19:41  --------------------------------------------------------  IN: 400 mL / OUT: 1400 mL / NET: -1000 mL      T(C): 36.7 (22 @ 16:45), Max: 37 (22 @ 02:00)  HR: 68 (22 @ 16:45) (64 - 104)  BP: 145/62 (22 @ 16:45) (139/87 - 165/61)  RR: 18 (22 @ 16:45) (18 - 19)  SpO2: 100% (22 @ 13:09) (98% - 100%)  Wt(kg): --Vital Signs Last 24 Hrs  T(C): 36.7 (24 Aug 2022 16:45), Max: 37 (24 Aug 2022 02:00)  T(F): 98 (24 Aug 2022 16:45), Max: 98.6 (24 Aug 2022 02:00)  HR: 68 (24 Aug 2022 16:45) (64 - 104)  BP: 145/62 (24 Aug 2022 16:45) (139/87 - 165/61)  BP(mean): --  RR: 18 (24 Aug 2022 16:45) (18 - 19)  SpO2: 100% (24 Aug 2022 13:09) (98% - 100%)    Parameters below as of 24 Aug 2022 16:45  Patient On (Oxygen Delivery Method): room air        LABS:                        9.0    4.78  )-----------( 264      ( 24 Aug 2022 06:30 )             31.9     08-24    138  |  101  |  31<H>  ----------------------------<  120<H>  4.1   |  27  |  4.38<H>    Ca    8.4      24 Aug 2022 06:30  Phos  3.6     08-24  Mg     2.30     08-24      PT/INR - ( 24 Aug 2022 06:30 )   PT: 11.4 sec;   INR: 0.98 ratio         PTT - ( 24 Aug 2022 06:30 )  PTT:31.3 sec    CAPILLARY BLOOD GLUCOSE      POCT Blood Glucose.: 103 mg/dL (24 Aug 2022 19:29)  POCT Blood Glucose.: 107 mg/dL (24 Aug 2022 13:17)  POCT Blood Glucose.: 127 mg/dL (24 Aug 2022 11:10)  POCT Blood Glucose.: 111 mg/dL (24 Aug 2022 07:41)  POCT Blood Glucose.: 155 mg/dL (23 Aug 2022 21:36)            PAST MEDICAL & SURGICAL HISTORY:  HLD (hyperlipidemia)      Diabetic neuropathy      CAD (coronary artery disease)  ~ s/p MICHAEL x2 in 2017      Paroxysmal atrial fibrillation      Diabetes mellitus, type 2      GI bleed      Neuropathy      Gastritis      Diastolic heart failure      Transfusion history      Stage 5 chronic kidney disease not on chronic dialysis      Anemia      PVD (peripheral vascular disease)  ~ RLE angiogram with 2 stents placed in 2019.      H/O  section      S/P coronary artery stent placement  x2 in       History of esophagogastroduodenoscopy (EGD)      S/P angiogram of extremity          MEDICATIONS  (STANDING):  chlorhexidine 2% Cloths 1 Application(s) Topical two times a day  clopidogrel Tablet 75 milliGRAM(s) Oral daily  dextrose 5%. 1000 milliLiter(s) (50 mL/Hr) IV Continuous <Continuous>  dextrose 5%. 1000 milliLiter(s) (100 mL/Hr) IV Continuous <Continuous>  dextrose 50% Injectable 25 Gram(s) IV Push once  dextrose 50% Injectable 12.5 Gram(s) IV Push once  dextrose 50% Injectable 25 Gram(s) IV Push once  epoetin sean-epbx (RETACRIT) Injectable 52217 Unit(s) IV Push <User Schedule>  gabapentin 300 milliGRAM(s) Oral at bedtime  glucagon  Injectable 1 milliGRAM(s) IntraMuscular once  heparin   Injectable 5000 Unit(s) SubCutaneous every 12 hours  hydrALAZINE 10 milliGRAM(s) Oral three times a day  insulin lispro (ADMELOG) corrective regimen sliding scale   SubCutaneous three times a day before meals  insulin lispro (ADMELOG) corrective regimen sliding scale   SubCutaneous at bedtime  metoprolol succinate ER 25 milliGRAM(s) Oral daily  pantoprazole    Tablet 40 milliGRAM(s) Oral before breakfast  polyethylene glycol 3350 17 Gram(s) Oral two times a day  senna 2 Tablet(s) Oral at bedtime  sevelamer carbonate 800 milliGRAM(s) Oral three times a day    MEDICATIONS  (PRN):  acetaminophen     Tablet .. 650 milliGRAM(s) Oral every 6 hours PRN Temp greater or equal to 38C (100.4F), Mild Pain (1 - 3)  dextrose Oral Gel 15 Gram(s) Oral once PRN Blood Glucose LESS THAN 70 milliGRAM(s)/deciliter  lidocaine   4% Patch 1 Patch Transdermal every 24 hours PRN Back pain  melatonin 3 milliGRAM(s) Oral at bedtime PRN Insomnia  sodium chloride 0.9% lock flush 10 milliLiter(s) IV Push every 1 hour PRN Pre/post blood products, medications, blood draw, and to maintain line patency        RADIOLOGY & ADDITIONAL TESTS:    Imaging Personally Reviewed:  [ ] YES  [ ] NO    Consultant(s) Notes Reviewed:  [ x] YES  [ ] NO    PHYSICAL EXAM:  GENERAL: Alert and awake lying in bed in no distress  HEAD:  Atraumatic, Normocephalic  EYES: EOMI, EDIN, conjunctiva and sclera clear  NECK: Supple, No JVD, Normal thyroid  NERVOUS SYSTEM:  Alert & Oriented X3, Motor and sensory systems are intact,   CHEST/LUNG: Bilateral clear breath sounds, no rhochi, no wheezing, no crepitations,  HEART: Regular rate and rhythm; No murmurs, rubs, or gallops  ABDOMEN: Soft, Nontender, Nondistended; Bowel sounds present  EXTREMITIES:   Peripheral Pulses are palpable, no  edema        Care Discussed with Consultants/Other Providers [x ] YES  [ ] NO      Code Status: [] Full Code [] DNR [] DNI [] Goals of Care:   Disposition: [] ICU [] Stroke Unit [] RCU []PCU []Floor [] Discharge Home         JHONATAN [email or web address]

## 2022-08-24 NOTE — DISCHARGE NOTE NURSING/CASE MANAGEMENT/SOCIAL WORK - PATIENT PORTAL LINK FT
You can access the FollowMyHealth Patient Portal offered by [facility] by registering at the following website: [email or web address]. By joining [facility]’s FollowMyHealth portal, you will also be able to view your health information using other applications (apps) compatible with our system.

## 2022-08-24 NOTE — PROGRESS NOTE ADULT - ASSESSMENT
IMPRESSION: 82F w/ DM2, CAD, PAD, PUD, and CKD5, 8/11/22 p/w abnormal labs    (1)Renal - newly ESRD-HD as of this admission. HD today   (3)Hyperphosphatemia - much improved, on Renvela/low-PO4 diet  (3)Anemia - slightly lower than goal, s/p PRBCs/on Venofer  (4)Vasc - s/p conversion of shiley to tunneled cath  (5)Psychosocial - planning for HD at [facility] as outpatient    RECOMMENDATIONS:  (1)HD today - 3 hrs, 1kg; Retacrit with HD  (2)Tunneled cath placement per IR  (3)Setup of outside HD per SW    AISHWARYA HopeC  [facility]  [phone]        IMPRESSION: 82F w/ DM2, CAD, PAD, PUD, and CKD5, 8/11/22 p/w abnormal labs    (1)Renal - newly ESRD-HD as of this admission. HD today   (3)Hyperphosphatemia - much improved, on Renvela/low-PO4 diet  (3)Anemia - slightly lower than goal, s/p PRBCs/on Venofer  (4)Vasc - s/p conversion of shiley to tunneled cath  (5)Psychosocial - planning for HD at [facility] as outpatient    RECOMMENDATIONS:  (1)HD today - 3 hrs, 1kg; Retacrit with HD  (2)Tunneled cath placement per IR  (3)Setup of outside HD per SW    YEIMI Hope  [facility]  [phone]    RENAL ATTENDING NOTE  Patient seen and examined with NP. Agree with assessment and plan as above.    Reid Solis MD  [facility]  [phone]

## 2022-08-24 NOTE — PROGRESS NOTE ADULT - SUBJECTIVE AND OBJECTIVE BOX
NEPHROLOGY     Patient seen and examined in recovery s/p tunneled catheter placement, resting comfortably, no pain, not sob, comfortable on NC, in no acute distress.     MEDICATIONS  (STANDING):  chlorhexidine 2% Cloths 1 Application(s) Topical two times a day  clopidogrel Tablet 75 milliGRAM(s) Oral daily  dextrose 5%. 1000 milliLiter(s) (50 mL/Hr) IV Continuous <Continuous>  dextrose 5%. 1000 milliLiter(s) (100 mL/Hr) IV Continuous <Continuous>  dextrose 50% Injectable 25 Gram(s) IV Push once  dextrose 50% Injectable 12.5 Gram(s) IV Push once  dextrose 50% Injectable 25 Gram(s) IV Push once  epoetin sean-epbx (RETACRIT) Injectable 98787 Unit(s) IV Push <User Schedule>  gabapentin 300 milliGRAM(s) Oral at bedtime  glucagon  Injectable 1 milliGRAM(s) IntraMuscular once  hydrALAZINE 10 milliGRAM(s) Oral three times a day  insulin lispro (ADMELOG) corrective regimen sliding scale   SubCutaneous three times a day before meals  insulin lispro (ADMELOG) corrective regimen sliding scale   SubCutaneous at bedtime  metoprolol succinate ER 25 milliGRAM(s) Oral daily  pantoprazole    Tablet 40 milliGRAM(s) Oral before breakfast  polyethylene glycol 3350 17 Gram(s) Oral two times a day  senna 2 Tablet(s) Oral at bedtime  sevelamer carbonate 800 milliGRAM(s) Oral three times a day    VITALS:  T(C): , Max: 37 (08-24-22 @ 02:00)  T(F): , Max: 98.6 (08-24-22 @ 02:00)  HR: 70 (08-24-22 @ 05:06)  BP: 141/59 (08-24-22 @ 05:06)  RR: 18 (08-24-22 @ 05:06)  SpO2: 99% (08-24-22 @ 05:06)    PHYSICAL EXAM:  Constitutional: NAD, alert  HEENT: MMM  Neck:  No JVD, supple   Respiratory: CTA B/L  Cardiovascular: S1 and S2, IRR  Gastrointestinal: + BS, soft, NT, ND  Extremities: 2+ b/l LE edema  Neurological: reduced generalized strength  : No Sylvester  Skin: No rashes, C/D/I  Vascular Access: RIJ tunneled cath     LABS:                        9.0    4.78  )-----------( 264      ( 24 Aug 2022 06:30 )             31.9     08-24    138  |  101  |  31<H>  ----------------------------<  120<H>  4.1   |  27  |  4.38<H>    Ca    8.4      24 Aug 2022 06:30  Phos  3.6     08-24  Mg     2.30     08-24   NEPHROLOGY     Patient seen and examined in recovery s/p tunneled catheter placement, resting comfortably, no pain, not sob, comfortable on NC, in no acute distress.     MEDICATIONS  (STANDING):  chlorhexidine 2% Cloths 1 Application(s) Topical two times a day  clopidogrel Tablet 75 milliGRAM(s) Oral daily  dextrose 5%. 1000 milliLiter(s) (50 mL/Hr) IV Continuous <Continuous>  dextrose 5%. 1000 milliLiter(s) (100 mL/Hr) IV Continuous <Continuous>  dextrose 50% Injectable 25 Gram(s) IV Push once  dextrose 50% Injectable 12.5 Gram(s) IV Push once  dextrose 50% Injectable 25 Gram(s) IV Push once  epoetin sean-epbx (RETACRIT) Injectable 00172 Unit(s) IV Push <User Schedule>  gabapentin 300 milliGRAM(s) Oral at bedtime  glucagon  Injectable 1 milliGRAM(s) IntraMuscular once  hydrALAZINE 10 milliGRAM(s) Oral three times a day  insulin lispro (ADMELOG) corrective regimen sliding scale   SubCutaneous three times a day before meals  insulin lispro (ADMELOG) corrective regimen sliding scale   SubCutaneous at bedtime  metoprolol succinate ER 25 milliGRAM(s) Oral daily  pantoprazole    Tablet 40 milliGRAM(s) Oral before breakfast  polyethylene glycol 3350 17 Gram(s) Oral two times a day  senna 2 Tablet(s) Oral at bedtime  sevelamer carbonate 800 milliGRAM(s) Oral three times a day    VITALS:  T(C): , Max: 37 (08-24-22 @ 02:00)  T(F): , Max: 98.6 (08-24-22 @ 02:00)  HR: 70 (08-24-22 @ 05:06)  BP: 141/59 (08-24-22 @ 05:06)  RR: 18 (08-24-22 @ 05:06)  SpO2: 99% (08-24-22 @ 05:06)    PHYSICAL EXAM:  Constitutional: NAD, alert  HEENT: MMM  Neck:  No JVD, supple   Respiratory: CTA B/L  Cardiovascular: S1 and S2, IRR  Gastrointestinal: + BS, soft, NT, ND  Extremities: tr b/l LE edema  Neurological: reduced generalized strength  : No Sylvester  Skin: No rashes, C/D/I  Vascular Access: [facility] tunneled cath     LABS:                        9.0    4.78  )-----------( 264      ( 24 Aug 2022 06:30 )             31.9     08-24    138  |  101  |  31<H>  ----------------------------<  120<H>  4.1   |  27  |  4.38<H>    Ca    8.4      24 Aug 2022 06:30  Phos  3.6     08-24  Mg     2.30     08-24

## 2022-08-24 NOTE — DISCHARGE NOTE NURSING/CASE MANAGEMENT/SOCIAL WORK - NSDCCRNAME_GEN_ALL_CORE_FT
SW confirmed acceptance to [facility] ([facility]) [address] [place] [address] Phone: [phone]/ [phone] Fax: [phone], Dialysis days are Tuesday, Thursday and Saturday at 2:45pm. Admission to start on Tuesday 8/30 at 2:45pm. Center's plan Transportation [employer] [phone], taxi set up with [employer] service [phone], 8/30 ref# [id number], 9/1 ref# [id number], 9/3 #[id number].

## 2022-08-25 LAB
ANION GAP SERPL CALC-SCNC: 9 MMOL/L — SIGNIFICANT CHANGE UP (ref 7–14)
BUN SERPL-MCNC: 19 MG/DL — SIGNIFICANT CHANGE UP (ref 7–23)
CALCIUM SERPL-MCNC: 7.9 MG/DL — LOW (ref 8.4–10.5)
CHLORIDE SERPL-SCNC: 97 MMOL/L — LOW (ref 98–107)
CO2 SERPL-SCNC: 28 MMOL/L — SIGNIFICANT CHANGE UP (ref 22–31)
CREAT SERPL-MCNC: 3.06 MG/DL — HIGH (ref 0.5–1.3)
EGFR: 15 ML/MIN/1.73M2 — LOW
GLUCOSE BLDC GLUCOMTR-MCNC: 104 MG/DL — HIGH (ref 70–99)
GLUCOSE BLDC GLUCOMTR-MCNC: 134 MG/DL — HIGH (ref 70–99)
GLUCOSE BLDC GLUCOMTR-MCNC: 137 MG/DL — HIGH (ref 70–99)
GLUCOSE BLDC GLUCOMTR-MCNC: 158 MG/DL — HIGH (ref 70–99)
GLUCOSE BLDC GLUCOMTR-MCNC: 178 MG/DL — HIGH (ref 70–99)
GLUCOSE SERPL-MCNC: 178 MG/DL — HIGH (ref 70–99)
HCT VFR BLD CALC: 30 % — LOW (ref 34.5–45)
HGB BLD-MCNC: 8.7 G/DL — LOW (ref 11.5–15.5)
MAGNESIUM SERPL-MCNC: 2 MG/DL — SIGNIFICANT CHANGE UP (ref 1.6–2.6)
MCHC RBC-ENTMCNC: 27.1 PG — SIGNIFICANT CHANGE UP (ref 27–34)
MCHC RBC-ENTMCNC: 29 GM/DL — LOW (ref 32–36)
MCV RBC AUTO: 93.5 FL — SIGNIFICANT CHANGE UP (ref 80–100)
NRBC # BLD: 0 /100 WBCS — SIGNIFICANT CHANGE UP (ref 0–0)
NRBC # FLD: 0 K/UL — SIGNIFICANT CHANGE UP (ref 0–0)
PHOSPHATE SERPL-MCNC: 2.5 MG/DL — SIGNIFICANT CHANGE UP (ref 2.5–4.5)
PLATELET # BLD AUTO: 234 K/UL — SIGNIFICANT CHANGE UP (ref 150–400)
POTASSIUM SERPL-MCNC: 3.7 MMOL/L — SIGNIFICANT CHANGE UP (ref 3.5–5.3)
POTASSIUM SERPL-SCNC: 3.7 MMOL/L — SIGNIFICANT CHANGE UP (ref 3.5–5.3)
RBC # BLD: 3.21 M/UL — LOW (ref 3.8–5.2)
RBC # FLD: 20.1 % — HIGH (ref 10.3–14.5)
SODIUM SERPL-SCNC: 134 MMOL/L — LOW (ref 135–145)
WBC # BLD: 3.56 K/UL — LOW (ref 3.8–10.5)
WBC # FLD AUTO: 3.56 K/UL — LOW (ref 3.8–10.5)

## 2022-08-25 RX ADMIN — Medication 650 MILLIGRAM(S): at 13:38

## 2022-08-25 RX ADMIN — SEVELAMER CARBONATE 800 MILLIGRAM(S): 2400 POWDER, FOR SUSPENSION ORAL at 22:11

## 2022-08-25 RX ADMIN — Medication 10 MILLIGRAM(S): at 22:10

## 2022-08-25 RX ADMIN — CLOPIDOGREL BISULFATE 75 MILLIGRAM(S): 75 TABLET, FILM COATED ORAL at 12:38

## 2022-08-25 RX ADMIN — CHLORHEXIDINE GLUCONATE 1 APPLICATION(S): 213 SOLUTION TOPICAL at 05:30

## 2022-08-25 RX ADMIN — Medication 650 MILLIGRAM(S): at 22:39

## 2022-08-25 RX ADMIN — GABAPENTIN 300 MILLIGRAM(S): 400 CAPSULE ORAL at 22:10

## 2022-08-25 RX ADMIN — SEVELAMER CARBONATE 800 MILLIGRAM(S): 2400 POWDER, FOR SUSPENSION ORAL at 05:29

## 2022-08-25 RX ADMIN — HEPARIN SODIUM 5000 UNIT(S): 5000 INJECTION INTRAVENOUS; SUBCUTANEOUS at 19:28

## 2022-08-25 RX ADMIN — ERYTHROPOIETIN 10000 UNIT(S): 10000 INJECTION, SOLUTION INTRAVENOUS; SUBCUTANEOUS at 17:56

## 2022-08-25 RX ADMIN — LIDOCAINE 1 PATCH: 4 CREAM TOPICAL at 13:37

## 2022-08-25 RX ADMIN — Medication 10 MILLIGRAM(S): at 05:30

## 2022-08-25 RX ADMIN — Medication 650 MILLIGRAM(S): at 22:09

## 2022-08-25 RX ADMIN — Medication 25 MILLIGRAM(S): at 05:29

## 2022-08-25 RX ADMIN — PANTOPRAZOLE SODIUM 40 MILLIGRAM(S): 20 TABLET, DELAYED RELEASE ORAL at 05:29

## 2022-08-25 RX ADMIN — HEPARIN SODIUM 5000 UNIT(S): 5000 INJECTION INTRAVENOUS; SUBCUTANEOUS at 05:29

## 2022-08-25 RX ADMIN — CHLORHEXIDINE GLUCONATE 1 APPLICATION(S): 213 SOLUTION TOPICAL at 19:29

## 2022-08-25 NOTE — PROGRESS NOTE ADULT - ASSESSMENT
IMPRESSION: 82F w/ DM2, CAD, PAD, PUD, and CKD5, 8/11/22 p/w abnormal labs    (1)Renal - newly ESRD-HD as of this admission. HD today   (3)Hyperphosphatemia - controlled on Renvela/low-PO4 diet  (3)Anemia - slightly lower than goal, s/p PRBCs/on Venofer  (4)Vasc - s/p conversion of shiley to tunneled cath  (5)Psychosocial - planning for HD at [facility] as outpatient    RECOMMENDATIONS:  (1)HD today - 3 hrs, 1kg; Retacrit with HD  (2)Setup of outside HD per CHRISTIANE Ureña NP-C  [facility]  [phone]   IMPRESSION: 82F w/ DM2, CAD, PAD, PUD, and CKD5, 8/11/22 p/w abnormal labs    (1)Renal - newly ESRD-HD as of this admission. HD today as she is planned for TTS at outpt HD  (3)Hyperphosphatemia - controlled on Renvela/low-PO4 diet  (3)Anemia - slightly lower than goal, s/p PRBCs/on Venofer  (4)Vasc - s/p conversion of shiley to tunneled cath  (5)Psychosocial - planning for HD at [facility] as outpatient    RECOMMENDATIONS:  (1)HD today - 3 hrs, 1kg; Retacrit with HD  (2)Setup of outside HD per YEIMI Fuentes  [facility]  [phone]   IMPRESSION: 82F w/ DM2, CAD, PAD, PUD, and CKD5, 8/11/22 p/w abnormal labs    (1)Renal - newly ESRD-HD as of this admission. HD today as she is planned for TTS at outpt HD  (3)Hyperphosphatemia - controlled on Renvela/low-PO4 diet  (3)Anemia - slightly lower than goal, s/p PRBCs/on Venofer  (4)Vasc - s/p conversion of shiley to tunneled cath  (5)Psychosocial - planning for HD at [facility] as outpatient    RECOMMENDATIONS:  (1)HD today - 3 hrs, 1kg; Retacrit with HD  (2)Setup of outside HD per YEIMI Fuentes  [facility] [place] [facility]  [phone]    RENAL ATTENDING NOTE  Patient seen and examined with NP. Agree with assessment and plan as above.    Reid Solis MD  [facility] [place] [facility]  [phone]

## 2022-08-25 NOTE — PROGRESS NOTE ADULT - SUBJECTIVE AND OBJECTIVE BOX
NEPHROLOGY     Patient seen and examined sitting in chair having lunch, reports of some discomfort at catheter site, no redness or swelling at site noted, denies sob, comfortable on room air, in no acute distress. Tolerated HD yesterday and removed 1 kg.     MEDICATIONS  (STANDING):  chlorhexidine 2% Cloths 1 Application(s) Topical two times a day  clopidogrel Tablet 75 milliGRAM(s) Oral daily  dextrose 5%. 1000 milliLiter(s) (50 mL/Hr) IV Continuous <Continuous>  dextrose 5%. 1000 milliLiter(s) (100 mL/Hr) IV Continuous <Continuous>  dextrose 50% Injectable 25 Gram(s) IV Push once  dextrose 50% Injectable 12.5 Gram(s) IV Push once  dextrose 50% Injectable 25 Gram(s) IV Push once  epoetin sean-epbx (RETACRIT) Injectable 70304 Unit(s) IV Push <User Schedule>  gabapentin 300 milliGRAM(s) Oral at bedtime  glucagon  Injectable 1 milliGRAM(s) IntraMuscular once  heparin   Injectable 5000 Unit(s) SubCutaneous every 12 hours  hydrALAZINE 10 milliGRAM(s) Oral three times a day  insulin lispro (ADMELOG) corrective regimen sliding scale   SubCutaneous three times a day before meals  insulin lispro (ADMELOG) corrective regimen sliding scale   SubCutaneous at bedtime  metoprolol succinate ER 25 milliGRAM(s) Oral daily  pantoprazole    Tablet 40 milliGRAM(s) Oral before breakfast  polyethylene glycol 3350 17 Gram(s) Oral two times a day  senna 2 Tablet(s) Oral at bedtime  sevelamer carbonate 800 milliGRAM(s) Oral three times a day    VITALS:  T(C): , Max: 36.8 (08-25-22 @ 10:29)  T(F): , Max: 98.3 (08-25-22 @ 10:29)  HR: 94 (08-25-22 @ 10:29)  BP: 102/45 (08-25-22 @ 10:29)  BP(mean): 68 (08-25-22 @ 10:29)  RR: 18 (08-25-22 @ 10:29)  SpO2: 95% (08-25-22 @ 10:29)    I and O's:    08-24 @ 07:01  -  08-25 @ 07:00  --------------------------------------------------------  IN: 400 mL / OUT: 1400 mL / NET: -1000 mL    PHYSICAL EXAM:  Constitutional: NAD, alert  HEENT: MMM  Neck:  No JVD, supple   Respiratory: CTA B/L  Cardiovascular: S1 and S2, IRR  Gastrointestinal: + BS, soft, NT, ND  Extremities: tr b/l LE edema  Neurological: reduced generalized strength  : No Sylvester  Skin: No rashes, C/D/I  Vascular Access: RIJ tunneled cath     LABS:                        9.0    4.78  )-----------( 264      ( 24 Aug 2022 06:30 )             31.9     08-24    138  |  101  |  31<H>  ----------------------------<  120<H>  4.1   |  27  |  4.38<H>    Ca    8.4      24 Aug 2022 06:30  Phos  3.6     08-24  Mg     2.30     08-24

## 2022-08-25 NOTE — PROGRESS NOTE ADULT - SUBJECTIVE AND OBJECTIVE BOX
Cardiovascular Disease Progress Note    Overnight events: No acute events overnight.    Ms. Tsang denies chest pain or SOB.   Otherwise review of systems negative    Objective Findings:  T(C): 36.6 (08-25-22 @ 05:24), Max: 36.7 (08-24-22 @ 16:45)  HR: 98 (08-25-22 @ 05:24) (64 - 98)  BP: 121/64 (08-25-22 @ 05:24) (121/64 - 168/65)  RR: 18 (08-25-22 @ 05:24) (18 - 20)  SpO2: 95% (08-25-22 @ 05:24) (95% - 100%)  Wt(kg): --  Daily     Daily       Physical Exam:  Gen: NAD; Patient resting comfortably  HEENT: EOMI, Normocephalic/ atraumatic  CV: RRR, normal S1 + S2, no m/r/g  Lungs:  Normal respiratory effort; clear to auscultation bilaterally  Abd: soft, non-tender; bowel sounds present  Ext: No edema; warm and well perfused    Telemetry: Sinus    Laboratory Data:                        9.0    4.78  )-----------( 264      ( 24 Aug 2022 06:30 )             31.9     08-24    138  |  101  |  31<H>  ----------------------------<  120<H>  4.1   |  27  |  4.38<H>    Ca    8.4      24 Aug 2022 06:30  Phos  3.6     08-24  Mg     2.30     08-24      PT/INR - ( 24 Aug 2022 06:30 )   PT: 11.4 sec;   INR: 0.98 ratio         PTT - ( 24 Aug 2022 06:30 )  PTT:31.3 sec          Inpatient Medications:  MEDICATIONS  (STANDING):  chlorhexidine 2% Cloths 1 Application(s) Topical two times a day  clopidogrel Tablet 75 milliGRAM(s) Oral daily  dextrose 5%. 1000 milliLiter(s) (50 mL/Hr) IV Continuous <Continuous>  dextrose 5%. 1000 milliLiter(s) (100 mL/Hr) IV Continuous <Continuous>  dextrose 50% Injectable 25 Gram(s) IV Push once  dextrose 50% Injectable 12.5 Gram(s) IV Push once  dextrose 50% Injectable 25 Gram(s) IV Push once  epoetin sean-epbx (RETACRIT) Injectable 06108 Unit(s) IV Push <User Schedule>  gabapentin 300 milliGRAM(s) Oral at bedtime  glucagon  Injectable 1 milliGRAM(s) IntraMuscular once  heparin   Injectable 5000 Unit(s) SubCutaneous every 12 hours  hydrALAZINE 10 milliGRAM(s) Oral three times a day  insulin lispro (ADMELOG) corrective regimen sliding scale   SubCutaneous three times a day before meals  insulin lispro (ADMELOG) corrective regimen sliding scale   SubCutaneous at bedtime  metoprolol succinate ER 25 milliGRAM(s) Oral daily  pantoprazole    Tablet 40 milliGRAM(s) Oral before breakfast  polyethylene glycol 3350 17 Gram(s) Oral two times a day  senna 2 Tablet(s) Oral at bedtime  sevelamer carbonate 800 milliGRAM(s) Oral three times a day      Assessment:  82 year old woman with uncontrolled IDDM, compensated diastolic CHF, CAD s/p PCI, CKD V and HTN presents with anemia.     Plan of Care:    #CAD- s/p MICHAEL x2 in 12/2017.  Anemia noted on admission, but patient denies any signs of bleeding.  Likely anemia in the setting of CKD.   Exertional dyspnea now improved post pRBC transfusion.  Given extensive vascular disease, continue Plavix 75 mg daily.     #Compensated systolic CHF-  New inferior wall motion abnormality noted on echo  Ms. Tsang is not fluid overloaded on exam and she denies chest pain.  I would not pursue another ischemic evaluation, as Ms. Tsang cannot tolerate dual antiplatelet therapy due to bleeding history.     #ESRD-  S/P HD initiation on 8/16 by the renal team.     #PVD-  Patient status post RLE angiogram with 2 stents placed in 11/2019.   Antiplatelet therapy management as stated above.     #Paroxymal a-fib-  Patient in and out of rate controlled a-fib.   Continue Toprol.   No AC given h/o GI bleed.    #ACP (advance care planning)-  Advanced care planning was discussed with the patient.  Advanced care planning forms were discussed.  Risks, benefits and alternatives of medical treatment and procedures were discussed in detail and all questions were answered. 30 minutes spent addressing advance care plans.    Over 25 minutes spent on total encounter; more than 50% of the visit was spent counseling and/or coordinating care by the attending physician.      Julio César Hooper MD [facility]  Cardiovascular Disease  [phone]

## 2022-08-25 NOTE — PROGRESS NOTE ADULT - SUBJECTIVE AND OBJECTIVE BOX
Patient is a 82y old  Female who presents with a chief complaint of abnormal labs (25 Aug 2022 13:00)      SUBJECTIVE / OVERNIGHT EVENTS:    Events noted.  CONSTITUTIONAL: No fever,  or fatigue  RESPIRATORY: No cough, wheezing,  No shortness of breath  CARDIOVASCULAR: No chest pain, palpitations, dizziness, or leg swelling  GASTROINTESTINAL: No abdominal or epigastric pain. No nausea, vomiting.  NEUROLOGICAL: No headache    MEDICATIONS  (STANDING):  chlorhexidine 2% Cloths 1 Application(s) Topical two times a day  clopidogrel Tablet 75 milliGRAM(s) Oral daily  dextrose 5%. 1000 milliLiter(s) (100 mL/Hr) IV Continuous <Continuous>  dextrose 5%. 1000 milliLiter(s) (50 mL/Hr) IV Continuous <Continuous>  dextrose 50% Injectable 25 Gram(s) IV Push once  dextrose 50% Injectable 12.5 Gram(s) IV Push once  dextrose 50% Injectable 25 Gram(s) IV Push once  epoetin sean-epbx (RETACRIT) Injectable 07371 Unit(s) IV Push <User Schedule>  gabapentin 300 milliGRAM(s) Oral at bedtime  glucagon  Injectable 1 milliGRAM(s) IntraMuscular once  heparin   Injectable 5000 Unit(s) SubCutaneous every 12 hours  hydrALAZINE 10 milliGRAM(s) Oral three times a day  insulin lispro (ADMELOG) corrective regimen sliding scale   SubCutaneous three times a day before meals  insulin lispro (ADMELOG) corrective regimen sliding scale   SubCutaneous at bedtime  metoprolol succinate ER 25 milliGRAM(s) Oral daily  pantoprazole    Tablet 40 milliGRAM(s) Oral before breakfast  polyethylene glycol 3350 17 Gram(s) Oral two times a day  senna 2 Tablet(s) Oral at bedtime  sevelamer carbonate 800 milliGRAM(s) Oral three times a day    MEDICATIONS  (PRN):  acetaminophen     Tablet .. 650 milliGRAM(s) Oral every 6 hours PRN Temp greater or equal to 38C (100.4F), Mild Pain (1 - 3)  dextrose Oral Gel 15 Gram(s) Oral once PRN Blood Glucose LESS THAN 70 milliGRAM(s)/deciliter  lidocaine   4% Patch 1 Patch Transdermal every 24 hours PRN Back pain  melatonin 3 milliGRAM(s) Oral at bedtime PRN Insomnia  sodium chloride 0.9% lock flush 10 milliLiter(s) IV Push every 1 hour PRN Pre/post blood products, medications, blood draw, and to maintain line patency        CAPILLARY BLOOD GLUCOSE      POCT Blood Glucose.: 178 mg/dL (25 Aug 2022 22:15)  POCT Blood Glucose.: 134 mg/dL (25 Aug 2022 17:01)  POCT Blood Glucose.: 158 mg/dL (25 Aug 2022 16:03)  POCT Blood Glucose.: 137 mg/dL (25 Aug 2022 11:47)  POCT Blood Glucose.: 104 mg/dL (25 Aug 2022 07:42)    I&O's Summary    24 Aug 2022 07:01  -  25 Aug 2022 07:00  --------------------------------------------------------  IN: 400 mL / OUT: 1400 mL / NET: -1000 mL    25 Aug 2022 07:01  -  25 Aug 2022 23:02  --------------------------------------------------------  IN: 500 mL / OUT: 900 mL / NET: -400 mL        T(C): 36.3 (08-25-22 @ 22:00), Max: 37.2 (08-25-22 @ 15:40)  HR: 98 (08-25-22 @ 22:00) (91 - 981)  BP: 152/67 (08-25-22 @ 22:00) (102/45 - 155/72)  RR: 18 (08-25-22 @ 22:00) (18 - 23)  SpO2: 100% (08-25-22 @ 22:00) (95% - 100%)    PHYSICAL EXAM:  GENERAL: NAD  NECK: Supple, No JVD  CHEST/LUNG: Clear to auscultation bilaterally; No wheezing.  HEART: Regular rate and rhythm; No murmurs, rubs, or gallops  ABDOMEN: Soft, Nontender, Nondistended; Bowel sounds present  EXTREMITIES:   No edema  NEUROLOGY: AAO X 3      LABS:                        8.7    3.56  )-----------( 234      ( 25 Aug 2022 15:59 )             30.0     08-25    134<L>  |  97<L>  |  19  ----------------------------<  178<H>  3.7   |  28  |  3.06<H>    Ca    7.9<L>      25 Aug 2022 15:59  Phos  2.5     08-25  Mg     2.00     08-25      PT/INR - ( 24 Aug 2022 06:30 )   PT: 11.4 sec;   INR: 0.98 ratio         PTT - ( 24 Aug 2022 06:30 )  PTT:31.3 sec        CAPILLARY BLOOD GLUCOSE      POCT Blood Glucose.: 178 mg/dL (25 Aug 2022 22:15)  POCT Blood Glucose.: 134 mg/dL (25 Aug 2022 17:01)  POCT Blood Glucose.: 158 mg/dL (25 Aug 2022 16:03)  POCT Blood Glucose.: 137 mg/dL (25 Aug 2022 11:47)  POCT Blood Glucose.: 104 mg/dL (25 Aug 2022 07:42)        RADIOLOGY & ADDITIONAL TESTS:    Imaging Personally Reviewed:    Consultant(s) Notes Reviewed:      Care Discussed with Consultants/Other Providers:    Parminder Goldberg MD, CMD, FACP    [address] [address]  Office Tel: [phone]  Cell: [phone]

## 2022-08-26 LAB
ANION GAP SERPL CALC-SCNC: 11 MMOL/L — SIGNIFICANT CHANGE UP (ref 7–14)
BUN SERPL-MCNC: 12 MG/DL — SIGNIFICANT CHANGE UP (ref 7–23)
CALCIUM SERPL-MCNC: 8.4 MG/DL — SIGNIFICANT CHANGE UP (ref 8.4–10.5)
CHLORIDE SERPL-SCNC: 98 MMOL/L — SIGNIFICANT CHANGE UP (ref 98–107)
CO2 SERPL-SCNC: 27 MMOL/L — SIGNIFICANT CHANGE UP (ref 22–31)
CREAT SERPL-MCNC: 2.46 MG/DL — HIGH (ref 0.5–1.3)
EGFR: 19 ML/MIN/1.73M2 — LOW
GLUCOSE BLDC GLUCOMTR-MCNC: 108 MG/DL — HIGH (ref 70–99)
GLUCOSE BLDC GLUCOMTR-MCNC: 120 MG/DL — HIGH (ref 70–99)
GLUCOSE BLDC GLUCOMTR-MCNC: 148 MG/DL — HIGH (ref 70–99)
GLUCOSE BLDC GLUCOMTR-MCNC: 180 MG/DL — HIGH (ref 70–99)
GLUCOSE SERPL-MCNC: 105 MG/DL — HIGH (ref 70–99)
HCT VFR BLD CALC: 31.5 % — LOW (ref 34.5–45)
HGB BLD-MCNC: 9.4 G/DL — LOW (ref 11.5–15.5)
MAGNESIUM SERPL-MCNC: 2.1 MG/DL — SIGNIFICANT CHANGE UP (ref 1.6–2.6)
MCHC RBC-ENTMCNC: 27.1 PG — SIGNIFICANT CHANGE UP (ref 27–34)
MCHC RBC-ENTMCNC: 29.8 GM/DL — LOW (ref 32–36)
MCV RBC AUTO: 90.8 FL — SIGNIFICANT CHANGE UP (ref 80–100)
NRBC # BLD: 0 /100 WBCS — SIGNIFICANT CHANGE UP (ref 0–0)
NRBC # FLD: 0 K/UL — SIGNIFICANT CHANGE UP (ref 0–0)
PHOSPHATE SERPL-MCNC: 2.6 MG/DL — SIGNIFICANT CHANGE UP (ref 2.5–4.5)
PLATELET # BLD AUTO: 223 K/UL — SIGNIFICANT CHANGE UP (ref 150–400)
POTASSIUM SERPL-MCNC: 3.6 MMOL/L — SIGNIFICANT CHANGE UP (ref 3.5–5.3)
POTASSIUM SERPL-SCNC: 3.6 MMOL/L — SIGNIFICANT CHANGE UP (ref 3.5–5.3)
RBC # BLD: 3.47 M/UL — LOW (ref 3.8–5.2)
RBC # FLD: 19.9 % — HIGH (ref 10.3–14.5)
SARS-COV-2 RNA SPEC QL NAA+PROBE: SIGNIFICANT CHANGE UP
SODIUM SERPL-SCNC: 136 MMOL/L — SIGNIFICANT CHANGE UP (ref 135–145)
WBC # BLD: 3.23 K/UL — LOW (ref 3.8–10.5)
WBC # FLD AUTO: 3.23 K/UL — LOW (ref 3.8–10.5)

## 2022-08-26 RX ADMIN — SEVELAMER CARBONATE 800 MILLIGRAM(S): 2400 POWDER, FOR SUSPENSION ORAL at 06:24

## 2022-08-26 RX ADMIN — Medication 25 MILLIGRAM(S): at 06:22

## 2022-08-26 RX ADMIN — Medication 10 MILLIGRAM(S): at 06:22

## 2022-08-26 RX ADMIN — CHLORHEXIDINE GLUCONATE 1 APPLICATION(S): 213 SOLUTION TOPICAL at 06:22

## 2022-08-26 RX ADMIN — Medication 10 MILLIGRAM(S): at 21:33

## 2022-08-26 RX ADMIN — HEPARIN SODIUM 5000 UNIT(S): 5000 INJECTION INTRAVENOUS; SUBCUTANEOUS at 18:12

## 2022-08-26 RX ADMIN — PANTOPRAZOLE SODIUM 40 MILLIGRAM(S): 20 TABLET, DELAYED RELEASE ORAL at 06:22

## 2022-08-26 RX ADMIN — SEVELAMER CARBONATE 800 MILLIGRAM(S): 2400 POWDER, FOR SUSPENSION ORAL at 14:37

## 2022-08-26 RX ADMIN — CHLORHEXIDINE GLUCONATE 1 APPLICATION(S): 213 SOLUTION TOPICAL at 18:13

## 2022-08-26 RX ADMIN — SEVELAMER CARBONATE 800 MILLIGRAM(S): 2400 POWDER, FOR SUSPENSION ORAL at 21:33

## 2022-08-26 RX ADMIN — HEPARIN SODIUM 5000 UNIT(S): 5000 INJECTION INTRAVENOUS; SUBCUTANEOUS at 06:22

## 2022-08-26 RX ADMIN — Medication 10 MILLIGRAM(S): at 14:37

## 2022-08-26 RX ADMIN — GABAPENTIN 300 MILLIGRAM(S): 400 CAPSULE ORAL at 21:33

## 2022-08-26 RX ADMIN — CLOPIDOGREL BISULFATE 75 MILLIGRAM(S): 75 TABLET, FILM COATED ORAL at 14:37

## 2022-08-26 NOTE — PROGRESS NOTE ADULT - PROBLEM SELECTOR PROBLEM 6
History of gastritis
Type 2 diabetes mellitus with hyperglycemia, with long-term current use of insulin
History of gastritis
Type 2 diabetes mellitus with hyperglycemia, with long-term current use of insulin
Type 2 diabetes mellitus with hyperglycemia, with long-term current use of insulin
History of gastritis
Type 2 diabetes mellitus with hyperglycemia, with long-term current use of insulin

## 2022-08-26 NOTE — PROGRESS NOTE ADULT - PROBLEM SELECTOR PLAN 7
HSQ   DASH/TLC CC diet
Resume plavix, BB. No statin on med rec? Clarify why not on statin  Not on ASA due to hx GIB  -Cardiology consult appreciated
HSQ   DASH/TLC CC diet
Resume plavix, BB. No statin on med rec? Clarify why not on statin  Not on ASA due to hx GIB  -Cardiology consult appreciated
HSQ   DASH/TLC CC diet
HSQ   DASH/TLC CC diet
Resume plavix, BB. No statin on med rec? Clarify why not on statin  Not on ASA due to hx GIB  -Cardiology consult appreciated
HSQ   DASH/TLC CC diet
HSQ   DASH/TLC CC diet
Resume plavix, BB. No statin on med rec? Clarify why not on statin  Not on ASA due to hx GIB  -Cardiology consult appreciated
HSQ   DASH/TLC CC diet

## 2022-08-26 NOTE — PROGRESS NOTE ADULT - PROBLEM SELECTOR PLAN 2
Cardio f/up appreciated  -not on AC due to hx GIB per prior cards notes
-120s and asymptomatic. HD stable  EKG NSR, on tele afib  -give metoprolol 25mg PO stat now  -resume metoprolol succinate 25mg qd  -tele monitoring  -not on AC due to hx GIB per prior cards notes
Cardio f/up appreciated  -not on AC due to hx GIB per prior cards notes
Cardio f/up appreciated  -metoprolol succinate 25mg qd  -tele monitoring  -not on AC due to hx GIB per prior cards notes
Cardio f/up appreciated  -not on AC due to hx GIB per prior cards notes
Cardio f/up appreciated  -metoprolol succinate 25mg qd  -tele monitoring  -not on AC due to hx GIB per prior cards notes
Cardio f/up appreciated  -not on AC due to hx GIB per prior cards notes
-120s and asymptomatic. HD stable  EKG NSR, on tele afib  -give metoprolol 25mg PO stat now  -resume metoprolol succinate 25mg qd  -tele monitoring  -not on AC due to hx GIB per prior cards notes
Cardio f/up appreciated  -not on AC due to hx GIB per prior cards notes
Cardio f/up appreciated  -not on AC due to hx GIB per prior cards notes
-120s and asymptomatic. HD stable  EKG NSR, on tele afib  -give metoprolol 25mg PO stat now  -resume metoprolol succinate 25mg qd  -tele monitoring  -not on AC due to hx GIB per prior cards notes

## 2022-08-26 NOTE — PROGRESS NOTE ADULT - SUBJECTIVE AND OBJECTIVE BOX
NEPHROLOGY     Patient seen and examined. HD yesterday removed 400 cc    MEDICATIONS  (STANDING):  chlorhexidine 2% Cloths 1 Application(s) Topical two times a day  clopidogrel Tablet 75 milliGRAM(s) Oral daily  dextrose 5%. 1000 milliLiter(s) (50 mL/Hr) IV Continuous <Continuous>  dextrose 5%. 1000 milliLiter(s) (100 mL/Hr) IV Continuous <Continuous>  dextrose 50% Injectable 25 Gram(s) IV Push once  dextrose 50% Injectable 12.5 Gram(s) IV Push once  dextrose 50% Injectable 25 Gram(s) IV Push once  epoetin sean-epbx (RETACRIT) Injectable 33380 Unit(s) IV Push <User Schedule>  gabapentin 300 milliGRAM(s) Oral at bedtime  glucagon  Injectable 1 milliGRAM(s) IntraMuscular once  heparin   Injectable 5000 Unit(s) SubCutaneous every 12 hours  hydrALAZINE 10 milliGRAM(s) Oral three times a day  insulin lispro (ADMELOG) corrective regimen sliding scale   SubCutaneous three times a day before meals  insulin lispro (ADMELOG) corrective regimen sliding scale   SubCutaneous at bedtime  metoprolol succinate ER 25 milliGRAM(s) Oral daily  pantoprazole    Tablet 40 milliGRAM(s) Oral before breakfast  polyethylene glycol 3350 17 Gram(s) Oral two times a day  senna 2 Tablet(s) Oral at bedtime  sevelamer carbonate 800 milliGRAM(s) Oral three times a day    VITALS:  T(C): , Max: 37.2 (08-25-22 @ 15:40)  T(F): , Max: 99 (08-25-22 @ 15:40)  HR: 80 (08-26-22 @ 06:00)  BP: 159/68 (08-26-22 @ 06:00)  BP(mean): 68 (08-25-22 @ 10:29)  RR: 18 (08-26-22 @ 06:00)  SpO2: 96% (08-26-22 @ 06:00)    I and O's:    08-25 @ 07:01  -  08-26 @ 07:00  --------------------------------------------------------  IN: 500 mL / OUT: 900 mL / NET: -400 mL    PHYSICAL EXAM:  Constitutional: NAD, alert  HEENT: MMM  Neck:  No JVD, supple   Respiratory: CTA B/L  Cardiovascular: S1 and S2, IRR  Gastrointestinal: + BS, soft, NT, ND  Extremities: tr b/l LE edema  Neurological: reduced generalized strength  : No Sylvester  Skin: No rashes, C/D/I  Vascular Access: RIJ tunneled cath     LABS:                        9.4    3.23  )-----------( 223      ( 26 Aug 2022 07:06 )             31.5     08-26    136  |  98  |  12  ----------------------------<  105<H>  3.6   |  27  |  2.46<H>    Ca    8.4      26 Aug 2022 07:06  Phos  2.6     08-26  Mg     2.10     08-26    RADIOLOGY & ADDITIONAL STUDIES:  rad NEPHROLOGY     Patient seen and examined sitting on bed having breakfast, no new complaints, denies pain, no sob, in no acute distress. HD yesterday removed 0.4L.    MEDICATIONS  (STANDING):  chlorhexidine 2% Cloths 1 Application(s) Topical two times a day  clopidogrel Tablet 75 milliGRAM(s) Oral daily  dextrose 5%. 1000 milliLiter(s) (50 mL/Hr) IV Continuous <Continuous>  dextrose 5%. 1000 milliLiter(s) (100 mL/Hr) IV Continuous <Continuous>  dextrose 50% Injectable 25 Gram(s) IV Push once  dextrose 50% Injectable 12.5 Gram(s) IV Push once  dextrose 50% Injectable 25 Gram(s) IV Push once  epoetin sean-epbx (RETACRIT) Injectable 20436 Unit(s) IV Push <User Schedule>  gabapentin 300 milliGRAM(s) Oral at bedtime  glucagon  Injectable 1 milliGRAM(s) IntraMuscular once  heparin   Injectable 5000 Unit(s) SubCutaneous every 12 hours  hydrALAZINE 10 milliGRAM(s) Oral three times a day  insulin lispro (ADMELOG) corrective regimen sliding scale   SubCutaneous three times a day before meals  insulin lispro (ADMELOG) corrective regimen sliding scale   SubCutaneous at bedtime  metoprolol succinate ER 25 milliGRAM(s) Oral daily  pantoprazole    Tablet 40 milliGRAM(s) Oral before breakfast  polyethylene glycol 3350 17 Gram(s) Oral two times a day  senna 2 Tablet(s) Oral at bedtime  sevelamer carbonate 800 milliGRAM(s) Oral three times a day    VITALS:  T(C): , Max: 37.2 (08-25-22 @ 15:40)  T(F): , Max: 99 (08-25-22 @ 15:40)  HR: 80 (08-26-22 @ 06:00)  BP: 159/68 (08-26-22 @ 06:00)  BP(mean): 68 (08-25-22 @ 10:29)  RR: 18 (08-26-22 @ 06:00)  SpO2: 96% (08-26-22 @ 06:00)    I and O's:    08-25 @ 07:01  -  08-26 @ 07:00  --------------------------------------------------------  IN: 500 mL / OUT: 900 mL / NET: -400 mL    PHYSICAL EXAM:  Constitutional: NAD, alert  HEENT: MMM  Neck:  No JVD, supple   Respiratory: CTA B/L  Cardiovascular: S1 and S2, IRR  Gastrointestinal: + BS, soft, NT, ND  Extremities: tr b/l LE edema  Neurological: reduced generalized strength  : No Sylvester  Skin: No rashes, C/D/I  Vascular Access: RIJ tunneled cath     LABS:                        9.4    3.23  )-----------( 223      ( 26 Aug 2022 07:06 )             31.5     08-26    136  |  98  |  12  ----------------------------<  105<H>  3.6   |  27  |  2.46<H>    Ca    8.4      26 Aug 2022 07:06  Phos  2.6     08-26  Mg     2.10     08-26

## 2022-08-26 NOTE — PROGRESS NOTE ADULT - ASSESSMENT
IMPRESSION: 82F w/ DM2, CAD, PAD, PUD, and CKD5, 8/11/22 p/w abnormal labs    (1)Renal - newly ESRD-HD as of this admission., planned for TTS at outpt HD  (3)Hyperphosphatemia - controlled on Renvela/low-PO4 diet  (3)Anemia - slightly lower than goal, s/p PRBCs/on Venofer  (4)Vasc - s/p conversion of shiley to tunneled cath  (5)Psychosocial - planning for HD at [facility] as outpatient    RECOMMENDATIONS:  (1)HD tomorrow - 3 hrs, 1kg as able; Retacrit with HD  (2)Setup of outside HD per AISHWARYA FuentesC  [facility]  [phone]     IMPRESSION: 82F w/ DM2, CAD, PAD, PUD, and CKD5, 8/11/22 p/w abnormal labs    (1)Renal - newly ESRD-HD as of this admission., planned for TTS at outpt HD  (3)Hyperphosphatemia - controlled on Renvela/low-PO4 diet  (3)Anemia - slightly lower than goal, s/p PRBCs/on Venofer  (4)Vasc - s/p conversion of shiley to tunneled cath  (5)Psychosocial - planning for HD at [facility] as outpatient    RECOMMENDATIONS:  (1)HD tomorrow (inpt vs outpt) - 3 hrs, 1kg as able; Retacrit with HD  (2)Setup of outside HD per CHRISTIANE Ureña NP-C  [facility]  [phone]     IMPRESSION: 82F w/ DM2, CAD, PAD, PUD, and CKD5, 8/11/22 p/w abnormal labs    (1)Renal - newly ESRD-HD as of this admission., planned for TTS at outpt HD  (3)Hyperphosphatemia - controlled on Renvela/low-PO4 diet  (3)Anemia - slightly lower than goal, s/p PRBCs/on Venofer  (4)Vasc - s/p conversion of shiley to tunneled cath  (5)Psychosocial - planning for HD at [facility] as outpatient    RECOMMENDATIONS:  (1)HD tomorrow (inpt vs outpt) - 3 hrs, 1kg as able; Retacrit with HD  (2)Setup of outside HD per YEIMI Fuentes  [facility]  [phone]      RENAL ATTENDING NOTE  Patient seen and examined with NP. Agree with assessment and plan as above.    Accepted to [facility] for HD TTS, 1st HD there on Tuesday 8/30. We will plan for HD tomorrow a.m. at [facility]; no objection to discharge after HD tomorrow if no further complications ensue    Reid Solis MD  [facility]  [phone]

## 2022-08-26 NOTE — PROGRESS NOTE ADULT - PROBLEM SELECTOR PLAN 5
Resume plavix, BB. No statin on med rec? Clarify why not on statin  Not on ASA due to hx GIB
Chronic HARPER  -ECHO  -likely related to anemia vs chronic CHF
Resume plavix, BB. No statin on med rec? Clarify why not on statin  Not on ASA due to hx GIB
Chronic HARPER  -ECHO  -likely related to anemia vs chronic CHF
Resume plavix, BB. No statin on med rec? Clarify why not on statin  Not on ASA due to hx GIB
Chronic HARPER  -ECHO  -likely related to anemia vs chronic CHF
Resume plavix, BB. No statin on med rec? Clarify why not on statin  Not on ASA due to hx GIB
Resume plavix, BB. No statin on med rec? Clarify why not on statin  Not on ASA due to hx GIB  -Cardiology consult appreciated
Resume plavix, BB. No statin on med rec? Clarify why not on statin  Not on ASA due to hx GIB
Chronic HARPER  -ECHO  -likely related to anemia vs chronic CHF

## 2022-08-26 NOTE — PROGRESS NOTE ADULT - PROBLEM SELECTOR PROBLEM 4
Type 2 diabetes mellitus with hyperglycemia, with long-term current use of insulin
Chronic kidney disease, unspecified CKD stage
Type 2 diabetes mellitus with hyperglycemia, with long-term current use of insulin
Chronic kidney disease, unspecified CKD stage
Type 2 diabetes mellitus with hyperglycemia, with long-term current use of insulin
Chronic kidney disease, unspecified CKD stage
Type 2 diabetes mellitus with hyperglycemia, with long-term current use of insulin
Type 2 diabetes mellitus with hyperglycemia, with long-term current use of insulin
Chronic kidney disease, unspecified CKD stage

## 2022-08-26 NOTE — PROGRESS NOTE ADULT - PROBLEM SELECTOR PLAN 4
ANGELA/Lantus
ANGELA/Lantus
Creatinine 4.99 up from 4.45 3 months ago  -bilateral LE edema  -lasix 80mg BID  -renal c/s appreciated Likely will need eventual HD
ANGELA/Lantus
Creatinine 4.99 up from 4.45 3 months ago  -bilateral LE edema  -lasix 80mg BID  -renal c/s appreciated Likely will need eventual HD
ANGELA/Lantus
Creatinine 4.99 up from 4.45 3 months ago  -bilateral LE edema  -lasix 80mg BID  -renal c/s appreciated Likely will need eventual HD
ANGELA/Lantus
Creatinine 4.99 up from 4.45 3 months ago  -bilateral LE edema  -lasix 80mg BID  -renal c/s appreciated Likely will need eventual HD

## 2022-08-26 NOTE — PROGRESS NOTE ADULT - PROBLEM SELECTOR PLAN 6
PPI
On lantus 20u qhs. On last admission 7u qhs and admelog 5u TID. Will give 8u qhs for now and uptitrate as needed. ISS as well. Hold orals
PPI
PPI  May need eventual repeat EGD. Should have GI f/u
PPI
On lantus 20u qhs. On last admission 7u qhs and admelog 5u TID. Will give 8u qhs for now and uptitrate as needed. ISS as well. Hold orals
PPI
PPI
ANGELA/Lantus
PPI
PPI
On lantus 20u qhs. On last admission 7u qhs and admelog 5u TID. Will give 8u qhs for now and uptitrate as needed. ISS as well. Hold orals

## 2022-08-26 NOTE — PROGRESS NOTE ADULT - PROBLEM SELECTOR PROBLEM 1
Symptomatic anemia

## 2022-08-26 NOTE — PROGRESS NOTE ADULT - PROBLEM SELECTOR PROBLEM 5
CAD (coronary artery disease)
CAD (coronary artery disease)
Dyspnea on exertion
CAD (coronary artery disease)
Dyspnea on exertion
CAD (coronary artery disease)
Dyspnea on exertion
Dyspnea on exertion
PAIN/TENDERNESS

## 2022-08-26 NOTE — PROGRESS NOTE ADULT - PROBLEM SELECTOR PLAN 1
Hb 8.9  Stable
Hb 8.3  Stable
Hb 8.9  Stable
Hb 8.9  Stable
Hb 9.2  Stable
Hb 8.9  Stable
Hb 8.9  Stable
-hgb-stable
-hgb-stable
Hb 8.9  Stable
Hb 8.9  Stable
Hb 8.1  GI f/up
Hgb 6.4. HD stable. Denies active bleeding and FOBT neg  Likely related to CKD vs slow intermittent chronic GIB  --s/p PRBC  -GI Consult appreciated,refusing Endoscopy  -iv iron  -monitor H/H
-hgb-stable
Likely related to CKD vs slow intermittent chronic GIB  --s/p PRBC  -GI Consult appreciated,refusing Endoscopy  -iv iron  -monitor H/H
Hgb 6.4. HD stable. Denies active bleeding and FOBT neg  Likely related to CKD vs slow intermittent chronic GIB  --s/p PRBC  -GI Consult appreciated,refusing Endoscopy  -iv iron  -monitor H/H
Hgb 6.4. HD stable. Denies active bleeding and FOBT neg  Likely related to CKD vs slow intermittent chronic GIB  --s/p PRBC  -GI Consult appreciated,refusing Endoscopy  -iv iron  -monitor H/H

## 2022-08-26 NOTE — PROGRESS NOTE ADULT - SUBJECTIVE AND OBJECTIVE BOX
REGI HOLTTHEA  82y  Female      Patient is a 82y old  Female who presents with a chief complaint of abnormal labs (26 Aug 2022 07:59)  feels ok.no sob,no cp,no fever,no cough    REVIEW OF SYSTEMS:  CONSTITUTIONAL: No fever  RESPIRATORY: No cough, hemoptysis or shortness of breath  CARDIOVASCULAR: No chest pain, palpitations, dizziness, or leg swelling  GASTROINTESTINAL: No abdominal pain. nausea, vomiting, hematemesis  INTERVAL HPI/OVERNIGHT EVENTS:  T(C): 36.3 (22 @ 17:59), Max: 36.6 (22 @ 06:00)  HR: 75 (22 @ 17:59) (75 - 98)  BP: 165/60 (22 @ 17:59) (152/67 - 165/60)  RR: 17 (22 @ 17:59) (17 - 18)  SpO2: 98% (22:59) (96% - 100%)  Wt(kg): --  I&O's Summary    25 Aug 2022 07:  -  26 Aug 2022 07:00  --------------------------------------------------------  IN: 500 mL / OUT: 900 mL / NET: -400 mL    26 Aug 2022 07:  -  26 Aug 2022 21:21  --------------------------------------------------------  IN: 900 mL / OUT: 0 mL / NET: 900 mL      T(C): 36.3 (22 @ 17:59), Max: 36.6 (22 @ 06:00)  HR: 75 (22 @ 17:59) (75 - 98)  BP: 165/60 (22 @ 17:59) (152/67 - 165/60)  RR: 17 (22 @ 17:59) (17 - 18)  SpO2: 98% (- @ 17:59) (96% - 100%)  Wt(kg): --Vital Signs Last 24 Hrs  T(C): 36.3 (26 Aug 2022 17:59), Max: 36.6 (26 Aug 2022 06:00)  T(F): 97.4 (26 Aug 2022 17:59), Max: 97.9 (26 Aug 2022 06:00)  HR: 75 (26 Aug 2022 17:59) (75 - 98)  BP: 165/60 (26 Aug 2022 17:59) (152/67 - 165/60)  BP(mean): --  RR: 17 (26 Aug 2022 17:59) (17 - 18)  SpO2: 98% (26 Aug 2022 17:59) (96% - 100%)    Parameters below as of 26 Aug 2022 17:59  Patient On (Oxygen Delivery Method): room air        LABS:                        9.4    3.23  )-----------( 223      ( 26 Aug 2022 07:06 )             31.5     08-26    136  |  98  |  12  ----------------------------<  105<H>  3.6   |  27  |  2.46<H>    Ca    8.4      26 Aug 2022 07:06  Phos  2.6     08-26  Mg     2.10     08-26          CAPILLARY BLOOD GLUCOSE      POCT Blood Glucose.: 120 mg/dL (26 Aug 2022 16:56)  POCT Blood Glucose.: 148 mg/dL (26 Aug 2022 12:31)  POCT Blood Glucose.: 108 mg/dL (26 Aug 2022 08:40)  POCT Blood Glucose.: 178 mg/dL (25 Aug 2022 22:15)            PAST MEDICAL & SURGICAL HISTORY:  HLD (hyperlipidemia)      Diabetic neuropathy      CAD (coronary artery disease)  ~ s/p MICHAEL x2 in 2017      Paroxysmal atrial fibrillation      Diabetes mellitus, type 2      GI bleed      Neuropathy      Gastritis      Diastolic heart failure      Transfusion history      Stage 5 chronic kidney disease not on chronic dialysis      Anemia      PVD (peripheral vascular disease)  ~ RLE angiogram with 2 stents placed in 2019.      H/O  section      S/P coronary artery stent placement  x2 in       History of esophagogastroduodenoscopy (EGD)      S/P angiogram of extremity          MEDICATIONS  (STANDING):  chlorhexidine 2% Cloths 1 Application(s) Topical two times a day  clopidogrel Tablet 75 milliGRAM(s) Oral daily  dextrose 5%. 1000 milliLiter(s) (50 mL/Hr) IV Continuous <Continuous>  dextrose 5%. 1000 milliLiter(s) (100 mL/Hr) IV Continuous <Continuous>  dextrose 50% Injectable 25 Gram(s) IV Push once  dextrose 50% Injectable 12.5 Gram(s) IV Push once  dextrose 50% Injectable 25 Gram(s) IV Push once  epoetin sean-epbx (RETACRIT) Injectable 15210 Unit(s) IV Push <User Schedule>  gabapentin 300 milliGRAM(s) Oral at bedtime  glucagon  Injectable 1 milliGRAM(s) IntraMuscular once  heparin   Injectable 5000 Unit(s) SubCutaneous every 12 hours  hydrALAZINE 10 milliGRAM(s) Oral three times a day  insulin lispro (ADMELOG) corrective regimen sliding scale   SubCutaneous three times a day before meals  insulin lispro (ADMELOG) corrective regimen sliding scale   SubCutaneous at bedtime  metoprolol succinate ER 25 milliGRAM(s) Oral daily  pantoprazole    Tablet 40 milliGRAM(s) Oral before breakfast  polyethylene glycol 3350 17 Gram(s) Oral two times a day  senna 2 Tablet(s) Oral at bedtime  sevelamer carbonate 800 milliGRAM(s) Oral three times a day    MEDICATIONS  (PRN):  acetaminophen     Tablet .. 650 milliGRAM(s) Oral every 6 hours PRN Temp greater or equal to 38C (100.4F), Mild Pain (1 - 3)  dextrose Oral Gel 15 Gram(s) Oral once PRN Blood Glucose LESS THAN 70 milliGRAM(s)/deciliter  lidocaine   4% Patch 1 Patch Transdermal every 24 hours PRN Back pain  melatonin 3 milliGRAM(s) Oral at bedtime PRN Insomnia  sodium chloride 0.9% lock flush 10 milliLiter(s) IV Push every 1 hour PRN Pre/post blood products, medications, blood draw, and to maintain line patency        RADIOLOGY & ADDITIONAL TESTS:    Imaging Personally Reviewed:  [ ] YES  [ ] NO    Consultant(s) Notes Reviewed:  [ ] YES  [ ] NO    PHYSICAL EXAM:  GENERAL: Alert and awake lying in bed in no distress  HEAD:  Atraumatic, Normocephalic  EYES: EOMI, EDIN, conjunctiva and sclera clear  NECK: Supple, No JVD, Normal thyroid  NERVOUS SYSTEM:  Alert & Oriented X3, Motor and sensory systems are intact,   CHEST/LUNG: Bilateral clear breath sounds, no rhochi, no wheezing, no crepitations,  HEART: Regular rate and rhythm; No murmurs, rubs, or gallops  ABDOMEN: Soft, Nontender, Nondistended; Bowel sounds present  EXTREMITIES:   Peripheral Pulses are palpable, no  edema        Care Discussed with Consultants/Other Providers [ ] YES  [ ] NO      Code Status: [] Full Code [] DNR [] DNI [] Goals of Care:   Disposition: [] ICU [] Stroke Unit [] RCU []PCU []Floor [] Discharge Home         JHONATAN [email or web address]

## 2022-08-26 NOTE — PROGRESS NOTE ADULT - PROBLEM SELECTOR PROBLEM 3
Chronic kidney disease, unspecified CKD stage
Abnormal EKG
Abnormal EKG
Chronic kidney disease, unspecified CKD stage
Abnormal EKG
Abnormal EKG

## 2022-08-26 NOTE — PROGRESS NOTE ADULT - PROBLEM SELECTOR PROBLEM 2
Atrial fibrillation with rapid ventricular response

## 2022-08-26 NOTE — PROGRESS NOTE ADULT - SUBJECTIVE AND OBJECTIVE BOX
Cardiovascular Disease Progress Note    Overnight events: No acute events overnight.   Ms. Tsang denies chest pain or SOB.    Otherwise review of systems negative    Objective Findings:  T(C): 36.6 (22 @ 06:00), Max: 37.2 (22 @ 15:40)  HR: 80 (22 @ 06:00) (80 - 981)  BP: 159/68 (22 @ 06:00) (102/45 - 159/68)  RR: 18 (22 @ 06:00) (18 - 23)  SpO2: 96% (22 @ 06:00) (95% - 100%)  Wt(kg): --  Daily     Daily Weight in k.6 (25 Aug 2022 18:40)      Physical Exam:  Gen: NAD; Patient resting comfortably  HEENT: EOMI, Normocephalic/ atraumatic  CV: IIR, normal S1 + S2, no m/r/g  Lungs:  Normal respiratory effort; clear to auscultation bilaterally  Abd: soft, non-tender; bowel sounds present  Ext: No edema; warm and well perfused    Telemetry: a-fib 90s    Laboratory Data:                        9.4    3.23  )-----------( 223      ( 26 Aug 2022 07:06 )             31.5         136  |  98  |  12  ----------------------------<  105<H>  3.6   |  27  |  2.46<H>    Ca    8.4      26 Aug 2022 07:06  Phos  2.6       Mg     2.10                     Inpatient Medications:  MEDICATIONS  (STANDING):  chlorhexidine 2% Cloths 1 Application(s) Topical two times a day  clopidogrel Tablet 75 milliGRAM(s) Oral daily  dextrose 5%. 1000 milliLiter(s) (100 mL/Hr) IV Continuous <Continuous>  dextrose 5%. 1000 milliLiter(s) (50 mL/Hr) IV Continuous <Continuous>  dextrose 50% Injectable 25 Gram(s) IV Push once  dextrose 50% Injectable 12.5 Gram(s) IV Push once  dextrose 50% Injectable 25 Gram(s) IV Push once  epoetin sean-epbx (RETACRIT) Injectable 13420 Unit(s) IV Push <User Schedule>  gabapentin 300 milliGRAM(s) Oral at bedtime  glucagon  Injectable 1 milliGRAM(s) IntraMuscular once  heparin   Injectable 5000 Unit(s) SubCutaneous every 12 hours  hydrALAZINE 10 milliGRAM(s) Oral three times a day  insulin lispro (ADMELOG) corrective regimen sliding scale   SubCutaneous three times a day before meals  insulin lispro (ADMELOG) corrective regimen sliding scale   SubCutaneous at bedtime  metoprolol succinate ER 25 milliGRAM(s) Oral daily  pantoprazole    Tablet 40 milliGRAM(s) Oral before breakfast  polyethylene glycol 3350 17 Gram(s) Oral two times a day  senna 2 Tablet(s) Oral at bedtime  sevelamer carbonate 800 milliGRAM(s) Oral three times a day      Assessment:  82 year old woman with uncontrolled IDDM, compensated diastolic CHF, CAD s/p PCI, CKD V and HTN presents with anemia.     Plan of Care:    #CAD- s/p MICHAEL x2 in 2017.  Anemia noted on admission, but patient denies any signs of bleeding.  Likely anemia in the setting of CKD.   Exertional dyspnea now improved post pRBC transfusion.  Given extensive vascular disease, continue Plavix 75 mg daily.     #Compensated systolic CHF-  New inferior wall motion abnormality noted on echo  Ms. Tsang is not fluid overloaded on exam and she denies chest pain.  I would not pursue another ischemic evaluation, as Ms. Tsang cannot tolerate dual antiplatelet therapy due to bleeding history.     #ESRD-  S/P HD initiation on  by the renal team.     #PVD-  Patient status post RLE angiogram with 2 stents placed in 2019.   Antiplatelet therapy management as stated above.     #Paroxymal a-fib-  Patient in and out of rate controlled a-fib.   Continue Toprol.   No AC given h/o GI bleed.      Over 25 minutes spent on total encounter; more than 50% of the visit was spent counseling and/or coordinating care by the attending physician.      Julio César Hooper MD [facility]  Cardiovascular Disease  [phone]

## 2022-08-26 NOTE — PROGRESS NOTE ADULT - PROBLEM SELECTOR PROBLEM 7
Need for prophylactic measure
CAD (coronary artery disease)
Need for prophylactic measure
CAD (coronary artery disease)
Need for prophylactic measure
CAD (coronary artery disease)
Need for prophylactic measure
Need for prophylactic measure
CAD (coronary artery disease)

## 2022-08-27 VITALS
DIASTOLIC BLOOD PRESSURE: 77 MMHG | SYSTOLIC BLOOD PRESSURE: 149 MMHG | RESPIRATION RATE: 19 BRPM | HEART RATE: 84 BPM | TEMPERATURE: 98 F | OXYGEN SATURATION: 99 %

## 2022-08-27 LAB
ANION GAP SERPL CALC-SCNC: 12 MMOL/L — SIGNIFICANT CHANGE UP (ref 7–14)
BUN SERPL-MCNC: 17 MG/DL — SIGNIFICANT CHANGE UP (ref 7–23)
CALCIUM SERPL-MCNC: 8.6 MG/DL — SIGNIFICANT CHANGE UP (ref 8.4–10.5)
CHLORIDE SERPL-SCNC: 98 MMOL/L — SIGNIFICANT CHANGE UP (ref 98–107)
CO2 SERPL-SCNC: 27 MMOL/L — SIGNIFICANT CHANGE UP (ref 22–31)
CREAT SERPL-MCNC: 3.07 MG/DL — HIGH (ref 0.5–1.3)
EGFR: 15 ML/MIN/1.73M2 — LOW
GLUCOSE BLDC GLUCOMTR-MCNC: 104 MG/DL — HIGH (ref 70–99)
GLUCOSE BLDC GLUCOMTR-MCNC: 105 MG/DL — HIGH (ref 70–99)
GLUCOSE BLDC GLUCOMTR-MCNC: 205 MG/DL — HIGH (ref 70–99)
GLUCOSE SERPL-MCNC: 104 MG/DL — HIGH (ref 70–99)
HCT VFR BLD CALC: 29.9 % — LOW (ref 34.5–45)
HGB BLD-MCNC: 8.7 G/DL — LOW (ref 11.5–15.5)
MAGNESIUM SERPL-MCNC: 2 MG/DL — SIGNIFICANT CHANGE UP (ref 1.6–2.6)
MCHC RBC-ENTMCNC: 27.1 PG — SIGNIFICANT CHANGE UP (ref 27–34)
MCHC RBC-ENTMCNC: 29.1 GM/DL — LOW (ref 32–36)
MCV RBC AUTO: 93.1 FL — SIGNIFICANT CHANGE UP (ref 80–100)
NRBC # BLD: 0 /100 WBCS — SIGNIFICANT CHANGE UP (ref 0–0)
NRBC # FLD: 0 K/UL — SIGNIFICANT CHANGE UP (ref 0–0)
PHOSPHATE SERPL-MCNC: 3 MG/DL — SIGNIFICANT CHANGE UP (ref 2.5–4.5)
PLATELET # BLD AUTO: 254 K/UL — SIGNIFICANT CHANGE UP (ref 150–400)
POTASSIUM SERPL-MCNC: 3.3 MMOL/L — LOW (ref 3.5–5.3)
POTASSIUM SERPL-SCNC: 3.3 MMOL/L — LOW (ref 3.5–5.3)
RBC # BLD: 3.21 M/UL — LOW (ref 3.8–5.2)
RBC # FLD: 20.3 % — HIGH (ref 10.3–14.5)
SODIUM SERPL-SCNC: 137 MMOL/L — SIGNIFICANT CHANGE UP (ref 135–145)
WBC # BLD: 3.5 K/UL — LOW (ref 3.8–10.5)
WBC # FLD AUTO: 3.5 K/UL — LOW (ref 3.8–10.5)

## 2022-08-27 RX ORDER — INSULIN GLARGINE 100 [IU]/ML
20 INJECTION, SOLUTION SUBCUTANEOUS
Qty: 0 | Refills: 0 | DISCHARGE

## 2022-08-27 RX ORDER — GABAPENTIN 400 MG/1
1 CAPSULE ORAL
Qty: 30 | Refills: 0
Start: 2022-08-27 | End: 2022-09-25

## 2022-08-27 RX ORDER — FUROSEMIDE 40 MG
1 TABLET ORAL
Qty: 0 | Refills: 0 | DISCHARGE

## 2022-08-27 RX ORDER — GABAPENTIN 400 MG/1
1 CAPSULE ORAL
Qty: 0 | Refills: 0 | DISCHARGE

## 2022-08-27 RX ORDER — FUROSEMIDE 40 MG
1 TABLET ORAL
Qty: 32 | Refills: 0
Start: 2022-08-27 | End: 2022-09-25

## 2022-08-27 RX ORDER — PANTOPRAZOLE SODIUM 20 MG/1
1 TABLET, DELAYED RELEASE ORAL
Qty: 30 | Refills: 0
Start: 2022-08-27 | End: 2022-09-25

## 2022-08-27 RX ORDER — LACTULOSE 10 G/15ML
10 SOLUTION ORAL ONCE
Refills: 0 | Status: COMPLETED | OUTPATIENT
Start: 2022-08-27 | End: 2022-08-27

## 2022-08-27 RX ORDER — SENNA PLUS 8.6 MG/1
2 TABLET ORAL
Qty: 0 | Refills: 0 | DISCHARGE
Start: 2022-08-27

## 2022-08-27 RX ADMIN — ERYTHROPOIETIN 10000 UNIT(S): 10000 INJECTION, SOLUTION INTRAVENOUS; SUBCUTANEOUS at 08:45

## 2022-08-27 RX ADMIN — SEVELAMER CARBONATE 800 MILLIGRAM(S): 2400 POWDER, FOR SUSPENSION ORAL at 13:24

## 2022-08-27 RX ADMIN — HEPARIN SODIUM 5000 UNIT(S): 5000 INJECTION INTRAVENOUS; SUBCUTANEOUS at 06:01

## 2022-08-27 RX ADMIN — SEVELAMER CARBONATE 800 MILLIGRAM(S): 2400 POWDER, FOR SUSPENSION ORAL at 06:00

## 2022-08-27 RX ADMIN — Medication 2: at 12:05

## 2022-08-27 RX ADMIN — Medication 10 MILLIGRAM(S): at 13:24

## 2022-08-27 RX ADMIN — Medication 25 MILLIGRAM(S): at 12:07

## 2022-08-27 RX ADMIN — CHLORHEXIDINE GLUCONATE 1 APPLICATION(S): 213 SOLUTION TOPICAL at 05:20

## 2022-08-27 RX ADMIN — CLOPIDOGREL BISULFATE 75 MILLIGRAM(S): 75 TABLET, FILM COATED ORAL at 12:15

## 2022-08-27 RX ADMIN — LACTULOSE 10 GRAM(S): 10 SOLUTION ORAL at 12:12

## 2022-08-27 RX ADMIN — PANTOPRAZOLE SODIUM 40 MILLIGRAM(S): 20 TABLET, DELAYED RELEASE ORAL at 06:00

## 2022-08-27 NOTE — PROGRESS NOTE ADULT - SUBJECTIVE AND OBJECTIVE BOX
NEPHROLOGY     Patient seen and examined.    MEDICATIONS  (STANDING):  chlorhexidine 2% Cloths 1 Application(s) Topical two times a day  clopidogrel Tablet 75 milliGRAM(s) Oral daily  dextrose 5%. 1000 milliLiter(s) (100 mL/Hr) IV Continuous <Continuous>  dextrose 5%. 1000 milliLiter(s) (50 mL/Hr) IV Continuous <Continuous>  dextrose 50% Injectable 25 Gram(s) IV Push once  dextrose 50% Injectable 12.5 Gram(s) IV Push once  dextrose 50% Injectable 25 Gram(s) IV Push once  epoetin sean-epbx (RETACRIT) Injectable 12880 Unit(s) IV Push <User Schedule>  gabapentin 300 milliGRAM(s) Oral at bedtime  glucagon  Injectable 1 milliGRAM(s) IntraMuscular once  heparin   Injectable 5000 Unit(s) SubCutaneous every 12 hours  hydrALAZINE 10 milliGRAM(s) Oral three times a day  insulin lispro (ADMELOG) corrective regimen sliding scale   SubCutaneous three times a day before meals  insulin lispro (ADMELOG) corrective regimen sliding scale   SubCutaneous at bedtime  metoprolol succinate ER 25 milliGRAM(s) Oral daily  pantoprazole    Tablet 40 milliGRAM(s) Oral before breakfast  polyethylene glycol 3350 17 Gram(s) Oral two times a day  senna 2 Tablet(s) Oral at bedtime  sevelamer carbonate 800 milliGRAM(s) Oral three times a day    VITALS:  T(C): , Max: 36.8 (08-27-22 @ 06:25)  T(F): , Max: 98.3 (08-27-22 @ 06:25)  HR: 69 (08-27-22 @ 06:25)  BP: 168/72 (08-27-22 @ 06:25)  RR: 20 (08-27-22 @ 06:25)  SpO2: 98% (08-27-22 @ 05:45)    I and O's:    08-26 @ 07:01  -  08-27 @ 07:00  --------------------------------------------------------  IN: 900 mL / OUT: 0 mL / NET: 900 mL    PHYSICAL EXAM:  Constitutional: NAD, alert  HEENT: MMM  Neck:  No JVD, supple   Respiratory: CTA B/L  Cardiovascular: S1 and S2, IRR  Gastrointestinal: + BS, soft, NT, ND  Extremities: tr b/l LE edema  Neurological: reduced generalized strength  : No Sylvester  Skin: No rashes, C/D/I  Vascular Access: RIJ tunneled cath     LABS:                        9.4    3.23  )-----------( 223      ( 26 Aug 2022 07:06 )             31.5     08-26    136  |  98  |  12  ----------------------------<  105<H>  3.6   |  27  |  2.46<H>    Ca    8.4      26 Aug 2022 07:06  Phos  2.6     08-26  Mg     2.10     08-26     NEPHROLOGY     Patient seen and examined on HD, reports feeling well, denies pain, no sob, in no acute distress.     MEDICATIONS  (STANDING):  chlorhexidine 2% Cloths 1 Application(s) Topical two times a day  clopidogrel Tablet 75 milliGRAM(s) Oral daily  dextrose 5%. 1000 milliLiter(s) (100 mL/Hr) IV Continuous <Continuous>  dextrose 5%. 1000 milliLiter(s) (50 mL/Hr) IV Continuous <Continuous>  dextrose 50% Injectable 25 Gram(s) IV Push once  dextrose 50% Injectable 12.5 Gram(s) IV Push once  dextrose 50% Injectable 25 Gram(s) IV Push once  epoetin sean-epbx (RETACRIT) Injectable 56105 Unit(s) IV Push <User Schedule>  gabapentin 300 milliGRAM(s) Oral at bedtime  glucagon  Injectable 1 milliGRAM(s) IntraMuscular once  heparin   Injectable 5000 Unit(s) SubCutaneous every 12 hours  hydrALAZINE 10 milliGRAM(s) Oral three times a day  insulin lispro (ADMELOG) corrective regimen sliding scale   SubCutaneous three times a day before meals  insulin lispro (ADMELOG) corrective regimen sliding scale   SubCutaneous at bedtime  metoprolol succinate ER 25 milliGRAM(s) Oral daily  pantoprazole    Tablet 40 milliGRAM(s) Oral before breakfast  polyethylene glycol 3350 17 Gram(s) Oral two times a day  senna 2 Tablet(s) Oral at bedtime  sevelamer carbonate 800 milliGRAM(s) Oral three times a day    VITALS:  T(C): , Max: 36.8 (08-27-22 @ 06:25)  T(F): , Max: 98.3 (08-27-22 @ 06:25)  HR: 69 (08-27-22 @ 06:25)  BP: 168/72 (08-27-22 @ 06:25)  RR: 20 (08-27-22 @ 06:25)  SpO2: 98% (08-27-22 @ 05:45)    I and O's:    08-26 @ 07:01  -  08-27 @ 07:00  --------------------------------------------------------  IN: 900 mL / OUT: 0 mL / NET: 900 mL    PHYSICAL EXAM:  Constitutional: NAD, alert  HEENT: MMM  Neck:  No JVD, supple   Respiratory: CTA B/L  Cardiovascular: S1 and S2, IRR  Gastrointestinal: + BS, soft, NT, ND  Extremities: tr b/l LE edema  Neurological: reduced generalized strength  : No Sylvester  Skin: No rashes, C/D/I  Vascular Access: RIJ tunneled cath     LABS:                        9.4    3.23  )-----------( 223      ( 26 Aug 2022 07:06 )             31.5     08-26    136  |  98  |  12  ----------------------------<  105<H>  3.6   |  27  |  2.46<H>    Ca    8.4      26 Aug 2022 07:06  Phos  2.6     08-26  Mg     2.10     08-26     NEPHROLOGY     Patient seen and examined on HD, reports feeling well, denies pain, no sob, in no acute distress.     MEDICATIONS  (STANDING):  chlorhexidine 2% Cloths 1 Application(s) Topical two times a day  clopidogrel Tablet 75 milliGRAM(s) Oral daily  dextrose 5%. 1000 milliLiter(s) (100 mL/Hr) IV Continuous <Continuous>  dextrose 5%. 1000 milliLiter(s) (50 mL/Hr) IV Continuous <Continuous>  dextrose 50% Injectable 25 Gram(s) IV Push once  dextrose 50% Injectable 12.5 Gram(s) IV Push once  dextrose 50% Injectable 25 Gram(s) IV Push once  epoetin sean-epbx (RETACRIT) Injectable 88489 Unit(s) IV Push <User Schedule>  gabapentin 300 milliGRAM(s) Oral at bedtime  glucagon  Injectable 1 milliGRAM(s) IntraMuscular once  heparin   Injectable 5000 Unit(s) SubCutaneous every 12 hours  hydrALAZINE 10 milliGRAM(s) Oral three times a day  insulin lispro (ADMELOG) corrective regimen sliding scale   SubCutaneous three times a day before meals  insulin lispro (ADMELOG) corrective regimen sliding scale   SubCutaneous at bedtime  metoprolol succinate ER 25 milliGRAM(s) Oral daily  pantoprazole    Tablet 40 milliGRAM(s) Oral before breakfast  polyethylene glycol 3350 17 Gram(s) Oral two times a day  senna 2 Tablet(s) Oral at bedtime  sevelamer carbonate 800 milliGRAM(s) Oral three times a day    VITALS:  T(C): , Max: 36.8 (08-27-22 @ 06:25)  T(F): , Max: 98.3 (08-27-22 @ 06:25)  HR: 69 (08-27-22 @ 06:25)  BP: 168/72 (08-27-22 @ 06:25)  RR: 20 (08-27-22 @ 06:25)  SpO2: 98% (08-27-22 @ 05:45)    I and O's:    08-26 @ 07:01  -  08-27 @ 07:00  --------------------------------------------------------  IN: 900 mL / OUT: 0 mL / NET: 900 mL    PHYSICAL EXAM:  Constitutional: NAD, alert  HEENT: MMM  Neck:  No JVD, supple   Respiratory: CTA B/L  Cardiovascular: S1 and S2, IRR  Gastrointestinal: + BS, soft, NT, ND  Extremities: tr b/l LE edema  Neurological: reduced generalized strength  : No Sylvester  Skin: No rashes, C/D/I  Vascular Access: RIJ tunneled cath (accessed)    LABS:                        9.4    3.23  )-----------( 223      ( 26 Aug 2022 07:06 )             31.5     08-26    136  |  98  |  12  ----------------------------<  105<H>  3.6   |  27  |  2.46<H>    Ca    8.4      26 Aug 2022 07:06  Phos  2.6     08-26  Mg     2.10     08-26

## 2022-08-27 NOTE — CHART NOTE - NSCHARTNOTEFT_GEN_A_CORE
Spoke with oncall neprhology NP pt to continue home lasix 80mg BID on non HD days.   Discharge medication reviewed with covering attending Dr Lomeli.

## 2022-08-27 NOTE — CHART NOTE - NSCHARTNOTESELECT_GEN_ALL_CORE
Event Note
Event Note
IR follow up note/Event Note
Interventional Radiology/Off Service Note
Event Note
Event Note
Interventional Radiology/Off Service Note
pre-IR/Event Note

## 2022-08-27 NOTE — PROGRESS NOTE ADULT - REASON FOR ADMISSION
abnormal labs

## 2022-08-27 NOTE — PROGRESS NOTE ADULT - SUBJECTIVE AND OBJECTIVE BOX
Cardiovascular Disease Progress Note    Overnight events: No acute events overnight.   Patient denies chest pain or SOB.    Otherwise review of systems negative    Objective Findings:  T(C): 36.8 (22 @ 06:25), Max: 36.8 (22 @ 06:25)  HR: 69 (22 @ 06:25) (69 - 95)  BP: 168/72 (22 @ 06:25) (153/72 - 168/73)  RR: 20 (22 @ 06:25) (17 - 20)  SpO2: 98% (22 @ 05:45) (97% - 100%)  Wt(kg): --  Daily     Daily Weight in k.3 (27 Aug 2022 06:25)      Physical Exam:  Gen: NAD; Patient resting comfortably  HEENT: EOMI, Normocephalic/ atraumatic  CV: RRR, normal S1 + S2, no m/r/g  Lungs:  Normal respiratory effort; clear to auscultation bilaterally  Abd: soft, non-tender; bowel sounds present  Ext: No edema; warm and well perfused    Telemetry: Sinus    Laboratory Data:                        8.7    3.50  )-----------( 254      ( 27 Aug 2022 06:30 )             29.9         137  |  98  |  17  ----------------------------<  104<H>  3.3<L>   |  27  |  3.07<H>    Ca    8.6      27 Aug 2022 06:30  Phos  3.0       Mg     2.00                     Inpatient Medications:  MEDICATIONS  (STANDING):  chlorhexidine 2% Cloths 1 Application(s) Topical two times a day  clopidogrel Tablet 75 milliGRAM(s) Oral daily  dextrose 5%. 1000 milliLiter(s) (50 mL/Hr) IV Continuous <Continuous>  dextrose 5%. 1000 milliLiter(s) (100 mL/Hr) IV Continuous <Continuous>  dextrose 50% Injectable 25 Gram(s) IV Push once  dextrose 50% Injectable 12.5 Gram(s) IV Push once  dextrose 50% Injectable 25 Gram(s) IV Push once  epoetin sean-epbx (RETACRIT) Injectable 04835 Unit(s) IV Push <User Schedule>  gabapentin 300 milliGRAM(s) Oral at bedtime  glucagon  Injectable 1 milliGRAM(s) IntraMuscular once  heparin   Injectable 5000 Unit(s) SubCutaneous every 12 hours  hydrALAZINE 10 milliGRAM(s) Oral three times a day  insulin lispro (ADMELOG) corrective regimen sliding scale   SubCutaneous three times a day before meals  insulin lispro (ADMELOG) corrective regimen sliding scale   SubCutaneous at bedtime  metoprolol succinate ER 25 milliGRAM(s) Oral daily  pantoprazole    Tablet 40 milliGRAM(s) Oral before breakfast  polyethylene glycol 3350 17 Gram(s) Oral two times a day  senna 2 Tablet(s) Oral at bedtime  sevelamer carbonate 800 milliGRAM(s) Oral three times a day      Assessment:  82 year old woman with uncontrolled IDDM, compensated diastolic CHF, CAD s/p PCI, CKD V and HTN presents with anemia.     Plan of Care:    #CAD- s/p MICHAEL x2 in 2017.  Anemia noted on admission, but patient denies any signs of bleeding.  Likely anemia in the setting of CKD.   Exertional dyspnea now improved post pRBC transfusion.  Given extensive vascular disease, continue Plavix 75 mg daily.     #Compensated systolic CHF-  New inferior wall motion abnormality noted on echo  Ms. Tsang is not fluid overloaded on exam and she denies chest pain.  I would not pursue another ischemic evaluation, as Ms. Tsang cannot tolerate dual antiplatelet therapy due to bleeding history.     #ESRD-  S/P HD initiation on  by the renal team.     #PVD-  Patient status post RLE angiogram with 2 stents placed in 2019.   Antiplatelet therapy management as stated above.     #Paroxymal a-fib-  Patient in and out of rate controlled a-fib.   Continue Toprol.   No AC given h/o GI bleed.      Over 25 minutes spent on total encounter; more than 50% of the visit was spent counseling and/or coordinating care by the attending physician.      Julio César Hooper MD [facility]  Cardiovascular Disease  [phone]

## 2022-08-27 NOTE — PROGRESS NOTE ADULT - PROVIDER SPECIALTY LIST ADULT
Cardiology
Internal Medicine
Nephrology
Cardiology
Nephrology
Cardiology
Nephrology
Internal Medicine
Internal Medicine
Nephrology
Internal Medicine

## 2022-08-27 NOTE — PROGRESS NOTE ADULT - ASSESSMENT
IMPRESSION: 82F w/ DM2, CAD, PAD, PUD, and CKD5, 8/11/22 p/w abnormal labs    (1)Renal - newly ESRD-HD as of this admission., planned for TTS at outpt HD  (3)Hyperphosphatemia - controlled on Renvela/low-PO4 diet  (3)Anemia - slightly lower than goal, s/p PRBCs/on Venofer  (4)Vasc - s/p conversion of shiley to tunneled cath  (5)Psychosocial - planning for HD at [facility] as outpatient     IMPRESSION: 82F w/ DM2, CAD, PAD, PUD, and CKD5, 8/11/22 p/w abnormal labs    (1)Renal - newly ESRD-HD as of this admission., planned for TTS at outpt HD  (3)Hyperphosphatemia - controlled on Renvela/low-PO4 diet  (3)Anemia - slightly lower than goal, s/p PRBCs/on Venofer  (4)Vasc - s/p conversion of shiley to tunneled cath  (5)Psychosocial - planning for HD at [facility] as outpatient    RECOMMENDATIONS:  (1)HD today - 3 hrs, 1kg as able; Retacrit with HD  (2)Setup of outside HD per AISHWARYA FuentesC  [facility]  [phone]   IMPRESSION: 82F w/ DM2, CAD, PAD, PUD, and CKD5, 8/11/22 p/w abnormal labs    (1)Renal - newly ESRD-HD as of this admission., planned for TTS at outpt HD  (3)Hyperphosphatemia - controlled on Renvela/low-PO4 diet  (3)Anemia - slightly lower than goal, s/p PRBCs/on Venofer  (4)Vasc - s/p conversion of shiley to tunneled cath  (5)Psychosocial - planning for HD at [facility] as outpatient    RECOMMENDATIONS:  (1)HD today - 3 hrs, 1kg as able; Retacrit with HD  (2)Lasix 80mg po bid (home dose) on non HD days   (3)Setup of outside HD per   (4)D/c planning per primary team    D/w Dr Luciana Ureña, NP-C  [facility]  [phone]

## 2022-08-29 ENCOUNTER — NON-APPOINTMENT (OUTPATIENT)
Age: 82
End: 2022-08-29

## 2022-11-18 ENCOUNTER — EMERGENCY (EMERGENCY)
Facility: HOSPITAL | Age: 82
LOS: 1 days | Discharge: HOME | End: 2022-11-18
Attending: STUDENT IN AN ORGANIZED HEALTH CARE EDUCATION/TRAINING PROGRAM
Payer: MEDICARE

## 2022-11-18 VITALS
WEIGHT: 156.09 LBS | TEMPERATURE: 98 F | DIASTOLIC BLOOD PRESSURE: 59 MMHG | SYSTOLIC BLOOD PRESSURE: 150 MMHG | HEART RATE: 65 BPM | OXYGEN SATURATION: 98 % | RESPIRATION RATE: 20 BRPM | HEIGHT: 64 IN

## 2022-11-18 DIAGNOSIS — Z98.890 OTHER SPECIFIED POSTPROCEDURAL STATES: Chronic | ICD-10-CM

## 2022-11-18 DIAGNOSIS — Z95.5 PRESENCE OF CORONARY ANGIOPLASTY IMPLANT AND GRAFT: Chronic | ICD-10-CM

## 2022-11-18 DIAGNOSIS — Z98.891 HISTORY OF UTERINE SCAR FROM PREVIOUS SURGERY: Chronic | ICD-10-CM

## 2022-11-18 LAB
ALBUMIN SERPL ELPH-MCNC: 3.5 G/DL — SIGNIFICANT CHANGE UP (ref 3.3–5)
ALP SERPL-CCNC: 220 U/L — HIGH (ref 40–120)
ALT FLD-CCNC: 6 U/L — LOW (ref 10–45)
ANION GAP SERPL CALC-SCNC: 11 MMOL/L — SIGNIFICANT CHANGE UP (ref 5–17)
ANISOCYTOSIS BLD QL: SLIGHT — SIGNIFICANT CHANGE UP
AST SERPL-CCNC: 9 U/L — LOW (ref 10–40)
BASOPHILS # BLD AUTO: 0.04 K/UL — SIGNIFICANT CHANGE UP (ref 0–0.2)
BASOPHILS NFR BLD AUTO: 0.9 % — SIGNIFICANT CHANGE UP (ref 0–2)
BILIRUB SERPL-MCNC: 0.2 MG/DL — SIGNIFICANT CHANGE UP (ref 0.2–1.2)
BLD GP AB SCN SERPL QL: NEGATIVE — SIGNIFICANT CHANGE UP
BUN SERPL-MCNC: 32 MG/DL — HIGH (ref 7–23)
CALCIUM SERPL-MCNC: 7.5 MG/DL — LOW (ref 8.4–10.5)
CHLORIDE SERPL-SCNC: 101 MMOL/L — SIGNIFICANT CHANGE UP (ref 96–108)
CO2 SERPL-SCNC: 27 MMOL/L — SIGNIFICANT CHANGE UP (ref 22–31)
CREAT SERPL-MCNC: 4.11 MG/DL — HIGH (ref 0.5–1.3)
DACRYOCYTES BLD QL SMEAR: SLIGHT — SIGNIFICANT CHANGE UP
EGFR: 10 ML/MIN/1.73M2 — LOW
EOSINOPHIL # BLD AUTO: 0.39 K/UL — SIGNIFICANT CHANGE UP (ref 0–0.5)
EOSINOPHIL NFR BLD AUTO: 8.7 % — HIGH (ref 0–6)
GLUCOSE SERPL-MCNC: 207 MG/DL — HIGH (ref 70–99)
HCT VFR BLD CALC: 21.4 % — LOW (ref 34.5–45)
HGB BLD-MCNC: 6.5 G/DL — CRITICAL LOW (ref 11.5–15.5)
LYMPHOCYTES # BLD AUTO: 1.02 K/UL — SIGNIFICANT CHANGE UP (ref 1–3.3)
LYMPHOCYTES # BLD AUTO: 22.8 % — SIGNIFICANT CHANGE UP (ref 13–44)
MACROCYTES BLD QL: SLIGHT — SIGNIFICANT CHANGE UP
MANUAL SMEAR VERIFICATION: SIGNIFICANT CHANGE UP
MCHC RBC-ENTMCNC: 30 PG — SIGNIFICANT CHANGE UP (ref 27–34)
MCHC RBC-ENTMCNC: 30.4 GM/DL — LOW (ref 32–36)
MCV RBC AUTO: 98.6 FL — SIGNIFICANT CHANGE UP (ref 80–100)
METAMYELOCYTES # FLD: 0.9 % — HIGH (ref 0–0)
MICROCYTES BLD QL: SLIGHT — SIGNIFICANT CHANGE UP
MONOCYTES # BLD AUTO: 0.39 K/UL — SIGNIFICANT CHANGE UP (ref 0–0.9)
MONOCYTES NFR BLD AUTO: 8.8 % — SIGNIFICANT CHANGE UP (ref 2–14)
NEUTROPHILS # BLD AUTO: 2.59 K/UL — SIGNIFICANT CHANGE UP (ref 1.8–7.4)
NEUTROPHILS NFR BLD AUTO: 57.9 % — SIGNIFICANT CHANGE UP (ref 43–77)
NRBC # BLD: 2 /100 — HIGH (ref 0–0)
PLAT MORPH BLD: ABNORMAL
PLATELET # BLD AUTO: 226 K/UL — SIGNIFICANT CHANGE UP (ref 150–400)
POIKILOCYTOSIS BLD QL AUTO: SLIGHT — SIGNIFICANT CHANGE UP
POLYCHROMASIA BLD QL SMEAR: SLIGHT — SIGNIFICANT CHANGE UP
POTASSIUM SERPL-MCNC: 4.2 MMOL/L — SIGNIFICANT CHANGE UP (ref 3.5–5.3)
POTASSIUM SERPL-SCNC: 4.2 MMOL/L — SIGNIFICANT CHANGE UP (ref 3.5–5.3)
PROT SERPL-MCNC: 6.9 G/DL — SIGNIFICANT CHANGE UP (ref 6–8.3)
RBC # BLD: 2.17 M/UL — LOW (ref 3.8–5.2)
RBC # FLD: 17 % — HIGH (ref 10.3–14.5)
RBC BLD AUTO: ABNORMAL
RH IG SCN BLD-IMP: POSITIVE — SIGNIFICANT CHANGE UP
SODIUM SERPL-SCNC: 139 MMOL/L — SIGNIFICANT CHANGE UP (ref 135–145)
WBC # BLD: 4.48 K/UL — SIGNIFICANT CHANGE UP (ref 3.8–10.5)
WBC # FLD AUTO: 4.48 K/UL — SIGNIFICANT CHANGE UP (ref 3.8–10.5)

## 2022-11-18 PROCEDURE — 99291 CRITICAL CARE FIRST HOUR: CPT | Mod: GC

## 2022-11-18 RX ORDER — CALCIUM GLUCONATE 100 MG/ML
1 VIAL (ML) INTRAVENOUS ONCE
Refills: 0 | Status: COMPLETED | OUTPATIENT
Start: 2022-11-18 | End: 2022-11-18

## 2022-11-18 NOTE — ED PROVIDER NOTE - PHYSICAL EXAMINATION
GENERAL: well appearing in no acute distress, non-toxic appearing  HEENT: +pallor of conjunctiva, oral mucosa moist, uvula midline, no tonsilar exudates, no JVD  CARDIAC: regular rate and rhythm, normal S1S2, no appreciable murmurs, 2+ pulses in UE/LE b/l  PULM: normal breath sounds, clear to ascultation bilaterally, no rales, rhonchi, wheezing  MSK: ROM intact, no peripheral edema, no calf tenderness b/l  SKIN: well-perfused, extremities warm, no visible rashes  PSYCH: appropriate mood and affect

## 2022-11-18 NOTE — ED PROVIDER NOTE - PATIENT PORTAL LINK FT
You can access the FollowMyHealth Patient Portal offered by [facility] by registering at the following website: [email or web address]. By joining [employer]’s FollowMyHealth portal, you will also be able to view your health information using other applications (apps) compatible with our system.

## 2022-11-18 NOTE — ED PROVIDER NOTE - NSFOLLOWUPINSTRUCTIONS_ED_ALL_ED_FT
Anemia    Anemia is a condition in which the concentration of red blood cells or hemoglobin in the blood is below normal. Hemoglobin is a substance in red blood cells that carries oxygen to the tissues of the body. Anemia results in not enough oxygen reaching these tissues which can cause symptoms such as weakness, dizziness/lightheadedness, shortness of breath, chest pain, paleness, or nausea. The cause of your anemia may or may not be determined immediately. If your hemoglobin was dangerously low, you may have received a blood transfusion. Usually reactions to transfusions occur immediately but monitor yourself for any fevers, rash, or shortness of breath.    Please follow up with your Nephrologist.     SEEK IMMEDIATE MEDICAL CARE IF YOU HAVE ANY OF THE FOLLOWING SYMPTOMS: extreme weakness/chest pain/shortness of breath, black or bloody stools, vomiting blood, fainting, fever, or any signs of dehydration.

## 2022-11-18 NOTE — ED PROVIDER NOTE - OBJECTIVE STATEMENT
82F w/ ESRD on HD (TTS), anemia, CAD, DMII, CHF, HLD, HTN, pAFIB, PVD, sent in by MD for low Hb. Patient had full dialysis session Thursday. Blood draws from that session showed an Hb of 3.8?? pt denying fatigue, SOB, lightheadedness, CP, melena, BRBPR, hematuria, vaginal bleeding, hematemesis, hemoptysis.

## 2022-11-18 NOTE — ED ADULT NURSE REASSESSMENT NOTE - NS ED NURSE REASSESS COMMENT FT1
Multiple IV attempts done by 2 RNs but unsuccessful. Tripp, Resident MD notified that pt needs ultrasound line. Pt is stable at this time, AAOx4, VSS, denies any pain/discomfort, SOB, dizziness, or active bleeding. NAD noted at this time, type and screen sent to lab, NAD noted at this time.

## 2022-11-18 NOTE — ED PROVIDER NOTE - ATTENDING CONTRIBUTION TO CARE
Attending (Jignesh Lyons D.O.): I have personally seen and examined this patient. I have performed a substantive portion of the visit including all aspects of the medical decision making. Resident, Fellow, and/or ACP note reviewed. I agree on the plan of care except where noted.    I have personally provided 31 minutes of critical care concurrently with the resident(s) and/or ACP(s), excluding time spent on separate procedures.     82F hx of ESRD on HD (TTS), anemia, CAD, DM type 2, CHF, HLD, HTN, pAFIB (not on AC 2/2 prior hx of upper GI bleed), PVD, baseline hgb aroun 9 here after post dilaysis hgb was reported to be 3.6 so sent to ED. Patient w/o acute complaints. Denies bleeding anywhere. Treated for known anemia in past with venofer. Also suspect element for anemia from chronic renal disease. Hemodynamically stable here. Conjunctivae not pale. Clear lungs. Patient aaox4. No signs of fluid overload. Do not suspect GI source at present. Perhaps lab error. Will recheck for degree of anemia. If truly low, will transfuse and plan for dc for continued outpatient f/u.

## 2022-11-18 NOTE — ED PROVIDER NOTE - CLINICAL SUMMARY MEDICAL DECISION MAKING FREE TEXT BOX
82F w/ ESRD on HD (TTS), anemia, CAD, DMII, CHF, HLD, HTN, pAFIB, PVD, sent in by MD for low Hb. Patient had full dialysis session Thursday. Blood draws from that session showed an Hb of 3.8?? pt denying fatigue, SOB, lightheadedness, CP, melena, BRBPR, hematuria, vaginal bleeding, hematemesis, hemoptysis. AVSS, exam WNL. Anemia, will recheck Hb, +/- transfusion, and reassess.

## 2022-11-18 NOTE — ED ADULT NURSE NOTE - OBJECTIVE STATEMENT
Pt sent in by PCP after having dialysis yesterday, states her hgb was very low. Pt denies any symptoms, states "I feel fine, I don't know why they sent me here". No chest pain, SOB, abd pain, dizziness, or known sources of bleeding. NAD noted at this time.

## 2022-11-18 NOTE — ED PROVIDER NOTE - PROGRESS NOTE DETAILS
Attending (Jignesh Lyons D.O.):  Patient with hgb 6.5. Baseline around 9. Patient with hx of GI bleed in past but not currently on AC. BUN 30s, was similar in August of this year. Patient denies having blood in stool on repeat questioning. Denies black stool. Cr elevated from past, currently 4, possible low in setting of worsening renal failure though already on dialysis. Had dialysis yesterday and due for it tomorrow. Patient consented for blood. Plan for 1 U prbc and discharge.

## 2022-11-18 NOTE — ED ADULT TRIAGE NOTE - ADDITIONAL SAFETY/BANDS...
Physical Therapy Daily Progress Note Entered On:  3/6/2017 12:07     Performed On:  3/6/2017 12:01  by Marsha Alvarado   PT Therapeutic Activities Each 15 Min :   1    Marsha Alvarado 3/6/2017 12:01    Musculoskeletal Assessment   Wheelchair Mobility   Supine to Sitting :   Supervision   Sitting to Standing :   Supervision   Standing to Sitting :   Supervision   Marsha Alvarado 3/6/2017 12:01    Gait Grid   Assistive Device :    No assistive device              Distance :    2 ft              Level of Assistance :    CGA  (Comment: CGA-SBA [Marsha Alvarado - 3/6/2017 12:01 ] )             Weight Bearing Status :    Full weight bearing              Location :    Bedside                Marsha Alvarado 3/6/2017 12:01          Treatments and Interventions   Treatments and Interventions Checklist :   Therapeutic exercise, Therapeutic transfer training, Bed mobility, Gait training   Marsha Alvarado 3/6/2017 12:01    Assessment and Goals   Assessment :   Pt. cleared for physical therapy, pt. transferred from bed to chair in  without AD.  Pt. reported she just vomitted and reports nausea and did not want to walk but just to get up in a chair.  Pt. belching some when seen.  Pt. seen for active rom exs. to bilat lower extremities x 10 reps.  Bedside tray placed in front of pt., call light and phone within reach, chair alarm on.  Pt. on 2 l of oxygen.       Charges:  TA=14 mins.     Follow-Up PT Recommendations :   Subacute rehab   Marsha Alvarado 3/6/2017 12:01    PT Goals Grid   Problem :    Bed mobility   Transfers   Gait        Patient will... :    sup >< sit from flat bed SBA to facilitate OOB.   sit >< stand to RW SBA to initiate gait.   amb >100' with RW CGA for household gait distance.        Timeframe :    5-7 days   5-7 days   5-7 days          Marsha Alvarado 3/6/2017 12:01   Marsha Alvarado 3/6/2017 12:01   Marsha Alvarado 3/6/2017 12:01        PT Plan/Recommendations :   Therapeutic exercise, Bed  mobility, Transfer training, Gait training, Endurance training, Education of patient/family   Frequency :   Daily x 6 days per week   Location of PT Intervention :   Bedside   Marsha Alvarado - 3/6/2017 12:01            Additional Safety/Bands:

## 2022-11-19 VITALS
TEMPERATURE: 98 F | SYSTOLIC BLOOD PRESSURE: 135 MMHG | HEART RATE: 70 BPM | DIASTOLIC BLOOD PRESSURE: 55 MMHG | RESPIRATION RATE: 17 BRPM | OXYGEN SATURATION: 98 %

## 2022-11-19 PROCEDURE — P9016: CPT

## 2022-11-19 PROCEDURE — 86923 COMPATIBILITY TEST ELECTRIC: CPT

## 2022-11-19 PROCEDURE — 99291 CRITICAL CARE FIRST HOUR: CPT | Mod: 25

## 2022-11-19 PROCEDURE — 86900 BLOOD TYPING SEROLOGIC ABO: CPT

## 2022-11-19 PROCEDURE — 96374 THER/PROPH/DIAG INJ IV PUSH: CPT

## 2022-11-19 PROCEDURE — 80053 COMPREHEN METABOLIC PANEL: CPT

## 2022-11-19 PROCEDURE — 85025 COMPLETE CBC W/AUTO DIFF WBC: CPT

## 2022-11-19 PROCEDURE — 36415 COLL VENOUS BLD VENIPUNCTURE: CPT

## 2022-11-19 PROCEDURE — 86901 BLOOD TYPING SEROLOGIC RH(D): CPT

## 2022-11-19 PROCEDURE — 36430 TRANSFUSION BLD/BLD COMPNT: CPT

## 2022-11-19 PROCEDURE — 86850 RBC ANTIBODY SCREEN: CPT

## 2022-11-19 RX ADMIN — Medication 100 GRAM(S): at 04:45

## 2023-01-09 ENCOUNTER — APPOINTMENT (OUTPATIENT)
Dept: ENDOCRINOLOGY | Facility: CLINIC | Age: 83
End: 2023-01-09

## 2023-02-06 NOTE — DISCHARGE NOTE ADULT - PHYSICIAN SECTION COMPLETE
Please call pharmacy and advise on which directions of miralax is correct for patient.     Thank you,  Savanah Hernandez, Pharm.D.  [facility]  [phone]   Yes

## 2023-02-10 NOTE — PATIENT PROFILE ADULT. - SUPPORT PERSON NAME
Detail Level: Detailed
Patient Specific Counseling (Will Not Stick From Patient To Patient): NG recommends pt follow up with allergist. List of allergists given today
Evelyn Braga- granddaughter

## 2023-03-15 NOTE — ED ADULT NURSE NOTE - NS ED NURSE LEVEL OF CONSCIOUSNESS AFFECT
Appropriate Post-Care Instructions: I reviewed with the patient in detail post-care instructions. Patient is to avoid sunlight until photodynamic therapy and for 2 days following the procedure, and wear sun protection. Patients may expect sunburn like redness, discomfort and scabbing following photodynamic therapy.

## 2023-03-16 NOTE — H&P ADULT - DOES THIS PATIENT HAVE A HISTORY OF OR HAS BEEN DX WITH HEART FAILURE?
Use the provided antibiotic eyedrops-2 drops in your left eye 4 times a day for 5 days  In the event that you run out of the provided medication, an additional prescription has been sent to your pharmacy  Follow-up with ophthalmology  Please return to the emergency department if you develop worsening symptoms, vision changes, severe eye pain, or anything else concerning to you  yes

## 2023-03-25 ENCOUNTER — INPATIENT (INPATIENT)
Facility: HOSPITAL | Age: 83
LOS: 0 days | Discharge: LEFT BEFORE DISCHARGE | End: 2023-03-26
Attending: INTERNAL MEDICINE | Admitting: INTERNAL MEDICINE
Payer: MEDICARE

## 2023-03-25 VITALS
RESPIRATION RATE: 18 BRPM | DIASTOLIC BLOOD PRESSURE: 89 MMHG | OXYGEN SATURATION: 99 % | HEART RATE: 128 BPM | TEMPERATURE: 98 F | SYSTOLIC BLOOD PRESSURE: 137 MMHG

## 2023-03-25 DIAGNOSIS — Z98.890 OTHER SPECIFIED POSTPROCEDURAL STATES: Chronic | ICD-10-CM

## 2023-03-25 DIAGNOSIS — Z98.891 HISTORY OF UTERINE SCAR FROM PREVIOUS SURGERY: Chronic | ICD-10-CM

## 2023-03-25 DIAGNOSIS — Z95.5 PRESENCE OF CORONARY ANGIOPLASTY IMPLANT AND GRAFT: Chronic | ICD-10-CM

## 2023-03-25 PROCEDURE — 93010 ELECTROCARDIOGRAM REPORT: CPT

## 2023-03-25 PROCEDURE — 74177 CT ABD & PELVIS W/CONTRAST: CPT | Mod: 26

## 2023-03-25 PROCEDURE — 99285 EMERGENCY DEPT VISIT HI MDM: CPT

## 2023-03-25 PROCEDURE — 71045 X-RAY EXAM CHEST 1 VIEW: CPT | Mod: 26

## 2023-03-25 NOTE — ED ADULT TRIAGE NOTE - CHIEF COMPLAINT QUOTE
Pt brought from dialysis for abd pain and nausea. Denies vomiting. Per EMS, unsure of how much, if any, dialysis treatment was received. Denies chest pain. Pt was found to be hypotensive in dialysis and had a syncopal episode. Received NS infusion at dialysis.

## 2023-03-26 ENCOUNTER — TRANSCRIPTION ENCOUNTER (OUTPATIENT)
Age: 83
End: 2023-03-26

## 2023-03-26 VITALS
DIASTOLIC BLOOD PRESSURE: 84 MMHG | TEMPERATURE: 98 F | RESPIRATION RATE: 16 BRPM | OXYGEN SATURATION: 97 % | SYSTOLIC BLOOD PRESSURE: 200 MMHG | HEART RATE: 70 BPM

## 2023-03-26 DIAGNOSIS — R10.0 ACUTE ABDOMEN: ICD-10-CM

## 2023-03-26 LAB
ANION GAP SERPL CALC-SCNC: 13 MMOL/L — SIGNIFICANT CHANGE UP (ref 7–14)
BUN SERPL-MCNC: 32 MG/DL — HIGH (ref 7–23)
CALCIUM SERPL-MCNC: 8.2 MG/DL — LOW (ref 8.4–10.5)
CHLORIDE SERPL-SCNC: 103 MMOL/L — SIGNIFICANT CHANGE UP (ref 98–107)
CO2 SERPL-SCNC: 22 MMOL/L — SIGNIFICANT CHANGE UP (ref 22–31)
CREAT SERPL-MCNC: 3.62 MG/DL — HIGH (ref 0.5–1.3)
EGFR: 12 ML/MIN/1.73M2 — LOW
GLUCOSE BLDC GLUCOMTR-MCNC: 112 MG/DL — HIGH (ref 70–99)
GLUCOSE BLDC GLUCOMTR-MCNC: 134 MG/DL — HIGH (ref 70–99)
GLUCOSE SERPL-MCNC: 105 MG/DL — HIGH (ref 70–99)
HCT VFR BLD CALC: 30.3 % — LOW (ref 34.5–45)
HGB BLD-MCNC: 8.7 G/DL — LOW (ref 11.5–15.5)
MAGNESIUM SERPL-MCNC: 2.2 MG/DL — SIGNIFICANT CHANGE UP (ref 1.6–2.6)
MCHC RBC-ENTMCNC: 28.7 GM/DL — LOW (ref 32–36)
MCHC RBC-ENTMCNC: 29.2 PG — SIGNIFICANT CHANGE UP (ref 27–34)
MCV RBC AUTO: 101.7 FL — HIGH (ref 80–100)
NRBC # BLD: 0 /100 WBCS — SIGNIFICANT CHANGE UP (ref 0–0)
NRBC # FLD: 0 K/UL — SIGNIFICANT CHANGE UP (ref 0–0)
PHOSPHATE SERPL-MCNC: 5.2 MG/DL — HIGH (ref 2.5–4.5)
PLATELET # BLD AUTO: 156 K/UL — SIGNIFICANT CHANGE UP (ref 150–400)
POTASSIUM SERPL-MCNC: 5.4 MMOL/L — HIGH (ref 3.5–5.3)
POTASSIUM SERPL-SCNC: 5.4 MMOL/L — HIGH (ref 3.5–5.3)
RBC # BLD: 2.98 M/UL — LOW (ref 3.8–5.2)
RBC # FLD: 17.6 % — HIGH (ref 10.3–14.5)
SODIUM SERPL-SCNC: 138 MMOL/L — SIGNIFICANT CHANGE UP (ref 135–145)
WBC # BLD: 5.43 K/UL — SIGNIFICANT CHANGE UP (ref 3.8–10.5)
WBC # FLD AUTO: 5.43 K/UL — SIGNIFICANT CHANGE UP (ref 3.8–10.5)

## 2023-03-26 RX ORDER — CHLORHEXIDINE GLUCONATE 213 G/1000ML
1 SOLUTION TOPICAL DAILY
Refills: 0 | Status: DISCONTINUED | OUTPATIENT
Start: 2023-03-26 | End: 2023-03-26

## 2023-03-26 RX ORDER — GLUCAGON INJECTION, SOLUTION 0.5 MG/.1ML
1 INJECTION, SOLUTION SUBCUTANEOUS ONCE
Refills: 0 | Status: DISCONTINUED | OUTPATIENT
Start: 2023-03-26 | End: 2023-03-26

## 2023-03-26 RX ORDER — INSULIN LISPRO 100/ML
VIAL (ML) SUBCUTANEOUS
Refills: 0 | Status: DISCONTINUED | OUTPATIENT
Start: 2023-03-26 | End: 2023-03-26

## 2023-03-26 RX ORDER — INSULIN LISPRO 100/ML
VIAL (ML) SUBCUTANEOUS AT BEDTIME
Refills: 0 | Status: DISCONTINUED | OUTPATIENT
Start: 2023-03-26 | End: 2023-03-26

## 2023-03-26 RX ORDER — DEXTROSE 50 % IN WATER 50 %
25 SYRINGE (ML) INTRAVENOUS ONCE
Refills: 0 | Status: DISCONTINUED | OUTPATIENT
Start: 2023-03-26 | End: 2023-03-26

## 2023-03-26 RX ORDER — SODIUM CHLORIDE 9 MG/ML
1000 INJECTION, SOLUTION INTRAVENOUS
Refills: 0 | Status: DISCONTINUED | OUTPATIENT
Start: 2023-03-26 | End: 2023-03-26

## 2023-03-26 RX ORDER — DEXTROSE 50 % IN WATER 50 %
12.5 SYRINGE (ML) INTRAVENOUS ONCE
Refills: 0 | Status: DISCONTINUED | OUTPATIENT
Start: 2023-03-26 | End: 2023-03-26

## 2023-03-26 RX ORDER — DEXTROSE 50 % IN WATER 50 %
15 SYRINGE (ML) INTRAVENOUS ONCE
Refills: 0 | Status: DISCONTINUED | OUTPATIENT
Start: 2023-03-26 | End: 2023-03-26

## 2023-03-26 NOTE — PROVIDER CONTACT NOTE (OTHER) - ASSESSMENT
SW spoke with patient at bedside, patient confirmed she received dialysis at [facility] and her next appointment is scheduled for Tuesday 03/28/2023.  Patient reported her last treatment was yesterday (Saturday) at [facility]. SW educated patient on providing discharge paperwork to dialysis on Tuesday for her next appointment, which she confirmed she will not miss.    Patient confirmed a family member will be picking her up from hospital.

## 2023-03-26 NOTE — H&P ADULT - NSHPLABSRESULTS_GEN_ALL_CORE
CBC Full  -  ( 26 Mar 2023 09:28 )  WBC Count : 5.43 K/uL  RBC Count : 2.98 M/uL  Hemoglobin : 8.7 g/dL  Hematocrit : 30.3 %  Platelet Count - Automated : 156 K/uL  Mean Cell Volume : 101.7 fL  Mean Cell Hemoglobin : 29.2 pg  Mean Cell Hemoglobin Concentration : 28.7 gm/dL  Auto Neutrophil # : x  Auto Lymphocyte # : x  Auto Monocyte # : x  Auto Eosinophil # : x  Auto Basophil # : x  Auto Neutrophil % : x  Auto Lymphocyte % : x  Auto Monocyte % : x  Auto Eosinophil % : x  Auto Basophil % : x

## 2023-03-26 NOTE — PATIENT PROFILE ADULT - FALL HARM RISK - HARM RISK INTERVENTIONS
Assistance with ambulation/Assistance OOB with selected safe patient handling equipment/Communicate Risk of Fall with Harm to all staff/Discuss with provider need for PT consult/Monitor gait and stability/Provide patient with walking aids - walker, cane, crutches/Reinforce activity limits and safety measures with patient and family/Review medications for side effects contributing to fall risk/Sit up slowly, dangle for a short time, stand at bedside before walking/Tailored Fall Risk Interventions/Use of alarms - bed, chair and/or voice tab/Visual Cue: Yellow wristband and red socks/Bed in lowest position, wheels locked, appropriate side rails in place/Call bell, personal items and telephone in reach/Instruct patient to call for assistance before getting out of bed or chair/Non-slip footwear when patient is out of bed/[place] to call system/Physically safe environment - no spills, clutter or unnecessary equipment/Purposeful Proactive Rounding/Room/bathroom lighting operational, light cord in reach

## 2023-03-26 NOTE — DISCHARGE NOTE PROVIDER - CARE PROVIDER_API CALL
Parminder Goldberg)  Internal Medicine  [address] [facility], [address] [place] [address]  Phone: [phone]  Fax: [phone]  Established Patient  Follow Up Time: 1 week    Kidney doctor,   Phone: (   )    -  Fax: (   )    -  Established Patient  Follow Up Time: 1 week

## 2023-03-26 NOTE — H&P ADULT - HISTORY OF PRESENT ILLNESS
RY OF PRESENT ILLNESS:  This is an 82 year old woman with uncontrolled IDDM, compensated diastolic CHF, CAD s/p PCI, ESRD on HD,Anemia and HTN who presented to [facility] ED on 3/25/2023 with low BP at HD.,developed Nausea,hypotensive at [facility].  Symptoms improved after stopping HD.   s/p EGD ON 8/22-non bleeding duodenal ulcer,xarelto was stopped.Resumed asa,plavix.Capsule study-questionable ulcer in ilium.asa was stopped  stable hemodinamically.seen by Renal,,Cardiology

## 2023-03-26 NOTE — H&P ADULT - NSHPPHYSICALEXAM_GEN_ALL_CORE
alert,awake,ox3,nad  vss  HEENT-WNL,NO JVD  LUNGS-CLEAR B/L  HEART-REGULAR,NO MURMUR  ABD-BENIGN,NT  EXTS-EDEMA+

## 2023-03-26 NOTE — PATIENT PROFILE ADULT - FUNCTIONAL ASSESSMENT - BASIC MOBILITY 6.
3-calculated by average/Not able to assess (calculate score using [facility] averaging method)

## 2023-03-26 NOTE — CONSULT NOTE ADULT - SUBJECTIVE AND OBJECTIVE BOX
Cardiovascular Disease Initial Evaluation  Date of Service: 23 @ 11:31    CHIEF COMPLAINT: Low BP at HD    HISTORY OF PRESENT ILLNESS:  This is an 82 year old woman with uncontrolled IDDM, compensated diastolic CHF, CAD s/p PCI, ESRD on HD and HTN who presented to [facility] ED on 3/25/2023 with low BP at HD.  Ms. Tsang says that she developed nausea and was noted to be hypotensive.  Symptoms improved after stopping HD.     Ms. Tsang was last admitted on 2022 and was noted to be profoundly anemic with associated exertional SOB.  HD was initiated at that time.   Mr. Tsang has a history of CAD and recurrent GI bleed. She underwent cath with Dr. Zulay Mckinnon and had MICHAEL placed to LCx and OM1 on 2017. On 2018, she presented to [facility] with progressive SOB. She was found to have a Hb of 4.6 and was transfused multiple units of pRBCs. She underwent EGD by Dr. Curtis Daigle revealing one non-bleeding duodenal ulcer. Xarelto was discontinued (paroxysmal a-fib) and ASA with Plavix was resumed. She again presented with similar symptoms in 3/2018 with Hb-5. Capsule study revealed questionable ulcer in ileum and patient was started on mesalamine. ASA was d/c'ed.      She denies chest pain or SOB.         Allergies  Carrots (Rash)  No Known Drug Allergies        MEDICATIONS:            dextrose 50% Injectable 25 Gram(s) IV Push once  dextrose 50% Injectable 12.5 Gram(s) IV Push once  dextrose 50% Injectable 25 Gram(s) IV Push once  dextrose Oral Gel 15 Gram(s) Oral once PRN  glucagon  Injectable 1 milliGRAM(s) IntraMuscular once  insulin lispro (ADMELOG) corrective regimen sliding scale   SubCutaneous three times a day before meals  insulin lispro (ADMELOG) corrective regimen sliding scale   SubCutaneous at bedtime    chlorhexidine 2% Cloths 1 Application(s) Topical daily  dextrose 5%. 1000 milliLiter(s) IV Continuous <Continuous>  dextrose 5%. 1000 milliLiter(s) IV Continuous <Continuous>      PAST MEDICAL & SURGICAL HISTORY:  HLD (hyperlipidemia)      Diabetic neuropathy      CAD (coronary artery disease)  ~ s/p MICHAEL x2 in 2017      Paroxysmal atrial fibrillation      Diabetes mellitus, type 2      GI bleed      Neuropathy      Gastritis      Diastolic heart failure      Transfusion history      Stage 5 chronic kidney disease not on chronic dialysis      Anemia      PVD (peripheral vascular disease)  ~ RLE angiogram with 2 stents placed in 2019.      H/O  section      S/P coronary artery stent placement  x2 in       History of esophagogastroduodenoscopy (EGD)      S/P angiogram of extremity          FAMILY HISTORY:  FH: diabetes mellitus        SOCIAL HISTORY:    The patient is a nonsmoker       REVIEW OF SYSTEMS:  See HPI, otherwise complete 14 point review of systems negative      PHYSICAL EXAM:  T(C): 36.8 (23 @ 10:55), Max: 36.8 (23 @ 03:43)  HR: 60 (23 @ 10:55) (58 - 128)  BP: 174/61 (23 @ 10:55) (137/89 - 187/80)  RR: 18 (23 @ 10:55) (18 - 20)  SpO2: 100% (23 @ 10:55) (99% - 100%)  Wt(kg): --  I&O's Summary      Appearance: No Acute Distress; resting comfortably  HEENT:  Normal oral mucosa, PERRL, EOMI	  Cardiovascular: Normal S1 S2, No JVD, No murmurs/rubs/gallops  Respiratory: Normal respiratory effort; Lungs clear to auscultation bilaterally  Gastrointestinal:  Soft, Non-tender, + BS	  Skin: No rashes, No ecchymoses, No cyanosis	  Neurologic: Non-focal; no weakness  Extremities: No clubbing, cyanosis or edema  Vascular: Peripheral pulses palpable 2+ bilaterally  Psychiatry: A & O x 3, Mood & affect appropriate    Laboratory Data:	 	    CBC Full  -  ( 26 Mar 2023 09:28 )  WBC Count : 5.43 K/uL  Hemoglobin : 8.7 g/dL  Hematocrit : 30.3 %  Platelet Count - Automated : 156 K/uL  Mean Cell Volume : 101.7 fL  Mean Cell Hemoglobin : 29.2 pg  Mean Cell Hemoglobin Concentration : 28.7 gm/dL  Auto Neutrophil # : x  Auto Lymphocyte # : x  Auto Monocyte # : x  Auto Eosinophil # : x  Auto Basophil # : x  Auto Neutrophil % : x  Auto Lymphocyte % : x  Auto Monocyte % : x  Auto Eosinophil % : x  Auto Basophil % : x        138  |  103  |  32<H>  ----------------------------<  105<H>  5.4<H>   |  22  |  3.62<H>      145  |  106  |  25<H>  ----------------------------<  188<H>  4.4   |  24  |  2.74<H>    Ca    8.2<L>      26 Mar 2023 09:28  Ca    8.6      25 Mar 2023 16:50  Phos  5.2     -  Phos  3.9     -25  Mg     2.20     -26  Mg     2.00     -25    TPro  6.5  /  Alb  3.7  /  TBili  0.3  /  DBili  x   /  AST  33<H>  /  ALT  17  /  AlkPhos  234<H>          Interpretation of Telemetry: n/a	    ECG:  	A-fib    Assessment: 82 year old woman with uncontrolled IDDM, compensated systolic CHF, CAD s/p PCI, and ESRD on HD presents with hypotension at HD.       Plan of Care:    #Hypotension-  Associated with nausea at the HD center.  Symptoms and BP now improved.  CT scan with no acute intra-abdominal findings to explain belly pain.   Resume home meds and observe.       #CAD- s/p MICHAEL x2 in 2017.  Ms. Tsang displays no signs or symptoms of coronary ischemia or decompensated CHF.   Resume Plavix 75 mg daily.     #Compensated systolic CHF-  Inferior wall motion abnormality noted on last echo.  EF 45%  Ms. Tsang has mild pedal edema, but is not fluid overloaded on exam and she denies chest pain.  HD with fluid removal as per the renal team.     #ESRD-  S/P HD initiation on 2022.    Management as per Dr. Reid Solis.         #Paroxymal a-fib-  Rates mildly elevated on admission EKG.   Resume Toprol.   No AC given h/o GI bleed.      Care discussed at length with Ms. Tsang in the ED.     72 minutes spent on total encounter; more than 50% of the visit was spent counseling and/or coordinating care by the attending physician.   	  Julio César Hooper MD [facility]  Cardiovascular Diseases  [phone]

## 2023-03-26 NOTE — ED ADULT NURSE REASSESSMENT NOTE - NSFALLRSKASSESSDT_ED_ALL_ED
25-Mar-2023 23:15
26-Mar-2023 08:39
26-Mar-2023 12:01
25-Mar-2023 21:15
26-Mar-2023 00:51
25-Mar-2023 20:57
26-Mar-2023 16:21

## 2023-03-26 NOTE — DISCHARGE NOTE PROVIDER - NSDCMRMEDTOKEN_GEN_ALL_CORE_FT
clopidogrel 75 mg oral tablet: 1 tab(s) orally once a day  gabapentin 300 mg oral capsule: 1 cap(s) orally once a day (at bedtime)  hydrALAZINE 10 mg oral tablet: 1 tab(s) orally 3 times a day  Lasix 80 mg oral tablet: 1 tab(s) orally twice daily on Monday, Wednesday, and Friday, Sunday (non dialysis days)  Metoprolol Succinate ER 25 mg oral tablet, extended release: 1 tab(s) orally once a day  pantoprazole 40 mg oral delayed release tablet: 1 tab(s) orally once a day (before a meal)  rolling walker : as directed for unsteady gait   RUSSEL 99  senna oral tablet: 2 tab(s) orally once a day (at bedtime)  sevelamer carbonate 800 mg oral tablet: 1 tab(s) orally 3 times a day   Tradjenta 5 mg oral tablet: 1 tab(s) orally once a day

## 2023-03-26 NOTE — DISCHARGE NOTE NURSING/CASE MANAGEMENT/SOCIAL WORK - PATIENT PORTAL LINK FT
You can access the FollowMyHealth Patient Portal offered by [facility] by registering at the following website: [email or web address]. By joining [facility]’s FollowMyHealth portal, you will also be able to view your health information using other applications (apps) compatible with our system.

## 2023-03-26 NOTE — DISCHARGE NOTE PROVIDER - NSDCCPCAREPLAN_GEN_ALL_CORE_FT
PRINCIPAL DISCHARGE DIAGNOSIS  Diagnosis: Abdominal pain  Assessment and Plan of Treatment: You had a CT scan of your abdomen which did not show a reason for your abdominal pain.  Follow up with your primary care doctor.      SECONDARY DISCHARGE DIAGNOSES  Diagnosis: ESRD on dialysis  Assessment and Plan of Treatment: Please continue to follow your dialysis schedule and refer to your primary provider/nephrologist for further care and monitoring of kidney function and electrolytes. Continue a renal restricted diet (Avoiding foods high in potassium and phosphorus), your prescribed medications, and supplementations as directed.  Next dialysis session Tuesday march 28th

## 2023-03-26 NOTE — DISCHARGE NOTE PROVIDER - NSDCFUSCHEDAPPT_GEN_ALL_CORE_FT
[facility]  [address]  Scheduled Appointment: 04/26/2023     VITALS: reviewed  GEN: NAD, A & O x 4  HEAD/EYES: NCAT, PERRL, EOMI, anicteric sclerae, no conjunctival pallor  ENT: mucus membranes moist, oropharynx WNL, trachea midline, no JVD  RESP: lungs CTA with equal breath sounds bilaterally, chest wall nontender and atraumatic  CV: heart with reg rhythm S1, S2, no murmur; distal pulses intact and symmetric bilaterally  ABDOMEN: normoactive bowel sounds, soft, +distended, TTP of the RT sided abdomen, no palpable masses  : no CVAT  MSK: extremities atraumatic and nontender, no edema, no asymmetry. the back is without midline or lateral tenderness, there is no spinal deformity or stepoff and the back is ranged painlessly. the neck has no midline tenderness, deformity, or stepoff, and is ranged painlessly.  SKIN: warm, dry, no rash, no bruising, no cyanosis. color appropriate for ethnicity  NEURO: alert, mentating appropriately, no facial asymmetry. gross sensation, motor, coordination are intact  PSYCH: Affect appropriate

## 2023-03-26 NOTE — ED PROVIDER NOTE - INTERNATIONAL TRAVEL
[FreeTextEntry1] : Chronic urticaria in patient with recurrent Blastocystis hominis GI infection.   I have recommended that he RV for food allergy skin testing.  In addition, he will repeat stool for OP now that he has completed the course of Flagyl.  \par \par He was advised to take Allegra 180 mg QD prn symptoms. 
No

## 2023-03-26 NOTE — ED ADULT NURSE REASSESSMENT NOTE - NS ED NURSE REASSESS COMMENT FT1
Break Rn note- Patient resting quietly in bed, breathing even and nonlabored. No acute distress. Patient to be admitted for dialysis. Safety maintained. Patient stable upon exiting the room.
Patient wants to sign AMA and go home. ACP notified on the phone.
Portable chest x ray order handed off to radiology
Received patient in 1B, admitted to medicine waiting for bed assignment. Patient resting in stretcher, no distress noted. Patient denies any pain/discomfort at this time. Patient is A&OX2, airway patent, breathing unlabored and even, radial pulses palpable. Side rails up and safety maintained. Fall precaution in place.
CT completed awaiting results.  Pt provided warm blankets.
Received report from SAGAR RN that pt pulled her iv line out.  new 18g iv placed to rt A/C by ra Garvin.  RN JI wrapped area with kerlix.  Pt understands she is waiting for CT.
Pt is resting in stretcher with no medical complaints at this time. Pt requesting to speak with MD about leaving AMA. ACP made aware. Respirations even and unlabored, chest rise equal b/l. VS as noted in flow sheets. Pt denies chest pain, SOB, fever, cough, chills, abdominal pain, N/V/D or any urinary symptoms at this time. Safety maintained throughout. Will continue to monitor.

## 2023-03-26 NOTE — CHART NOTE - NSCHARTNOTEFT_GEN_A_CORE
Patient leaving AMA-see discharge summary for complete documentation.    Patient alert and oriented x4, she understands all risks of leaving the hospital against medical advice including death. Discussed with Dr. Lomeli

## 2023-03-26 NOTE — DISCHARGE NOTE PROVIDER - HOSPITAL COURSE
82 year old woman with uncontrolled IDDM, compensated systolic CHF, CAD s/p PCI, and ESRD on HD presents with hypotension at HD and abdominal pain. Patient had CT abdomen & pelvis in ED which did not show etiology for her abdominal pain.  She was seen by cardiology in the ED and no acute inpatient intervention was recommended. he was seen by nephrology who recommended that she resume her usual dialysis sessions (next due Tuesday).  Patient was medically admitted for further observation however insisted on leaving the hospital against medical advice.  Spoke to social work for dialysis reinstatement, of note melinda states she has A which is private hire that she will arrange for reinstatement of (ED CM notified and admission is <24h so patient would be able to do this).    On 3/26/2023 patient signed out of the hospital against medical advice.  This was discussed w/Dr. Lomeli, there were no medication changes and she is to resume her usual dialysis sessions at [facility] on Tuesday 3/28/2023       82 year old woman with uncontrolled IDDM, compensated systolic CHF, CAD s/p PCI, and ESRD on HD presents with hypotension at HD and abdominal pain. Patient had CT abdomen & pelvis in ED which did not show etiology for her abdominal pain.  She was seen by cardiology in the ED and no acute inpatient intervention was recommended. he was seen by nephrology who recommended that she resume her usual dialysis sessions (next due Tuesday).  Patient was medically admitted for further observation however insisted on leaving the hospital against medical advice.  Spoke to social work for dialysis reinstatement, since patient did not receive HD while here she can proceed with HD Tuesday as scheduled she just has to bring her D.C papers with her (patient aware). Of note melinda states she has HHA which is private hire that she will arrange for reinstatement of (ED CM notified and admission is <24h so patient would be able to do this).    On 3/26/2023 patient signed out of the hospital against medical advice.  This was discussed w/Dr. Lomeli, there were no medication changes and she is to resume her usual dialysis sessions at [facility] on Tuesday 3/28/2023

## 2023-03-26 NOTE — CONSULT NOTE ADULT - SUBJECTIVE AND OBJECTIVE BOX
HPI: Ms. Tsang is an 83 year-old woman with history of multiple medical issues including hypertension, type 2 diabetes mellitus, coronary artery disease, congestive heart failure with preserved EF, peripheral arterial disease, and end stage renal disease. She undergoes hemodialysis , , and  at the [facility] [place] ([facility]). She presented last night from [facility] after acutely developing nausea and abdominal pain during dialysis, as well as intradialytic hypotension/syncope.    PAST MEDICAL & SURGICAL HISTORY:  HTN  DM2  HLD  CAD - stents   PAF  HFpEF  PAD - RLE stents 2019  Diabetic neuropathy  PUD    ESRD-HD      Allergies  Carrots (Rash)    SOCIAL HISTORY:  Denies ETOh,Smoking,     FAMILY HISTORY:  FH: diabetes mellitus    REVIEW OF SYSTEMS:  CONSTITUTIONAL: No weakness, fevers or chills  EYES/ENT: No visual changes;  No vertigo or throat pain   NECK: No pain or stiffness  RESPIRATORY: No cough, wheezing, hemoptysis; No shortness of breath  CARDIOVASCULAR: (+)syncope  GASTROINTESTINAL: (+)nausea, (+)abdominal pain  GENITOURINARY: No dysuria, frequency or hematuria  NEUROLOGICAL: No numbness or weakness  SKIN: No itching, burning, rashes, or lesions   All other review of systems is negative unless indicated above.    VITAL:  T(C): , Max: 36.8 (23 @ 03:43)  T(F): , Max: 98.2 (23 @ 03:43)  HR: 66 (23 @ 07:25)  BP: 150/57 (23 @ 07:19)  BP(mean): --  RR: 20 (23 @ 07:19)  SpO2: 100% (23 @ 07:19)  Wt(kg): --    PHYSICAL EXAM:  Constitutional: NAD, Alert  HEENT: NCAT, MMM  Neck: Supple, No JVD  Respiratory: CTA-b/l  Cardiovascular: RRR s1s2, no m/r/g  Gastrointestinal: BS+, soft, NT/ND  Extremities: No peripheral edema b/l  Neurological: no focal deficits; strength grossly intact  Back: no CVAT b/l  Skin: No rashes, no nevi  Access:     LABS:                        8.9    8.52  )-----------( 141      ( 25 Mar 2023 16:50 )             30.9     Na(145)/K(4.4)/Cl(106)/HCO3(24)/BUN(25)/Cr(2.74)Glu(188)/Ca(8.6)/Mg(2.00)/PO4(3.9)    - @ 16:50      IMAGING:  < from: CT Abdomen and Pelvis w/ IV Cont (23 @ 23:09) >  Bladder wall thickening along with fullness of the right renal collecting   system with urothelial enhancement concerning for cystitis and right   ureteritis. Focus of air in the bladder could be on infectious basis or   related to recent instrumentation. Please correlate clinically and with   urinalysis and urine culture.      ASSESSMENT:  (1)Renal - ESRD - HD TTS - last dialyzed yesterday - no need for further HD today  (2)Anemia - ESRD-associated  (3)GI - nausea/abdominal pain during HD yesterday - due to intradialytic hypotension?  (4)CV - intradialytic hypotension/syncope - likely simply due to excessive attempted ultrafiltration with HD    RECOMMEND:  (1)Workup of abdominal pain, nausea, and syncope per primary team  (2)Next HD Tuesday 3/28/23; limited UF with HD; LORNA with HD  (3)Dose new meds for GFR<10/HD    Thank you for involving [facility] in this patient's care.    With warm regards,    Reid Solis MD   [facility]  Office: [phone]  Cell: [phone]             HPI: Ms. Tsang is an 83 year-old woman with history of multiple medical issues including hypertension, type 2 diabetes mellitus, coronary artery disease, congestive heart failure with preserved EF, peripheral arterial disease, and end stage renal disease. She undergoes hemodialysis , , and  at the [facility] [place] ([facility]). She presented last night from [facility] after acutely developing nausea and abdominal pain during dialysis, as well as intradialytic hypotension/syncope.    Ms. Tsang states that she feels well at present. She asks if she can go home.    PAST MEDICAL & SURGICAL HISTORY:  HTN  DM2  HLD  CAD - stents 2017  PAF  HFpEF  PAD - RLE stents 2019  Diabetic neuropathy  PUD    ESRD-HD      Allergies  Carrots (Rash)    SOCIAL HISTORY:  Denies ETOh,Smoking,     FAMILY HISTORY:  FH: diabetes mellitus    REVIEW OF SYSTEMS:  CONSTITUTIONAL: No weakness, fevers or chills  EYES/ENT: No visual changes;  No vertigo or throat pain   NECK: No pain or stiffness  RESPIRATORY: No cough, wheezing, hemoptysis; No shortness of breath  CARDIOVASCULAR: (+)syncope  GASTROINTESTINAL: (+)nausea, (+)abdominal pain  GENITOURINARY: No dysuria, frequency or hematuria  NEUROLOGICAL: No numbness or weakness  SKIN: No itching, burning, rashes, or lesions   All other review of systems is negative unless indicated above.    VITAL:  T(C): , Max: 36.8 (23 @ 03:43)  T(F): , Max: 98.2 (23 @ 03:43)  HR: 66 (23 @ 07:25)  BP: 150/57 (23 @ 07:19)  BP(mean): --  RR: 20 (23 @ 07:19)  SpO2: 100% (23 @ 07:19)  Wt(kg): --    PHYSICAL EXAM:  Constitutional: NAD, Alert  HEENT: NCAT, MMM  Neck: Supple, No JVD  Respiratory: CTA-b/l  Cardiovascular: RRR s1s2, no m/r/g  Gastrointestinal: BS+, soft, NT/ND  Extremities: No peripheral edema b/l  Neurological: no focal deficits; strength grossly intact  Back: no CVAT b/l  Skin: No rashes, no nevi  Access: RIJ tunneled cath    LABS:                        8.9    8.52  )-----------( 141      ( 25 Mar 2023 16:50 )             30.9     Na(145)/K(4.4)/Cl(106)/HCO3(24)/BUN(25)/Cr(2.74)Glu(188)/Ca(8.6)/Mg(2.00)/PO4(3.9)    - @ 16:50      IMAGING:  < from: CT Abdomen and Pelvis w/ IV Cont (23 @ 23:09) >  Bladder wall thickening along with fullness of the right renal collecting   system with urothelial enhancement concerning for cystitis and right   ureteritis. Focus of air in the bladder could be on infectious basis or   related to recent instrumentation. Please correlate clinically and with   urinalysis and urine culture.      ASSESSMENT:  (1)Renal - ESRD - HD TTS - last dialyzed yesterday - no need for further HD today  (2)Anemia - ESRD-associated  (3)GI - nausea/abdominal pain during HD yesterday - due to intradialytic hypotension?  (4)CV - intradialytic hypotension/syncope - likely simply due to excessive attempted ultrafiltration with HD    RECOMMEND:  (1)Workup of abdominal pain, nausea, and syncope per primary team  (2)Next HD Tuesday 3/28/23; limited UF with HD; LORNA with HD  (3)Dose new meds for GFR<10/HD    Thank you for involving [facility] in this patient's care.    With warm regards,    Reid Solis MD   [facility]  Office: [phone]  Cell: [phone]             HPI: Ms. Tsang is an 83 year-old woman with history of multiple medical issues including hypertension, type 2 diabetes mellitus, coronary artery disease, congestive heart failure with preserved EF, peripheral arterial disease, and end stage renal disease. She undergoes hemodialysis , , and  at the [facility] [place] ([facility]). She presented last night from [facility] after acutely developing nausea and abdominal pain during dialysis, as well as intradialytic hypotension/syncope.    Ms. Tsang states that she feels well at present. She asks if she can go home.    PAST MEDICAL & SURGICAL HISTORY:  HTN  DM2  HLD  CAD - stents 2017  PAF  HFpEF  PAD - RLE stents 2019  Diabetic neuropathy  PUD    ESRD-HD      Allergies  Carrots (Rash)    SOCIAL HISTORY:  Denies ETOh,Smoking,     FAMILY HISTORY:  FH: diabetes mellitus    REVIEW OF SYSTEMS:  CONSTITUTIONAL: No weakness, fevers or chills  EYES/ENT: No visual changes;  No vertigo or throat pain   NECK: No pain or stiffness  RESPIRATORY: No cough, wheezing, hemoptysis; No shortness of breath  CARDIOVASCULAR: (+)syncope  GASTROINTESTINAL: (+)nausea, (+)abdominal pain  GENITOURINARY: No dysuria, frequency or hematuria  NEUROLOGICAL: No numbness or weakness  SKIN: No itching, burning, rashes, or lesions   All other review of systems is negative unless indicated above.    VITAL:  T(C): , Max: 36.8 (23 @ 03:43)  T(F): , Max: 98.2 (23 @ 03:43)  HR: 66 (23 @ 07:25)  BP: 150/57 (23 @ 07:19)  BP(mean): --  RR: 20 (23 @ 07:19)  SpO2: 100% (23 @ 07:19)  Wt(kg): --    PHYSICAL EXAM:  Constitutional: NAD, Alert  HEENT: NCAT, MMM  Neck: Supple, No JVD  Respiratory: CTA-b/l  Cardiovascular: RRR s1s2, no m/r/g  Gastrointestinal: BS+, soft, NT/ND  Extremities: No peripheral edema b/l  Neurological: no focal deficits; strength grossly intact  Back: no CVAT b/l  Skin: No rashes, no nevi  Access: RIJ tunneled cath    LABS:                        8.9    8.52  )-----------( 141      ( 25 Mar 2023 16:50 )             30.9     Na(145)/K(4.4)/Cl(106)/HCO3(24)/BUN(25)/Cr(2.74)Glu(188)/Ca(8.6)/Mg(2.00)/PO4(3.9)    - @ 16:50      IMAGING:  < from: CT Abdomen and Pelvis w/ IV Cont (23 @ 23:09) >  Bladder wall thickening along with fullness of the right renal collecting   system with urothelial enhancement concerning for cystitis and right   ureteritis. Focus of air in the bladder could be on infectious basis or   related to recent instrumentation. Please correlate clinically and with   urinalysis and urine culture.      ASSESSMENT:  (1)Renal - ESRD - HD TTS - last dialyzed yesterday - no need for further HD today  (2)Anemia - ESRD-associated  (3)GI - nausea/abdominal pain during HD yesterday - due to intradialytic hypotension?  (4)CV - intradialytic hypotension/syncope - likely simply due to excessive attempted ultrafiltration with HD    RECOMMEND:  (1)Workup of abdominal pain, nausea, and syncope per primary team/Cardiology (if deemed appropriate by all other services for discharge, I would not object to discharge)  (2)Next HD Tuesday 3/28/23; limited UF with HD; LORNA with HD  (3)Dose new meds for GFR<10/HD    Thank you for involving [facility] in this patient's care.    With warm regards,    Reid Solis MD   [facility]  Office: [phone]  Cell: [phone]

## 2023-03-26 NOTE — H&P ADULT - ASSESSMENT
This is an 82 year old woman with uncontrolled IDDM, compensated diastolic CHF, CAD s/p PCI, ESRD on HD,Anemia and HTN who presented to [facility] ED on 3/25/2023 with low BP at HD.,developed Nausea,[facility]  Symptoms improved after stopping HD.   -DIZZINESS,HYPOTENSION-improved.seen by Cardiology.no intervention needed,monitor.resume plavix  -CAD-S/P DESX2-resume plavix  -SYSTOLIC CHF-EF-46%-not fluid overloaded  ESRD-not fluid overloaded.Renal consult appreciated  .HD per renal  -DVT Prophylaxis  d/w   -/w ACP  -Patient wants to sign AMA,explained possible risks and complications.She understood.

## 2023-03-26 NOTE — DISCHARGE NOTE PROVIDER - PROVIDER TOKENS
PROVIDER:[TOKEN:[[id number]],FOLLOWUP:[1 week],ESTABLISHEDPATIENT:[T]],FREE:[LAST:[Kidney doctor],PHONE:[(   )    -],FAX:[(   )    -],FOLLOWUP:[1 week],ESTABLISHEDPATIENT:[T]]

## 2023-03-27 ENCOUNTER — NON-APPOINTMENT (OUTPATIENT)
Age: 83
End: 2023-03-27

## 2023-04-04 ENCOUNTER — INPATIENT (INPATIENT)
Facility: HOSPITAL | Age: 83
LOS: 2 days | Discharge: HOME | End: 2023-04-07
Attending: INTERNAL MEDICINE | Admitting: INTERNAL MEDICINE
Payer: MEDICARE

## 2023-04-04 VITALS
TEMPERATURE: 99 F | HEART RATE: 67 BPM | OXYGEN SATURATION: 100 % | RESPIRATION RATE: 16 BRPM | SYSTOLIC BLOOD PRESSURE: 160 MMHG | DIASTOLIC BLOOD PRESSURE: 54 MMHG | HEIGHT: 64 IN

## 2023-04-04 DIAGNOSIS — I50.23 ACUTE ON CHRONIC SYSTOLIC (CONGESTIVE) HEART FAILURE: ICD-10-CM

## 2023-04-04 DIAGNOSIS — Z29.9 ENCOUNTER FOR PROPHYLACTIC MEASURES, UNSPECIFIED: ICD-10-CM

## 2023-04-04 DIAGNOSIS — I10 ESSENTIAL (PRIMARY) HYPERTENSION: ICD-10-CM

## 2023-04-04 DIAGNOSIS — N18.6 END STAGE RENAL DISEASE: ICD-10-CM

## 2023-04-04 DIAGNOSIS — R06.02 SHORTNESS OF BREATH: ICD-10-CM

## 2023-04-04 DIAGNOSIS — N18.9 CHRONIC KIDNEY DISEASE, UNSPECIFIED: ICD-10-CM

## 2023-04-04 DIAGNOSIS — N17.9 ACUTE KIDNEY FAILURE, UNSPECIFIED: ICD-10-CM

## 2023-04-04 DIAGNOSIS — Z95.5 PRESENCE OF CORONARY ANGIOPLASTY IMPLANT AND GRAFT: Chronic | ICD-10-CM

## 2023-04-04 DIAGNOSIS — Z98.890 OTHER SPECIFIED POSTPROCEDURAL STATES: Chronic | ICD-10-CM

## 2023-04-04 DIAGNOSIS — E87.5 HYPERKALEMIA: ICD-10-CM

## 2023-04-04 DIAGNOSIS — Z98.891 HISTORY OF UTERINE SCAR FROM PREVIOUS SURGERY: Chronic | ICD-10-CM

## 2023-04-04 DIAGNOSIS — E11.9 TYPE 2 DIABETES MELLITUS WITHOUT COMPLICATIONS: ICD-10-CM

## 2023-04-04 DIAGNOSIS — I25.10 ATHEROSCLEROTIC HEART DISEASE OF NATIVE CORONARY ARTERY WITHOUT ANGINA PECTORIS: ICD-10-CM

## 2023-04-04 DIAGNOSIS — I48.0 PAROXYSMAL ATRIAL FIBRILLATION: ICD-10-CM

## 2023-04-04 LAB
ALBUMIN SERPL ELPH-MCNC: 4.3 G/DL — SIGNIFICANT CHANGE UP (ref 3.3–5)
ANION GAP SERPL CALC-SCNC: 16 MMOL/L — HIGH (ref 7–14)
B PERT DNA SPEC QL NAA+PROBE: SIGNIFICANT CHANGE UP
B PERT+PARAPERT DNA PNL SPEC NAA+PROBE: SIGNIFICANT CHANGE UP
BASE EXCESS BLDV CALC-SCNC: -2 MMOL/L — SIGNIFICANT CHANGE UP (ref -2–3)
BASOPHILS # BLD AUTO: 0.05 K/UL — SIGNIFICANT CHANGE UP (ref 0–0.2)
BASOPHILS NFR BLD AUTO: 1 % — SIGNIFICANT CHANGE UP (ref 0–2)
BLOOD GAS VENOUS COMPREHENSIVE RESULT: SIGNIFICANT CHANGE UP
BORDETELLA PARAPERTUSSIS (RAPRVP): SIGNIFICANT CHANGE UP
BUN SERPL-MCNC: 52 MG/DL — HIGH (ref 7–23)
C PNEUM DNA SPEC QL NAA+PROBE: SIGNIFICANT CHANGE UP
CALCIUM SERPL-MCNC: 8 MG/DL — LOW (ref 8.4–10.5)
CHLORIDE BLDV-SCNC: 108 MMOL/L — SIGNIFICANT CHANGE UP (ref 96–108)
CHLORIDE SERPL-SCNC: 110 MMOL/L — HIGH (ref 98–107)
CO2 BLDV-SCNC: 29.6 MMOL/L — HIGH (ref 22–26)
CO2 SERPL-SCNC: 21 MMOL/L — LOW (ref 22–31)
CREAT SERPL-MCNC: 7.12 MG/DL — HIGH (ref 0.5–1.3)
DIALYSIS INSTRUMENT RESULT - HEPATITIS B SURFACE ANTIGEN: NEGATIVE — SIGNIFICANT CHANGE UP
EGFR: 5 ML/MIN/1.73M2 — LOW
EOSINOPHIL # BLD AUTO: 0.05 K/UL — SIGNIFICANT CHANGE UP (ref 0–0.5)
EOSINOPHIL NFR BLD AUTO: 1 % — SIGNIFICANT CHANGE UP (ref 0–6)
FERRITIN SERPL-MCNC: 254 NG/ML — HIGH (ref 15–150)
FLUAV SUBTYP SPEC NAA+PROBE: SIGNIFICANT CHANGE UP
FLUBV RNA SPEC QL NAA+PROBE: SIGNIFICANT CHANGE UP
GAS PNL BLDV: 139 MMOL/L — SIGNIFICANT CHANGE UP (ref 136–145)
GLUCOSE BLDC GLUCOMTR-MCNC: 119 MG/DL — HIGH (ref 70–99)
GLUCOSE BLDC GLUCOMTR-MCNC: 122 MG/DL — HIGH (ref 70–99)
GLUCOSE BLDV-MCNC: 121 MG/DL — HIGH (ref 70–99)
GLUCOSE SERPL-MCNC: 133 MG/DL — HIGH (ref 70–99)
HADV DNA SPEC QL NAA+PROBE: SIGNIFICANT CHANGE UP
HCO3 BLDV-SCNC: 27 MMOL/L — SIGNIFICANT CHANGE UP (ref 22–29)
HCOV 229E RNA SPEC QL NAA+PROBE: SIGNIFICANT CHANGE UP
HCOV HKU1 RNA SPEC QL NAA+PROBE: SIGNIFICANT CHANGE UP
HCOV NL63 RNA SPEC QL NAA+PROBE: SIGNIFICANT CHANGE UP
HCOV OC43 RNA SPEC QL NAA+PROBE: SIGNIFICANT CHANGE UP
HCT VFR BLD CALC: 27.1 % — LOW (ref 34.5–45)
HCT VFR BLDA CALC: 23 % — LOW (ref 34.5–46.5)
HGB BLD CALC-MCNC: 7.7 G/DL — LOW (ref 11.7–16.1)
HGB BLD-MCNC: 8.2 G/DL — LOW (ref 11.5–15.5)
HMPV RNA SPEC QL NAA+PROBE: SIGNIFICANT CHANGE UP
HPIV1 RNA SPEC QL NAA+PROBE: SIGNIFICANT CHANGE UP
HPIV2 RNA SPEC QL NAA+PROBE: SIGNIFICANT CHANGE UP
HPIV3 RNA SPEC QL NAA+PROBE: SIGNIFICANT CHANGE UP
HPIV4 RNA SPEC QL NAA+PROBE: SIGNIFICANT CHANGE UP
IANC: 3.75 K/UL — SIGNIFICANT CHANGE UP (ref 1.8–7.4)
IMM GRANULOCYTES NFR BLD AUTO: 1.2 % — HIGH (ref 0–0.9)
IRON SATN MFR SERPL: 16 % — SIGNIFICANT CHANGE UP (ref 14–50)
IRON SATN MFR SERPL: 33 UG/DL — SIGNIFICANT CHANGE UP (ref 30–160)
LACTATE BLDV-MCNC: 1 MMOL/L — SIGNIFICANT CHANGE UP (ref 0.5–2)
LYMPHOCYTES # BLD AUTO: 0.71 K/UL — LOW (ref 1–3.3)
LYMPHOCYTES # BLD AUTO: 14.1 % — SIGNIFICANT CHANGE UP (ref 13–44)
M PNEUMO DNA SPEC QL NAA+PROBE: SIGNIFICANT CHANGE UP
MCHC RBC-ENTMCNC: 30.3 GM/DL — LOW (ref 32–36)
MCHC RBC-ENTMCNC: 33.2 PG — SIGNIFICANT CHANGE UP (ref 27–34)
MCV RBC AUTO: 109.7 FL — HIGH (ref 80–100)
MONOCYTES # BLD AUTO: 0.43 K/UL — SIGNIFICANT CHANGE UP (ref 0–0.9)
MONOCYTES NFR BLD AUTO: 8.5 % — SIGNIFICANT CHANGE UP (ref 2–14)
NEUTROPHILS # BLD AUTO: 3.75 K/UL — SIGNIFICANT CHANGE UP (ref 1.8–7.4)
NEUTROPHILS NFR BLD AUTO: 74.2 % — SIGNIFICANT CHANGE UP (ref 43–77)
NRBC # BLD: 0 /100 WBCS — SIGNIFICANT CHANGE UP (ref 0–0)
NRBC # FLD: 0 K/UL — SIGNIFICANT CHANGE UP (ref 0–0)
PCO2 BLDV: 78 MMHG — HIGH (ref 39–52)
PH BLDV: 7.15 — LOW (ref 7.32–7.43)
PHOSPHATE SERPL-MCNC: 7.6 MG/DL — HIGH (ref 2.5–4.5)
PLATELET # BLD AUTO: 326 K/UL — SIGNIFICANT CHANGE UP (ref 150–400)
PO2 BLDV: 39 MMHG — SIGNIFICANT CHANGE UP (ref 25–45)
POTASSIUM BLDV-SCNC: SIGNIFICANT CHANGE UP MMOL/L (ref 3.5–5.1)
POTASSIUM SERPL-MCNC: 7.6 MMOL/L — CRITICAL HIGH (ref 3.5–5.3)
POTASSIUM SERPL-SCNC: 7.6 MMOL/L — CRITICAL HIGH (ref 3.5–5.3)
RAPID RVP RESULT: SIGNIFICANT CHANGE UP
RBC # BLD: 2.47 M/UL — LOW (ref 3.8–5.2)
RBC # FLD: 18.3 % — HIGH (ref 10.3–14.5)
RSV RNA SPEC QL NAA+PROBE: SIGNIFICANT CHANGE UP
RV+EV RNA SPEC QL NAA+PROBE: SIGNIFICANT CHANGE UP
SAO2 % BLDV: 60.7 % — LOW (ref 67–88)
SARS-COV-2 RNA SPEC QL NAA+PROBE: SIGNIFICANT CHANGE UP
SODIUM SERPL-SCNC: 147 MMOL/L — HIGH (ref 135–145)
TIBC SERPL-MCNC: 210 UG/DL — LOW (ref 220–430)
UIBC SERPL-MCNC: 177 UG/DL — SIGNIFICANT CHANGE UP (ref 110–370)
WBC # BLD: 5.05 K/UL — SIGNIFICANT CHANGE UP (ref 3.8–10.5)
WBC # FLD AUTO: 5.05 K/UL — SIGNIFICANT CHANGE UP (ref 3.8–10.5)

## 2023-04-04 PROCEDURE — 99223 1ST HOSP IP/OBS HIGH 75: CPT

## 2023-04-04 PROCEDURE — 71045 X-RAY EXAM CHEST 1 VIEW: CPT | Mod: 26

## 2023-04-04 PROCEDURE — 99291 CRITICAL CARE FIRST HOUR: CPT

## 2023-04-04 RX ORDER — SODIUM CHLORIDE 9 MG/ML
1000 INJECTION, SOLUTION INTRAVENOUS
Refills: 0 | Status: DISCONTINUED | OUTPATIENT
Start: 2023-04-04 | End: 2023-04-07

## 2023-04-04 RX ORDER — ATORVASTATIN CALCIUM 80 MG/1
40 TABLET, FILM COATED ORAL AT BEDTIME
Refills: 0 | Status: DISCONTINUED | OUTPATIENT
Start: 2023-04-04 | End: 2023-04-07

## 2023-04-04 RX ORDER — DEXTROSE 50 % IN WATER 50 %
15 SYRINGE (ML) INTRAVENOUS ONCE
Refills: 0 | Status: DISCONTINUED | OUTPATIENT
Start: 2023-04-04 | End: 2023-04-07

## 2023-04-04 RX ORDER — SENNA PLUS 8.6 MG/1
2 TABLET ORAL AT BEDTIME
Refills: 0 | Status: DISCONTINUED | OUTPATIENT
Start: 2023-04-04 | End: 2023-04-07

## 2023-04-04 RX ORDER — HYDRALAZINE HCL 50 MG
10 TABLET ORAL THREE TIMES A DAY
Refills: 0 | Status: DISCONTINUED | OUTPATIENT
Start: 2023-04-04 | End: 2023-04-07

## 2023-04-04 RX ORDER — LANOLIN ALCOHOL/MO/W.PET/CERES
3 CREAM (GRAM) TOPICAL AT BEDTIME
Refills: 0 | Status: DISCONTINUED | OUTPATIENT
Start: 2023-04-04 | End: 2023-04-07

## 2023-04-04 RX ORDER — SODIUM ZIRCONIUM CYCLOSILICATE 10 G/10G
10 POWDER, FOR SUSPENSION ORAL ONCE
Refills: 0 | Status: COMPLETED | OUTPATIENT
Start: 2023-04-04 | End: 2023-04-04

## 2023-04-04 RX ORDER — GLUCAGON INJECTION, SOLUTION 0.5 MG/.1ML
1 INJECTION, SOLUTION SUBCUTANEOUS ONCE
Refills: 0 | Status: DISCONTINUED | OUTPATIENT
Start: 2023-04-04 | End: 2023-04-07

## 2023-04-04 RX ORDER — ACETAMINOPHEN 500 MG
650 TABLET ORAL EVERY 6 HOURS
Refills: 0 | Status: DISCONTINUED | OUTPATIENT
Start: 2023-04-04 | End: 2023-04-07

## 2023-04-04 RX ORDER — SEVELAMER CARBONATE 2400 MG/1
800 POWDER, FOR SUSPENSION ORAL
Refills: 0 | Status: DISCONTINUED | OUTPATIENT
Start: 2023-04-04 | End: 2023-04-07

## 2023-04-04 RX ORDER — CLOPIDOGREL BISULFATE 75 MG/1
75 TABLET, FILM COATED ORAL DAILY
Refills: 0 | Status: DISCONTINUED | OUTPATIENT
Start: 2023-04-05 | End: 2023-04-07

## 2023-04-04 RX ORDER — INSULIN HUMAN 100 [IU]/ML
5 INJECTION, SOLUTION SUBCUTANEOUS ONCE
Refills: 0 | Status: COMPLETED | OUTPATIENT
Start: 2023-04-04 | End: 2023-04-04

## 2023-04-04 RX ORDER — CHLORHEXIDINE GLUCONATE 213 G/1000ML
1 SOLUTION TOPICAL DAILY
Refills: 0 | Status: DISCONTINUED | OUTPATIENT
Start: 2023-04-04 | End: 2023-04-07

## 2023-04-04 RX ORDER — DEXTROSE 50 % IN WATER 50 %
50 SYRINGE (ML) INTRAVENOUS ONCE
Refills: 0 | Status: COMPLETED | OUTPATIENT
Start: 2023-04-04 | End: 2023-04-04

## 2023-04-04 RX ORDER — DEXTROSE 50 % IN WATER 50 %
25 SYRINGE (ML) INTRAVENOUS ONCE
Refills: 0 | Status: DISCONTINUED | OUTPATIENT
Start: 2023-04-04 | End: 2023-04-07

## 2023-04-04 RX ORDER — MUPIROCIN 20 MG/G
1 OINTMENT TOPICAL
Refills: 0 | Status: DISCONTINUED | OUTPATIENT
Start: 2023-04-04 | End: 2023-04-07

## 2023-04-04 RX ORDER — METOPROLOL TARTRATE 50 MG
50 TABLET ORAL DAILY
Refills: 0 | Status: DISCONTINUED | OUTPATIENT
Start: 2023-04-04 | End: 2023-04-07

## 2023-04-04 RX ORDER — INSULIN LISPRO 100/ML
VIAL (ML) SUBCUTANEOUS
Refills: 0 | Status: DISCONTINUED | OUTPATIENT
Start: 2023-04-04 | End: 2023-04-07

## 2023-04-04 RX ORDER — SODIUM CHLORIDE 9 MG/ML
100 INJECTION INTRAMUSCULAR; INTRAVENOUS; SUBCUTANEOUS
Refills: 0 | Status: DISCONTINUED | OUTPATIENT
Start: 2023-04-04 | End: 2023-04-07

## 2023-04-04 RX ORDER — METOPROLOL TARTRATE 50 MG
1 TABLET ORAL
Qty: 0 | Refills: 0 | DISCHARGE

## 2023-04-04 RX ORDER — ALBUTEROL 90 UG/1
10 AEROSOL, METERED ORAL ONCE
Refills: 0 | Status: DISCONTINUED | OUTPATIENT
Start: 2023-04-04 | End: 2023-04-07

## 2023-04-04 RX ORDER — SODIUM BICARBONATE 1 MEQ/ML
50 SYRINGE (ML) INTRAVENOUS ONCE
Refills: 0 | Status: COMPLETED | OUTPATIENT
Start: 2023-04-04 | End: 2023-04-04

## 2023-04-04 RX ORDER — INSULIN LISPRO 100/ML
VIAL (ML) SUBCUTANEOUS AT BEDTIME
Refills: 0 | Status: DISCONTINUED | OUTPATIENT
Start: 2023-04-04 | End: 2023-04-07

## 2023-04-04 RX ORDER — FUROSEMIDE 40 MG
40 TABLET ORAL ONCE
Refills: 0 | Status: COMPLETED | OUTPATIENT
Start: 2023-04-04 | End: 2023-04-04

## 2023-04-04 RX ORDER — GABAPENTIN 400 MG/1
300 CAPSULE ORAL AT BEDTIME
Refills: 0 | Status: DISCONTINUED | OUTPATIENT
Start: 2023-04-04 | End: 2023-04-07

## 2023-04-04 RX ADMIN — Medication 40 MILLIGRAM(S): at 16:54

## 2023-04-04 RX ADMIN — Medication 50 MILLIEQUIVALENT(S): at 21:08

## 2023-04-04 RX ADMIN — SODIUM ZIRCONIUM CYCLOSILICATE 10 GRAM(S): 10 POWDER, FOR SUSPENSION ORAL at 21:00

## 2023-04-04 RX ADMIN — Medication 50 MILLILITER(S): at 21:03

## 2023-04-04 RX ADMIN — INSULIN HUMAN 5 UNIT(S): 100 INJECTION, SOLUTION SUBCUTANEOUS at 21:07

## 2023-04-04 NOTE — H&P ADULT - PROBLEM SELECTOR PLAN 1
Pt presenting with worsening SOB in setting of missed HD, last HD 3/30. CXR with evidence of fluid volume overload and w/ LE edema on exam. Suspect SOB likely due to missed HD vs CHF exacerbation vs less likely ACS vs infectious etiology  - f/u BNP and trop  - f/u TTE  - c/w tele monitoring

## 2023-04-04 NOTE — ED PROVIDER NOTE - ATTENDING CONTRIBUTION TO CARE
I (Sophia) agree with above, I performed a history and physical. Counseled mireya medical staff, physician assistant, and/or medical student on medical decision making as documented. Medical decisions and treatment interventions were made in real time during the patient encounter. Additionally and/or with the following exceptions: Patient is a 89-year-old female past medical history of coronary artery disease with stents, hypertension, end-stage renal disease renal dialysis Tuesday Thursday Friday who is presenting to the emergency department with shortness of breath.  Was noted to be 77 on room air.  Patient says she was supposed to go to dialysis today but did not feel well so missed it.  Patient had moderate respiratory distress, improved with nasal cannula.  Had diffuse crackles in all the lung bases and apices.  Patient was signed out to oncoming attending pending basic labs CBC and CMP.  Given the fluid overloaded status patient was given Lasix because she still makes urine.  Furthermore her private nephrology group was contacted for urgent dialysis.  Patient would definitely require admission.  The patient's condition was not amenable to outpatient treatment due either the lack of feasibility of outpatient care coordination, possibility for further decompensation with adverse outcome if discharge, or treatments and diagnostic  modalities only available during an inpatient hospitalization.  Additional time as above spent by myself, separate from billable procedures, included coordination of patient care with consultants/admitting team, performing reassessments on the patient, documentation, and counseling patient/family members on the care provided.

## 2023-04-04 NOTE — H&P ADULT - PROBLEM SELECTOR PLAN 4
Pt SOB w/ evidence of overload. Last TTE from 11/2022 w/ LVEF of 45%.  - HD today per renal  - continue to monitor fluid status and need for additional diuresis/HD  - f/u TTE

## 2023-04-04 NOTE — H&P ADULT - PROBLEM SELECTOR PLAN 2
Last HD 3/30, pt missed 2 sessions. Pt w/ SOB and appears overloaded. HD today per renal.  - f/u repeat K  - f/u renal for next HD session

## 2023-04-04 NOTE — ED ADULT NURSE REASSESSMENT NOTE - NS ED NURSE REASSESS COMMENT FT1
pt resting comfortably in ED stretcher at this time. NAD noted. denies chest pain, n/v/d, ha, numbness tingling, still reporting mild SOB - remains on 4LNC, spo2 WDL, no use of accessory muscles noted. pt pending dialysis and admission.

## 2023-04-04 NOTE — ED ADULT NURSE NOTE - OBJECTIVE STATEMENT
pt to room 16 with c/o SOB. dialysis pt (T,Th,S) stating she missed last 2 sessions due to "not feeling well" pt spo2 100% on 3LNC. respirations appear even and nonlabored. no use of accessory muscles noted. pt denies chest pain, SOB, n/v, ha. dialysis port noted to R chest wall.

## 2023-04-04 NOTE — H&P ADULT - NSHPLABSRESULTS_GEN_ALL_CORE
.  LABS:                         8.2    5.05  )-----------( 326      ( 04 Apr 2023 17:08 )             27.1     04-04    147<H>  |  110<H>  |  52<H>  ----------------------------<  133<H>  7.6<HH>   |  21<L>  |  7.12<H>    Ca    8.0<L>      04 Apr 2023 17:08  Phos  7.6     04-04    TPro  x   /  Alb  4.3  /  TBili  x   /  DBili  x   /  AST  x   /  ALT  x   /  AlkPhos  x   04-04                  RADIOLOGY, EKG & ADDITIONAL TESTS: Reviewed.

## 2023-04-04 NOTE — ED ADULT NURSE REASSESSMENT NOTE - NS ED NURSE REASSESS COMMENT FT1
pt report given to Ramana BOWSER RN, vss on 2lnc, pt ambulatory to bathroom with minimal assist, pt endorses at baseline uses cane/ walker.

## 2023-04-04 NOTE — CONSULT NOTE ADULT - SUBJECTIVE AND OBJECTIVE BOX
NEPHROLOGY - NSN    Patient seen and examined.    HPI:    84 y/o F with h/o CAD with stents, Diastolic Dysfunction, DM, Afib, GI bleed, CKD (dialysis T, Th, Sat) missed today c/o SOB and CP today. Noted to be sat'ing at 79% on RA via EMS and BIBA on a non-rebreather, currently is 92% on RA and down graded to NC 3L sat'ing at 97% at bedside.  Denies f/c,, cough, ab pain, n/v/d, dysuria, numbness, tingling.   	PMD- [facility]  (+) former smoker quit 20 years ago      PAST MEDICAL & SURGICAL HISTORY:  HLD (hyperlipidemia)      Diabetic neuropathy      CAD (coronary artery disease)  ~ s/p MICHAEL x2 in 2017      Paroxysmal atrial fibrillation      Diabetes mellitus, type 2      GI bleed      Neuropathy      Gastritis      Diastolic heart failure      Transfusion history      Stage 5 chronic kidney disease not on chronic dialysis      Anemia      PVD (peripheral vascular disease)  ~ RLE angiogram with 2 stents placed in 2019.      H/O  section      S/P coronary artery stent placement  x2 in       History of esophagogastroduodenoscopy (EGD)      S/P angiogram of extremity          MEDICATIONS  (STANDING):      Allergies    Carrots (Rash)  No Known Drug Allergies    Intolerances        SOCIAL HISTORY:  Denies alcohol abuse, drug abuse or tobacco usage.     FAMILY HISTORY:  FH: diabetes mellitus        VITALS:  T(C): 37.1 (23 @ 13:28), Max: 37.1 (23 @ 13:28)  HR: 67 (23 @ 13:28) (67 - 67)  BP: 160/54 (23 @ 13:28) (160/54 - 160/54)  RR: 16 (23 @ 13:28) (16 - 16)  SpO2: 100% (23 @ 13:28) (100% - 100%)    REVIEW OF SYSTEMS:  Denies any nausea, vomiting, diarrhea, fever or chills. Denies chest pain, SOB, focal weakness, hematuria or dysuria. Good oral intake and denies fatigue or weakness. All other pertinent systems are reviewed and are negative.    PHYSICAL EXAM:  Constitutional: NAD  HEENT: EOMI  Neck:  No JVD, supple   Respiratory: CTA B/L  Cardiovascular: S1 and S2, RRR  Gastrointestinal: + BS, soft, NT, ND  Extremities: No peripheral edema, + peripheral pulses  Neurological: A/O x 3, CN2-12 intact  Psychiatric: Normal mood, normal affect  : No Sylvester  Skin: No rashes, C/D/I  Access: Not applicable    I and O's:    Height (cm): 162.6 (04-04 @ 13:28)    LABS:            URINE:      RADIOLOGY & ADDITIONAL STUDIES:     NEPHROLOGY - NSN    Patient seen and examined.    HPI:    82 y/o F with h/o CAD with stents, Diastolic Dysfunction, DM, Afib, GI bleed, CKD (dialysis T, Th, Sat) missed today c/o SOB and CP today. Noted to be sat'ing at 79% on RA via EMS and BIBA on a non-rebreather, currently is 92% on RA and down graded to NC 3L sat'ing at 97% at bedside.  Denies f/c,, cough, ab pain, n/v/d, dysuria, numbness, tingling.   	PMD- [facility]  (+) former smoker quit 20 years ago  At present pt is not having chest pain.  She has oxygen at home     PAST MEDICAL & SURGICAL HISTORY:  HLD (hyperlipidemia)      Diabetic neuropathy      CAD (coronary artery disease)  ~ s/p MICHAEL x2 in 2017      Paroxysmal atrial fibrillation      Diabetes mellitus, type 2      GI bleed      Neuropathy      Gastritis      Diastolic heart failure      Transfusion history         Anemia      PVD (peripheral vascular disease)  ~ RLE angiogram with 2 stents placed in 2019.      H/O  section      S/P coronary artery stent placement  x2 in       History of esophagogastroduodenoscopy (EGD)      S/P angiogram of extremity          MEDICATIONS  (STANDING):      Allergies    Carrots (Rash)  No Known Drug Allergies    Intolerances        SOCIAL HISTORY:  Denies alcohol abuse, drug abuse or tobacco usage.     FAMILY HISTORY:  FH: diabetes mellitus        VITALS:  T(C): 37.1 (23 @ 13:28), Max: 37.1 (23 @ 13:28)  HR: 67 (23 @ 13:28) (67 - 67)  BP: 160/54 (23 @ 13:28) (160/54 - 160/54)  RR: 16 (23 @ 13:28) (16 - 16)  SpO2: 100% (23 @ 13:28) (100% - 100%)    REVIEW OF SYSTEMS:    Denies chest pain, SOB, focal weakness, hematuria or dysuria. + shortness of breath ; All other pertinent systems are reviewed and are negative.    PHYSICAL EXAM:  Constitutional: NAD  HEENT: EOMI  Neck:  No JVD, supple   Respiratory: CTA B/L  Cardiovascular: S1 and S2, RRR  Gastrointestinal: + BS, soft, NT, ND  Extremities: No peripheral edema, + peripheral pulses  Neurological: A/O x 3, CN2-12 intact  Psychiatric: Normal mood, normal affect  : No Sylvester  Skin: No rashes, C/D/I  Access: avf    I and O's:    Height (cm): 162.6 ( @ 13:28)    LABS:            URINE:      RADIOLOGY & ADDITIONAL STUDIES:    < from: CT Abdomen and Pelvis w/ IV Cont (23 @ 23:09) >    ACC: [id number] EXAM:  CT ABDOMEN AND PELVIS IC   ORDERED BY: LÓPEZ NAVARRO     PROCEDURE DATE:  2023          INTERPRETATION:  CLINICAL INFORMATION: Abdominal pain and nausea. ESRD on   HD.    COMPARISON: CT abdomen and pelvis 2018    CONTRAST/COMPLICATIONS:  IV Contrast: Omnipaque 350  90 cc administered   10 cc discarded  Oral Contrast: NONE  Complications: None reported at time of study completion    PROCEDURE:  CT of the Abdomen and Pelvis was performed.  Sagittal and coronal reformats were performed.    FINDINGS:  LOWER CHEST: Right upper lobe nodular opacity measuring 8 mm. Right lower   lobe nodule measuring 6 mm is unchanged from 2018. Lingular atelectasis.   Partially visualized hemodialysis catheter with tip at the superior   cavoatrial junction. Cardiomegaly. Coronary artery calcifications.    LIVER: Within normal limits.  BILE DUCTS: Normal caliber.  GALLBLADDER: Within normal limits.  SPLEEN: Within normal limits.  PANCREAS: Within normal limits.  ADRENALS: 1.5 cm left adrenal nodule and focal thickening of the right   adrenal gland similar to prior exam.  KIDNEYS/URETERS: Bilateral atrophic kidneys. Mildly prominent right   collecting system with diffuse urothelial enhancement. No left   hydronephrosis.    BLADDER: Mild circumferential bladder wall thickening for the degree of   distention. Focus of gas at the urinary bladder fundus. Correlate for   recent instrumentation.  REPRODUCTIVE ORGANS: Calcified fibroids. Tubular cystic lesion in the   left adnexa measuring approximately 5.0 x 3.9 cm is decreased in size   from 2018 previously measuring 8.0 x 4.0 cm and may represent   hydrosalpinx.    BOWEL: No bowel obstruction. Mild rectal stool burden. Appendix is normal.  PERITONEUM: No ascites.  VESSELS: Atherosclerotic changes.  RETROPERITONEUM/LYMPH NODES: No lymphadenopathy.  ABDOMINAL WALL: Within normal limits.  BONES: Degenerative changes.    IMPRESSION:  Bladder wall thickening along with fullness of the right renal collecting   system with urothelial enhancement concerning for cystitis and right   ureteritis. Focus of air in the bladder could be on infectious basis or   related to recent instrumentation. Please correlate clinically and with   urinalysis and urine culture.      < end of copied text >

## 2023-04-04 NOTE — ED PROVIDER NOTE - OBJECTIVE STATEMENT
82 y/o F with h/o CAD with stents, Diastolic Dysfunction, DM, Afib, GI bleed, CKD (dialysis T, Th, Sat) missed today c/o SOB and CP today. Noted to be sat'ing at 79% on RA via EMS and BIBA on a non-rebreather, currently is 92% on RA and down graded to NC 3L sat'ing at 97% at bedside.  Denies f/c,, cough, ab pain, n/v/d, dysuria, numbness, tingling.   PMD- [facility]  (+) former smoker quit 20 years ago

## 2023-04-04 NOTE — H&P ADULT - HISTORY OF PRESENT ILLNESS
Pt is an 84 y/o F with pmhx of DM, systolic CHF, CAD s/p PCI, ESRD on HD Tues, Thurs, Sat, Anemia and HTN presenting with complaints of SOB x2 days. Pt states she has baseline SOB however it worsened. SOB is worse on exertion. She states she intermittently uses O2 at home. She denies any chest pain, palpitations, light headedness or dizziness. She states she last HD was 3/30. She missed her last 2 HD sessions due to SOB. She denies fever, chills, abd pain, n/v/d, or urinary symptoms, blood in stool or black stools.     In ED, vitals T 98.7, HR 67, /54, RR 16, O2 sat 100% on RA --> 95% on 4L  Labs significant for: Hb 8.2, near baseline, Na 147, K 7.6, BUN/Cr 52/7.12, vbg w/ pH 7.15  EKG personally reviewed: NSR, no acute ischemic changes, no peaked T waves  CXR: IMPRESSION:  No focal consolidation.  Mild noncardiogenic edema likely fluid overload.  Imaging:  ED management: IV lasix 40 mg x1 and renal consulted

## 2023-04-04 NOTE — H&P ADULT - NSHPPHYSICALEXAM_GEN_ALL_CORE
VITAL SIGNS:  T(C): 36.5 (04-04-23 @ 21:38), Max: 37.2 (04-04-23 @ 20:24)  T(F): 97.7 (04-04-23 @ 21:38), Max: 98.9 (04-04-23 @ 20:24)  HR: 78 (04-04-23 @ 21:38) (64 - 87)  BP: 159/76 (04-04-23 @ 21:38) (109/54 - 171/81)  BP(mean): --  RR: 18 (04-04-23 @ 21:38) (16 - 20)  SpO2: 95% (04-04-23 @ 20:24) (95% - 100%)  Wt(kg): --    PHYSICAL EXAM:    Constitutional: resting comfortably in bed; NAD  Head: NC/AT  Eyes: PERRL, EOMI, anicteric sclera  ENT: no nasal discharge; uvula midline, no oropharyngeal erythema or exudates; MMM  Neck: supple  Respiratory: b/l crackels, no retractions  Cardiac: +S1/S2; RRR; no M/R/G  Gastrointestinal: abdomen soft, NT/ND; no rebound or guarding; +BSx4  Back: spine midline, no bony tenderness  Extremities: WWP, no clubbing or cyanosis; b/l LE w/ 1+ pitting edema  Musculoskeletal: NROM x4; no joint swelling, tenderness or erythema  Vascular: distal pulses intact  Dermatologic: skin warm, dry and intact; no rashes  Lymphatic: no submandibular or cervical LAD  Neurologic: AAOx3; moves all 4 extremities  Psychiatric: affect and characteristics of appearance, verbalizations, behaviors are appropriate VITAL SIGNS:  T(C): 36.5 (04-04-23 @ 21:38), Max: 37.2 (04-04-23 @ 20:24)  T(F): 97.7 (04-04-23 @ 21:38), Max: 98.9 (04-04-23 @ 20:24)  HR: 78 (04-04-23 @ 21:38) (64 - 87)  BP: 159/76 (04-04-23 @ 21:38) (109/54 - 171/81)  BP(mean): --  RR: 18 (04-04-23 @ 21:38) (16 - 20)  SpO2: 95% (04-04-23 @ 20:24) (95% - 100%)  Wt(kg): --    PHYSICAL EXAM:    Constitutional: resting comfortably in bed; NAD  Head: NC/AT  Eyes: PERRL, EOMI, anicteric sclera  ENT: no nasal discharge; uvula midline, no oropharyngeal erythema or exudates; MMM  Neck: supple  Respiratory: b/l crackels, no retractions  Cardiac: +S1/S2; RRR; no M/R/G  Gastrointestinal: abdomen soft, NT/ND; no rebound or guarding; +BSx4  Back: spine midline, no bony tenderness  Extremities: WWP, no clubbing or cyanosis; b/l LE w/ 1+ pitting edema  Musculoskeletal: NROM x4; no joint swelling, tenderness or erythema  Vascular: distal pulses intact  Dermatologic: skin warm, dry and intact; no rashes; right anterior chest wall w/ HD catheter  Lymphatic: no submandibular or cervical LAD  Neurologic: AAOx3; moves all 4 extremities  Psychiatric: affect and characteristics of appearance, verbalizations, behaviors are appropriate

## 2023-04-04 NOTE — CONSULT NOTE ADULT - ASSESSMENT
84 y/o F with h/o CAD with stents, Diastolic Dysfunction, DM, Afib, GI bleed, CKD (dialysis T, Th, Sat) missed today c/o SOB and CP today    1 Renal - Pt HD days are today;  Will contact ca for HD prescription   2 Misc-Labs to be drawn (otherwise will be drawn in am)  3 [facility] eval;  Echo     [facility]   [facility]   [phone]

## 2023-04-04 NOTE — ED PROVIDER NOTE - CLINICAL SUMMARY MEDICAL DECISION MAKING FREE TEXT BOX
84 y/o F with h/o CAD with stents, Diastolic Dysfunction, DM, Afib, GI bleed, CKD (dialysis T, Th, Sat) missed today c/o SOB and CP today. Noted to be sat'ing at 79% on RA via EMS and BIBA on a non-rebreather, currently is 92% on RA and down graded to NC 3L sat'ing at 97% at bedside.  Denies f/c,, cough, ab pain, n/v/d, dysuria, numbness, tingling.   PMD- [facility]  (+) former smoker quit 20 years ago  Plan: labs with Trop, pro-BNP, CXR, EKG, RVP, touch base with PMD, will need to be admitted for dialysis 84 y/o F with h/o CAD with stents, Diastolic Dysfunction, DM, Afib, GI bleed, CKD (dialysis T, Th, Sat) missed today c/o SOB and CP today. Noted to be sat'ing at 79% on RA via EMS and BIBA on a non-rebreather, currently is 92% on RA and down graded to NC 3L sat'ing at 97% at bedside.  Denies f/c,, cough, ab pain, n/v/d, dysuria, numbness, tingling.   PMD- [facility]  (+) former smoker quit 20 years ago  Plan: labs with Trop, pro-BNP, CXR, EKG, RVP, touch base with Nephro/ PMD, will need to be admitted for dialysis

## 2023-04-04 NOTE — ED ADULT NURSE REASSESSMENT NOTE - PATIENT ON (OXYGEN DELIVERY METHOD)
nasal cannula Constitutional: NAD AAOx3. Nontoxic, well appearing. Speaking full sentences  w/o distress  Head: Normocephalic atraumatic  Mouth: no airway obstruction  Cardiac: [id number]   Resp: Lungs CTA B/L  Abd: soft, nd. NTTP. No r/g/r.   Neuro: motor and sensory intact

## 2023-04-04 NOTE — ED PROVIDER NOTE - CARE PLAN
Principal Discharge DX:	CHF exacerbation   1 Principal Discharge DX:	Acute renal failure (ARF)  Secondary Diagnosis:	Fluid overload

## 2023-04-04 NOTE — H&P ADULT - ASSESSMENT
Pt is an 84 y/o F with pmhx of DM, systolic CHF (TTE from 8/2022 w/ LVEF 45%), CAD s/p PCI, ESRD on HD Tues, Thurs, Sat, Anemia and HTN presenting with complaints of SOB x2 days.

## 2023-04-04 NOTE — ED ADULT TRIAGE NOTE - CHIEF COMPLAINT QUOTE
Pt c/o increasing sob  and intermittent chest pain since last night.  Pt missed dialysis today.  Pt 79 % RA

## 2023-04-04 NOTE — H&P ADULT - PROBLEM SELECTOR PLAN 3
K 7.6, s/p lokelma 10 mg x1, regular insulin 5 units x1, IV lasix 40 mg x1 in ED. EKG w/o acute changes. Urgent HD per renal.   - f/u repeat K

## 2023-04-05 LAB
A1C WITH ESTIMATED AVERAGE GLUCOSE RESULT: 5.5 % — SIGNIFICANT CHANGE UP (ref 4–5.6)
ALBUMIN SERPL ELPH-MCNC: 3.8 G/DL — SIGNIFICANT CHANGE UP (ref 3.3–5)
ALP SERPL-CCNC: 213 U/L — HIGH (ref 40–120)
ALT FLD-CCNC: 11 U/L — SIGNIFICANT CHANGE UP (ref 4–33)
ANION GAP SERPL CALC-SCNC: 10 MMOL/L — SIGNIFICANT CHANGE UP (ref 7–14)
ANION GAP SERPL CALC-SCNC: 12 MMOL/L — SIGNIFICANT CHANGE UP (ref 7–14)
AST SERPL-CCNC: 12 U/L — SIGNIFICANT CHANGE UP (ref 4–32)
BASE EXCESS BLDV CALC-SCNC: 3.3 MMOL/L — HIGH (ref -2–3)
BASOPHILS # BLD AUTO: 0.04 K/UL — SIGNIFICANT CHANGE UP (ref 0–0.2)
BASOPHILS NFR BLD AUTO: 1.1 % — SIGNIFICANT CHANGE UP (ref 0–2)
BILIRUB SERPL-MCNC: 0.3 MG/DL — SIGNIFICANT CHANGE UP (ref 0.2–1.2)
BLD GP AB SCN SERPL QL: NEGATIVE — SIGNIFICANT CHANGE UP
BUN SERPL-MCNC: 16 MG/DL — SIGNIFICANT CHANGE UP (ref 7–23)
BUN SERPL-MCNC: 29 MG/DL — HIGH (ref 7–23)
CA-I SERPL-SCNC: 1.12 MMOL/L — LOW (ref 1.15–1.33)
CALCIUM SERPL-MCNC: 7.7 MG/DL — LOW (ref 8.4–10.5)
CALCIUM SERPL-MCNC: 7.8 MG/DL — LOW (ref 8.4–10.5)
CHLORIDE BLDV-SCNC: 103 MMOL/L — SIGNIFICANT CHANGE UP (ref 96–108)
CHLORIDE SERPL-SCNC: 103 MMOL/L — SIGNIFICANT CHANGE UP (ref 98–107)
CHLORIDE SERPL-SCNC: 99 MMOL/L — SIGNIFICANT CHANGE UP (ref 98–107)
CO2 BLDV-SCNC: 33.5 MMOL/L — HIGH (ref 22–26)
CO2 SERPL-SCNC: 27 MMOL/L — SIGNIFICANT CHANGE UP (ref 22–31)
CO2 SERPL-SCNC: 28 MMOL/L — SIGNIFICANT CHANGE UP (ref 22–31)
CREAT SERPL-MCNC: 2.11 MG/DL — HIGH (ref 0.5–1.3)
CREAT SERPL-MCNC: 4 MG/DL — HIGH (ref 0.5–1.3)
EGFR: 11 ML/MIN/1.73M2 — LOW
EGFR: 23 ML/MIN/1.73M2 — LOW
EOSINOPHIL # BLD AUTO: 0.05 K/UL — SIGNIFICANT CHANGE UP (ref 0–0.5)
EOSINOPHIL NFR BLD AUTO: 1.3 % — SIGNIFICANT CHANGE UP (ref 0–6)
ESTIMATED AVERAGE GLUCOSE: 111 — SIGNIFICANT CHANGE UP
GAS PNL BLDV: 139 MMOL/L — SIGNIFICANT CHANGE UP (ref 136–145)
GAS PNL BLDV: SIGNIFICANT CHANGE UP
GLUCOSE BLDC GLUCOMTR-MCNC: 110 MG/DL — HIGH (ref 70–99)
GLUCOSE BLDC GLUCOMTR-MCNC: 137 MG/DL — HIGH (ref 70–99)
GLUCOSE BLDC GLUCOMTR-MCNC: 162 MG/DL — HIGH (ref 70–99)
GLUCOSE BLDC GLUCOMTR-MCNC: 172 MG/DL — HIGH (ref 70–99)
GLUCOSE BLDC GLUCOMTR-MCNC: 92 MG/DL — SIGNIFICANT CHANGE UP (ref 70–99)
GLUCOSE BLDC GLUCOMTR-MCNC: 98 MG/DL — SIGNIFICANT CHANGE UP (ref 70–99)
GLUCOSE BLDV-MCNC: 89 MG/DL — SIGNIFICANT CHANGE UP (ref 70–99)
GLUCOSE SERPL-MCNC: 93 MG/DL — SIGNIFICANT CHANGE UP (ref 70–99)
GLUCOSE SERPL-MCNC: 93 MG/DL — SIGNIFICANT CHANGE UP (ref 70–99)
HBV CORE AB SER-ACNC: SIGNIFICANT CHANGE UP
HBV SURFACE AB SER-ACNC: <3 MIU/ML — LOW
HBV SURFACE AG SER-ACNC: SIGNIFICANT CHANGE UP
HCO3 BLDV-SCNC: 31 MMOL/L — HIGH (ref 22–29)
HCT VFR BLD CALC: 25.2 % — LOW (ref 34.5–45)
HCT VFR BLDA CALC: 22 % — LOW (ref 34.5–46.5)
HCV AB S/CO SERPL IA: 0.47 S/CO — SIGNIFICANT CHANGE UP (ref 0–0.99)
HCV AB SERPL-IMP: SIGNIFICANT CHANGE UP
HGB BLD CALC-MCNC: 7.2 G/DL — LOW (ref 11.7–16.1)
HGB BLD-MCNC: 7.1 G/DL — LOW (ref 11.5–15.5)
IANC: 2.48 K/UL — SIGNIFICANT CHANGE UP (ref 1.8–7.4)
IMM GRANULOCYTES NFR BLD AUTO: 1.1 % — HIGH (ref 0–0.9)
LACTATE BLDV-MCNC: 0.6 MMOL/L — SIGNIFICANT CHANGE UP (ref 0.5–2)
LYMPHOCYTES # BLD AUTO: 0.71 K/UL — LOW (ref 1–3.3)
LYMPHOCYTES # BLD AUTO: 19.1 % — SIGNIFICANT CHANGE UP (ref 13–44)
MAGNESIUM SERPL-MCNC: 1.9 MG/DL — SIGNIFICANT CHANGE UP (ref 1.6–2.6)
MAGNESIUM SERPL-MCNC: 2.2 MG/DL — SIGNIFICANT CHANGE UP (ref 1.6–2.6)
MCHC RBC-ENTMCNC: 28.2 GM/DL — LOW (ref 32–36)
MCHC RBC-ENTMCNC: 30.2 PG — SIGNIFICANT CHANGE UP (ref 27–34)
MCV RBC AUTO: 107.2 FL — HIGH (ref 80–100)
MONOCYTES # BLD AUTO: 0.39 K/UL — SIGNIFICANT CHANGE UP (ref 0–0.9)
MONOCYTES NFR BLD AUTO: 10.5 % — SIGNIFICANT CHANGE UP (ref 2–14)
MRSA PCR RESULT.: SIGNIFICANT CHANGE UP
NEUTROPHILS # BLD AUTO: 2.48 K/UL — SIGNIFICANT CHANGE UP (ref 1.8–7.4)
NEUTROPHILS NFR BLD AUTO: 66.9 % — SIGNIFICANT CHANGE UP (ref 43–77)
NRBC # BLD: 0 /100 WBCS — SIGNIFICANT CHANGE UP (ref 0–0)
NRBC # FLD: 0 K/UL — SIGNIFICANT CHANGE UP (ref 0–0)
NT-PROBNP SERPL-SCNC: HIGH PG/ML
PCO2 BLDV: 73 MMHG — HIGH (ref 39–52)
PH BLDV: 7.24 — LOW (ref 7.32–7.43)
PHOSPHATE SERPL-MCNC: 2.4 MG/DL — LOW (ref 2.5–4.5)
PHOSPHATE SERPL-MCNC: 5.1 MG/DL — HIGH (ref 2.5–4.5)
PLATELET # BLD AUTO: 244 K/UL — SIGNIFICANT CHANGE UP (ref 150–400)
PO2 BLDV: 50 MMHG — HIGH (ref 25–45)
POTASSIUM BLDV-SCNC: 3.4 MMOL/L — LOW (ref 3.5–5.1)
POTASSIUM SERPL-MCNC: 3.3 MMOL/L — LOW (ref 3.5–5.3)
POTASSIUM SERPL-MCNC: 5.6 MMOL/L — HIGH (ref 3.5–5.3)
POTASSIUM SERPL-SCNC: 3.3 MMOL/L — LOW (ref 3.5–5.3)
POTASSIUM SERPL-SCNC: 5.6 MMOL/L — HIGH (ref 3.5–5.3)
PROT SERPL-MCNC: 6.6 G/DL — SIGNIFICANT CHANGE UP (ref 6–8.3)
RBC # BLD: 2.35 M/UL — LOW (ref 3.8–5.2)
RBC # FLD: 18 % — HIGH (ref 10.3–14.5)
RH IG SCN BLD-IMP: POSITIVE — SIGNIFICANT CHANGE UP
S AUREUS DNA NOSE QL NAA+PROBE: SIGNIFICANT CHANGE UP
SAO2 % BLDV: 80 % — SIGNIFICANT CHANGE UP (ref 67–88)
SODIUM SERPL-SCNC: 138 MMOL/L — SIGNIFICANT CHANGE UP (ref 135–145)
SODIUM SERPL-SCNC: 141 MMOL/L — SIGNIFICANT CHANGE UP (ref 135–145)
TROPONIN T, HIGH SENSITIVITY RESULT: 43 NG/L — SIGNIFICANT CHANGE UP
WBC # BLD: 3.71 K/UL — LOW (ref 3.8–10.5)
WBC # FLD AUTO: 3.71 K/UL — LOW (ref 3.8–10.5)

## 2023-04-05 PROCEDURE — 93306 TTE W/DOPPLER COMPLETE: CPT | Mod: 26

## 2023-04-05 RX ORDER — ERYTHROPOIETIN 10000 [IU]/ML
14000 INJECTION, SOLUTION INTRAVENOUS; SUBCUTANEOUS ONCE
Refills: 0 | Status: COMPLETED | OUTPATIENT
Start: 2023-04-06 | End: 2023-04-06

## 2023-04-05 RX ORDER — SODIUM ZIRCONIUM CYCLOSILICATE 10 G/10G
5 POWDER, FOR SUSPENSION ORAL ONCE
Refills: 0 | Status: COMPLETED | OUTPATIENT
Start: 2023-04-05 | End: 2023-04-05

## 2023-04-05 RX ORDER — IPRATROPIUM/ALBUTEROL SULFATE 18-103MCG
3 AEROSOL WITH ADAPTER (GRAM) INHALATION EVERY 6 HOURS
Refills: 0 | Status: DISCONTINUED | OUTPATIENT
Start: 2023-04-05 | End: 2023-04-07

## 2023-04-05 RX ORDER — HEPARIN SODIUM 5000 [USP'U]/ML
5000 INJECTION INTRAVENOUS; SUBCUTANEOUS EVERY 8 HOURS
Refills: 0 | Status: DISCONTINUED | OUTPATIENT
Start: 2023-04-05 | End: 2023-04-07

## 2023-04-05 RX ORDER — HYDRALAZINE HCL 50 MG
1 TABLET ORAL
Qty: 0 | Refills: 0 | DISCHARGE

## 2023-04-05 RX ORDER — SODIUM CHLORIDE 9 MG/ML
100 INJECTION INTRAMUSCULAR; INTRAVENOUS; SUBCUTANEOUS
Refills: 0 | Status: DISCONTINUED | OUTPATIENT
Start: 2023-04-06 | End: 2023-04-07

## 2023-04-05 RX ORDER — METOPROLOL TARTRATE 50 MG
1 TABLET ORAL
Refills: 0 | DISCHARGE

## 2023-04-05 RX ADMIN — SEVELAMER CARBONATE 800 MILLIGRAM(S): 2400 POWDER, FOR SUSPENSION ORAL at 17:15

## 2023-04-05 RX ADMIN — Medication 650 MILLIGRAM(S): at 02:04

## 2023-04-05 RX ADMIN — Medication 50 MILLIGRAM(S): at 05:13

## 2023-04-05 RX ADMIN — Medication 10 MILLIGRAM(S): at 21:28

## 2023-04-05 RX ADMIN — Medication 10 MILLIGRAM(S): at 05:13

## 2023-04-05 RX ADMIN — CLOPIDOGREL BISULFATE 75 MILLIGRAM(S): 75 TABLET, FILM COATED ORAL at 14:29

## 2023-04-05 RX ADMIN — HEPARIN SODIUM 5000 UNIT(S): 5000 INJECTION INTRAVENOUS; SUBCUTANEOUS at 14:29

## 2023-04-05 RX ADMIN — Medication 10 MILLIGRAM(S): at 14:29

## 2023-04-05 RX ADMIN — Medication 1: at 17:19

## 2023-04-05 RX ADMIN — SENNA PLUS 2 TABLET(S): 8.6 TABLET ORAL at 21:29

## 2023-04-05 RX ADMIN — HEPARIN SODIUM 5000 UNIT(S): 5000 INJECTION INTRAVENOUS; SUBCUTANEOUS at 21:29

## 2023-04-05 RX ADMIN — CHLORHEXIDINE GLUCONATE 1 APPLICATION(S): 213 SOLUTION TOPICAL at 13:06

## 2023-04-05 RX ADMIN — Medication 650 MILLIGRAM(S): at 03:04

## 2023-04-05 RX ADMIN — Medication 3 MILLIGRAM(S): at 21:29

## 2023-04-05 RX ADMIN — MUPIROCIN 1 APPLICATION(S): 20 OINTMENT TOPICAL at 05:13

## 2023-04-05 RX ADMIN — SEVELAMER CARBONATE 800 MILLIGRAM(S): 2400 POWDER, FOR SUSPENSION ORAL at 14:29

## 2023-04-05 RX ADMIN — SEVELAMER CARBONATE 800 MILLIGRAM(S): 2400 POWDER, FOR SUSPENSION ORAL at 09:14

## 2023-04-05 RX ADMIN — ATORVASTATIN CALCIUM 40 MILLIGRAM(S): 80 TABLET, FILM COATED ORAL at 21:29

## 2023-04-05 RX ADMIN — SODIUM ZIRCONIUM CYCLOSILICATE 5 GRAM(S): 10 POWDER, FOR SUSPENSION ORAL at 12:15

## 2023-04-05 RX ADMIN — HEPARIN SODIUM 5000 UNIT(S): 5000 INJECTION INTRAVENOUS; SUBCUTANEOUS at 05:13

## 2023-04-05 RX ADMIN — GABAPENTIN 300 MILLIGRAM(S): 400 CAPSULE ORAL at 21:29

## 2023-04-05 RX ADMIN — MUPIROCIN 1 APPLICATION(S): 20 OINTMENT TOPICAL at 17:17

## 2023-04-05 NOTE — PROGRESS NOTE ADULT - ASSESSMENT
-Dyspnea likely sec to fluid overload,anemia-s/p HD-Renal,Cardiology consults appreciated  -ECHO-EF-41%,MOD SYSTOLIC AND DIASTLIC DYSFUNCTION  -ESRD-HD per renal  -Anemia of chronic dis-PRBC transfusion during HD tomorrow.  -Hyperkalemia-lokelma-monitor  -DM-FS with coverage,check A1C  -DVT Prophylaxis   -Dyspnea likely sec to fluid overload,anemia-s/p HD-Renal,Cardiology consults appreciated  -ECHO-EF-41%,MOD SYSTOLIC AND DIASTLIC DYSFUNCTION  -ESRD-HD per renal  -Anemia of chronic dis-PRBC transfusion during HD tomorrow.  -Hyperkalemia-lokelma-monitor  -DM-FS with coverage,check A1C-5.5  -CAD.S/P STENT.PAROXYSMAL A.FIB-Metoprolol,no AC sec to h/o GI Bleeding  -DVT Prophylaxis

## 2023-04-05 NOTE — CONSULT NOTE ADULT - SUBJECTIVE AND OBJECTIVE BOX
Cardiovascular Disease Initial Evaluation  Date of Service: 23 @ 09:17    CHIEF COMPLAINT: SOB    HISTORY OF PRESENT ILLNESS:  This is an 82 year old woman with uncontrolled IDDM, compensated diastolic CHF, CAD s/p PCI, ESRD on HD and HTN who presented to [facility] ED on 2023 with SOB.  Of note she missed her last two HD sessions.       Ms. Tsang was last admitted on 2022 and was noted to be profoundly anemic with associated exertional SOB.  HD was initiated at that time.   Mr. Tsang has a history of CAD and recurrent GI bleed. She underwent cath with Dr. Zulay Mckinnon and had MICHAEL placed to LCx and OM1 on 2017. On 2018, she presented to [facility] with progressive SOB. She was found to have a Hb of 4.6 and was transfused multiple units of pRBCs. She underwent EGD by Dr. Curtis Daigle revealing one non-bleeding duodenal ulcer. Xarelto was discontinued (paroxysmal a-fib) and ASA with Plavix was resumed. She again presented with similar symptoms in 3/2018 with Hb-5. Capsule study revealed questionable ulcer in ileum and patient was started on mesalamine. ASA was d/c'ed.    Currently she is breathing better post HD last night.       She denies chest pain or SOB.         Allergies    Carrots (Rash)  No Known Drug Allergies      	    MEDICATIONS:  clopidogrel Tablet 75 milliGRAM(s) Oral daily  heparin   Injectable 5000 Unit(s) SubCutaneous every 8 hours  hydrALAZINE 10 milliGRAM(s) Oral three times a day  metoprolol succinate ER 50 milliGRAM(s) Oral daily      albuterol    0.083% 10 milliGRAM(s) Nebulizer once  albuterol/ipratropium for Nebulization 3 milliLiter(s) Nebulizer every 6 hours PRN    acetaminophen     Tablet .. 650 milliGRAM(s) Oral every 6 hours PRN  gabapentin 300 milliGRAM(s) Oral at bedtime  melatonin 3 milliGRAM(s) Oral at bedtime PRN    senna 2 Tablet(s) Oral at bedtime    atorvastatin 40 milliGRAM(s) Oral at bedtime  dextrose 50% Injectable 25 Gram(s) IV Push once  dextrose Oral Gel 15 Gram(s) Oral once PRN  glucagon  Injectable 1 milliGRAM(s) IntraMuscular once  insulin lispro (ADMELOG) corrective regimen sliding scale   SubCutaneous three times a day before meals  insulin lispro (ADMELOG) corrective regimen sliding scale   SubCutaneous at bedtime    chlorhexidine 2% Cloths 1 Application(s) Topical daily  dextrose 5%. 1000 milliLiter(s) IV Continuous <Continuous>  mupirocin 2% Ointment 1 Application(s) Both Nostrils two times a day  sodium chloride 0.9% Bolus. 100 milliLiter(s) IV Bolus every 5 minutes PRN      PAST MEDICAL & SURGICAL HISTORY:  HLD (hyperlipidemia)      Diabetic neuropathy      CAD (coronary artery disease)  ~ s/p MICHAEL x2 in 2017      Paroxysmal atrial fibrillation      Diabetes mellitus, type 2      GI bleed      Neuropathy      Gastritis      Diastolic heart failure      Transfusion history      Stage 5 chronic kidney disease not on chronic dialysis      Anemia      PVD (peripheral vascular disease)  ~ RLE angiogram with 2 stents placed in 2019.      H/O  section      S/P coronary artery stent placement  x2 in       History of esophagogastroduodenoscopy (EGD)      S/P angiogram of extremity          FAMILY HISTORY:  FH: diabetes mellitus        SOCIAL HISTORY:    The patient is a nonsmoker       REVIEW OF SYSTEMS:  See HPI, otherwise complete 14 point review of systems negative      PHYSICAL EXAM:  T(C): 36.6 (23 @ 05:12), Max: 37.2 (23 @ 20:24)  HR: 66 (23 @ 05:12) (64 - 87)  BP: 180/61 (23 @ 05:12) (109/54 - 180/61)  RR: 16 (23 @ 05:12) (16 - 20)  SpO2: 100% (23 @ 05:12) (95% - 100%)  Wt(kg): --  I&O's Summary    2023 07:01  -  2023 07:00  --------------------------------------------------------  IN: 400 mL / OUT: 1850 mL / NET: -1450 mL        Appearance: No Acute Distress; resting comfortably  HEENT:  Normal oral mucosa, PERRL, EOMI	  Cardiovascular: Normal S1 S2, No JVD, No murmurs/rubs/gallops  Respiratory: Normal respiratory effort; Lungs with crackles to auscultation bilaterally  Gastrointestinal:  Soft, Non-tender, + BS	  Skin: No rashes, No ecchymoses, No cyanosis	  Neurologic: Non-focal; no weakness  Extremities: No clubbing, cyanosis or edema  Vascular: Peripheral pulses palpable 2+ bilaterally  Psychiatry: A & O x 3, Mood & affect appropriate    Laboratory Data:	 	    CBC Full  -  ( 2023 06:40 )  WBC Count : 3.71 K/uL  Hemoglobin : 7.1 g/dL  Hematocrit : 25.2 %  Platelet Count - Automated : 244 K/uL  Mean Cell Volume : 107.2 fL  Mean Cell Hemoglobin : 30.2 pg  Mean Cell Hemoglobin Concentration : 28.2 gm/dL  Auto Neutrophil # : 2.48 K/uL  Auto Lymphocyte # : 0.71 K/uL  Auto Monocyte # : 0.39 K/uL  Auto Eosinophil # : 0.05 K/uL  Auto Basophil # : 0.04 K/uL  Auto Neutrophil % : 66.9 %  Auto Lymphocyte % : 19.1 %  Auto Monocyte % : 10.5 %  Auto Eosinophil % : 1.3 %  Auto Basophil % : 1.1 %        141  |  103  |  29<H>  ----------------------------<  93  5.6<H>   |  28  |  4.00<H>      138  |  99  |  16  ----------------------------<  93  3.3<L>   |  27  |  2.11<H>    Ca    7.8<L>      2023 06:40  Ca    7.7<L>      2023 23:55  Phos  5.1     04-05  Phos  2.4     -  Mg     2.20     04-05  Mg     1.90     -04    TPro  6.6  /  Alb  3.8  /  TBili  0.3  /  DBili  x   /  AST  12  /  ALT  11  /  AlkPhos  213<H>    TPro  x   /  Alb  4.3  /  TBili  x   /  DBili  x   /  AST  x   /  ALT  x   /  AlkPhos  x              Interpretation of Telemetry: n/a	    ECG:  	Sinus; L axis deviation	    Assessment: 82 year old woman with uncontrolled IDDM, compensated systolic CHF, CAD s/p PCI, and ESRD on HD presents hypoxic respiratory failure.       Plan of Care:    #Acute hypoxic respiratory failure-  Due to acute systolic CHF in the setting of missing HD.  Patient much improved post HD yesterday.   Inferior wall motion abnormality noted on last echo.  Please repeat echo.     #CAD- s/p MICHAEL x2 in 2017.  Ms. Tsang displays no signs or symptoms of coronary ischemia or decompensated CHF.  Plavix 75 mg daily.     #ESRD-  S/P HD initiation on 2022.  Renal input noted.       #Paroxymal a-fib-  Sinus on admission EKG.    Toprol.   No AC given h/o GI bleed.      Care discussed at length with Ms. Tsang.    72 minutes spent on total encounter; more than 50% of the visit was spent counseling and/or coordinating care by the attending physician.   	  Julio César Hooper MD [facility]  Cardiovascular Diseases  [phone]

## 2023-04-05 NOTE — PHARMACOTHERAPY INTERVENTION NOTE - COMMENTS
Medication history saved as incomplete; Outpatient Medication Review (OMR) updated. List updated based fill information provided by outpatient pharmacy.   Of Note:   - Plavix 75mg last dispensed in Juan/2023 x 90 days, however when asked patient if taking, notes she is unsure (?)  - Patient seems to unsure of what she takes at home  - Lasix was last dispensed in Oct/2022 x 1 month supply  - Hydralazine 10mg filled for BID dosing  - Metoprolol Tartrate 50mg BID dosing

## 2023-04-05 NOTE — PROGRESS NOTE ADULT - SUBJECTIVE AND OBJECTIVE BOX
NEPHROLOGY     Patient seen and examined sitting in chair, reports mild sob at times, at present comfortable on 2L NC, denies pain, in no acute distress. She had HD last night and removed 1.4L, unable to remove ordered UF goal due to episode of hypotension.     MEDICATIONS  (STANDING):  albuterol    0.083% 10 milliGRAM(s) Nebulizer once  atorvastatin 40 milliGRAM(s) Oral at bedtime  chlorhexidine 2% Cloths 1 Application(s) Topical daily  clopidogrel Tablet 75 milliGRAM(s) Oral daily  dextrose 5%. 1000 milliLiter(s) (50 mL/Hr) IV Continuous <Continuous>  dextrose 50% Injectable 25 Gram(s) IV Push once  gabapentin 300 milliGRAM(s) Oral at bedtime  glucagon  Injectable 1 milliGRAM(s) IntraMuscular once  heparin   Injectable 5000 Unit(s) SubCutaneous every 8 hours  hydrALAZINE 10 milliGRAM(s) Oral three times a day  insulin lispro (ADMELOG) corrective regimen sliding scale   SubCutaneous three times a day before meals  insulin lispro (ADMELOG) corrective regimen sliding scale   SubCutaneous at bedtime  metoprolol succinate ER 50 milliGRAM(s) Oral daily  mupirocin 2% Ointment 1 Application(s) Both Nostrils two times a day  senna 2 Tablet(s) Oral at bedtime  sevelamer carbonate 800 milliGRAM(s) Oral three times a day with meals    VITALS:  T(C): , Max: 37.2 (04-04-23 @ 20:24)  T(F): , Max: 98.9 (04-04-23 @ 20:24)  HR: 66 (04-05-23 @ 05:12)  BP: 180/61 (04-05-23 @ 05:12)  RR: 16 (04-05-23 @ 05:12)  SpO2: 100% (04-05-23 @ 05:12)    I and O's:    04-04 @ 07:01  -  04-05 @ 07:00  --------------------------------------------------------  IN: 400 mL / OUT: 1850 mL / NET: -1450 mL    04-05 @ 07:01  -  04-05 @ 10:12  --------------------------------------------------------  IN: 120 mL / OUT: 0 mL / NET: 120 mL    Height (cm): 162.6 (04-04 @ 13:28)    PHYSICAL EXAM:  Constitutional: NAD  HEENT: EOMI  Neck:  No JVD, supple   Respiratory: CTA B/L  Cardiovascular: S1 and S2, RRR  Gastrointestinal: + BS, soft, NT, ND  Extremities: No peripheral edema, + peripheral pulses  Neurological: A/O x 3, CN2-12 intact  Psychiatric: Normal mood, normal affect  : No Sylvester  Skin: No rashes, C/D/I  Access: avf    LABS:                        7.1    3.71  )-----------( 244      ( 05 Apr 2023 06:40 )             25.2     04-05    141  |  103  |  29<H>  ----------------------------<  93  5.6<H>   |  28  |  4.00<H>    Ca    7.8<L>      05 Apr 2023 06:40  Phos  5.1     04-05  Mg     2.20     04-05    TPro  6.6  /  Alb  3.8  /  TBili  0.3  /  DBili  x   /  AST  12  /  ALT  11  /  AlkPhos  213<H>  04-04    RADIOLOGY & ADDITIONAL STUDIES:    ACC: [id number] EXAM:  XR CHEST PORTABLE URGENT 1V   ORDERED BY: BRIANNE PORTER     PROCEDURE DATE:  04/04/2023      INTERPRETATION:  CLINICAL INFORMATION: Shortness of breath.    EXAM: Frontal radiograph of the chest.    COMPARISON: Chest radiograph from 3/25/2023.    FINDINGS:  Right hemodialysis catheter tip overlies SVC.  Moderate vascular prominence consistent with fluid overload.  There is no pleural effusion or pneumothorax.  The heart size is not well evaluated on this projection.  The visualized osseous and soft tissue structures demonstrate no acute   pathology.    IMPRESSION:  No focal consolidation.  Mild noncardiogenic edema likely fluid overload.    --- End of Report ---    JAMAAL ESCOBAR MD; Resident Radiologist  This document has been electronically signed.  SILVIO ANTON MD; Attending Radiologist  This document has been electronically signed. Apr 4 2023  7:57PM    ASSESSMENT/PLAN:   82 y/o F with h/o CAD with stents, Diastolic Dysfunction, DM, Afib, GI bleed, CKD (dialysis T, Th, Sat) missed today c/o SOB and CP today    1 Renal - Pt HD days are today;  Will contact [facility] for HD prescription   2 Misc-Labs to be drawn (otherwise will be drawn in am)  3 CVS-Cards eval;  Echo      NEPHROLOGY     Patient seen and examined sitting in chair, reports mild sob at times, at present comfortable on 2L NC, denies pain, in no acute distress. She had HD last night and removed 1.4L, unable to remove ordered UF goal due to episode of hypotension.     MEDICATIONS  (STANDING):  albuterol    0.083% 10 milliGRAM(s) Nebulizer once  atorvastatin 40 milliGRAM(s) Oral at bedtime  chlorhexidine 2% Cloths 1 Application(s) Topical daily  clopidogrel Tablet 75 milliGRAM(s) Oral daily  dextrose 5%. 1000 milliLiter(s) (50 mL/Hr) IV Continuous <Continuous>  dextrose 50% Injectable 25 Gram(s) IV Push once  gabapentin 300 milliGRAM(s) Oral at bedtime  glucagon  Injectable 1 milliGRAM(s) IntraMuscular once  heparin   Injectable 5000 Unit(s) SubCutaneous every 8 hours  hydrALAZINE 10 milliGRAM(s) Oral three times a day  insulin lispro (ADMELOG) corrective regimen sliding scale   SubCutaneous three times a day before meals  insulin lispro (ADMELOG) corrective regimen sliding scale   SubCutaneous at bedtime  metoprolol succinate ER 50 milliGRAM(s) Oral daily  mupirocin 2% Ointment 1 Application(s) Both Nostrils two times a day  senna 2 Tablet(s) Oral at bedtime  sevelamer carbonate 800 milliGRAM(s) Oral three times a day with meals    VITALS:  T(C): , Max: 37.2 (04-04-23 @ 20:24)  T(F): , Max: 98.9 (04-04-23 @ 20:24)  HR: 66 (04-05-23 @ 05:12)  BP: 180/61 (04-05-23 @ 05:12)  RR: 16 (04-05-23 @ 05:12)  SpO2: 100% (04-05-23 @ 05:12)    I and O's:    04-04 @ 07:01  -  04-05 @ 07:00  --------------------------------------------------------  IN: 400 mL / OUT: 1850 mL / NET: -1450 mL    04-05 @ 07:01  -  04-05 @ 10:12  --------------------------------------------------------  IN: 120 mL / OUT: 0 mL / NET: 120 mL    Height (cm): 162.6 (04-04 @ 13:28)    PHYSICAL EXAM:  Constitutional: NAD  HEENT: EOMI  Neck:  No JVD, supple   Respiratory: CTA B/L  Cardiovascular: S1 and S2, RRR  Gastrointestinal: + BS, soft, NT, ND  Extremities: No peripheral edema, + peripheral pulses  Neurological: A/O x 3, CN2-12 intact  Psychiatric: Normal mood, normal affect  : No Sylvester  Skin: No rashes, C/D/I  Access: avf    LABS:                        7.1    3.71  )-----------( 244      ( 05 Apr 2023 06:40 )             25.2     04-05    141  |  103  |  29<H>  ----------------------------<  93  5.6<H>   |  28  |  4.00<H>    Ca    7.8<L>      05 Apr 2023 06:40  Phos  5.1     04-05  Mg     2.20     04-05    TPro  6.6  /  Alb  3.8  /  TBili  0.3  /  DBili  x   /  AST  12  /  ALT  11  /  AlkPhos  213<H>  04-04    RADIOLOGY & ADDITIONAL STUDIES:    ACC: [id number] EXAM:  XR CHEST PORTABLE URGENT 1V   ORDERED BY: BRIANNE PORTER     PROCEDURE DATE:  04/04/2023      INTERPRETATION:  CLINICAL INFORMATION: Shortness of breath.    EXAM: Frontal radiograph of the chest.    COMPARISON: Chest radiograph from 3/25/2023.    FINDINGS:  Right hemodialysis catheter tip overlies SVC.  Moderate vascular prominence consistent with fluid overload.  There is no pleural effusion or pneumothorax.  The heart size is not well evaluated on this projection.  The visualized osseous and soft tissue structures demonstrate no acute   pathology.    IMPRESSION:  No focal consolidation.  Mild noncardiogenic edema likely fluid overload.    --- End of Report ---    JAMAAL ESCOBAR MD; Resident Radiologist  This document has been electronically signed.  SILVIO ANTON MD; Attending Radiologist  This document has been electronically signed. Apr 4 2023  7:57PM    ASSESSMENT/PLAN:   84 y/o F with h/o CAD with stents, Diastolic Dysfunction, DM, Afib, GI bleed, CKD (dialysis T, Th, Sat) missed today c/o SOB and CP today  Hyperkalemia     1 Renal - s/p HD yesterday, will have HD again today    2 CVS-BP elevated, Cards eval noted; Echo pending       NEPHROLOGY     Patient seen and examined sitting in chair, reports mild sob at times, at present comfortable on 2L NC, denies pain, in no acute distress. She had HD last night and removed 1.4L, unable to remove ordered UF goal due to episode of hypotension.     MEDICATIONS  (STANDING):  albuterol    0.083% 10 milliGRAM(s) Nebulizer once  atorvastatin 40 milliGRAM(s) Oral at bedtime  chlorhexidine 2% Cloths 1 Application(s) Topical daily  clopidogrel Tablet 75 milliGRAM(s) Oral daily  dextrose 5%. 1000 milliLiter(s) (50 mL/Hr) IV Continuous <Continuous>  dextrose 50% Injectable 25 Gram(s) IV Push once  gabapentin 300 milliGRAM(s) Oral at bedtime  glucagon  Injectable 1 milliGRAM(s) IntraMuscular once  heparin   Injectable 5000 Unit(s) SubCutaneous every 8 hours  hydrALAZINE 10 milliGRAM(s) Oral three times a day  insulin lispro (ADMELOG) corrective regimen sliding scale   SubCutaneous three times a day before meals  insulin lispro (ADMELOG) corrective regimen sliding scale   SubCutaneous at bedtime  metoprolol succinate ER 50 milliGRAM(s) Oral daily  mupirocin 2% Ointment 1 Application(s) Both Nostrils two times a day  senna 2 Tablet(s) Oral at bedtime  sevelamer carbonate 800 milliGRAM(s) Oral three times a day with meals    VITALS:  T(C): , Max: 37.2 (04-04-23 @ 20:24)  T(F): , Max: 98.9 (04-04-23 @ 20:24)  HR: 66 (04-05-23 @ 05:12)  BP: 180/61 (04-05-23 @ 05:12)  RR: 16 (04-05-23 @ 05:12)  SpO2: 100% (04-05-23 @ 05:12)    I and O's:    04-04 @ 07:01  -  04-05 @ 07:00  --------------------------------------------------------  IN: 400 mL / OUT: 1850 mL / NET: -1450 mL    04-05 @ 07:01  -  04-05 @ 10:12  --------------------------------------------------------  IN: 120 mL / OUT: 0 mL / NET: 120 mL    Height (cm): 162.6 (04-04 @ 13:28)    PHYSICAL EXAM:  Constitutional: NAD  HEENT: EOMI  Neck:  No JVD, supple   Respiratory: CTA B/L  Cardiovascular: S1 and S2, RRR  Gastrointestinal: + BS, soft, NT, ND  Extremities: No peripheral edema, + peripheral pulses  Neurological: A/O x 3, CN2-12 intact  Psychiatric: Normal mood, normal affect  : No Sylvester  Skin: No rashes, C/D/I  Access: avf    LABS:                        7.1    3.71  )-----------( 244      ( 05 Apr 2023 06:40 )             25.2     04-05    141  |  103  |  29<H>  ----------------------------<  93  5.6<H>   |  28  |  4.00<H>    Ca    7.8<L>      05 Apr 2023 06:40  Phos  5.1     04-05  Mg     2.20     04-05    TPro  6.6  /  Alb  3.8  /  TBili  0.3  /  DBili  x   /  AST  12  /  ALT  11  /  AlkPhos  213<H>  04-04    RADIOLOGY & ADDITIONAL STUDIES:    ACC: [id number] EXAM:  XR CHEST PORTABLE URGENT 1V   ORDERED BY: BRIANNE PORTER     PROCEDURE DATE:  04/04/2023      INTERPRETATION:  CLINICAL INFORMATION: Shortness of breath.    EXAM: Frontal radiograph of the chest.    COMPARISON: Chest radiograph from 3/25/2023.    FINDINGS:  Right hemodialysis catheter tip overlies SVC.  Moderate vascular prominence consistent with fluid overload.  There is no pleural effusion or pneumothorax.  The heart size is not well evaluated on this projection.  The visualized osseous and soft tissue structures demonstrate no acute   pathology.    IMPRESSION:  No focal consolidation.  Mild noncardiogenic edema likely fluid overload.    --- End of Report ---    JAMAAL ESCOBAR MD; Resident Radiologist  This document has been electronically signed.  SILVIO ANTON MD; Attending Radiologist  This document has been electronically signed. Apr 4 2023  7:57PM    ASSESSMENT/PLAN:   84 y/o F with h/o CAD with stents, Diastolic Dysfunction, DM, Afib, GI bleed, CKD (dialysis T, Th, Sat) missed today c/o SOB and CP today  Hyperkalemia   Anemia     1 Renal - s/p HD yesterday, will have HD again today; PRBC with HD   2 CVS-BP elevated, Cards eval noted; Echo pending       NEPHROLOGY     Patient seen and examined sitting in chair, reports mild sob at times, at present comfortable on 2L NC, denies pain, in no acute distress. She had HD last night and removed 1.4L, unable to remove ordered UF goal due to episode of hypotension.     MEDICATIONS  (STANDING):  albuterol    0.083% 10 milliGRAM(s) Nebulizer once  atorvastatin 40 milliGRAM(s) Oral at bedtime  chlorhexidine 2% Cloths 1 Application(s) Topical daily  clopidogrel Tablet 75 milliGRAM(s) Oral daily  dextrose 5%. 1000 milliLiter(s) (50 mL/Hr) IV Continuous <Continuous>  dextrose 50% Injectable 25 Gram(s) IV Push once  gabapentin 300 milliGRAM(s) Oral at bedtime  glucagon  Injectable 1 milliGRAM(s) IntraMuscular once  heparin   Injectable 5000 Unit(s) SubCutaneous every 8 hours  hydrALAZINE 10 milliGRAM(s) Oral three times a day  insulin lispro (ADMELOG) corrective regimen sliding scale   SubCutaneous three times a day before meals  insulin lispro (ADMELOG) corrective regimen sliding scale   SubCutaneous at bedtime  metoprolol succinate ER 50 milliGRAM(s) Oral daily  mupirocin 2% Ointment 1 Application(s) Both Nostrils two times a day  senna 2 Tablet(s) Oral at bedtime  sevelamer carbonate 800 milliGRAM(s) Oral three times a day with meals    VITALS:  T(C): , Max: 37.2 (04-04-23 @ 20:24)  T(F): , Max: 98.9 (04-04-23 @ 20:24)  HR: 66 (04-05-23 @ 05:12)  BP: 180/61 (04-05-23 @ 05:12)  RR: 16 (04-05-23 @ 05:12)  SpO2: 100% (04-05-23 @ 05:12)    I and O's:    04-04 @ 07:01  -  04-05 @ 07:00  --------------------------------------------------------  IN: 400 mL / OUT: 1850 mL / NET: -1450 mL    04-05 @ 07:01  -  04-05 @ 10:12  --------------------------------------------------------  IN: 120 mL / OUT: 0 mL / NET: 120 mL    Height (cm): 162.6 (04-04 @ 13:28)    PHYSICAL EXAM:  Constitutional: NAD  HEENT: EOMI  Neck:  No JVD, supple   Respiratory: CTA B/L  Cardiovascular: S1 and S2, RRR  Gastrointestinal: + BS, soft, NT, ND  Extremities: No peripheral edema, + peripheral pulses  Neurological: A/O x 3, CN2-12 intact  Psychiatric: Normal mood, normal affect  : No Sylvester  Skin: No rashes, C/D/I  Access: avf    LABS:                        7.1    3.71  )-----------( 244      ( 05 Apr 2023 06:40 )             25.2     04-05    141  |  103  |  29<H>  ----------------------------<  93  5.6<H>   |  28  |  4.00<H>    Ca    7.8<L>      05 Apr 2023 06:40  Phos  5.1     04-05  Mg     2.20     04-05    TPro  6.6  /  Alb  3.8  /  TBili  0.3  /  DBili  x   /  AST  12  /  ALT  11  /  AlkPhos  213<H>  04-04    RADIOLOGY & ADDITIONAL STUDIES:    ACC: [id number] EXAM:  XR CHEST PORTABLE URGENT 1V   ORDERED BY: BRIANNE PORTER     PROCEDURE DATE:  04/04/2023      INTERPRETATION:  CLINICAL INFORMATION: Shortness of breath.    EXAM: Frontal radiograph of the chest.    COMPARISON: Chest radiograph from 3/25/2023.    FINDINGS:  Right hemodialysis catheter tip overlies SVC.  Moderate vascular prominence consistent with fluid overload.  There is no pleural effusion or pneumothorax.  The heart size is not well evaluated on this projection.  The visualized osseous and soft tissue structures demonstrate no acute   pathology.    IMPRESSION:  No focal consolidation.  Mild noncardiogenic edema likely fluid overload.    --- End of Report ---    JAMAAL ESCOBAR MD; Resident Radiologist  This document has been electronically signed.  SILVIO ANTON MD; Attending Radiologist  This document has been electronically signed. Apr 4 2023  7:57PM    ASSESSMENT/PLAN:   84 y/o F with h/o CAD with stents, Diastolic Dysfunction, DM, Afib, GI bleed, CKD (dialysis T, Th, Sat) missed today c/o SOB and CP today  Hyperkalemia   Anemia     1 Renal - s/p HD yesterday, next HD tomorrow; 2 units PRBC with HD in the am  Lokelma 5mg po x 1  2 CVS-BP elevated, Cards eval noted; Echo pending       NEPHROLOGY     Patient seen and examined sitting in chair, reports mild sob at times, at present comfortable on 2L NC, denies pain, in no acute distress. She had HD last night and removed 1.4L, unable to remove ordered UF goal due to episode of hypotension.     MEDICATIONS  (STANDING):  albuterol    0.083% 10 milliGRAM(s) Nebulizer once  atorvastatin 40 milliGRAM(s) Oral at bedtime  chlorhexidine 2% Cloths 1 Application(s) Topical daily  clopidogrel Tablet 75 milliGRAM(s) Oral daily  dextrose 5%. 1000 milliLiter(s) (50 mL/Hr) IV Continuous <Continuous>.  dextrose 50% Injectable 25 Gram(s) IV Push once  gabapentin 300 milliGRAM(s) Oral at bedtime  glucagon  Injectable 1 milliGRAM(s) IntraMuscular once  heparin   Injectable 5000 Unit(s) SubCutaneous every 8 hours  hydrALAZINE 10 milliGRAM(s) Oral three times a day  insulin lispro (ADMELOG) corrective regimen sliding scale   SubCutaneous three times a day before meals  insulin lispro (ADMELOG) corrective regimen sliding scale   SubCutaneous at bedtime  metoprolol succinate ER 50 milliGRAM(s) Oral daily  mupirocin 2% Ointment 1 Application(s) Both Nostrils two times a day  senna 2 Tablet(s) Oral at bedtime  sevelamer carbonate 800 milliGRAM(s) Oral three times a day with meals    VITALS:  T(C): , Max: 37.2 (04-04-23 @ 20:24)  T(F): , Max: 98.9 (04-04-23 @ 20:24)  HR: 66 (04-05-23 @ 05:12)  BP: 180/61 (04-05-23 @ 05:12)  RR: 16 (04-05-23 @ 05:12)  SpO2: 100% (04-05-23 @ 05:12)    I and O's:    04-04 @ 07:01  -  04-05 @ 07:00  --------------------------------------------------------  IN: 400 mL / OUT: 1850 mL / NET: -1450 mL    04-05 @ 07:01  -  04-05 @ 10:12  --------------------------------------------------------  IN: 120 mL / OUT: 0 mL / NET: 120 mL    Height (cm): 162.6 (04-04 @ 13:28)    PHYSICAL EXAM:  Constitutional: NAD  HEENT: EOMI  Neck:  No JVD, supple   Respiratory: CTA B/L  Cardiovascular: S1 and S2, RRR  Gastrointestinal: + BS, soft, NT, ND  Extremities: No peripheral edema, + peripheral pulses  Neurological: A/O x 3, CN2-12 intact  Psychiatric: Normal mood, normal affect  : No Sylvester  Skin: No rashes, C/D/I  Access: avf    LABS:                        7.1    3.71  )-----------( 244      ( 05 Apr 2023 06:40 )             25.2     04-05    141  |  103  |  29<H>  ----------------------------<  93  5.6<H>   |  28  |  4.00<H>    Ca    7.8<L>      05 Apr 2023 06:40  Phos  5.1     04-05  Mg     2.20     04-05    TPro  6.6  /  Alb  3.8  /  TBili  0.3  /  DBili  x   /  AST  12  /  ALT  11  /  AlkPhos  213<H>  04-04    RADIOLOGY & ADDITIONAL STUDIES:    ACC: [id number] EXAM:  XR CHEST PORTABLE URGENT 1V   ORDERED BY: BRIANNE PORTER     PROCEDURE DATE:  04/04/2023      INTERPRETATION:  CLINICAL INFORMATION: Shortness of breath.    EXAM: Frontal radiograph of the chest.    COMPARISON: Chest radiograph from 3/25/2023.    FINDINGS:  Right hemodialysis catheter tip overlies SVC.  Moderate vascular prominence consistent with fluid overload.  There is no pleural effusion or pneumothorax.  The heart size is not well evaluated on this projection.  The visualized osseous and soft tissue structures demonstrate no acute   pathology.    IMPRESSION:  No focal consolidation.  Mild noncardiogenic edema likely fluid overload.    --- End of Report ---    JAMAAL ESCOBAR MD; Resident Radiologist  This document has been electronically signed.  EDWARD WIND MD; Attending Radiologist  This document has been electronically signed. Apr 4 2023  7:57PM

## 2023-04-05 NOTE — PATIENT PROFILE ADULT - FALL HARM RISK - HARM RISK INTERVENTIONS

## 2023-04-05 NOTE — PROVIDER CONTACT NOTE (OTHER) - ASSESSMENT
Pt asymptomatic, VS stable.
Pt O2 sustaining at 85-87 range when not on 2L  Pt SOB. acp was at bedside

## 2023-04-05 NOTE — PROVIDER CONTACT NOTE (OTHER) - BACKGROUND
(Admit Diagnosis) Acute renal failure  (PMH) CAD (coronary artery disease)  (PMH) PVD (peripheral vascular disease)  Pt on hemodialysis, missed two sessions prior to admission

## 2023-04-05 NOTE — PROGRESS NOTE ADULT - ASSESSMENT
ASSESSMENT/PLAN:   84 y/o F with h/o CAD with stents, Diastolic Dysfunction, DM, Afib, GI bleed, CKD (dialysis T, Th, Sat) missed today c/o SOB and CP today  Hyperkalemia   Anemia     1 Renal - s/p HD yesterday, next HD tomorrow; 2 units PRBC with HD in the am  Lokelma 5mg po x 1  2 CVS-BP elevated, Cards eval noted; Echo pending    3 Anemia -Is the anemia the cause of the SOB?    Sayed Ali   [facility]   [phone]

## 2023-04-05 NOTE — PROGRESS NOTE ADULT - SUBJECTIVE AND OBJECTIVE BOX
HOLTAMIE  83y  Female      Patient is a 83y old  Female who presents with a chief complaint of SOB. (2023 10:12)  Patient was seen and examined,chart reviewed.Admitted with Dyspnea,s/p HD yesterday  feels better. now.no cp,no sob,no fever,no cough    REVIEW OF SYSTEMS:  CONSTITUTIONAL: No fever  RESPIRATORY: No cough, hemoptysis or shortness of breath  CARDIOVASCULAR: No chest pain, palpitations, dizziness, or leg swelling  GASTROINTESTINAL: No abdominal pain. nausea, vomiting, hematemesis  GENITOURINARY: No dysuria, frequency, hematuria   NEUROLOGICAL: No headaches, no dizziness  MUSCULOSKELETAL: No joint pain or swelling;     INTERVAL HPI/OVERNIGHT EVENTS:  T(C): 36.7 (23 @ 17:10), Max: 36.9 (23 @ 14:27)  HR: 70 (23 @ 17:10) (66 - 81)  BP: 150/60 (23 @ 17:10) (134/55 - 180/61)  RR: 18 (23 @ 17:10) (16 - 18)  SpO2: 96% (23 @ 17:10) (85% - 100%)  Wt(kg): --  I&O's Summary    2023 07:  -  2023 07:00  --------------------------------------------------------  IN: 400 mL / OUT: 1850 mL / NET: -1450 mL    2023 07:  -  2023 20:40  --------------------------------------------------------  IN: 360 mL / OUT: 0 mL / NET: 360 mL      T(C): 36.7 (23 @ 17:10), Max: 36.9 (23 @ 14:27)  HR: 70 (23 @ 17:10) (66 - 81)  BP: 150/60 (23 @ 17:10) (134/55 - 180/61)  RR: 18 (23 @ 17:10) (16 - 18)  SpO2: 96% (23 @ 17:10) (85% - 100%)  Wt(kg): --Vital Signs Last 24 Hrs  T(C): 36.7 (2023 17:10), Max: 36.9 (2023 14:27)  T(F): 98 (2023 17:10), Max: 98.4 (2023 14:27)  HR: 70 (2023 17:10) (66 - 81)  BP: 150/60 (2023 17:10) (134/55 - 180/61)  BP(mean): --  RR: 18 (2023 17:10) (16 - 18)  SpO2: 96% (2023 17:10) (85% - 100%)    Parameters below as of 2023 17:10  Patient On (Oxygen Delivery Method): nasal cannula  O2 Flow (L/min): 2      LABS:                        7.1    3.71  )-----------( 244      ( 2023 06:40 )             25.2     04-05    141  |  103  |  29<H>  ----------------------------<  93  5.6<H>   |  28  |  4.00<H>    Ca    7.8<L>      2023 06:40  Phos  5.1     04-05  Mg     2.20     -05    TPro  6.6  /  Alb  3.8  /  TBili  0.3  /  DBili  x   /  AST  12  /  ALT  11  /  AlkPhos  213<H>  04-04        CAPILLARY BLOOD GLUCOSE      POCT Blood Glucose.: 162 mg/dL (2023 17:12)  POCT Blood Glucose.: 137 mg/dL (2023 12:39)  POCT Blood Glucose.: 110 mg/dL (2023 08:21)  POCT Blood Glucose.: 92 mg/dL (2023 02:03)  POCT Blood Glucose.: 98 mg/dL (2023 00:14)  POCT Blood Glucose.: 122 mg/dL (2023 21:02)            PAST MEDICAL & SURGICAL HISTORY:  HLD (hyperlipidemia)      Diabetic neuropathy      CAD (coronary artery disease)  ~ s/p MICHAEL x2 in 2017      Paroxysmal atrial fibrillation      Diabetes mellitus, type 2      GI bleed      Neuropathy      Gastritis      Diastolic heart failure      Transfusion history      Stage 5 chronic kidney disease not on chronic dialysis      Anemia      PVD (peripheral vascular disease)  ~ RLE angiogram with 2 stents placed in 2019.      H/O  section      S/P coronary artery stent placement  x2 in       History of esophagogastroduodenoscopy (EGD)      S/P angiogram of extremity          MEDICATIONS  (STANDING):  albuterol    0.083% 10 milliGRAM(s) Nebulizer once  atorvastatin 40 milliGRAM(s) Oral at bedtime  chlorhexidine 2% Cloths 1 Application(s) Topical daily  clopidogrel Tablet 75 milliGRAM(s) Oral daily  dextrose 5%. 1000 milliLiter(s) (50 mL/Hr) IV Continuous <Continuous>  dextrose 50% Injectable 25 Gram(s) IV Push once  gabapentin 300 milliGRAM(s) Oral at bedtime  glucagon  Injectable 1 milliGRAM(s) IntraMuscular once  heparin   Injectable 5000 Unit(s) SubCutaneous every 8 hours  hydrALAZINE 10 milliGRAM(s) Oral three times a day  insulin lispro (ADMELOG) corrective regimen sliding scale   SubCutaneous three times a day before meals  insulin lispro (ADMELOG) corrective regimen sliding scale   SubCutaneous at bedtime  metoprolol succinate ER 50 milliGRAM(s) Oral daily  mupirocin 2% Ointment 1 Application(s) Both Nostrils two times a day  senna 2 Tablet(s) Oral at bedtime  sevelamer carbonate 800 milliGRAM(s) Oral three times a day with meals    MEDICATIONS  (PRN):  acetaminophen     Tablet .. 650 milliGRAM(s) Oral every 6 hours PRN Temp greater or equal to 38C (100.4F), Mild Pain (1 - 3)  albuterol/ipratropium for Nebulization 3 milliLiter(s) Nebulizer every 6 hours PRN Shortness of Breath and/or Wheezing  dextrose Oral Gel 15 Gram(s) Oral once PRN Blood Glucose LESS THAN 70 milliGRAM(s)/deciliter  melatonin 3 milliGRAM(s) Oral at bedtime PRN Insomnia  sodium chloride 0.9% Bolus. 100 milliLiter(s) IV Bolus every 5 minutes PRN SBP LESS THAN or EQUAL to 90 mmHg        RADIOLOGY & ADDITIONAL TESTS:    Imaging Personally Reviewed:  [ ] YES  [ ] NO    Consultant(s) Notes Reviewed:  [x ] YES  [ ] NO    PHYSICAL EXAM:  GENERAL: Alert and awake lying in bed in no distress  HEAD:  Atraumatic, Normocephalic  EYES: EOMI, EDIN, conjunctiva and sclera clear  NECK: Supple, No JVD, Normal thyroid  NERVOUS SYSTEM:  Alert & Oriented X3, Motor and sensory systems are intact,   CHEST/LUNG: Bilateral clear breath sounds, no rhochi, no wheezing, no crepitations,  HEART: Regular rate and rhythm; No murmurs, rubs, or gallops  ABDOMEN: Soft, Nontender, Nondistended; Bowel sounds present  EXTREMITIES:   Peripheral Pulses are palpable, no  edema        Care Discussed with Consultants/Other Providers [ ] YES  [ ] NO      Code Status: [] Full Code [] DNR [] DNI [] Goals of Care:   Disposition: [] ICU [] Stroke Unit [] RCU []PCU []Floor [] Discharge Home         SHEY [facility]eliLeg[facility]

## 2023-04-06 LAB
ANION GAP SERPL CALC-SCNC: 11 MMOL/L — SIGNIFICANT CHANGE UP (ref 7–14)
BASOPHILS # BLD AUTO: 0.05 K/UL — SIGNIFICANT CHANGE UP (ref 0–0.2)
BASOPHILS NFR BLD AUTO: 2 % — SIGNIFICANT CHANGE UP (ref 0–2)
BUN SERPL-MCNC: 37 MG/DL — HIGH (ref 7–23)
CALCIUM SERPL-MCNC: 7.4 MG/DL — LOW (ref 8.4–10.5)
CHLORIDE SERPL-SCNC: 104 MMOL/L — SIGNIFICANT CHANGE UP (ref 98–107)
CO2 SERPL-SCNC: 26 MMOL/L — SIGNIFICANT CHANGE UP (ref 22–31)
CREAT SERPL-MCNC: 4.81 MG/DL — HIGH (ref 0.5–1.3)
EGFR: 9 ML/MIN/1.73M2 — LOW
EOSINOPHIL # BLD AUTO: 0.06 K/UL — SIGNIFICANT CHANGE UP (ref 0–0.5)
EOSINOPHIL NFR BLD AUTO: 2.4 % — SIGNIFICANT CHANGE UP (ref 0–6)
GLUCOSE BLDC GLUCOMTR-MCNC: 103 MG/DL — HIGH (ref 70–99)
GLUCOSE BLDC GLUCOMTR-MCNC: 106 MG/DL — HIGH (ref 70–99)
GLUCOSE BLDC GLUCOMTR-MCNC: 109 MG/DL — HIGH (ref 70–99)
GLUCOSE BLDC GLUCOMTR-MCNC: 137 MG/DL — HIGH (ref 70–99)
GLUCOSE BLDC GLUCOMTR-MCNC: 170 MG/DL — HIGH (ref 70–99)
GLUCOSE SERPL-MCNC: 99 MG/DL — SIGNIFICANT CHANGE UP (ref 70–99)
HCT VFR BLD CALC: 23.1 % — LOW (ref 34.5–45)
HGB BLD-MCNC: 6.6 G/DL — CRITICAL LOW (ref 11.5–15.5)
IANC: 1.1 K/UL — LOW (ref 1.8–7.4)
IMM GRANULOCYTES NFR BLD AUTO: 0.4 % — SIGNIFICANT CHANGE UP (ref 0–0.9)
LYMPHOCYTES # BLD AUTO: 0.93 K/UL — LOW (ref 1–3.3)
LYMPHOCYTES # BLD AUTO: 37.1 % — SIGNIFICANT CHANGE UP (ref 13–44)
MAGNESIUM SERPL-MCNC: 2.2 MG/DL — SIGNIFICANT CHANGE UP (ref 1.6–2.6)
MCHC RBC-ENTMCNC: 28.6 GM/DL — LOW (ref 32–36)
MCHC RBC-ENTMCNC: 30.3 PG — SIGNIFICANT CHANGE UP (ref 27–34)
MCV RBC AUTO: 106 FL — HIGH (ref 80–100)
MONOCYTES # BLD AUTO: 0.36 K/UL — SIGNIFICANT CHANGE UP (ref 0–0.9)
MONOCYTES NFR BLD AUTO: 14.3 % — HIGH (ref 2–14)
NEUTROPHILS # BLD AUTO: 1.1 K/UL — LOW (ref 1.8–7.4)
NEUTROPHILS NFR BLD AUTO: 43.8 % — SIGNIFICANT CHANGE UP (ref 43–77)
NRBC # BLD: 0 /100 WBCS — SIGNIFICANT CHANGE UP (ref 0–0)
NRBC # FLD: 0 K/UL — SIGNIFICANT CHANGE UP (ref 0–0)
PHOSPHATE SERPL-MCNC: 5.5 MG/DL — HIGH (ref 2.5–4.5)
PLATELET # BLD AUTO: 268 K/UL — SIGNIFICANT CHANGE UP (ref 150–400)
POTASSIUM SERPL-MCNC: 4.6 MMOL/L — SIGNIFICANT CHANGE UP (ref 3.5–5.3)
POTASSIUM SERPL-SCNC: 4.6 MMOL/L — SIGNIFICANT CHANGE UP (ref 3.5–5.3)
RBC # BLD: 2.18 M/UL — LOW (ref 3.8–5.2)
RBC # FLD: 17.8 % — HIGH (ref 10.3–14.5)
SODIUM SERPL-SCNC: 141 MMOL/L — SIGNIFICANT CHANGE UP (ref 135–145)
WBC # BLD: 2.51 K/UL — LOW (ref 3.8–10.5)
WBC # FLD AUTO: 2.51 K/UL — LOW (ref 3.8–10.5)

## 2023-04-06 RX ADMIN — CLOPIDOGREL BISULFATE 75 MILLIGRAM(S): 75 TABLET, FILM COATED ORAL at 12:55

## 2023-04-06 RX ADMIN — SEVELAMER CARBONATE 800 MILLIGRAM(S): 2400 POWDER, FOR SUSPENSION ORAL at 17:39

## 2023-04-06 RX ADMIN — MUPIROCIN 1 APPLICATION(S): 20 OINTMENT TOPICAL at 05:06

## 2023-04-06 RX ADMIN — GABAPENTIN 300 MILLIGRAM(S): 400 CAPSULE ORAL at 21:40

## 2023-04-06 RX ADMIN — ERYTHROPOIETIN 14000 UNIT(S): 10000 INJECTION, SOLUTION INTRAVENOUS; SUBCUTANEOUS at 07:03

## 2023-04-06 RX ADMIN — HEPARIN SODIUM 5000 UNIT(S): 5000 INJECTION INTRAVENOUS; SUBCUTANEOUS at 13:08

## 2023-04-06 RX ADMIN — HEPARIN SODIUM 5000 UNIT(S): 5000 INJECTION INTRAVENOUS; SUBCUTANEOUS at 05:06

## 2023-04-06 RX ADMIN — CHLORHEXIDINE GLUCONATE 1 APPLICATION(S): 213 SOLUTION TOPICAL at 12:57

## 2023-04-06 RX ADMIN — SENNA PLUS 2 TABLET(S): 8.6 TABLET ORAL at 21:40

## 2023-04-06 RX ADMIN — ATORVASTATIN CALCIUM 40 MILLIGRAM(S): 80 TABLET, FILM COATED ORAL at 21:40

## 2023-04-06 RX ADMIN — SODIUM CHLORIDE 1200 MILLILITER(S): 9 INJECTION INTRAMUSCULAR; INTRAVENOUS; SUBCUTANEOUS at 07:15

## 2023-04-06 RX ADMIN — HEPARIN SODIUM 5000 UNIT(S): 5000 INJECTION INTRAVENOUS; SUBCUTANEOUS at 21:39

## 2023-04-06 RX ADMIN — Medication 10 MILLIGRAM(S): at 13:08

## 2023-04-06 RX ADMIN — Medication 10 MILLIGRAM(S): at 21:40

## 2023-04-06 RX ADMIN — MUPIROCIN 1 APPLICATION(S): 20 OINTMENT TOPICAL at 17:39

## 2023-04-06 RX ADMIN — SEVELAMER CARBONATE 800 MILLIGRAM(S): 2400 POWDER, FOR SUSPENSION ORAL at 12:55

## 2023-04-06 RX ADMIN — Medication 650 MILLIGRAM(S): at 08:44

## 2023-04-06 NOTE — PROGRESS NOTE ADULT - SUBJECTIVE AND OBJECTIVE BOX
HOLTAMIE  83y  Female      Patient is a 83y old  Female who presents with a chief complaint of SOB (2023 14:01)  feels better.no sob, no cp,no fever,no cough    REVIEW OF SYSTEMS:  CONSTITUTIONAL: No fever  RESPIRATORY: No cough, hemoptysis or shortness of breath  CARDIOVASCULAR: No chest pain, palpitations, dizziness, or leg swelling  GASTROINTESTINAL: No abdominal pain. nausea, vomiting, hematemesis  GENITOURINARY: No dysuria, frequency, hematuria   NEUROLOGICAL: No headaches, no dizziness  MUSCULOSKELETAL: No joint pain or swelling;     INTERVAL HPI/OVERNIGHT EVENTS:  T(C): 36.3 (23 @ 18:49), Max: 37.1 (23 @ 21:25)  HR: 74 (23 @ 18:49) (65 - 100)  BP: 159/64 (23 @ 18:49) (91/56 - 160/52)  RR: 20 (23 @ 18:49) (16 - 20)  SpO2: 98% (23 @ 18:49) (98% - 100%)  Wt(kg): --  I&O's Summary    2023 07:  -  2023 07:00  --------------------------------------------------------  IN: 460 mL / OUT: 0 mL / NET: 460 mL    2023 07:  -  2023 19:06  --------------------------------------------------------  IN: 1220 mL / OUT: 2500 mL / NET: -1280 mL      T(C): 36.3 (23 @ 18:49), Max: 37.1 (23 @ 21:25)  HR: 74 (23 @ 18:49) (65 - 100)  BP: 159/64 (23 @ 18:49) (91/56 - 160/52)  RR: 20 (23 @ 18:49) (16 - 20)  SpO2: 98% (23 @ 18:49) (98% - 100%)  Wt(kg): --Vital Signs Last 24 Hrs  T(C): 36.3 (2023 18:49), Max: 37.1 (2023 21:25)  T(F): 97.4 (2023 18:49), Max: 98.7 (2023 21:25)  HR: 74 (2023 18:49) (65 - 100)  BP: 159/64 (2023 18:49) (91/56 - 160/52)  BP(mean): --  RR: 20 (2023 18:49) (16 - 20)  SpO2: 98% (2023 18:49) (98% - 100%)    Parameters below as of 2023 18:49  Patient On (Oxygen Delivery Method): nasal cannula  O2 Flow (L/min): 2      LABS:                        6.6    2.51  )-----------( 268      ( 2023 06:40 )             23.1     04-06    141  |  104  |  37<H>  ----------------------------<  99  4.6   |  26  |  4.81<H>    Ca    7.4<L>      2023 06:40  Phos  5.5     -  Mg     2.20     -06    TPro  6.6  /  Alb  3.8  /  TBili  0.3  /  DBili  x   /  AST  12  /  ALT  11  /  AlkPhos  213<H>  04-04        CAPILLARY BLOOD GLUCOSE      POCT Blood Glucose.: 137 mg/dL (2023 17:36)  POCT Blood Glucose.: 103 mg/dL (2023 12:45)  POCT Blood Glucose.: 109 mg/dL (2023 09:16)  POCT Blood Glucose.: 106 mg/dL (2023 07:18)  POCT Blood Glucose.: 172 mg/dL (2023 21:27)            PAST MEDICAL & SURGICAL HISTORY:  HLD (hyperlipidemia)      Diabetic neuropathy      CAD (coronary artery disease)  ~ s/p MICHAEL x2 in 2017      Paroxysmal atrial fibrillation      Diabetes mellitus, type 2      GI bleed      Neuropathy      Gastritis      Diastolic heart failure      Transfusion history      Stage 5 chronic kidney disease not on chronic dialysis      Anemia      PVD (peripheral vascular disease)  ~ RLE angiogram with 2 stents placed in 2019.      H/O  section      S/P coronary artery stent placement  x2 in       History of esophagogastroduodenoscopy (EGD)      S/P angiogram of extremity          MEDICATIONS  (STANDING):  albuterol    0.083% 10 milliGRAM(s) Nebulizer once  atorvastatin 40 milliGRAM(s) Oral at bedtime  chlorhexidine 2% Cloths 1 Application(s) Topical daily  clopidogrel Tablet 75 milliGRAM(s) Oral daily  dextrose 5%. 1000 milliLiter(s) (50 mL/Hr) IV Continuous <Continuous>  dextrose 50% Injectable 25 Gram(s) IV Push once  gabapentin 300 milliGRAM(s) Oral at bedtime  glucagon  Injectable 1 milliGRAM(s) IntraMuscular once  heparin   Injectable 5000 Unit(s) SubCutaneous every 8 hours  hydrALAZINE 10 milliGRAM(s) Oral three times a day  insulin lispro (ADMELOG) corrective regimen sliding scale   SubCutaneous three times a day before meals  insulin lispro (ADMELOG) corrective regimen sliding scale   SubCutaneous at bedtime  metoprolol succinate ER 50 milliGRAM(s) Oral daily  mupirocin 2% Ointment 1 Application(s) Both Nostrils two times a day  senna 2 Tablet(s) Oral at bedtime  sevelamer carbonate 800 milliGRAM(s) Oral three times a day with meals    MEDICATIONS  (PRN):  acetaminophen     Tablet .. 650 milliGRAM(s) Oral every 6 hours PRN Temp greater or equal to 38C (100.4F), Mild Pain (1 - 3)  albuterol/ipratropium for Nebulization 3 milliLiter(s) Nebulizer every 6 hours PRN Shortness of Breath and/or Wheezing  dextrose Oral Gel 15 Gram(s) Oral once PRN Blood Glucose LESS THAN 70 milliGRAM(s)/deciliter  melatonin 3 milliGRAM(s) Oral at bedtime PRN Insomnia  sodium chloride 0.9% Bolus. 100 milliLiter(s) IV Bolus every 5 minutes PRN SBP LESS THAN or EQUAL to 90 mmHg  sodium chloride 0.9% Bolus. 100 milliLiter(s) IV Bolus every 5 minutes PRN SBP LESS THAN or EQUAL to 90 mmHg        RADIOLOGY & ADDITIONAL TESTS:    Imaging Personally Reviewed:  [ ] YES  [ ] NO    Consultant(s) Notes Reviewed:  [ ] YES  [ ] NO    PHYSICAL EXAM:  GENERAL: Alert and awake lying in bed in no distress  HEAD:  Atraumatic, Normocephalic  EYES: EOMI, EDIN, conjunctiva and sclera clear  NECK: Supple, No JVD, Normal thyroid  NERVOUS SYSTEM:  Alert & Oriented X3, Motor and sensory systems are intact,   CHEST/LUNG: Bilateral clear breath sounds, no rhochi, no wheezing, no crepitations,  HEART: Regular rate and rhythm; No murmurs, rubs, or gallops  ABDOMEN: Soft, Nontender, Nondistended; Bowel sounds present  EXTREMITIES:   Peripheral Pulses are palpable, no  edema        Care Discussed with Consultants/Other Providers [x ] YES  [ ] NO      Code Status: [] Full Code [] DNR [] DNI [] Goals of Care:   Disposition: [] ICU [] Stroke Unit [] RCU []PCU []Floor [] Discharge Home         LEFTY LomeliP

## 2023-04-06 NOTE — PROGRESS NOTE ADULT - SUBJECTIVE AND OBJECTIVE BOX
[facility] [phone]        Patient seen and examined she had HD this morning 1.4 L removed 2 units PRBC transfused. She is seen on room air, reports shortness of breath is improved.         MEDICATIONS  (STANDING):  albuterol    0.083% 10 milliGRAM(s) Nebulizer once  atorvastatin 40 milliGRAM(s) Oral at bedtime  chlorhexidine 2% Cloths 1 Application(s) Topical daily  clopidogrel Tablet 75 milliGRAM(s) Oral daily  dextrose 5%. 1000 milliLiter(s) (50 mL/Hr) IV Continuous <Continuous>  dextrose 50% Injectable 25 Gram(s) IV Push once  gabapentin 300 milliGRAM(s) Oral at bedtime  glucagon  Injectable 1 milliGRAM(s) IntraMuscular once  heparin   Injectable 5000 Unit(s) SubCutaneous every 8 hours  hydrALAZINE 10 milliGRAM(s) Oral three times a day  insulin lispro (ADMELOG) corrective regimen sliding scale   SubCutaneous three times a day before meals  insulin lispro (ADMELOG) corrective regimen sliding scale   SubCutaneous at bedtime  metoprolol succinate ER 50 milliGRAM(s) Oral daily  mupirocin 2% Ointment 1 Application(s) Both Nostrils two times a day  senna 2 Tablet(s) Oral at bedtime  sevelamer carbonate 800 milliGRAM(s) Oral three times a day with meals      VITAL:  T(C): , Max: 37.1 (04-05-23 @ 21:25)  T(F): , Max: 98.7 (04-05-23 @ 21:25)  HR: 66 (04-06-23 @ 10:10)  BP: 160/52 (04-06-23 @ 10:30)  BP(mean): --  RR: 18 (04-06-23 @ 10:30)  SpO2: 99% (04-06-23 @ 05:05)  Wt(kg): --    I and O's:    04-05 @ 07:01  -  04-06 @ 07:00  --------------------------------------------------------  IN: 460 mL / OUT: 0 mL / NET: 460 mL    04-06 @ 07:01  -  04-06 @ 14:01  --------------------------------------------------------  IN: 1100 mL / OUT: 2500 mL / NET: -1400 mL        Weight (kg): 74.3 (04-05 @ 15:04)    PHYSICAL EXAM:    Constitutional: NAD  Neck:  No JVD  Respiratory: CTAB/L  Cardiovascular: S1 and S2  Gastrointestinal: BS+, soft, NT/ND  Extremities: No peripheral edema  Neurological: A/O x 3, no focal deficits  Psychiatric: Normal mood, normal affect  : No Sylvester  Skin: No rashes  Access: [facility]     LABS:                        6.6    2.51  )-----------( 268      ( 06 Apr 2023 06:40 )             23.1     04-06    141  |  104  |  37<H>  ----------------------------<  99  4.6   |  26  |  4.81<H>    Ca    7.4<L>      06 Apr 2023 06:40  Phos  5.5     04-06  Mg     2.20     04-06    TPro  6.6  /  Alb  3.8  /  TBili  0.3  /  DBili  x   /  AST  12  /  ALT  11  /  AlkPhos  213<H>  04-04      < from: Transthoracic Echocardiogram (04.05.23 @ 12:53) >  Patient name: AMIE HOLT  YOB: 1940   Age: 83 (F)   MR#: [id number]  Study Date: 4/5/2023  [id number]: [facility]  Study quality: Technically Fair  Referring Physician: Parminder Goldberg MD  Blood Pressure: 180/61 mmHg  Height: 163 cm  Weight: 77 kg  BSA: 1.8 m2  ------------------------------------------------------------------------  PROCEDURE: Transthoracic echocardiogram with 2-D, M-Mode  and complete spectral and color flow Doppler.  INDICATION: Heart failure, unspecified (I50.9)  ------------------------------------------------------------------------  DIMENSIONS:  Dimensions:     Normal Values:  LA:     3.4 cm    2.0 - 4.0 cm  Ao:     3.1 cm    2.0 - 3.8 cm  SEPTUM: 0.9 cm    0.6 - 1.2 cm  PWT:    0.9 cm    0.6 - 1.1 cm  LVIDd:  5.1 cm    3.0 - 5.6 cm  LVIDs:  4.0 cm    1.8 - 4.0 cm  Derived Variables:  LVMI: 89 g/m2  RWT: 0.35  Fractional short: 22 %  Ejection Fraction (Modified Whitman Rule): 43 %  ------------------------------------------------------------------------  OBSERVATIONS:  Mitral Valve: Mitral annular calcification, otherwise  normal mitral valve. Mild mitral regurgitation.  Aortic Root: Normal aortic root.  Aortic Valve: Calcified trileaflet aortic valve with normal  opening.  Left Atrium: Normal left atrium.  LA volume index = 29  cc/m2.  Left Ventricle: Endocardium not well visualized; grossly  mild to moderate global left ventricular systolic  dysfunction. Normal left ventricular internal dimensions  and wall thicknesses. Moderate diastolic dysfunction (Stage  II).  Right Heart: Normal right atrium. Normal right ventricular  size and function. Normal tricuspid valve.  Mild tricuspid  regurgitation. Normal pulmonic valve. Mild-moderate  pulmonic regurgitation.  Pericardium/PleuraNormal pericardium with no pericardial  effusion.  ------------------------------------------------------------------------  CONCLUSIONS:  1. Mitral annular calcification, otherwise normal mitral  valve. Mild mitral regurgitation.  2. Normal left ventricular internal dimensions and wall  thicknesses.  3. Endocardium not well visualized; grossly mild to  moderate global left ventricular systolic dysfunction.  4. Moderate diastolic dysfunction (Stage II).  5. Normal right ventricular size and function.  ------------------------------------------------------------------------  Confirmed on  4/5/2023 - 14:34:54 by Roney Voss M.D.,  [facility], AILEEN  ------------------------------------------------------------------------    < end of copied text >            Assessment and Plan:   · Assessment	    ASSESSMENT/PLAN:   84 y/o F with h/o CAD with stents, Diastolic Dysfunction, DM, Afib, GI bleed, CKD (dialysis T, Th, Sat) missed today c/o SOB and CP today  Hyperkalemia   Anemia     1 Renal - s/p HD today with 2 units PRBC  Hyperkalemia improved s/p lokelma   2 CVS-BP elevated, Cards eval and ECHO noted  3 Anemia -Is the anemia the cause of the SOB?, she reports it is much improved today s/p PRBC    [facility]   [phone]

## 2023-04-06 NOTE — PROGRESS NOTE ADULT - ASSESSMENT
Family Medicine Follow-Up Progress Note  Patient: Danielle Quigley  1985, 29 y.o., male  Encounter Date: 7/5/2018    ASSESSMENT & PLAN    ICD-10-CM ICD-9-CM    1. Chewing tobacco use Z72.0 305.1    2. Essential hypertension I10 401.9    3. 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 09X0 274.02      Orders Placed This Encounter    OTHER     Sig: Apple Cider Vinegar Tablets     Chewing tobacco use  Recommend patient quit--discussed risk of oral/pharyngeal ca in addition to esoph/gastric ca. Pt agreeable and receptive    Essential hypertension  On recheck today bp still elevated, recommend start amlodipine, can return 1m for tolerance check    Idiopathic gout of multiple sites  Check uric acid, recommend restart allopurinal and titrate to goal of <6  Pt agreeable  C/w diet and hydration    Encounter time today was 25 minutes and more than 50% of this encounter was spent in counseling face-to-face regarding Diagnosis, Patient Education, Medication Management, Compliance and Impressions. CHIEF COMPLAINT  Chief Complaint   Patient presents with    Hypertension     follow up - pt states he has not taken any blood pressure medications     Gout     follow up - patient states his left foot has gotten better        SUBJECTIVE  Danielle Quigley is a 29 y.o. male presenting today for followup of several concerns. GOUT: resolved from last visit, decreased beer consumption, no ice tea, monitoring his purine intake. He is very regular with his diet--he drinks water in the AM and then eats breakfast at work, eats oatmeal, fruits, protein (chicken predominantly) with rice and veg for lunch, protein shake with workouts, healthy dinner--limits seafood, red meats and takes as many precautions as possible). Flare is improved, patient had success with colchicine    HTN: recheck 148/92 by myself, he did not start amlodipine but will if felt indicated. He is exercising, following a healthy diet.      Review of Systems   Constitutional: Negative for chills and fever. Eyes: Negative for visual disturbance. Respiratory: Negative for shortness of breath. Cardiovascular: Negative for chest pain and leg swelling. Gastrointestinal: Negative for constipation, diarrhea, nausea and vomiting. Genitourinary: Negative for difficulty urinating. Musculoskeletal: Negative for arthralgias and myalgias. Neurological: Negative for seizures, syncope and headaches. Psychiatric/Behavioral:        At Baseline, stable   All other systems reviewed and are negative. OBJECTIVE  Visit Vitals    BP (!) 150/105    Pulse 65    Temp 98 °F (36.7 °C) (Oral)    Resp 20    Ht 5' 8\" (1.727 m)    Wt 215 lb (97.5 kg)    BMI 32.69 kg/m2       Physical Exam   Constitutional: He is oriented to person, place, and time. He appears well-developed and well-nourished. No distress. NAD, Nontoxic, Appears Stated Age   HENT:   Head: Normocephalic and atraumatic. Mouth/Throat: Oropharynx is clear and moist.   Eyes: Conjunctivae and EOM are normal. Right eye exhibits no discharge. Left eye exhibits no discharge. No scleral icterus. Neck: Neck supple. Cardiovascular: Normal rate, regular rhythm and normal heart sounds. No murmur heard. Pulmonary/Chest: Effort normal and breath sounds normal. No stridor. No respiratory distress. He has no wheezes. He has no rales. Abdominal: Soft. Bowel sounds are normal. He exhibits no distension. There is no tenderness. Musculoskeletal: He exhibits no edema or tenderness. Neurological: He is alert and oriented to person, place, and time. No cranial nerve deficit. Grossly intact CN   Skin: Skin is warm and dry. No rash noted. He is not diaphoretic. Psychiatric: He has a normal mood and affect. His behavior is normal.   Nursing note and vitals reviewed. No results found for any visits on 07/05/18.     HISTORICAL  Reviewed and updated today, and as noted below:    Past Medical History: Diagnosis Date    Chewing tobacco use 2/22/2018    Essential hypertension 6/20/2018    GERD (gastroesophageal reflux disease)     Idiopathic gout of multiple sites 2/25/2018    Migraine with aura and without status migrainosus, not intractable 2/22/2018     Past Surgical History:   Procedure Laterality Date    HX ORTHOPAEDIC Left 2011    tibia surgery and torn mencius     Family History   Problem Relation Age of Onset    Elevated Lipids Mother     Depression Mother     Anxiety Mother     Migraines Mother     Pacemaker Paternal Grandfather     Elevated Lipids Father     Hypertension Father     Migraines Father     Coronary Artery Disease Paternal Uncle     Heart Attack Paternal Uncle     Asthma Brother     Asthma Brother     No Known Problems Daughter      History   Smoking Status    Former Smoker   Smokeless Tobacco    Current User    Types: Chew     Comment: once every 2 days     Social History     Social History    Marital status: SINGLE     Spouse name: N/A    Number of children: N/A    Years of education: N/A     Social History Main Topics    Smoking status: Former Smoker    Smokeless tobacco: Current User     Types: Chew      Comment: once every 2 days    Alcohol use 1.2 oz/week     2 Cans of beer per week      Comment: 2-3 drinks per night    Drug use: No    Sexual activity: Yes     Partners: Female     Birth control/ protection: None     Other Topics Concern    None     Social History Narrative     No Known Allergies    No visits with results within 3 Month(s) from this visit.   Latest known visit with results is:    Office Visit on 02/22/2018   Component Date Value Ref Range Status    Glucose 02/22/2018 89  65 - 99 mg/dL Final    BUN 02/22/2018 11  6 - 20 mg/dL Final    Creatinine 02/22/2018 1.26  0.76 - 1.27 mg/dL Final    GFR est non-AA 02/22/2018 76  >59 Final    GFR est AA 02/22/2018 88  >59 Final    BUN/Creatinine ratio 02/22/2018 9  9 - 20 Final    Sodium -Dyspnea likely sec to fluid overload,anemia-s/p HD-Renal,Cardiology consults appreciated  -ECHO-EF-41%,MOD SYSTOLIC AND DIASTLIC DYSFUNCTION  -ESRD-HD per renal  -Anemia of chronic dis-PRBC transfusion during HD tomorrow.  -Hyperkalemia-lokelma-monitor  -DM-FS with coverage,check A1C-5.5  -CAD.S/P STENT.PAROXYSMAL A.FIB-Metoprolol,no AC sec to h/o GI Bleeding  -DVT Prophylaxis  d/c planning   02/22/2018 137  134 - 144 mmol/L Final    Potassium 02/22/2018 4.4  3.5 - 5.2 mmol/L Final    Chloride 02/22/2018 95* 96 - 106 mmol/L Final    CO2 02/22/2018 24  18 - 29 mmol/L Final    Calcium 02/22/2018 9.5  8.7 - 10.2 mg/dL Final    Protein, total 02/22/2018 7.5  6.0 - 8.5 g/dL Final    Albumin 02/22/2018 4.5  3.5 - 5.5 g/dL Final    GLOBULIN, TOTAL 02/22/2018 3.0  1.5 - 4.5 Final    A-G Ratio 02/22/2018 1.5  1.2 - 2.2 Final    Bilirubin, total 02/22/2018 0.6  0.0 - 1.2 mg/dL Final    Alk. phosphatase 02/22/2018 67  39 - 117 IU/L Final    AST (SGOT) 02/22/2018 28  0 - 40 IU/L Final    ALT (SGPT) 02/22/2018 38  0 - 44 IU/L Final    Cholesterol, total 02/22/2018 207* 100 - 199 mg/dL Final    Triglyceride 02/22/2018 442* 0 - 149 mg/dL Final    HDL Cholesterol 02/22/2018 36* >39 mg/dL Final    VLDL, calculated 02/22/2018 Comment  5 - 40 Final    Comment: The calculation for the VLDL cholesterol is not valid when  triglyceride level is >400 mg/dL.  LDL, calculated 02/22/2018 Comment  0 - 99 Final    Comment: Triglyceride result indicated is too high for an accurate LDL  cholesterol estimation.  WBC 02/22/2018 7.4  3.4 - 10.8 x10E3/uL Final    RBC 02/22/2018 4.53  4.14 - 5.80 x10E6/uL Final    HGB 02/22/2018 13.8  13.0 - 17.7 g/dL Final    HCT 02/22/2018 40.1  37.5 - 51.0 % Final    MCV 02/22/2018 89  79 - 97 fL Final    MCH 02/22/2018 30.5  26.6 - 33.0 pg Final    MCHC 02/22/2018 34.4  31.5 - 35.7 g/dL Final    RDW 02/22/2018 13.7  12.3 - 15.4 % Final    PLATELET 84/70/0557 894  150 - 379 x10E3/uL Final    NEUTROPHILS 02/22/2018 54  Not Estab. % Final    Lymphocytes 02/22/2018 34  Not Estab. % Final    MONOCYTES 02/22/2018 9  Not Estab. % Final    EOSINOPHILS 02/22/2018 3  Not Estab. % Final    BASOPHILS 02/22/2018 0  Not Estab. % Final    ABS. NEUTROPHILS 02/22/2018 4.0  1.4 - 7.0 x10E3/uL Final    Abs Lymphocytes 02/22/2018 2.5  0.7 - 3.1 x10E3/uL Final    ABS.  MONOCYTES 02/22/2018 0.7  0.1 - 0.9 x10E3/uL Final    ABS. EOSINOPHILS 02/22/2018 0.2  0.0 - 0.4 x10E3/uL Final    ABS. BASOPHILS 02/22/2018 0.0  0.0 - 0.2 x10E3/uL Final    IMMATURE GRANULOCYTES 02/22/2018 0  Not Estab. % Final    ABS. IMM. GRANS. 02/22/2018 0.0  0.0 - 0.1 x10E3/uL Final    Uric acid 02/22/2018 8.0  3.7 - 8.6 mg/dL Final               Therapeutic target for gout patients: <6.0    INTERPRETATION 02/22/2018 Note   Final    Comment: Medical Director's Note: A CV Risk Assessment Report was not  sent because both LDL cholesterol and non-HDL cholesterol  results are required to generate a report. A [employer] interpretive report has not been generated  since ordered tests are not currently relevant to the  program.      PDF IMAGE 76/14/7651 Not applicable   Final         Rachel Garcia MD  [facility]  07/05/18 3:52 PM    Portions of this note may have been populated using smart dictation software and may have \"sounds-like\" errors present.

## 2023-04-06 NOTE — PROGRESS NOTE ADULT - SUBJECTIVE AND OBJECTIVE BOX
Cardiovascular Disease Progress Note  Date of Service: 04-06-23 @ 07:07    Overnight events: No acute events overnight.    Ms. Tsang says her breathing is better.   Otherwise review of systems negative    Objective Findings:  T(C): 36.5 (04-06-23 @ 05:05), Max: 37.1 (04-05-23 @ 21:25)  HR: 100 (04-06-23 @ 05:05) (70 - 100)  BP: 91/56 (04-06-23 @ 05:05) (91/56 - 150/60)  RR: 18 (04-06-23 @ 05:05) (18 - 18)  SpO2: 99% (04-06-23 @ 05:05) (85% - 100%)  Wt(kg): --  Daily     Daily       Physical Exam:  Gen: NAD; Patient resting comfortably  HEENT: EOMI, Normocephalic/ atraumatic  CV: RRR, normal S1 + S2, no m/r/g  Lungs:  Normal respiratory effort; clear to auscultation bilaterally  Abd: soft, non-tender; bowel sounds present  Ext: No edema; warm and well perfused    Telemetry: n/a    Laboratory Data:                        7.1    3.71  )-----------( 244      ( 05 Apr 2023 06:40 )             25.2     04-05    141  |  103  |  29<H>  ----------------------------<  93  5.6<H>   |  28  |  4.00<H>    Ca    7.8<L>      05 Apr 2023 06:40  Phos  5.1     04-05  Mg     2.20     04-05    TPro  6.6  /  Alb  3.8  /  TBili  0.3  /  DBili  x   /  AST  12  /  ALT  11  /  AlkPhos  213<H>  04-04              Inpatient Medications:  MEDICATIONS  (STANDING):  albuterol    0.083% 10 milliGRAM(s) Nebulizer once  atorvastatin 40 milliGRAM(s) Oral at bedtime  chlorhexidine 2% Cloths 1 Application(s) Topical daily  clopidogrel Tablet 75 milliGRAM(s) Oral daily  dextrose 5%. 1000 milliLiter(s) (50 mL/Hr) IV Continuous <Continuous>  dextrose 50% Injectable 25 Gram(s) IV Push once  gabapentin 300 milliGRAM(s) Oral at bedtime  glucagon  Injectable 1 milliGRAM(s) IntraMuscular once  heparin   Injectable 5000 Unit(s) SubCutaneous every 8 hours  hydrALAZINE 10 milliGRAM(s) Oral three times a day  insulin lispro (ADMELOG) corrective regimen sliding scale   SubCutaneous three times a day before meals  insulin lispro (ADMELOG) corrective regimen sliding scale   SubCutaneous at bedtime  metoprolol succinate ER 50 milliGRAM(s) Oral daily  mupirocin 2% Ointment 1 Application(s) Both Nostrils two times a day  senna 2 Tablet(s) Oral at bedtime  sevelamer carbonate 800 milliGRAM(s) Oral three times a day with meals      Assessment: 82 year old woman with uncontrolled IDDM, compensated systolic CHF, CAD s/p PCI, and ESRD on HD presents hypoxic respiratory failure.       Plan of Care:    #Acute hypoxic respiratory failure-  Due to acute systolic CHF in the setting of missing HD.  Volume status is improved post HD, however Ms. Tsang is still hypoxic.   Echo shows no significant changes from prior.   I agree with pRBC transfusion.     #CAD- s/p MICHAEL x2 in 12/2017.  Ms. Tsang displays no signs or symptoms of coronary ischemia or decompensated CHF.  Plavix 75 mg daily.     #ESRD-  S/P HD initiation on 8/16/2022.  Renal input noted.       #Paroxymal a-fib-  Sinus on admission EKG.   Toprol.   No AC given h/o GI bleed.    Complex medical decision making.    Over 35 minutes spent on total encounter; more than 50% of the visit was spent counseling and/or coordinating care by the attending physician.      Julio César Hooper MD [facility]  Cardiovascular Disease  [phone]

## 2023-04-07 ENCOUNTER — TRANSCRIPTION ENCOUNTER (OUTPATIENT)
Age: 83
End: 2023-04-07

## 2023-04-07 VITALS
SYSTOLIC BLOOD PRESSURE: 148 MMHG | DIASTOLIC BLOOD PRESSURE: 61 MMHG | HEART RATE: 66 BPM | OXYGEN SATURATION: 99 % | TEMPERATURE: 98 F | RESPIRATION RATE: 17 BRPM

## 2023-04-07 LAB
ANION GAP SERPL CALC-SCNC: 12 MMOL/L — SIGNIFICANT CHANGE UP (ref 7–14)
BASOPHILS # BLD AUTO: 0.06 K/UL — SIGNIFICANT CHANGE UP (ref 0–0.2)
BASOPHILS NFR BLD AUTO: 1.8 % — SIGNIFICANT CHANGE UP (ref 0–2)
BUN SERPL-MCNC: 17 MG/DL — SIGNIFICANT CHANGE UP (ref 7–23)
CALCIUM SERPL-MCNC: 8.2 MG/DL — LOW (ref 8.4–10.5)
CHLORIDE SERPL-SCNC: 97 MMOL/L — LOW (ref 98–107)
CO2 SERPL-SCNC: 27 MMOL/L — SIGNIFICANT CHANGE UP (ref 22–31)
CREAT SERPL-MCNC: 3.15 MG/DL — HIGH (ref 0.5–1.3)
EGFR: 14 ML/MIN/1.73M2 — LOW
EOSINOPHIL # BLD AUTO: 0.08 K/UL — SIGNIFICANT CHANGE UP (ref 0–0.5)
EOSINOPHIL NFR BLD AUTO: 2.3 % — SIGNIFICANT CHANGE UP (ref 0–6)
GLUCOSE BLDC GLUCOMTR-MCNC: 151 MG/DL — HIGH (ref 70–99)
GLUCOSE BLDC GLUCOMTR-MCNC: 87 MG/DL — SIGNIFICANT CHANGE UP (ref 70–99)
GLUCOSE SERPL-MCNC: 93 MG/DL — SIGNIFICANT CHANGE UP (ref 70–99)
HCT VFR BLD CALC: 34.4 % — LOW (ref 34.5–45)
HGB BLD-MCNC: 10.6 G/DL — LOW (ref 11.5–15.5)
IANC: 1.58 K/UL — LOW (ref 1.8–7.4)
IMM GRANULOCYTES NFR BLD AUTO: 0.6 % — SIGNIFICANT CHANGE UP (ref 0–0.9)
LYMPHOCYTES # BLD AUTO: 1.1 K/UL — SIGNIFICANT CHANGE UP (ref 1–3.3)
LYMPHOCYTES # BLD AUTO: 32.2 % — SIGNIFICANT CHANGE UP (ref 13–44)
MAGNESIUM SERPL-MCNC: 2.1 MG/DL — SIGNIFICANT CHANGE UP (ref 1.6–2.6)
MCHC RBC-ENTMCNC: 29.1 PG — SIGNIFICANT CHANGE UP (ref 27–34)
MCHC RBC-ENTMCNC: 30.8 GM/DL — LOW (ref 32–36)
MCV RBC AUTO: 94.5 FL — SIGNIFICANT CHANGE UP (ref 80–100)
MONOCYTES # BLD AUTO: 0.58 K/UL — SIGNIFICANT CHANGE UP (ref 0–0.9)
MONOCYTES NFR BLD AUTO: 17 % — HIGH (ref 2–14)
NEUTROPHILS # BLD AUTO: 1.58 K/UL — LOW (ref 1.8–7.4)
NEUTROPHILS NFR BLD AUTO: 46.1 % — SIGNIFICANT CHANGE UP (ref 43–77)
NRBC # BLD: 0 /100 WBCS — SIGNIFICANT CHANGE UP (ref 0–0)
NRBC # FLD: 0 K/UL — SIGNIFICANT CHANGE UP (ref 0–0)
PHOSPHATE SERPL-MCNC: 4.7 MG/DL — HIGH (ref 2.5–4.5)
PLATELET # BLD AUTO: 234 K/UL — SIGNIFICANT CHANGE UP (ref 150–400)
POTASSIUM SERPL-MCNC: 4.1 MMOL/L — SIGNIFICANT CHANGE UP (ref 3.5–5.3)
POTASSIUM SERPL-SCNC: 4.1 MMOL/L — SIGNIFICANT CHANGE UP (ref 3.5–5.3)
RBC # BLD: 3.64 M/UL — LOW (ref 3.8–5.2)
RBC # FLD: 19.6 % — HIGH (ref 10.3–14.5)
SODIUM SERPL-SCNC: 136 MMOL/L — SIGNIFICANT CHANGE UP (ref 135–145)
WBC # BLD: 3.42 K/UL — LOW (ref 3.8–10.5)
WBC # FLD AUTO: 3.42 K/UL — LOW (ref 3.8–10.5)

## 2023-04-07 RX ORDER — CLOPIDOGREL BISULFATE 75 MG/1
1 TABLET, FILM COATED ORAL
Qty: 0 | Refills: 0 | DISCHARGE
Start: 2023-04-07

## 2023-04-07 RX ORDER — SEVELAMER CARBONATE 2400 MG/1
1 POWDER, FOR SUSPENSION ORAL
Qty: 0 | Refills: 0 | DISCHARGE
Start: 2023-04-07

## 2023-04-07 RX ADMIN — SEVELAMER CARBONATE 800 MILLIGRAM(S): 2400 POWDER, FOR SUSPENSION ORAL at 09:37

## 2023-04-07 RX ADMIN — CHLORHEXIDINE GLUCONATE 1 APPLICATION(S): 213 SOLUTION TOPICAL at 11:47

## 2023-04-07 RX ADMIN — HEPARIN SODIUM 5000 UNIT(S): 5000 INJECTION INTRAVENOUS; SUBCUTANEOUS at 05:00

## 2023-04-07 RX ADMIN — HEPARIN SODIUM 5000 UNIT(S): 5000 INJECTION INTRAVENOUS; SUBCUTANEOUS at 13:24

## 2023-04-07 RX ADMIN — MUPIROCIN 1 APPLICATION(S): 20 OINTMENT TOPICAL at 06:35

## 2023-04-07 RX ADMIN — SEVELAMER CARBONATE 800 MILLIGRAM(S): 2400 POWDER, FOR SUSPENSION ORAL at 13:23

## 2023-04-07 RX ADMIN — CLOPIDOGREL BISULFATE 75 MILLIGRAM(S): 75 TABLET, FILM COATED ORAL at 11:46

## 2023-04-07 RX ADMIN — Medication 1: at 13:21

## 2023-04-07 RX ADMIN — Medication 10 MILLIGRAM(S): at 05:01

## 2023-04-07 RX ADMIN — Medication 50 MILLIGRAM(S): at 05:01

## 2023-04-07 RX ADMIN — Medication 10 MILLIGRAM(S): at 13:23

## 2023-04-07 NOTE — DISCHARGE NOTE NURSING/CASE MANAGEMENT/SOCIAL WORK - PATIENT PORTAL LINK FT
You can access the FollowMyHealth Patient Portal offered by [facility] by registering at the following website: [email or web address]. By joining [employer]’s FollowMyHealth portal, you will also be able to view your health information using other applications (apps) compatible with our system.

## 2023-04-07 NOTE — DISCHARGE NOTE PROVIDER - HOSPITAL COURSE
82 year old woman with uncontrolled IDDM, compensated systolic CHF, CAD s/p PCI, and ESRD on HD presents hypoxic respiratory failure. TTE without significant change from prior- no further cardiac workup per cardiology. Found to be anemia and s/p PRBC with improved symptoms. Respiratory/ volume status improved following HD. O2 ___________      82 year old woman with uncontrolled IDDM, compensated systolic CHF, CAD s/p PCI, and ESRD on HD presents hypoxic respiratory failure. TTE without significant change from prior- no further cardiac workup per cardiology. Found to be anemia and s/p PRBC with improved symptoms. Respiratory/ volume status improved following HD.      Case discussed with Dr. Lomeli on 4/7/23. The patient is medically stable for discharge.

## 2023-04-07 NOTE — DISCHARGE NOTE PROVIDER - NSDCMRMEDTOKEN_GEN_ALL_CORE_FT
atorvastatin 40 mg oral tablet: 1 orally once a day (at bedtime)  clopidogrel 75 mg oral tablet: 1 tab(s) orally once a day  gabapentin 300 mg oral capsule: 1 cap(s) orally once a day (at bedtime)  hydrALAZINE 10 mg oral tablet: 1 tab(s) orally 2 times a day  Lasix 80 mg oral tablet: 1 tab(s) orally twice daily on Monday, Wednesday, and Friday, Sunday (non dialysis days)  metoprolol tartrate 50 mg oral tablet: 1 tab(s) orally 2 times a day  senna oral tablet: 2 tab(s) orally once a day (at bedtime)  sevelamer carbonate 800 mg oral tablet: 1 tab(s) orally 3 times a day   Tradjenta 5 mg oral tablet: 1 tab(s) orally once a day    atorvastatin 40 mg oral tablet: 1 orally once a day (at bedtime)  clopidogrel 75 mg oral tablet: 1 tab(s) orally once a day  gabapentin 300 mg oral capsule: 1 cap(s) orally once a day (at bedtime)  hydrALAZINE 10 mg oral tablet: 1 tab(s) orally 2 times a day  Lasix 80 mg oral tablet: 1 tab(s) orally twice daily on Monday, Wednesday, and Friday, Sunday (non dialysis days)  metoprolol tartrate 50 mg oral tablet: 1 tab(s) orally 2 times a day  senna oral tablet: 2 tab(s) orally once a day (at bedtime)  sevelamer carbonate 800 mg oral tablet: 1 tab(s) orally 3 times a day (with meals)  Tradjenta 5 mg oral tablet: 1 tab(s) orally once a day    atorvastatin 40 mg oral tablet: 1 orally once a day (at bedtime)  clopidogrel 75 mg oral tablet: 1 tab(s) orally once a day  gabapentin 300 mg oral capsule: 1 cap(s) orally once a day (at bedtime)  hydrALAZINE 10 mg oral tablet: 1 tab(s) orally 2 times a day  metoprolol tartrate 50 mg oral tablet: 1 tab(s) orally 2 times a day  senna oral tablet: 2 tab(s) orally once a day (at bedtime)  sevelamer carbonate 800 mg oral tablet: 1 tab(s) orally 3 times a day (with meals)  Tradjenta 5 mg oral tablet: 1 tab(s) orally once a day

## 2023-04-07 NOTE — PROGRESS NOTE ADULT - SUBJECTIVE AND OBJECTIVE BOX
NEPHROLOGY     Patient seen and examined resting comfortably, no new complaints, no sob, breathing comfortably on 2L NC,  in no acute distress.     MEDICATIONS  (STANDING):  albuterol    0.083% 10 milliGRAM(s) Nebulizer once  atorvastatin 40 milliGRAM(s) Oral at bedtime  chlorhexidine 2% Cloths 1 Application(s) Topical daily  clopidogrel Tablet 75 milliGRAM(s) Oral daily  dextrose 5%. 1000 milliLiter(s) (50 mL/Hr) IV Continuous <Continuous>  dextrose 50% Injectable 25 Gram(s) IV Push once  gabapentin 300 milliGRAM(s) Oral at bedtime  glucagon  Injectable 1 milliGRAM(s) IntraMuscular once  heparin   Injectable 5000 Unit(s) SubCutaneous every 8 hours  hydrALAZINE 10 milliGRAM(s) Oral three times a day  insulin lispro (ADMELOG) corrective regimen sliding scale   SubCutaneous three times a day before meals  insulin lispro (ADMELOG) corrective regimen sliding scale   SubCutaneous at bedtime  metoprolol succinate ER 50 milliGRAM(s) Oral daily  mupirocin 2% Ointment 1 Application(s) Both Nostrils two times a day  senna 2 Tablet(s) Oral at bedtime  sevelamer carbonate 800 milliGRAM(s) Oral three times a day with meals    VITALS:  T(C): , Max: 36.7 (04-06-23 @ 10:30)  T(F): , Max: 98 (04-06-23 @ 10:30)  HR: 60 (04-07-23 @ 05:00)  BP: 139/96 (04-07-23 @ 05:00)  RR: 18 (04-07-23 @ 05:00)  SpO2: 100% (04-07-23 @ 08:16)    I and O's:    04-06 @ 07:01  -  04-07 @ 07:00  --------------------------------------------------------  IN: 1220 mL / OUT: 2500 mL / NET: -1280 mL    04-07 @ 07:01  -  04-07 @ 09:59  --------------------------------------------------------  IN: 250 mL / OUT: 0 mL / NET: 250 mL    PHYSICAL EXAM:  Constitutional: NAD  Neck:  No JVD  Respiratory: CTAB/L  Cardiovascular: S1 and S2  Gastrointestinal: BS+, soft, NT/ND  Extremities: No peripheral edema  Neurological: A/O x 3, no focal deficits  Psychiatric: Normal mood, normal affect  : No Sylvester  Skin: No rashes  Access: permcath       LABS:                        10.6   3.42  )-----------( 234      ( 07 Apr 2023 04:53 )             34.4     04-07    136  |  97<L>  |  17  ----------------------------<  93  4.1   |  27  |  3.15<H>    Ca    8.2<L>      07 Apr 2023 04:53  Phos  4.7     04-07  Mg     2.10     04-07      ASSESSMENT/PLAN:   84 y/o F with h/o CAD with stents, Diastolic Dysfunction, DM, Afib, GI bleed, CKD (dialysis T, Th, Sat) missed today c/o SOB and CP today  Hyperkalemia - improved   Anemia - improved s/p PRBCs    1 Renal - s/p HD yesterday with 2 units PRBC, next HD tomorrow   2 CVS-BP improved this a.m. , Cards eval and ECHO noted  3 Anemia - was the anemia the cause of the SOB?, she reports breathing improved s/p transfusion    NEPHROLOGY     Patient seen and examined resting comfortably, no new complaints, no sob, breathing comfortably on 2L NC,  in no acute distress.     MEDICATIONS  (STANDING):  albuterol    0.083% 10 milliGRAM(s) Nebulizer once  atorvastatin 40 milliGRAM(s) Oral at bedtime  chlorhexidine 2% Cloths 1 Application(s) Topical daily  clopidogrel Tablet 75 milliGRAM(s) Oral daily  dextrose 5%. 1000 milliLiter(s) (50 mL/Hr) IV Continuous <Continuous>  dextrose 50% Injectable 25 Gram(s) IV Push once  gabapentin 300 milliGRAM(s) Oral at bedtime  glucagon  Injectable 1 milliGRAM(s) IntraMuscular once  heparin   Injectable 5000 Unit(s) SubCutaneous every 8 hours  hydrALAZINE 10 milliGRAM(s) Oral three times a day  insulin lispro (ADMELOG) corrective regimen sliding scale   SubCutaneous three times a day before meals  insulin lispro (ADMELOG) corrective regimen sliding scale   SubCutaneous at bedtime  metoprolol succinate ER 50 milliGRAM(s) Oral daily  mupirocin 2% Ointment 1 Application(s) Both Nostrils two times a day  senna 2 Tablet(s) Oral at bedtime  sevelamer carbonate 800 milliGRAM(s) Oral three times a day with meals    VITALS:  T(C): , Max: 36.7 (04-06-23 @ 10:30)  T(F): , Max: 98 (04-06-23 @ 10:30)  HR: 60 (04-07-23 @ 05:00)  BP: 139/96 (04-07-23 @ 05:00)  RR: 18 (04-07-23 @ 05:00)  SpO2: 100% (04-07-23 @ 08:16)    I and O's:    04-06 @ 07:01  -  04-07 @ 07:00  --------------------------------------------------------  IN: 1220 mL / OUT: 2500 mL / NET: -1280 mL    04-07 @ 07:01  -  04-07 @ 09:59  --------------------------------------------------------  IN: 250 mL / OUT: 0 mL / NET: 250 mL    PHYSICAL EXAM:  Constitutional: NAD  Neck:  No JVD  Respiratory: CTAB/L  Cardiovascular: S1 and S2  Gastrointestinal: BS+, soft, NT/ND  Extremities: No peripheral edema  Neurological: A/O x 3, no focal deficits  Psychiatric: Normal mood, normal affect  : No Sylvester  Skin: No rashes  Access: [facility]       LABS:                        10.6   3.42  )-----------( 234      ( 07 Apr 2023 04:53 )             34.4     04-07    136  |  97<L>  |  17  ----------------------------<  93  4.1   |  27  |  3.15<H>    Ca    8.2<L>      07 Apr 2023 04:53  Phos  4.7     04-07  Mg     2.10     04-07

## 2023-04-07 NOTE — DISCHARGE NOTE NURSING/CASE MANAGEMENT/SOCIAL WORK - NSDCPEFALRISK_GEN_ALL_CORE
For information on Fall & Injury Prevention, visit: [email or web address] OR  [email or web address] OR  https://www.cdc.gov/steadi/patient.html

## 2023-04-07 NOTE — PHYSICAL THERAPY INITIAL EVALUATION ADULT - ADDITIONAL COMMENTS
Pt. reports owning a rollator. Pt. uses rollator when she goes out. Pt. has HHA 5 days/week for 5 hours/day.     Pt. was left seated at edge of bed post PT Evaluation, no apparent distress, all lines intact, SpO2 100% on 2L, call bell within reach.

## 2023-04-07 NOTE — PROGRESS NOTE ADULT - SUBJECTIVE AND OBJECTIVE BOX
Cardiovascular Disease Progress Note  Date of Service: 23 @ 06:59    Overnight events: No acute events overnight.    Ms. Tsang says her breathing is better.   Otherwise review of systems negative    Objective Findings:  T(C): 36.4 (23 @ 05:00), Max: 36.7 (23 @ 10:30)  HR: 60 (23 @ 05:00) (60 - 74)  BP: 139/96 (23 @ 05:00) (130/64 - 173/75)  RR: 18 (23 @ 05:00) (16 - 20)  SpO2: 91% (23 @ 05:00) (91% - 100%)  Wt(kg): --  Daily     Daily Weight in k.1 (2023 10:10)      Physical Exam:  Gen: NAD; Patient resting comfortably  HEENT: EOMI, Normocephalic/ atraumatic  CV: RRR, normal S1 + S2, no m/r/g  Lungs:  Normal respiratory effort; clear to auscultation bilaterally  Abd: soft, non-tender; bowel sounds present  Ext: No edema; warm and well perfused    Telemetry: n/a    Laboratory Data:                        6.6    2.51  )-----------( 268      ( 2023 06:40 )             23.1         136  |  97<L>  |  17  ----------------------------<  93  4.1   |  27  |  3.15<H>    Ca    8.2<L>      2023 04:53  Phos  4.7       Mg     2.10                     Inpatient Medications:  MEDICATIONS  (STANDING):  albuterol    0.083% 10 milliGRAM(s) Nebulizer once  atorvastatin 40 milliGRAM(s) Oral at bedtime  chlorhexidine 2% Cloths 1 Application(s) Topical daily  clopidogrel Tablet 75 milliGRAM(s) Oral daily  dextrose 5%. 1000 milliLiter(s) (50 mL/Hr) IV Continuous <Continuous>  dextrose 50% Injectable 25 Gram(s) IV Push once  gabapentin 300 milliGRAM(s) Oral at bedtime  glucagon  Injectable 1 milliGRAM(s) IntraMuscular once  heparin   Injectable 5000 Unit(s) SubCutaneous every 8 hours  hydrALAZINE 10 milliGRAM(s) Oral three times a day  insulin lispro (ADMELOG) corrective regimen sliding scale   SubCutaneous three times a day before meals  insulin lispro (ADMELOG) corrective regimen sliding scale   SubCutaneous at bedtime  metoprolol succinate ER 50 milliGRAM(s) Oral daily  mupirocin 2% Ointment 1 Application(s) Both Nostrils two times a day  senna 2 Tablet(s) Oral at bedtime  sevelamer carbonate 800 milliGRAM(s) Oral three times a day with meals      Assessment: 82 year old woman with uncontrolled IDDM, compensated systolic CHF, CAD s/p PCI, and ESRD on HD presents hypoxic respiratory failure.       Plan of Care:    #Acute hypoxic respiratory failure-  Unclear etiology.  Volume status is improved post HD, however Ms. Tsang is still hypoxic.   Echo shows no significant changes from prior.   S/P pRBC transfusion with improvement in symptoms.     #CAD- s/p MICHAEL x2 in 2017.  Ms. Tsang displays no signs or symptoms of coronary ischemia or decompensated CHF.  Plavix 75 mg daily.     #ESRD-  S/P HD initiation on 2022.  Renal input noted.       #Paroxymal a-fib-  Sinus on admission EKG.   Toprol.   No AC given h/o GI bleed.    Complex medical decision making.        Over 35 minutes spent on total encounter; more than 50% of the visit was spent counseling and/or coordinating care by the attending physician.      Julio César Hooper MD [facility]  Cardiovascular Disease  [phone]

## 2023-04-07 NOTE — PROGRESS NOTE ADULT - ASSESSMENT
ASSESSMENT/PLAN:   84 y/o F with h/o CAD with stents, Diastolic Dysfunction, DM, Afib, GI bleed, CKD (dialysis T, Th, Sat) missed today c/o SOB and CP today  Hyperkalemia - improved   Anemia - improved s/p PRBCs    1 Renal - s/p HD yesterday with 2 units PRBC,;  next HD tomorrow which can be as outpt   2 CVS-BP improved this a.m. , Cards eval and ECHO noted  3 Anemia - was the anemia the cause of the SOB?, she reports breathing improved s/p transfusion     ?DC planning    Sayed Xiaoying   [phone]

## 2023-04-07 NOTE — DISCHARGE NOTE PROVIDER - NSDCCPCAREPLAN_GEN_ALL_CORE_FT
PRINCIPAL DISCHARGE DIAGNOSIS  Diagnosis: SOB (shortness of breath)  Assessment and Plan of Treatment: You came to the hospital with shortness of breath which improved following dialysis and receiving blood for your anemia. The ultrasound of your heart did not show any new findings.     PRINCIPAL DISCHARGE DIAGNOSIS  Diagnosis: SOB (shortness of breath)  Assessment and Plan of Treatment: You came to the hospital with shortness of breath which improved following dialysis and receiving blood for your anemia. The ultrasound of your heart did not show any new findings. Continue with your home oxygen.

## 2023-06-13 NOTE — PROGRESS NOTE ADULT - PROBLEM SELECTOR PROBLEM 1
GI bleed
GI bleed
Acute kidney injury superimposed on chronic kidney disease
Acute kidney injury superimposed on chronic kidney disease
Gastrointestinal hemorrhage with melena
No

## 2023-06-18 NOTE — ED ADULT TRIAGE NOTE - PAIN: PRESENCE, MLM
65 yo M with PMHx of HTN, HLD, Bipolar, GERD, Jhon's Esphagous, Asthma, and Abnormal LFTs presents to the ED with 1x day hx of epigastric pain and new onset fever, admitted for workup.      denies pain/discomfort

## 2023-07-31 ENCOUNTER — APPOINTMENT (OUTPATIENT)
Dept: ORTHOPEDIC SURGERY | Facility: CLINIC | Age: 83
End: 2023-07-31

## 2023-08-22 NOTE — PATIENT PROFILE ADULT - NSTRANSFERBELONGINGSDISPO_GEN_A_NUR
-Thank you for your visit  -I have filled out a form for your dietary restrictions and will have our staff fax it to [facility]  -Please feel free to schedule a follow-up visit if any new concerns arise  Kade Yousif, DO    
with patient

## 2023-09-07 NOTE — ED PROVIDER NOTE - CPE EDP MUSC NORM
Bed in lowest position, wheels locked, appropriate side rails in place/Call bell, personal items and telephone in reach/Instruct patient to call for assistance before getting out of bed or chair/Non-slip footwear when patient is out of bed/[place] to call system/Physically safe environment - no spills, clutter or unnecessary equipment/Purposeful Proactive Rounding/Room/bathroom lighting operational, light cord in reach normal...

## 2023-11-06 NOTE — CHART NOTE - NSCHARTNOTEFT_GEN_A_CORE
SUBJECTIVE:    CC: SOB  HPI: Alerted by RN that Pt w/ SOB during pRBC transfusion. pRBC held and Pt assessed at bedside. Pt endorsing moderate SOB similar to what shes experienced during previous transfusions. She is having good urine  output and is otherwise asymptomatic. Denies F/C, N/V, HA, CP, palpitations, cough, diaphoresis, abd pain, diarrhea, numbness, weakness, rashes, pain.       OBJECTIVE:  Vital Signs Last 24 Hrs  T(C): 36.6 (10-31-20 @ 22:45), Max: 37 (10-31-20 @ 11:10)  T(F): 97.8 (10-31-20 @ 22:45), Max: 98.6 (10-31-20 @ 11:10)  HR: 65 (10-31-20 @ 21:45) (65 - 75)  BP: 146/66 (10-31-20 @ 21:45) (118/66 - 149/60)  RR: 20 (10-31-20 @ 21:45) (14 - 20)  SpO2: 100% (10-31-20 @ 21:45) (100% - 100%) on (O2)    Physical Exam:   General: NAD, A&Ox3, WN/WD  Resp: slight bibasilar crackles, lungs otherwise CTA b/l, Initially w/ Slight difficulty speaking in full sentences, improved on reassessment s/p Lasix   Cardio: RRR, nl S1/2  Abd: Soft, NT/ND, +BS                            7.0    5.98  )-----------( 269      ( 31 Oct 2020 23:20 )             23.6     10-31    140  |  107  |  70<H>  ----------------------------<  149<H>  4.5   |  20<L>  |  3.62<H>    Ca    7.9<L>      31 Oct 2020 07:00  Phos  5.3     10-31  Mg     2.4     10-31    TPro  7.1  /  Alb  4.1  /  TBili  0.6  /  DBili  x   /  AST  81<H>  /  ALT  93<H>  /  AlkPhos  149<H>  10-31    PT/INR - ( 30 Oct 2020 23:00 )   PT: 13.3 SEC;   INR: 1.16          PTT - ( 30 Oct 2020 23:00 )  PTT:30.6 SEC      Reviewed all relevant lab results, tests, radiology studies, medications, telemetry, nursing assessments, and EKG.     ASSESSMENT:    80y Female admitted with complaints of Patient is a 80y old  Female who presents with a chief complaint of CC: chest pain and shortness of breath (31 Oct 2020 23:00)  Now c/o SOB     Problem:   1) Fluid Overload    PLAN:  1) SOB relieved s/p Lasix 20 IV, Hgb increased to 7 s/p second 1/2 unit.   Pt VSS, no evidence of hypovolemia. Given recurrent fluid overload and worsening KOJO, will hold off on further transfusion overnight, f/u am CBC/CMP   F/u GI/Nephro recs in am    Case d/w Pt/RN, in agreement w/ assessment and plan.     Will continue to monitor closely Ilumya Counseling: I discussed with the patient the risks of tildrakizumab including but not limited to immunosuppression, malignancy, posterior leukoencephalopathy syndrome, and serious infections.  The patient understands that monitoring is required including a PPD at baseline and must alert us or the primary physician if symptoms of infection or other concerning signs are noted.

## 2024-02-01 NOTE — PHYSICAL THERAPY INITIAL EVALUATION ADULT - PHYSICAL ASSIST/NONPHYSICAL ASSIST: SUPINE/SIT, REHAB EVAL
D/w pt - reviewed, readings look good; ok to continue same meds, and ok to measure once per week 1 person assist/nonverbal cues (demo/gestures)/verbal cues Yes

## 2024-02-02 ENCOUNTER — APPOINTMENT (OUTPATIENT)
Dept: ENDOCRINOLOGY | Facility: CLINIC | Age: 84
End: 2024-02-02

## 2024-03-12 NOTE — DIETITIAN INITIAL EVALUATION ADULT - NS FNS DIET ORDER
no
DASH/TLC: Sodium & Cholesterol Restricted;   Consistent Carbohydrate {Evening Snack} (CSTCHOSN)  No Concentrated Phosphorus (08-15-22 @ 13:26) [Active]

## 2024-05-03 NOTE — PROGRESS NOTE ADULT - PROBLEM/PLAN-4
DISPLAY PLAN FREE TEXT
Low Segment Transverse C/S
DISPLAY PLAN FREE TEXT

## 2024-05-21 NOTE — ED PROVIDER NOTE - CARE PLAN
Patient of Mackenzie Hayward NP.  Per order of Shameka Ely MD patient had string stent was removed.  String was visualized from the urethra.  The string was grasped firmly and string stent was removed intact in its entirety without difficulty.  Patient tolerated the procedure well and was given post care instruction sheet for stent removal.     Addendum:     This was a nurse only visit. No nursing concerns during today's visit.     MARIXA Manley  [facility]      Principal Discharge DX:	Dyspnea   Principal Discharge DX:	Dyspnea  Secondary Diagnosis:	Hyperkalemia

## 2024-05-28 ENCOUNTER — INPATIENT (INPATIENT)
Facility: HOSPITAL | Age: 84
LOS: 3 days | Discharge: HOME HEALTH | End: 2024-06-01
Attending: INTERNAL MEDICINE | Admitting: INTERNAL MEDICINE
Payer: MEDICARE

## 2024-05-28 VITALS
HEART RATE: 68 BPM | RESPIRATION RATE: 20 BRPM | OXYGEN SATURATION: 98 % | SYSTOLIC BLOOD PRESSURE: 145 MMHG | TEMPERATURE: 98 F | DIASTOLIC BLOOD PRESSURE: 77 MMHG

## 2024-05-28 DIAGNOSIS — Z95.5 PRESENCE OF CORONARY ANGIOPLASTY IMPLANT AND GRAFT: Chronic | ICD-10-CM

## 2024-05-28 DIAGNOSIS — Z98.891 HISTORY OF UTERINE SCAR FROM PREVIOUS SURGERY: Chronic | ICD-10-CM

## 2024-05-28 DIAGNOSIS — Z98.890 OTHER SPECIFIED POSTPROCEDURAL STATES: Chronic | ICD-10-CM

## 2024-05-28 LAB
ALBUMIN SERPL ELPH-MCNC: 3.6 G/DL — SIGNIFICANT CHANGE UP (ref 3.3–5)
ALP SERPL-CCNC: 97 U/L — SIGNIFICANT CHANGE UP (ref 40–120)
ALT FLD-CCNC: 6 U/L — SIGNIFICANT CHANGE UP (ref 4–33)
ANION GAP SERPL CALC-SCNC: 13 MMOL/L — SIGNIFICANT CHANGE UP (ref 7–14)
AST SERPL-CCNC: 9 U/L — SIGNIFICANT CHANGE UP (ref 4–32)
BILIRUB SERPL-MCNC: 0.2 MG/DL — SIGNIFICANT CHANGE UP (ref 0.2–1.2)
BUN SERPL-MCNC: 48 MG/DL — HIGH (ref 7–23)
CALCIUM SERPL-MCNC: 8.8 MG/DL — SIGNIFICANT CHANGE UP (ref 8.4–10.5)
CHLORIDE SERPL-SCNC: 103 MMOL/L — SIGNIFICANT CHANGE UP (ref 98–107)
CO2 SERPL-SCNC: 23 MMOL/L — SIGNIFICANT CHANGE UP (ref 22–31)
CREAT SERPL-MCNC: 3.98 MG/DL — HIGH (ref 0.5–1.3)
EGFR: 11 ML/MIN/1.73M2 — LOW
GLUCOSE SERPL-MCNC: 164 MG/DL — HIGH (ref 70–99)
HCT VFR BLD CALC: 18.5 % — CRITICAL LOW (ref 34.5–45)
HGB BLD-MCNC: 5.9 G/DL — CRITICAL LOW (ref 11.5–15.5)
IANC: 6.84 K/UL — SIGNIFICANT CHANGE UP (ref 1.8–7.4)
MCHC RBC-ENTMCNC: 31.9 GM/DL — LOW (ref 32–36)
MCHC RBC-ENTMCNC: 31.9 PG — SIGNIFICANT CHANGE UP (ref 27–34)
MCV RBC AUTO: 100 FL — SIGNIFICANT CHANGE UP (ref 80–100)
NT-PROBNP SERPL-SCNC: HIGH PG/ML
PLATELET # BLD AUTO: 235 K/UL — SIGNIFICANT CHANGE UP (ref 150–400)
POTASSIUM SERPL-MCNC: 4.7 MMOL/L — SIGNIFICANT CHANGE UP (ref 3.5–5.3)
POTASSIUM SERPL-SCNC: 4.7 MMOL/L — SIGNIFICANT CHANGE UP (ref 3.5–5.3)
PROT SERPL-MCNC: 6.4 G/DL — SIGNIFICANT CHANGE UP (ref 6–8.3)
RBC # BLD: 1.85 M/UL — LOW (ref 3.8–5.2)
RBC # FLD: 15.9 % — HIGH (ref 10.3–14.5)
SODIUM SERPL-SCNC: 139 MMOL/L — SIGNIFICANT CHANGE UP (ref 135–145)
TROPONIN T, HIGH SENSITIVITY RESULT: 79 NG/L — CRITICAL HIGH
WBC # BLD: 8.6 K/UL — SIGNIFICANT CHANGE UP (ref 3.8–10.5)
WBC # FLD AUTO: 8.6 K/UL — SIGNIFICANT CHANGE UP (ref 3.8–10.5)

## 2024-05-28 PROCEDURE — 99285 EMERGENCY DEPT VISIT HI MDM: CPT

## 2024-05-28 PROCEDURE — 71046 X-RAY EXAM CHEST 2 VIEWS: CPT | Mod: 26

## 2024-05-28 RX ORDER — ACETAMINOPHEN 500 MG
1000 TABLET ORAL ONCE
Refills: 0 | Status: COMPLETED | OUTPATIENT
Start: 2024-05-28 | End: 2024-05-28

## 2024-05-28 RX ADMIN — Medication 400 MILLIGRAM(S): at 23:55

## 2024-05-28 NOTE — ED PROVIDER NOTE - PROGRESS NOTE DETAILS
JULIO CESAR Montalvo: Patient seen resting comfortably and NAD. Labs significant for h/h (5.9/18.5). BNP elevated. Trop 79 repeat 102, < 20% change. Upon speaking with patient about labs, she states endorses taking a laxative due to constipation and noted dark tarry stools. Rectal exam done w/ Chaperone, showed dark tarry stools on glove. Fecal occult +. will start protonix and Prbcs. Patient to be admitted to Dr. Streeter, multiple attempts to call  for admission, will continue to reach out at this time. Patient HDS, afebrile and reports improvement of symptoms. JULIO CESAR Montalvo: Patient seen resting comfortably and NAD. Labs significant for h/h (5.9/18.5). BNP elevated. Trop 79 repeat 102, < 20% change. Upon speaking with patient about labs, she states endorses taking a laxative due to constipation and noted dark tarry stools. Rectal exam done w/ Chaperone, showed dark tarry stools on glove. Fecal occult +. will start protonix and Prbcs. Patient to be admitted to Dr. Zaidi.  Patient HDS, afebrile and reports improvement of symptoms.

## 2024-05-28 NOTE — ED ADULT NURSE NOTE - HIV OFFER
MRN:[id number]                      After Visit Summary   2/13/2017    Alton Davis    MRN: [id number]           Visit Information        Provider Department      2/13/2017 7:00 PM Alyson Davis LP [facility]      Your next 10 appointments already scheduled     Feb 27, 2017  6:00 PM CST   Return Visit with Alyson Davis LP   [facility] ([facility])    [address]  [facility] [address]   [phone]              MyChart Information     [employer] lets you send messages to your doctor, view your test results, renew your prescriptions, schedule appointments and more. To sign up, go to [email or web address], contact your [place] clinic or call [phone] during business hours.            Care EveryWhere ID     This is your Care EveryWhere ID. This could be used by other organizations to access your [place] medical records  [id number]        
Opt out

## 2024-05-28 NOTE — ED PROVIDER NOTE - ATTENDING APP SHARED VISIT CONTRIBUTION OF CARE
84F p/w sent from HD due SOB and numbness of tongue 15 minutes into starting HD, was given benadryl and some fluids with improvement.  Satting 89% RA.  Plan check labs, trops, proBNP.  Was admitted in april of last year for similar.  H/o IDDM, CHF, CAD, ESRD on HD, was found to be anemic last year.  Sat was 85% on RA last year, qualifying for home O2.  EKG afib at 81 no go, submillimeter STD I, II, aVF.   qtc  494.  Sat low here but improving on NRB, pt speaking in complete sentences.  Pt ambivalent about staying, per DA has been difficult to convince to come to hospital.  Of note pt AMA from admission last year.  Pt agreeable to stay after lengthy discussion.    VS:  unremarkable except hypoxia, tachypnea    GEN - malaise, mild resp distress ;   A+O x3   HEAD - NC/AT     ENT - PEERL, EOMI, mucous membranes    moist , no discharge      NECK: Neck supple, non-tender without lymphadenopathy, no masses, no JVD  PULM - CTA b/l,  symmetric breath sounds  COR -  normal heart sounds    ABD - , mild distension, NT, soft,  BACK - no CVA tenderness, nontender spine     EXTREMS - (+)2 LE edema, no deformity, warm and well perfused    SKIN - no rash    or bruising      NEUROLOGIC - alert, face symmetric, speech fluent, sensation nl, motor no focal deficit.

## 2024-05-28 NOTE — ED ADULT NURSE NOTE - CHIEF COMPLAINT QUOTE
pt coming from Dialysis while in 15 min into dialysis pt c/o SOB/diff breathing, as per EMs pt's Sat was 89% RA.  Had IV fluids at the dialysis pt stated feeling better.   hx DMT2

## 2024-05-28 NOTE — ED PROVIDER NOTE - PHYSICAL EXAMINATION
Vitals signed reviewed  General: Well appearing, NAD  HEENT: NC/AT, PERRLA, EOM intact with no pain, MMM  Neck: full ROM, no trachea deviation  Heart: RRR, normal s1/s2  Lungs: CTAB, labored breathing noted,  no wheezing, rales, or rhonchi  Abdomen: Soft, non-distended, non-tender, no CVA tenderness , rebounding or guarding  MSK:    normal gait, full ROM with no tenderness, redness, instability    B/L swelling noted on physical exam Vitals signed reviewed  General: Well appearing, NAD  HEENT: NC/AT, PERRLA, EOM intact with no pain, MMM  Neck: full ROM, no trachea deviation  Heart: RRR, normal s1/s2  Lungs: CTAB, labored breathing noted,  no wheezing, rales, or rhonchi  Abdomen: Soft, non-distended, non-tender, no CVA tenderness , rebounding or guarding  MSK:    normal gait, full ROM with no tenderness, redness, instability    B/L swelling noted on physical exam    Rectal Exam: PA Student     + Dark tarry stools noted on glove Vitals signed reviewed  General: Well appearing, NAD  HEENT: NC/AT, PERRLA, EOM intact with no pain, MMM  Neck: full ROM, no trachea deviation  Heart: RRR, normal s1/s2  Lungs: CTAB, labored breathing noted,  no wheezing, rales, or rhonchi  Abdomen: Soft, non-distended, non-tender, no CVA tenderness , rebounding or guarding  MSK:    normal gait, full ROM with no tenderness, redness, instability    B/L swelling noted on physical exam    Rectal Exam: PA Student Mary    + Dark tarry stools noted on glove

## 2024-05-28 NOTE — ED ADULT NURSE NOTE - NSSUHOSCREENINGYN_ED_ALL_ED
Patient interviewed and examined.       Chief Complaint   Patient presents with    Neck Pain         HPI: 49 yo f w htn, hld and DMII here w neck pain for last several days assoc w radiation to arms. No concerning signs or symptoms including changes in urinary or bowel symptoms such as incontience, weakness. Patient with no complaints of perineal paresthesias. Pt able to ambulate, but with some discomfort. Denies fever. Tried typical regimen without relief.   Mentioned chest tightness to triage and has noticed in past but not actively.      Review of Systems  Constitutional symptoms:  No fever, chills.    Skin symptoms:  No jaundice, or rash    HEENT: no sore throat, no vision change  Respiratory symptoms:  No   cough  Cardiovascular symptoms:  No   palpitations  Gastrointestinal symptoms:  no vomiting, no diarrhea.    Genitourinary symptoms:  No dysuria or blood in urine  Musculoskeletal symptoms:  No joint swelling    Neurologic symptoms:  No headache or new weakness  Hematologic: no bleeding or new bruising    PAST MEDICAL HX:    Hyperlipidemia                                  6/27/2018     Essential hypertension                          6/27/2012     Diabetes mellitus, type 2 (CMD)                 6/27/2018       Comment: 8/2020 had eye exam ( [facility])    Depression with anxiety                         6/27/2012     Rheumatoid arthritis of multiple sites with ne* 10/9/2020       Comment: Negative RF however positive CCP    Polyarthritis                                   11/11/2020      Comment: Onset of intermittent swelling of her hands and               knees and reportedly the shoulders.  Responded                to prednisone.  CCP was positive at 34.                 Responded to prednisone but no recurrence since               that time.  No morning stiffness    Migraine without aura and without status migra* 2/4/2016        PAST SURGICAL HX:    INCISIONAL BREAST BIOPSY                                       TONSILLECTOMY                                                  Family History   Problem Relation Age of Onset    Diabetes Mother     Hypothyroid Father     Diabetes Father     Stroke/TIA Father     Cancer, Throat Maternal Grandfather     Hypertension Other     Diabetes Other     Cancer, Prostate Neg Hx     Cancer Neg Hx     Heart disease Neg Hx     Stroke Neg Hx        Social History     Tobacco Use    Smoking status: Never    Smokeless tobacco: Never   Vaping Use    Vaping status: never used   Substance Use Topics    Alcohol use: Yes     Comment: socially    Drug use: Never          PE    Vitals:    04/30/24 1308 04/30/24 1314 04/30/24 1536   BP: 135/86  (!) 129/91   Pulse: 86  81   Resp: 18  18   Temp: 97.9 °F (36.6 °C)     TempSrc: Oral     SpO2: 99%  96%   Weight:  99.8 kg (220 lb 0.3 oz)        General: lying comfortably, no acute distress  HEENT: NC/AT, EOMI, oral mucosa pink and moist  Neck: supple, sp tenderness c6-7, no nuchal rigidity  Heart: regular rate and rhythm  Lungs: Nonlabored, clear to auscultation, no wheezing noted  ABD: soft, no guarding, rebound, or peritoneal signs or symptoms.   nontender  Musculo: Normal ROM, strength and sensation, no swelling or tenderness  Skin: No rash, warm and well perfused  Neuro: Alert and oriented, no focal neuro deficits  Psych: Normal affect            MDM:    Attempted to get old records including labs, imaging and ekg.   The case was reviewed with the patient. Nursing notes reviewed.    50-year-old female here with atraumatic neck pain and incidentally found hyperglycemia.   Ddx includes muscle spasm, cervical radiculopathy with radicular symptoms, no suspicion for compressive lesion with no weakness or numbness and no red flags such as fever, malignancy, symptoms lasting more than a month or steroid use to warrant advanced imaging at this time. This patient is not younger than 18 or older than 49yo. No abdominal component to suggest AAA.    Valium  and NSAID for back spasm  Pt presenting with hyperglycemia with normal gap and bicarb likely in setting of medication non-adherence.    No fevers or sign of infection on history and physical  EKG interpreted by me NSR, nonischemic, no ST segment changes. Unchanged from prior. Trop reassuring  CXR interpreted by me without consolidation   Dehydration- resuscitated with   NS.    Hypokalemia corrected prior to starting insulin    Hyperglycemia-  .05-.1kg   insulin x1  Patient feeling better and has PMD follow up to discuss regimen changes.  Given return to ED precautions including for worsening pain, SOB, fever or any other concerning symptoms and recommended to f/u with physician this week. Counseled patient regarding diagnosis, diagnostic results, treatment plan and they voiced understanding.    Anticipate home w PMD follow up when pain controlled and ambulating here.  Patient has been instructed to stay active, use proper body mechanics at all times, apply alternating cold/warm compresses to affected area. Limit lifting greater than 15 pounds for at least 2 weeks and no driving while taking pain medication or while in pain.  On re-examination, they were feeling better with pain medication. Now pt able to ambulate, but with some discomfort. Given return to ED precautions including for worsening pain, SOB, fever or any other concerning symptoms and recommended to f/u with physician this week.  Patient with no complaints of perineal paresthesias. No bladder or bowel incontinence. Denies fever. Patient informed that if these symptoms were to develop, patient is to return to ER immediately. Otherwise is appropriate for further outpatient imaging if symptoms persist. Counseled family regarding diagnosis, diagnostic results, treatment plan, prescription and they voiced understanding.     I have performed an independent history and physical examination and discussed the patient's management with the resident Олег. I  agree with the findings, assessment and plan of care as documented by the resident except as noted above. Any EKG or XRAY studies performed during the patient's Emergency Department stay were ordered and reviewed directly under my supervision and noted by the resident.      Results for orders placed or performed during the hospital encounter of 04/30/24   TROPONIN I, HIGH SENSITIVITY   Result Value    Troponin I, High Sensitivity <4   Comprehensive Metabolic Panel   Result Value    Fasting Status     Sodium 132 (L)    Potassium 3.4    Chloride 97    Carbon Dioxide 23    Anion Gap 15    Glucose 618 (HH)    BUN 10    Creatinine 0.50 (L)    Glomerular Filtration Rate >90     Comment: eGFR results = or >60 mL/min/1.73m2 = Normal kidney function. Estimated GFR calculated using the CKD-EPI-R (2021) equation that does not include race in the creatinine calculation.    BUN/Cr 20    Calcium 9.0    Bilirubin, Total 0.7    GOT/AST 28    GPT/ALT 44    Alkaline Phosphatase 82    Albumin 3.6    Protein, Total 7.0    Globulin 3.4    A/G Ratio 1.1   CBC with Automated Differential (performable only)   Result Value    WBC 5.7    RBC 5.14    HGB 14.7    HCT 42.7    MCV 83.1    MCH 28.6    MCHC 34.4    RDW-CV 12.9    RDW-SD 38.8 (L)        NRBC 0    Neutrophil, Percent 67    Lymphocytes, Percent 24    Mono, Percent 8    Eosinophils, Percent 1    Basophils, Percent 0    Immature Granulocytes 0    Absolute Neutrophils 3.8    Absolute Lymphocytes 1.4    Absolute Monocytes 0.5    Absolute Eosinophils  0.1    Absolute Basophils 0.0    Absolute Immature Granulocytes 0.0   GLUCOSE, BEDSIDE - POINT OF CARE   Result Value    GLUCOSE, BEDSIDE - POINT OF CARE 494 (HH)        Imaging Results              XR CHEST PA AND LATERAL 2 VIEWS (Final result)  Result time 04/30/24 16:11:29      Final result                   Impression:    Impression:   No acute cardiopulmonary abnormality.    Electronically Signed by: STORMY LOPEZ M.D.   Signed  on: 4/30/2024 4:11 PM   Workstation ID: [id number]               Narrative:    Examination: Chest radiographs, 2 views.    Clinical Information: Chest pain    Comparison: Chest x-ray 10/18/2022    Findings:    Support tubes and lines: None.    Heart, mediastinum, and pulmonary vasculature: The cardiomediastinal  silhouette is within normal limits for size and contour. No pulmonary  vascular congestion.     Lungs and pleura: No pulmonary consolidation or edema. No pleural effusion.  No pneumothorax.    Bones: No acute abnormality. Mild degenerative changes of the  acromioclavicular joints.    Other findings: None.                                       CT CERVICAL SPINE WO CONTRAST (Final result)  Result time 04/30/24 14:53:24      Final result                   Impression:    Impression:   1.   No evidence of cervical spine fracture.  2.   Mild degenerative changes of the cervical spine with mild to moderate  right neural foraminal narrowing at C3-C4 and C4-C5 and mild bilateral  neural foraminal narrowing at C5-C6. No significant spinal stenosis.        Electronically Signed by: FELICITY MURPHY M.D.   Signed on: 4/30/2024 2:53 PM   Workstation ID: [id number]               Narrative:    CT cervical spine without contrast    Clinical Indication: Neck pain radiating to the upper extremities    Comparison: None.    Technique: Multiplanar noncontrast CT of the cervical spine was performed.   Automated exposure control employed as radiation dose reduction strategy on  this patient.    Findings:   Normal cervical lordosis is preserved.  Sagittal alignment and vertebral  body heights are maintained.  There is no evidence of fracture, disc  disruption or facet dislocation. No aggressive osseous lesions. Chronic  degenerative changes of the cervical spine. Mild right uncovertebral joint  hypertrophy at C3-C4 and C4-C5 with mild to moderate right neural foraminal  narrowing. Facet joint arthropathy and bilateral  uncovertebral joint  hypertrophy at C5-C6 with mild bilateral neural foraminal narrowing. No  significant spinal stenosis. Retropharyngeal course of the right internal  carotid artery. Prevertebral soft tissues appear unremarkable.                                        Medications   lidocaine (LIDOCARE) 4 % patch 1 patch (1 patch Transdermal Patch Applied 4/30/24 1539)   insulin lispro (ADMELOG, HumaLOG) injection 5 Units (has no administration in time range)   sodium chloride (NORMAL SALINE) 0.9 % bolus 500 mL (has no administration in time range)   potassium CHLORIDE (KLOR-CON) packet 40 mEq (has no administration in time range)   ketorolac (TORADOL) injection 15 mg (15 mg Intravenous Given 4/30/24 1356)   acetaminophen (TYLENOL) tablet 1,000 mg (1,000 mg Oral Given 4/30/24 1532)   diazePAM (VALIUM) tablet 5 mg (5 mg Oral Given 4/30/24 1533)   sodium chloride (NORMAL SALINE) 0.9 % bolus 1,000 mL (0 mLs Intravenous Completed 4/30/24 1628)       ED Course as of 04/30/24 1637 Tue Apr 30, 2024   1437 Corrected sodium for hyperglycemia is 140 [RL]   1456 Labs with hyperglycemia without DKA.  Patient states she has been out of her Trulicity for months secondary to access and insurance issues [JR]   1546 Patient visited and reassessed.  Just received pain medications, will reassess.  Discussed CT results including lack of evidence of infection or acute fracture.  The patient expresses understanding of this and that an MRI may need to be performed as outpatient for further evaluation of her pain. [RL]      ED Course User Index  [JR] Cat Leyva MD  [RL] Mauro Denney III, DO        ED Diagnoses       Diagnosis Comment Associated Orders       Final diagnoses    Acute neck pain -- --    Hyperglycemia -- --                  MD Autumn Tejeda Jacqueline T, MD  04/30/24 1637     Yes - the patient is able to be screened

## 2024-05-28 NOTE — ED PROVIDER NOTE - OBJECTIVE STATEMENT
85 y/o female with PMHx DM, systolic CHF, CAD s/p PCI, ESRD on HD Tues, Thurs, Sat, Anemia and HTN presents to the ED while at dialysis 85 y/o female with PMHx DM, systolic CHF, CAD s/p PCI, ESRD on HD Tues, Thurs, Sat, Anemia and HTN presents to the ED while at dialysis began to have SOB and numbness of the mouth 15 mins into dialysis. Patient was brought in by EMS Sat 89% on RA was placed on O2. Patient was given IVF and benadryl at dialysis and reports improvement of symptoms. Patient  states she would like to go home.  Patient denies chest pain, fever, chills, n/v, headache visual changes or any other concerning symptoms at this time.

## 2024-05-28 NOTE — ED PROVIDER NOTE - CARE PLAN
1 Principal Discharge DX:	Symptomatic anemia   Principal Discharge DX:	Symptomatic anemia  Secondary Diagnosis:	ESRD needing dialysis

## 2024-05-28 NOTE — ED ADULT TRIAGE NOTE - CHIEF COMPLAINT QUOTE
pt coming from Dialysis while in 15 min into dialysis pt c/o SOB/diff breathing, as per EMs pt's Sat was 89% RA.  Pt AOx 3.  hx DMT2 pt coming from Dialysis while in 15 min into dialysis pt c/o SOB/diff breathing, as per EMs pt's Sat was 89% RA.  Had IV fluids at the dialysis pt stated feeling better.   hx DMT2

## 2024-05-29 DIAGNOSIS — E78.5 HYPERLIPIDEMIA, UNSPECIFIED: ICD-10-CM

## 2024-05-29 DIAGNOSIS — N18.6 END STAGE RENAL DISEASE: ICD-10-CM

## 2024-05-29 DIAGNOSIS — D62 ACUTE POSTHEMORRHAGIC ANEMIA: ICD-10-CM

## 2024-05-29 DIAGNOSIS — Z29.9 ENCOUNTER FOR PROPHYLACTIC MEASURES, UNSPECIFIED: ICD-10-CM

## 2024-05-29 DIAGNOSIS — Z79.899 OTHER LONG TERM (CURRENT) DRUG THERAPY: ICD-10-CM

## 2024-05-29 DIAGNOSIS — I50.30 UNSPECIFIED DIASTOLIC (CONGESTIVE) HEART FAILURE: ICD-10-CM

## 2024-05-29 DIAGNOSIS — K21.9 GASTRO-ESOPHAGEAL REFLUX DISEASE WITHOUT ESOPHAGITIS: ICD-10-CM

## 2024-05-29 DIAGNOSIS — D64.9 ANEMIA, UNSPECIFIED: ICD-10-CM

## 2024-05-29 DIAGNOSIS — I10 ESSENTIAL (PRIMARY) HYPERTENSION: ICD-10-CM

## 2024-05-29 LAB
ANION GAP SERPL CALC-SCNC: 14 MMOL/L — SIGNIFICANT CHANGE UP (ref 7–14)
ANISOCYTOSIS BLD QL: SIGNIFICANT CHANGE UP
BASO STIPL BLD QL SMEAR: PRESENT — SIGNIFICANT CHANGE UP
BASOPHILS # BLD AUTO: 0.15 K/UL — SIGNIFICANT CHANGE UP (ref 0–0.2)
BASOPHILS NFR BLD AUTO: 1.8 % — SIGNIFICANT CHANGE UP (ref 0–2)
BLD GP AB SCN SERPL QL: NEGATIVE — SIGNIFICANT CHANGE UP
BUN SERPL-MCNC: 57 MG/DL — HIGH (ref 7–23)
CALCIUM SERPL-MCNC: 8.3 MG/DL — LOW (ref 8.4–10.5)
CHLORIDE SERPL-SCNC: 102 MMOL/L — SIGNIFICANT CHANGE UP (ref 98–107)
CO2 SERPL-SCNC: 23 MMOL/L — SIGNIFICANT CHANGE UP (ref 22–31)
CREAT SERPL-MCNC: 4.5 MG/DL — HIGH (ref 0.5–1.3)
DIALYSIS INSTRUMENT RESULT - HEPATITIS B SURFACE ANTIGEN: NEGATIVE — SIGNIFICANT CHANGE UP
EGFR: 9 ML/MIN/1.73M2 — LOW
EOSINOPHIL # BLD AUTO: 0 K/UL — SIGNIFICANT CHANGE UP (ref 0–0.5)
EOSINOPHIL NFR BLD AUTO: 0 % — SIGNIFICANT CHANGE UP (ref 0–6)
GIANT PLATELETS BLD QL SMEAR: PRESENT — SIGNIFICANT CHANGE UP
GLUCOSE BLDC GLUCOMTR-MCNC: 104 MG/DL — HIGH (ref 70–99)
GLUCOSE BLDC GLUCOMTR-MCNC: 135 MG/DL — HIGH (ref 70–99)
GLUCOSE BLDC GLUCOMTR-MCNC: 163 MG/DL — HIGH (ref 70–99)
GLUCOSE SERPL-MCNC: 153 MG/DL — HIGH (ref 70–99)
HCT VFR BLD CALC: 20.6 % — CRITICAL LOW (ref 34.5–45)
HGB BLD-MCNC: 6.6 G/DL — CRITICAL LOW (ref 11.5–15.5)
LYMPHOCYTES # BLD AUTO: 1.2 K/UL — SIGNIFICANT CHANGE UP (ref 1–3.3)
LYMPHOCYTES # BLD AUTO: 13.9 % — SIGNIFICANT CHANGE UP (ref 13–44)
MACROCYTES BLD QL: SLIGHT — SIGNIFICANT CHANGE UP
MAGNESIUM SERPL-MCNC: 2 MG/DL — SIGNIFICANT CHANGE UP (ref 1.6–2.6)
MANUAL SMEAR VERIFICATION: SIGNIFICANT CHANGE UP
MCHC RBC-ENTMCNC: 31 PG — SIGNIFICANT CHANGE UP (ref 27–34)
MCHC RBC-ENTMCNC: 32 GM/DL — SIGNIFICANT CHANGE UP (ref 32–36)
MCV RBC AUTO: 96.7 FL — SIGNIFICANT CHANGE UP (ref 80–100)
MICROCYTES BLD QL: SLIGHT — SIGNIFICANT CHANGE UP
MONOCYTES # BLD AUTO: 0.15 K/UL — SIGNIFICANT CHANGE UP (ref 0–0.9)
MONOCYTES NFR BLD AUTO: 1.7 % — LOW (ref 2–14)
MRSA PCR RESULT.: SIGNIFICANT CHANGE UP
NEUTROPHILS # BLD AUTO: 7.1 K/UL — SIGNIFICANT CHANGE UP (ref 1.8–7.4)
NEUTROPHILS NFR BLD AUTO: 82.6 % — HIGH (ref 43–77)
NRBC # BLD: 0 /100 WBCS — SIGNIFICANT CHANGE UP (ref 0–0)
NRBC # FLD: 0 K/UL — SIGNIFICANT CHANGE UP (ref 0–0)
OB PNL STL: POSITIVE
PHOSPHATE SERPL-MCNC: 4.9 MG/DL — HIGH (ref 2.5–4.5)
PLAT MORPH BLD: NORMAL — SIGNIFICANT CHANGE UP
PLATELET # BLD AUTO: 224 K/UL — SIGNIFICANT CHANGE UP (ref 150–400)
PLATELET COUNT - ESTIMATE: NORMAL — SIGNIFICANT CHANGE UP
POIKILOCYTOSIS BLD QL AUTO: SLIGHT — SIGNIFICANT CHANGE UP
POTASSIUM SERPL-MCNC: 5.1 MMOL/L — SIGNIFICANT CHANGE UP (ref 3.5–5.3)
POTASSIUM SERPL-SCNC: 5.1 MMOL/L — SIGNIFICANT CHANGE UP (ref 3.5–5.3)
RBC # BLD: 2.13 M/UL — LOW (ref 3.8–5.2)
RBC # FLD: 18 % — HIGH (ref 10.3–14.5)
RBC BLD AUTO: ABNORMAL
RH IG SCN BLD-IMP: POSITIVE — SIGNIFICANT CHANGE UP
S AUREUS DNA NOSE QL NAA+PROBE: SIGNIFICANT CHANGE UP
SODIUM SERPL-SCNC: 139 MMOL/L — SIGNIFICANT CHANGE UP (ref 135–145)
TROPONIN T, HIGH SENSITIVITY RESULT: 102 NG/L — CRITICAL HIGH
WBC # BLD: 7.76 K/UL — SIGNIFICANT CHANGE UP (ref 3.8–10.5)
WBC # FLD AUTO: 7.76 K/UL — SIGNIFICANT CHANGE UP (ref 3.8–10.5)

## 2024-05-29 PROCEDURE — 99223 1ST HOSP IP/OBS HIGH 75: CPT

## 2024-05-29 PROCEDURE — 99222 1ST HOSP IP/OBS MODERATE 55: CPT | Mod: GC

## 2024-05-29 RX ORDER — LANOLIN ALCOHOL/MO/W.PET/CERES
3 CREAM (GRAM) TOPICAL AT BEDTIME
Refills: 0 | Status: DISCONTINUED | OUTPATIENT
Start: 2024-05-29 | End: 2024-06-01

## 2024-05-29 RX ORDER — HYDRALAZINE HCL 50 MG
1 TABLET ORAL
Refills: 0 | DISCHARGE

## 2024-05-29 RX ORDER — SODIUM CHLORIDE 9 MG/ML
1000 INJECTION, SOLUTION INTRAVENOUS
Refills: 0 | Status: DISCONTINUED | OUTPATIENT
Start: 2024-05-29 | End: 2024-06-01

## 2024-05-29 RX ORDER — DEXTROSE 50 % IN WATER 50 %
25 SYRINGE (ML) INTRAVENOUS ONCE
Refills: 0 | Status: DISCONTINUED | OUTPATIENT
Start: 2024-05-29 | End: 2024-06-01

## 2024-05-29 RX ORDER — INSULIN LISPRO 100/ML
VIAL (ML) SUBCUTANEOUS
Refills: 0 | Status: DISCONTINUED | OUTPATIENT
Start: 2024-05-29 | End: 2024-06-01

## 2024-05-29 RX ORDER — OXYCODONE HYDROCHLORIDE 5 MG/1
5 TABLET ORAL ONCE
Refills: 0 | Status: DISCONTINUED | OUTPATIENT
Start: 2024-05-29 | End: 2024-05-29

## 2024-05-29 RX ORDER — GABAPENTIN 400 MG/1
300 CAPSULE ORAL ONCE
Refills: 0 | Status: COMPLETED | OUTPATIENT
Start: 2024-05-29 | End: 2024-05-29

## 2024-05-29 RX ORDER — DEXTROSE 50 % IN WATER 50 %
12.5 SYRINGE (ML) INTRAVENOUS ONCE
Refills: 0 | Status: DISCONTINUED | OUTPATIENT
Start: 2024-05-29 | End: 2024-06-01

## 2024-05-29 RX ORDER — PANTOPRAZOLE SODIUM 20 MG/1
40 TABLET, DELAYED RELEASE ORAL
Refills: 0 | Status: DISCONTINUED | OUTPATIENT
Start: 2024-05-29 | End: 2024-06-01

## 2024-05-29 RX ORDER — CHLORHEXIDINE GLUCONATE 213 G/1000ML
1 SOLUTION TOPICAL DAILY
Refills: 0 | Status: DISCONTINUED | OUTPATIENT
Start: 2024-05-29 | End: 2024-06-01

## 2024-05-29 RX ORDER — ACETAMINOPHEN 500 MG
650 TABLET ORAL EVERY 6 HOURS
Refills: 0 | Status: DISCONTINUED | OUTPATIENT
Start: 2024-05-29 | End: 2024-06-01

## 2024-05-29 RX ORDER — GLUCAGON INJECTION, SOLUTION 0.5 MG/.1ML
1 INJECTION, SOLUTION SUBCUTANEOUS ONCE
Refills: 0 | Status: DISCONTINUED | OUTPATIENT
Start: 2024-05-29 | End: 2024-06-01

## 2024-05-29 RX ORDER — PANTOPRAZOLE SODIUM 20 MG/1
80 TABLET, DELAYED RELEASE ORAL ONCE
Refills: 0 | Status: DISCONTINUED | OUTPATIENT
Start: 2024-05-29 | End: 2024-05-29

## 2024-05-29 RX ORDER — DEXTROSE 50 % IN WATER 50 %
15 SYRINGE (ML) INTRAVENOUS ONCE
Refills: 0 | Status: DISCONTINUED | OUTPATIENT
Start: 2024-05-29 | End: 2024-06-01

## 2024-05-29 RX ORDER — METOPROLOL TARTRATE 50 MG
1 TABLET ORAL
Refills: 0 | DISCHARGE

## 2024-05-29 RX ORDER — GABAPENTIN 400 MG/1
300 CAPSULE ORAL ONCE
Refills: 0 | Status: DISCONTINUED | OUTPATIENT
Start: 2024-05-29 | End: 2024-05-29

## 2024-05-29 RX ORDER — FUROSEMIDE 40 MG
40 TABLET ORAL ONCE
Refills: 0 | Status: COMPLETED | OUTPATIENT
Start: 2024-05-29 | End: 2024-05-29

## 2024-05-29 RX ORDER — DEXTROSE 10 % IN WATER 10 %
125 INTRAVENOUS SOLUTION INTRAVENOUS ONCE
Refills: 0 | Status: DISCONTINUED | OUTPATIENT
Start: 2024-05-29 | End: 2024-06-01

## 2024-05-29 RX ORDER — APIXABAN 2.5 MG/1
1 TABLET, FILM COATED ORAL
Refills: 0 | DISCHARGE

## 2024-05-29 RX ORDER — ATORVASTATIN CALCIUM 80 MG/1
1 TABLET, FILM COATED ORAL
Refills: 0 | DISCHARGE

## 2024-05-29 RX ADMIN — CHLORHEXIDINE GLUCONATE 1 APPLICATION(S): 213 SOLUTION TOPICAL at 17:00

## 2024-05-29 RX ADMIN — OXYCODONE HYDROCHLORIDE 5 MILLIGRAM(S): 5 TABLET ORAL at 07:42

## 2024-05-29 RX ADMIN — PANTOPRAZOLE SODIUM 40 MILLIGRAM(S): 20 TABLET, DELAYED RELEASE ORAL at 20:47

## 2024-05-29 RX ADMIN — GABAPENTIN 300 MILLIGRAM(S): 400 CAPSULE ORAL at 06:30

## 2024-05-29 RX ADMIN — OXYCODONE HYDROCHLORIDE 5 MILLIGRAM(S): 5 TABLET ORAL at 08:12

## 2024-05-29 RX ADMIN — Medication 40 MILLIGRAM(S): at 06:43

## 2024-05-29 NOTE — CONSULT NOTE ADULT - SUBJECTIVE AND OBJECTIVE BOX
HPI: 85 y/o female with PMHx DM, systolic CHF, CAD s/p PCI, ESRD on HD Tues, Thurs, Sat, Anemia and HTN presents to the ED while at dialysis began to have SOB and numbness of the mouth 15 mins into dialysis. Patient was brought in by EMS Sat 89% on RA was placed on O2. Patient was given IVF and benadryl at dialysis and reports improvement of symptoms. Patient  states she would like to go home.  Patient denies chest pain, fever, chills, n/v, headache visual changes or any other concerning symptoms at this time.    Patient is a 84y old  Female who presents with a chief complaint of     PAST MEDICAL & SURGICAL HISTORY:  HLD (hyperlipidemia)      Diabetic neuropathy      CAD (coronary artery disease)  ~ s/p MICHAEL x2 in 2017      Paroxysmal atrial fibrillation      Diabetes mellitus, type 2      GI bleed      Neuropathy      Gastritis      Diastolic heart failure      Transfusion history      Stage 5 chronic kidney disease not on chronic dialysis      Anemia      PVD (peripheral vascular disease)  ~ RLE angiogram with 2 stents placed in 2019.      H/O  section      S/P coronary artery stent placement  x2 in       History of esophagogastroduodenoscopy (EGD)      S/P angiogram of extremity          MEDICATIONS  (STANDING):      Allergies    Carrots (Rash)  No Known Drug Allergies    Intolerances        SOCIAL HISTORY:  Denies ETOh,Smoking,     FAMILY HISTORY:  FH: diabetes mellitus        REVIEW OF SYSTEMS:  CONSTITUTIONAL: No weakness, fevers or chills  EYES/ENT: No visual changes;  No vertigo or throat pain   NECK: No pain or stiffness  RESPIRATORY: No cough, wheezing, hemoptysis; No shortness of breath  CARDIOVASCULAR: No chest pain or palpitations  GASTROINTESTINAL: No abdominal or epigastric pain. No nausea, vomiting, or hematemesis; No diarrhea or constipation. No melena or hematochezia.  GENITOURINARY: No dysuria, frequency or hematuria  NEUROLOGICAL: No numbness or weakness  SKIN: No itching, burning, rashes, or lesions   All other review of systems is negative unless indicated above.    VITAL:  T(C): , Max: 36.8 (24 @ 02:15)  T(F): , Max: 98.3 (24 @ 02:30)  HR: 71 (24 @ 06:40)  BP: 138/60 (24 @ 06:40)  BP(mean): --  RR: 18 (24 @ 06:40)  SpO2: 100% (24 @ 06:40)  Wt(kg): --    PHYSICAL EXAM:  Constitutional: NAD, Alert  HEENT: NCAT, MMM  Neck: Supple, No JVD  Respiratory: CTA-b/l  Cardiovascular: RRR s1s2, no m/r/g  Gastrointestinal: BS+, soft, NT/ND  Extremities: No peripheral edema b/l  Neurological: no focal deficits; strength grossly intact  Back: no CVAT b/l  Skin: No rashes, no nevi    LABS:                        5.9    8.60  )-----------( 235      ( 28 May 2024 22:46 )             18.5     Na(139)/K(4.7)/Cl(103)/HCO3(23)/BUN(48)/Cr(3.98)Glu(164)/Ca(8.8)/Mg(--)/PO4(--)     @ 22:46    Urinalysis Basic - ( 28 May 2024 22:46 )    Color: x / Appearance: x / SG: x / pH: x  Gluc: 164 mg/dL / Ketone: x  / Bili: x / Urobili: x   Blood: x / Protein: x / Nitrite: x   Leuk Esterase: x / RBC: x / WBC x   Sq Epi: x / Non Sq Epi: x / Bacteria: x            IMAGING:    ASSESSMENT:  85 y/o female with PMHx DM, systolic CHF, CAD s/p PCI, ESRD on HD Tues, Thurs, Sat, Anemia and HTN presents to the ED with sob ,hypotension      ESRD ---TTS--incomplete last night   anemia --sp one unit transfusion   volume-still has renal reserves , cxr clear     RECOMMEND:  -HD today   -get an other       Thank you for involving [facility] in this patient's care.    With warm regards    Gaby Chowdhury  [facility]  Office: [phone]             HPI: 85 y/o female with PMHx DM, systolic CHF, CAD s/p PCI, ESRD on HD Tues, Thurs, Sat, Anemia and HTN presents to the ED while at dialysis began to have SOB and numbness of the mouth 15 mins into dialysis. Patient was brought in by EMS Sat 89% on RA was placed on O2. Patient was given IVF and benadryl at dialysis and reports improvement of symptoms. Patient  states she would like to go home.  Patient denies chest pain, fever, chills, n/v, headache visual changes or any other concerning symptoms at this time.    Patient is a 84y old  Female who presents with a chief complaint of     PAST MEDICAL & SURGICAL HISTORY:  HLD (hyperlipidemia)      Diabetic neuropathy      CAD (coronary artery disease)  ~ s/p MICHAEL x2 in 2017      Paroxysmal atrial fibrillation      Diabetes mellitus, type 2      GI bleed      Neuropathy      Gastritis      Diastolic heart failure      Transfusion history      Stage 5 chronic kidney disease not on chronic dialysis      Anemia      PVD (peripheral vascular disease)  ~ RLE angiogram with 2 stents placed in 2019.      H/O  section      S/P coronary artery stent placement  x2 in       History of esophagogastroduodenoscopy (EGD)      S/P angiogram of extremity          MEDICATIONS  (STANDING):      Allergies    Carrots (Rash)  No Known Drug Allergies    Intolerances        SOCIAL HISTORY:  Denies ETOh,Smoking,     FAMILY HISTORY:  FH: diabetes mellitus        REVIEW OF SYSTEMS:  CONSTITUTIONAL: No weakness, fevers or chills  EYES/ENT: No visual changes;  No vertigo or throat pain   NECK: No pain or stiffness  RESPIRATORY: No cough, wheezing, hemoptysis; No shortness of breath  CARDIOVASCULAR: No chest pain or palpitations  GASTROINTESTINAL: No abdominal or epigastric pain. No nausea, vomiting, or hematemesis; No diarrhea or constipation. No melena or hematochezia.  GENITOURINARY: No dysuria, frequency or hematuria  NEUROLOGICAL: No numbness or weakness  SKIN: No itching, burning, rashes, or lesions   All other review of systems is negative unless indicated above.    VITAL:  T(C): , Max: 36.8 (24 @ 02:15)  T(F): , Max: 98.3 (24 @ 02:30)  HR: 71 (24 @ 06:40)  BP: 138/60 (24 @ 06:40)  BP(mean): --  RR: 18 (24 @ 06:40)  SpO2: 100% (24 @ 06:40)  Wt(kg): --    PHYSICAL EXAM:  Constitutional: NAD, Alert  HEENT: NCAT, MMM  Neck: Supple, No JVD  Respiratory: CTA-b/l  Cardiovascular: RRR s1s2, no m/r/g  Gastrointestinal: BS+, soft, NT/ND  Extremities: No peripheral edema b/l  Neurological: no focal deficits; strength grossly intact  Back: no CVAT b/l  Skin: No rashes, no nevi  access tdc  LABS:                        5.9    8.60  )-----------( 235      ( 28 May 2024 22:46 )             18.5     Na(139)/K(4.7)/Cl(103)/HCO3(23)/BUN(48)/Cr(3.98)Glu(164)/Ca(8.8)/Mg(--)/PO4(--)     @ 22:46    Urinalysis Basic - ( 28 May 2024 22:46 )    Color: x / Appearance: x / SG: x / pH: x  Gluc: 164 mg/dL / Ketone: x  / Bili: x / Urobili: x   Blood: x / Protein: x / Nitrite: x   Leuk Esterase: x / RBC: x / WBC x   Sq Epi: x / Non Sq Epi: x / Bacteria: x            IMAGING:    ASSESSMENT:  85 y/o female with PMHx DM, systolic CHF, CAD s/p PCI, ESRD on HD Tues, Thurs, Sat, Anemia and HTN presents to the ED with sob ,hypotension      ESRD ---TTS--incomplete last night   anemia --Gi loss sp one unit transfusion   volume-still has renal reserves , cxr clear     RECOMMEND:  -HD today   -get an other transfusion during HD  -GI consult  -renal diet .      Thank you for involving [facility] in this patient's care.    With warm regards    Gaby Chowdhury  [facility]  Office: [phone]

## 2024-05-29 NOTE — CONSULT NOTE ADULT - ATTENDING COMMENTS
84-year-old female with CAD status post PCI 2017 on ASA/Plavix/PVD/HTN/HLD/T2DM/ESRD on HD TRS/history of peptic ulcer disease found to be short of breath, found to have a hemoglobin of 4.6 from a baseline of 8–10 in the background of?  Melena, with background of duodenal ulcer in 2018, hemoglobin of 5, repeat EGD in 2020 demonstrated only gastritis and patient refused colonoscopy at that time.  Incidental new LLE hematoma  Patient was started on Eliquis 1 week ago in addition to ASA/Plavix  Impression  1.  Likely GI bleed while on DAPT plus anticoagulation    Recommendation  1.  Risk/benefits of aspirin/Plavix/Eliquis administration need to be considered in an 84-year-old patient with history of anemia and GI bleeding, who has dysfunctional platelets given ESRD status  2.  Plan for endoscopic evaluation EGD 5/30 now that she is post HD and less volume overloaded,   3. hemoglobin needs to be 8 given history of CAD, please transfuse to goal

## 2024-05-29 NOTE — H&P ADULT - HISTORY OF PRESENT ILLNESS
Patient is an 83 y/o F with history of CAD s/p PCI, PVD, HTN, HLD, T2DM, HFpEF, ESRD on HD (T/Th/Sa) and PUD sent in from her dialysis center due to shortness of breath during HD and noted to have significant anemia to 4.6. Patient reportedly went to her regularly scheduled HD session on 5/28 and began to experience shortness of breath and selene-oral numbness which started shortly after initiation of dialysis. Patient was noted to be saturating 89% on and placed on oxygen. She was given IV fluid at that time and did report some improvement in her symptoms. Patient endorses that she has been taking ASA/Plavix for her CAD/PVD and has also noted black stools for approximately 1 week.  Patient has not been experiencing any hematemesis or nausea/abdominal pain. Currently reports that she feels better after receiving blood transfusions. Denies any CP, SOB, dizziness or palpitations currently.

## 2024-05-29 NOTE — H&P ADULT - PROBLEM SELECTOR PLAN 4
Patient with history of CHF  - most recent TTE from 4/2023 with FE 43%  - cardiology following, recs appreciated  - c/w BB for now  - fluid removal with HD  - judicious use of IV fluids Patient with history of CHF  - most recent TTE from 4/2023 with FE 43%  - cardiology following, recs appreciated  - was previously on lopressor but patient reports she stopped taking it  - fluid removal with HD  - judicious use of IV fluids Patient with history of ESRD  - on T/Th/Sa schedule currently  - receiving HD via [facility] Tunneled HD cath  - planned for additional HD session with PRBC transfusion on 5/29  - plan for further HD per renal

## 2024-05-29 NOTE — H&P ADULT - PROBLEM SELECTOR PLAN 2
Patient with history of HTN  - BPs currently well controlled  - most likely lower than baseline i/s/o GI bleed  - hold anti-hypertensives for now  - can gradually re-introduce home BP regimen as Hgb and BP stabilizes Patient with history of HTN  - BPs currently well controlled  - most likely lower than baseline i/s/o GI bleed  - hold anti-hypertensives for now  - patient reports self discontinuing all her BP meds at home, may possibly need reintroduction of new meds during this admission

## 2024-05-29 NOTE — H&P ADULT - NSHPLABSRESULTS_GEN_ALL_CORE
LABS:                         6.6    7.76  )-----------( 224      ( 29 May 2024 08:31 )             20.6     05-29    139  |  102  |  57<H>  ----------------------------<  153<H>  5.1   |  23  |  4.50<H>    Ca    8.3<L>      29 May 2024 08:31  Phos  4.9     05-29  Mg     2.00     05-29    TPro  6.4  /  Alb  3.6  /  TBili  0.2  /  DBili  x   /  AST  9   /  ALT  6   /  AlkPhos  97  05-28    Urinalysis Basic - ( 29 May 2024 08:31 )    Color: x / Appearance: x / SG: x / pH: x  Gluc: 153 mg/dL / Ketone: x  / Bili: x / Urobili: x   Blood: x / Protein: x / Nitrite: x   Leuk Esterase: x / RBC: x / WBC x   Sq Epi: x / Non Sq Epi: x / Bacteria: x    RADIOLOGY, EKG & ADDITIONAL TESTS: Reviewed.   cx< from: Xray Chest 2 Views PA/Lat (05.28.24 @ 21:39) >    FINDINGS:  Right IJ central venous catheter tip terminates in the SVC.  The heart is enlarged. Aortic calcifications are noted.  The lungs are clear.  No pleural effusion or pneumothorax.    IMPRESSION:  Clear lungs.    < end of copied text >

## 2024-05-29 NOTE — CONSULT NOTE ADULT - SUBJECTIVE AND OBJECTIVE BOX
HPI:  Nikunj Healy is a 84 year old female with a PMHx notable for CAD s/p PCI 2017 on ASA/Plavix, PVD, HTN, HLD, T2DM, HFpEF, ESRD on HD //S and hx of PUD referred into the hospital after being found to be short of breath at her HD session with labs notable for hgb of 4.6 from a baseline of ~8-10 in the background of dark black stools. GI consulted for further workup and management of the above.     Pt with multiple admissions in the past for anemia. EGD in 2018 with note of one non-bleeding duodenal ulcer. Xarelto was discontinued (paroxysmal a-fib) and ASA with Plavix was resumed. She again presented with similar symptoms in 3/2018 with Hb-5. Capsule study revealed questionable ulcer in ileum and patient was started on mesalamine although currently off of it. In  she had anemia with EGD notable for gastritis. Colonoscopy was offered although patient declined. Pt currently presents to the hospital after being found to be short of breath at her HD session with labs notable for hgb of 4.6 from a baseline of ~8-10 in the background of dark black stools for the past week. Notably, pt started on Elliquis around one week ago in addition to ASA and Plavix. She has also had a new LLE hematoma that is new. Labs in the ED notable for hgb of 5.6 s/p 1 unit with increase to 6.6. Rectal exam by ED providers with melena although no stool in rectal vault on my exam.  Pt being planned for another HD session today.         Allergies:  Carrots (Rash)  No Known Drug Allergies      Home Medications:  atorvastatin 40 mg oral tablet: 1 orally once a day (at bedtime) (2023 12:08)  clopidogrel 75 mg oral tablet: 1 tab(s) orally once a day (2023 10:20)  hydrALAZINE 10 mg oral tablet: 1 tab(s) orally 2 times a day (2023 12:08)  metoprolol tartrate 50 mg oral tablet: 1 tab(s) orally 2 times a day (2023 12:08)  senna oral tablet: 2 tab(s) orally once a day (at bedtime) (2023 23:09)  sevelamer carbonate 800 mg oral tablet: 1 tab(s) orally 3 times a day (with meals) (2023 10:20)  [facility] Medications: None       PMHX/PSHX:    Type 2 diabetes mellitus  HLD (hyperlipidemia)  Diabetic neuropathy  CAD (coronary artery disease) s/p PCI   PVD s/p stenting   Paroxysmal atrial fbrillation  Atrial fibrillation, unspecified type  PUD   Diastolic heart failure  Transfusion history  Stage 5 chronic kidney disease not on chronic dialysis  PVD (peripheral vascular disease)  H/O  section  S/P angiogram of extremity      FH: diabetes mellitus    Social History:   Tob: Denies  EtOH: Denies  Illicit Drugs: Denies    ROS: Complete and normal except as mentioned above    PHYSICAL EXAM:   GENERAL:  No acute distress  HEENT:  NCAT   CHEST:  no respiratory distress  HEART:  Regular rate and rhythm  ABDOMEN:  Soft, non-tender, non-distended, normoactive bowel sounds. Rectal exam with no stool in the rectal vault.   EXTREMITIES: 1+edema b/l . Large hematoma on the left leg.   NEURO:  Alert and oriented x 3, no asterixis    Vital Signs:  Vital Signs Last 24 Hrs  T(C): 36 (29 May 2024 08:12), Max: 36.8 (29 May 2024 02:15)  T(F): 96.8 (29 May 2024 08:12), Max: 98.3 (29 May 2024 02:30)  HR: 69 (29 May 2024 08:12) (68 - 83)  BP: 121/71 (29 May 2024 08:12) (101/53 - 145/77)  BP(mean): --  RR: 22 (29 May 2024 08:12) (18 - 22)  SpO2: 95% (29 May 2024 08:12) (95% - 100%)    Parameters below as of 29 May 2024 08:12  Patient On (Oxygen Delivery Method): room air      LABS:                        6.6    7.76  )-----------( 224      ( 29 May 2024 08:31 )             20.6     Mean Cell Volume: 96.7 fL (24 @ 08:31)        139  |  102  |  57<H>  ----------------------------<  153<H>  5.1   |  23  |  4.50<H>    Ca    8.3<L>      29 May 2024 08:31  Phos  4.9       Mg     2.00         TPro  6.4  /  Alb  3.6  /  TBili  0.2  /  DBili  x   /  AST  9   /  ALT  6   /  AlkPhos  97      LIVER FUNCTIONS - ( 28 May 2024 22:46 )  Alb: 3.6 g/dL / Pro: 6.4 g/dL / ALK PHOS: 97 U/L / ALT: 6 U/L / AST: 9 U/L / GGT: x             Urinalysis Basic - ( 29 May 2024 08:31 )    Color: x / Appearance: x / SG: x / pH: x  Gluc: 153 mg/dL / Ketone: x  / Bili: x / Urobili: x   Blood: x / Protein: x / Nitrite: x   Leuk Esterase: x / RBC: x / WBC x   Sq Epi: x / Non Sq Epi: x / Bacteria: x                              6.6    7.76  )-----------( 224      ( 29 May 2024 08:31 )             20.6                         5.9    8.60  )-----------( 235      ( 28 May 2024 22:46 )             18.5       Imaging:  < from: CT Abdomen and Pelvis w/ IV Cont (23 @ 23:09) >  IMPRESSION:  Bladder wall thickening along with fullness of the right renal collecting   system with urothelial enhancement concerning for cystitis and right   ureteritis. Focus of air in the bladder could be on infectious basis or   related to recent instrumentation. Please correlate clinically and with   urinalysis and urine culture.    Right upper lobe 8 mm nodular opacity. Consider repeat CT chest in 3   months for further evaluation.        < end of copied text >  < from: Xray Chest 2 Views PA/Lat (24 @ 21:39) >  IMPRESSION:  Clear lungs.    < end of copied text >  < from: Upper Endoscopy (20 @ 11:01) >  Moderate Sedation:       Moderate (conscious) sedation was personally administered by the        endoscopist. The following parameters were monitored: oxygen saturation,        heart rate, blood pressure, respiratory rate, EKG, adequacy of pulmonary        ventilation, and response to care.  Impression:          - Normal esophagus.                       - Gastritis. Biopsied.                       - Normal examined duodenum.    < end of copied text >  < from: Colonoscopy (18 @ 17:12) >                                                                                   Impression:          - Preparation of the colon was poor.                       - Stool in the entire examined colon.                       - Internal hemorrhoids.                       - No specimens collected.    < end of copied text >  < from: Upper Endoscopy (18 @ 17:11) >                                                                                   Impression:          - Normal esophagus.                       - Normal stomach.                       - Normal examined duodenum.                       - No specimens collected.    < end of copied text >  < from: Upper Endoscopy (18 @ 08:04) >  Impression:          - Normal esophagus.                       - Gastritis.    - One non-bleeding duodenal ulcer with no stigmata of                        bleeding.    < end of copied text >

## 2024-05-29 NOTE — ED ADULT NURSE REASSESSMENT NOTE - NS ED NURSE REASSESS COMMENT FT1
Assumed care of patient, A&Ox4, no signs of distress, no complaints at this time, PA placed 20G PIV to LUE, pending report to floor, fall precautions maintained

## 2024-05-29 NOTE — H&P ADULT - PROBLEM SELECTOR PLAN 1
Patient presenting with initial hemoglobin of 5.9 on admission  - noted with microcytic anemia and history of previous small bowel ulcers/gastritis on prior workups  - s/p 1U PRBC with improvement to 6.6  - planned for additional 1 U PRBC with HD 5/29  - plan to check post transfusion CBC this evening  - per ED documentation, noted with melena on rectal exam  - GI eval appreciated, plan for EGD 5/30  - c/w PPI IV BID  - maintain active T&S, transfuse for hgb <7  - trend CBCs q12h for now  - CLD for now, NPO at MN

## 2024-05-29 NOTE — H&P ADULT - NSHPPHYSICALEXAM_GEN_ALL_CORE
GENERAL: NAD, lying in bed comfortably  HEENT: NC/AT, EOMI, PERRL  NECK: Supple, No JVD  CHEST/LUNG: CTAB, no increased WOB, R chest wall permacath noted  HEART: RRR, no m/r/g  ABDOMEN: soft, NT, ND, BS+  EXTREMITIES:  2+ peripheral pulses, no clubbing, no edema  NERVOUS SYSTEM:  A&Ox3, no focal deficits  MSK: FROM all 4 extremities, full and equal strength  SKIN: No rashes or lesions

## 2024-05-29 NOTE — ED ADULT NURSE REASSESSMENT NOTE - NS ED NURSE REASSESS COMMENT FT1
20G US guided IV established to left AC by JULIO CESAR Spring and JULIO CESAR Montalvo. Blood transfusion infusing as per orders.

## 2024-05-29 NOTE — CONSULT NOTE ADULT - SUBJECTIVE AND OBJECTIVE BOX
Cardiovascular Disease Initial Evaluation  DATE OF SERVICE: 24 @ 08:11    CHIEF COMPLAINT: Weakness    HISTORY OF PRESENT ILLNESS:  This is an 84 year old woman with uncontrolled IDDM, compensated diastolic CHF, CAD s/p PCI, ESRD on HD and HTN who presented to [facility] ED on 2024 with SOB.         Ms. Tsang has been admitted multiple times for anemia.   She has a history of CAD and recurrent GI bleed. She underwent cath with Dr. Zulay Mckinnon and had MICHAEL placed to LCx and OM1 on 2017. On 2018, she presented to [facility] with progressive SOB. She was found to have a Hb of 4.6 and was transfused multiple units of pRBCs. She underwent EGD by Dr. Curtis Daigle revealing one non-bleeding duodenal ulcer. Xarelto was discontinued (paroxysmal a-fib) and ASA with Plavix was resumed. She again presented with similar symptoms in 3/2018 with Hb-5. Capsule study revealed questionable ulcer in ileum and patient was started on mesalamine. ASA was d/c'ed.         Allergies  Carrots (Rash)  No Known Drug Allergies      	    MEDICATIONS:    Reviewed      PAST MEDICAL & SURGICAL HISTORY:  HLD (hyperlipidemia)      Diabetic neuropathy      CAD (coronary artery disease)  ~ s/p MICHAEL x2 in 2017      Paroxysmal atrial fibrillation      Diabetes mellitus, type 2      GI bleed      Neuropathy      Gastritis      Diastolic heart failure      Transfusion history      Stage 5 chronic kidney disease not on chronic dialysis      Anemia      PVD (peripheral vascular disease)  ~ RLE angiogram with 2 stents placed in 2019.      H/O  section      S/P coronary artery stent placement  x2 in       History of esophagogastroduodenoscopy (EGD)      S/P angiogram of extremity          FAMILY HISTORY:  FH: diabetes mellitus        SOCIAL HISTORY:    The patient is a nonsmoker       REVIEW OF SYSTEMS:  See HPI, otherwise complete 14 point review of systems negative      PHYSICAL EXAM:  T(C): 36.6 (24 @ 06:40), Max: 36.8 (24 @ 02:15)  HR: 71 (24 @ 06:40) (68 - 83)  BP: 138/60 (24 @ 06:40) (101/53 - 145/77)  RR: 18 (24 @ 06:40) (18 - 20)  SpO2: 100% (24 @ 06:40) (97% - 100%)  Wt(kg): --  I&O's Summary      Appearance: No Acute Distress; resting comfortably  HEENT:  Normal oral mucosa, PERRL, EOMI	  Cardiovascular: Normal S1 S2, No JVD, No murmurs/rubs/gallops  Respiratory: Normal respiratory effort; Lungs clear to auscultation bilaterally  Gastrointestinal:  Soft, Non-tender, + BS	  Skin: No rashes, No ecchymoses, No cyanosis	  Neurologic: Non-focal; no weakness  Extremities: No clubbing, cyanosis or edema  Vascular: Peripheral pulses palpable 2+ bilaterally  Psychiatry: A & O x 3, Mood & affect appropriate    Laboratory Data:	 	    CBC Full  -  ( 28 May 2024 22:46 )  WBC Count : 8.60 K/uL  Hemoglobin : 5.9 g/dL  Hematocrit : 18.5 %  Platelet Count - Automated : 235 K/uL  Mean Cell Volume : 100.0 fL  Mean Cell Hemoglobin : 31.9 pg  Mean Cell Hemoglobin Concentration : 31.9 gm/dL  Auto Neutrophil # : 7.10 K/uL  Auto Lymphocyte # : 1.20 K/uL  Auto Monocyte # : 0.15 K/uL  Auto Eosinophil # : 0.00 K/uL  Auto Basophil # : 0.15 K/uL  Auto Neutrophil % : 82.6 %  Auto Lymphocyte % : 13.9 %  Auto Monocyte % : 1.7 %  Auto Eosinophil % : 0.0 %  Auto Basophil % : 1.8 %        139  |  103  |  48<H>  ----------------------------<  164<H>  4.7   |  23  |  3.98<H>    Ca    8.8      28 May 2024 22:46    TPro  6.4  /  Alb  3.6  /  TBili  0.2  /  DBili  x   /  AST  9   /  ALT  6   /  AlkPhos  97        Interpretation of Telemetry: Sinus	    ECG:  	A-fib  	    Assessment: 84 year old woman with uncontrolled IDDM, compensated systolic CHF, CAD s/p PCI, and ESRD on HD presents with profound anemia.       Plan of Care:    #Acute anemia-  S/P pRBC transfusion with improvement in symptoms.   Work up as per the primary team.     #CAD- s/p MICHAEL x2 in 2017.  Ms. Tsang displays no signs or symptoms of coronary ischemia or decompensated CHF.   Hold antiplatelets given recurrent anemia.     #ESRD-  S/P HD initiation in .  Renal input noted.       #Paroxymal a-fib-  Sinus on telemetry in the ED.    Toprol.   No AC given h/o GI bleed.    High and complex medical decision making in this patient with active comorbidities.     76 minutes spent on total encounter; more than 50% of the visit was spent counseling and/or coordinating care by the attending physician.   	  Julio César Hooper MD [facility]  Cardiovascular Diseases  [phone]

## 2024-05-29 NOTE — CONSULT NOTE ADULT - ASSESSMENT
Nikunj Healy is a 84 year old female with a PMHx notable for CAD s/p PCI 2017 on ASA/Plavix, PVD, HTN, HLD, T2DM, HFpEF, ESRD on HD T/Th/S and hx of PUD referred into the hospital after being found to be short of breath at her HD session with labs notable for hgb of 4.6 from a baseline of ~8-10 in the background of dark black stools. GI consulted for further workup and management of the above.     #Dark stools c/f melena   #Acute on Chronic Anemia   Hx notable for GI bleeding in the past. EGD in 2018 with note of one non-bleeding duodenal ulcer. Xarelto was discontinued (paroxysmal a-fib) and ASA with Plavix was resumed. She again presented with similar symptoms in 3/2018 with Hb-5. Capsule study revealed questionable ulcer in ileum and patient was started on mesalamine although currently off of it. In 2020 she had anemia with EGD notable for gastritis. Colonoscopy was offered although patient declined. Pt currently presents to the hospital after being found to be short of breath at her HD session with labs notable for hgb of 4.6 from a baseline of ~8-10 in the background of dark black stools for the past week. Rectal exam by ED providers with melena although no stool in rectal vault on my exam. Risk factors for ongoing bleeding include new Elliquis initiation in the past week (exam with LLE hematoma) in addition to ongoing ASA and Plavix use. Etiology of ongoing anemia likely multifactorial and secondary to LLE hematoma with possible luminal losses from upper GI tract with ddx including PUD, esophagitis/gastritis/duodenitis; less likely is masses or dieulafoy lesion. Plan for EGD on THursday 5/30/2024 once more medically optimized from volume perspective and more adequately resuscitated     Recommendations:  -OK for CLD today, please keep NPO after MN with plan for EGD on Thursday 5/30/2024 once more medically optimized from volume perspective and more adequately resuscitated   -Transfuse 1-2 units for goal hgb>8 given hx of CAD   -IV PPI BID   -OK to continue ASA given hx of CAD, would hold Plavix and Elliquis if no absolute CI   -Management of LLE hematoma per primary team   -Volume removal per renal   -Maintain two large bore IV, active T&S    All recommendations are tentative until note is attested by attending.

## 2024-05-29 NOTE — H&P ADULT - PROBLEM SELECTOR PLAN 7
DVT: SCDs for now  Diet: CLD, NPO at MN for EGD on 5/30  Dispo: pending clinical course DVT: SCDs for now  Diet: CLD, NPO at MN for EGD on 5/30  Dispo: pending clinical course  Communication: case and plan of care discussed with patient's son, Antonio, on 5/29

## 2024-05-29 NOTE — H&P ADULT - ASSESSMENT
85 y/o F with history of CAD s/p PCI, PVD, HTN, HLD, T2DM, HFpEF, ESRD on HD (T/Th/Sa) and PUD sent in from her dialysis center due to shortness of breath during HD and noted to have significant anemia to 4.6. Patient currently admitted for suspected GI bleeding and pending further workup and management.

## 2024-05-29 NOTE — ED ADULT NURSE REASSESSMENT NOTE - NS ED NURSE REASSESS COMMENT FT1
Break coverage RN: Pt resting in stretcher, no acute distress noted. Pt NSR on cardiac monitor. Upon assessment of pt, US guided IV noted to be occluded. JULIO CESAR Montalvo made aware of need for new US guided IV to be placed to continue blood transfusion. Pt denies headache, chest pain, SOB, dizziness, weakness, numbness/tingling at this time. Respirations even and unlabored. safety maintained, call bell within reach

## 2024-05-29 NOTE — H&P ADULT - PROBLEM SELECTOR PLAN 5
Patient with history of ESRD  - on T/Th/Sa schedule currently  - receiving HD via RIJ Tunneled HD cath  - planned for additional HD session with PRBC transfusion on 5/29  - c/w sevelamer 800mg TID  - plan for further HD per renal No Patient with history of ESRD  - on T/Th/Sa schedule currently  - receiving HD via RIJ Tunneled HD cath  - planned for additional HD session with PRBC transfusion on 5/29  - plan for further HD per renal - c/w home atorvastatin 40mg qhs

## 2024-05-29 NOTE — H&P ADULT - PROBLEM SELECTOR PLAN 3
- c/w home atorvastatin 40mg qhs Patient with history of CHF  - most recent TTE from 4/2023 with FE 43%  - cardiology following, recs appreciated  - was previously on lopressor but patient reports she stopped taking it  - fluid removal with HD  - judicious use of IV fluids

## 2024-05-29 NOTE — H&P ADULT - PROBLEM SELECTOR PLAN 6
DVT: SCDs for now  Diet: CLD, NPO at MN for EGD on 5/30  Dispo: pending clinical course Patient was previously on robust medication regimen   - currently reports that she stopped taking most of her medications  - currently reports only taking 3 medications, denies any insulin use  - contacted patient's pharmacy, only recent medications being filled/picked up are ASA, Plavix and Eliquis

## 2024-05-30 LAB
A1C WITH ESTIMATED AVERAGE GLUCOSE RESULT: 5.6 % — SIGNIFICANT CHANGE UP (ref 4–5.6)
ANION GAP SERPL CALC-SCNC: 12 MMOL/L — SIGNIFICANT CHANGE UP (ref 7–14)
BUN SERPL-MCNC: 30 MG/DL — HIGH (ref 7–23)
CALCIUM SERPL-MCNC: 7.9 MG/DL — LOW (ref 8.4–10.5)
CHLORIDE SERPL-SCNC: 102 MMOL/L — SIGNIFICANT CHANGE UP (ref 98–107)
CO2 SERPL-SCNC: 25 MMOL/L — SIGNIFICANT CHANGE UP (ref 22–31)
CREAT SERPL-MCNC: 3.53 MG/DL — HIGH (ref 0.5–1.3)
EGFR: 12 ML/MIN/1.73M2 — LOW
ESTIMATED AVERAGE GLUCOSE: 114 — SIGNIFICANT CHANGE UP
GLUCOSE BLDC GLUCOMTR-MCNC: 110 MG/DL — HIGH (ref 70–99)
GLUCOSE BLDC GLUCOMTR-MCNC: 121 MG/DL — HIGH (ref 70–99)
GLUCOSE BLDC GLUCOMTR-MCNC: 127 MG/DL — HIGH (ref 70–99)
GLUCOSE BLDC GLUCOMTR-MCNC: 201 MG/DL — HIGH (ref 70–99)
GLUCOSE SERPL-MCNC: 100 MG/DL — HIGH (ref 70–99)
HBV CORE AB SER-ACNC: SIGNIFICANT CHANGE UP
HBV SURFACE AB SER-ACNC: 7.9 MIU/ML — LOW
HBV SURFACE AG SER-ACNC: SIGNIFICANT CHANGE UP
HCT VFR BLD CALC: 23.1 % — LOW (ref 34.5–45)
HCV AB S/CO SERPL IA: 0.24 S/CO — SIGNIFICANT CHANGE UP (ref 0–0.99)
HCV AB SERPL-IMP: SIGNIFICANT CHANGE UP
HGB BLD-MCNC: 7.7 G/DL — LOW (ref 11.5–15.5)
INR BLD: 1.02 RATIO — SIGNIFICANT CHANGE UP (ref 0.85–1.18)
MAGNESIUM SERPL-MCNC: 1.9 MG/DL — SIGNIFICANT CHANGE UP (ref 1.6–2.6)
MCHC RBC-ENTMCNC: 31 PG — SIGNIFICANT CHANGE UP (ref 27–34)
MCHC RBC-ENTMCNC: 33.3 GM/DL — SIGNIFICANT CHANGE UP (ref 32–36)
MCV RBC AUTO: 93.1 FL — SIGNIFICANT CHANGE UP (ref 80–100)
NRBC # BLD: 0 /100 WBCS — SIGNIFICANT CHANGE UP (ref 0–0)
NRBC # FLD: 0.02 K/UL — HIGH (ref 0–0)
PHOSPHATE SERPL-MCNC: 4.1 MG/DL — SIGNIFICANT CHANGE UP (ref 2.5–4.5)
PLATELET # BLD AUTO: 202 K/UL — SIGNIFICANT CHANGE UP (ref 150–400)
POTASSIUM SERPL-MCNC: 4 MMOL/L — SIGNIFICANT CHANGE UP (ref 3.5–5.3)
POTASSIUM SERPL-SCNC: 4 MMOL/L — SIGNIFICANT CHANGE UP (ref 3.5–5.3)
PROTHROM AB SERPL-ACNC: 11.5 SEC — SIGNIFICANT CHANGE UP (ref 9.5–13)
RBC # BLD: 2.48 M/UL — LOW (ref 3.8–5.2)
RBC # FLD: 20.6 % — HIGH (ref 10.3–14.5)
SODIUM SERPL-SCNC: 139 MMOL/L — SIGNIFICANT CHANGE UP (ref 135–145)
WBC # BLD: 6.21 K/UL — SIGNIFICANT CHANGE UP (ref 3.8–10.5)
WBC # FLD AUTO: 6.21 K/UL — SIGNIFICANT CHANGE UP (ref 3.8–10.5)

## 2024-05-30 PROCEDURE — 43255 EGD CONTROL BLEEDING ANY: CPT | Mod: GC

## 2024-05-30 PROCEDURE — 88305 TISSUE EXAM BY PATHOLOGIST: CPT | Mod: 26

## 2024-05-30 DEVICE — CLIP HEMO INSTINCT PLUS ENDOSCOPIC: Type: IMPLANTABLE DEVICE | Status: FUNCTIONAL

## 2024-05-30 RX ADMIN — PANTOPRAZOLE SODIUM 40 MILLIGRAM(S): 20 TABLET, DELAYED RELEASE ORAL at 18:19

## 2024-05-30 RX ADMIN — PANTOPRAZOLE SODIUM 40 MILLIGRAM(S): 20 TABLET, DELAYED RELEASE ORAL at 05:30

## 2024-05-30 RX ADMIN — CHLORHEXIDINE GLUCONATE 1 APPLICATION(S): 213 SOLUTION TOPICAL at 12:21

## 2024-05-30 NOTE — PROGRESS NOTE ADULT - SUBJECTIVE AND OBJECTIVE BOX
Cardiovascular Disease Progress Note  DATE OF SERVICE: 24 @ 09:02    Overnight events: No acute events overnight.    The patient reports SOB.  No chest pain.   Otherwise review of systems negative    Objective Findings:  T(C): 36.5 (24 @ 06:00), Max: 36.8 (24 @ 21:05)  HR: 70 (24 @ 06:00) (66 - 84)  BP: 166/57 (24 @ 06:00) (121/59 - 166/57)  RR: 18 (24 @ 06:00) (18 - 18)  SpO2: 100% (24 @ 06:00) (98% - 100%)  Wt(kg): --  Daily     Daily Weight in k (29 May 2024 14:32)      Physical Exam:  Gen: NAD; Patient resting comfortably  HEENT: EOMI, Normocephalic/ atraumatic  CV: RRR, normal S1 + S2, no m/r/g  Lungs:  Normal respiratory effort; mild crackles to auscultation bilaterally  Abd: soft, non-tender; bowel sounds present  Ext: No edema; warm and well perfused    Telemetry: n/a     Laboratory Data:                        7.7    6.21  )-----------( 202      ( 30 May 2024 05:39 )             23.1         139  |  102  |  30<H>  ----------------------------<  100<H>  4.0   |  25  |  3.53<H>    Ca    7.9<L>      30 May 2024 05:39  Phos  4.1       Mg     1.90         TPro  6.4  /  Alb  3.6  /  TBili  0.2  /  DBili  x   /  AST  9   /  ALT  6   /  AlkPhos  97  28    PT/INR - ( 30 May 2024 05:39 )   PT: 11.5 sec;   INR: 1.02 ratio                   Inpatient Medications:  MEDICATIONS  (STANDING):  chlorhexidine 2% Cloths 1 Application(s) Topical daily  dextrose 10% Bolus 125 milliLiter(s) IV Bolus once  dextrose 5%. 1000 milliLiter(s) (100 mL/Hr) IV Continuous <Continuous>  dextrose 5%. 1000 milliLiter(s) (50 mL/Hr) IV Continuous <Continuous>  dextrose 50% Injectable 25 Gram(s) IV Push once  dextrose 50% Injectable 12.5 Gram(s) IV Push once  glucagon  Injectable 1 milliGRAM(s) IntraMuscular once  insulin lispro (ADMELOG) corrective regimen sliding scale   SubCutaneous three times a day before meals  pantoprazole  Injectable 40 milliGRAM(s) IV Push two times a day      Assessment: 84 year old woman with uncontrolled IDDM, compensated systolic CHF, CAD s/p PCI, and ESRD on HD presents with profound anemia.       Plan of Care:      #CAD- s/p MICHAEL x2 in 2017.  Ms. Tsang displays no signs or symptoms of coronary ischemia or decompensated CHF.   Her volume status is optimized after yesterday's HD session.  No cardiac objection to proceed with EGD under light to moderate sedation.   Hold antiplatelets pending EGD findings.     #ESRD-  S/P HD initiation in .  Renal input noted.       #Paroxymal a-fib-  Rates controlled.   Toprol.   No AC given h/o GI bleed.      #ACP (advance care planning)-  Advanced care planning was discussed with the patient.  Advance care planning forms were discussed. Risks, benefits and alternatives of medical treatment and procedures were discussed in detail and all questions were answered.   30 additional minutes spent addressing advance care plans.    High and complex medical decision making in this patient with active comorbidities.            Over 55 minutes spent on total encounter; more than 50% of the visit was spent counseling and/or coordinating care by the attending physician.      Julio César Hooper MD [facility]  Cardiovascular Disease  [phone]

## 2024-05-30 NOTE — PROGRESS NOTE ADULT - SUBJECTIVE AND OBJECTIVE BOX
Overnight events noted      VITAL:  T(C): , Max: 36.8 (05-29-24 @ 21:05)  T(F): , Max: 98.2 (05-29-24 @ 21:05)  HR: 73 (05-30-24 @ 09:43)  BP: 177/62 (05-30-24 @ 09:43)  BP(mean): --  RR: 29 (05-30-24 @ 09:43)  SpO2: 94% (05-30-24 @ 09:43)  Wt(kg): --      PHYSICAL EXAM:  General: Alerted, oriented  HEENT: MMM  Lungs:CTA-b/l  Heart: RRR S1S2  Abdomen: Soft, NTND  Ext: no pedal edema b/l  : no alba      LABS:                        7.7    6.21  )-----------( 202      ( 30 May 2024 05:39 )             23.1     Na(139)/K(4.0)/Cl(102)/HCO3(25)/BUN(30)/Cr(3.53)Glu(100)/Ca(7.9)/Mg(1.90)/PO4(4.1)    05-30 @ 05:39  Na(139)/K(5.1)/Cl(102)/HCO3(23)/BUN(57)/Cr(4.50)Glu(153)/Ca(8.3)/Mg(2.00)/PO4(4.9)    05-29 @ 08:31  Na(139)/K(4.7)/Cl(103)/HCO3(23)/BUN(48)/Cr(3.98)Glu(164)/Ca(8.8)/Mg(--)/PO4(--)    05-28 @ 22:46    Urinalysis Basic - ( 30 May 2024 05:39 )    Color: x / Appearance: x / SG: x / pH: x  Gluc: 100 mg/dL / Ketone: x  / Bili: x / Urobili: x   Blood: x / Protein: x / Nitrite: x   Leuk Esterase: x / RBC: x / WBC x   Sq Epi: x / Non Sq Epi: x / Bacteria: x      ASSESSMENT:  83 y/o female with PMHx DM, systolic CHF, CAD s/p PCI, ESRD on HD Tues, Thurs, Sat, Anemia and HTN presents to the ED with sob ,hypotension      ESRD ---TTS--incomplete last night   anemia --Gi loss sp one unit transfusion   volume-still has renal reserves , cxr clear   Gi -bleed-scope today   RECOMMEND:  -HD tomorrow   -get an other transfusion during HD  -EGD today   -renal diet .            Gabylindsey Chowdhury  [facility]  Office: [phone]         Overnight events noted  no distress   egd planned today     VITAL:  T(C): , Max: 36.8 (05-29-24 @ 21:05)  T(F): , Max: 98.2 (05-29-24 @ 21:05)  HR: 73 (05-30-24 @ 09:43)  BP: 177/62 (05-30-24 @ 09:43)  BP(mean): --  RR: 29 (05-30-24 @ 09:43)  SpO2: 94% (05-30-24 @ 09:43)  Wt(kg): --      PHYSICAL EXAM:  General: Alerted, oriented  HEENT: MMM  Lungs:CTA-b/l  Heart: RRR S1S2  Abdomen: Soft, NTND  Ext: no pedal edema b/l  : no alba  access ; tdc     LABS:                        7.7    6.21  )-----------( 202      ( 30 May 2024 05:39 )             23.1     Na(139)/K(4.0)/Cl(102)/HCO3(25)/BUN(30)/Cr(3.53)Glu(100)/Ca(7.9)/Mg(1.90)/PO4(4.1)    05-30 @ 05:39  Na(139)/K(5.1)/Cl(102)/HCO3(23)/BUN(57)/Cr(4.50)Glu(153)/Ca(8.3)/Mg(2.00)/PO4(4.9)    05-29 @ 08:31  Na(139)/K(4.7)/Cl(103)/HCO3(23)/BUN(48)/Cr(3.98)Glu(164)/Ca(8.8)/Mg(--)/PO4(--)    05-28 @ 22:46    Urinalysis Basic - ( 30 May 2024 05:39 )    Color: x / Appearance: x / SG: x / pH: x  Gluc: 100 mg/dL / Ketone: x  / Bili: x / Urobili: x   Blood: x / Protein: x / Nitrite: x   Leuk Esterase: x / RBC: x / WBC x   Sq Epi: x / Non Sq Epi: x / Bacteria: x      ASSESSMENT:  83 y/o female with PMHx DM, systolic CHF, CAD s/p PCI, ESRD on HD Tues, Thurs, Sat, Anemia and HTN presents to the ED with sob ,hypotension      ESRD ---TTS--incomplete last night   anemia --Gi loss stable sp transfusions   volume-still has renal reserves , cxr clear   Gi -bleed-scope today , AVM       RECOMMEND:  -HD tomorrow   -get an other transfusion during HD  -EGD today   -renal diet .            [facility]  [facility]  Office: [phone]

## 2024-05-30 NOTE — PROGRESS NOTE ADULT - SUBJECTIVE AND OBJECTIVE BOX
Patient is a 84y old  Female who presents with a chief complaint of Anemia (30 May 2024 10:28)      SUBJECTIVE / OVERNIGHT EVENTS:    Events noted.  CONSTITUTIONAL: No fever,  or fatigue  RESPIRATORY: No cough, wheezing,  No shortness of breath  CARDIOVASCULAR: No chest pain, palpitations, dizziness, or leg swelling  GASTROINTESTINAL: No abdominal or epigastric pain.       MEDICATIONS  (STANDING):  chlorhexidine 2% Cloths 1 Application(s) Topical daily  dextrose 10% Bolus 125 milliLiter(s) IV Bolus once  dextrose 5%. 1000 milliLiter(s) (100 mL/Hr) IV Continuous <Continuous>  dextrose 5%. 1000 milliLiter(s) (50 mL/Hr) IV Continuous <Continuous>  dextrose 50% Injectable 25 Gram(s) IV Push once  dextrose 50% Injectable 12.5 Gram(s) IV Push once  glucagon  Injectable 1 milliGRAM(s) IntraMuscular once  insulin lispro (ADMELOG) corrective regimen sliding scale   SubCutaneous three times a day before meals  pantoprazole  Injectable 40 milliGRAM(s) IV Push two times a day    MEDICATIONS  (PRN):  acetaminophen     Tablet .. 650 milliGRAM(s) Oral every 6 hours PRN Temp greater or equal to 38C (100.4F), Mild Pain (1 - 3)  aluminum hydroxide/magnesium hydroxide/simethicone Suspension 30 milliLiter(s) Oral every 4 hours PRN Dyspepsia  dextrose Oral Gel 15 Gram(s) Oral once PRN Blood Glucose LESS THAN 70 milliGRAM(s)/deciliter  melatonin 3 milliGRAM(s) Oral at bedtime PRN Insomnia        CAPILLARY BLOOD GLUCOSE      POCT Blood Glucose.: 127 mg/dL (30 May 2024 11:18)  POCT Blood Glucose.: 110 mg/dL (30 May 2024 08:41)  POCT Blood Glucose.: 135 mg/dL (29 May 2024 20:52)  POCT Blood Glucose.: 104 mg/dL (29 May 2024 14:04)    I&O's Summary    29 May 2024 07:01  -  30 May 2024 07:00  --------------------------------------------------------  IN: 700 mL / OUT: 1400 mL / NET: -700 mL        T(C): 36.6 (05-30-24 @ 11:37), Max: 36.8 (05-29-24 @ 21:05)  HR: 73 (05-30-24 @ 11:37) (70 - 84)  BP: 159/61 (05-30-24 @ 11:37) (120/45 - 177/62)  RR: 18 (05-30-24 @ 11:37) (18 - 29)  SpO2: 98% (05-30-24 @ 11:37) (94% - 100%)    PHYSICAL EXAM:  GENERAL: NAD  NECK: Supple, No JVD  CHEST/LUNG: Clear to auscultation bilaterally; No wheezing.  HEART: Regular rate and rhythm; No murmurs, rubs, or gallops  ABDOMEN: Soft, Nontender, Nondistended; Bowel sounds present  EXTREMITIES:   No edema  NEUROLOGY: AAO X 3      LABS:                        7.7    6.21  )-----------( 202      ( 30 May 2024 05:39 )             23.1     05-30    139  |  102  |  30<H>  ----------------------------<  100<H>  4.0   |  25  |  3.53<H>    Ca    7.9<L>      30 May 2024 05:39  Phos  4.1     05-30  Mg     1.90     05-30    TPro  6.4  /  Alb  3.6  /  TBili  0.2  /  DBili  x   /  AST  9   /  ALT  6   /  AlkPhos  97  05-28    PT/INR - ( 30 May 2024 05:39 )   PT: 11.5 sec;   INR: 1.02 ratio               Urinalysis Basic - ( 30 May 2024 05:39 )    Color: x / Appearance: x / SG: x / pH: x  Gluc: 100 mg/dL / Ketone: x  / Bili: x / Urobili: x   Blood: x / Protein: x / Nitrite: x   Leuk Esterase: x / RBC: x / WBC x   Sq Epi: x / Non Sq Epi: x / Bacteria: x      CAPILLARY BLOOD GLUCOSE      POCT Blood Glucose.: 127 mg/dL (30 May 2024 11:18)  POCT Blood Glucose.: 110 mg/dL (30 May 2024 08:41)  POCT Blood Glucose.: 135 mg/dL (29 May 2024 20:52)  POCT Blood Glucose.: 104 mg/dL (29 May 2024 14:04)        RADIOLOGY & ADDITIONAL TESTS:    Imaging Personally Reviewed:    Consultant(s) Notes Reviewed:      Care Discussed with Consultants/Other Providers:    Parminder Goldberg MD, CMD, FACP    [address] [place] [address]  Office Tel: [phone]  Cell: [phone]

## 2024-05-31 ENCOUNTER — TRANSCRIPTION ENCOUNTER (OUTPATIENT)
Age: 84
End: 2024-05-31

## 2024-05-31 LAB
ANION GAP SERPL CALC-SCNC: 12 MMOL/L — SIGNIFICANT CHANGE UP (ref 7–14)
ANION GAP SERPL CALC-SCNC: 13 MMOL/L — SIGNIFICANT CHANGE UP (ref 7–14)
BUN SERPL-MCNC: 36 MG/DL — HIGH (ref 7–23)
BUN SERPL-MCNC: 37 MG/DL — HIGH (ref 7–23)
CALCIUM SERPL-MCNC: 7.8 MG/DL — LOW (ref 8.4–10.5)
CALCIUM SERPL-MCNC: 7.8 MG/DL — LOW (ref 8.4–10.5)
CHLORIDE SERPL-SCNC: 103 MMOL/L — SIGNIFICANT CHANGE UP (ref 98–107)
CHLORIDE SERPL-SCNC: 104 MMOL/L — SIGNIFICANT CHANGE UP (ref 98–107)
CO2 SERPL-SCNC: 25 MMOL/L — SIGNIFICANT CHANGE UP (ref 22–31)
CO2 SERPL-SCNC: 25 MMOL/L — SIGNIFICANT CHANGE UP (ref 22–31)
CREAT SERPL-MCNC: 4.7 MG/DL — HIGH (ref 0.5–1.3)
CREAT SERPL-MCNC: 4.83 MG/DL — HIGH (ref 0.5–1.3)
EGFR: 8 ML/MIN/1.73M2 — LOW
EGFR: 9 ML/MIN/1.73M2 — LOW
GLUCOSE BLDC GLUCOMTR-MCNC: 110 MG/DL — HIGH (ref 70–99)
GLUCOSE BLDC GLUCOMTR-MCNC: 113 MG/DL — HIGH (ref 70–99)
GLUCOSE BLDC GLUCOMTR-MCNC: 132 MG/DL — HIGH (ref 70–99)
GLUCOSE BLDC GLUCOMTR-MCNC: 137 MG/DL — HIGH (ref 70–99)
GLUCOSE BLDC GLUCOMTR-MCNC: 160 MG/DL — HIGH (ref 70–99)
GLUCOSE BLDC GLUCOMTR-MCNC: 163 MG/DL — HIGH (ref 70–99)
GLUCOSE BLDC GLUCOMTR-MCNC: 168 MG/DL — HIGH (ref 70–99)
GLUCOSE BLDC GLUCOMTR-MCNC: 174 MG/DL — HIGH (ref 70–99)
GLUCOSE SERPL-MCNC: 123 MG/DL — HIGH (ref 70–99)
GLUCOSE SERPL-MCNC: 136 MG/DL — HIGH (ref 70–99)
HCT VFR BLD CALC: 24.7 % — LOW (ref 34.5–45)
HGB BLD-MCNC: 7.8 G/DL — LOW (ref 11.5–15.5)
MAGNESIUM SERPL-MCNC: 1.9 MG/DL — SIGNIFICANT CHANGE UP (ref 1.6–2.6)
MAGNESIUM SERPL-MCNC: 1.9 MG/DL — SIGNIFICANT CHANGE UP (ref 1.6–2.6)
MCHC RBC-ENTMCNC: 30.7 PG — SIGNIFICANT CHANGE UP (ref 27–34)
MCHC RBC-ENTMCNC: 31.6 GM/DL — LOW (ref 32–36)
MCV RBC AUTO: 97.2 FL — SIGNIFICANT CHANGE UP (ref 80–100)
NRBC # BLD: 0 /100 WBCS — SIGNIFICANT CHANGE UP (ref 0–0)
NRBC # FLD: 0.02 K/UL — HIGH (ref 0–0)
PHOSPHATE SERPL-MCNC: 5.4 MG/DL — HIGH (ref 2.5–4.5)
PHOSPHATE SERPL-MCNC: 5.5 MG/DL — HIGH (ref 2.5–4.5)
PLATELET # BLD AUTO: 224 K/UL — SIGNIFICANT CHANGE UP (ref 150–400)
POTASSIUM SERPL-MCNC: 4.2 MMOL/L — SIGNIFICANT CHANGE UP (ref 3.5–5.3)
POTASSIUM SERPL-MCNC: 4.3 MMOL/L — SIGNIFICANT CHANGE UP (ref 3.5–5.3)
POTASSIUM SERPL-SCNC: 4.2 MMOL/L — SIGNIFICANT CHANGE UP (ref 3.5–5.3)
POTASSIUM SERPL-SCNC: 4.3 MMOL/L — SIGNIFICANT CHANGE UP (ref 3.5–5.3)
RBC # BLD: 2.54 M/UL — LOW (ref 3.8–5.2)
RBC # FLD: 19 % — HIGH (ref 10.3–14.5)
SODIUM SERPL-SCNC: 141 MMOL/L — SIGNIFICANT CHANGE UP (ref 135–145)
SODIUM SERPL-SCNC: 141 MMOL/L — SIGNIFICANT CHANGE UP (ref 135–145)
WBC # BLD: 5.27 K/UL — SIGNIFICANT CHANGE UP (ref 3.8–10.5)
WBC # FLD AUTO: 5.27 K/UL — SIGNIFICANT CHANGE UP (ref 3.8–10.5)

## 2024-05-31 PROCEDURE — 99232 SBSQ HOSP IP/OBS MODERATE 35: CPT | Mod: GC

## 2024-05-31 RX ORDER — POLYETHYLENE GLYCOL 3350 17 G/17G
17 POWDER, FOR SOLUTION ORAL DAILY
Refills: 0 | Status: DISCONTINUED | OUTPATIENT
Start: 2024-05-31 | End: 2024-06-01

## 2024-05-31 RX ORDER — APIXABAN 2.5 MG/1
2.5 TABLET, FILM COATED ORAL
Refills: 0 | Status: DISCONTINUED | OUTPATIENT
Start: 2024-05-31 | End: 2024-06-01

## 2024-05-31 RX ORDER — AMLODIPINE BESYLATE 2.5 MG/1
5 TABLET ORAL DAILY
Refills: 0 | Status: DISCONTINUED | OUTPATIENT
Start: 2024-05-31 | End: 2024-06-01

## 2024-05-31 RX ORDER — SENNA PLUS 8.6 MG/1
2 TABLET ORAL AT BEDTIME
Refills: 0 | Status: DISCONTINUED | OUTPATIENT
Start: 2024-05-31 | End: 2024-06-01

## 2024-05-31 RX ORDER — AMLODIPINE BESYLATE 2.5 MG/1
5 TABLET ORAL DAILY
Refills: 0 | Status: DISCONTINUED | OUTPATIENT
Start: 2024-05-31 | End: 2024-05-31

## 2024-05-31 RX ADMIN — PANTOPRAZOLE SODIUM 40 MILLIGRAM(S): 20 TABLET, DELAYED RELEASE ORAL at 06:21

## 2024-05-31 RX ADMIN — CHLORHEXIDINE GLUCONATE 1 APPLICATION(S): 213 SOLUTION TOPICAL at 14:33

## 2024-05-31 RX ADMIN — APIXABAN 2.5 MILLIGRAM(S): 2.5 TABLET, FILM COATED ORAL at 17:20

## 2024-05-31 RX ADMIN — Medication 1: at 14:39

## 2024-05-31 RX ADMIN — PANTOPRAZOLE SODIUM 40 MILLIGRAM(S): 20 TABLET, DELAYED RELEASE ORAL at 17:19

## 2024-05-31 RX ADMIN — Medication 1: at 17:19

## 2024-05-31 NOTE — DISCHARGE NOTE PROVIDER - PROVIDER TOKENS
PROVIDER:[TOKEN:[[id number]],FOLLOWUP:[2 weeks]] PROVIDER:[TOKEN:[[id number]],FOLLOWUP:[2 weeks]],FREE:[LAST:[[facility]],PHONE:[phone]],FAX:[(   )    -],FOLLOWUP:[2 weeks]]

## 2024-05-31 NOTE — PROGRESS NOTE ADULT - SUBJECTIVE AND OBJECTIVE BOX
Patient is a 84y old  Female who presents with a chief complaint of Anemia (30 May 2024 10:28)      SUBJECTIVE / OVERNIGHT EVENTS:    Events noted.  CONSTITUTIONAL: No fever,  or fatigue  RESPIRATORY: No cough, wheezing,  No shortness of breath  CARDIOVASCULAR: No chest pain, palpitations, dizziness, or leg swelling  GASTROINTESTINAL: No abdominal or epigastric pain.       T(C): 36.6 (05-31-24 @ 15:04), Max: 36.7 (05-31-24 @ 14:15)  HR: 75 (05-31-24 @ 15:04) (72 - 76)  BP: 150/45 (05-31-24 @ 15:04) (150/45 - 195/92)  RR: 17 (05-31-24 @ 15:04) (17 - 18)  SpO2: 97% (05-31-24 @ 15:04) (97% - 97%)      MEDICATIONS  (STANDING):  amLODIPine   Tablet 5 milliGRAM(s) Oral daily  apixaban 2.5 milliGRAM(s) Oral two times a day  chlorhexidine 2% Cloths 1 Application(s) Topical daily  dextrose 10% Bolus 125 milliLiter(s) IV Bolus once  dextrose 5%. 1000 milliLiter(s) (100 mL/Hr) IV Continuous <Continuous>  dextrose 5%. 1000 milliLiter(s) (50 mL/Hr) IV Continuous <Continuous>  dextrose 50% Injectable 12.5 Gram(s) IV Push once  dextrose 50% Injectable 25 Gram(s) IV Push once  glucagon  Injectable 1 milliGRAM(s) IntraMuscular once  insulin lispro (ADMELOG) corrective regimen sliding scale   SubCutaneous three times a day before meals  pantoprazole  Injectable 40 milliGRAM(s) IV Push two times a day  polyethylene glycol 3350 17 Gram(s) Oral daily  senna 2 Tablet(s) Oral at bedtime    MEDICATIONS  (PRN):  acetaminophen     Tablet .. 650 milliGRAM(s) Oral every 6 hours PRN Temp greater or equal to 38C (100.4F), Mild Pain (1 - 3)  aluminum hydroxide/magnesium hydroxide/simethicone Suspension 30 milliLiter(s) Oral every 4 hours PRN Dyspepsia  dextrose Oral Gel 15 Gram(s) Oral once PRN Blood Glucose LESS THAN 70 milliGRAM(s)/deciliter  melatonin 3 milliGRAM(s) Oral at bedtime PRN Insomnia    PHYSICAL EXAM:  GENERAL: NAD  NECK: Supple, No JVD  CHEST/LUNG: Clear to auscultation bilaterally; No wheezing.  HEART: Regular rate and rhythm; No murmurs, rubs, or gallops  ABDOMEN: Soft, Nontender, Nondistended; Bowel sounds present  EXTREMITIES:   No edema  NEUROLOGY: AAO X 3                            7.8    5.27  )-----------( 224      ( 31 May 2024 08:15 )             24.7             PT/INR - ( 30 May 2024 05:39 )   PT: 11.5 sec;   INR: 1.02 ratio           141|104|37<123  4.2|25|4.83  7.8,1.90,5.5  05-31 @ 08:15  141|103|36<136  4.3|25|4.70  7.8,1.90,5.4  05-31 @ 05:37        Urinalysis Basic - ( 30 May 2024 05:39 )    Color: x / Appearance: x / SG: x / pH: x  Gluc: 100 mg/dL / Ketone: x  / Bili: x / Urobili: x   Blood: x / Protein: x / Nitrite: x   Leuk Esterase: x / RBC: x / WBC x   Sq Epi: x / Non Sq Epi: x / Bacteria: x    CAPILLARY BLOOD GLUCOSE      POCT Blood Glucose.: 174 mg/dL (31 May 2024 17:12)  POCT Blood Glucose.: 168 mg/dL (31 May 2024 14:34)  POCT Blood Glucose.: 137 mg/dL (31 May 2024 14:12)  POCT Blood Glucose.: 113 mg/dL (31 May 2024 12:52)  POCT Blood Glucose.: 160 mg/dL (31 May 2024 10:18)  POCT Blood Glucose.: 132 mg/dL (31 May 2024 07:59)  POCT Blood Glucose.: 201 mg/dL (30 May 2024 21:44)

## 2024-05-31 NOTE — PROGRESS NOTE ADULT - SUBJECTIVE AND OBJECTIVE BOX
Cardiovascular Disease Progress Note  DATE OF SERVICE: 05-31-24 @ 08:59    Overnight events: No acute events overnight.    The patient denies chest pain or SOB.   Otherwise review of systems negative    Objective Findings:  T(C): 36.8 (05-31-24 @ 06:41), Max: 36.9 (05-30-24 @ 18:32)  HR: 68 (05-31-24 @ 06:41) (68 - 80)  BP: 163/64 (05-31-24 @ 06:41) (120/45 - 177/62)  RR: 18 (05-31-24 @ 06:41) (17 - 29)  SpO2: 96% (05-31-24 @ 06:41) (94% - 98%)  Wt(kg): --  Daily     Daily       Physical Exam:  Gen: NAD; Patient resting comfortably  HEENT: EOMI, Normocephalic/ atraumatic  CV: RRR, normal S1 + S2, no m/r/g  Lungs:  Normal respiratory effort; clear to auscultation bilaterally  Abd: soft, non-tender; bowel sounds present  Ext: No edema; warm and well perfused    Telemetry: n/a    Laboratory Data:                        7.8    5.27  )-----------( 224      ( 31 May 2024 08:15 )             24.7     05-31    141  |  104  |  37<H>  ----------------------------<  123<H>  4.2   |  25  |  4.83<H>    Ca    7.8<L>      31 May 2024 08:15  Phos  5.5     05-31  Mg     1.90     05-31      PT/INR - ( 30 May 2024 05:39 )   PT: 11.5 sec;   INR: 1.02 ratio                   Inpatient Medications:  MEDICATIONS  (STANDING):  chlorhexidine 2% Cloths 1 Application(s) Topical daily  dextrose 10% Bolus 125 milliLiter(s) IV Bolus once  dextrose 5%. 1000 milliLiter(s) (100 mL/Hr) IV Continuous <Continuous>  dextrose 5%. 1000 milliLiter(s) (50 mL/Hr) IV Continuous <Continuous>  dextrose 50% Injectable 25 Gram(s) IV Push once  dextrose 50% Injectable 12.5 Gram(s) IV Push once  glucagon  Injectable 1 milliGRAM(s) IntraMuscular once  insulin lispro (ADMELOG) corrective regimen sliding scale   SubCutaneous three times a day before meals  pantoprazole  Injectable 40 milliGRAM(s) IV Push two times a day      Assessment: 84 year old woman with uncontrolled IDDM, compensated systolic CHF, CAD s/p PCI, and ESRD on HD presents with profound anemia.       Plan of Care:      #CAD- s/p MICHAEL x2 in 12/2017.  Ms. Tsang was taking ASA, Plavix, and Eliquis at home at the direction of her vascular physician.   EGD notable for AVMs and ulcers.  No ASA or Plavix.  Plan for just low dose Eliquis.        #ESRD-  S/P HD initiation in 2022.  Renal input noted.       #Paroxymal a-fib-  Rates controlled.   Toprol.   I would re challenge with Eliquis 2.5 mg PO BID.            High and complex medical decision making in this patient with active comorbidities.            Over 55 minutes spent on total encounter; more than 50% of the visit was spent counseling and/or coordinating care by the attending physician.      Julio César Hooper MD [facility]  Cardiovascular Disease  [phone]

## 2024-05-31 NOTE — DISCHARGE NOTE PROVIDER - HOSPITAL COURSE
85 y/o F with history of CAD s/p PCI, PVD, HTN, HLD, T2DM, HFpEF, ESRD on HD (T/Th/Sa) and PUD sent in from her dialysis center due to shortness of breath during HD and noted to have significant anemia to 4.6. Patient currently admitted for suspected GI bleeding.    Acute blood loss anemia.   - S/p 2U PRBC  - S/p EGD: Gastric AVM/Duodenal ulcers   - On AC (ASA/Plavix/Eliquis)--> Discussed w/ cards ______    HTN (hypertension).   - hold anti-hypertensives for now, resumed on DC    (HFpEF) heart failure with preserved ejection fraction.   - most recent TTE from 4/2023 with EF 43%  - cardiology following, recs appreciated    ESRD on dialysis.   - receiving HD via RIJ Tunneled HD cath    HLD (hyperlipidemia).   - c/w home atorvastatin 40mg qhs    Case discussed with  ***** on ______. Reviewed discharge medications with patient; All new medications requiring new prescription sent to pharmacy of patients choice. Reviewed need for prescription for previous home medication and new prescriptions sent if requested. Patient in agreement and understands.    83 y/o F with history of CAD s/p PCI, PVD, HTN, HLD, T2DM, HFpEF, ESRD on HD (T/Th/Sa) and PUD sent in from her dialysis center due to shortness of breath during HD and noted to have significant anemia to 4.6. Patient currently admitted for suspected GI bleeding.    Acute blood loss anemia.   - S/p 2U PRBC  - S/p EGD: Gastric AVM/Duodenal ulcers   - On AC (ASA/Plavix/Eliquis)--> Discussed w/ cards, recommend to challenge Eliquis 2.5mg BID and hold Plavix/Eliquis.    HTN (hypertension).   - hold anti-hypertensives for now, resumed on DC    (HFpEF) heart failure with preserved ejection fraction.   - most recent TTE from 4/2023 with EF 43%  - cardiology following, recs appreciated    ESRD on dialysis.   - receiving HD via RIJ Tunneled HD cath    HLD (hyperlipidemia).   - c/w home atorvastatin 40mg qhs    Case discussed with  ***** on ______. Reviewed discharge medications with patient; All new medications requiring new prescription sent to pharmacy of patients choice. Reviewed need for prescription for previous home medication and new prescriptions sent if requested. Patient in agreement and understands.    83 y/o F with history of CAD s/p PCI, PVD, HTN, HLD, T2DM, HFpEF, ESRD on HD (T/Th/Sa) and PUD sent in from her dialysis center due to shortness of breath during HD and noted to have significant anemia to 4.6. Patient currently admitted for suspected GI bleeding.    Acute blood loss anemia.   - S/p 2U PRBC  - S/p EGD: Gastric AVM/Duodenal ulcers   - On AC (ASA/Plavix/Eliquis)--> Discussed w/ cards, restarted Eliquis 2.5mg BID and hold Plavix/Eliquis.    HTN (hypertension).   - hold anti-hypertensives for now, resumed on DC    (HFpEF) heart failure with preserved ejection fraction.   - most recent TTE from 4/2023 with EF 43%  - cardiology following, recs appreciated    ESRD on dialysis.   - receiving HD via RIJ Tunneled HD cath    HLD (hyperlipidemia).   - c/w home atorvastatin 40mg qhs    Case discussed with Dr. Goldberg on 6/01/2024. Pt medically cleared for DC to home w/ HD reinstated. Reviewed discharge medications with patient; All new medications requiring new prescription sent to pharmacy of patients choice. Reviewed need for prescription for previous home medication and new prescriptions sent if requested. Patient in agreement and understands.

## 2024-05-31 NOTE — DISCHARGE NOTE PROVIDER - CARE PROVIDER_API CALL
[facility]  Internal Medicine  [address] [place] [address], [address]  [place] [address]  Phone: [phone]  Fax: [phone]  Follow Up Time: 2 weeks   Parminder Goldberg  Internal Medicine  [id number] [place] [address], [address]  [place] [address]  Phone: [phone]  Fax: [phone]  Follow Up Time: 2 weeks    [facility] [place] [facility],   Phone: [phone]  Fax: (   )    -  Follow Up Time: 2 weeks

## 2024-05-31 NOTE — PROGRESS NOTE ADULT - SUBJECTIVE AND OBJECTIVE BOX
Gastroenterology Progress Note    Interval Events:   Pt overall feeling well after her procedure yesterday. Denies any ongoing nausea, vomiting or abd pain.  Tolerating a diet although no BMs since the procedure. Hgb stable at 7.8.     Allergies:  Carrots (Rash)  No Known Drug Allergies      Hospital Medications:  acetaminophen     Tablet .. 650 milliGRAM(s) Oral every 6 hours PRN  aluminum hydroxide/magnesium hydroxide/simethicone Suspension 30 milliLiter(s) Oral every 4 hours PRN  chlorhexidine 2% Cloths 1 Application(s) Topical daily  dextrose 10% Bolus 125 milliLiter(s) IV Bolus once  dextrose 5%. 1000 milliLiter(s) IV Continuous <Continuous>  dextrose 5%. 1000 milliLiter(s) IV Continuous <Continuous>  dextrose 50% Injectable 25 Gram(s) IV Push once  dextrose 50% Injectable 12.5 Gram(s) IV Push once  dextrose Oral Gel 15 Gram(s) Oral once PRN  glucagon  Injectable 1 milliGRAM(s) IntraMuscular once  insulin lispro (ADMELOG) corrective regimen sliding scale   SubCutaneous three times a day before meals  melatonin 3 milliGRAM(s) Oral at bedtime PRN  pantoprazole  Injectable 40 milliGRAM(s) IV Push two times a day      ROS: 14 point ROS negative unless otherwise state in subjective    PHYSICAL EXAM:   Vital Signs:  Vital Signs Last 24 Hrs  T(C): 36.8 (31 May 2024 06:41), Max: 36.9 (30 May 2024 18:32)  T(F): 98.2 (31 May 2024 06:41), Max: 98.5 (30 May 2024 18:32)  HR: 68 (31 May 2024 06:41) (68 - 80)  BP: 163/64 (31 May 2024 06:41) (120/45 - 177/62)  BP(mean): --  RR: 18 (31 May 2024 06:41) (17 - 29)  SpO2: 96% (31 May 2024 06:41) (94% - 98%)    Parameters below as of 31 May 2024 06:41  Patient On (Oxygen Delivery Method): room air      Daily     Daily     GENERAL:  No acute distress  HEENT:  NCAT, no scleral icterus  CHEST: no resp distress  HEART:  RRR  ABDOMEN:  Soft, non-tender, non-distended, normoactive bowel sounds  NEURO:  Alert and oriented x 3, no asterixis, no tremor    LABS:                        7.8    5.27  )-----------( 224      ( 31 May 2024 08:15 )             24.7     Mean Cell Volume: 97.2 fL (05-31-24 @ 08:15)    05-31    141  |  104  |  37<H>  ----------------------------<  123<H>  4.2   |  25  |  4.83<H>    Ca    7.8<L>      31 May 2024 08:15  Phos  5.5     05-31  Mg     1.90     05-31        PT/INR - ( 30 May 2024 05:39 )   PT: 11.5 sec;   INR: 1.02 ratio           Urinalysis Basic - ( 31 May 2024 08:15 )    Color: x / Appearance: x / SG: x / pH: x  Gluc: 123 mg/dL / Ketone: x  / Bili: x / Urobili: x   Blood: x / Protein: x / Nitrite: x   Leuk Esterase: x / RBC: x / WBC x   Sq Epi: x / Non Sq Epi: x / Bacteria: x      Imaging:  < from: Upper Endoscopy (05.30.24 @ 09:16) >  Findings:       The examined esophagus was normal.       The Z-line was regular and was found 40 cm from the incisors.       A single 3 mm angioectasia with no bleeding was found in the gastric        body. Coagulation for bleeding prevention using argon plasma was        successful. To prevent bleeding post-intervention, one hemostatic clip        was successfully placed (MR conditional). There was no bleeding at the        end of the procedure.       Localized mild inflammation characterized by granularity was found in        the gastric antrum. Biopsies were taken with a cold forceps for        histology.       Three non-bleeding superficial duodenal ulcers with no stigmata of    bleeding were found in the duodenal bulb. The largest lesion was 3 mm in        largest dimension. Ulcers were clean based (Ozzie Class 3) with no        stigmata of recent bleeding.       The exam of the duodenum was otherwise normal.                                                                        Impression:          - Normal esophagus.                       - Z-line regular, 40 cm from the incisors.                       - A single non-bleeding angioectasia in the stomach.                       Treated with argon plasma coagulation (APC). Clip (MR                        conditional) was placed.                       - Gastritis. Biopsied.                       - Multiple non-bleeding duodenal ulcers with no stigmata                        of bleeding.                       - Etiology of melena likely secondary to gastric AVM and                        non-bleeding duodenal ulcers in the setting of recent                        triple therapy (ASA/Plavix.Elliquis)  Recommendation:      - Return patient to hospital [facility] for ongoing care.                       - Advance diet as tolerated today.                       - Await pathology results.                       - PO PPI BID x 4 weeks followed by PO PPI QD for                        gastroprotection while on ASA                       - OK to resume AP/AC use if clinically indicated                        although would consider de-escelation from triple                        therapy unless clinically indicated given risk of                        bleeding.    < end of copied text >

## 2024-05-31 NOTE — PHYSICAL THERAPY INITIAL EVALUATION ADULT - ADDITIONAL COMMENTS
Patient lives in a private home with 6 steps to enter. Patient ambulatory with a rolling walker, also owns a wheelchair. Patient has a home health aide 7 days a week for 5 hours a day. Denies any falls.    Patient left semi-reclined in bed, NAD, all lines and tubes intact, spO2 93% on room air following ambulation, bed alarm on, call bell within reach

## 2024-05-31 NOTE — PHYSICAL THERAPY INITIAL EVALUATION ADULT - GENERAL OBSERVATIONS, REHAB EVAL
Patient found semi-reclined in bed, NAD, A&Ox4, spO2 95% on room air, patient agreeable to PT evaluation

## 2024-05-31 NOTE — PROGRESS NOTE ADULT - SUBJECTIVE AND OBJECTIVE BOX
Overnight events noted  no distress  dialysis today     VITAL:  T(C): , Max: 36.8 (05-29-24 @ 21:05)  T(F): , Max: 98.2 (05-29-24 @ 21:05)  HR: 73 (05-30-24 @ 09:43)  BP: 177/62 (05-30-24 @ 09:43)  BP(mean): --  RR: 29 (05-30-24 @ 09:43)  SpO2: 94% (05-30-24 @ 09:43)  Wt(kg): --      PHYSICAL EXAM:  General: Alerted, oriented  HEENT: MMM  Lungs:CTA-b/l  Heart: RRR S1S2  Abdomen: Soft, NTND  Ext: no pedal edema b/l  : no alba  access ; tdc     LABS:                        7.7    6.21  )-----------( 202      ( 30 May 2024 05:39 )             23.1     Na(139)/K(4.0)/Cl(102)/HCO3(25)/BUN(30)/Cr(3.53)Glu(100)/Ca(7.9)/Mg(1.90)/PO4(4.1)    05-30 @ 05:39  Na(139)/K(5.1)/Cl(102)/HCO3(23)/BUN(57)/Cr(4.50)Glu(153)/Ca(8.3)/Mg(2.00)/PO4(4.9)    05-29 @ 08:31  Na(139)/K(4.7)/Cl(103)/HCO3(23)/BUN(48)/Cr(3.98)Glu(164)/Ca(8.8)/Mg(--)/PO4(--)    05-28 @ 22:46    Urinalysis Basic - ( 30 May 2024 05:39 )    Color: x / Appearance: x / SG: x / pH: x  Gluc: 100 mg/dL / Ketone: x  / Bili: x / Urobili: x   Blood: x / Protein: x / Nitrite: x   Leuk Esterase: x / RBC: x / WBC x   Sq Epi: x / Non Sq Epi: x / Bacteria: x      ASSESSMENT:  85 y/o female with PMHx DM, systolic CHF, CAD s/p PCI, ESRD on HD Tues, Thurs, Sat, Anemia and HTN presents to the ED with sob ,hypotension      ESRD ---TTS--incomplete last night   anemia --Gi loss stable sp transfusions   volume-still has renal reserves , cxr clear   Gi -bleed-scope today , AVM ,EGD yesterday       RECOMMEND:  -HD today for 1 liter UF   -get an other transfusion during HD  -EGD today   -renal diet .            Gaby Chowdhury  [facility]  Office: [phone]         Overnight events noted  no distress  seen on dialysis today , feeling ok    getting transfusion     VITAL:  T(C): , Max: 36.8 (05-29-24 @ 21:05)  T(F): , Max: 98.2 (05-29-24 @ 21:05)  HR: 73 (05-30-24 @ 09:43)  BP: 177/62 (05-30-24 @ 09:43)  BP(mean): --  RR: 29 (05-30-24 @ 09:43)  SpO2: 94% (05-30-24 @ 09:43)  Wt(kg): --      PHYSICAL EXAM:  General: Alerted, oriented  HEENT: MMM  Lungs:CTA-b/l  Heart: RRR S1S2  Abdomen: Soft, NTND  Ext: no pedal edema b/l  : no alba  access ; tdc     LABS:                        7.7    6.21  )-----------( 202      ( 30 May 2024 05:39 )             23.1     Na(139)/K(4.0)/Cl(102)/HCO3(25)/BUN(30)/Cr(3.53)Glu(100)/Ca(7.9)/Mg(1.90)/PO4(4.1)    05-30 @ 05:39  Na(139)/K(5.1)/Cl(102)/HCO3(23)/BUN(57)/Cr(4.50)Glu(153)/Ca(8.3)/Mg(2.00)/PO4(4.9)    05-29 @ 08:31  Na(139)/K(4.7)/Cl(103)/HCO3(23)/BUN(48)/Cr(3.98)Glu(164)/Ca(8.8)/Mg(--)/PO4(--)    05-28 @ 22:46    Urinalysis Basic - ( 30 May 2024 05:39 )    Color: x / Appearance: x / SG: x / pH: x  Gluc: 100 mg/dL / Ketone: x  / Bili: x / Urobili: x   Blood: x / Protein: x / Nitrite: x   Leuk Esterase: x / RBC: x / WBC x   Sq Epi: x / Non Sq Epi: x / Bacteria: x      ASSESSMENT:  83 y/o female with PMHx DM, systolic CHF, CAD s/p PCI, ESRD on HD Tues, Thurs, Sat, Anemia and HTN presents to the ED with sob ,hypotension      ESRD ---TTS--incomplete last night   anemia --Gi loss stable sp transfusions   volume-still has renal reserves , cxr clear   Gi -bleed-scope today , AVM ,EGD yesterday       RECOMMEND:  -HD today for 1 liter UF   -get an other transfusion during HD  -EGD today   -renal diet .            [facility]  [facility]  Office: [phone]

## 2024-05-31 NOTE — DISCHARGE NOTE PROVIDER - NSFOLLOWUPCLINICS_GEN_ALL_ED_FT
[facility]  Gastroenterology  [address] [place] [address] [address]  [place] [address]  Phone: [phone]  Fax:

## 2024-05-31 NOTE — DISCHARGE NOTE PROVIDER - NSDCFUADDAPPT_GEN_ALL_CORE_FT
Follow up with [facility] for further evaluation and treatment within 1 month of discharge.

## 2024-05-31 NOTE — PROGRESS NOTE ADULT - ATTENDING COMMENTS
84-year-old female with CAD status post PCI 2017 on ASA/Plavix/PVD/HTN/HLD/T2DM/ESRD on HD TRS/history of peptic ulcer disease found to be short of breath, found to have a hemoglobin of 4.6 from a baseline of 8–10 in the background of?  Melena, with background of duodenal ulcer in 2018, hemoglobin of 5, repeat EGD in 2020 demonstrated only gastritis and patient refused colonoscopy at that time.  Incidental new LLE hematoma  Patient was started on Eliquis 1 week ago in addition to ASA/Plavix  s/p EGD  Recs  1- as above

## 2024-05-31 NOTE — PHYSICAL THERAPY INITIAL EVALUATION ADULT - PERTINENT HX OF CURRENT PROBLEM, REHAB EVAL
Patient is a 84 year old female with history of CAD s/p PCI, PVD, HTN, HLD, T2DM, HFpEF, ESRD on HD (T/Th/Sa) and PUD sent in from her dialysis center due to shortness of breath during HD and noted to have significant anemia to 4.6. Patient currently admitted for suspected GI bleeding and pending further workup and management .Underwent EGD on 5/30/2024 with single non-bleeding angioectasia in the stomach s/p APC and clip placement. Note also made of gastritis (Biopsied) in addition to multiple non-bleeding duodenal ulcers with no stigmata of bleeding .Etiology of melena likely secondary to gastric AVM and non-bleeding duodenal ulcers in the setting of recent triple therapy (ASA/Plavix/Elliquis). Pt feeling well post procedure with no ongoing nausea, vomiting or abd pain with stable hgb at 7.8. No further GI interventions planned at this time

## 2024-05-31 NOTE — DISCHARGE NOTE PROVIDER - NSDCCPCAREPLAN_GEN_ALL_CORE_FT
PRINCIPAL DISCHARGE DIAGNOSIS  Diagnosis: Symptomatic anemia  Assessment and Plan of Treatment: You presented with low Hemoglobin. You recieved 2 units of blood. The gastroenterolgoy team follow you throughout your visit, an Endoscopy was performed which showed duodenal ulcers and an angioectasic which was treated with argon plasma coagulation.   After discussion with Cardiology team, it was determined to resume your Eliquis. You were observed for 24 hours after resumption of Eliquis_____.  Follow up with your PCP within 2 weeks for further evaluaiton and treatment.      SECONDARY DISCHARGE DIAGNOSES  Diagnosis: HTN (hypertension)  Assessment and Plan of Treatment: Continue blood pressure medication regimen as directed. Monitor for any visual changes, headaches or dizziness.  Monitor blood pressure regularly.  Follow up with your PCP for further management for high blood pressure.    Diagnosis: ESRD needing dialysis  Assessment and Plan of Treatment: Please continue to follow your dialysis schedule and refer to your primary provider/nephrologist for further care and monitoring of kidney function and electrolytes. Continue a renal restricted diet (Avoiding foods high in potassium and phosphorus), your prescribed medications, and supplementations as directed.    Diagnosis: Chronic atrial fibrillation  Assessment and Plan of Treatment: Your ELiquis was resumed. You were observed for 24 hours after _______.    Diagnosis: CAD (coronary artery disease)  Assessment and Plan of Treatment: After discussion with cardiology, it was determined to discontinue your Aspirin and Plavix due to your stomach bleed. Please continue to hold this medication.    Follow-up with your primary provider/cardiologist as outpatient for ongoing care. If you have persistent chest pain, shortness of breath, heart palpitations, or dizziness you should return to the emergency room.     PRINCIPAL DISCHARGE DIAGNOSIS  Diagnosis: Symptomatic anemia  Assessment and Plan of Treatment: You presented with low Hemoglobin. You recieved 2 units of blood. The gastroenterolgoy team follow you throughout your visit, an Endoscopy was performed which showed duodenal ulcers and an angioectasic which was treated with argon plasma coagulation.   After discussion with Cardiology team, it was determined to resume your Eliquis. You were observed for 24 hours after resumption of Eliquis and your blood counts remained stable.  Follow up with your PCP and GI doctor within 2 weeks for further evaluaiton and treatment.  If you begin to experience blood in stool, vomitting blood, shortness of breath, or chest pain please report to ER immediately.      SECONDARY DISCHARGE DIAGNOSES  Diagnosis: HTN (hypertension)  Assessment and Plan of Treatment: Continue blood pressure medication regimen as directed. Monitor for any visual changes, headaches or dizziness.  Monitor blood pressure regularly.  Follow up with your PCP for further management for high blood pressure.    Diagnosis: ESRD needing dialysis  Assessment and Plan of Treatment: Please continue to follow your dialysis schedule and refer to your primary provider/nephrologist for further care and monitoring of kidney function and electrolytes. Continue a renal restricted diet (Avoiding foods high in potassium and phosphorus), your prescribed medications, and supplementations as directed.    Diagnosis: Chronic atrial fibrillation  Assessment and Plan of Treatment: Your ELiquis was resumed. You were observed for 24 hours after and your blood levels remained stable. Continue this medication at home as prescribed.    Diagnosis: CAD (coronary artery disease)  Assessment and Plan of Treatment: After discussion with cardiology, it was determined to discontinue your Aspirin and Plavix due to your stomach bleed. Please continue to hold this medication.    Follow-up with your primary provider/cardiologist as outpatient for ongoing care. If you have persistent chest pain, shortness of breath, heart palpitations, or dizziness you should return to the emergency room.

## 2024-05-31 NOTE — DISCHARGE NOTE PROVIDER - CARE PROVIDERS DIRECT ADDRESSES
[email or web address]

## 2024-06-01 ENCOUNTER — TRANSCRIPTION ENCOUNTER (OUTPATIENT)
Age: 84
End: 2024-06-01

## 2024-06-01 VITALS
HEART RATE: 72 BPM | OXYGEN SATURATION: 99 % | SYSTOLIC BLOOD PRESSURE: 138 MMHG | DIASTOLIC BLOOD PRESSURE: 74 MMHG | TEMPERATURE: 98 F | RESPIRATION RATE: 18 BRPM

## 2024-06-01 LAB
ANION GAP SERPL CALC-SCNC: 14 MMOL/L — SIGNIFICANT CHANGE UP (ref 7–14)
BUN SERPL-MCNC: 20 MG/DL — SIGNIFICANT CHANGE UP (ref 7–23)
CALCIUM SERPL-MCNC: 7.8 MG/DL — LOW (ref 8.4–10.5)
CHLORIDE SERPL-SCNC: 103 MMOL/L — SIGNIFICANT CHANGE UP (ref 98–107)
CO2 SERPL-SCNC: 25 MMOL/L — SIGNIFICANT CHANGE UP (ref 22–31)
CREAT SERPL-MCNC: 3.6 MG/DL — HIGH (ref 0.5–1.3)
EGFR: 12 ML/MIN/1.73M2 — LOW
GLUCOSE BLDC GLUCOMTR-MCNC: 109 MG/DL — HIGH (ref 70–99)
GLUCOSE BLDC GLUCOMTR-MCNC: 131 MG/DL — HIGH (ref 70–99)
GLUCOSE BLDC GLUCOMTR-MCNC: 135 MG/DL — HIGH (ref 70–99)
GLUCOSE SERPL-MCNC: 128 MG/DL — HIGH (ref 70–99)
HCT VFR BLD CALC: 28.4 % — LOW (ref 34.5–45)
HGB BLD-MCNC: 9.4 G/DL — LOW (ref 11.5–15.5)
MAGNESIUM SERPL-MCNC: 1.9 MG/DL — SIGNIFICANT CHANGE UP (ref 1.6–2.6)
MCHC RBC-ENTMCNC: 31 PG — SIGNIFICANT CHANGE UP (ref 27–34)
MCHC RBC-ENTMCNC: 33.1 GM/DL — SIGNIFICANT CHANGE UP (ref 32–36)
MCV RBC AUTO: 93.7 FL — SIGNIFICANT CHANGE UP (ref 80–100)
NRBC # BLD: 0 /100 WBCS — SIGNIFICANT CHANGE UP (ref 0–0)
NRBC # FLD: 0 K/UL — SIGNIFICANT CHANGE UP (ref 0–0)
PHOSPHATE SERPL-MCNC: 4.4 MG/DL — SIGNIFICANT CHANGE UP (ref 2.5–4.5)
PLATELET # BLD AUTO: 242 K/UL — SIGNIFICANT CHANGE UP (ref 150–400)
POTASSIUM SERPL-MCNC: 3.6 MMOL/L — SIGNIFICANT CHANGE UP (ref 3.5–5.3)
POTASSIUM SERPL-SCNC: 3.6 MMOL/L — SIGNIFICANT CHANGE UP (ref 3.5–5.3)
RBC # BLD: 3.03 M/UL — LOW (ref 3.8–5.2)
RBC # FLD: 17.6 % — HIGH (ref 10.3–14.5)
SODIUM SERPL-SCNC: 142 MMOL/L — SIGNIFICANT CHANGE UP (ref 135–145)
WBC # BLD: 5.03 K/UL — SIGNIFICANT CHANGE UP (ref 3.8–10.5)
WBC # FLD AUTO: 5.03 K/UL — SIGNIFICANT CHANGE UP (ref 3.8–10.5)

## 2024-06-01 RX ORDER — PANTOPRAZOLE SODIUM 20 MG/1
1 TABLET, DELAYED RELEASE ORAL
Qty: 50 | Refills: 0
Start: 2024-06-01 | End: 2024-06-25

## 2024-06-01 RX ORDER — AMLODIPINE BESYLATE 2.5 MG/1
1 TABLET ORAL
Qty: 0 | Refills: 0 | DISCHARGE
Start: 2024-06-01

## 2024-06-01 RX ORDER — ASPIRIN/CALCIUM CARB/MAGNESIUM 324 MG
1 TABLET ORAL
Refills: 0 | DISCHARGE

## 2024-06-01 RX ADMIN — CHLORHEXIDINE GLUCONATE 1 APPLICATION(S): 213 SOLUTION TOPICAL at 10:31

## 2024-06-01 RX ADMIN — AMLODIPINE BESYLATE 5 MILLIGRAM(S): 2.5 TABLET ORAL at 10:31

## 2024-06-01 RX ADMIN — APIXABAN 2.5 MILLIGRAM(S): 2.5 TABLET, FILM COATED ORAL at 10:31

## 2024-06-01 RX ADMIN — POLYETHYLENE GLYCOL 3350 17 GRAM(S): 17 POWDER, FOR SOLUTION ORAL at 10:31

## 2024-06-01 NOTE — PROGRESS NOTE ADULT - SUBJECTIVE AND OBJECTIVE BOX
Cardiovascular Disease Progress Note  DATE OF SERVICE: 24 @ 08:28    Overnight events: No acute events overnight.    Ms. Tsang is undergoing HD in no distress.   Otherwise review of systems negative    Objective Findings:  T(C): 36.6 (24 @ 07:00), Max: 36.7 (24 @ 14:15)  HR: 66 (24 @ 07:46) (66 - 76)  BP: 157/61 (24 @ 07:46) (142/75 - 218/90)  RR: 18 (24 @ 07:46) (17 - 18)  SpO2: 100% (24 @ 05:45) (97% - 100%)  Wt(kg): --  Daily Height in cm: 162.56 (31 May 2024 19:47)    Daily Weight in k.5 (2024 07:00)      Physical Exam:  Gen: NAD; Patient resting comfortably  HEENT: EOMI, Normocephalic/ atraumatic  CV: RRR, normal S1 + S2, no m/r/g  Lungs:  Normal respiratory effort; clear to auscultation bilaterally  Abd: soft, non-tender; bowel sounds present  Ext: No edema; warm and well perfused    Telemetry: n/a    Laboratory Data:                        9.4    5.03  )-----------( 242      ( 2024 07:00 )             28.4         141  |  104  |  37<H>  ----------------------------<  123<H>  4.2   |  25  |  4.83<H>    Ca    7.8<L>      31 May 2024 08:15  Phos  5.5       Mg     1.90                     Inpatient Medications:  MEDICATIONS  (STANDING):  amLODIPine   Tablet 5 milliGRAM(s) Oral daily  apixaban 2.5 milliGRAM(s) Oral two times a day  chlorhexidine 2% Cloths 1 Application(s) Topical daily  dextrose 10% Bolus 125 milliLiter(s) IV Bolus once  dextrose 5%. 1000 milliLiter(s) (100 mL/Hr) IV Continuous <Continuous>  dextrose 5%. 1000 milliLiter(s) (50 mL/Hr) IV Continuous <Continuous>  dextrose 50% Injectable 25 Gram(s) IV Push once  dextrose 50% Injectable 12.5 Gram(s) IV Push once  glucagon  Injectable 1 milliGRAM(s) IntraMuscular once  insulin lispro (ADMELOG) corrective regimen sliding scale   SubCutaneous three times a day before meals  pantoprazole  Injectable 40 milliGRAM(s) IV Push two times a day  polyethylene glycol 3350 17 Gram(s) Oral daily  senna 2 Tablet(s) Oral at bedtime      Assessment: 84 year old woman with uncontrolled IDDM, compensated systolic CHF, CAD s/p PCI, and ESRD on HD presents with profound anemia.       Plan of Care:      #CAD- s/p MICHAEL x2 in 2017.  Ms. Tsang was taking ASA, Plavix, and Eliquis at home at the direction of her vascular physician.   EGD notable for AVMs and ulcers.  No ASA or Plavix.  Plan for just low dose Eliquis.        #ESRD-  HD underway this morning.   Renal input noted.       #Paroxymal a-fib-  Rates controlled.   Toprol.   Re challenge with Eliquis 2.5 mg PO BID.            High and complex medical decision making in this patient with active comorbidities.                Over 55 minutes spent on total encounter; more than 50% of the visit was spent counseling and/or coordinating care by the attending physician.      Julio César Hooper MD [facility]  Cardiovascular Disease  [phone]

## 2024-06-01 NOTE — PROGRESS NOTE ADULT - SUBJECTIVE AND OBJECTIVE BOX
Patient is a 84y old  Female who presents with a chief complaint of Anemia (01 Jun 2024 13:11)      SUBJECTIVE / OVERNIGHT EVENTS:    Events noted.  CONSTITUTIONAL: No fever,  or fatigue  RESPIRATORY: No cough, wheezing,  No shortness of breath  CARDIOVASCULAR: No chest pain, palpitations, dizziness, or leg swelling  GASTROINTESTINAL: No abdominal or epigastric pain. No nausea, vomiting.  NEUROLOGICAL: No headache    MEDICATIONS  (STANDING):  amLODIPine   Tablet 5 milliGRAM(s) Oral daily  apixaban 2.5 milliGRAM(s) Oral two times a day  chlorhexidine 2% Cloths 1 Application(s) Topical daily  dextrose 10% Bolus 125 milliLiter(s) IV Bolus once  dextrose 5%. 1000 milliLiter(s) (100 mL/Hr) IV Continuous <Continuous>  dextrose 5%. 1000 milliLiter(s) (50 mL/Hr) IV Continuous <Continuous>  dextrose 50% Injectable 25 Gram(s) IV Push once  dextrose 50% Injectable 12.5 Gram(s) IV Push once  glucagon  Injectable 1 milliGRAM(s) IntraMuscular once  insulin lispro (ADMELOG) corrective regimen sliding scale   SubCutaneous three times a day before meals  pantoprazole  Injectable 40 milliGRAM(s) IV Push two times a day  polyethylene glycol 3350 17 Gram(s) Oral daily  senna 2 Tablet(s) Oral at bedtime    MEDICATIONS  (PRN):  acetaminophen     Tablet .. 650 milliGRAM(s) Oral every 6 hours PRN Temp greater or equal to 38C (100.4F), Mild Pain (1 - 3)  aluminum hydroxide/magnesium hydroxide/simethicone Suspension 30 milliLiter(s) Oral every 4 hours PRN Dyspepsia  dextrose Oral Gel 15 Gram(s) Oral once PRN Blood Glucose LESS THAN 70 milliGRAM(s)/deciliter  melatonin 3 milliGRAM(s) Oral at bedtime PRN Insomnia        CAPILLARY BLOOD GLUCOSE      POCT Blood Glucose.: 135 mg/dL (01 Jun 2024 12:11)  POCT Blood Glucose.: 109 mg/dL (01 Jun 2024 09:20)  POCT Blood Glucose.: 131 mg/dL (01 Jun 2024 05:22)  POCT Blood Glucose.: 163 mg/dL (31 May 2024 23:43)  POCT Blood Glucose.: 110 mg/dL (31 May 2024 20:56)    I&O's Summary    31 May 2024 07:01  -  01 Jun 2024 07:00  --------------------------------------------------------  IN: 700 mL / OUT: 2200 mL / NET: -1500 mL    01 Jun 2024 07:01  -  01 Jun 2024 17:21  --------------------------------------------------------  IN: 400 mL / OUT: 1400 mL / NET: -1000 mL        T(C): 36.7 (06-01-24 @ 12:42), Max: 36.7 (06-01-24 @ 05:45)  HR: 72 (06-01-24 @ 12:42) (66 - 75)  BP: 138/74 (06-01-24 @ 12:42) (138/74 - 218/90)  RR: 18 (06-01-24 @ 12:42) (18 - 18)  SpO2: 99% (06-01-24 @ 12:42) (97% - 100%)    PHYSICAL EXAM:  GENERAL: NAD  NECK: Supple, No JVD  CHEST/LUNG: Clear to auscultation bilaterally; No wheezing.  HEART: Regular rate and rhythm; No murmurs, rubs, or gallops  ABDOMEN: Soft, Nontender, Nondistended; Bowel sounds present  EXTREMITIES:   No edema  NEUROLOGY: AAO X 3      LABS:                        9.4    5.03  )-----------( 242      ( 01 Jun 2024 07:00 )             28.4     06-01    142  |  103  |  20  ----------------------------<  128<H>  3.6   |  25  |  3.60<H>    Ca    7.8<L>      01 Jun 2024 07:00  Phos  4.4     06-01  Mg     1.90     06-01            Urinalysis Basic - ( 01 Jun 2024 07:00 )    Color: x / Appearance: x / SG: x / pH: x  Gluc: 128 mg/dL / Ketone: x  / Bili: x / Urobili: x   Blood: x / Protein: x / Nitrite: x   Leuk Esterase: x / RBC: x / WBC x   Sq Epi: x / Non Sq Epi: x / Bacteria: x      CAPILLARY BLOOD GLUCOSE      POCT Blood Glucose.: 135 mg/dL (01 Jun 2024 12:11)  POCT Blood Glucose.: 109 mg/dL (01 Jun 2024 09:20)  POCT Blood Glucose.: 131 mg/dL (01 Jun 2024 05:22)  POCT Blood Glucose.: 163 mg/dL (31 May 2024 23:43)  POCT Blood Glucose.: 110 mg/dL (31 May 2024 20:56)        RADIOLOGY & ADDITIONAL TESTS:    Imaging Personally Reviewed:    Consultant(s) Notes Reviewed:      Care Discussed with Consultants/Other Providers:    Parminder Goldberg MD, CMD, FACP    [address] [place] [address]  Office Tel: [phone]  Cell: [phone]

## 2024-06-01 NOTE — PROGRESS NOTE ADULT - PROBLEM SELECTOR PLAN 4
Patient with history of ESRD  - on T/Th/Sa schedule currently  - receiving HD via RIJ Tunneled HD cath  - planned for additional HD session with PRBC transfusion on 5/29  - Nephro f/up appreciated
Patient with history of ESRD  - on T/Th/Sa schedule currently  - receiving HD via RIJ Tunneled HD cath  - planned for additional HD session with PRBC transfusion on 5/29  - Nephro f/up appreciated
Patient with history of ESRD  - on HD T/Th/Sa   - receiving HD via RIJ Tunneled HD cath  - Nephro f/up appreciated

## 2024-06-01 NOTE — PROVIDER CONTACT NOTE (OTHER) - RECOMMENDATIONS
Plan to remove 1kg with HD today. Monitor BP closely and BP medication will be given if BP remains high.

## 2024-06-01 NOTE — PROGRESS NOTE ADULT - SUBJECTIVE AND OBJECTIVE BOX
NEPHROLOGY-[facility] [phone]        Patient seen and examined she had HD this morning 1kg removed, hypertension noted. BP now improved. She had HD yesterday 1.5 kg removed 1 unit PRBC transfused.         MEDICATIONS  (STANDING):  amLODIPine   Tablet 5 milliGRAM(s) Oral daily  apixaban 2.5 milliGRAM(s) Oral two times a day  chlorhexidine 2% Cloths 1 Application(s) Topical daily  dextrose 10% Bolus 125 milliLiter(s) IV Bolus once  dextrose 5%. 1000 milliLiter(s) (100 mL/Hr) IV Continuous <Continuous>  dextrose 5%. 1000 milliLiter(s) (50 mL/Hr) IV Continuous <Continuous>  dextrose 50% Injectable 25 Gram(s) IV Push once  dextrose 50% Injectable 12.5 Gram(s) IV Push once  glucagon  Injectable 1 milliGRAM(s) IntraMuscular once  insulin lispro (ADMELOG) corrective regimen sliding scale   SubCutaneous three times a day before meals  pantoprazole  Injectable 40 milliGRAM(s) IV Push two times a day  polyethylene glycol 3350 17 Gram(s) Oral daily  senna 2 Tablet(s) Oral at bedtime      VITAL:  T(C): , Max: 36.7 (05-31-24 @ 14:15)  T(F): , Max: 98.1 (05-31-24 @ 14:15)  HR: 72 (06-01-24 @ 12:42)  BP: 138/74 (06-01-24 @ 12:42)  BP(mean): --  RR: 18 (06-01-24 @ 12:42)  SpO2: 99% (06-01-24 @ 12:42)  Wt(kg): --    I and O's:    05-31 @ 07:01  -  06-01 @ 07:00  --------------------------------------------------------  IN: 700 mL / OUT: 2200 mL / NET: -1500 mL    06-01 @ 07:01  -  06-01 @ 13:12  --------------------------------------------------------  IN: 400 mL / OUT: 1400 mL / NET: -1000 mL      Height (cm): 162.6 (05-31 @ 19:47)    PHYSICAL EXAM:    Constitutional: NAD  Neck:  No JVD  Respiratory: CTAB/L  Cardiovascular: S1 and S2  Gastrointestinal: BS+, soft, NT/ND  Extremities: No peripheral edema  Neurological: A/O x 3, no focal deficits  Psychiatric: Normal mood, normal affect  : No Sylvester  Skin: No rashes  Access: PermCat     LABS:                        9.4    5.03  )-----------( 242      ( 01 Jun 2024 07:00 )             28.4     06-01    142  |  103  |  20  ----------------------------<  128<H>  3.6   |  25  |  3.60<H>    Ca    7.8<L>      01 Jun 2024 07:00  Phos  4.4     06-01  Mg     1.90     06-01            Urine Studies:  Urinalysis Basic - ( 01 Jun 2024 07:00 )    Color: x / Appearance: x / SG: x / pH: x  Gluc: 128 mg/dL / Ketone: x  / Bili: x / Urobili: x   Blood: x / Protein: x / Nitrite: x   Leuk Esterase: x / RBC: x / WBC x   Sq Epi: x / Non Sq Epi: x / Bacteria: x      ASSESSMENT:  85 y/o female with PMHx DM, systolic CHF, CAD s/p PCI, ESRD on HD Tues, Thurs, Sat, Anemia and HTN presents to the ED with sob ,hypotension      ESRD ---TTS   anemia --Gi loss stable sp transfusions hgb improving   volume-still has renal reserves , cxr clear   Gi -bleed-scope today , AVM ,s/p EGD      RECOMMEND:  -HD today (completed) for 1 liter UF   -renal diet .  -no renal objection to discharge once outpatient HD slot confirmed           Alona Del Angel NP  [facility]  Office: [phone]

## 2024-06-01 NOTE — PROGRESS NOTE ADULT - PROBLEM SELECTOR PLAN 1
GI f/up appreciated  S/p EGD: Gastric AVM/Duodenal ulcers   On Eliquis
GI f/up appreciated  S/p EGD: Gastric AVM/Duodenal ulcers   On AC (ASA/Plavix/Eliquis)
GI f/up appreciated  S/p EGD: Gastric AVM/Duodenal ulcers   On AC (ASA/Plavix/Eliquis)     d/c planning

## 2024-06-01 NOTE — PROGRESS NOTE ADULT - ASSESSMENT
Nikunj Healy is a 84 year old female with a PMHx notable for CAD s/p PCI 2017 on ASA/Plavix, PVD, HTN, HLD, T2DM, HFpEF, ESRD on HD T/Th/S and hx of PUD referred into the hospital after being found to be short of breath at her HD session with labs notable for hgb of 4.6 from a baseline of ~8-10 in the background of dark black stools. GI consulted for further workup and management of the above.     #Dark stools c/f melena   #Acute on Chronic Anemia   Hx notable for GI bleeding in the past. EGD in 2018 with note of one non-bleeding duodenal ulcer. Xarelto was discontinued (paroxysmal a-fib) and ASA with Plavix was resumed. She again presented with similar symptoms in 3/2018 with Hb-5. Capsule study revealed questionable ulcer in ileum and patient was started on mesalamine although currently off of it. In 2020 she had anemia with EGD notable for gastritis. Colonoscopy was offered although patient declined. Pt currently presents to the hospital after being found to be short of breath at her HD session with labs notable for hgb of 4.6 from a baseline of ~8-10 in the background of dark black stools for the past week. Rectal exam by ED providers with melena although no stool in rectal vault on my exam.   -Underwent EGD on 5/30/2024 with  asingle non-bleeding angioectasia in the stomach s/p APC and clip placement. Note also made of gastritis (Biopsied) in addition to multiple non-bleeding duodenal ulcers with no stigmata of bleeding.Etiology of melena likely secondary to gastric AVM and  non-bleeding duodenal ulcers in the setting of recent triple therapy (ASA/Plavix/Elliquis). Pt feeling well post procedure with no ongoing nausea, vomiting or abd pain with stable hgb at 7.8. No further GI interventions planned at this time       Recommendations:  -Diet as tolerated  - Await pathology results.  - PO PPI BID x 4 weeks followed by PO PPI QD for gastroprotection while on ASA  -OK to resume AP/AC use if clinically indicated  although would consider de-escalation from triple  therapy unless clinically indicated given risk of bleeding.    GI will plan to sign off at this time. Please feel free to reach out to our team with any follow up questions. Please provide patient with Gastroenterology Clinic number to confirm/arrange appointment; [phone] ([facility] at [address] [facility]) or [phone] ([facility] at [address] [place] [address]) or [phone] ([facility] at [address] [facility]).    All recommendations are tentative until note is attested by attending.   
83 y/o F with history of CAD s/p PCI, PVD, HTN, HLD, T2DM, HFpEF, ESRD on HD (T/Th/Sa) and PUD sent in from her dialysis center due to shortness of breath during HD and noted to have significant anemia to 4.6. Patient currently admitted for suspected GI bleeding and pending further workup and management.
85 y/o F with history of CAD s/p PCI, PVD, HTN, HLD, T2DM, HFpEF, ESRD on HD (T/Th/Sa) and PUD sent in from her dialysis center due to shortness of breath during HD and noted to have significant anemia to 4.6. Patient currently admitted for suspected GI bleeding and pending further workup and management.
85 y/o F with history of CAD s/p PCI, PVD, HTN, HLD, T2DM, HFpEF, ESRD on HD (T/Th/Sa) and PUD sent in from her dialysis center due to shortness of breath during HD and noted to have significant anemia to 4.6. Patient currently admitted for suspected GI bleeding and pending further workup and management.

## 2024-06-01 NOTE — DISCHARGE NOTE NURSING/CASE MANAGEMENT/SOCIAL WORK - NSDCPEFALRISK_GEN_ALL_CORE
For information on Fall & Injury Prevention, visit: [email or web address] OR  [email or web address] OR  https://www.cdc.gov/steadi/patient.html

## 2024-06-01 NOTE — PROGRESS NOTE ADULT - PROBLEM SELECTOR PLAN 3
Patient with history of CHF  - most recent TTE from 4/2023 with FE 43%  - cardiology following, recs appreciated  - was previously on lopressor but patient reports she stopped taking it  - fluid removal with HD  - judicious use of IV fluids
Patient with history of CHF  - most recent TTE from 4/2023 with FE 43%  - cardiology following, recs appreciated  - was previously on lopressor but patient reports she stopped taking it  - fluid removal with HD
Patient with history of CHF  - most recent TTE from 4/2023 with FE 43%  - cardiology following, recs appreciated  - was previously on lopressor but patient reports she stopped taking it  - fluid removal with HD  - judicious use of IV fluids

## 2024-06-01 NOTE — PROGRESS NOTE ADULT - PROBLEM SELECTOR PLAN 2
Patient with history of HTN  - BPs currently well controlled  - most likely lower than baseline i/s/o GI bleed  - hold anti-hypertensives for now
Patient with history of HTN  - BPs currently well controlled  - most likely lower than baseline i/s/o GI bleed  - hold anti-hypertensives for now  - patient reports self discontinuing all her BP meds at home, will monitor
Patient with history of HTN  - BPs currently well controlled  - most likely lower than baseline i/s/o GI bleed  - hold anti-hypertensives for now  - patient reports self discontinuing all her BP meds at home, will monitor

## 2024-06-01 NOTE — DISCHARGE NOTE NURSING/CASE MANAGEMENT/SOCIAL WORK - PATIENT PORTAL LINK FT
You can access the FollowMyHealth Patient Portal offered by [facility] by registering at the following website: [email or web address]. By joining [facility]’s FollowMyHealth portal, you will also be able to view your health information using other applications (apps) compatible with our system.

## 2024-06-01 NOTE — DISCHARGE NOTE NURSING/CASE MANAGEMENT/SOCIAL WORK - NSDCFUADDAPPT_GEN_ALL_CORE_FT
Follow up with [facility] for further evaluation and treatment within 1 month of discharge.

## 2024-06-03 ENCOUNTER — NON-APPOINTMENT (OUTPATIENT)
Age: 84
End: 2024-06-03

## 2024-06-03 LAB — SURGICAL PATHOLOGY STUDY: SIGNIFICANT CHANGE UP

## 2024-06-19 NOTE — ED PROVIDER NOTE - NS ED MD DISPO ADMITTING SERVICE
Letter:  all of your labs are within acceptable limits except:  1.  You have mild anemia and borderline low platelets.  Some additional labs are being added to the current sample to check your iron levels.  Since the platelets are also borderline, have this level repeated in 3 months with a lab only visit at that time.   Otherwise, the remaining labs are within acceptable limits.  Keep up the great work! Follow up in 1 yr or sooner if needed. Have a great weekend and rest of the year. 
MED

## 2024-07-19 NOTE — DIETITIAN INITIAL EVALUATION ADULT. - NS AS NUTRI INTERV MEALS SNACK
Goal Outcome Evaluation:      Plan of Care Reviewed With: patient, spouse        Shift: 07/18  [id number]    Admitting Dx: Syncope.   Orientation: A&Ox4  Aggression Stop Light: green  Activity: Independent.   Diet/BS Checks: mechanical soft  Tele:  NA  IV Access/Drains: L PIV SL  Pain Management: denies  Abnormal VS/Results: VSS on RA  Bowel/Bladder: continent b/b. No BM this shift  Skin/Wounds: scattered bruising, Mepi in place on coccyx  Consults: GI, Hem/Onc, Radiation Onc, IR  D/C Disposition:7/19 pending   Other Info: Radiation scheduled 7/19                   Carbohydrate - modified diet/Continue current consistent CHO no snack, DASH/TLC diet to promote blood glucose control and heart health. Recommend add no concentrated phosphorous diet in setting of hyperphosphatemia. Pt's food preferences obtained./[place] - modified diet

## 2024-08-07 NOTE — PATIENT PROFILE ADULT. - ABILITY TO HEAR (WITH HEARING AID OR HEARING APPLIANCE IF NORMALLY USED):
Patient: Doretha Yu    Procedure: Procedure(s):  ESOPHAGOGASTRODUODENOSCOPY, WITH BIOPSY  Pouchoscopy with biopsy       Diagnosis: Ulcerative colitis without complications, unspecified location (H) [K51.90]  Regurgitation of food [R11.10]  Diagnosis Additional Information: No value filed.    Anesthesia Type:   MAC     Note:    Oropharynx: oropharynx clear of all foreign objects and spontaneously breathing  Level of Consciousness: awake  Oxygen Supplementation: nasal cannula  Level of Supplemental Oxygen (L/min / FiO2): 3  Independent Airway: airway patency satisfactory and stable  Dentition: dentition unchanged  Vital Signs Stable: post-procedure vital signs reviewed and stable  Report to RN Given: handoff report given  Patient transferred to: PACU  Comments: Pt awake and able to verbalize needs. ALL monitors on and audible. Spontaneous respiration without difficulty. Pt denies pain. Report given to PACU RN.  Handoff Report: Identifed the Patient, Identified the Reponsible Provider, Reviewed the pertinent medical history, Discussed the surgical course, Reviewed Intra-OP anesthesia mangement and issues during anesthesia, Set expectations for post-procedure period and Allowed opportunity for questions and acknowledgement of understanding      Vitals:  Vitals Value Taken Time   BP 89/56 08/07/24 1438   Temp     Pulse 69 08/07/24 1440   Resp 14 08/07/24 1440   SpO2 100 % 08/07/24 1440   Vitals shown include unfiled device data.    Electronically Signed By: AMADA Fang CRNA  August 7, 2024  2:41 PM  
Adequate: hears normal conversation without difficulty

## 2024-08-29 NOTE — CONSULT NOTE ADULT - PROBLEM/RECOMMENDATION-1
No in lab complications DISPLAY PLAN FREE TEXT Helical Rim Text: The closure involved the helical rim.

## 2024-09-18 ENCOUNTER — INPATIENT (INPATIENT)
Facility: HOSPITAL | Age: 84
LOS: 5 days | Discharge: HOME | End: 2024-09-24
Attending: INTERNAL MEDICINE | Admitting: INTERNAL MEDICINE
Payer: MEDICARE

## 2024-09-18 VITALS
HEIGHT: 64 IN | DIASTOLIC BLOOD PRESSURE: 88 MMHG | WEIGHT: 179.9 LBS | TEMPERATURE: 97 F | RESPIRATION RATE: 16 BRPM | OXYGEN SATURATION: 100 % | HEART RATE: 109 BPM | SYSTOLIC BLOOD PRESSURE: 141 MMHG

## 2024-09-18 DIAGNOSIS — Z87.891 PERSONAL HISTORY OF NICOTINE DEPENDENCE: ICD-10-CM

## 2024-09-18 DIAGNOSIS — Z29.9 ENCOUNTER FOR PROPHYLACTIC MEASURES, UNSPECIFIED: ICD-10-CM

## 2024-09-18 DIAGNOSIS — I48.0 PAROXYSMAL ATRIAL FIBRILLATION: ICD-10-CM

## 2024-09-18 DIAGNOSIS — K92.2 GASTROINTESTINAL HEMORRHAGE, UNSPECIFIED: ICD-10-CM

## 2024-09-18 DIAGNOSIS — Z98.891 HISTORY OF UTERINE SCAR FROM PREVIOUS SURGERY: Chronic | ICD-10-CM

## 2024-09-18 DIAGNOSIS — R55 SYNCOPE AND COLLAPSE: ICD-10-CM

## 2024-09-18 DIAGNOSIS — Z98.890 OTHER SPECIFIED POSTPROCEDURAL STATES: Chronic | ICD-10-CM

## 2024-09-18 DIAGNOSIS — I50.22 CHRONIC SYSTOLIC (CONGESTIVE) HEART FAILURE: ICD-10-CM

## 2024-09-18 DIAGNOSIS — N18.6 END STAGE RENAL DISEASE: ICD-10-CM

## 2024-09-18 DIAGNOSIS — Z95.5 PRESENCE OF CORONARY ANGIOPLASTY IMPLANT AND GRAFT: Chronic | ICD-10-CM

## 2024-09-18 DIAGNOSIS — I25.10 ATHEROSCLEROTIC HEART DISEASE OF NATIVE CORONARY ARTERY WITHOUT ANGINA PECTORIS: ICD-10-CM

## 2024-09-18 LAB
ALBUMIN SERPL ELPH-MCNC: 3.4 G/DL — SIGNIFICANT CHANGE UP (ref 3.3–5)
ALBUMIN SERPL ELPH-MCNC: 3.4 G/DL — SIGNIFICANT CHANGE UP (ref 3.3–5)
ALP SERPL-CCNC: 239 U/L — HIGH (ref 40–120)
ALP SERPL-CCNC: 240 U/L — HIGH (ref 40–120)
ALT FLD-CCNC: 154 U/L — HIGH (ref 4–33)
ALT FLD-CCNC: 155 U/L — HIGH (ref 4–33)
ANION GAP SERPL CALC-SCNC: 14 MMOL/L — SIGNIFICANT CHANGE UP (ref 7–14)
ANION GAP SERPL CALC-SCNC: 20 MMOL/L — HIGH (ref 7–14)
AST SERPL-CCNC: 156 U/L — HIGH (ref 4–32)
AST SERPL-CCNC: 192 U/L — HIGH (ref 4–32)
BASOPHILS # BLD AUTO: 0.04 K/UL — SIGNIFICANT CHANGE UP (ref 0–0.2)
BASOPHILS NFR BLD AUTO: 0.7 % — SIGNIFICANT CHANGE UP (ref 0–2)
BILIRUB SERPL-MCNC: 0.5 MG/DL — SIGNIFICANT CHANGE UP (ref 0.2–1.2)
BILIRUB SERPL-MCNC: 0.5 MG/DL — SIGNIFICANT CHANGE UP (ref 0.2–1.2)
BUN SERPL-MCNC: 36 MG/DL — HIGH (ref 7–23)
BUN SERPL-MCNC: 37 MG/DL — HIGH (ref 7–23)
CALCIUM SERPL-MCNC: 8.7 MG/DL — SIGNIFICANT CHANGE UP (ref 8.4–10.5)
CALCIUM SERPL-MCNC: 9 MG/DL — SIGNIFICANT CHANGE UP (ref 8.4–10.5)
CHLORIDE SERPL-SCNC: 95 MMOL/L — LOW (ref 98–107)
CHLORIDE SERPL-SCNC: 98 MMOL/L — SIGNIFICANT CHANGE UP (ref 98–107)
CO2 SERPL-SCNC: 21 MMOL/L — LOW (ref 22–31)
CO2 SERPL-SCNC: 23 MMOL/L — SIGNIFICANT CHANGE UP (ref 22–31)
CREAT SERPL-MCNC: 4.8 MG/DL — HIGH (ref 0.5–1.3)
CREAT SERPL-MCNC: 5.07 MG/DL — HIGH (ref 0.5–1.3)
DIALYSIS INSTRUMENT RESULT - HEPATITIS B SURFACE ANTIGEN: NEGATIVE — SIGNIFICANT CHANGE UP
EGFR: 8 ML/MIN/1.73M2 — LOW
EGFR: 9 ML/MIN/1.73M2 — LOW
EOSINOPHIL # BLD AUTO: 0.01 K/UL — SIGNIFICANT CHANGE UP (ref 0–0.5)
EOSINOPHIL NFR BLD AUTO: 0.2 % — SIGNIFICANT CHANGE UP (ref 0–6)
FLUAV AG NPH QL: SIGNIFICANT CHANGE UP
FLUBV AG NPH QL: SIGNIFICANT CHANGE UP
GLUCOSE BLDC GLUCOMTR-MCNC: 107 MG/DL — HIGH (ref 70–99)
GLUCOSE SERPL-MCNC: 131 MG/DL — HIGH (ref 70–99)
GLUCOSE SERPL-MCNC: 136 MG/DL — HIGH (ref 70–99)
HCT VFR BLD CALC: 33.1 % — LOW (ref 34.5–45)
HGB BLD-MCNC: 10.3 G/DL — LOW (ref 11.5–15.5)
IANC: 4.05 K/UL — SIGNIFICANT CHANGE UP (ref 1.8–7.4)
IMM GRANULOCYTES NFR BLD AUTO: 0.7 % — SIGNIFICANT CHANGE UP (ref 0–0.9)
LYMPHOCYTES # BLD AUTO: 0.8 K/UL — LOW (ref 1–3.3)
LYMPHOCYTES # BLD AUTO: 14.4 % — SIGNIFICANT CHANGE UP (ref 13–44)
MCHC RBC-ENTMCNC: 30.8 PG — SIGNIFICANT CHANGE UP (ref 27–34)
MCHC RBC-ENTMCNC: 31.1 GM/DL — LOW (ref 32–36)
MCV RBC AUTO: 99.1 FL — SIGNIFICANT CHANGE UP (ref 80–100)
MONOCYTES # BLD AUTO: 0.62 K/UL — SIGNIFICANT CHANGE UP (ref 0–0.9)
MONOCYTES NFR BLD AUTO: 11.2 % — SIGNIFICANT CHANGE UP (ref 2–14)
NEUTROPHILS # BLD AUTO: 4.05 K/UL — SIGNIFICANT CHANGE UP (ref 1.8–7.4)
NEUTROPHILS NFR BLD AUTO: 72.8 % — SIGNIFICANT CHANGE UP (ref 43–77)
NRBC # BLD: 0 /100 WBCS — SIGNIFICANT CHANGE UP (ref 0–0)
NRBC # FLD: 0 K/UL — SIGNIFICANT CHANGE UP (ref 0–0)
NT-PROBNP SERPL-SCNC: HIGH PG/ML
PLATELET # BLD AUTO: 223 K/UL — SIGNIFICANT CHANGE UP (ref 150–400)
POTASSIUM SERPL-MCNC: 4.8 MMOL/L — SIGNIFICANT CHANGE UP (ref 3.5–5.3)
POTASSIUM SERPL-MCNC: SIGNIFICANT CHANGE UP MMOL/L (ref 3.5–5.3)
POTASSIUM SERPL-SCNC: 4.8 MMOL/L — SIGNIFICANT CHANGE UP (ref 3.5–5.3)
POTASSIUM SERPL-SCNC: SIGNIFICANT CHANGE UP MMOL/L (ref 3.5–5.3)
PROT SERPL-MCNC: 6.9 G/DL — SIGNIFICANT CHANGE UP (ref 6–8.3)
PROT SERPL-MCNC: 6.9 G/DL — SIGNIFICANT CHANGE UP (ref 6–8.3)
RBC # BLD: 3.34 M/UL — LOW (ref 3.8–5.2)
RBC # FLD: 14.6 % — HIGH (ref 10.3–14.5)
RSV RNA NPH QL NAA+NON-PROBE: SIGNIFICANT CHANGE UP
SARS-COV-2 RNA SPEC QL NAA+PROBE: SIGNIFICANT CHANGE UP
SODIUM SERPL-SCNC: 135 MMOL/L — SIGNIFICANT CHANGE UP (ref 135–145)
SODIUM SERPL-SCNC: 136 MMOL/L — SIGNIFICANT CHANGE UP (ref 135–145)
TROPONIN T, HIGH SENSITIVITY RESULT: 43 NG/L — SIGNIFICANT CHANGE UP
TROPONIN T, HIGH SENSITIVITY RESULT: 51 NG/L — HIGH
WBC # BLD: 5.56 K/UL — SIGNIFICANT CHANGE UP (ref 3.8–10.5)
WBC # FLD AUTO: 5.56 K/UL — SIGNIFICANT CHANGE UP (ref 3.8–10.5)

## 2024-09-18 PROCEDURE — 71046 X-RAY EXAM CHEST 2 VIEWS: CPT | Mod: 26

## 2024-09-18 PROCEDURE — 99285 EMERGENCY DEPT VISIT HI MDM: CPT

## 2024-09-18 PROCEDURE — 70450 CT HEAD/BRAIN W/O DYE: CPT | Mod: 26,MC

## 2024-09-18 PROCEDURE — 99223 1ST HOSP IP/OBS HIGH 75: CPT

## 2024-09-18 RX ORDER — INFLUENZA VIRUS VACCINE 15; 15; 15; 15 UG/.5ML; UG/.5ML; UG/.5ML; UG/.5ML
0.5 SUSPENSION INTRAMUSCULAR ONCE
Refills: 0 | Status: DISCONTINUED | OUTPATIENT
Start: 2024-09-18 | End: 2024-09-24

## 2024-09-18 RX ORDER — GUAIFENESIN 100 MG/5ML
600 SOLUTION ORAL EVERY 12 HOURS
Refills: 0 | Status: DISCONTINUED | OUTPATIENT
Start: 2024-09-18 | End: 2024-09-24

## 2024-09-18 RX ORDER — ERYTHROPOIETIN 10000 [IU]/ML
4000 INJECTION, SOLUTION INTRAVENOUS; SUBCUTANEOUS ONCE
Refills: 0 | Status: COMPLETED | OUTPATIENT
Start: 2024-09-18 | End: 2024-09-18

## 2024-09-18 RX ORDER — 5-HYDROXYTRYPTOPHAN (5-HTP) 100 MG
3 TABLET,DISINTEGRATING ORAL AT BEDTIME
Refills: 0 | Status: DISCONTINUED | OUTPATIENT
Start: 2024-09-18 | End: 2024-09-24

## 2024-09-18 RX ORDER — PANTOPRAZOLE SODIUM 40 MG/1
40 TABLET, DELAYED RELEASE ORAL
Refills: 0 | Status: DISCONTINUED | OUTPATIENT
Start: 2024-09-18 | End: 2024-09-24

## 2024-09-18 RX ORDER — ACETAMINOPHEN 325 MG
1000 TABLET ORAL ONCE
Refills: 0 | Status: COMPLETED | OUTPATIENT
Start: 2024-09-18 | End: 2024-09-18

## 2024-09-18 RX ORDER — BENZOCAINE AND LEVOMENTHOL 200; 5 MG/G; MG/G
1 SPRAY TOPICAL EVERY 4 HOURS
Refills: 0 | Status: DISCONTINUED | OUTPATIENT
Start: 2024-09-18 | End: 2024-09-24

## 2024-09-18 RX ORDER — METOPROLOL TARTRATE 50 MG
1 TABLET ORAL
Refills: 0 | DISCHARGE

## 2024-09-18 RX ORDER — ALBUTEROL 90 MCG
2 AEROSOL (GRAM) INHALATION EVERY 6 HOURS
Refills: 0 | Status: DISCONTINUED | OUTPATIENT
Start: 2024-09-18 | End: 2024-09-24

## 2024-09-18 RX ORDER — CHLORHEXIDINE GLUCONATE ORAL RINSE 1.2 MG/ML
1 SOLUTION DENTAL DAILY
Refills: 0 | Status: DISCONTINUED | OUTPATIENT
Start: 2024-09-18 | End: 2024-09-24

## 2024-09-18 RX ORDER — ACETAMINOPHEN 325 MG
650 TABLET ORAL EVERY 6 HOURS
Refills: 0 | Status: DISCONTINUED | OUTPATIENT
Start: 2024-09-18 | End: 2024-09-24

## 2024-09-18 RX ORDER — METOPROLOL TARTRATE 50 MG
50 TABLET ORAL EVERY 12 HOURS
Refills: 0 | Status: DISCONTINUED | OUTPATIENT
Start: 2024-09-18 | End: 2024-09-24

## 2024-09-18 RX ADMIN — Medication 2 PUFF(S): at 19:42

## 2024-09-18 RX ADMIN — Medication 50 MILLIGRAM(S): at 19:42

## 2024-09-18 RX ADMIN — Medication 650 MILLIGRAM(S): at 19:42

## 2024-09-18 RX ADMIN — ERYTHROPOIETIN 4000 UNIT(S): 10000 INJECTION, SOLUTION INTRAVENOUS; SUBCUTANEOUS at 18:11

## 2024-09-18 RX ADMIN — Medication 400 MILLIGRAM(S): at 14:49

## 2024-09-18 NOTE — ED PROVIDER NOTE - ATTENDING APP SHARED VISIT CONTRIBUTION OF CARE
84 F hx DM, ESRD - got HD yesterday, Afib not on AC. HTN, CHF, COPD on home O2, CAD now BIB family after being found on floor - pt has no memory. When pt was taken off floor she noted HA, diffuse nonexertional chest pain, assic w/sob. No AC use, VA changes, neck pain, fever, n/v/d, motor/sensory changes.    VS: afebrile, others notable for   , otherwise reassuring   Gen: Well appearing in NAD  Head: NC/AT  ENT: moist mucous membranes   Neck: trachea midline, FROM (painless) - neg Brudzinski   Resp:  No distress  Neuro: A&Ox3, spont. interactive. CN2-12 intact. GCS 15. Strength 5/5 b/l UEs & LEs. No gross sensory deficits. No pronator drift b/l UEs. Finger to nose intact b/l.   Skin:  Warm and dry as visualized  Psych:  appropriate affect and mood    Initial ED MDM: syncope +/1 CP. not concerned for dissection given no radiation. syncope w/o concerning EKG findings. Will CT head, trop, labs, reassess,

## 2024-09-18 NOTE — PATIENT PROFILE ADULT - FALL HARM RISK - HARM RISK INTERVENTIONS
Assistance with ambulation/Assistance OOB with selected safe patient handling equipment/Communicate Risk of Fall with Harm to all staff/Reinforce activity limits and safety measures with patient and family/Tailored Fall Risk Interventions/Visual Cue: Yellow wristband and red socks/Bed in lowest position, wheels locked, appropriate side rails in place/Call bell, personal items and telephone in reach/Instruct patient to call for assistance before getting out of bed or chair/Non-slip footwear when patient is out of bed/[place] to call system/Physically safe environment - no spills, clutter or unnecessary equipment/Purposeful Proactive Rounding/Room/bathroom lighting operational, light cord in reach Colchicine Counseling:  Patient counseled regarding adverse effects including but not limited to stomach upset (nausea, vomiting, stomach pain, or diarrhea).  Patient instructed to limit alcohol consumption while taking this medication.  Colchicine may reduce blood counts especially with prolonged use.  The patient understands that monitoring of kidney function and blood counts may be required, especially at baseline. The patient verbalized understanding of the proper use and possible adverse effects of colchicine.  All of the patient's questions and concerns were addressed.

## 2024-09-18 NOTE — H&P ADULT - NSHPLABSRESULTS_GEN_ALL_CORE
09-18    136  |  95[L]  |  37[H]  ----------------------------<  131[H]  4.8   |  21[L]  |  5.07[H]    Ca    8.7      18 Sep 2024 11:09    TPro  6.9  /  Alb  3.4  /  TBili  0.5  /  DBili  x   /  AST  156[H]  /  ALT  154[H]  /  AlkPhos  240[H]  09-18                          10.3   5.56  )-----------( 223      ( 18 Sep 2024 08:13 )             33.1     CTH: IMPRESSION: No acute intracranial hemorrhage, mass effect, or shift of   the midline structures.

## 2024-09-18 NOTE — PATIENT PROFILE ADULT - FUNCTIONAL ASSESSMENT - BASIC MOBILITY 6.
3-calculated by average/Not able to assess (calculate score using [facility] averaging method)

## 2024-09-18 NOTE — H&P ADULT - PROBLEM SELECTOR PLAN 3
- History of GIB earlier this yr 5/2024, was at [facility]. GI followed, patient underwent EGD but refused colonoscopy.  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 Also noted to have gastritis and non bleeding duodenal ulcers. Bleed was attributed to gastric AVM and the ulcers in setting of use of ASA/Plavix/Eliquis together. She is no longer on any AC. She also had GIB in 2023 and 2020.   - Monitor H/H while admitted, has chronic anemia in setting of ESRD (baseline ~ 10).    # PUD: During last admission patient was advised PPI BID x 4wks, then QD therafter. She is not on PPI.   - Pantoprazole 40mg QD ordered, will encourage pt to continue on d/c and f/u with GI.

## 2024-09-18 NOTE — H&P ADULT - PROBLEM SELECTOR PLAN 5
- TTE 4/5/24: EF 45% w/ grade 1 diastolic dysfunction.   - Patient is currently euvolemic; clear lung sounds, able to lie flat, no LE edema. Elevated BNP d/t poor renal clearance.   - Is/Os strict.   - No fluid restriction bc patient gets HD regularly and not on fluid restriction at home. Not on diuretics at home.   - Repeat TTE as noted above.

## 2024-09-18 NOTE — H&P ADULT - PROBLEM SELECTOR PLAN 6
- Hx of CAD s/p PCI. Was on ASA/Plavix until earlier this year, no longer on this per patient.   - Cardiac sxns stable. Monitor for any changes.     # Prolonged QT:   - Qtc 471ms. Avoid any QT prolonging meds.   - Also prolonged interval noted from prior admissions.

## 2024-09-18 NOTE — ED ADULT NURSE REASSESSMENT NOTE - NS ED NURSE REASSESS COMMENT FT1
Received pt at change of shift, PT is resting in stretcher, easily arousable to verbal stimuli, A+Ox4. no apparent distress noted. pt arrives C/O fall at home with head strike. strength and sensation equal bilaterally. Respirations even and unlabored, normal work of breathing, no accessory muscle use, speaking in full clear uninterrupted sentences. Pt denies any chest pain, SOB, headache, dizziness, N+V, diarrhea, fever, chills. pt with chest cath for dialysis, R arm precautions for AV fistula creation. 22G to left hand, no redness or swelling noted.  plan of care ongoing, no further concerns as of present, patient expresses no other needs at this time, call light within reach.

## 2024-09-18 NOTE — H&P ADULT - NSHPPHYSICALEXAM_GEN_ALL_CORE
T(C): 36.6 (09-18-24 @ 14:38), Max: 36.9 (09-18-24 @ 13:31)  HR: 97 (09-18-24 @ 14:38) (88 - 112)  BP: 113/56 (09-18-24 @ 14:38) (113/56 - 149/93)  RR: 18 (09-18-24 @ 14:38) (16 - 18)  SpO2: 97% (09-18-24 @ 14:38) (95% - 100%)    CONSTITUTIONAL: Well groomed, no apparent distress  EYES: PERRLA and symmetric, EOMI, No conjunctival or scleral injection, non-icteric  ENMT: Oral mucosa with moist membranes. Normal dentition; no pharyngeal injection or exudates  NECK: Supple, symmetric and without tracheal deviation   RESP: No respiratory distress, no use of accessory muscles; distant air entry b/l, no wheezing/crackles   CV: RRR, +S1S2, no MRG; no peripheral edema  GI: Soft, NT, ND, no rebound, no guarding; no palpable masses; no hepatosplenomegaly; no hernia palpated  MSK:  No digital clubbing or cyanosis; examination of the (head/neck/spine/ribs/pelvis, RUE, LUE, RLE, LLE) without misalignment,   R arm AVF. Also R sided chest permacath.   SKIN: No rashes or ulcers noted  NEURO: No focal deficits.   PSYCH: Appropriate insight/judgment; A+O x 3, mood and affect appropriate, recent/remote memory intact

## 2024-09-18 NOTE — ED PROVIDER NOTE - WHICH SHOWED
Afib @ 108, LAD, TwI laterally - similar morphology to prior 5/2024  There are no signs of acute ischemia, heart block, WPW, long QT, HOCM, ARVD, or brugada on the Emergency Department EKG.

## 2024-09-18 NOTE — CONSULT NOTE ADULT - ASSESSMENT
85 y/o female pmh htn, hld, dm, HF, CAD, esrd m/w/f had HD yesterday as she missed monday pw syncope     1 Renal- 2 hours of hd today and minimal fluid removal   Epogen at HD   2 [facility]- Tele at minimum and defer to cards re repeat ech  3 Vasc- Will use avf and she has arrangements to remove the perm cath     Pain control    Sayed [facility]   [phone]

## 2024-09-18 NOTE — H&P ADULT - PROBLEM SELECTOR PLAN 4
- Chadsvasc 5-6.   - On Metoprolol Tartrate 50mg BID at home, continuing with holding parameters.   - No longer on AC/Eliquis d/t GIB noted above.

## 2024-09-18 NOTE — H&P ADULT - HISTORY OF PRESENT ILLNESS
83 yo F with PMH of CAD s/p PCI 2017 (was on ASA/Plavix), Paroxysmal Afib (no longer on Eliquis d/t GIB), PVD, HTN, HLD, T2DM (resolved), HFpEF, ESRD on HD MWF, COPD? (on chronic home oxygen, thinks 2L), PUD/gastritis presenting after syncope/fall.     Per patient she was up to brush her teeth last night, and did not realize that she had fallen. She remembers waking up and her son-in-law was next to her trying to wake her up and asking her "not to die." She states she does not recall the fall, did not have any prodromal symptoms and did not feel confused after episode. She recall everything that happened pre and post episode, just not the fall itself. She thinks she hit her head bc it hurts a little on the side. She is no longer on AC d/t GIB this year and last.     Patient notes she had a URI and recovered about a week ago. Still with some residual cough.     Also endorses she is chronically on oxygen at home, When asked why, she states she does not know. But has history of 40 pack year smoking history, stopped about 10/15 yrs ago. Most likely COPD but not on any inhalers at home.     Was admitted in 5/2024 for anemia, found to have GIB. 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 Also noted to have gastritis and non bleeding duodenal ulcers. Bleed was attributed to gastric AVM and the ulcers in setting of use of ASA/Plavix/Eliquis together. She is no longer on any AC. She also had GIB in 2023 and 2020.     ED Course:   - CTH and CXR negative for acute findings.   - Labs c/w HD patient, pt seen by nephro Dr. Walls and sent to HD.

## 2024-09-18 NOTE — CONSULT NOTE ADULT - SUBJECTIVE AND OBJECTIVE BOX
NEPHROLOGY - NSN    Patient seen and examined.    HPI:  83 y/o female pmh htn, hld, dm, HF, CAD, esrd m/w/f had HD yesterday as she missed monday, brought by family, states that they found her on the bathroom floor, pt does not remember falling or passing out, c/o HA, diffuse chest pain, sob. Pt is not currently on blood  thinners. denies any visual disturbances, neck pain, cough, f/c/n/v/d, abdominal pain, urinary symptoms, numbness, weakness, tingling, recent travel, sick contact, ot is on home o2 via [place]  PAST MEDICAL & SURGICAL HISTORY:  HLD (hyperlipidemia)      Diabetic neuropathy      CAD (coronary artery disease)  ~ s/p MICHAEL x2 in 2017      Paroxysmal atrial fibrillation      Diabetes mellitus, type 2      GI bleed      Neuropathy      Gastritis      Diastolic heart failure      Transfusion history      Stage 5 chronic kidney disease not on chronic dialysis      Anemia      PVD (peripheral vascular disease)  ~ RLE angiogram with 2 stents placed in 2019.      H/O  section      S/P coronary artery stent placement  x2 in       History of esophagogastroduodenoscopy (EGD)      S/P angiogram of extremity          MEDICATIONS  (STANDING):      Allergies    No Known Drug Allergies  Carrots (Rash)    Intolerances        SOCIAL HISTORY:  Denies alcohol abuse, drug abuse or tobacco usage.     FAMILY HISTORY:  FH: diabetes mellitus        VITALS:  T(C): 36.5 (24 @ 08:43), Max: 36.5 (24 @ 08:43)  HR: 88 (24 @ 08:43) (88 - 109)  BP: 148/88 (24 @ 08:43) (141/88 - 148/88)  RR: 17 (24 @ 08:43) (16 - 17)  SpO2: 100% (24 @ 08:43) (100% - 100%)    REVIEW OF SYSTEMS:  Denies any nausea, vomiting, diarrhea, fever or chills. Denies chest pain, SOB, focal weakness, hematuria or dysuria. Good oral intake and denies fatigue or weakness. All other pertinent systems are reviewed and are negative.    PHYSICAL EXAM:  Constitutional: NAD  HEENT: EOMI  Neck:  No JVD, supple   Respiratory: CTA B/L  Cardiovascular: S1 and S2, RRR  Gastrointestinal: + BS, soft, NT, ND  Extremities: No peripheral edema, + peripheral pulses  Neurological: A/O x 3, CN2-12 intact  Psychiatric: Normal mood, normal affect  : No Sylvester  Skin: No rashes, C/D/I  Access: Not applicable    I and O's:    Height (cm): 162.6 ( @ 06:21)  Weight (kg): 81.6 ( @ 06:21)  BMI (kg/m2): 30.9 ( @ 06:21)  BSA (m2): 1.87 ( @ 06:21)    LABS:                        10.3   5.56  )-----------( 223      ( 18 Sep 2024 08:13 )             33.1         136  |  95[L]  |  37[H]  ----------------------------<  131[H]  4.8   |  21[L]  |  5.07[H]    Ca    8.7      18 Sep 2024 11:09    TPro  6.9  /  Alb  3.4  /  TBili  0.5  /  DBili  x   /  AST  156[H]  /  ALT  154[H]  /  AlkPhos  240[H]        URINE:  Urinalysis Basic - ( 18 Sep 2024 11:09 )    Color: x / Appearance: x / SG: x / pH: x  Gluc: 131 mg/dL / Ketone: x  / Bili: x / Urobili: x   Blood: x / Protein: x / Nitrite: x   Leuk Esterase: x / RBC: x / WBC x   Sq Epi: x / Non Sq Epi: x / Bacteria: x        RADIOLOGY & ADDITIONAL STUDIES:     NEPHROLOGY - NSN    Patient seen and examined.    HPI:  83 y/o female pmh htn, hld, dm, HF, CAD, esrd m/w/f had HD yesterday as she missed monday, brought by family, states that they found her on the bathroom floor, pt does not remember falling or passing out, c/o HA, diffuse chest pain, sob. Pt is not currently on blood  thinners. denies any visual disturbances, neck pain, cough, f/c/n/v/d, abdominal pain, urinary symptoms, numbness, weakness, tingling, recent travel, sick contact, ot is on home o2 via [place]  Missed HD at [facility] on Monday n did a make up on Tuesday   No chest pain       PAST MEDICAL & SURGICAL HISTORY:  HLD (hyperlipidemia)      Diabetic neuropathy      CAD (coronary artery disease)  ~ s/p MICHAEL x2 in 2017      Paroxysmal atrial fibrillation      Diabetes mellitus, type 2      GI bleed      Neuropathy      Gastritis      Diastolic heart failure      Transfusion history      Stage 5 chronic kidney disease not on chronic dialysis      Anemia      PVD (peripheral vascular disease)  ~ RLE angiogram with 2 stents placed in 2019.      H/O  section      S/P coronary artery stent placement  x2 in       History of esophagogastroduodenoscopy (EGD)      S/P angiogram of extremity          MEDICATIONS  (STANDING):      Allergies    No Known Drug Allergies  Carrots (Rash)    Intolerances        SOCIAL HISTORY:  Denies alcohol abuse, drug abuse or tobacco usage.     FAMILY HISTORY:  FH: diabetes mellitus        VITALS:  T(C): 36.5 (24 @ 08:43), Max: 36.5 (24 @ 08:43)  HR: 88 (24 @ 08:43) (88 - 109)  BP: 148/88 (24 @ 08:43) (141/88 - 148/88)  RR: 17 (24 @ 08:43) (16 - 17)  SpO2: 100% (24 @ 08:43) (100% - 100%)    REVIEW OF SYSTEMS:  Denies any nausea, vomiting, diarrhea, fever or chills. Denies chest pain, SOB, focal weakness, hematuria or dysuria. Good oral intake and denies fatigue or weakness. All other pertinent systems are reviewed and are negative.    PHYSICAL EXAM:  Constitutional: NAD  HEENT: EOMI  Neck:  No JVD, supple   Respiratory: CTA B/L  Cardiovascular: S1 and S2, RRR  Gastrointestinal: + BS, soft, NT, ND  Extremities: No peripheral edema, + peripheral pulses  Neurological: A/O x 3, CN2-12 intact  Psychiatric: Normal mood, normal affect  : No Sylvester  Skin: No rashes, C/D/I  Access: avf and perm cath     I and O's:    Height (cm): 162.6 ( @ 06:21)  Weight (kg): 81.6 ( @ 06:21)  BMI (kg/m2): 30.9 ( @ :21)  BSA (m2): 1.87 ( @ 06:21)    LABS:                        10.3   5.56  )-----------( 223      ( 18 Sep 2024 08:13 )             33.1         136  |  95[L]  |  37[H]  ----------------------------<  131[H]  4.8   |  21[L]  |  5.07[H]    Ca    8.7      18 Sep 2024 11:09    TPro  6.9  /  Alb  3.4  /  TBili  0.5  /  DBili  x   /  AST  156[H]  /  ALT  154[H]  /  AlkPhos  240[H]        URINE:  Urinalysis Basic - ( 18 Sep 2024 11:09 )    Color: x / Appearance: x / SG: x / pH: x  Gluc: 131 mg/dL / Ketone: x  / Bili: x / Urobili: x   Blood: x / Protein: x / Nitrite: x   Leuk Esterase: x / RBC: x / WBC x   Sq Epi: x / Non Sq Epi: x / Bacteria: x        RADIOLOGY & ADDITIONAL STUDIES:

## 2024-09-18 NOTE — ED PROVIDER NOTE - CLINICAL SUMMARY MEDICAL DECISION MAKING FREE TEXT BOX
85 y/o female pmh htn, hld, dm, HF, CAD, esrd m/w/f had HD yesterday as she missed monday, brought by family, states that they found her on the bathroom floor, pt does not remember falling or passing out, c/o HA, diffuse chest pain, sob. Pt is not currently on blood  thinners. denies any visual disturbances, neck pain, cough, f/c/n/v/d, abdominal pain, urinary symptoms, numbness, weakness, tingling, recent travel, sick contact, ot is on home o2 via [place]  on exam, no swelling/deformity to head, no neck tenderness  labs, cxr, ekg, reasses

## 2024-09-18 NOTE — ED PROVIDER NOTE - OBJECTIVE STATEMENT
85 y/o female pmh htn, hld, dm, HF, CAD, esrd m/w/f had HD yesterday as she missed monday, brought by family, states that they found her on the bathroom floor, pt does not remember falling or passing out, c/o HA, diffuse chest pain, sob. Pt is not currently on blood  thinners. denies any visual disturbances, neck pain, cough, f/c/n/v/d, abdominal pain, urinary symptoms, numbness, weakness, tingling, recent travel, sick contact, ot is on home o2 via [place]

## 2024-09-18 NOTE — H&P ADULT - PROBLEM SELECTOR PLAN 7
- 40 pack yr smoking hx and on chronic oxygen at home (she thinks 2L). Distant air entry b/l c/w COPD hx, although patient denies such history. She is not on any inhalers at home either.   - Monitor respiratory status.   - She is coughing and endorsing intermittent resp issues post recent URI, will also order Albuterol inhaler PRN.     # Recent URI  - residual cough and congestion. Lung sounds distant but clear. CXR normal.   - Will order Albuterol PRN (as noted above), Mucinex PRN (per pt request), and Lozenges PRN for sore throat.   - Can add Tessalon Perles 100mg TID if needed.

## 2024-09-18 NOTE — H&P ADULT - PROBLEM SELECTOR PLAN 2
- ESRD on HD MWF via R AVF.   - Last HD today 9/18/24 at [facility].   - Nephrology following.   - Of note, pt would like to establish care w/ Dr. Elizabeth Walls on discharge. Please include his name and office number/address on discharge paperwork.   - Pt also had chest permacath but has arrangements to remove it.   - Epogen during HD per nephro.    # Elevated LFTs, trop, BNP most likely due to decreased renal clearance. No active cardiac or GI symptoms. Monitor patient. If any new onset CP, get repeat EKG to r/o acute ischemia and recheck trops. If LFTs uptrending despite HD, get hepatitis w/u and liver sono.

## 2024-09-18 NOTE — ED ADULT TRIAGE NOTE - CHIEF COMPLAINT QUOTE
pt coming in for fall. Pt states she was in the bathroom and then she woke up on the floor. Pt states she felt dizzy prior. Unsure if she hit her head. History of ESRD via R AV fistula (M, W,F) but pt states she had last session on tuesday, afib, DM2, CAD, HF. Pt c/o headache. pt coming in for fall. Pt states she was in the bathroom and then she woke up on the floor. Pt states she felt dizzy prior. Unsure if she hit her head. History of ESRD via R AV fistula (M, W,F) but pt states she had last session on tuesday, afib, DM2, CAD, HF. Pt c/o headache and chest pain pt coming in for fall. Pt states she was in the bathroom and then she woke up on the floor. Pt states she felt dizzy prior. Unsure if she hit her head. History of ESRD via R AV fistula (M, W,F)  pt states she had last session on tuesday, afib, DM2, CAD, HF. Pt c/o headache and chest pain. Pt on 2LNC (use o2 PRN at home).

## 2024-09-18 NOTE — H&P ADULT - NSHPREVIEWOFSYSTEMS_GEN_ALL_CORE
CONSTITUTIONAL:  No weakness, fevers or chills  EYES/ENT:  No visual changes;  No vertigo or throat pain   NECK:  No pain or stiffness  RESPIRATORY:  + cough, wheezing, hemoptysis; No shortness of breath  CARDIOVASCULAR:  No chest pain. Has hx of Afib and gets palpitations intermittently.   GASTROINTESTINAL:  No abdominal or epigastric pain. No nausea, vomiting, or hematemesis; No diarrhea or constipation. No melena or hematochezia.  GENITOURINARY:  No dysuria, frequency or hematuria  MUSCULOSKELETAL:  FROM all extremities, normal strength, No calf tenderness  NEUROLOGICAL:  No numbness or weakness  SKIN:  No itching, rashes

## 2024-09-18 NOTE — H&P ADULT - ASSESSMENT
83 yo F with PMH of CAD s/p PCI 2017 (was on ASA/Plavix), Paroxysmal Afib (no longer on Eliquis d/t GIB), PVD, HTN, HLD, T2DM (resolved), HFpEF, ESRD on HD MWF, COPD? (on chronic home oxygen, thinks 2L), PUD/gastritis presenting after syncope/fall.

## 2024-09-18 NOTE — H&P ADULT - PROBLEM SELECTOR PLAN 8
Code: Full  Diet: DASH/GERD/renal, halal  DVT ppx: SCDs, chemical contraindicated d/t hx of GIB within last 6m    # Of note, patient is no longer diabetic. Last A1C 5/2024 was 5.6%. Repeat ordered for am, but she does not take any DM meds at home.

## 2024-09-18 NOTE — H&P ADULT - PROBLEM SELECTOR PLAN 1
- 85 yo F w/ syncopal episode 9/17/24 when she was in bathroom brushing her teeth. No prodromal effects. No post ictal state. Remembers everything pre and post fall, just doesn't recall falling or how it happened. She thinks she hit her head. No similar episodes in the past.   - Recently had been sick w/ URI and recovered about a week ago.   - Differentials include cardiac etiology (arrythmia, orthostasis/hypotension). Pt's cardiologist Dr. Hooper consulted.   - Suspicion of neurological etiology is low, based on symptoms of presentation and negative CTH. If any change in physical exam/symptoms, please consult neurology. Will hold off for now.   - TTE ordered.   - Tele monitoring. - 85 yo F w/ syncopal episode 9/17/24 when she was in bathroom brushing her teeth. No prodromal effects. No post ictal state. Remembers everything pre and post fall, just doesn't recall falling or how it happened. She thinks she hit her head. No similar episodes in the past.   - Recently had been sick w/ URI and recovered about a week ago.   - Differentials include cardiac etiology (arrythmia, orthostasis/hypotension). Pt's cardiologist Dr. Hooper consulted.   - Suspicion of neurological etiology is low, based on symptoms of presentation and negative CTH. If any change in physical exam/symptoms, please consult neurology. Will hold off for now.   - TTE and carotid dopplers ordered.   - Tele monitoring.

## 2024-09-19 LAB
A1C WITH ESTIMATED AVERAGE GLUCOSE RESULT: 6.7 % — HIGH (ref 4–5.6)
ALBUMIN SERPL ELPH-MCNC: 3.4 G/DL — SIGNIFICANT CHANGE UP (ref 3.3–5)
ALP SERPL-CCNC: 268 U/L — HIGH (ref 40–120)
ALT FLD-CCNC: 126 U/L — HIGH (ref 4–33)
ANION GAP SERPL CALC-SCNC: 15 MMOL/L — HIGH (ref 7–14)
AST SERPL-CCNC: 63 U/L — HIGH (ref 4–32)
BASOPHILS # BLD AUTO: 0.05 K/UL — SIGNIFICANT CHANGE UP (ref 0–0.2)
BASOPHILS NFR BLD AUTO: 0.8 % — SIGNIFICANT CHANGE UP (ref 0–2)
BILIRUB SERPL-MCNC: 0.3 MG/DL — SIGNIFICANT CHANGE UP (ref 0.2–1.2)
BUN SERPL-MCNC: 33 MG/DL — HIGH (ref 7–23)
CALCIUM SERPL-MCNC: 8.7 MG/DL — SIGNIFICANT CHANGE UP (ref 8.4–10.5)
CHLORIDE SERPL-SCNC: 97 MMOL/L — LOW (ref 98–107)
CO2 SERPL-SCNC: 25 MMOL/L — SIGNIFICANT CHANGE UP (ref 22–31)
CREAT SERPL-MCNC: 4.61 MG/DL — HIGH (ref 0.5–1.3)
EGFR: 9 ML/MIN/1.73M2 — LOW
EOSINOPHIL # BLD AUTO: 0.03 K/UL — SIGNIFICANT CHANGE UP (ref 0–0.5)
EOSINOPHIL NFR BLD AUTO: 0.5 % — SIGNIFICANT CHANGE UP (ref 0–6)
ESTIMATED AVERAGE GLUCOSE: 146 — SIGNIFICANT CHANGE UP
GLUCOSE SERPL-MCNC: 145 MG/DL — HIGH (ref 70–99)
HBV CORE AB SER-ACNC: SIGNIFICANT CHANGE UP
HBV CORE IGM SER-ACNC: SIGNIFICANT CHANGE UP
HBV SURFACE AB SER-ACNC: 5.4 MIU/ML — LOW
HBV SURFACE AG SER-ACNC: SIGNIFICANT CHANGE UP
HCT VFR BLD CALC: 35.7 % — SIGNIFICANT CHANGE UP (ref 34.5–45)
HCT VFR BLD CALC: 36.3 % — SIGNIFICANT CHANGE UP (ref 34.5–45)
HGB BLD-MCNC: 11 G/DL — LOW (ref 11.5–15.5)
HGB BLD-MCNC: 11.1 G/DL — LOW (ref 11.5–15.5)
IANC: 4.36 K/UL — SIGNIFICANT CHANGE UP (ref 1.8–7.4)
IMM GRANULOCYTES NFR BLD AUTO: 0.6 % — SIGNIFICANT CHANGE UP (ref 0–0.9)
LYMPHOCYTES # BLD AUTO: 1 K/UL — SIGNIFICANT CHANGE UP (ref 1–3.3)
LYMPHOCYTES # BLD AUTO: 15.7 % — SIGNIFICANT CHANGE UP (ref 13–44)
MAGNESIUM SERPL-MCNC: 2.2 MG/DL — SIGNIFICANT CHANGE UP (ref 1.6–2.6)
MCHC RBC-ENTMCNC: 30.4 PG — SIGNIFICANT CHANGE UP (ref 27–34)
MCHC RBC-ENTMCNC: 30.6 GM/DL — LOW (ref 32–36)
MCHC RBC-ENTMCNC: 30.8 GM/DL — LOW (ref 32–36)
MCHC RBC-ENTMCNC: 30.9 PG — SIGNIFICANT CHANGE UP (ref 27–34)
MCV RBC AUTO: 100.3 FL — HIGH (ref 80–100)
MCV RBC AUTO: 99.5 FL — SIGNIFICANT CHANGE UP (ref 80–100)
MONOCYTES # BLD AUTO: 0.87 K/UL — SIGNIFICANT CHANGE UP (ref 0–0.9)
MONOCYTES NFR BLD AUTO: 13.7 % — SIGNIFICANT CHANGE UP (ref 2–14)
MRSA PCR RESULT.: SIGNIFICANT CHANGE UP
NEUTROPHILS # BLD AUTO: 4.36 K/UL — SIGNIFICANT CHANGE UP (ref 1.8–7.4)
NEUTROPHILS NFR BLD AUTO: 68.7 % — SIGNIFICANT CHANGE UP (ref 43–77)
NRBC # BLD: 0 /100 WBCS — SIGNIFICANT CHANGE UP (ref 0–0)
NRBC # BLD: 0 /100 WBCS — SIGNIFICANT CHANGE UP (ref 0–0)
NRBC # FLD: 0.03 K/UL — HIGH (ref 0–0)
NRBC # FLD: 0.05 K/UL — HIGH (ref 0–0)
PHOSPHATE SERPL-MCNC: 5.2 MG/DL — HIGH (ref 2.5–4.5)
PLATELET # BLD AUTO: 260 K/UL — SIGNIFICANT CHANGE UP (ref 150–400)
PLATELET # BLD AUTO: 261 K/UL — SIGNIFICANT CHANGE UP (ref 150–400)
POTASSIUM SERPL-MCNC: 4.1 MMOL/L — SIGNIFICANT CHANGE UP (ref 3.5–5.3)
POTASSIUM SERPL-SCNC: 4.1 MMOL/L — SIGNIFICANT CHANGE UP (ref 3.5–5.3)
PROT SERPL-MCNC: 7 G/DL — SIGNIFICANT CHANGE UP (ref 6–8.3)
RBC # BLD: 3.56 M/UL — LOW (ref 3.8–5.2)
RBC # BLD: 3.65 M/UL — LOW (ref 3.8–5.2)
RBC # FLD: 14.5 % — SIGNIFICANT CHANGE UP (ref 10.3–14.5)
RBC # FLD: 14.7 % — HIGH (ref 10.3–14.5)
S AUREUS DNA NOSE QL NAA+PROBE: SIGNIFICANT CHANGE UP
SODIUM SERPL-SCNC: 137 MMOL/L — SIGNIFICANT CHANGE UP (ref 135–145)
WBC # BLD: 6.35 K/UL — SIGNIFICANT CHANGE UP (ref 3.8–10.5)
WBC # BLD: 6.75 K/UL — SIGNIFICANT CHANGE UP (ref 3.8–10.5)
WBC # FLD AUTO: 6.35 K/UL — SIGNIFICANT CHANGE UP (ref 3.8–10.5)
WBC # FLD AUTO: 6.75 K/UL — SIGNIFICANT CHANGE UP (ref 3.8–10.5)

## 2024-09-19 RX ORDER — CETIRIZINE HYDROCHLORIDE 10 MG/1
10 TABLET ORAL ONCE
Refills: 0 | Status: COMPLETED | OUTPATIENT
Start: 2024-09-19 | End: 2024-09-19

## 2024-09-19 RX ORDER — OXYCODONE HYDROCHLORIDE 30 MG/1
5 TABLET, FILM COATED, EXTENDED RELEASE ORAL EVERY 8 HOURS
Refills: 0 | Status: DISCONTINUED | OUTPATIENT
Start: 2024-09-19 | End: 2024-09-24

## 2024-09-19 RX ORDER — SENNOSIDES 8.6 MG
2 TABLET ORAL AT BEDTIME
Refills: 0 | Status: DISCONTINUED | OUTPATIENT
Start: 2024-09-19 | End: 2024-09-24

## 2024-09-19 RX ORDER — FAMOTIDINE 40 MG
10 TABLET ORAL ONCE
Refills: 0 | Status: COMPLETED | OUTPATIENT
Start: 2024-09-19 | End: 2024-09-19

## 2024-09-19 RX ADMIN — BENZOCAINE AND LEVOMENTHOL 1 LOZENGE: 200; 5 SPRAY TOPICAL at 05:12

## 2024-09-19 RX ADMIN — OXYCODONE HYDROCHLORIDE 5 MILLIGRAM(S): 30 TABLET, FILM COATED, EXTENDED RELEASE ORAL at 18:08

## 2024-09-19 RX ADMIN — Medication 3 MILLIGRAM(S): at 01:44

## 2024-09-19 RX ADMIN — CHLORHEXIDINE GLUCONATE ORAL RINSE 1 APPLICATION(S): 1.2 SOLUTION DENTAL at 14:37

## 2024-09-19 RX ADMIN — Medication 650 MILLIGRAM(S): at 14:34

## 2024-09-19 RX ADMIN — Medication 50 MILLIGRAM(S): at 05:12

## 2024-09-19 RX ADMIN — GUAIFENESIN 600 MILLIGRAM(S): 100 SOLUTION ORAL at 05:12

## 2024-09-19 RX ADMIN — Medication 10 MILLIGRAM(S): at 05:08

## 2024-09-19 RX ADMIN — CETIRIZINE HYDROCHLORIDE 10 MILLIGRAM(S): 10 TABLET ORAL at 01:44

## 2024-09-19 RX ADMIN — PANTOPRAZOLE SODIUM 40 MILLIGRAM(S): 40 TABLET, DELAYED RELEASE ORAL at 05:12

## 2024-09-19 RX ADMIN — Medication 2 PUFF(S): at 05:10

## 2024-09-19 RX ADMIN — Medication 50 MILLIGRAM(S): at 18:08

## 2024-09-19 NOTE — CONSULT NOTE ADULT - SUBJECTIVE AND OBJECTIVE BOX
Cardiovascular Disease Initial Evaluation  DATE OF SERVICE: 24 @ 07:55    CHIEF COMPLAINT:    HISTORY OF PRESENT ILLNESS:  This is an 84 year old woman with uncontrolled IDDM, compensated diastolic CHF, CAD s/p PCI, ESRD on HD and HTN who presented to [facility] ED on 2024 after passing out.  Ms. Tsang does not remember details of the event and woke up on the floor.     Ms. Tsang has been admitted multiple times for anemia.   She has a history of CAD and recurrent GI bleed. She underwent cath with Dr. Zulay Mckinnon and had MICHAEL placed to LCx and OM1 on 2017. On 2018, she presented to [facility] with progressive SOB. She was found to have a Hb of 4.6 and was transfused multiple units of pRBCs. She underwent EGD by Dr. Curtis Daigle revealing one non-bleeding duodenal ulcer. Xarelto was discontinued (paroxysmal a-fib) and ASA with Plavix was resumed. She again presented with similar symptoms in 3/2018 with Hb-5. Capsule study revealed questionable ulcer in ileum and patient was started on mesalamine. ASA was d/c'ed.    Allergies  No Known Drug Allergies  Carrots (Rash)      	    MEDICATIONS:  metoprolol tartrate 50 milliGRAM(s) Oral every 12 hours      albuterol    90 MICROgram(s) HFA Inhaler 2 Puff(s) Inhalation every 6 hours PRN  guaiFENesin  milliGRAM(s) Oral every 12 hours PRN    acetaminophen     Tablet .. 650 milliGRAM(s) Oral every 6 hours PRN  melatonin 3 milliGRAM(s) Oral at bedtime PRN    pantoprazole    Tablet 40 milliGRAM(s) Oral before breakfast      benzocaine/menthol Lozenge 1 Lozenge Oral every 4 hours PRN  chlorhexidine 2% Cloths 1 Application(s) Topical daily  influenza  Vaccine (HIGH DOSE) 0.5 milliLiter(s) IntraMuscular once      PAST MEDICAL & SURGICAL HISTORY:  HLD (hyperlipidemia)      Diabetic neuropathy      CAD (coronary artery disease)  ~ s/p MICHAEL x2 in 2017      Paroxysmal atrial fibrillation      Diabetes mellitus, type 2      GI bleed      Neuropathy      Gastritis      Diastolic heart failure      Transfusion history      Stage 5 chronic kidney disease not on chronic dialysis      Anemia      PVD (peripheral vascular disease)  ~ RLE angiogram with 2 stents placed in 2019.      H/O  section      S/P coronary artery stent placement  x2 in       History of esophagogastroduodenoscopy (EGD)      S/P angiogram of extremity          FAMILY HISTORY:  FH: diabetes mellitus        SOCIAL HISTORY:    The patient is a nonsmoker       REVIEW OF SYSTEMS:  See HPI, otherwise complete 14 point review of systems negative    PHYSICAL EXAM:  T(C): 36.7 (24 @ 05:15), Max: 36.9 (24 @ 13:31)  HR: 106 (24 @ 05:15) (88 - 112)  BP: 129/116 (24 @ 05:15) (113/56 - 149/93)  RR: 20 (24 @ 05:15) (16 - 20)  SpO2: 100% (24 @ 05:15) (91% - 100%)  Wt(kg): --  I&O's Summary    18 Sep 2024 07:01  -  19 Sep 2024 07:00  --------------------------------------------------------  IN: 400 mL / OUT: 900 mL / NET: -500 mL        Appearance: No Acute Distress; resting comfortably  HEENT:  Normal oral mucosa, PERRL, EOMI	  Cardiovascular: Normal S1 S2, No JVD, No murmurs/rubs/gallops  Respiratory: Normal respiratory effort; Lungs clear to auscultation bilaterally  Gastrointestinal:  Soft, Non-tender, + BS	  Skin: No rashes, No ecchymoses, No cyanosis	  Neurologic: Non-focal; no weakness  Extremities: No clubbing, cyanosis or edema  Vascular: Peripheral pulses palpable 2+ bilaterally  Psychiatry: A & O x 3, Mood & affect appropriate    Laboratory Data:	 	    CBC Full  -  ( 19 Sep 2024 06:37 )  WBC Count : 6.35 K/uL  Hemoglobin : 11.1 g/dL  Hematocrit : 36.3 %  Platelet Count - Automated : 260 K/uL  Mean Cell Volume : 99.5 fL  Mean Cell Hemoglobin : 30.4 pg  Mean Cell Hemoglobin Concentration : 30.6 gm/dL  Auto Neutrophil # : 4.36 K/uL  Auto Lymphocyte # : 1.00 K/uL  Auto Monocyte # : 0.87 K/uL  Auto Eosinophil # : 0.03 K/uL  Auto Basophil # : 0.05 K/uL  Auto Neutrophil % : 68.7 %  Auto Lymphocyte % : 15.7 %  Auto Monocyte % : 13.7 %  Auto Eosinophil % : 0.5 %  Auto Basophil % : 0.8 %        136  |  95[L]  |  37[H]  ----------------------------<  131[H]  4.8   |  21[L]  |  5.07[H]      135  |  98  |  36[H]  ----------------------------<  136[H]  TNP   |  23  |  4.80[H]    Ca    8.7      18 Sep 2024 11:09  Ca    9.0      18 Sep 2024 08:13    TPro  6.9  /  Alb  3.4  /  TBili  0.5  /  DBili  x   /  AST  156[H]  /  ALT  154[H]  /  AlkPhos  240[H]    TPro  6.9  /  Alb  3.4  /  TBili  0.5  /  DBili  x   /  AST  192[H]  /  ALT  155[H]  /  AlkPhos  239[H]            Interpretation of Telemetry: a-fib	    ECG:  	A-fib   	    Assessment: 84 year old woman with uncontrolled IDDM, compensated systolic CHF, CAD s/p PCI, and ESRD on HD presents with syncope      Plan of Care:    #Syncope-  Unclear etiology.  Admission EKG and telemetry show rate controlled a-fib.  I agree with obtaining an echo.       #CAD- s/p MICHAEL x2 in 2017.  History of multiple GI bleeds.   Prior EGD notable for AVMs and ulcers.  No signs of active ischemia.   No ASA or Plavix.          #ESRD-  Renal input noted.       #Paroxymal a-fib-  Rates controlled.   Toprol.   No Eliquis given the above stated reasons.            High and complex medical decision making in this patient with active comorbidities.            76 minutes spent on total encounter; more than 50% of the visit was spent counseling and/or coordinating care by the attending physician.   	  Julio César Hooper MD [facility]  Cardiovascular Diseases  [phone]

## 2024-09-19 NOTE — PHYSICAL THERAPY INITIAL EVALUATION ADULT - GENERAL OBSERVATIONS, REHAB EVAL
Pt encountered in semi-supine position in NAD, all lines intact, a&ox4, SPO2 100%, +NC at 2L, and RN aware.

## 2024-09-19 NOTE — PHYSICAL THERAPY INITIAL EVALUATION ADULT - ACTIVE RANGE OF MOTION EXAMINATION, REHAB EVAL
carlos manuel. upper extremity Active ROM was WNL (within normal limits)/bilateral lower extremity Active ROM was WNL (within normal limits)

## 2024-09-19 NOTE — PHYSICAL THERAPY INITIAL EVALUATION ADULT - LEVEL OF INDEPENDENCE: SUPINE/SIT, REHAB EVAL
Pt deferred despite maximal positive encouragement; pt states she feels too weak today and will get up tomorrow.

## 2024-09-19 NOTE — PROGRESS NOTE ADULT - SUBJECTIVE AND OBJECTIVE BOX
NEPHROLOGY-[facility] [phone]        Patient seen and examined in bed.  She was the same and less back pain         MEDICATIONS  (STANDING):  chlorhexidine 2% Cloths 1 Application(s) Topical daily  influenza  Vaccine (HIGH DOSE) 0.5 milliLiter(s) IntraMuscular once  metoprolol tartrate 50 milliGRAM(s) Oral every 12 hours  pantoprazole    Tablet 40 milliGRAM(s) Oral before breakfast      VITAL:  T(C): , Max: 36.9 (09-18-24 @ 13:31)  T(F): , Max: 98.4 (09-18-24 @ 13:31)  HR: 101 (09-19-24 @ 09:52)  BP: 123/74 (09-19-24 @ 09:52)  BP(mean): --  RR: 16 (09-19-24 @ 09:52)  SpO2: 97% (09-19-24 @ 09:52)  Wt(kg): --    I and O's:    09-18 @ 07:01  -  09-19 @ 07:00  --------------------------------------------------------  IN: 400 mL / OUT: 900 mL / NET: -500 mL          PHYSICAL EXAM:    Constitutional: NAD  Neck:  No JVD  Respiratory: CTAB/L  Cardiovascular: S1 and S2  Gastrointestinal: BS+, soft, NT/ND  Extremities: No peripheral edema  Neurological: A/O x 3, no focal deficits  Psychiatric: Normal mood, normal affect  : No Sylvester  Skin: No rashes  Access: avf and perm cath     LABS:                        11.1   6.35  )-----------( 260      ( 19 Sep 2024 06:37 )             36.3     09-19    137  |  97[L]  |  33[H]  ----------------------------<  145[H]  4.1   |  25  |  4.61[H]    Ca    8.7      19 Sep 2024 06:37  Phos  5.2     09-19  Mg     2.20     09-19    TPro  7.0  /  Alb  3.4  /  TBili  0.3  /  DBili  x   /  AST  63[H]  /  ALT  126[H]  /  AlkPhos  268[H]  09-19          Urine Studies:  Urinalysis Basic - ( 19 Sep 2024 06:37 )    Color: x / Appearance: x / SG: x / pH: x  Gluc: 145 mg/dL / Ketone: x  / Bili: x / Urobili: x   Blood: x / Protein: x / Nitrite: x   Leuk Esterase: x / RBC: x / WBC x   Sq Epi: x / Non Sq Epi: x / Bacteria: x            RADIOLOGY & ADDITIONAL STUDIES:

## 2024-09-19 NOTE — PHYSICAL THERAPY INITIAL EVALUATION ADULT - ADDITIONAL COMMENTS
Pt left semisupine in bed in NAD, all lines intact, call bell in reach, SPO2 100%, +NC at 2L, bed at lowest level, bed rails raised, and RN aware.

## 2024-09-19 NOTE — PROGRESS NOTE ADULT - ASSESSMENT
85 y/o female pmh htn, hld, dm, HF, CAD, esrd m/w/f had HD yesterday as she missed monday pw syncope     1 Renal-  hd in am  and minimal fluid removal   Epogen at HD   2 [facility]- Tele at minimum and   echo ordered   Heart rate control per cards   3 Vasc- Will use avf and she has arrangements to remove the perm cath as outpt with Dr Brody     Pain control    Sayed [facility]   [phone]

## 2024-09-19 NOTE — PROGRESS NOTE ADULT - ASSESSMENT
85 yo F with PMH of CAD s/p PCI 2017 (was on ASA/Plavix), Paroxysmal Afib (no longer on Eliquis d/t GIB), PVD, HTN, HLD, T2DM (resolved), HFpEF, ESRD on HD MWF, COPD? (on chronic home oxygen, thinks 2L), PUD/gastritis presenting after syncope/fall.

## 2024-09-19 NOTE — PROGRESS NOTE ADULT - SUBJECTIVE AND OBJECTIVE BOX
Patient is a 84y old  Female who presents with a chief complaint of fall (19 Sep 2024 12:30)      SUBJECTIVE / OVERNIGHT EVENTS:    Events noted.  CONSTITUTIONAL: No fever,  or fatigue  RESPIRATORY: No cough, wheezing,  No shortness of breath  CARDIOVASCULAR: No chest pain, palpitations, dizziness, or leg swelling  GASTROINTESTINAL: No abdominal or epigastric pain.       MEDICATIONS  (STANDING):  chlorhexidine 2% Cloths 1 Application(s) Topical daily  influenza  Vaccine (HIGH DOSE) 0.5 milliLiter(s) IntraMuscular once  metoprolol tartrate 50 milliGRAM(s) Oral every 12 hours  pantoprazole    Tablet 40 milliGRAM(s) Oral before breakfast  polyethylene glycol 3350 17 Gram(s) Oral daily  senna 2 Tablet(s) Oral at bedtime    MEDICATIONS  (PRN):  acetaminophen     Tablet .. 650 milliGRAM(s) Oral every 6 hours PRN Temp greater or equal to 38C (100.4F), Mild Pain (1 - 3)  albuterol    90 MICROgram(s) HFA Inhaler 2 Puff(s) Inhalation every 6 hours PRN Shortness of Breath and/or Wheezing  benzocaine/menthol Lozenge 1 Lozenge Oral every 4 hours PRN Sore Throat  guaiFENesin  milliGRAM(s) Oral every 12 hours PRN Cough  melatonin 3 milliGRAM(s) Oral at bedtime PRN Insomnia  oxyCODONE    IR 5 milliGRAM(s) Oral every 8 hours PRN Severe Pain (7 - 10)        CAPILLARY BLOOD GLUCOSE        I&O's Summary    18 Sep 2024 07:01  -  19 Sep 2024 07:00  --------------------------------------------------------  IN: 400 mL / OUT: 900 mL / NET: -500 mL        T(C): 36.4 (09-19-24 @ 18:07), Max: 36.7 (09-19-24 @ 05:15)  HR: 105 (09-19-24 @ 18:07) (101 - 106)  BP: 128/86 (09-19-24 @ 18:07) (123/74 - 129/116)  RR: 18 (09-19-24 @ 18:07) (16 - 20)  SpO2: 100% (09-19-24 @ 18:07) (97% - 100%)    PHYSICAL EXAM:    NECK: Supple, No JVD  CHEST/LUNG: Clear to auscultation bilaterally; No wheezing.  HEART: Regular rate and rhythm; No murmurs, rubs, or gallops  ABDOMEN: Soft, Nontender, Nondistended; Bowel sounds present  EXTREMITIES:   No edema  NEUROLOGY: AAO X 3      LABS:                        11.1   6.35  )-----------( 260      ( 19 Sep 2024 06:37 )             36.3     09-19    137  |  97[L]  |  33[H]  ----------------------------<  145[H]  4.1   |  25  |  4.61[H]    Ca    8.7      19 Sep 2024 06:37  Phos  5.2     09-19  Mg     2.20     09-19    TPro  7.0  /  Alb  3.4  /  TBili  0.3  /  DBili  x   /  AST  63[H]  /  ALT  126[H]  /  AlkPhos  268[H]  09-19          Urinalysis Basic - ( 19 Sep 2024 06:37 )    Color: x / Appearance: x / SG: x / pH: x  Gluc: 145 mg/dL / Ketone: x  / Bili: x / Urobili: x   Blood: x / Protein: x / Nitrite: x   Leuk Esterase: x / RBC: x / WBC x   Sq Epi: x / Non Sq Epi: x / Bacteria: x      CAPILLARY BLOOD GLUCOSE            RADIOLOGY & ADDITIONAL TESTS:    Imaging Personally Reviewed:    Consultant(s) Notes Reviewed:      Care Discussed with Consultants/Other Providers:    Parminder Goldberg MD, CMD, FACP    [address] [place] [address]  Office Tel: [phone]  Cell: [phone]

## 2024-09-20 ENCOUNTER — RESULT REVIEW (OUTPATIENT)
Age: 84
End: 2024-09-20

## 2024-09-20 LAB
ADD ON TEST-SPECIMEN IN LAB: SIGNIFICANT CHANGE UP
ALBUMIN SERPL ELPH-MCNC: 3.3 G/DL — SIGNIFICANT CHANGE UP (ref 3.3–5)
ALP SERPL-CCNC: 261 U/L — HIGH (ref 40–120)
ALT FLD-CCNC: 664 U/L — HIGH (ref 4–33)
ANION GAP SERPL CALC-SCNC: 17 MMOL/L — HIGH (ref 7–14)
AST SERPL-CCNC: 1185 U/L — HIGH (ref 4–32)
BASE EXCESS BLDA CALC-SCNC: -3.4 MMOL/L — LOW (ref -2–3)
BASOPHILS # BLD AUTO: 0.05 K/UL — SIGNIFICANT CHANGE UP (ref 0–0.2)
BASOPHILS NFR BLD AUTO: 0.3 % — SIGNIFICANT CHANGE UP (ref 0–2)
BILIRUB SERPL-MCNC: 0.6 MG/DL — SIGNIFICANT CHANGE UP (ref 0.2–1.2)
BLOOD GAS ARTERIAL - LYTES,HGB,ICA,LACT RESULT: SIGNIFICANT CHANGE UP
BUN SERPL-MCNC: 25 MG/DL — HIGH (ref 7–23)
CA-I BLDA-SCNC: 1.17 MMOL/L — SIGNIFICANT CHANGE UP (ref 1.15–1.33)
CALCIUM SERPL-MCNC: 8.7 MG/DL — SIGNIFICANT CHANGE UP (ref 8.4–10.5)
CHLORIDE SERPL-SCNC: 98 MMOL/L — SIGNIFICANT CHANGE UP (ref 98–107)
CO2 BLDA-SCNC: 27 MMOL/L — HIGH (ref 19–24)
CO2 SERPL-SCNC: 23 MMOL/L — SIGNIFICANT CHANGE UP (ref 22–31)
CREAT SERPL-MCNC: 3.4 MG/DL — HIGH (ref 0.5–1.3)
EGFR: 13 ML/MIN/1.73M2 — LOW
EOSINOPHIL # BLD AUTO: 0 K/UL — SIGNIFICANT CHANGE UP (ref 0–0.5)
EOSINOPHIL NFR BLD AUTO: 0 % — SIGNIFICANT CHANGE UP (ref 0–6)
GLUCOSE BLDC GLUCOMTR-MCNC: 94 MG/DL — SIGNIFICANT CHANGE UP (ref 70–99)
GLUCOSE SERPL-MCNC: 131 MG/DL — HIGH (ref 70–99)
HCO3 BLDA-SCNC: 25 MMOL/L — SIGNIFICANT CHANGE UP (ref 21–28)
HCT VFR BLD CALC: 36.8 % — SIGNIFICANT CHANGE UP (ref 34.5–45)
HGB BLD-MCNC: 11.4 G/DL — LOW (ref 11.5–15.5)
IANC: 15.14 K/UL — HIGH (ref 1.8–7.4)
IMM GRANULOCYTES NFR BLD AUTO: 1.2 % — HIGH (ref 0–0.9)
LACTATE BLDA-MCNC: 3.5 MMOL/L — HIGH (ref 0.5–2)
LACTATE SERPL-SCNC: 2.2 MMOL/L — HIGH (ref 0.5–2)
LYMPHOCYTES # BLD AUTO: 1.46 K/UL — SIGNIFICANT CHANGE UP (ref 1–3.3)
LYMPHOCYTES # BLD AUTO: 8 % — LOW (ref 13–44)
MAGNESIUM SERPL-MCNC: 2.2 MG/DL — SIGNIFICANT CHANGE UP (ref 1.6–2.6)
MCHC RBC-ENTMCNC: 30.9 PG — SIGNIFICANT CHANGE UP (ref 27–34)
MCHC RBC-ENTMCNC: 31 GM/DL — LOW (ref 32–36)
MCV RBC AUTO: 99.7 FL — SIGNIFICANT CHANGE UP (ref 80–100)
MONOCYTES # BLD AUTO: 1.44 K/UL — HIGH (ref 0–0.9)
MONOCYTES NFR BLD AUTO: 7.9 % — SIGNIFICANT CHANGE UP (ref 2–14)
NEUTROPHILS # BLD AUTO: 15.14 K/UL — HIGH (ref 1.8–7.4)
NEUTROPHILS NFR BLD AUTO: 82.6 % — HIGH (ref 43–77)
NRBC # BLD: 0 /100 WBCS — SIGNIFICANT CHANGE UP (ref 0–0)
NRBC # FLD: 0.05 K/UL — HIGH (ref 0–0)
PCO2 BLDA: 58 MMHG — HIGH (ref 32–45)
PH BLDA: 7.24 — LOW (ref 7.35–7.45)
PHOSPHATE SERPL-MCNC: 4.8 MG/DL — HIGH (ref 2.5–4.5)
PLATELET # BLD AUTO: 201 K/UL — SIGNIFICANT CHANGE UP (ref 150–400)
PO2 BLDA: 128 MMHG — HIGH (ref 83–108)
POTASSIUM SERPL-MCNC: 4.3 MMOL/L — SIGNIFICANT CHANGE UP (ref 3.5–5.3)
POTASSIUM SERPL-SCNC: 4.3 MMOL/L — SIGNIFICANT CHANGE UP (ref 3.5–5.3)
PROT SERPL-MCNC: 6.2 G/DL — SIGNIFICANT CHANGE UP (ref 6–8.3)
RBC # BLD: 3.69 M/UL — LOW (ref 3.8–5.2)
RBC # FLD: 14.6 % — HIGH (ref 10.3–14.5)
SAO2 % BLDA: 99 % — HIGH (ref 94–98)
SODIUM SERPL-SCNC: 138 MMOL/L — SIGNIFICANT CHANGE UP (ref 135–145)
TROPONIN T, HIGH SENSITIVITY RESULT: 65 NG/L — CRITICAL HIGH
WBC # BLD: 18.73 K/UL — HIGH (ref 3.8–10.5)
WBC # FLD AUTO: 18.73 K/UL — HIGH (ref 3.8–10.5)

## 2024-09-20 PROCEDURE — 71045 X-RAY EXAM CHEST 1 VIEW: CPT | Mod: 26

## 2024-09-20 PROCEDURE — 71275 CT ANGIOGRAPHY CHEST: CPT | Mod: 26

## 2024-09-20 PROCEDURE — 93306 TTE W/DOPPLER COMPLETE: CPT | Mod: 26

## 2024-09-20 PROCEDURE — 93010 ELECTROCARDIOGRAM REPORT: CPT

## 2024-09-20 PROCEDURE — 76705 ECHO EXAM OF ABDOMEN: CPT | Mod: 26

## 2024-09-20 RX ORDER — PIPERACILLIN SODIUM AND TAZOBACTAM SODIUM 12; 1.5 G/60ML; G/60ML
3.38 INJECTION, POWDER, LYOPHILIZED, FOR SOLUTION INTRAVENOUS ONCE
Refills: 0 | Status: DISCONTINUED | OUTPATIENT
Start: 2024-09-20 | End: 2024-09-20

## 2024-09-20 RX ORDER — PIPERACILLIN SODIUM AND TAZOBACTAM SODIUM 12; 1.5 G/60ML; G/60ML
3.38 INJECTION, POWDER, LYOPHILIZED, FOR SOLUTION INTRAVENOUS EVERY 12 HOURS
Refills: 0 | Status: DISCONTINUED | OUTPATIENT
Start: 2024-09-20 | End: 2024-09-24

## 2024-09-20 RX ORDER — PIPERACILLIN SODIUM AND TAZOBACTAM SODIUM 12; 1.5 G/60ML; G/60ML
3.38 INJECTION, POWDER, LYOPHILIZED, FOR SOLUTION INTRAVENOUS ONCE
Refills: 0 | Status: COMPLETED | OUTPATIENT
Start: 2024-09-20 | End: 2024-09-20

## 2024-09-20 RX ADMIN — GUAIFENESIN 600 MILLIGRAM(S): 100 SOLUTION ORAL at 17:32

## 2024-09-20 RX ADMIN — PANTOPRAZOLE SODIUM 40 MILLIGRAM(S): 40 TABLET, DELAYED RELEASE ORAL at 05:52

## 2024-09-20 RX ADMIN — PIPERACILLIN SODIUM AND TAZOBACTAM SODIUM 200 GRAM(S): 12; 1.5 INJECTION, POWDER, LYOPHILIZED, FOR SOLUTION INTRAVENOUS at 15:30

## 2024-09-20 RX ADMIN — BENZOCAINE AND LEVOMENTHOL 1 LOZENGE: 200; 5 SPRAY TOPICAL at 22:45

## 2024-09-20 RX ADMIN — Medication 50 MILLIGRAM(S): at 18:48

## 2024-09-20 RX ADMIN — CHLORHEXIDINE GLUCONATE ORAL RINSE 1 APPLICATION(S): 1.2 SOLUTION DENTAL at 11:50

## 2024-09-20 RX ADMIN — PIPERACILLIN SODIUM AND TAZOBACTAM SODIUM 25 GRAM(S): 12; 1.5 INJECTION, POWDER, LYOPHILIZED, FOR SOLUTION INTRAVENOUS at 23:30

## 2024-09-20 RX ADMIN — Medication 17 GRAM(S): at 11:51

## 2024-09-20 RX ADMIN — BENZOCAINE AND LEVOMENTHOL 1 LOZENGE: 200; 5 SPRAY TOPICAL at 17:37

## 2024-09-20 NOTE — PROGRESS NOTE ADULT - SUBJECTIVE AND OBJECTIVE BOX
NEPHROLOGY[facility] [phone]        Patient seen and examined in bed.  Events of today were reviewed and noted  She passed out at HD  There was no post ictal state     ROS-+weakness + fatigue; all other ros were reviewed and were negative         MEDICATIONS  (STANDING):  chlorhexidine 2% Cloths 1 Application(s) Topical daily  influenza  Vaccine (HIGH DOSE) 0.5 milliLiter(s) IntraMuscular once  metoprolol tartrate 50 milliGRAM(s) Oral every 12 hours  pantoprazole    Tablet 40 milliGRAM(s) Oral before breakfast  piperacillin/tazobactam IVPB.- 3.375 Gram(s) IV Intermittent once  polyethylene glycol 3350 17 Gram(s) Oral daily  senna 2 Tablet(s) Oral at bedtime      VITAL:  T(C): , Max: 36.3 (09-19-24 @ 20:21)  T(F): , Max: 97.4 (09-20-24 @ 05:50)  HR: 103 (09-20-24 @ 09:01)  BP: 159/88 (09-20-24 @ 09:01)  BP(mean): --  RR: 23 (09-20-24 @ 09:01)  SpO2: 99% (09-20-24 @ 05:50)  Wt(kg): --    I and O's:    09-20 @ 07:01  -  09-20 @ 18:10  --------------------------------------------------------  IN: 500 mL / OUT: 1196 mL / NET: -696 mL          PHYSICAL EXAM:    Constitutional: NAD  Neck:  No JVD  Respiratory: CTAB/L  Cardiovascular: S1 and S2  Gastrointestinal: BS+, soft, NT/ND  Extremities: No peripheral edema  Neurological: A/O x 3, no focal deficits  Psychiatric: Normal mood, normal affect  : No Sylvester  Skin: No rashes  Access: Not applicable    LABS:                        11.4   18.73 )-----------( 201      ( 20 Sep 2024 09:10 )             36.8     09-20    138  |  98  |  25[H]  ----------------------------<  131[H]  4.3   |  23  |  3.40[H]    Ca    8.7      20 Sep 2024 09:10  Phos  4.8     09-20  Mg     2.20     09-20    TPro  6.2  /  Alb  3.3  /  TBili  0.6  /  DBili  x   /  AST  1185[H]  /  ALT  664[H]  /  AlkPhos  261[H]  09-20          Urine Studies:  Urinalysis Basic - ( 20 Sep 2024 09:10 )    Color: x / Appearance: x / SG: x / pH: x  Gluc: 131 mg/dL / Ketone: x  / Bili: x / Urobili: x   Blood: x / Protein: x / Nitrite: x   Leuk Esterase: x / RBC: x / WBC x   Sq Epi: x / Non Sq Epi: x / Bacteria: x            RADIOLOGY & ADDITIONAL STUDIES:          < from: US Abdomen Upper Quadrant Right (09.20.24 @ 10:57) >    ACC: [id number] EXAM:  US ABDOMEN RT UPR QUADRANT   ORDERED BY: BECKI SAMS     PROCEDURE DATE:  09/20/2024          INTERPRETATION:  CLINICAL INFORMATION: Abnormal liver function tests.    COMPARISON: None available.    TECHNIQUE: Sonography of the right upper quadrant.    FINDINGS:  Liver: Within normal limits.  Bile ducts: Normal caliber. Common bile duct measures 4 mm.  Gallbladder: Nonspecific gallbladder wall thickening. No stones,   localized tenderness, or evidence of acute cholecystitis.  Pancreas: Visualized portions are within normal limits.  Right kidney: 7.5 cm. Echogenic and atrophic. No hydronephrosis.  Ascites: None.  IVC: Visualized portions are within normal limits.    IMPRESSION:  *  Nonspecific gallbladder wall thickening.  *  Echogenic atrophic right kidney.        --- End of Report ---            ERIN AHN MD; Attending Radiologist  This document has been electronically signed. Sep 20 2024 11:    < end of copied text >

## 2024-09-20 NOTE — PROGRESS NOTE ADULT - SUBJECTIVE AND OBJECTIVE BOX
Patient is a 84y old  Female who presents with a chief complaint of fall (20 Sep 2024 18:10)      SUBJECTIVE / OVERNIGHT EVENTS:    Events noted.  S/p RRT  No CP  On supp O2    MEDICATIONS  (STANDING):  chlorhexidine 2% Cloths 1 Application(s) Topical daily  influenza  Vaccine (HIGH DOSE) 0.5 milliLiter(s) IntraMuscular once  metoprolol tartrate 50 milliGRAM(s) Oral every 12 hours  pantoprazole    Tablet 40 milliGRAM(s) Oral before breakfast  piperacillin/tazobactam IVPB.. 3.375 Gram(s) IV Intermittent every 12 hours  polyethylene glycol 3350 17 Gram(s) Oral daily  senna 2 Tablet(s) Oral at bedtime    MEDICATIONS  (PRN):  acetaminophen     Tablet .. 650 milliGRAM(s) Oral every 6 hours PRN Temp greater or equal to 38C (100.4F), Mild Pain (1 - 3)  albuterol    90 MICROgram(s) HFA Inhaler 2 Puff(s) Inhalation every 6 hours PRN Shortness of Breath and/or Wheezing  benzocaine/menthol Lozenge 1 Lozenge Oral every 4 hours PRN Sore Throat  guaiFENesin  milliGRAM(s) Oral every 12 hours PRN Cough  melatonin 3 milliGRAM(s) Oral at bedtime PRN Insomnia  oxyCODONE    IR 5 milliGRAM(s) Oral every 8 hours PRN Severe Pain (7 - 10)        CAPILLARY BLOOD GLUCOSE      POCT Blood Glucose.: 94 mg/dL (20 Sep 2024 08:52)    I&O's Summary    20 Sep 2024 07:01  -  20 Sep 2024 22:19  --------------------------------------------------------  IN: 500 mL / OUT: 1196 mL / NET: -696 mL        T(C): 36.4 (09-20-24 @ 18:48), Max: 36.8 (09-20-24 @ 17:09)  HR: 100 (09-20-24 @ 18:48) (90 - 103)  BP: 162/99 (09-20-24 @ 18:48) (119/89 - 162/99)  RR: 22 (09-20-24 @ 18:48) (17 - 23)  SpO2: 99% (09-20-24 @ 17:09) (99% - 100%)    PHYSICAL EXAM:    NECK: Supple, No JVD  CHEST/LUNG: Clear to auscultation bilaterally; No wheezing.  HEART: Regular rate and rhythm; No murmurs, rubs, or gallops  ABDOMEN: Soft, Nontender, Nondistended; Bowel sounds present  EXTREMITIES:   No edema  NEUROLOGY: AAO       LABS:                        11.4   18.73 )-----------( 201      ( 20 Sep 2024 09:10 )             36.8     09-20    138  |  98  |  25[H]  ----------------------------<  131[H]  4.3   |  23  |  3.40[H]    Ca    8.7      20 Sep 2024 09:10  Phos  4.8     09-20  Mg     2.20     09-20    TPro  6.2  /  Alb  3.3  /  TBili  0.6  /  DBili  x   /  AST  1185[H]  /  ALT  664[H]  /  AlkPhos  261[H]  09-20          Urinalysis Basic - ( 20 Sep 2024 09:10 )    Color: x / Appearance: x / SG: x / pH: x  Gluc: 131 mg/dL / Ketone: x  / Bili: x / Urobili: x   Blood: x / Protein: x / Nitrite: x   Leuk Esterase: x / RBC: x / WBC x   Sq Epi: x / Non Sq Epi: x / Bacteria: x      CAPILLARY BLOOD GLUCOSE      POCT Blood Glucose.: 94 mg/dL (20 Sep 2024 08:52)        RADIOLOGY & ADDITIONAL TESTS:    Imaging Personally Reviewed:    Consultant(s) Notes Reviewed:      Care Discussed with Consultants/Other Providers:    Parminder Goldberg MD, CMD, FACP    [address] [place] [id number]  Office Tel: [phone]  Cell: [phone]

## 2024-09-20 NOTE — PROGRESS NOTE ADULT - SUBJECTIVE AND OBJECTIVE BOX
Cardiovascular Disease Progress Note  DATE OF SERVICE: 24 @ 09:09    RRT this morning for syncope.  Patient is now alert.   No chest pain.     Objective Findings:  T(C): 36.1 (24 @ 09:01), Max: 36.4 (24 @ 18:07)  HR: 103 (24 @ 09:01) (92 - 105)  BP: 159/88 (24 @ 09:01) (124/67 - 159/88)  RR: 23 (24 @ 09:01) (17 - 23)  SpO2: 99% (24 @ 05:50) (99% - 100%)  Wt(kg): --  Daily     Daily Weight in k.1 (20 Sep 2024 09:01)      Physical Exam:  Gen: NAD; Patient resting comfortably  HEENT: EOMI, Normocephalic/ atraumatic  CV: RRR, normal S1 + S2, no m/r/g  Lungs:  Normal respiratory effort; clear to auscultation bilaterally  Abd: soft, non-tender; bowel sounds present  Ext: No edema; warm and well perfused    Telemetry: a-fib    Laboratory Data:                        11.4   18.73 )-----------( 201      ( 20 Sep 2024 09:10 )             36.8         138  |  98  |  25[H]  ----------------------------<  131[H]  4.3   |  23  |  3.40[H]    Ca    8.7      20 Sep 2024 09:10  Phos  4.8       Mg     2.20         TPro  6.2  /  Alb  3.3  /  TBili  0.6  /  DBili  x   /  AST  1185[H]  /  ALT  664[H]  /  AlkPhos  261[H]                Inpatient Medications:  MEDICATIONS  (STANDING):  chlorhexidine 2% Cloths 1 Application(s) Topical daily  influenza  Vaccine (HIGH DOSE) 0.5 milliLiter(s) IntraMuscular once  metoprolol tartrate 50 milliGRAM(s) Oral every 12 hours  pantoprazole    Tablet 40 milliGRAM(s) Oral before breakfast  piperacillin/tazobactam IVPB. 3.375 Gram(s) IV Intermittent once  piperacillin/tazobactam IVPB.- 3.375 Gram(s) IV Intermittent once  polyethylene glycol 3350 17 Gram(s) Oral daily  senna 2 Tablet(s) Oral at bedtime      Assessment: 84 year old woman with uncontrolled IDDM, compensated systolic CHF, CAD s/p PCI, and ESRD on HD presents with syncope      Plan of Care:    #Syncope-  Syncope noted again this morning during HD.  Collateral information obtained from HD nurse- BP was 70s prior to the episode.  Telemetry during the episode showed rapid a-fib without ischemic changes- unlikely cause of syncope.   Awaiting echo and CTA.  I would not pursue cath given bleeding history.       #CAD- s/p MICHAEL x2 in 2017.  History of multiple GI bleeds.   Prior EGD notable for AVMs and ulcers.  No signs of active ischemia.   No ASA or Plavix.          #ESRD-  Renal input noted.       #Paroxymal a-fib-  Rates elevated during HD.   No Eliquis given the above stated reasons.   Work up as above.           High and complex medical decision making in this patient with active comorbidities.           Over 55 minutes spent on total encounter; more than 50% of the visit was spent counseling and/or coordinating care by the attending physician.      Julio César Hooper MD [facility]  Cardiovascular Disease  [phone] Cardiovascular Disease Progress Note  DATE OF SERVICE: 24 @ 09:09    RRT this morning for syncope.  Patient is now alert.   No chest pain.     Objective Findings:  T(C): 36.1 (24 @ 09:01), Max: 36.4 (24 @ 18:07)  HR: 103 (24 @ 09:01) (92 - 105)  BP: 159/88 (24 @ 09:01) (124/67 - 159/88)  RR: 23 (24 @ 09:01) (17 - 23)  SpO2: 99% (24 @ 05:50) (99% - 100%)  Wt(kg): --  Daily     Daily Weight in k.1 (20 Sep 2024 09:01)      Physical Exam:  Gen: NAD; Patient resting comfortably  HEENT: EOMI, Normocephalic/ atraumatic  CV: RRR, normal S1 + S2, no m/r/g  Lungs:  Normal respiratory effort; clear to auscultation bilaterally  Abd: soft, non-tender; bowel sounds present  Ext: No edema; warm and well perfused    Telemetry: a-fib    Laboratory Data:                        11.4   18.73 )-----------( 201      ( 20 Sep 2024 09:10 )             36.8         138  |  98  |  25[H]  ----------------------------<  131[H]  4.3   |  23  |  3.40[H]    Ca    8.7      20 Sep 2024 09:10  Phos  4.8       Mg     2.20         TPro  6.2  /  Alb  3.3  /  TBili  0.6  /  DBili  x   /  AST  1185[H]  /  ALT  664[H]  /  AlkPhos  261[H]                Inpatient Medications:  MEDICATIONS  (STANDING):  chlorhexidine 2% Cloths 1 Application(s) Topical daily  influenza  Vaccine (HIGH DOSE) 0.5 milliLiter(s) IntraMuscular once  metoprolol tartrate 50 milliGRAM(s) Oral every 12 hours  pantoprazole    Tablet 40 milliGRAM(s) Oral before breakfast  piperacillin/tazobactam IVPB. 3.375 Gram(s) IV Intermittent once  piperacillin/tazobactam IVPB.- 3.375 Gram(s) IV Intermittent once  polyethylene glycol 3350 17 Gram(s) Oral daily  senna 2 Tablet(s) Oral at bedtime      Assessment: 84 year old woman with uncontrolled IDDM, compensated systolic CHF, CAD s/p PCI, and ESRD on HD presents with syncope      Plan of Care:    #Syncope-  Syncope noted again this morning during HD.  Collateral information obtained from HD nurse- BP was 70s prior to the episode.  Telemetry during the episode showed rapid a-fib without ischemic changes- unlikely cause of syncope.   Awaiting echo and CTA.  I would not pursue cath given bleeding history.       #CAD- s/p MICHAEL x2 in 2017.  History of multiple GI bleeds.   Prior EGD notable for AVMs and ulcers.  No signs of active ischemia.   No ASA or Plavix.          #ESRD-  Renal input noted.       #Paroxymal a-fib-  Rates elevated during HD.   No Eliquis given the above stated reasons.   Work up as above.     #ACP (advance care planning)-  CPT 28990  Advanced care planning was discussed with the patient.  Advance care planning forms were discussed. Risks, benefits and alternatives of medical treatment and procedures were discussed in detail and all questions were answered. 30 additional minutes spent addressing advance care plans.      High and complex medical decision making in this patient with active comorbidities.           Over 55 minutes spent on total encounter; more than 50% of the visit was spent counseling and/or coordinating care by the attending physician.      Julio César Hooper MD [facility]  Cardiovascular Disease  [phone]

## 2024-09-20 NOTE — PROGRESS NOTE ADULT - ASSESSMENT
83 yo F with PMH of CAD s/p PCI 2017 (was on ASA/Plavix), Paroxysmal Afib (no longer on Eliquis d/t GIB), PVD, HTN, HLD, T2DM (resolved), HFpEF, ESRD on HD MWF, COPD? (on chronic home oxygen, thinks 2L), PUD/gastritis presenting after syncope/fall.     Acute resp failure:    Supp O2  CTA Chest    Transaminitis:    Hepatology eval  CMP in AM    Leukocytosis:    IV Zosyn  F/up with Blood Cx

## 2024-09-20 NOTE — PROGRESS NOTE ADULT - ASSESSMENT
83 y/o female pmh htn, hld, dm, HF, CAD, esrd m/w/f had HD yesterday as she missed monday pw syncope     1 Renal-  hd earlier today and minimal fluid removal   Epogen at HD   2 CVS- echo ordered;  No cath as she can not take any anticoagulants including asa  Heart rate control per cards.  3 Vasc- Will use avf and she has arrangements to remove the perm cath as outpt with Dr Brody   4 Pulm- CTA ordered to rule out PE      Unclear the cause of syncope.  This was not CHF related  Neuro consult - Carotid dopplers     DW HD nurses  DW Cards attd    >60 minutes spent on total encounter and more then 50% of the visit was spent counseling and/or coordinating care by the attending physician      Sayed [facility]   [phone]

## 2024-09-20 NOTE — PROVIDER CONTACT NOTE (OTHER) - ACTION/TREATMENT ORDERED:
Pt rinsed back, RRT called. Pt was stabilized, DR. Walls made aware, pt sent back to floor , report given to YOKASTA Castaneda.

## 2024-09-20 NOTE — RAPID RESPONSE TEAM SUMMARY - NSSITUATIONBACKGROUNDRRT_GEN_ALL_CORE
85 yo F with PMH of CAD s/p PCI 2017 (was on ASA/Plavix), Paroxysmal Afib (no longer on Eliquis d/t GIB), PVD, HTN, HLD, T2DM (resolved), HFpEF, ESRD on HD MWF, COPD? (on chronic home oxygen, thinks 2L), PUD/gastritis presenting after syncope/fall. Rapid called for episode of unresponsiveness and hypotension to 70's systolic. vitals 116/82, hr 135, rr 29, spo2 68, sugar 94. Pt agitated w/ poor waveform on zoll, bagged briefly and hypertensive to 170's systolic and tachy intermittently afib rhythm. Patient redirectable and calmer, HR improved to 110's without medication afib rhythm, SpO2 100% on 2L NC, /88. CXR performed w/o significant interval change. ECG w/o abnormalities, afib rhythm 120s HR. CBC, CMP, VBG, Troponin drawn due to pt complaint of chest pain/SOB.     Determined that pt likely syncopized due to vasovagal syncope vs. afib w/ RVR with hypotension. Recommended primary team to follow-up on labs drawn for electrolytes and troponin, CXR read, cardiology follow-up to complete syncope work-up and adjust rate control for afib, consider CTA to r/o PE. Patient to remain on HD unit and then transfer back to floor.

## 2024-09-20 NOTE — CHART NOTE - NSCHARTNOTEFT_GEN_A_CORE
MRRT was called at about 8:55AM for unresponsiveness. Pt was about one hour into a HD session when she became unresponsive, when RRT arrived pt was awake but in distress, tachypneic, tachycardiac, and complaining of difficulty breathing and Chest pain. Supplemental oxygen was provided and pt became more comfortable and less agitated, 12 lead EKG showed she was in afib with a rate 100s-140s, afib is known. Renal and cardiology made aware and have low suspicion for flash pulm edema and cardiology does not recommend cardiac cath due to intolerance to any form of AC. Pt stabilized and will be transported back to the unit, will await for CTA to r/o PE and labs drawn to result. Medical attending notified and made aware. Will continue to monitor pt.     Gaurav Saavedra PA-C

## 2024-09-21 ENCOUNTER — RESULT REVIEW (OUTPATIENT)
Age: 84
End: 2024-09-21

## 2024-09-21 PROCEDURE — 93880 EXTRACRANIAL BILAT STUDY: CPT | Mod: 26

## 2024-09-21 RX ADMIN — PANTOPRAZOLE SODIUM 40 MILLIGRAM(S): 40 TABLET, DELAYED RELEASE ORAL at 05:17

## 2024-09-21 RX ADMIN — PIPERACILLIN SODIUM AND TAZOBACTAM SODIUM 25 GRAM(S): 12; 1.5 INJECTION, POWDER, LYOPHILIZED, FOR SOLUTION INTRAVENOUS at 11:43

## 2024-09-21 RX ADMIN — GUAIFENESIN 600 MILLIGRAM(S): 100 SOLUTION ORAL at 21:56

## 2024-09-21 RX ADMIN — Medication 50 MILLIGRAM(S): at 05:17

## 2024-09-21 RX ADMIN — CHLORHEXIDINE GLUCONATE ORAL RINSE 1 APPLICATION(S): 1.2 SOLUTION DENTAL at 11:43

## 2024-09-21 RX ADMIN — Medication 50 MILLIGRAM(S): at 17:47

## 2024-09-21 RX ADMIN — PIPERACILLIN SODIUM AND TAZOBACTAM SODIUM 25 GRAM(S): 12; 1.5 INJECTION, POWDER, LYOPHILIZED, FOR SOLUTION INTRAVENOUS at 22:51

## 2024-09-21 RX ADMIN — Medication 1 APPLICATION(S): at 01:48

## 2024-09-21 NOTE — PROGRESS NOTE ADULT - ASSESSMENT
85 yo F with PMH of CAD s/p PCI 2017 (was on ASA/Plavix), Paroxysmal Afib (no longer on Eliquis d/t GIB), PVD, HTN, HLD, T2DM (resolved), HFpEF, ESRD on HD MWF, COPD? (on chronic home oxygen, thinks 2L), PUD/gastritis presenting after syncope/fall.     Acute resp failure:    Supp O2  CTA Chest reviewed   No main, lobar, or segmental pulmonary embolism.    Bilateral patchy groundglass opacities, right greater than left,   nonspecific. This may be infection or inflammatory in etiology. Recommend   short interval follow-up in 6-8 weeks to ensure resolution.    Tracheobronchial secretions with tree-in-bud opacities predominantly in   the bilateral lower lobes, compatible with mucoid impacted distal airways   and may be secondary to aspiration.          Pulm eval called   Follow up recs       Transaminitis:    Hepatology eval  CMP in AM    Leukocytosis:    IV Zosyn  F/up with Blood Cx

## 2024-09-21 NOTE — PROGRESS NOTE ADULT - SUBJECTIVE AND OBJECTIVE BOX
Cardiovascular Disease Progress Note  DATE OF SERVICE: 09-21-24 @ 10:10    Overnight events: No acute events overnight.    The patient denies chest pain or SOB.   Otherwise review of systems negative    Objective Findings:  T(C): 36.6 (09-21-24 @ 05:00), Max: 36.8 (09-20-24 @ 17:09)  HR: 97 (09-21-24 @ 05:00) (90 - 100)  BP: 126/67 (09-21-24 @ 05:00) (109/57 - 162/99)  RR: 19 (09-21-24 @ 05:00) (17 - 22)  SpO2: 99% (09-21-24 @ 05:00) (98% - 100%)  Wt(kg): --  Daily     Daily       Physical Exam:  Gen: NAD; Patient resting comfortably  HEENT: EOMI, Normocephalic/ atraumatic  CV: IIR, normal S1 + S2, no m/r/g  Lungs:  Normal respiratory effort; clear to auscultation bilaterally  Abd: soft, non-tender; bowel sounds present  Ext: No edema; warm and well perfused    Telemetry: a-fib 70s    Laboratory Data:                        11.4   18.73 )-----------( 201      ( 20 Sep 2024 09:10 )             36.8     09-20    138  |  98  |  25[H]  ----------------------------<  131[H]  4.3   |  23  |  3.40[H]    Ca    8.7      20 Sep 2024 09:10  Phos  4.8     09-20  Mg     2.20     09-20    TPro  6.2  /  Alb  3.3  /  TBili  0.6  /  DBili  x   /  AST  1185[H]  /  ALT  664[H]  /  AlkPhos  261[H]  09-20              Inpatient Medications:  MEDICATIONS  (STANDING):  chlorhexidine 2% Cloths 1 Application(s) Topical daily  influenza  Vaccine (HIGH DOSE) 0.5 milliLiter(s) IntraMuscular once  metoprolol tartrate 50 milliGRAM(s) Oral every 12 hours  pantoprazole    Tablet 40 milliGRAM(s) Oral before breakfast  piperacillin/tazobactam IVPB.. 3.375 Gram(s) IV Intermittent every 12 hours  polyethylene glycol 3350 17 Gram(s) Oral daily  senna 2 Tablet(s) Oral at bedtime      Assessment: 84 year old woman with uncontrolled IDDM, compensated systolic CHF, CAD s/p PCI, and ESRD on HD presents with syncope      Plan of Care:    #Syncope-  Syncope noted again on 9/20 during HD.  Collateral information obtained from HD nurse- BP was 70s prior to the episode.  Telemetry during the episode showed rapid a-fib without ischemic changes- unlikely cause of syncope.   Echo and CTA are unremarkable.  I would not pursue cath given bleeding history.       #CAD- s/p MICHAEL x2 in 12/2017.  History of multiple GI bleeds.   Prior EGD notable for AVMs and ulcers.  No signs of active ischemia.   No ASA or Plavix.          #ESRD-  Renal input noted.       #Paroxymal a-fib-  Rates elevated during HD.   No Eliquis given the above stated reasons.   Work up as above.           High and complex medical decision making in this patient with active comorbidities.          Over 55 minutes spent on total encounter; more than 50% of the visit was spent counseling and/or coordinating care by the attending physician.      Julio César Hooper MD [facility]  Cardiovascular Disease  [phone]

## 2024-09-21 NOTE — PROGRESS NOTE PEDS - SUBJECTIVE AND OBJECTIVE BOX
NEPHROLOGY     Patient seen and examined having lunch, reports feeling better, denies sob, breathing comfortably on room air, in no acute distress.     MEDICATIONS  (STANDING):  chlorhexidine 2% Cloths 1 Application(s) Topical daily  influenza  Vaccine (HIGH DOSE) 0.5 milliLiter(s) IntraMuscular once  metoprolol tartrate 50 milliGRAM(s) Oral every 12 hours  pantoprazole    Tablet 40 milliGRAM(s) Oral before breakfast  piperacillin/tazobactam IVPB.. 3.375 Gram(s) IV Intermittent every 12 hours  polyethylene glycol 3350 17 Gram(s) Oral daily  senna 2 Tablet(s) Oral at bedtime    VITALS:  T(C): , Max: 36.8 (09-20-24 @ 17:09)  T(F): , Max: 98.2 (09-20-24 @ 17:09)  HR: 91 (09-21-24 @ 09:00)  BP: 106/67 (09-21-24 @ 09:00)  RR: 18 (09-21-24 @ 09:00)  SpO2: 99% (09-21-24 @ 09:00)    I and O's:    09-20 @ 07:01  -  09-21 @ 07:00  --------------------------------------------------------  IN: 500 mL / OUT: 1546 mL / NET: -1046 mL    PHYSICAL EXAM:  Constitutional: NAD  HEENT: EOMI  Neck:  No JVD, supple   Respiratory: CTA B/L  Cardiovascular: S1 and S2, RRR  Gastrointestinal: + BS, soft, NT, ND  Extremities: No peripheral edema, + peripheral pulses  Neurological: A/O x 3, CN2-12 intact  Psychiatric: Normal mood, normal affect  : No Sylvester  Skin: No rashes, C/D/I  Access: avf and perm cath     LABS:                        11.4   18.73 )-----------( 201      ( 20 Sep 2024 09:10 )             36.8     09-20    138  |  98  |  25[H]  ----------------------------<  131[H]  4.3   |  23  |  3.40[H]    Ca    8.7      20 Sep 2024 09:10  Phos  4.8     09-20  Mg     2.20     09-20    TPro  6.2  /  Alb  3.3  /  TBili  0.6  /  DBili  x   /  AST  1185[H]  /  ALT  664[H]  /  AlkPhos  261[H]  09-20    RADIOLOGY & ADDITIONAL STUDIES:  rad< from: CT Angio Chest PE Protocol w/ IV Cont (09.20.24 @ 19:22) >  IMPRESSION:  No main, lobar, or segmental pulmonary embolism.    Bilateral patchy groundglass opacities, right greater than left,   nonspecific. This may be infection or inflammatory in etiology. Recommend   short interval follow-up in 6-8 weeks to ensure resolution.    Tracheobronchial secretions with tree-in-bud opacities predominantly in   the bilateral lower lobes, compatible with mucoid impacted distal airways   and may be secondary to aspiration.    --- End of Report ---      DARBY ZHENG MD; Resident Radiologist  This document has been electronically signed.  YANG ALAS MD; Attending Radiologist  This document has been electronically signed. Sep 21 2024  9:13AM    < end of copied text >      TRANSTHORACIC ECHOCARDIOGRAM REPORT  ________________________________________________________________________________                                      _______       Pt. Name:       AMIE HOLT Study Date:    9/20/2024  MRN:            [id number]   YOB: 1940  Accession #:    [id number]   Age:           84 years  Account#:       [id number]    Gender:        F  Heart Rate:     98 bpm      Height:        64.00 in (162.56 cm)  Rhythm:                     Weight:        180.00 lb (81.65 kg)  Blood Pressure: 137/92 mmHg BSA/BMI:       1.87 m² / 30.90 kg/m²  ________________________________________________________________________________________  Referring Physician:    [id number] Parminder Goldbreg  Interpreting Physician: Roney Voss M.D.  Primary Sonographer:    Sarah Guerra [facility]    CPT:                ECHO TTE WITH CON COMP W DOPP - .m;DEFINITY ECHO                      CONTRAST PER ML - .m  Indication(s):      Syncope and collapse - R55  Procedure:          Transthoracic echocardiogram with 2-D, M-mode and complete                      spectral and color flow Doppler.  [facility]  Admission Status:   Inpatient  Contrast Injection: Verbal consent was obtained for injection of Ultrasonic                      Enhancing Agent following a discussion of risks and                      benefits.                      Endocardial visualization enhanced with 3 ml of Definity                      Ultrasound enhancing agent (Lot#:[id number] Exp.Date:06/25                      Discarded Dose:7ml).  UEA Reaction:       Patient had no adverse reaction after injection of                      Ultrasound Enhancing Agent.  Study Information:  Image quality for this study is fair.    _______________________________________________________________________________________     CONCLUSIONS:      1. The left ventricular cavity is normal in size. Left ventricular wall thickness is moderately increased. Left ventricular systolic function is mildly decreased with a calculated ejection fraction of 53 % by the Whitman's biplane method of disks. There is global left ventricular hypokinesis. Moderate left ventricular hypertrophy. There is increased LV mass and concentric hypertrophy.   2. Normal right ventricular cavity size and reduced right ventricular systolic function.   3. Structurally normal mitral valve with normal leaflet excursion. There is calcification of the mitral valve annulus. There is trace mitral regurgitation.   4. The left atrium is moderately dilated with an indexed volume of 43.47 ml/m².   5. Structurally normal tricuspid valve with normal leaflet excursion. There is mild to moderate tricuspid regurgitation. Estimated pulmonary artery systolic pressure is 52 mmHg, consistent with moderate pulmonary hypertension.   6. No prior echocardiogram is available for comparison.    ________________________________________________________________    ASSESSMENT/PLAN:   85 y/o female pmh htn, hld, dm, HF, CAD, esrd m/w/f had HD yesterday as she missed monday pw syncope     1 Renal- s/p HD yesterday, next HD Monday   Epogen at HD   2 CVS- echo noted; No cath as she can not take any anticoagulants including asa  Heart rate control per cards.  3 Vasc- Will use avf and she has arrangements to remove the perm cath as outpt with Dr Brody   4 Pulm- no PE on CTA  Unclear the cause of syncope.  This was not CHF related  5 Neuro consult - Carotid dopplers pending     Cathy Ureña DNP  [facility]  [phone]

## 2024-09-21 NOTE — PROGRESS NOTE ADULT - SUBJECTIVE AND OBJECTIVE BOX
Patient is a 84y old  Female who presents with a chief complaint of fall (20 Sep 2024 18:10)      SUBJECTIVE / OVERNIGHT EVENTS:    Events noted.  S/p RRT  No CP  On supp O2    MEDICATIONS  (STANDING):  chlorhexidine 2% Cloths 1 Application(s) Topical daily  influenza  Vaccine (HIGH DOSE) 0.5 milliLiter(s) IntraMuscular once  metoprolol tartrate 50 milliGRAM(s) Oral every 12 hours  pantoprazole    Tablet 40 milliGRAM(s) Oral before breakfast  piperacillin/tazobactam IVPB.. 3.375 Gram(s) IV Intermittent every 12 hours  polyethylene glycol 3350 17 Gram(s) Oral daily  senna 2 Tablet(s) Oral at bedtime    MEDICATIONS  (PRN):  acetaminophen     Tablet .. 650 milliGRAM(s) Oral every 6 hours PRN Temp greater or equal to 38C (100.4F), Mild Pain (1 - 3)  albuterol    90 MICROgram(s) HFA Inhaler 2 Puff(s) Inhalation every 6 hours PRN Shortness of Breath and/or Wheezing  benzocaine/menthol Lozenge 1 Lozenge Oral every 4 hours PRN Sore Throat  guaiFENesin  milliGRAM(s) Oral every 12 hours PRN Cough  melatonin 3 milliGRAM(s) Oral at bedtime PRN Insomnia  oxyCODONE    IR 5 milliGRAM(s) Oral every 8 hours PRN Severe Pain (7 - 10)        CAPILLARY BLOOD GLUCOSE      POCT Blood Glucose.: 94 mg/dL (20 Sep 2024 08:52)    I&O's Summary    20 Sep 2024 07:01  -  20 Sep 2024 22:19  --------------------------------------------------------  IN: 500 mL / OUT: 1196 mL / NET: -696 mL        T(C): 36.4 (09-20-24 @ 18:48), Max: 36.8 (09-20-24 @ 17:09)  HR: 100 (09-20-24 @ 18:48) (90 - 103)  BP: 162/99 (09-20-24 @ 18:48) (119/89 - 162/99)  RR: 22 (09-20-24 @ 18:48) (17 - 23)  SpO2: 99% (09-20-24 @ 17:09) (99% - 100%)    PHYSICAL EXAM:    NECK: Supple, No JVD  CHEST/LUNG: Clear to auscultation bilaterally; No wheezing.  HEART: Regular rate and rhythm; No murmurs, rubs, or gallops  ABDOMEN: Soft, Nontender, Nondistended; Bowel sounds present  EXTREMITIES:   No edema  NEUROLOGY: AAO       LABS:                        11.4   18.73 )-----------( 201      ( 20 Sep 2024 09:10 )             36.8     09-20    138  |  98  |  25[H]  ----------------------------<  131[H]  4.3   |  23  |  3.40[H]    Ca    8.7      20 Sep 2024 09:10  Phos  4.8     09-20  Mg     2.20     09-20    TPro  6.2  /  Alb  3.3  /  TBili  0.6  /  DBili  x   /  AST  1185[H]  /  ALT  664[H]  /  AlkPhos  261[H]  09-20          Urinalysis Basic - ( 20 Sep 2024 09:10 )    Color: x / Appearance: x / SG: x / pH: x  Gluc: 131 mg/dL / Ketone: x  / Bili: x / Urobili: x   Blood: x / Protein: x / Nitrite: x   Leuk Esterase: x / RBC: x / WBC x   Sq Epi: x / Non Sq Epi: x / Bacteria: x      CAPILLARY BLOOD GLUCOSE      POCT Blood Glucose.: 94 mg/dL (20 Sep 2024 08:52)        RADIOLOGY & ADDITIONAL TESTS:    Imaging Personally Reviewed:    Consultant(s) Notes Reviewed:      Care Discussed with Consultants/Other Providers:    Parminder Goldberg MD, CMD, FACP    [id number] [id number]  Office Tel: [phone]  Cell: [phone]

## 2024-09-22 LAB
ALBUMIN SERPL ELPH-MCNC: 3.2 G/DL — LOW (ref 3.3–5)
ALP SERPL-CCNC: 220 U/L — HIGH (ref 40–120)
ALT FLD-CCNC: 390 U/L — HIGH (ref 4–33)
ANION GAP SERPL CALC-SCNC: 18 MMOL/L — HIGH (ref 7–14)
AST SERPL-CCNC: 179 U/L — HIGH (ref 4–32)
BASOPHILS # BLD AUTO: 0.04 K/UL — SIGNIFICANT CHANGE UP (ref 0–0.2)
BASOPHILS NFR BLD AUTO: 0.4 % — SIGNIFICANT CHANGE UP (ref 0–2)
BILIRUB SERPL-MCNC: 0.4 MG/DL — SIGNIFICANT CHANGE UP (ref 0.2–1.2)
BUN SERPL-MCNC: 59 MG/DL — HIGH (ref 7–23)
CALCIUM SERPL-MCNC: 8.7 MG/DL — SIGNIFICANT CHANGE UP (ref 8.4–10.5)
CHLORIDE SERPL-SCNC: 94 MMOL/L — LOW (ref 98–107)
CO2 SERPL-SCNC: 22 MMOL/L — SIGNIFICANT CHANGE UP (ref 22–31)
CREAT SERPL-MCNC: 6.43 MG/DL — HIGH (ref 0.5–1.3)
EGFR: 6 ML/MIN/1.73M2 — LOW
EOSINOPHIL # BLD AUTO: 0.1 K/UL — SIGNIFICANT CHANGE UP (ref 0–0.5)
EOSINOPHIL NFR BLD AUTO: 0.9 % — SIGNIFICANT CHANGE UP (ref 0–6)
GLUCOSE SERPL-MCNC: 157 MG/DL — HIGH (ref 70–99)
HCT VFR BLD CALC: 35.6 % — SIGNIFICANT CHANGE UP (ref 34.5–45)
HGB BLD-MCNC: 10.6 G/DL — LOW (ref 11.5–15.5)
IANC: 8.1 K/UL — HIGH (ref 1.8–7.4)
IMM GRANULOCYTES NFR BLD AUTO: 2.9 % — HIGH (ref 0–0.9)
LYMPHOCYTES # BLD AUTO: 1.51 K/UL — SIGNIFICANT CHANGE UP (ref 1–3.3)
LYMPHOCYTES # BLD AUTO: 13.2 % — SIGNIFICANT CHANGE UP (ref 13–44)
MAGNESIUM SERPL-MCNC: 2.3 MG/DL — SIGNIFICANT CHANGE UP (ref 1.6–2.6)
MCHC RBC-ENTMCNC: 29.8 GM/DL — LOW (ref 32–36)
MCHC RBC-ENTMCNC: 30.1 PG — SIGNIFICANT CHANGE UP (ref 27–34)
MCV RBC AUTO: 101.1 FL — HIGH (ref 80–100)
MONOCYTES # BLD AUTO: 1.32 K/UL — HIGH (ref 0–0.9)
MONOCYTES NFR BLD AUTO: 11.6 % — SIGNIFICANT CHANGE UP (ref 2–14)
NEUTROPHILS # BLD AUTO: 8.1 K/UL — HIGH (ref 1.8–7.4)
NEUTROPHILS NFR BLD AUTO: 71 % — SIGNIFICANT CHANGE UP (ref 43–77)
NRBC # BLD: 2 /100 WBCS — HIGH (ref 0–0)
NRBC # FLD: 0.18 K/UL — HIGH (ref 0–0)
PHOSPHATE SERPL-MCNC: 5.5 MG/DL — HIGH (ref 2.5–4.5)
PLATELET # BLD AUTO: 181 K/UL — SIGNIFICANT CHANGE UP (ref 150–400)
POTASSIUM SERPL-MCNC: 4.4 MMOL/L — SIGNIFICANT CHANGE UP (ref 3.5–5.3)
POTASSIUM SERPL-SCNC: 4.4 MMOL/L — SIGNIFICANT CHANGE UP (ref 3.5–5.3)
PROT SERPL-MCNC: 6 G/DL — SIGNIFICANT CHANGE UP (ref 6–8.3)
RBC # BLD: 3.52 M/UL — LOW (ref 3.8–5.2)
RBC # FLD: 15.7 % — HIGH (ref 10.3–14.5)
SODIUM SERPL-SCNC: 134 MMOL/L — LOW (ref 135–145)
WBC # BLD: 11.4 K/UL — HIGH (ref 3.8–10.5)
WBC # FLD AUTO: 11.4 K/UL — HIGH (ref 3.8–10.5)

## 2024-09-22 RX ADMIN — Medication 50 MILLIGRAM(S): at 17:41

## 2024-09-22 RX ADMIN — PANTOPRAZOLE SODIUM 40 MILLIGRAM(S): 40 TABLET, DELAYED RELEASE ORAL at 05:51

## 2024-09-22 RX ADMIN — BENZOCAINE AND LEVOMENTHOL 1 LOZENGE: 200; 5 SPRAY TOPICAL at 16:22

## 2024-09-22 RX ADMIN — CHLORHEXIDINE GLUCONATE ORAL RINSE 1 APPLICATION(S): 1.2 SOLUTION DENTAL at 11:50

## 2024-09-22 RX ADMIN — GUAIFENESIN 600 MILLIGRAM(S): 100 SOLUTION ORAL at 23:34

## 2024-09-22 RX ADMIN — PIPERACILLIN SODIUM AND TAZOBACTAM SODIUM 25 GRAM(S): 12; 1.5 INJECTION, POWDER, LYOPHILIZED, FOR SOLUTION INTRAVENOUS at 23:27

## 2024-09-22 RX ADMIN — PIPERACILLIN SODIUM AND TAZOBACTAM SODIUM 25 GRAM(S): 12; 1.5 INJECTION, POWDER, LYOPHILIZED, FOR SOLUTION INTRAVENOUS at 11:50

## 2024-09-22 RX ADMIN — Medication 50 MILLIGRAM(S): at 05:51

## 2024-09-22 NOTE — CONSULT NOTE ADULT - SUBJECTIVE AND OBJECTIVE BOX
--24 @ 11:18    Patient is a 84y old  Female who presents with a chief complaint of fall (21 Sep 2024 16:09)      HPI:  83 yo F with PMH of CAD s/p PCI  (was on ASA/Plavix), Paroxysmal Afib (no longer on Eliquis d/t GIB), PVD, HTN, HLD, T2DM (resolved), HFpEF, ESRD on HD MWF, COPD? (on chronic home oxygen, thinks 2L), PUD/gastritis presenting after syncope/fall.   Per patient she was up to brush her teeth last night, and did not realize that she had fallen. She remembers waking up and her son-in-law was next to her trying to wake her up and asking her "not to die." She states she does not recall the fall, did not have any prodromal symptoms and did not feel confused after episode. She recall everything that happened pre and post episode, just not the fall itself. She thinks she hit her head bc it hurts a little on the side. She is no longer on AC d/t GIB this year and last.   Patient notes she had a URI and recovered about a week ago. Still with some residual cough.   Also endorses she is chronically on oxygen at home, When asked why, she states she does not know. But has history of 40 pack year smoking history, stopped about 10/15 yrs ago. Most likely COPD but not on any inhalers at home.   Was admitted in 2024 for anemia, found to have GIB. 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 Also noted to have gastritis and non bleeding duodenal ulcers. Bleed was attributed to gastric AVM and the ulcers in setting of use of ASA/Plavix/Eliquis together. She is no longer on any AC. She also had GIB in  and .   ED Course:   - CTH and CXR negative for acute findings.   - Labs c/w HD patient, pt seen by nephro Dr. Walls and sent to HD.  (18 Sep 2024 17:18)  now pulm called to see the pt:   -pt says her pvt physician gave her the 0oxygen as she has CHF   to me she says she did bot smoke much and used to smoke in her younger years;       ?FOLLOWING PRESENT  [x ] Hx of PE/DVT, [? ] Hx COPD, [ x] Hx of Asthma, [y ] Hx of Hospitalization, [x ]  Hx of BiPAP/CPAP use, [ x] Hx of HUBER    Allergies    No Known Drug Allergies  Carrots (Rash)    Intolerances        PAST MEDICAL & SURGICAL HISTORY:  HLD (hyperlipidemia)      Diabetic neuropathy      CAD (coronary artery disease)  ~ s/p MICHAEL x2 in 2017      Paroxysmal atrial fibrillation      Diabetes mellitus, type 2      GI bleed      Neuropathy      Gastritis      Diastolic heart failure      Transfusion history      Stage 5 chronic kidney disease not on chronic dialysis      Anemia      PVD (peripheral vascular disease)  ~ RLE angiogram with 2 stents placed in 2019.      H/O  section      S/P coronary artery stent placement  x2 in       History of esophagogastroduodenoscopy (EGD)      S/P angiogram of extremity          FAMILY HISTORY:  FH: diabetes mellitus        Social History: [ ex smoker:  40 pk years ] TOBACCO                  [x  ] ETOH                                 [x  ] IVDA/DRUGS    REVIEW OF SYSTEMS    x  General:	s/p fall    Skin/Breast:x  	  Ophthalmologic:x  	  ENMT:	x    Respiratory and Thorax:x  	  Cardiovascular:	x    Gastrointestinal:	x    Genitourinary:	x    Musculoskeletal:	x    Neurological:	x    Psychiatric:	x    Hematology/Lymphatics:	x    Endocrine:	x    Allergic/Immunologic:x	    MEDICATIONS  (STANDING):  chlorhexidine 2% Cloths 1 Application(s) Topical daily  influenza  Vaccine (HIGH DOSE) 0.5 milliLiter(s) IntraMuscular once  metoprolol tartrate 50 milliGRAM(s) Oral every 12 hours  pantoprazole    Tablet 40 milliGRAM(s) Oral before breakfast  piperacillin/tazobactam IVPB.. 3.375 Gram(s) IV Intermittent every 12 hours  polyethylene glycol 3350 17 Gram(s) Oral daily  senna 2 Tablet(s) Oral at bedtime    MEDICATIONS  (PRN):  acetaminophen     Tablet .. 650 milliGRAM(s) Oral every 6 hours PRN Temp greater or equal to 38C (100.4F), Mild Pain (1 - 3)  albuterol    90 MICROgram(s) HFA Inhaler 2 Puff(s) Inhalation every 6 hours PRN Shortness of Breath and/or Wheezing  benzocaine/menthol Lozenge 1 Lozenge Oral every 4 hours PRN Sore Throat  calamine/zinc oxide Lotion 1 Application(s) Topical three times a day PRN Itching  guaiFENesin  milliGRAM(s) Oral every 12 hours PRN Cough  melatonin 3 milliGRAM(s) Oral at bedtime PRN Insomnia  oxyCODONE    IR 5 milliGRAM(s) Oral every 8 hours PRN Severe Pain (7 - 10)       Vital Signs Last 24 Hrs  T(C): 36.4 (22 Sep 2024 09:30), Max: 36.4 (21 Sep 2024 17:40)  T(F): 97.6 (22 Sep 2024 09:30), Max: 97.6 (22 Sep 2024 05:50)  HR: 82 (22 Sep 2024 09:30) (82 - 98)  BP: 101/61 (22 Sep 2024 09:30) (101/61 - 123/65)  BP(mean): --  RR: 19 (22 Sep 2024 09:30) (18 - 19)  SpO2: 100% (22 Sep 2024 09:30) (95% - 100%)    Parameters below as of 22 Sep 2024 09:30  Patient On (Oxygen Delivery Method): nasal cannula  O2 Flow (L/min): 2  Orthostatic VS          I&O's Summary      Physical Exam:   GENERAL: NAD, well-groomed, well-developed  HEENT: EDIN/   Atraumatic, Normocephalic  ENMT: No tonsillar erythema, exudates, or enlargement; Moist mucous membranes, Good dentition, No lesions  NECK: Supple, No JVD, Normal thyroid  CHEST/LUNG: Clear to auscultation bilaterally- no wheezing  CVS: Regular rate and rhythm; No murmurs, rubs, or gallops  GI: : Soft, Nontender, Nondistended; Bowel sounds present  NERVOUS SYSTEM:  Alert & Oriented X3- on 2 L of oxygen    EXTREMITIES: - edema  LYMPH: No lymphadenopathy noted  SKIN: No rashes or lesions  ENDOCRINOLOGY: No Thyromegaly  PSYCH: Appropriate    Labs:                              10.6   11.40 )-----------( 181      ( 22 Sep 2024 05:31 )             35.6                         11.4   18.73 )-----------( 201      ( 20 Sep 2024 09:10 )             36.8                         11.1   6.35  )-----------( 260      ( 19 Sep 2024 06:37 )             36.3     09-22    134[L]  |  94[L]  |  59[H]  ----------------------------<  157[H]  4.4   |  22  |  6.43[H]      138  |  98  |  25[H]  ----------------------------<  131[H]  4.3   |  23  |  3.40[H]      137  |  97[L]  |  33[H]  ----------------------------<  145[H]  4.1   |  25  |  4.61[H]    Ca    8.7      22 Sep 2024 05:31  Phos  5.5       Mg     2.30         TPro  6.0  /  Alb  3.2[L]  /  TBili  0.4  /  DBili  x   /  AST  179[H]  /  ALT  390[H]  /  AlkPhos  220[H]    TPro  6.2  /  Alb  3.3  /  TBili  0.6  /  DBili  x   /  AST  1185[H]  /  ALT  664[H]  /  AlkPhos  261[H]    TPro  7.0  /  Alb  3.4  /  TBili  0.3  /  DBili  x   /  AST  63[H]  /  ALT  126[H]  /  AlkPhos  268[H]      CAPILLARY BLOOD GLUCOSE        LIVER FUNCTIONS - ( 22 Sep 2024 05:31 )  Alb: 3.2 g/dL / Pro: 6.0 g/dL / ALK PHOS: 220 U/L / ALT: 390 U/L / AST: 179 U/L / GGT: x             Urinalysis Basic - ( 22 Sep 2024 05:31 )    Color: x / Appearance: x / SG: x / pH: x  Gluc: 157 mg/dL / Ketone: x  / Bili: x / Urobili: x   Blood: x / Protein: x / Nitrite: x   Leuk Esterase: x / RBC: x / WBC x   Sq Epi: x / Non Sq Epi: x / Bacteria: x      Culture - Blood (collected 20 Sep 2024 16:15)  Source: .Blood Blood-Venous  Preliminary Report (21 Sep 2024 20:01):    No growth at 24 hours    Culture - Blood (collected 20 Sep 2024 14:11)  Source: .Blood Blood-Venous  Preliminary Report (21 Sep 2024 20:01):    No growth at 24 hours      D DImerLactate, Blood: 2.2 mmol/L ( @ 16:13)        Studies  Chest X-RAY  CT SCAN Chest   CT Abdomen  Venous Dopplers: LE:   Others    rad< from: CT Angio Chest PE Protocol w/ IV Cont (24 @ 19:22) >  VISUALIZED UPPER ABDOMEN: Nonspecific gallbladder wall edema. Bilateral   renal atrophy with bilateral subcentimeter hypoattenuating foci, too   small to characterize. Bilateral adrenal gland thickening, nonspecific.  BONES: Degenerative changes.    IMPRESSION:  No main, lobar, or segmental pulmonary embolism.    Bilateral patchy groundglass opacities, right greater than left,   nonspecific. This may be infection or inflammatory in etiology. Recommend   short interval follow-up in 6-8 weeks to ensure resolution.    Tracheobronchial secretions with tree-in-bud opacities predominantly in   the bilateral lower lobes, compatible with mucoid impacted distal airways   and may be secondary to aspiration.    < end of copied text >                   --24 @ 11:18    Patient is a 84y old  Female who presents with a chief complaint of fall (21 Sep 2024 16:09)      HPI:  85 yo F with PMH of CAD s/p PCI  (was on ASA/Plavix), Paroxysmal Afib (no longer on Eliquis d/t GIB), PVD, HTN, HLD, T2DM (resolved), HFpEF, ESRD on HD MWF, COPD? (on chronic home oxygen, thinks 2L), PUD/gastritis presenting after syncope/fall.   Per patient she was up to brush her teeth last night, and did not realize that she had fallen. She remembers waking up and her son-in-law was next to her trying to wake her up and asking her "not to die." She states she does not recall the fall, did not have any prodromal symptoms and did not feel confused after episode. She recall everything that happened pre and post episode, just not the fall itself. She thinks she hit her head bc it hurts a little on the side. She is no longer on AC d/t GIB this year and last.   Patient notes she had a URI and recovered about a week ago. Still with some residual cough.   Also endorses she is chronically on oxygen at home, When asked why, she states she does not know. But has history of 40 pack year smoking history, stopped about 10/15 yrs ago. Most likely COPD but not on any inhalers at home.   Was admitted in 2024 for anemia, found to have GIB. 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 Also noted to have gastritis and non bleeding duodenal ulcers. Bleed was attributed to gastric AVM and the ulcers in setting of use of ASA/Plavix/Eliquis together. She is no longer on any AC. She also had GIB in  and .   ED Course:   - CTH and CXR negative for acute findings.   - Labs c/w HD patient, pt seen by nephro Dr. Walls and sent to HD.  (18 Sep 2024 17:18)  now pulm called to see the pt:   -pt says her pvt physician gave her the 0oxygen as she has CHF   to me she says she did bot smoke much and used to smoke in her younger years;       ?FOLLOWING PRESENT  [x ] Hx of PE/DVT, [? ] Hx COPD, [ x] Hx of Asthma, [y ] Hx of Hospitalization, [x ]  Hx of BiPAP/CPAP use, [ x] Hx of HUBER    Allergies    No Known Drug Allergies  Carrots (Rash)    Intolerances        PAST MEDICAL & SURGICAL HISTORY:  HLD (hyperlipidemia)      Diabetic neuropathy      CAD (coronary artery disease)  ~ s/p MICHAEL x2 in 2017      Paroxysmal atrial fibrillation      Diabetes mellitus, type 2      GI bleed      Neuropathy      Gastritis      Diastolic heart failure      Transfusion history      Stage 5 chronic kidney disease not on chronic dialysis      Anemia      PVD (peripheral vascular disease)  ~ RLE angiogram with 2 stents placed in 2019.      H/O  section      S/P coronary artery stent placement  x2 in       History of esophagogastroduodenoscopy (EGD)      S/P angiogram of extremity          FAMILY HISTORY:  FH: diabetes mellitus        Social History: [ ex smoker:  40 pk years ] TOBACCO                  [x  ] ETOH                                 [x  ] IVDA/DRUGS    REVIEW OF SYSTEMS    x  General:	s/p fall    Skin/Breast:x  	  Ophthalmologic:x  	  ENMT:	x    Respiratory and Thorax:x  	  Cardiovascular:	x    Gastrointestinal:	x    Genitourinary:	x    Musculoskeletal:	x    Neurological:	x    Psychiatric:	x    Hematology/Lymphatics:	x    Endocrine:	x    Allergic/Immunologic:x	    MEDICATIONS  (STANDING):  chlorhexidine 2% Cloths 1 Application(s) Topical daily  influenza  Vaccine (HIGH DOSE) 0.5 milliLiter(s) IntraMuscular once  metoprolol tartrate 50 milliGRAM(s) Oral every 12 hours  pantoprazole    Tablet 40 milliGRAM(s) Oral before breakfast  piperacillin/tazobactam IVPB.. 3.375 Gram(s) IV Intermittent every 12 hours  polyethylene glycol 3350 17 Gram(s) Oral daily  senna 2 Tablet(s) Oral at bedtime    MEDICATIONS  (PRN):  acetaminophen     Tablet .. 650 milliGRAM(s) Oral every 6 hours PRN Temp greater or equal to 38C (100.4F), Mild Pain (1 - 3)  albuterol    90 MICROgram(s) HFA Inhaler 2 Puff(s) Inhalation every 6 hours PRN Shortness of Breath and/or Wheezing  benzocaine/menthol Lozenge 1 Lozenge Oral every 4 hours PRN Sore Throat  calamine/zinc oxide Lotion 1 Application(s) Topical three times a day PRN Itching  guaiFENesin  milliGRAM(s) Oral every 12 hours PRN Cough  melatonin 3 milliGRAM(s) Oral at bedtime PRN Insomnia  oxyCODONE    IR 5 milliGRAM(s) Oral every 8 hours PRN Severe Pain (7 - 10)       Vital Signs Last 24 Hrs  T(C): 36.4 (22 Sep 2024 09:30), Max: 36.4 (21 Sep 2024 17:40)  T(F): 97.6 (22 Sep 2024 09:30), Max: 97.6 (22 Sep 2024 05:50)  HR: 82 (22 Sep 2024 09:30) (82 - 98)  BP: 101/61 (22 Sep 2024 09:30) (101/61 - 123/65)  BP(mean): --  RR: 19 (22 Sep 2024 09:30) (18 - 19)  SpO2: 100% (22 Sep 2024 09:30) (95% - 100%)    Parameters below as of 22 Sep 2024 09:30  Patient On (Oxygen Delivery Method): nasal cannula  O2 Flow (L/min): 2  Orthostatic VS          I&O's Summary      Physical Exam:   GENERAL: NAD, well-groomed, well-developed  HEENT: EDIN/   Atraumatic, Normocephalic  ENMT: No tonsillar erythema, exudates, or enlargement; Moist mucous membranes, Good dentition, No lesions  NECK: Supple, No JVD, Normal thyroid  CHEST/LUNG: Clear to auscultation bilaterally- no wheezing  CVS: Regular rate and rhythm; No murmurs, rubs, or gallops  GI: : Soft, Nontender, Nondistended; Bowel sounds present  NERVOUS SYSTEM:  Alert & Oriented X3- on 2 L of oxygen    EXTREMITIES: - edema  LYMPH: No lymphadenopathy noted  SKIN: No rashes or lesions  ENDOCRINOLOGY: No Thyromegaly  PSYCH: Appropriate    Labs:                              10.6   11.40 )-----------( 181      ( 22 Sep 2024 05:31 )             35.6                         11.4   18.73 )-----------( 201      ( 20 Sep 2024 09:10 )             36.8                         11.1   6.35  )-----------( 260      ( 19 Sep 2024 06:37 )             36.3     09-22    134[L]  |  94[L]  |  59[H]  ----------------------------<  157[H]  4.4   |  22  |  6.43[H]      138  |  98  |  25[H]  ----------------------------<  131[H]  4.3   |  23  |  3.40[H]      137  |  97[L]  |  33[H]  ----------------------------<  145[H]  4.1   |  25  |  4.61[H]    Ca    8.7      22 Sep 2024 05:31  Phos  5.5       Mg     2.30         TPro  6.0  /  Alb  3.2[L]  /  TBili  0.4  /  DBili  x   /  AST  179[H]  /  ALT  390[H]  /  AlkPhos  220[H]    TPro  6.2  /  Alb  3.3  /  TBili  0.6  /  DBili  x   /  AST  1185[H]  /  ALT  664[H]  /  AlkPhos  261[H]    TPro  7.0  /  Alb  3.4  /  TBili  0.3  /  DBili  x   /  AST  63[H]  /  ALT  126[H]  /  AlkPhos  268[H]      CAPILLARY BLOOD GLUCOSE        LIVER FUNCTIONS - ( 22 Sep 2024 05:31 )  Alb: 3.2 g/dL / Pro: 6.0 g/dL / ALK PHOS: 220 U/L / ALT: 390 U/L / AST: 179 U/L / GGT: x             Urinalysis Basic - ( 22 Sep 2024 05:31 )    Color: x / Appearance: x / SG: x / pH: x  Gluc: 157 mg/dL / Ketone: x  / Bili: x / Urobili: x   Blood: x / Protein: x / Nitrite: x   Leuk Esterase: x / RBC: x / WBC x   Sq Epi: x / Non Sq Epi: x / Bacteria: x      Culture - Blood (collected 20 Sep 2024 16:15)  Source: .Blood Blood-Venous  Preliminary Report (21 Sep 2024 20:01):    No growth at 24 hours    Culture - Blood (collected 20 Sep 2024 14:11)  Source: .Blood Blood-Venous  Preliminary Report (21 Sep 2024 20:01):    No growth at 24 hours      D DImerLactate, Blood: 2.2 mmol/L ( @ 16:13)        Studies  Chest X-RAY  CT SCAN Chest   CT Abdomen  Venous Dopplers: LE:   Others    rad< from: CT Angio Chest PE Protocol w/ IV Cont (24 @ 19:22) >  VISUALIZED UPPER ABDOMEN: Nonspecific gallbladder wall edema. Bilateral   renal atrophy with bilateral subcentimeter hypoattenuating foci, too   small to characterize. Bilateral adrenal gland thickening, nonspecific.  BONES: Degenerative changes.    IMPRESSION:  No main, lobar, or segmental pulmonary embolism.    Bilateral patchy groundglass opacities, right greater than left,   nonspecific. This may be infection or inflammatory in etiology. Recommend   short interval follow-up in 6-8 weeks to ensure resolution.    Tracheobronchial secretions with tree-in-bud opacities predominantly in   the bilateral lower lobes, compatible with mucoid impacted distal airways   and may be secondary to aspiration.    < end of copied text >    echo< from: TTE W or WO Ultrasound Enhancing Agent (24 @ 13:03) >  CONCLUSIONS:      1. The left ventricular cavity is normal in size. Left ventricular wall thickness is moderately increased. Left ventricular systolic function is mildly decreased with a calculated ejection fraction of 53 % by the Whitman's biplane method of disks. There is global left ventricular hypokinesis. Moderate left ventricular hypertrophy. There is increased LV mass and concentric hypertrophy.   2. Normal right ventricular cavitysize and reduced right ventricular systolic function.   3. Structurally normal mitral valve with normal leaflet excursion. There is calcification of the mitral valve annulus. There is trace mitral regurgitation.   4. The left atrium is moderately dilated with an indexed volume of 43.47 ml/m².   5. Structurally normal tricuspid valve with normal leaflet excursion. There is mild to moderate tricuspid regurgitation. Estimated pulmonary artery systolic pressure is 52 mmHg, consistent with moderate pulmonary hypertension.   6. No prior echocardiogram is available for comparison.    < end of copied text >

## 2024-09-22 NOTE — CONSULT NOTE ADULT - ASSESSMENT
83 yo F with PMH of CAD s/p PCI 2017 (was on ASA/Plavix), Paroxysmal Afib (no longer on Eliquis d/t GIB), PVD, HTN, HLD, T2DM (resolved), HFpEF, ESRD on HD MWF, COPD? (on chronic home oxygen, thinks 2L), PUD/gastritis presenting after syncope/fall.   Per patient she was up to brush her teeth last night, and did not realize that she had fallen. She remembers waking up and her son-in-law was next to her trying to wake her up and asking her "not to die." She states she does not recall the fall, did not have any prodromal symptoms and did not feel confused after episode. She recall everything that happened pre and post episode, just not the fall itself. She thinks she hit her head bc it hurts a little on the side. She is no longer on AC d/t GIB this year and last.   Patient notes she had a URI and recovered about a week ago. Still with some residual cough.   Also endorses she is chronically on oxygen at home, When asked why, she states she does not know. But has history of 40 pack year smoking history, stopped about 10/15 yrs ago. Most likely COPD but not on any inhalers at home.   Was admitted in 5/2024 for anemia, found to have GIB. 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 Also noted to have gastritis and non bleeding duodenal ulcers. Bleed was attributed to gastric AVM and the ulcers in setting of use of ASA/Plavix/Eliquis together. She is no longer on any AC. She also had GIB in 2023 and 2020.   ED Course:   - CTH and CXR negative for acute findings.   - Labs c/w HD patient, pt seen by nephro Dr. Walls and sent to HD.  (18 Sep 2024 17:18)  now pulm called to see the pt:   -pt says her pvt physician gave her the 0oxygen as she has CHF   to me she says she did bot smoke much and used to smoke in her younger years;     s/p fall   cad ; s/p pci/CHF  pAfib  HTN  DM  ESRD  PUD   GI BLEED    s/p fall   -NOT CLEAR ETIOLOGY ;  -CTA CHEST SHOWED :No main, lobar, or segmental pulmonary embolism. Bilateral patchy groundglass opacities, right greater than left, nonspecific. This may be infection or inflammatory in etiology. Recommend short interval follow-up in 6-8 weeks to ensure resolution. Tracheobronchial secretions with tree-in-bud opacities predominantly in the bilateral lower lobes, compatible with mucoid impacted distal airways and may be secondary to aspiration.   -started on antibiotics   -check for legionella:   -needs echo    -check orthostasis  -wbc is high :  on admission;   cad ; s/p pci/CHF  -cont current care;   -check echo   pAfib  -not on ac with hx of gi bleed  HTN  -controlled  DM  -monitor and control   ESRD  -on hd   PUD/GI BLEED  -PPI    dw acp    85 yo F with PMH of CAD s/p PCI 2017 (was on ASA/Plavix), Paroxysmal Afib (no longer on Eliquis d/t GIB), PVD, HTN, HLD, T2DM (resolved), HFpEF, ESRD on HD MWF, COPD? (on chronic home oxygen, thinks 2L), PUD/gastritis presenting after syncope/fall.   Per patient she was up to brush her teeth last night, and did not realize that she had fallen. She remembers waking up and her son-in-law was next to her trying to wake her up and asking her "not to die." She states she does not recall the fall, did not have any prodromal symptoms and did not feel confused after episode. She recall everything that happened pre and post episode, just not the fall itself. She thinks she hit her head bc it hurts a little on the side. She is no longer on AC d/t GIB this year and last.   Patient notes she had a URI and recovered about a week ago. Still with some residual cough.   Also endorses she is chronically on oxygen at home, When asked why, she states she does not know. But has history of 40 pack year smoking history, stopped about 10/15 yrs ago. Most likely COPD but not on any inhalers at home.   Was admitted in 5/2024 for anemia, found to have GIB. 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 Also noted to have gastritis and non bleeding duodenal ulcers. Bleed was attributed to gastric AVM and the ulcers in setting of use of ASA/Plavix/Eliquis together. She is no longer on any AC. She also had GIB in 2023 and 2020.   ED Course:   - CTH and CXR negative for acute findings.   - Labs c/w HD patient, pt seen by nephro Dr. Walls and sent to HD.  (18 Sep 2024 17:18)  now pulm called to see the pt:   -pt says her pvt physician gave her the 0oxygen as she has CHF   to me she says she did bot smoke much and used to smoke in her younger years;     s/p fall   cad ; s/p pci/CHF  pAfib  HTN  DM  ESRD  PUD   GI BLEED    s/p fall   -NOT CLEAR ETIOLOGY ;  -CTA CHEST SHOWED :No main, lobar, or segmental pulmonary embolism. Bilateral patchy groundglass opacities, right greater than left, nonspecific. This may be infection or inflammatory in etiology. Recommend short interval follow-up in 6-8 weeks to ensure resolution. Tracheobronchial secretions with tree-in-bud opacities predominantly in the bilateral lower lobes, compatible with mucoid impacted distal airways and may be secondary to aspiration.   -started on antibiotics   -check for legionella:   -check orthostasis  -wbc is high :  on admission;   cad ; s/p pci/CHF  -cont current care;   -check echo  ; showed;  1. The left ventricular cavity is normal in size. Left ventricular wall thickness is moderately increased. Left ventricular systolic function is mildly decreased with a calculated ejection fraction of 53 % by the Whitman's biplane method of disks. There is global left ventricular hypokinesis. Moderate left ventricular hypertrophy. There is increased LV mass and concentric hypertrophy.   2. Normal right ventricular cavitysize and reduced right ventricular systolic function.   3. Structurally normal mitral valve with normal leaflet excursion. There is calcification of the mitral valve annulus. There is trace mitral regurgitation.   4. The left atrium is moderately dilated with an indexed volume of 43.47 ml/m².   5. Structurally normal tricuspid valve with normal leaflet excursion. There is mild to moderate tricuspid regurgitation. Estimated pulmonary artery systolic pressure is 52 mmHg, consistent with moderate pulmonary hypertension.   6. No prior echocardiogram is available for comparison.  pAfib  -not on ac with hx of gi bleed  HTN  -controlled  DM  -monitor and control   ESRD  -on hd   PUD/GI BLEED  -PPI    dw acp

## 2024-09-22 NOTE — PROGRESS NOTE ADULT - SUBJECTIVE AND OBJECTIVE BOX
Patient is a 84y old  Female who presents with a chief complaint of fall (22 Sep 2024 13:47)      SUBJECTIVE / OVERNIGHT EVENTS:    Events noted.  CONSTITUTIONAL: No fever,  or fatigue  RESPIRATORY: No cough, wheezing,  No shortness of breath  CARDIOVASCULAR: No chest pain, palpitations, dizziness, or leg swelling  GASTROINTESTINAL: No abdominal or epigastric pain.       MEDICATIONS  (STANDING):  chlorhexidine 2% Cloths 1 Application(s) Topical daily  influenza  Vaccine (HIGH DOSE) 0.5 milliLiter(s) IntraMuscular once  metoprolol tartrate 50 milliGRAM(s) Oral every 12 hours  pantoprazole    Tablet 40 milliGRAM(s) Oral before breakfast  piperacillin/tazobactam IVPB.. 3.375 Gram(s) IV Intermittent every 12 hours  polyethylene glycol 3350 17 Gram(s) Oral daily  senna 2 Tablet(s) Oral at bedtime    MEDICATIONS  (PRN):  acetaminophen     Tablet .. 650 milliGRAM(s) Oral every 6 hours PRN Temp greater or equal to 38C (100.4F), Mild Pain (1 - 3)  albuterol    90 MICROgram(s) HFA Inhaler 2 Puff(s) Inhalation every 6 hours PRN Shortness of Breath and/or Wheezing  benzocaine/menthol Lozenge 1 Lozenge Oral every 4 hours PRN Sore Throat  calamine/zinc oxide Lotion 1 Application(s) Topical three times a day PRN Itching  guaiFENesin  milliGRAM(s) Oral every 12 hours PRN Cough  melatonin 3 milliGRAM(s) Oral at bedtime PRN Insomnia  oxyCODONE    IR 5 milliGRAM(s) Oral every 8 hours PRN Severe Pain (7 - 10)        CAPILLARY BLOOD GLUCOSE        I&O's Summary      T(C): 36.3 (09-22-24 @ 17:30), Max: 36.5 (09-22-24 @ 13:30)  HR: 85 (09-22-24 @ 17:30) (82 - 98)  BP: 102/64 (09-22-24 @ 17:30) (101/61 - 117/68)  RR: 18 (09-22-24 @ 17:30) (18 - 19)  SpO2: 100% (09-22-24 @ 17:30) (97% - 100%)    PHYSICAL EXAM:    NECK: Supple, No JVD  CHEST/LUNG: Clear to auscultation bilaterally; No wheezing.  HEART: Regular rate and rhythm; No murmurs, rubs, or gallops  ABDOMEN: Soft, Nontender, Nondistended; Bowel sounds present  EXTREMITIES:   No edema  NEUROLOGY: AAO X 3      LABS:                        10.6   11.40 )-----------( 181      ( 22 Sep 2024 05:31 )             35.6     09-22    134[L]  |  94[L]  |  59[H]  ----------------------------<  157[H]  4.4   |  22  |  6.43[H]    Ca    8.7      22 Sep 2024 05:31  Phos  5.5     09-22  Mg     2.30     09-22    TPro  6.0  /  Alb  3.2[L]  /  TBili  0.4  /  DBili  x   /  AST  179[H]  /  ALT  390[H]  /  AlkPhos  220[H]  09-22          Urinalysis Basic - ( 22 Sep 2024 05:31 )    Color: x / Appearance: x / SG: x / pH: x  Gluc: 157 mg/dL / Ketone: x  / Bili: x / Urobili: x   Blood: x / Protein: x / Nitrite: x   Leuk Esterase: x / RBC: x / WBC x   Sq Epi: x / Non Sq Epi: x / Bacteria: x      CAPILLARY BLOOD GLUCOSE            RADIOLOGY & ADDITIONAL TESTS:    Imaging Personally Reviewed:    Consultant(s) Notes Reviewed:      Care Discussed with Consultants/Other Providers:    Parminder Goldberg MD, CMD, FACP    [address] [place] [address]  Office Tel: [phone]  Cell: [phone]

## 2024-09-22 NOTE — PROGRESS NOTE ADULT - SUBJECTIVE AND OBJECTIVE BOX
Cardiovascular Disease Progress Note  Date of service: 09-22-24 @ 13:48    Overnight events: No acute events overnight.  Patient denies chest pain. States she wants to go home.   Otherwise review of systems negative    Objective Findings:  T(C): 36.4 (09-22-24 @ 09:30), Max: 36.4 (09-21-24 @ 17:40)  HR: 82 (09-22-24 @ 09:30) (82 - 98)  BP: 101/61 (09-22-24 @ 09:30) (101/61 - 123/65)  RR: 19 (09-22-24 @ 09:30) (18 - 19)  SpO2: 100% (09-22-24 @ 09:30) (96% - 100%)  Wt(kg): --  Daily     Daily       Physical Exam:  Gen: NAD; Patient resting comfortably  HEENT: EOMI, Normocephalic/ atraumatic  CV: RRR, normal S1 + S2, no m/r/g  Lungs:  Normal respiratory effort; clear to auscultation bilaterally  Abd: soft, non-tender; bowel sounds present  Ext: No edema; warm and well perfused    Telemetry: Afib    Laboratory Data:                        10.6   11.40 )-----------( 181      ( 22 Sep 2024 05:31 )             35.6     09-22    134[L]  |  94[L]  |  59[H]  ----------------------------<  157[H]  4.4   |  22  |  6.43[H]    Ca    8.7      22 Sep 2024 05:31  Phos  5.5     09-22  Mg     2.30     09-22    TPro  6.0  /  Alb  3.2[L]  /  TBili  0.4  /  DBili  x   /  AST  179[H]  /  ALT  390[H]  /  AlkPhos  220[H]  09-22              Inpatient Medications:  MEDICATIONS  (STANDING):  chlorhexidine 2% Cloths 1 Application(s) Topical daily  influenza  Vaccine (HIGH DOSE) 0.5 milliLiter(s) IntraMuscular once  metoprolol tartrate 50 milliGRAM(s) Oral every 12 hours  pantoprazole    Tablet 40 milliGRAM(s) Oral before breakfast  piperacillin/tazobactam IVPB.. 3.375 Gram(s) IV Intermittent every 12 hours  polyethylene glycol 3350 17 Gram(s) Oral daily  senna 2 Tablet(s) Oral at bedtime      Assessment:  84 year old woman with uncontrolled IDDM, compensated systolic CHF, CAD s/p PCI, and ESRD on HD presents with syncope      Plan of Care:    #Syncope-  Syncope noted again on 9/20 during HD.  Collateral information obtained from HD nurse- BP was 70s prior to the episode.  Telemetry during the episode showed rapid a-fib without ischemic changes- unlikely cause of syncope.   Echo and CTA are unremarkable.  I would not pursue cath given bleeding history.       #CAD- s/p MICHAEL x2 in 12/2017.  History of multiple GI bleeds.   Prior EGD notable for AVMs and ulcers.  No signs of active ischemia.   No ASA or Plavix.          #ESRD-  Renal input noted.       #Paroxymal a-fib-  Rates elevated during HD.   No Eliquis given the above stated reasons.   Work up as above.           High and complex medical decision making in this patient with active comorbidities.        Over 55 minutes spent on total encounter; more than 50% of the visit was spent counseling and/or coordinating care by the attending physician.      Davis Hooper DO [facility]  Cardiovascular Disease  [phone]

## 2024-09-22 NOTE — PROGRESS NOTE ADULT - ASSESSMENT
85 yo F with PMH of CAD s/p PCI 2017 (was on ASA/Plavix), Paroxysmal Afib (no longer on Eliquis d/t GIB), PVD, HTN, HLD, T2DM (resolved), HFpEF, ESRD on HD MWF, COPD? (on chronic home oxygen, thinks 2L), PUD/gastritis presenting after syncope/fall.     Acute resp failure:    Supp O2  CTA Chest reviewed   No main, lobar, or segmental pulmonary embolism.    Bilateral patchy groundglass opacities, right greater than left:    Pul eval appreciated      Transaminitis:    LFTs trending down    Leukocytosis:    IV Zosyn for 5 days  U. Legionella

## 2024-09-23 ENCOUNTER — TRANSCRIPTION ENCOUNTER (OUTPATIENT)
Age: 84
End: 2024-09-23

## 2024-09-23 LAB
ALBUMIN SERPL ELPH-MCNC: 3 G/DL — LOW (ref 3.3–5)
ALP SERPL-CCNC: 211 U/L — HIGH (ref 40–120)
ALT FLD-CCNC: 286 U/L — HIGH (ref 4–33)
ANION GAP SERPL CALC-SCNC: 20 MMOL/L — HIGH (ref 7–14)
AST SERPL-CCNC: 112 U/L — HIGH (ref 4–32)
BASOPHILS # BLD AUTO: 0.07 K/UL — SIGNIFICANT CHANGE UP (ref 0–0.2)
BASOPHILS NFR BLD AUTO: 0.8 % — SIGNIFICANT CHANGE UP (ref 0–2)
BILIRUB SERPL-MCNC: 0.4 MG/DL — SIGNIFICANT CHANGE UP (ref 0.2–1.2)
BUN SERPL-MCNC: 70 MG/DL — HIGH (ref 7–23)
CALCIUM SERPL-MCNC: 8.2 MG/DL — LOW (ref 8.4–10.5)
CHLORIDE SERPL-SCNC: 93 MMOL/L — LOW (ref 98–107)
CO2 SERPL-SCNC: 21 MMOL/L — LOW (ref 22–31)
CREAT SERPL-MCNC: 8.14 MG/DL — HIGH (ref 0.5–1.3)
EGFR: 5 ML/MIN/1.73M2 — LOW
EOSINOPHIL # BLD AUTO: 0.1 K/UL — SIGNIFICANT CHANGE UP (ref 0–0.5)
EOSINOPHIL NFR BLD AUTO: 1.2 % — SIGNIFICANT CHANGE UP (ref 0–6)
GAS PNL BLDV: SIGNIFICANT CHANGE UP
GLUCOSE BLDC GLUCOMTR-MCNC: 114 MG/DL — HIGH (ref 70–99)
GLUCOSE BLDC GLUCOMTR-MCNC: 212 MG/DL — HIGH (ref 70–99)
GLUCOSE BLDC GLUCOMTR-MCNC: 250 MG/DL — HIGH (ref 70–99)
GLUCOSE SERPL-MCNC: 101 MG/DL — HIGH (ref 70–99)
HCT VFR BLD CALC: 31.6 % — LOW (ref 34.5–45)
HGB BLD-MCNC: 10 G/DL — LOW (ref 11.5–15.5)
IANC: 5.8 K/UL — SIGNIFICANT CHANGE UP (ref 1.8–7.4)
IMM GRANULOCYTES NFR BLD AUTO: 3 % — HIGH (ref 0–0.9)
LYMPHOCYTES # BLD AUTO: 1.26 K/UL — SIGNIFICANT CHANGE UP (ref 1–3.3)
LYMPHOCYTES # BLD AUTO: 14.8 % — SIGNIFICANT CHANGE UP (ref 13–44)
MAGNESIUM SERPL-MCNC: 2.4 MG/DL — SIGNIFICANT CHANGE UP (ref 1.6–2.6)
MCHC RBC-ENTMCNC: 30.8 PG — SIGNIFICANT CHANGE UP (ref 27–34)
MCHC RBC-ENTMCNC: 31.6 GM/DL — LOW (ref 32–36)
MCV RBC AUTO: 97.2 FL — SIGNIFICANT CHANGE UP (ref 80–100)
MONOCYTES # BLD AUTO: 1.04 K/UL — HIGH (ref 0–0.9)
MONOCYTES NFR BLD AUTO: 12.2 % — SIGNIFICANT CHANGE UP (ref 2–14)
NEUTROPHILS # BLD AUTO: 5.8 K/UL — SIGNIFICANT CHANGE UP (ref 1.8–7.4)
NEUTROPHILS NFR BLD AUTO: 68 % — SIGNIFICANT CHANGE UP (ref 43–77)
NRBC # BLD: 1 /100 WBCS — HIGH (ref 0–0)
NRBC # FLD: 0.09 K/UL — HIGH (ref 0–0)
PHOSPHATE SERPL-MCNC: 7.3 MG/DL — HIGH (ref 2.5–4.5)
PLATELET # BLD AUTO: 160 K/UL — SIGNIFICANT CHANGE UP (ref 150–400)
POTASSIUM SERPL-MCNC: 4.7 MMOL/L — SIGNIFICANT CHANGE UP (ref 3.5–5.3)
POTASSIUM SERPL-SCNC: 4.7 MMOL/L — SIGNIFICANT CHANGE UP (ref 3.5–5.3)
PROT SERPL-MCNC: 6 G/DL — SIGNIFICANT CHANGE UP (ref 6–8.3)
RBC # BLD: 3.25 M/UL — LOW (ref 3.8–5.2)
RBC # FLD: 15.6 % — HIGH (ref 10.3–14.5)
SODIUM SERPL-SCNC: 134 MMOL/L — LOW (ref 135–145)
WBC # BLD: 8.53 K/UL — SIGNIFICANT CHANGE UP (ref 3.8–10.5)
WBC # FLD AUTO: 8.53 K/UL — SIGNIFICANT CHANGE UP (ref 3.8–10.5)

## 2024-09-23 RX ORDER — INSULIN LISPRO 100/ML
VIAL (ML) SUBCUTANEOUS
Refills: 0 | Status: DISCONTINUED | OUTPATIENT
Start: 2024-09-23 | End: 2024-09-24

## 2024-09-23 RX ORDER — PANTOPRAZOLE SODIUM 40 MG/1
1 TABLET, DELAYED RELEASE ORAL
Qty: 0 | Refills: 0 | DISCHARGE
Start: 2024-09-23

## 2024-09-23 RX ORDER — ALCOHOL ANTISEPTIC PADS
12.5 PADS, MEDICATED (EA) TOPICAL ONCE
Refills: 0 | Status: DISCONTINUED | OUTPATIENT
Start: 2024-09-23 | End: 2024-09-24

## 2024-09-23 RX ORDER — GLUCAGON INJECTION, SOLUTION 0.5 MG/.1ML
1 INJECTION, SOLUTION SUBCUTANEOUS ONCE
Refills: 0 | Status: DISCONTINUED | OUTPATIENT
Start: 2024-09-23 | End: 2024-09-24

## 2024-09-23 RX ORDER — ERYTHROPOIETIN 10000 [IU]/ML
4000 INJECTION, SOLUTION INTRAVENOUS; SUBCUTANEOUS ONCE
Refills: 0 | Status: COMPLETED | OUTPATIENT
Start: 2024-09-23 | End: 2024-09-23

## 2024-09-23 RX ORDER — ALCOHOL ANTISEPTIC PADS
25 PADS, MEDICATED (EA) TOPICAL ONCE
Refills: 0 | Status: DISCONTINUED | OUTPATIENT
Start: 2024-09-23 | End: 2024-09-24

## 2024-09-23 RX ORDER — GUAIFENESIN 100 MG/5ML
1 SOLUTION ORAL
Qty: 0 | Refills: 0 | DISCHARGE
Start: 2024-09-23

## 2024-09-23 RX ORDER — ALBUTEROL 90 MCG
2 AEROSOL (GRAM) INHALATION
Qty: 0 | Refills: 0 | DISCHARGE
Start: 2024-09-23

## 2024-09-23 RX ORDER — SODIUM CHLORIDE IRRIG SOLUTION 0.9 %
1000 SOLUTION, IRRIGATION IRRIGATION
Refills: 0 | Status: DISCONTINUED | OUTPATIENT
Start: 2024-09-23 | End: 2024-09-24

## 2024-09-23 RX ORDER — ALCOHOL ANTISEPTIC PADS
15 PADS, MEDICATED (EA) TOPICAL ONCE
Refills: 0 | Status: DISCONTINUED | OUTPATIENT
Start: 2024-09-23 | End: 2024-09-24

## 2024-09-23 RX ORDER — INSULIN LISPRO 100/ML
VIAL (ML) SUBCUTANEOUS AT BEDTIME
Refills: 0 | Status: DISCONTINUED | OUTPATIENT
Start: 2024-09-23 | End: 2024-09-24

## 2024-09-23 RX ORDER — INSULIN LISPRO 100/ML
2 VIAL (ML) SUBCUTANEOUS ONCE
Refills: 0 | Status: COMPLETED | OUTPATIENT
Start: 2024-09-23 | End: 2024-09-23

## 2024-09-23 RX ADMIN — PIPERACILLIN SODIUM AND TAZOBACTAM SODIUM 25 GRAM(S): 12; 1.5 INJECTION, POWDER, LYOPHILIZED, FOR SOLUTION INTRAVENOUS at 11:39

## 2024-09-23 RX ADMIN — Medication 2 TABLET(S): at 22:42

## 2024-09-23 RX ADMIN — BENZOCAINE AND LEVOMENTHOL 1 LOZENGE: 200; 5 SPRAY TOPICAL at 19:13

## 2024-09-23 RX ADMIN — GUAIFENESIN 600 MILLIGRAM(S): 100 SOLUTION ORAL at 13:48

## 2024-09-23 RX ADMIN — Medication 2 UNIT(S): at 13:33

## 2024-09-23 RX ADMIN — PANTOPRAZOLE SODIUM 40 MILLIGRAM(S): 40 TABLET, DELAYED RELEASE ORAL at 06:51

## 2024-09-23 RX ADMIN — PIPERACILLIN SODIUM AND TAZOBACTAM SODIUM 25 GRAM(S): 12; 1.5 INJECTION, POWDER, LYOPHILIZED, FOR SOLUTION INTRAVENOUS at 22:41

## 2024-09-23 RX ADMIN — CHLORHEXIDINE GLUCONATE ORAL RINSE 1 APPLICATION(S): 1.2 SOLUTION DENTAL at 11:40

## 2024-09-23 RX ADMIN — ERYTHROPOIETIN 4000 UNIT(S): 10000 INJECTION, SOLUTION INTRAVENOUS; SUBCUTANEOUS at 20:34

## 2024-09-23 RX ADMIN — Medication 50 MILLIGRAM(S): at 22:49

## 2024-09-23 NOTE — DISCHARGE NOTE NURSING/CASE MANAGEMENT/SOCIAL WORK - PATIENT PORTAL LINK FT
You can access the FollowMyHealth Patient Portal offered by [facility] by registering at the following website: [email or web address]. By joining [facility]’s FollowMyHealth portal, you will also be able to view your health information using other applications (apps) compatible with our system.

## 2024-09-23 NOTE — PROGRESS NOTE ADULT - SUBJECTIVE AND OBJECTIVE BOX
Cardiovascular Disease Progress Note  DATE OF SERVICE: 09-23-24 @ 10:13    Overnight events: No acute events overnight.    The patient denies chest pain or SOB.   Otherwise review of systems negative    Objective Findings:  T(C): 36.6 (09-23-24 @ 09:00), Max: 36.7 (09-23-24 @ 05:00)  HR: 75 (09-23-24 @ 09:00) (73 - 95)  BP: 127/63 (09-23-24 @ 09:00) (102/64 - 127/63)  RR: 18 (09-23-24 @ 09:00) (18 - 19)  SpO2: 100% (09-23-24 @ 09:00) (95% - 100%)  Wt(kg): --  Daily     Daily       Physical Exam:  Gen: NAD; Patient resting comfortably  HEENT: EOMI, Normocephalic/ atraumatic  CV: IIR, normal S1 + S2, no m/r/g  Lungs:  Normal respiratory effort; clear to auscultation bilaterally  Abd: soft, non-tender; bowel sounds present  Ext: No edema; warm and well perfused    Telemetry: a-fib 90s    Laboratory Data:                        10.6   11.40 )-----------( 181      ( 22 Sep 2024 05:31 )             35.6     09-22    134[L]  |  94[L]  |  59[H]  ----------------------------<  157[H]  4.4   |  22  |  6.43[H]    Ca    8.7      22 Sep 2024 05:31  Phos  5.5     09-22  Mg     2.30     09-22    TPro  6.0  /  Alb  3.2[L]  /  TBili  0.4  /  DBili  x   /  AST  179[H]  /  ALT  390[H]  /  AlkPhos  220[H]  09-22              Inpatient Medications:  MEDICATIONS  (STANDING):  chlorhexidine 2% Cloths 1 Application(s) Topical daily  influenza  Vaccine (HIGH DOSE) 0.5 milliLiter(s) IntraMuscular once  metoprolol tartrate 50 milliGRAM(s) Oral every 12 hours  pantoprazole    Tablet 40 milliGRAM(s) Oral before breakfast  piperacillin/tazobactam IVPB.. 3.375 Gram(s) IV Intermittent every 12 hours  polyethylene glycol 3350 17 Gram(s) Oral daily  senna 2 Tablet(s) Oral at bedtime      Assessment: 84 year old woman with uncontrolled IDDM, compensated systolic CHF, CAD s/p PCI, and ESRD on HD presents with syncope      Plan of Care:    #Syncope-  Syncope noted on 9/20 during HD.  Collateral information obtained from HD nurse- BP was 70s prior to the episode.  Telemetry during the episode showed rapid a-fib without ischemic changes- unlikely cause of syncope.   Echo, carotid duplex and CTA are unremarkable.  I would not pursue cath given bleeding history.       #CAD- s/p MICHAEL x2 in 12/2017.  History of multiple GI bleeds.   Prior EGD notable for AVMs and ulcers.  No signs of active ischemia.   No ASA or Plavix.          #ESRD-  Renal input noted.       #Paroxymal a-fib-  Rates elevated during HD.   No Eliquis given the above stated reasons.   Work up as above.       #ACP (advance care planning)-  Advanced care planning was discussed with the patient.  Advance care planning forms were discussed. Risks, benefits and alternatives of medical treatment and procedures were discussed in detail and all questions were answered. 30 additional minutes spent addressing advance care plans.        Over 55 minutes spent on total encounter; more than 50% of the visit was spent counseling and/or coordinating care by the attending physician.      Julio César Hooper MD [facility]  Cardiovascular Disease  [phone]

## 2024-09-23 NOTE — DISCHARGE NOTE NURSING/CASE MANAGEMENT/SOCIAL WORK - NSDCPEFALRISK_GEN_ALL_CORE
For information on Fall & Injury Prevention, visit: [email or web address] OR  [email or web address] OR  https://www.cdc.gov/steadi/patient.html

## 2024-09-23 NOTE — DISCHARGE NOTE PROVIDER - CARE PROVIDERS DIRECT ADDRESSES
,[email or web address]

## 2024-09-23 NOTE — DISCHARGE NOTE PROVIDER - NSDCMRMEDTOKEN_GEN_ALL_CORE_FT
metoprolol tartrate 50 mg oral tablet: 1 tab(s) orally every 12 hours   albuterol 90 mcg/inh inhalation aerosol: 2 puff(s) inhaled every 6 hours As needed Shortness of Breath and/or Wheezing  guaiFENesin 600 mg oral tablet, extended release: 1 tab(s) orally every 12 hours As needed Cough  metoprolol tartrate 50 mg oral tablet: 1 tab(s) orally every 12 hours  pantoprazole 40 mg oral delayed release tablet: 1 tab(s) orally once a day (before a meal)   Albuterol (Eqv-ProAir HFA) 90 mcg/inh inhalation aerosol: 2 puff(s) inhaled every 8 hours as needed for  shortness of breath and/or wheezing  amoxicillin-clavulanate 250 mg-125 mg oral tablet: 1 tab(s) orally 2 times a day  benzocaine-menthol 15 mg-2.1 mg mucous membrane lozenge: 1 lozenge orally every 6 hours as needed for  sore throat  guaiFENesin 600 mg oral tablet, extended release: 1 tab(s) orally every 12 hours as needed for Cough  metoprolol tartrate 50 mg oral tablet: 1 tab(s) orally every 12 hours  pantoprazole 40 mg oral delayed release tablet: 1 tab(s) orally once a day (before a meal)

## 2024-09-23 NOTE — DISCHARGE NOTE PROVIDER - NSDCFUADDAPPT_GEN_ALL_CORE_FT
Your blood glucose levels were noted elevated during your admission. Please follow up outpatient with PCP in 1-2 weeks for further management.

## 2024-09-23 NOTE — DISCHARGE NOTE PROVIDER - CARE PROVIDER_API CALL
Davis Hooper  Cardiology  [address] [place] [address]  Phone: [phone]  Follow Up Time:     Parminder Goldberg  Internal Medicine  [address] [place] [address], [address]  [place] [address]  Phone: [phone]  Fax: [phone]  Follow Up Time:

## 2024-09-23 NOTE — PROGRESS NOTE ADULT - ASSESSMENT
85 yo F with PMH of CAD s/p PCI 2017 (was on ASA/Plavix), Paroxysmal Afib (no longer on Eliquis d/t GIB), PVD, HTN, HLD, T2DM (resolved), HFpEF, ESRD on HD MWF, COPD? (on chronic home oxygen, thinks 2L), PUD/gastritis presenting after syncope/fall.   Per patient she was up to brush her teeth last night, and did not realize that she had fallen. She remembers waking up and her son-in-law was next to her trying to wake her up and asking her "not to die." She states she does not recall the fall, did not have any prodromal symptoms and did not feel confused after episode. She recall everything that happened pre and post episode, just not the fall itself. She thinks she hit her head bc it hurts a little on the side. She is no longer on AC d/t GIB this year and last.   Patient notes she had a URI and recovered about a week ago. Still with some residual cough.   Also endorses she is chronically on oxygen at home, When asked why, she states she does not know. But has history of 40 pack year smoking history, stopped about 10/15 yrs ago. Most likely COPD but not on any inhalers at home.   Was admitted in 5/2024 for anemia, found to have GIB. 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 Also noted to have gastritis and non bleeding duodenal ulcers. Bleed was attributed to gastric AVM and the ulcers in setting of use of ASA/Plavix/Eliquis together. She is no longer on any AC. She also had GIB in 2023 and 2020.   ED Course:   - CTH and CXR negative for acute findings.   - Labs c/w HD patient, pt seen by nephro Dr. Walls and sent to HD.  (18 Sep 2024 17:18)  now pulm called to see the pt:   -pt says her pvt physician gave her the 0oxygen as she has CHF   to me she says she did bot smoke much and used to smoke in her younger years;     s/p fall   cad ; s/p pci/CHF  pAfib  HTN  DM  ESRD  PUD   GI BLEED    s/p fall   -NOT CLEAR ETIOLOGY ;  -CTA CHEST SHOWED :No main, lobar, or segmental pulmonary embolism. Bilateral patchy groundglass opacities, right greater than left, nonspecific. This may be infection or inflammatory in etiology. Recommend short interval follow-up in 6-8 weeks to ensure resolution. Tracheobronchial secretions with tree-in-bud opacities predominantly in the bilateral lower lobes, compatible with mucoid impacted distal airways and may be secondary to aspiration.   -started on antibiotics   -check for legionella:   -check orthostasis  -wbc is high :  on admission;   9/23:  -n zosyn     cad ; s/p pci/CHF  -cont current care;   -check echo  ; showed;  1. The left ventricular cavity is normal in size. Left ventricular wall thickness is moderately increased. Left ventricular systolic function is mildly decreased with a calculated ejection fraction of 53 % by the Whitman's biplane method of disks. There is global left ventricular hypokinesis. Moderate left ventricular hypertrophy. There is increased LV mass and concentric hypertrophy.   2. Normal right ventricular cavitysize and reduced right ventricular systolic function.   3. Structurally normal mitral valve with normal leaflet excursion. There is calcification of the mitral valve annulus. There is trace mitral regurgitation.   4. The left atrium is moderately dilated with an indexed volume of 43.47 ml/m².   5. Structurally normal tricuspid valve with normal leaflet excursion. There is mild to moderate tricuspid regurgitation. Estimated pulmonary artery systolic pressure is 52 mmHg, consistent with moderate pulmonary hypertension.   6. No prior echocardiogram is available for comparison.    pAfib  -not on ac with hx of gi bleed    HTN  -controlled  DM  -monitor and control   ESRD  -on hd   PUD/GI BLEED  -PPI    dw acp

## 2024-09-23 NOTE — PROGRESS NOTE ADULT - SUBJECTIVE AND OBJECTIVE BOX
NEPHROLOGY-[facility] [phone]        Patient seen and examined in bed.  She was the same         MEDICATIONS  (STANDING):  chlorhexidine 2% Cloths 1 Application(s) Topical daily  dextrose 5%. 1000 milliLiter(s) (100 mL/Hr) IV Continuous <Continuous>  dextrose 5%. 1000 milliLiter(s) (50 mL/Hr) IV Continuous <Continuous>  dextrose 50% Injectable 25 Gram(s) IV Push once  dextrose 50% Injectable 12.5 Gram(s) IV Push once  dextrose 50% Injectable 25 Gram(s) IV Push once  epoetin sean (EPOGEN) Injectable 4000 Unit(s) IV Push once  glucagon  Injectable 1 milliGRAM(s) IntraMuscular once  influenza  Vaccine (HIGH DOSE) 0.5 milliLiter(s) IntraMuscular once  insulin lispro (ADMELOG) corrective regimen sliding scale   SubCutaneous three times a day before meals  insulin lispro (ADMELOG) corrective regimen sliding scale   SubCutaneous at bedtime  metoprolol tartrate 50 milliGRAM(s) Oral every 12 hours  pantoprazole    Tablet 40 milliGRAM(s) Oral before breakfast  piperacillin/tazobactam IVPB.. 3.375 Gram(s) IV Intermittent every 12 hours  polyethylene glycol 3350 17 Gram(s) Oral daily  senna 2 Tablet(s) Oral at bedtime      VITAL:  T(C): , Max: 36.7 (09-23-24 @ 05:00)  T(F): , Max: 98.1 (09-23-24 @ 05:00)  HR: 80 (09-23-24 @ 15:23)  BP: 118/57 (09-23-24 @ 15:23)  BP(mean): --  RR: 18 (09-23-24 @ 15:23)  SpO2: 100% (09-23-24 @ 15:23)  Wt(kg): --    I and O's:        PHYSICAL EXAM:    Constitutional: NAD  Neck:  No JVD  Respiratory: CTAB/L  Cardiovascular: S1 and S2  Gastrointestinal: BS+, soft, NT/ND  Extremities: No peripheral edema  Neurological: A/O x 3, no focal deficits  Psychiatric: Normal mood, normal affect  : No Sylvester  Skin: No rashes  Access: avf    LABS:                        10.6   11.40 )-----------( 181      ( 22 Sep 2024 05:31 )             35.6     09-22    134[L]  |  94[L]  |  59[H]  ----------------------------<  157[H]  4.4   |  22  |  6.43[H]    Ca    8.7      22 Sep 2024 05:31  Phos  5.5     09-22  Mg     2.30     09-22    TPro  6.0  /  Alb  3.2[L]  /  TBili  0.4  /  DBili  x   /  AST  179[H]  /  ALT  390[H]  /  AlkPhos  220[H]  09-22          Urine Studies:  Urinalysis Basic - ( 22 Sep 2024 05:31 )    Color: x / Appearance: x / SG: x / pH: x  Gluc: 157 mg/dL / Ketone: x  / Bili: x / Urobili: x   Blood: x / Protein: x / Nitrite: x   Leuk Esterase: x / RBC: x / WBC x   Sq Epi: x / Non Sq Epi: x / Bacteria: x            RADIOLOGY & ADDITIONAL STUDIES:

## 2024-09-23 NOTE — PROGRESS NOTE ADULT - SUBJECTIVE AND OBJECTIVE BOX
Date of Service: 09-23-24 @ 09:54    Patient is a 84y old  Female who presents with a chief complaint of fall (22 Sep 2024 14:18)      Any change in ROS: seems to be doing  ok ; no sob;     MEDICATIONS  (STANDING):  chlorhexidine 2% Cloths 1 Application(s) Topical daily  influenza  Vaccine (HIGH DOSE) 0.5 milliLiter(s) IntraMuscular once  metoprolol tartrate 50 milliGRAM(s) Oral every 12 hours  pantoprazole    Tablet 40 milliGRAM(s) Oral before breakfast  piperacillin/tazobactam IVPB.. 3.375 Gram(s) IV Intermittent every 12 hours  polyethylene glycol 3350 17 Gram(s) Oral daily  senna 2 Tablet(s) Oral at bedtime    MEDICATIONS  (PRN):  acetaminophen     Tablet .. 650 milliGRAM(s) Oral every 6 hours PRN Temp greater or equal to 38C (100.4F), Mild Pain (1 - 3)  albuterol    90 MICROgram(s) HFA Inhaler 2 Puff(s) Inhalation every 6 hours PRN Shortness of Breath and/or Wheezing  benzocaine/menthol Lozenge 1 Lozenge Oral every 4 hours PRN Sore Throat  calamine/zinc oxide Lotion 1 Application(s) Topical three times a day PRN Itching  guaiFENesin  milliGRAM(s) Oral every 12 hours PRN Cough  melatonin 3 milliGRAM(s) Oral at bedtime PRN Insomnia  oxyCODONE    IR 5 milliGRAM(s) Oral every 8 hours PRN Severe Pain (7 - 10)    Vital Signs Last 24 Hrs  T(C): 36.6 (23 Sep 2024 09:00), Max: 36.7 (23 Sep 2024 05:00)  T(F): 97.9 (23 Sep 2024 09:00), Max: 98.1 (23 Sep 2024 05:00)  HR: 75 (23 Sep 2024 09:00) (73 - 95)  BP: 127/63 (23 Sep 2024 09:00) (102/64 - 127/63)  BP(mean): --  RR: 18 (23 Sep 2024 09:00) (18 - 19)  SpO2: 100% (23 Sep 2024 09:00) (95% - 100%)    Parameters below as of 23 Sep 2024 09:00  Patient On (Oxygen Delivery Method): nasal cannula  O2 Flow (L/min): 2      I&O's Summary        Physical Exam:   GENERAL: NAD, well-groomed, well-developed  HEENT: EDIN/   Atraumatic, Normocephalic  ENMT: No tonsillar erythema, exudates, or enlargement; Moist mucous membranes, Good dentition, No lesions  NECK: Supple, No JVD, Normal thyroid  CHEST/LUNG: Clear to auscultaion  CVS: Regular rate and rhythm; No murmurs, rubs, or gallops  GI: : Soft, Nontender, Nondistended; Bowel sounds present  NERVOUS SYSTEM:  Alert & Oriented X3  EXTREMITIES:  - edema  LYMPH: No lymphadenopathy noted  SKIN: No rashes or lesions  ENDOCRINOLOGY: No Thyromegaly  PSYCH: calm     Labs:                              10.6   11.40 )-----------( 181      ( 22 Sep 2024 05:31 )             35.6                         11.4   18.73 )-----------( 201      ( 20 Sep 2024 09:10 )             36.8     09-22    134[L]  |  94[L]  |  59[H]  ----------------------------<  157[H]  4.4   |  22  |  6.43[H]  09-20    138  |  98  |  25[H]  ----------------------------<  131[H]  4.3   |  23  |  3.40[H]    Ca    8.7      22 Sep 2024 05:31  Phos  5.5     09-22  Mg     2.30     09-22    TPro  6.0  /  Alb  3.2[L]  /  TBili  0.4  /  DBili  x   /  AST  179[H]  /  ALT  390[H]  /  AlkPhos  220[H]  09-22  TPro  6.2  /  Alb  3.3  /  TBili  0.6  /  DBili  x   /  AST  1185[H]  /  ALT  664[H]  /  AlkPhos  261[H]  09-20    CAPILLARY BLOOD GLUCOSE          LIVER FUNCTIONS - ( 22 Sep 2024 05:31 )  Alb: 3.2 g/dL / Pro: 6.0 g/dL / ALK PHOS: 220 U/L / ALT: 390 U/L / AST: 179 U/L / GGT: x             Urinalysis Basic - ( 22 Sep 2024 05:31 )    Color: x / Appearance: x / SG: x / pH: x  Gluc: 157 mg/dL / Ketone: x  / Bili: x / Urobili: x   Blood: x / Protein: x / Nitrite: x   Leuk Esterase: x / RBC: x / WBC x   Sq Epi: x / Non Sq Epi: x / Bacteria: x      Lactate, Blood: 2.2 mmol/L (09-20 @ 16:13)        RECENT CULTURES:  09-20 @ 16:15 .Blood Blood-Venous       rad< from: CT Angio Chest PE Protocol w/ IV Cont (09.20.24 @ 19:22) >    IMPRESSION:  No main, lobar, or segmental pulmonary embolism.    Bilateral patchy groundglass opacities, right greater than left,   nonspecific. This may be infection or inflammatory in etiology. Recommend   short interval follow-up in 6-8 weeks to ensure resolution.    Tracheobronchial secretions with tree-in-bud opacities predominantly in   the bilateral lower lobes, compatible with mucoid impacted distal airways   and may be secondary to aspiration.    --- End of Report ---    < end of copied text >           No growth at 48 Hours    09-20 @ 14:11 .Blood Blood-Venous                No growth at 48 Hours          RESPIRATORY CULTURES:          Studies  Chest X-RAY  CT SCAN Chest   Venous Dopplers: LE:   CT Abdomen  Others

## 2024-09-23 NOTE — PROGRESS NOTE ADULT - SUBJECTIVE AND OBJECTIVE BOX
Patient is a 84y old  Female who presents with a chief complaint of fall (23 Sep 2024 17:17)      SUBJECTIVE / OVERNIGHT EVENTS:    Events noted.  CONSTITUTIONAL: No fever,  or fatigue  RESPIRATORY: No cough, wheezing,  No shortness of breath  CARDIOVASCULAR: No chest pain, palpitations, dizziness, or leg swelling  GASTROINTESTINAL: No abdominal or epigastric pain.       MEDICATIONS  (STANDING):  chlorhexidine 2% Cloths 1 Application(s) Topical daily  dextrose 5%. 1000 milliLiter(s) (100 mL/Hr) IV Continuous <Continuous>  dextrose 5%. 1000 milliLiter(s) (50 mL/Hr) IV Continuous <Continuous>  dextrose 50% Injectable 25 Gram(s) IV Push once  dextrose 50% Injectable 12.5 Gram(s) IV Push once  dextrose 50% Injectable 25 Gram(s) IV Push once  glucagon  Injectable 1 milliGRAM(s) IntraMuscular once  influenza  Vaccine (HIGH DOSE) 0.5 milliLiter(s) IntraMuscular once  insulin lispro (ADMELOG) corrective regimen sliding scale   SubCutaneous at bedtime  insulin lispro (ADMELOG) corrective regimen sliding scale   SubCutaneous three times a day before meals  metoprolol tartrate 50 milliGRAM(s) Oral every 12 hours  pantoprazole    Tablet 40 milliGRAM(s) Oral before breakfast  piperacillin/tazobactam IVPB.. 3.375 Gram(s) IV Intermittent every 12 hours  polyethylene glycol 3350 17 Gram(s) Oral daily  senna 2 Tablet(s) Oral at bedtime    MEDICATIONS  (PRN):  acetaminophen     Tablet .. 650 milliGRAM(s) Oral every 6 hours PRN Temp greater or equal to 38C (100.4F), Mild Pain (1 - 3)  albuterol    90 MICROgram(s) HFA Inhaler 2 Puff(s) Inhalation every 6 hours PRN Shortness of Breath and/or Wheezing  benzocaine/menthol Lozenge 1 Lozenge Oral every 4 hours PRN Sore Throat  calamine/zinc oxide Lotion 1 Application(s) Topical three times a day PRN Itching  dextrose Oral Gel 15 Gram(s) Oral once PRN Blood Glucose LESS THAN 70 milliGRAM(s)/deciliter  guaiFENesin  milliGRAM(s) Oral every 12 hours PRN Cough  melatonin 3 milliGRAM(s) Oral at bedtime PRN Insomnia  oxyCODONE    IR 5 milliGRAM(s) Oral every 8 hours PRN Severe Pain (7 - 10)        CAPILLARY BLOOD GLUCOSE      POCT Blood Glucose.: 114 mg/dL (23 Sep 2024 20:36)  POCT Blood Glucose.: 212 mg/dL (23 Sep 2024 15:19)  POCT Blood Glucose.: 250 mg/dL (23 Sep 2024 12:39)    I&O's Summary    23 Sep 2024 07:01  -  23 Sep 2024 22:39  --------------------------------------------------------  IN: 600 mL / OUT: 1900 mL / NET: -1300 mL        T(C): 36.4 (09-23-24 @ 21:23), Max: 36.7 (09-23-24 @ 05:00)  HR: 78 (09-23-24 @ 21:23) (65 - 84)  BP: 126/85 (09-23-24 @ 21:23) (106/57 - 127/63)  RR: 18 (09-23-24 @ 21:23) (18 - 18)  SpO2: 100% (09-23-24 @ 15:23) (95% - 100%)    PHYSICAL EXAM:  GENERAL: NAD  NECK: Supple, No JVD  CHEST/LUNG: Clear to auscultation bilaterally; No wheezing.  HEART: Regular rate and rhythm; No murmurs, rubs, or gallops  ABDOMEN: Soft, Nontender, Nondistended; Bowel sounds present  EXTREMITIES:   No edema  NEUROLOGY: AAO       LABS:                        10.0   8.53  )-----------( 160      ( 23 Sep 2024 18:00 )             31.6     09-23    134[L]  |  93[L]  |  70[H]  ----------------------------<  101[H]  4.7   |  21[L]  |  8.14[H]    Ca    8.2[L]      23 Sep 2024 18:00  Phos  7.3     09-23  Mg     2.40     09-23    TPro  6.0  /  Alb  3.0[L]  /  TBili  0.4  /  DBili  x   /  AST  112[H]  /  ALT  286[H]  /  AlkPhos  211[H]  09-23          Urinalysis Basic - ( 23 Sep 2024 18:00 )    Color: x / Appearance: x / SG: x / pH: x  Gluc: 101 mg/dL / Ketone: x  / Bili: x / Urobili: x   Blood: x / Protein: x / Nitrite: x   Leuk Esterase: x / RBC: x / WBC x   Sq Epi: x / Non Sq Epi: x / Bacteria: x      CAPILLARY BLOOD GLUCOSE      POCT Blood Glucose.: 114 mg/dL (23 Sep 2024 20:36)  POCT Blood Glucose.: 212 mg/dL (23 Sep 2024 15:19)  POCT Blood Glucose.: 250 mg/dL (23 Sep 2024 12:39)        RADIOLOGY & ADDITIONAL TESTS:    Imaging Personally Reviewed:    Consultant(s) Notes Reviewed:      Care Discussed with Consultants/Other Providers:    Parminder Goldberg MD, CMD, FACP    [address] [place] [address]  Office Tel: [phone]  Cell: [phone]

## 2024-09-23 NOTE — DISCHARGE NOTE PROVIDER - HOSPITAL COURSE
84 F PMHx CAD S/P PCI (2017, was on ASA/Plavix), AF (not on AC d/t GIB), PVD, HTN, HLD, T2DM (resolved), HFpEF, ESRD on HD MWF, COPD? (on chronic home oxygen, thinks 2L), PUD/gastritis presenting after syncope/fall. Per patient she was up to brush her teeth last night, and did not realize that she had fallen. She remembers waking up and her son-in-law was next to her trying to wake her up and asking her "not to die." She states she does not recall the fall, did not have any prodromal symptoms and did not feel confused after episode. She recall everything that happened pre and post episode, just not the fall itself. She thinks she hit her head because it hurts a little on the side. Patient notes she had a URI and recovered about a week ago. Still with some residual cough. Was admitted in 5/2024 for anemia, found to have GIB. 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 Also noted to have gastritis and non bleeding duodenal ulcers. Bleed was attributed to gastric AVM and the ulcers in setting of use of ASA/Plavix/Eliquis together. She is no longer on any AC. She also had GIB in 2023 and 2020. CTH and CXR negative for acute findings. Cardio consulted. Hospital course c/b RRT on 9/20 for unresponsiveness. Pt subsequently found awake but in distress with tachypnea and tachycardia. EKG with AF with -140s. CTA neg PE, R>L patchy GGO. Tracheobronchial secretions in B/L lower lobes with mucoid impacted distal airways, may be 2/2 aspiration. Started on Zosyn. TTE done with EF 53. Global LV hypokinesis, moderate LVH. Reduced      for unresponsiveness. Pt was about one hour into a HD session when she became unresponsive, when RRT arrived pt was awake but in distress, tachypneic, tachycardiac, and complaining of difficulty breathing and Chest pain. Supplemental oxygen was provided and pt became more comfortable and less agitated, 12 lead EKG showed she was in afib with a rate 100s-140s, afib is known. Renal and cardiology made aware and have low suspicion for flash pulm edema and cardiology does not recommend cardiac cath due to intolerance to any form of AC. Pt stabilized and will be transported back to the unit, will await for CTA to r/o PE and labs drawn to result. Medical attending notified and made aware. Will continue to monitor pt.      84 F PMHx CAD S/P PCI (2017, was on ASA/Plavix), AF (not on AC d/t GIB), PVD, HTN, HLD, T2DM (resolved), HFpEF, ESRD on HD MWF, COPD? (on chronic home oxygen, thinks 2L), PUD/gastritis presenting after syncope/fall. Per patient she was up to brush her teeth last night, and did not realize that she had fallen. She remembers waking up and her son-in-law was next to her trying to wake her up and asking her "not to die." She states she does not recall the fall, did not have any prodromal symptoms and did not feel confused after episode. She recall everything that happened pre and post episode, just not the fall itself. She thinks she hit her head because it hurts a little on the side. Patient notes she had a URI and recovered about a week ago. Still with some residual cough. Was admitted in 5/2024 for anemia, found to have GIB. 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 Also noted to have gastritis and non bleeding duodenal ulcers. Bleed was attributed to gastric AVM and the ulcers in setting of use of ASA/Plavix/Eliquis together. She is no longer on any AC. She also had GIB in 2023 and 2020. CTH and CXR negative for acute findings. Cardio consulted. Hospital course c/b RRT on 9/20 for unresponsiveness. Pt subsequently found awake but in distress with tachypnea and tachycardia. EKG with AF with -140s. CTA neg PE, R>L patchy GGO. Tracheobronchial secretions in B/L lower lobes with mucoid impacted distal airways, may be 2/2 aspiration. Started on Zosyn. TTE done with EF 53. Global LV hypokinesis, moderate LVH. Reduced RVSF. Trace MR. Mild-moderate TR. Moderate pHTN. No plans for cath per Cardio given due to intolerance for AC. Carotid Duplex neg high grade stenosis. Pt monitored without any further Tele events.     Labs notable for transaminitis. US abdomen unremarkable. Hepatitis panel negative. Now downtrending. Hepatology consulted ________________    Spoke with attending, Dr. Goldberg, pt is medically stable for discharge to home             .        84 F PMHx CAD S/P PCI (2017, was on ASA/Plavix), AF (not on AC d/t GIB), PVD, HTN, HLD, T2DM (resolved), HFpEF, ESRD on HD MWF, COPD? (on chronic home oxygen, thinks 2L), PUD/gastritis presenting after syncope/fall. Per patient she was up to brush her teeth last night, and did not realize that she had fallen. She remembers waking up and her son-in-law was next to her trying to wake her up and asking her "not to die." She states she does not recall the fall, did not have any prodromal symptoms and did not feel confused after episode. She recall everything that happened pre and post episode, just not the fall itself. She thinks she hit her head because it hurts a little on the side. Patient notes she had a URI and recovered about a week ago. Still with some residual cough. Was admitted in 5/2024 for anemia, found to have GIB. 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 Also noted to have gastritis and non bleeding duodenal ulcers. Bleed was attributed to gastric AVM and the ulcers in setting of use of ASA/Plavix/Eliquis together. She is no longer on any AC. She also had GIB in 2023 and 2020. CTH and CXR negative for acute findings. Cardio consulted. Hospital course c/b RRT on 9/20 for unresponsiveness. Pt subsequently found awake but in distress with tachypnea and tachycardia. EKG with AF with -140s. CTA neg PE, R>L patchy GGO. Tracheobronchial secretions in B/L lower lobes with mucoid impacted distal airways, may be 2/2 aspiration. Started on Zosyn. TTE done with EF 53. Global LV hypokinesis, moderate LVH. Reduced RVSF. Trace MR. Mild-moderate TR. Moderate pHTN. No plans for cath per Cardio given due to intolerance for AC. Carotid Duplex neg high grade stenosis. Pt monitored without any further Tele events.     Labs notable for transaminitis. US abdomen unremarkable. Hepatitis panel negative. Now downtrending.     Spoke with attending, Dr. Goldberg, pt is medically stable for discharge to home             .        84 F PMHx CAD S/P PCI (2017, was on ASA/Plavix), AF (not on AC d/t GIB), PVD, HTN, HLD, T2DM (resolved), HFpEF, ESRD on HD MWF, COPD? (on chronic home oxygen, thinks 2L), PUD/gastritis presenting after syncope/fall. Per patient she was up to brush her teeth last night, and did not realize that she had fallen. She remembers waking up and her son-in-law was next to her trying to wake her up and asking her "not to die." She states she does not recall the fall, did not have any prodromal symptoms and did not feel confused after episode. She recall everything that happened pre and post episode, just not the fall itself. She thinks she hit her head because it hurts a little on the side. Patient notes she had a URI and recovered about a week ago. Still with some residual cough. Was admitted in 5/2024 for anemia, found to have GIB. 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 Also noted to have gastritis and non bleeding duodenal ulcers. Bleed was attributed to gastric AVM and the ulcers in setting of use of ASA/Plavix/Eliquis together. She is no longer on any AC. She also had GIB in 2023 and 2020. CTH and CXR negative for acute findings. Cardio consulted. Hospital course c/b RRT on 9/20 for unresponsiveness. Pt subsequently found awake but in distress with tachypnea and tachycardia. EKG with AF with -140s. CTA neg PE, R>L patchy GGO. Tracheobronchial secretions in B/L lower lobes with mucoid impacted distal airways, may be 2/2 aspiration. Started on Zosyn. TTE done with EF 53. Global LV hypokinesis, moderate LVH. Reduced RVSF. Trace MR. Mild-moderate TR. Moderate pHTN. No plans for cath per Cardio given due to intolerance for AC. Carotid Duplex neg high grade stenosis. Speech consulted, recommend regular and outpatient CINE. Pt monitored without any further Tele events.     Labs notable for transaminitis. US abdomen unremarkable. Hepatitis panel negative. Now downtrending.     Spoke with attending, Dr. Goldberg, pt is medically stable for discharge to home             .

## 2024-09-23 NOTE — PROGRESS NOTE ADULT - ASSESSMENT
85 y/o female pmh htn, hld, dm, HF, CAD, esrd m/w/f had HD yesterday as she missed monday pw syncope     1 Renal-   HD today   Epogen at HD   2 CVS- echo noted; No cath as she can not take any anticoagulants including asa  3 Vasc- Will use avf and she has arrangements to remove the perm cath as outpt with Dr Brody   4 Pulm- no PE on CTA  Unclear the cause of syncope.  This was not CHF related    Sayed [facility]   [phone]

## 2024-09-23 NOTE — DISCHARGE NOTE PROVIDER - NSDCCPCAREPLAN_GEN_ALL_CORE_FT
PRINCIPAL DISCHARGE DIAGNOSIS  Diagnosis: Syncope  Assessment and Plan of Treatment: Resolved. You were seen by Cardiology in-hospital. Follow up outpatient PCP and/or cardiologist in 1-2 weeks for further management. Monitor for further episodes of unconsciousness, chest pain, dizziness, palpitations, shortness of breath.      SECONDARY DISCHARGE DIAGNOSES  Diagnosis: Paroxysmal atrial fibrillation  Assessment and Plan of Treatment: Please continue your medications as directed and follow-up with your primary provider/cardiologist to further manage your care. Monitor for signs/symptoms of uncontrolled atrial fibrillation, such as, increased heart rate, palpitations, chest pain, dizziness, or shortness of breath - Return to emergency room if these signs/symptoms are present.      Diagnosis: Chronic systolic congestive heart failure  Assessment and Plan of Treatment: Continue recommended medication regimen. Monitor for signs/symptoms of fluid overload and electrolyte abnormalities, such as, shortness of breath, cough, swelling, chest discomfort, changes in heart rate, dizziness, fainting, or changes in mental status. Follow-up with your PCP/cardiologist outpatient after you've been discharged from the hospital.]      Diagnosis: ESRD on dialysis  Assessment and Plan of Treatment: Please continue to follow your dialysis schedule and refer to your primary provider for further care/recommendations. Continue your medications and supplementation as directed.      Diagnosis: Pneumonia  Assessment and Plan of Treatment: You were found to have a lung infection during your hospital stay. Complete antibiotic therapy as directed.  Monitor for any further signs and symptoms of further infection, including but not limited to, fevers/chills, shortness of breath, increased heart rate, dizziness, or abrupt changes in mental status. Follow up outpatient PCP for repeat CT imaging to ensure resolution in 6-8 weeks.        PRINCIPAL DISCHARGE DIAGNOSIS  Diagnosis: Syncope  Assessment and Plan of Treatment: Resolved. You were seen by Cardiology in-hospital. Follow up outpatient PCP and/or cardiologist in 1-2 weeks for further management. Monitor for further episodes of unconsciousness, chest pain, dizziness, palpitations, shortness of breath.      SECONDARY DISCHARGE DIAGNOSES  Diagnosis: Paroxysmal atrial fibrillation  Assessment and Plan of Treatment: Please continue your medications as directed and follow-up with your primary provider/cardiologist to further manage your care. Monitor for signs/symptoms of uncontrolled atrial fibrillation, such as, increased heart rate, palpitations, chest pain, dizziness, or shortness of breath - Return to emergency room if these signs/symptoms are present.      Diagnosis: Chronic systolic congestive heart failure  Assessment and Plan of Treatment: Continue recommended medication regimen. Monitor for signs/symptoms of fluid overload and electrolyte abnormalities, such as, shortness of breath, cough, swelling, chest discomfort, changes in heart rate, dizziness, fainting, or changes in mental status. Follow-up with your PCP/cardiologist outpatient after you've been discharged from the hospital.]      Diagnosis: ESRD on dialysis  Assessment and Plan of Treatment: Please continue to follow your dialysis schedule and refer to your primary provider for further care/recommendations. Continue your medications and supplementation as directed.      Diagnosis: Pneumonia  Assessment and Plan of Treatment: You were found to have a lung infection during your hospital stay. Complete antibiotic therapy as directed.  Monitor for any further signs and symptoms of further infection, including but not limited to, fevers/chills, shortness of breath, increased heart rate, dizziness, or abrupt changes in mental status. Follow up outpatient PCP for repeat CT imaging to ensure resolution in 6-8 weeks.       Diagnosis: Transaminitis  Assessment and Plan of Treatment: Improving. Follow up outpatient PCP in 1-2 weeks for repeat labwork to monitor liver function tests and further management as warranted (including possible evaluation with gastroenterologist).     PRINCIPAL DISCHARGE DIAGNOSIS  Diagnosis: Syncope  Assessment and Plan of Treatment: Resolved. You were seen by Cardiology in-hospital. Follow up outpatient PCP and/or cardiologist in 1-2 weeks for further management. Monitor for further episodes of unconsciousness, chest pain, dizziness, palpitations, shortness of breath.      SECONDARY DISCHARGE DIAGNOSES  Diagnosis: Paroxysmal atrial fibrillation  Assessment and Plan of Treatment: Please continue your medications as directed and follow-up with your primary provider/cardiologist to further manage your care. Monitor for signs/symptoms of uncontrolled atrial fibrillation, such as, increased heart rate, palpitations, chest pain, dizziness, or shortness of breath - Return to emergency room if these signs/symptoms are present.      Diagnosis: Chronic systolic congestive heart failure  Assessment and Plan of Treatment: Continue recommended medication regimen. Monitor for signs/symptoms of fluid overload and electrolyte abnormalities, such as, shortness of breath, cough, swelling, chest discomfort, changes in heart rate, dizziness, fainting, or changes in mental status. Follow-up with your PCP/cardiologist outpatient after you've been discharged from the hospital.]      Diagnosis: ESRD on dialysis  Assessment and Plan of Treatment: Please continue to follow your dialysis schedule and refer to your primary provider for further care/recommendations. Continue your medications and supplementation as directed.      Diagnosis: Pneumonia  Assessment and Plan of Treatment: You were found to have a lung infection during your hospital stay. Complete antibiotic therapy as directed.  Monitor for any further signs and symptoms of further infection, including but not limited to, fevers/chills, shortness of breath, increased heart rate, dizziness, or abrupt changes in mental status. Follow up outpatient PCP for repeat CT imaging to ensure resolution in 6-8 weeks.   You were evaluated by the speech therapist and recommended for outpatient cinesophagram for evaluation for possible silent aspiration. Please call Radiology Scheduling Office Phone #: [phone] for follow up appointment - Fax #: [phone], and [facility] Phone #: [phone]       Diagnosis: Transaminitis  Assessment and Plan of Treatment: Improving. Follow up outpatient PCP in 1-2 weeks for repeat labwork to monitor liver function tests and further management as warranted (including possible evaluation with gastroenterologist).

## 2024-09-24 VITALS
DIASTOLIC BLOOD PRESSURE: 70 MMHG | HEART RATE: 81 BPM | OXYGEN SATURATION: 100 % | RESPIRATION RATE: 18 BRPM | TEMPERATURE: 98 F | SYSTOLIC BLOOD PRESSURE: 128 MMHG

## 2024-09-24 LAB
GLUCOSE BLDC GLUCOMTR-MCNC: 172 MG/DL — HIGH (ref 70–99)
GLUCOSE BLDC GLUCOMTR-MCNC: 223 MG/DL — HIGH (ref 70–99)

## 2024-09-24 RX ORDER — BENZOCAINE AND LEVOMENTHOL 200; 5 MG/G; MG/G
1 SPRAY TOPICAL
Qty: 1 | Refills: 0
Start: 2024-09-24

## 2024-09-24 RX ORDER — PANTOPRAZOLE SODIUM 40 MG/1
1 TABLET, DELAYED RELEASE ORAL
Qty: 14 | Refills: 0
Start: 2024-09-24 | End: 2024-10-07

## 2024-09-24 RX ORDER — ALBUTEROL 90 MCG
2 AEROSOL (GRAM) INHALATION
Qty: 1 | Refills: 0
Start: 2024-09-24

## 2024-09-24 RX ORDER — GUAIFENESIN 100 MG/5ML
1 SOLUTION ORAL
Qty: 14 | Refills: 0
Start: 2024-09-24 | End: 2024-09-30

## 2024-09-24 RX ADMIN — CHLORHEXIDINE GLUCONATE ORAL RINSE 1 APPLICATION(S): 1.2 SOLUTION DENTAL at 11:14

## 2024-09-24 RX ADMIN — Medication 50 MILLIGRAM(S): at 05:56

## 2024-09-24 RX ADMIN — PIPERACILLIN SODIUM AND TAZOBACTAM SODIUM 25 GRAM(S): 12; 1.5 INJECTION, POWDER, LYOPHILIZED, FOR SOLUTION INTRAVENOUS at 11:13

## 2024-09-24 RX ADMIN — Medication 1: at 09:02

## 2024-09-24 RX ADMIN — BENZOCAINE AND LEVOMENTHOL 1 LOZENGE: 200; 5 SPRAY TOPICAL at 09:07

## 2024-09-24 RX ADMIN — PANTOPRAZOLE SODIUM 40 MILLIGRAM(S): 40 TABLET, DELAYED RELEASE ORAL at 05:56

## 2024-09-24 RX ADMIN — Medication 17 GRAM(S): at 11:14

## 2024-09-24 RX ADMIN — Medication 2: at 12:51

## 2024-09-24 NOTE — PROGRESS NOTE ADULT - SUBJECTIVE AND OBJECTIVE BOX
Cardiovascular Disease Progress Note  DATE OF SERVICE: 09-24-24 @ 09:51    Overnight events: No acute events overnight.    The patient denies chest pain or SOB.   Otherwise review of systems negative    Objective Findings:  T(C): 36.7 (09-24-24 @ 05:00), Max: 36.7 (09-23-24 @ 13:05)  HR: 85 (09-24-24 @ 05:00) (65 - 94)  BP: 112/63 (09-24-24 @ 05:00) (112/63 - 126/85)  RR: 17 (09-24-24 @ 05:00) (17 - 18)  SpO2: 100% (09-24-24 @ 05:00) (100% - 100%)  Wt(kg): --  Daily     Daily       Physical Exam:  Gen: NAD; Patient resting comfortably  HEENT: EOMI, Normocephalic/ atraumatic  CV: IIR, normal S1 + S2, no m/r/g  Lungs:  Normal respiratory effort; clear to auscultation bilaterally  Abd: soft, non-tender; bowel sounds present  Ext: No edema; warm and well perfused    Telemetry: A-fib 70s    Laboratory Data:                        10.0   8.53  )-----------( 160      ( 23 Sep 2024 18:00 )             31.6     09-23    134[L]  |  93[L]  |  70[H]  ----------------------------<  101[H]  4.7   |  21[L]  |  8.14[H]    Ca    8.2[L]      23 Sep 2024 18:00  Phos  7.3     09-23  Mg     2.40     09-23    TPro  6.0  /  Alb  3.0[L]  /  TBili  0.4  /  DBili  x   /  AST  112[H]  /  ALT  286[H]  /  AlkPhos  211[H]  09-23              Inpatient Medications:  MEDICATIONS  (STANDING):  chlorhexidine 2% Cloths 1 Application(s) Topical daily  dextrose 5%. 1000 milliLiter(s) (100 mL/Hr) IV Continuous <Continuous>  dextrose 5%. 1000 milliLiter(s) (50 mL/Hr) IV Continuous <Continuous>  dextrose 50% Injectable 25 Gram(s) IV Push once  dextrose 50% Injectable 12.5 Gram(s) IV Push once  dextrose 50% Injectable 25 Gram(s) IV Push once  glucagon  Injectable 1 milliGRAM(s) IntraMuscular once  influenza  Vaccine (HIGH DOSE) 0.5 milliLiter(s) IntraMuscular once  insulin lispro (ADMELOG) corrective regimen sliding scale   SubCutaneous three times a day before meals  insulin lispro (ADMELOG) corrective regimen sliding scale   SubCutaneous at bedtime  metoprolol tartrate 50 milliGRAM(s) Oral every 12 hours  pantoprazole    Tablet 40 milliGRAM(s) Oral before breakfast  piperacillin/tazobactam IVPB.. 3.375 Gram(s) IV Intermittent every 12 hours  polyethylene glycol 3350 17 Gram(s) Oral daily  senna 2 Tablet(s) Oral at bedtime      Assessment: 84 year old woman with uncontrolled IDDM, compensated systolic CHF, CAD s/p PCI, and ESRD on HD presents with syncope      Plan of Care:    #Syncope-  Syncope noted on 9/20 during HD.  Collateral information obtained from HD nurse- BP was 70s prior to the episode.  Telemetry during the episode showed rapid a-fib without ischemic changes- unlikely cause of syncope.   Echo, carotid duplex and CTA are unremarkable.  I would not pursue cath given bleeding history.       #CAD- s/p MICHAEL x2 in 12/2017.  History of multiple GI bleeds.   Prior EGD notable for AVMs and ulcers.  No signs of active ischemia.   No ASA or Plavix.          #ESRD-  Renal input noted.       #Paroxymal a-fib-  Rates controlled.   No Eliquis given the above stated reasons.   Work up as above.            Over 55 minutes spent on total encounter; more than 50% of the visit was spent counseling and/or coordinating care by the attending physician.      Julio César Hooper MD [facility]  Cardiovascular Disease  [phone]

## 2024-09-24 NOTE — PROGRESS NOTE ADULT - PROBLEM SELECTOR PROBLEM 4
Social Work    Met with patient at bedside to complete consult for positive THC as well as offer any assist or support. This is patients 3rd child Tommy Mustafa. She has 2 daughters at home. FOB is Cleave Segun but they are not together but he is supportive. Resides with her dad,grandma and 2 daughters. Has all needed baby items and has a crib and pack and play for safe sleep. Denied any signs or symptoms of anxiety or depression. Discussed if they arise to reach out to [facility] or PCP. Does not receive any assistance. Addressed positive THC and patient reported that she used for nausea and vomiting. Informed her of SARAH mandate and that [facility] would be contacted. She acknowledged understanding and stated they were called before with her 3 yr old due to testing positive as well. Contacted [facility] intake and spoke with Ema to make report. Patient can discharge home with the baby as there are no safety concerns. PCP to be the [facility]. Informed patient if any needs arise to reach out to .
Paroxysmal atrial fibrillation

## 2024-09-24 NOTE — SWALLOW BEDSIDE ASSESSMENT ADULT - ASR SWALLOW REFERRAL
Recommend RD consult/follow-up to ensure adequate nutrition - patient may benefit from PO supplements (i.e., boost, ensure, magic cup) to maximize daily caloric intake./Registered Dietitian

## 2024-09-24 NOTE — PROGRESS NOTE ADULT - SUBJECTIVE AND OBJECTIVE BOX
Patient is a 84y old  Female who presents with a chief complaint of fall (23 Sep 2024 17:17)      SUBJECTIVE / OVERNIGHT EVENTS:    Events noted.  CONSTITUTIONAL: No fever,  or fatigue  RESPIRATORY: No cough, wheezing,  No shortness of breath  CARDIOVASCULAR: No chest pain, palpitations, dizziness, or leg swelling  GASTROINTESTINAL: No abdominal or epigastric pain.      MEDICATIONS  (STANDING):  chlorhexidine 2% Cloths 1 Application(s) Topical daily  dextrose 5%. 1000 milliLiter(s) (50 mL/Hr) IV Continuous <Continuous>  dextrose 5%. 1000 milliLiter(s) (100 mL/Hr) IV Continuous <Continuous>  dextrose 50% Injectable 25 Gram(s) IV Push once  dextrose 50% Injectable 25 Gram(s) IV Push once  dextrose 50% Injectable 12.5 Gram(s) IV Push once  glucagon  Injectable 1 milliGRAM(s) IntraMuscular once  influenza  Vaccine (HIGH DOSE) 0.5 milliLiter(s) IntraMuscular once  insulin lispro (ADMELOG) corrective regimen sliding scale   SubCutaneous three times a day before meals  insulin lispro (ADMELOG) corrective regimen sliding scale   SubCutaneous at bedtime  metoprolol tartrate 50 milliGRAM(s) Oral every 12 hours  pantoprazole    Tablet 40 milliGRAM(s) Oral before breakfast  piperacillin/tazobactam IVPB.. 3.375 Gram(s) IV Intermittent every 12 hours  polyethylene glycol 3350 17 Gram(s) Oral daily  senna 2 Tablet(s) Oral at bedtime    MEDICATIONS  (PRN):  benzocaine/menthol Lozenge 1 Lozenge Oral every 4 hours PRN Sore Throat  calamine/zinc oxide Lotion 1 Application(s) Topical three times a day PRN Itching  dextrose Oral Gel 15 Gram(s) Oral once PRN Blood Glucose LESS THAN 70 milliGRAM(s)/deciliter  guaiFENesin  milliGRAM(s) Oral every 12 hours PRN Cough  melatonin 3 milliGRAM(s) Oral at bedtime PRN Insomnia        CAPILLARY BLOOD GLUCOSE      POCT Blood Glucose.: 114 mg/dL (23 Sep 2024 20:36)  POCT Blood Glucose.: 212 mg/dL (23 Sep 2024 15:19)  POCT Blood Glucose.: 250 mg/dL (23 Sep 2024 12:39)    I&O's Summary    23 Sep 2024 07:01  -  24 Sep 2024 07:00  --------------------------------------------------------  IN: 700 mL / OUT: 1900 mL / NET: -1200 mL        T(C): 36.7 (09-24-24 @ 05:00), Max: 36.7 (09-23-24 @ 13:05)  HR: 85 (09-24-24 @ 05:00) (65 - 94)  BP: 112/63 (09-24-24 @ 05:00) (112/63 - 127/63)  RR: 17 (09-24-24 @ 05:00) (17 - 18)  SpO2: 100% (09-24-24 @ 05:00) (100% - 100%)    PHYSICAL EXAM:  GENERAL: NAD  NECK: Supple, No JVD  CHEST/LUNG: Clear to auscultation bilaterally; No wheezing.  HEART: Regular rate and rhythm; No murmurs, rubs, or gallops  ABDOMEN: Soft, Nontender, Nondistended; Bowel sounds present  EXTREMITIES:   No edema  NEUROLOGY: AAO       LABS:                        10.0   8.53  )-----------( 160      ( 23 Sep 2024 18:00 )             31.6     09-23    134[L]  |  93[L]  |  70[H]  ----------------------------<  101[H]  4.7   |  21[L]  |  8.14[H]    Ca    8.2[L]      23 Sep 2024 18:00  Phos  7.3     09-23  Mg     2.40     09-23    TPro  6.0  /  Alb  3.0[L]  /  TBili  0.4  /  DBili  x   /  AST  112[H]  /  ALT  286[H]  /  AlkPhos  211[H]  09-23          Urinalysis Basic - ( 23 Sep 2024 18:00 )    Color: x / Appearance: x / SG: x / pH: x  Gluc: 101 mg/dL / Ketone: x  / Bili: x / Urobili: x   Blood: x / Protein: x / Nitrite: x   Leuk Esterase: x / RBC: x / WBC x   Sq Epi: x / Non Sq Epi: x / Bacteria: x      CAPILLARY BLOOD GLUCOSE      POCT Blood Glucose.: 114 mg/dL (23 Sep 2024 20:36)  POCT Blood Glucose.: 212 mg/dL (23 Sep 2024 15:19)  POCT Blood Glucose.: 250 mg/dL (23 Sep 2024 12:39)        RADIOLOGY & ADDITIONAL TESTS:    Imaging Personally Reviewed:    Consultant(s) Notes Reviewed:      Care Discussed with Consultants/Other Providers:    Parminder Goldberg MD, CMD, FACP    [address] [place] [address]  Office Tel: [phone]  Cell: [phone]

## 2024-09-24 NOTE — SWALLOW BEDSIDE ASSESSMENT ADULT - COMMENTS
Progress Note-Internal Medicine 9/24: "85 yo F with PMH of CAD s/p PCI 2017 (was on ASA/Plavix), Paroxysmal Afib (no longer on Eliquis d/t GIB), PVD, HTN, HLD, T2DM (resolved), HFpEF, ESRD on HD MWF, COPD? (on chronic home oxygen, thinks 2L), PUD/gastritis presenting after syncope/fall."    CT Chest 9/20: "IMPRESSION: No main, lobar, or segmental pulmonary embolism. Bilateral patchy groundglass opacities, right greater than left, nonspecific. This may be infection or inflammatory in etiology. Recommend short interval follow-up in 6-8 weeks to ensure resolution. Tracheobronchial secretions with tree-in-bud opacities predominantly in the bilateral lower lobes, compatible with mucoid impacted distal airways and may be secondary to aspiration." Progress Note-Internal Medicine 9/24: "83 yo F with PMH of CAD s/p PCI 2017 (was on ASA/Plavix), Paroxysmal Afib (no longer on Eliquis d/t GIB), PVD, HTN, HLD, T2DM (resolved), HFpEF, ESRD on HD MWF, COPD? (on chronic home oxygen, thinks 2L), PUD/gastritis presenting after syncope/fall."    CT Chest 9/20: "IMPRESSION: No main, lobar, or segmental pulmonary embolism. Bilateral patchy groundglass opacities, right greater than left, nonspecific. This may be infection or inflammatory in etiology. Recommend short interval follow-up in 6-8 weeks to ensure resolution. Tracheobronchial secretions with tree-in-bud opacities predominantly in the bilateral lower lobes, compatible with mucoid impacted distal airways and may be secondary to aspiration."    CT Head 9/18: "IMPRESSION: No acute intracranial hemorrhage, mass effect, or shift of the midline structures."    Patient received awake/alert and upright in bed on room air. Able to make wants/needs known and follow simple verbal directives. Reported frustration with being in the hospital. Stated, "I want to go home." Denied current difficulties with swallowing and/or history of dysphagia; Stated, "I ate all of my lunch" (pointed to lunch tray present in room consisting of regular solids (chicken, rice) and thin liquids (tea)). Endorsed consuming diet of Regular Solids with Thin Liquids prior to hospitalization.

## 2024-09-24 NOTE — PROGRESS NOTE ADULT - PROBLEM SELECTOR PLAN 8
Code: Full  Diet: DASH/GERD/renal, halal  DVT ppx: SCDs, chemical contraindicated d/t hx of GIB within last 6m

## 2024-09-24 NOTE — PROGRESS NOTE ADULT - ASSESSMENT
83 yo F with PMH of CAD s/p PCI 2017 (was on ASA/Plavix), Paroxysmal Afib (no longer on Eliquis d/t GIB), PVD, HTN, HLD, T2DM (resolved), HFpEF, ESRD on HD MWF, COPD? (on chronic home oxygen, thinks 2L), PUD/gastritis presenting after syncope/fall.     Acute resp failure:    Supp O2  CTA Chest reviewed   No main, lobar, or segmental pulmonary embolism.  S/S f/up    Bilateral patchy groundglass opacities, right greater than left:    Pul f/up appreciated      Transaminitis:    LFTs trending down  F/up with GI as OP  Pt agrees for it    Leukocytosis:    PO abx -total 7 days    Dc planning. 83 yo F with PMH of CAD s/p PCI 2017 (was on ASA/Plavix), Paroxysmal Afib (no longer on Eliquis d/t GIB), PVD, HTN, HLD, T2DM (resolved), HFpEF, ESRD on HD MWF, COPD? (on chronic home oxygen, thinks 2L), PUD/gastritis presenting after syncope/fall.     Acute resp failure:    Supp O2  CTA Chest reviewed   No main, lobar, or segmental pulmonary embolism.  S/S f/up    Bilateral patchy groundglass opacities, right greater than left:    Pul f/up appreciated      Transaminitis:    LFTs trending down  RUQ sono- no acute pathology  F/up with GI as OP  Pt agrees for it    Leukocytosis:    PO abx -total 7 days    Dc planning.

## 2024-09-24 NOTE — PROGRESS NOTE ADULT - PROBLEM SELECTOR PLAN 2
Nephro f/up appreciated  On HD - Chadsvasc 5-6.   - On Metoprolol Tartrate 50mg BID at home, continuing with holding parameters.   - No longer on AC/Eliquis d/t GIB noted above.

## 2024-09-24 NOTE — PROGRESS NOTE ADULT - PROVIDER SPECIALTY LIST ADULT
Cardiology
Internal Medicine
Nephrology
Nephrology
Pulmonology
Cardiology
Pulmonology
Internal Medicine
Nephrology
Internal Medicine

## 2024-09-24 NOTE — SWALLOW BEDSIDE ASSESSMENT ADULT - CONSISTENCIES ADMINISTERED
1/4 cracker square (Patient verbally declined additional PO trials of Regular Solids. Participation did not improve despite verbal education & encouragement provided)./regular solid Patient verbally declined PO trials of Puree. Stated, "No, I don't want any of that."/beba ~3 ounces/thin liquid

## 2024-09-24 NOTE — PROGRESS NOTE ADULT - PROBLEM SELECTOR PROBLEM 5
Chronic systolic congestive heart failure

## 2024-09-24 NOTE — SWALLOW BEDSIDE ASSESSMENT ADULT - ASR SWALLOW RECOMMEND DIAG
2. Cinesophagram to rule-out silent aspiration/determine if findings from recent chest imaging are aspiration/dysphagia-related./VFSS/MBS

## 2024-09-24 NOTE — SWALLOW BEDSIDE ASSESSMENT ADULT - ADDITIONAL RECOMMENDATIONS
Medical team advised to reconsult this service if patient demonstrates change in medical status impacting oral/nutritional intake and/or change in tolerance of recommended oral diet. This department to follow up as schedule permits to assess for diet tolerance.     Per conversation with Christine [id number], Cinesophagram may be completed as an outpatient. Please provide a Script to patient/family and provide the following information on discharge plans summary: Radiology Scheduling Office Phone #: [phone], Fax #: [phone], and Outpatient [facility] Phone #: [phone] for patient/family to call and confirm appointment date and time.

## 2024-09-24 NOTE — PROGRESS NOTE ADULT - PROBLEM SELECTOR PROBLEM 7
History of smoking

## 2024-09-24 NOTE — PROGRESS NOTE ADULT - PROBLEM SELECTOR PLAN 7
- 40 pack yr smoking hx and on chronic oxygen at home (she thinks 2L). Distant air entry b/l c/w COPD hx, although patient denies such history. She is not on any inhalers at home either.   - Monitor respiratory status.   - She is coughing and endorsing intermittent resp issues post recent URI,  Albuterol inhaler PRN.

## 2024-09-24 NOTE — PHARMACOTHERAPY INTERVENTION NOTE - COMMENTS
Discharge medications were reviewed with the patient's son via phone. Current medication schedule was discussed in detail including: medication name, indication, dose, administration times, treatment duration of antibiotic, side effects, and special instructions. All questions and concerns were answered and addressed. Patient's son verbalized understanding.    Donna Smith, PharmD, [facility]  Clinical Pharmacy Specialist  Spectra [phone]

## 2024-09-24 NOTE — PROGRESS NOTE ADULT - PROBLEM SELECTOR PLAN 6
- Hx of CAD s/p PCI. Was on ASA/Plavix until earlier this year, no longer on this per patient.   - Cardiac sxns stable. Monitor for any changes.

## 2024-09-24 NOTE — SWALLOW BEDSIDE ASSESSMENT ADULT - SWALLOW EVAL: DIAGNOSIS
This is a limited assessment given patient accepted minimal PO trials, only. Patient accepted ~3 ounces thin liquids and 1 bite of regular solids. Patient verbally declined additional PO trials despite verbal education and encouragement provided. Oral stage for thin liquids and regular solids remarkable for adequate oral acceptance/containment, adequate mastication for regular solids, adequate anterior-posterior transport, and reduced oral clearance with trace lingual surface stasis post primary-swallow for regular solids (which cleared with liquid wash with thin liquids). Pharyngeal stage for aforementioned consistencies remarkable for suspected timely initiation of the pharyngeal swallow trigger and present hyolaryngeal excursion upon digital palpation. No overt s/sx aspiration/penetration observed post oral-intake accepted PO trials and patient denied globus/stuck sensation with all trialed consistencies.

## 2024-09-24 NOTE — PROGRESS NOTE ADULT - PROBLEM SELECTOR PLAN 5
- Stable  - Repeat TTE

## 2024-09-24 NOTE — SWALLOW BEDSIDE ASSESSMENT ADULT - ORAL PHASE
Unable to assess. Trace lingual surface stasis post primary-swallow; Cleared with liquid wash with thin liquids. Within functional limits

## 2024-09-24 NOTE — PROGRESS NOTE ADULT - SUBJECTIVE AND OBJECTIVE BOX
Date of Service: 09-24-24 @ 11:04    Patient is a 84y old  Female who presents with a chief complaint of fall (24 Sep 2024 09:50)      Any change in ROS: seems OK:   no sob:  no cough : no phlegm      MEDICATIONS  (STANDING):  chlorhexidine 2% Cloths 1 Application(s) Topical daily  dextrose 5%. 1000 milliLiter(s) (50 mL/Hr) IV Continuous <Continuous>  dextrose 5%. 1000 milliLiter(s) (100 mL/Hr) IV Continuous <Continuous>  dextrose 50% Injectable 25 Gram(s) IV Push once  dextrose 50% Injectable 12.5 Gram(s) IV Push once  dextrose 50% Injectable 25 Gram(s) IV Push once  glucagon  Injectable 1 milliGRAM(s) IntraMuscular once  influenza  Vaccine (HIGH DOSE) 0.5 milliLiter(s) IntraMuscular once  insulin lispro (ADMELOG) corrective regimen sliding scale   SubCutaneous three times a day before meals  insulin lispro (ADMELOG) corrective regimen sliding scale   SubCutaneous at bedtime  metoprolol tartrate 50 milliGRAM(s) Oral every 12 hours  pantoprazole    Tablet 40 milliGRAM(s) Oral before breakfast  piperacillin/tazobactam IVPB.. 3.375 Gram(s) IV Intermittent every 12 hours  polyethylene glycol 3350 17 Gram(s) Oral daily  senna 2 Tablet(s) Oral at bedtime    MEDICATIONS  (PRN):  benzocaine/menthol Lozenge 1 Lozenge Oral every 4 hours PRN Sore Throat  calamine/zinc oxide Lotion 1 Application(s) Topical three times a day PRN Itching  dextrose Oral Gel 15 Gram(s) Oral once PRN Blood Glucose LESS THAN 70 milliGRAM(s)/deciliter  guaiFENesin  milliGRAM(s) Oral every 12 hours PRN Cough  melatonin 3 milliGRAM(s) Oral at bedtime PRN Insomnia    Vital Signs Last 24 Hrs  T(C): 36.7 (24 Sep 2024 05:00), Max: 36.7 (23 Sep 2024 13:05)  T(F): 98 (24 Sep 2024 05:00), Max: 98 (23 Sep 2024 13:05)  HR: 85 (24 Sep 2024 05:00) (65 - 94)  BP: 112/63 (24 Sep 2024 05:00) (112/63 - 126/85)  BP(mean): --  RR: 17 (24 Sep 2024 05:00) (17 - 18)  SpO2: 100% (24 Sep 2024 05:00) (100% - 100%)    Parameters below as of 24 Sep 2024 05:00  Patient On (Oxygen Delivery Method): nasal cannula        I&O's Summary    23 Sep 2024 07:01  -  24 Sep 2024 07:00  --------------------------------------------------------  IN: 700 mL / OUT: 1900 mL / NET: -1200 mL          Physical Exam:   GENERAL: NAD, well-groomed, well-developed  HEENT: EDIN/   Atraumatic, Normocephalic  ENMT: No tonsillar erythema, exudates, or enlargement; Moist mucous membranes, Good dentition, No lesions  NECK: Supple, No JVD, Normal thyroid  CHEST/LUNG: Clear to auscultaion- no wheezing  CVS: Regular rate and rhythm; No murmurs, rubs, or gallops  GI: : Soft, Nontender, Nondistended; Bowel sounds present  NERVOUS SYSTEM:  Alert & Oriented X3  EXTREMITIES:  - edema  LYMPH: No lymphadenopathy noted  SKIN: No rashes or lesions  ENDOCRINOLOGY: No Thyromegaly  PSYCH: calm     Labs:  26                            10.0   8.53  )-----------( 160      ( 23 Sep 2024 18:00 )             31.6                         10.6   11.40 )-----------( 181      ( 22 Sep 2024 05:31 )             35.6     09-23    134[L]  |  93[L]  |  70[H]  ----------------------------<  101[H]  4.7   |  21[L]  |  8.14[H]  09-22    134[L]  |  94[L]  |  59[H]  ----------------------------<  157[H]  4.4   |  22  |  6.43[H]    Ca    8.2[L]      23 Sep 2024 18:00  Phos  7.3     09-23  Mg     2.40     09-23    TPro  6.0  /  Alb  3.0[L]  /  TBili  0.4  /  DBili  x   /  AST  112[H]  /  ALT  286[H]  /  AlkPhos  211[H]  09-23  TPro  6.0  /  Alb  3.2[L]  /  TBili  0.4  /  DBili  x   /  AST  179[H]  /  ALT  390[H]  /  AlkPhos  220[H]  09-22    CAPILLARY BLOOD GLUCOSE      POCT Blood Glucose.: 172 mg/dL (24 Sep 2024 08:32)  POCT Blood Glucose.: 114 mg/dL (23 Sep 2024 20:36)  POCT Blood Glucose.: 212 mg/dL (23 Sep 2024 15:19)  POCT Blood Glucose.: 250 mg/dL (23 Sep 2024 12:39)      LIVER FUNCTIONS - ( 23 Sep 2024 18:00 )  Alb: 3.0 g/dL / Pro: 6.0 g/dL / ALK PHOS: 211 U/L / ALT: 286 U/L / AST: 112 U/L / GGT: x             Urinalysis Basic - ( 23 Sep 2024 18:00 )    Color: x / Appearance: x / SG: x / pH: x  Gluc: 101 mg/dL / Ketone: x  / Bili: x / Urobili: x   Blood: x / Protein: x / Nitrite: x   Leuk Esterase: x / RBC: x / WBC x   Sq Epi: x / Non Sq Epi: x / Bacteria: x      Lactate, Blood: 2.2 mmol/L (09-20 @ 16:13)        RECENT CULTURES:  09-20 @ 16:15 .Blood Blood-Venous                No growth at 72 Hours    09-20 @ 14:11 .Blood Blood-Venous         rad< from: CT Angio Chest PE Protocol w/ IV Cont (09.20.24 @ 19:22) >  tip in the SVC.  HEART: Cardiomegaly. No pericardial effusion.  MEDIASTINUM AND ALEN: No lymphadenopathy.  CHEST WALL AND LOWER NECK:Within normal limits.  VISUALIZED UPPER ABDOMEN: Nonspecific gallbladder wall edema. Bilateral   renal atrophy with bilateral subcentimeter hypoattenuating foci, too   small to characterize. Bilateral adrenal gland thickening, nonspecific.  BONES: Degenerative changes.    IMPRESSION:  No main, lobar, or segmental pulmonary embolism.    Bilateral patchy groundglass opacities, right greater than left,   nonspecific. This may be infection or inflammatory in etiology. Recommend   short interval follow-up in 6-8 weeks to ensure resolution.    Tracheobronchial secretions with tree-in-bud opacities predominantly in   the bilateral lower lobes, compatible with mucoid impacted distal airways   and may be secondary to aspiration.    --- End of Report ---    < end of copied text >         No growth at 72 Hours          RESPIRATORY CULTURES:          Studies  Chest X-RAY  CT SCAN Chest   Venous Dopplers: LE:   CT Abdomen  Others

## 2024-09-24 NOTE — PROGRESS NOTE ADULT - ASSESSMENT
83 yo F with PMH of CAD s/p PCI 2017 (was on ASA/Plavix), Paroxysmal Afib (no longer on Eliquis d/t GIB), PVD, HTN, HLD, T2DM (resolved), HFpEF, ESRD on HD MWF, COPD? (on chronic home oxygen, thinks 2L), PUD/gastritis presenting after syncope/fall.   Per patient she was up to brush her teeth last night, and did not realize that she had fallen. She remembers waking up and her son-in-law was next to her trying to wake her up and asking her "not to die." She states she does not recall the fall, did not have any prodromal symptoms and did not feel confused after episode. She recall everything that happened pre and post episode, just not the fall itself. She thinks she hit her head bc it hurts a little on the side. She is no longer on AC d/t GIB this year and last.   Patient notes she had a URI and recovered about a week ago. Still with some residual cough.   Also endorses she is chronically on oxygen at home, When asked why, she states she does not know. But has history of 40 pack year smoking history, stopped about 10/15 yrs ago. Most likely COPD but not on any inhalers at home.   Was admitted in 5/2024 for anemia, found to have GIB. 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 Also noted to have gastritis and non bleeding duodenal ulcers. Bleed was attributed to gastric AVM and the ulcers in setting of use of ASA/Plavix/Eliquis together. She is no longer on any AC. She also had GIB in 2023 and 2020.   ED Course:   - CTH and CXR negative for acute findings.   - Labs c/w HD patient, pt seen by nephro Dr. Walls and sent to HD.  (18 Sep 2024 17:18)  now pulm called to see the pt:   -pt says her pvt physician gave her the 0oxygen as she has CHF   to me she says she did bot smoke much and used to smoke in her younger years;     s/p fall   cad ; s/p pci/CHF  pAfib  HTN  DM  ESRD  PUD   GI BLEED    s/p fall   -NOT CLEAR ETIOLOGY ;  -CTA CHEST SHOWED :No main, lobar, or segmental pulmonary embolism. Bilateral patchy groundglass opacities, right greater than left, nonspecific. This may be infection or inflammatory in etiology. Recommend short interval follow-up in 6-8 weeks to ensure resolution. Tracheobronchial secretions with tree-in-bud opacities predominantly in the bilateral lower lobes, compatible with mucoid impacted distal airways and may be secondary to aspiration.   -started on antibiotics   -check for legionella:   -check orthostasis  -wbc is high :  on admission;   9/23:  -n zosyn   9/24:  complete 7 days of zosyn for pneumonia    cad ; s/p pci/CHF  -cont current care;   -check echo  ; showed;  1. The left ventricular cavity is normal in size. Left ventricular wall thickness is moderately increased. Left ventricular systolic function is mildly decreased with a calculated ejection fraction of 53 % by the Whitman's biplane method of disks. There is global left ventricular hypokinesis. Moderate left ventricular hypertrophy. There is increased LV mass and concentric hypertrophy.   2. Normal right ventricular cavitysize and reduced right ventricular systolic function.   3. Structurally normal mitral valve with normal leaflet excursion. There is calcification of the mitral valve annulus. There is trace mitral regurgitation.   4. The left atrium is moderately dilated with an indexed volume of 43.47 ml/m².   5. Structurally normal tricuspid valve with normal leaflet excursion. There is mild to moderate tricuspid regurgitation. Estimated pulmonary artery systolic pressure is 52 mmHg, consistent with moderate pulmonary hypertension.   6. No prior echocardiogram is available for comparison.  9/24:  -defer to primary team     pAfib  -not on ac with hx of gi bleed  HTN  -controlled  DM  -monitor and control   ESRD  -on hd   PUD/GI BLEED  -PPI    dw acp

## 2024-09-24 NOTE — PROGRESS NOTE ADULT - PROBLEM SELECTOR PLAN 3
S/p EGD  Hb stable - Hx of CAD s/p PCI. Was on ASA/Plavix until earlier this year, no longer on this per patient.   - Cardiac sxns stable.

## 2024-09-25 LAB
CULTURE RESULTS: SIGNIFICANT CHANGE UP
CULTURE RESULTS: SIGNIFICANT CHANGE UP
SPECIMEN SOURCE: SIGNIFICANT CHANGE UP
SPECIMEN SOURCE: SIGNIFICANT CHANGE UP

## 2024-10-06 ENCOUNTER — EMERGENCY (EMERGENCY)
Facility: HOSPITAL | Age: 84
LOS: 1 days | Discharge: HOME | End: 2024-10-06
Attending: STUDENT IN AN ORGANIZED HEALTH CARE EDUCATION/TRAINING PROGRAM
Payer: MEDICARE

## 2024-10-06 VITALS
SYSTOLIC BLOOD PRESSURE: 137 MMHG | DIASTOLIC BLOOD PRESSURE: 78 MMHG | RESPIRATION RATE: 20 BRPM | OXYGEN SATURATION: 100 % | HEIGHT: 64 IN | TEMPERATURE: 98 F | WEIGHT: 139.99 LBS | HEART RATE: 118 BPM

## 2024-10-06 VITALS
OXYGEN SATURATION: 100 % | DIASTOLIC BLOOD PRESSURE: 92 MMHG | SYSTOLIC BLOOD PRESSURE: 127 MMHG | HEART RATE: 106 BPM | RESPIRATION RATE: 18 BRPM

## 2024-10-06 DIAGNOSIS — Z98.890 OTHER SPECIFIED POSTPROCEDURAL STATES: Chronic | ICD-10-CM

## 2024-10-06 DIAGNOSIS — Z98.891 HISTORY OF UTERINE SCAR FROM PREVIOUS SURGERY: Chronic | ICD-10-CM

## 2024-10-06 DIAGNOSIS — Z95.5 PRESENCE OF CORONARY ANGIOPLASTY IMPLANT AND GRAFT: Chronic | ICD-10-CM

## 2024-10-06 LAB
ADD ON TEST-SPECIMEN IN LAB: SIGNIFICANT CHANGE UP
ADD ON TEST-SPECIMEN IN LAB: SIGNIFICANT CHANGE UP
ALBUMIN SERPL ELPH-MCNC: 3.6 G/DL — SIGNIFICANT CHANGE UP (ref 3.3–5)
ALP SERPL-CCNC: 214 U/L — HIGH (ref 40–120)
ALT FLD-CCNC: 71 U/L — HIGH (ref 10–45)
ANION GAP SERPL CALC-SCNC: 15 MMOL/L — SIGNIFICANT CHANGE UP (ref 5–17)
ANISOCYTOSIS BLD QL: SIGNIFICANT CHANGE UP
APPEARANCE UR: CLEAR — SIGNIFICANT CHANGE UP
AST SERPL-CCNC: 96 U/L — HIGH (ref 10–40)
BACTERIA # UR AUTO: NEGATIVE /HPF — SIGNIFICANT CHANGE UP
BASOPHILS # BLD AUTO: 0.24 K/UL — HIGH (ref 0–0.2)
BASOPHILS NFR BLD AUTO: 3.5 % — HIGH (ref 0–2)
BILIRUB SERPL-MCNC: 0.7 MG/DL — SIGNIFICANT CHANGE UP (ref 0.2–1.2)
BILIRUB UR-MCNC: NEGATIVE — SIGNIFICANT CHANGE UP
BUN SERPL-MCNC: 26 MG/DL — HIGH (ref 7–23)
CALCIUM SERPL-MCNC: 9.5 MG/DL — SIGNIFICANT CHANGE UP (ref 8.4–10.5)
CAST: 4 /LPF — SIGNIFICANT CHANGE UP (ref 0–4)
CHLORIDE SERPL-SCNC: 96 MMOL/L — SIGNIFICANT CHANGE UP (ref 96–108)
CO2 SERPL-SCNC: 26 MMOL/L — SIGNIFICANT CHANGE UP (ref 22–31)
COLOR SPEC: YELLOW — SIGNIFICANT CHANGE UP
CREAT SERPL-MCNC: 4.83 MG/DL — HIGH (ref 0.5–1.3)
DACRYOCYTES BLD QL SMEAR: SLIGHT — SIGNIFICANT CHANGE UP
DIFF PNL FLD: NEGATIVE — SIGNIFICANT CHANGE UP
EGFR: 8 ML/MIN/1.73M2 — LOW
ELLIPTOCYTES BLD QL SMEAR: SLIGHT — SIGNIFICANT CHANGE UP
EOSINOPHIL # BLD AUTO: 0 K/UL — SIGNIFICANT CHANGE UP (ref 0–0.5)
EOSINOPHIL NFR BLD AUTO: 0 % — SIGNIFICANT CHANGE UP (ref 0–6)
FLUAV AG NPH QL: SIGNIFICANT CHANGE UP
FLUBV AG NPH QL: SIGNIFICANT CHANGE UP
GLUCOSE SERPL-MCNC: 123 MG/DL — HIGH (ref 70–99)
GLUCOSE UR QL: 100 MG/DL
HCT VFR BLD CALC: 28.2 % — LOW (ref 34.5–45)
HGB BLD-MCNC: 8.1 G/DL — LOW (ref 11.5–15.5)
KETONES UR-MCNC: ABNORMAL MG/DL
LEUKOCYTE ESTERASE UR-ACNC: NEGATIVE — SIGNIFICANT CHANGE UP
LYMPHOCYTES # BLD AUTO: 1.46 K/UL — SIGNIFICANT CHANGE UP (ref 1–3.3)
LYMPHOCYTES # BLD AUTO: 21 % — SIGNIFICANT CHANGE UP (ref 13–44)
MACROCYTES BLD QL: SIGNIFICANT CHANGE UP
MANUAL SMEAR VERIFICATION: SIGNIFICANT CHANGE UP
MCHC RBC-ENTMCNC: 28.7 GM/DL — LOW (ref 32–36)
MCHC RBC-ENTMCNC: 30.3 PG — SIGNIFICANT CHANGE UP (ref 27–34)
MCV RBC AUTO: 105.6 FL — HIGH (ref 80–100)
MICROCYTES BLD QL: SLIGHT — SIGNIFICANT CHANGE UP
MONOCYTES # BLD AUTO: 0.37 K/UL — SIGNIFICANT CHANGE UP (ref 0–0.9)
MONOCYTES NFR BLD AUTO: 5.3 % — SIGNIFICANT CHANGE UP (ref 2–14)
NEUTROPHILS # BLD AUTO: 4.89 K/UL — SIGNIFICANT CHANGE UP (ref 1.8–7.4)
NEUTROPHILS NFR BLD AUTO: 70.2 % — SIGNIFICANT CHANGE UP (ref 43–77)
NITRITE UR-MCNC: NEGATIVE — SIGNIFICANT CHANGE UP
OVALOCYTES BLD QL SMEAR: SLIGHT — SIGNIFICANT CHANGE UP
PH UR: 7.5 — SIGNIFICANT CHANGE UP (ref 5–8)
PLAT MORPH BLD: ABNORMAL
PLATELET # BLD AUTO: 268 K/UL — SIGNIFICANT CHANGE UP (ref 150–400)
POIKILOCYTOSIS BLD QL AUTO: SLIGHT — SIGNIFICANT CHANGE UP
POLYCHROMASIA BLD QL SMEAR: SLIGHT — SIGNIFICANT CHANGE UP
POTASSIUM SERPL-MCNC: 5.6 MMOL/L — HIGH (ref 3.5–5.3)
POTASSIUM SERPL-SCNC: 5.6 MMOL/L — HIGH (ref 3.5–5.3)
PROT SERPL-MCNC: 6.6 G/DL — SIGNIFICANT CHANGE UP (ref 6–8.3)
PROT UR-MCNC: 300 MG/DL
RBC # BLD: 2.67 M/UL — LOW (ref 3.8–5.2)
RBC # FLD: 18.6 % — HIGH (ref 10.3–14.5)
RBC BLD AUTO: ABNORMAL
RBC CASTS # UR COMP ASSIST: 0 /HPF — SIGNIFICANT CHANGE UP (ref 0–4)
REVIEW: SIGNIFICANT CHANGE UP
RSV RNA NPH QL NAA+NON-PROBE: SIGNIFICANT CHANGE UP
SARS-COV-2 RNA SPEC QL NAA+PROBE: SIGNIFICANT CHANGE UP
SODIUM SERPL-SCNC: 137 MMOL/L — SIGNIFICANT CHANGE UP (ref 135–145)
SP GR SPEC: 1.02 — SIGNIFICANT CHANGE UP (ref 1–1.03)
SQUAMOUS # UR AUTO: 3 /HPF — SIGNIFICANT CHANGE UP (ref 0–5)
STOMATOCYTES BLD QL SMEAR: SLIGHT — SIGNIFICANT CHANGE UP
UROBILINOGEN FLD QL: 1 MG/DL — SIGNIFICANT CHANGE UP (ref 0.2–1)
WBC # BLD: 6.96 K/UL — SIGNIFICANT CHANGE UP (ref 3.8–10.5)
WBC # FLD AUTO: 6.96 K/UL — SIGNIFICANT CHANGE UP (ref 3.8–10.5)
WBC UR QL: 16 /HPF — HIGH (ref 0–5)

## 2024-10-06 PROCEDURE — 84484 ASSAY OF TROPONIN QUANT: CPT

## 2024-10-06 PROCEDURE — 99285 EMERGENCY DEPT VISIT HI MDM: CPT

## 2024-10-06 PROCEDURE — 85025 COMPLETE CBC W/AUTO DIFF WBC: CPT

## 2024-10-06 PROCEDURE — 36410 VNPNXR 3YR/> PHY/QHP DX/THER: CPT

## 2024-10-06 PROCEDURE — 87086 URINE CULTURE/COLONY COUNT: CPT

## 2024-10-06 PROCEDURE — 74176 CT ABD & PELVIS W/O CONTRAST: CPT | Mod: MC

## 2024-10-06 PROCEDURE — 96375 TX/PRO/DX INJ NEW DRUG ADDON: CPT

## 2024-10-06 PROCEDURE — 74176 CT ABD & PELVIS W/O CONTRAST: CPT | Mod: 26,MC

## 2024-10-06 PROCEDURE — 80053 COMPREHEN METABOLIC PANEL: CPT

## 2024-10-06 PROCEDURE — 93970 EXTREMITY STUDY: CPT

## 2024-10-06 PROCEDURE — 99285 EMERGENCY DEPT VISIT HI MDM: CPT | Mod: 25

## 2024-10-06 PROCEDURE — 83690 ASSAY OF LIPASE: CPT

## 2024-10-06 PROCEDURE — 87637 SARSCOV2&INF A&B&RSV AMP PRB: CPT

## 2024-10-06 PROCEDURE — 93005 ELECTROCARDIOGRAM TRACING: CPT

## 2024-10-06 PROCEDURE — 81001 URINALYSIS AUTO W/SCOPE: CPT

## 2024-10-06 PROCEDURE — 93970 EXTREMITY STUDY: CPT | Mod: 26

## 2024-10-06 PROCEDURE — 83880 ASSAY OF NATRIURETIC PEPTIDE: CPT

## 2024-10-06 PROCEDURE — 96374 THER/PROPH/DIAG INJ IV PUSH: CPT

## 2024-10-06 PROCEDURE — 71045 X-RAY EXAM CHEST 1 VIEW: CPT

## 2024-10-06 PROCEDURE — 82962 GLUCOSE BLOOD TEST: CPT

## 2024-10-06 PROCEDURE — 87040 BLOOD CULTURE FOR BACTERIA: CPT

## 2024-10-06 PROCEDURE — 71045 X-RAY EXAM CHEST 1 VIEW: CPT | Mod: 26

## 2024-10-06 RX ORDER — ALCOHOL ANTISEPTIC PADS
50 PADS, MEDICATED (EA) TOPICAL ONCE
Refills: 0 | Status: COMPLETED | OUTPATIENT
Start: 2024-10-06 | End: 2024-10-06

## 2024-10-06 RX ORDER — MORPHINE SULFATE 30 MG/1
2 TABLET, FILM COATED, EXTENDED RELEASE ORAL ONCE
Refills: 0 | Status: DISCONTINUED | OUTPATIENT
Start: 2024-10-06 | End: 2024-10-06

## 2024-10-06 RX ORDER — ACETAMINOPHEN 325 MG
1000 TABLET ORAL ONCE
Refills: 0 | Status: COMPLETED | OUTPATIENT
Start: 2024-10-06 | End: 2024-10-06

## 2024-10-06 RX ORDER — INSULIN REGULAR, HUMAN 100/ML
5 VIAL (ML) INJECTION ONCE
Refills: 0 | Status: COMPLETED | OUTPATIENT
Start: 2024-10-06 | End: 2024-10-06

## 2024-10-06 RX ADMIN — Medication 50 MILLILITER(S): at 22:13

## 2024-10-06 RX ADMIN — Medication 100 GRAM(S): at 22:13

## 2024-10-06 RX ADMIN — Medication 400 MILLIGRAM(S): at 18:30

## 2024-10-06 RX ADMIN — Medication 5 UNIT(S): at 22:14

## 2024-10-06 RX ADMIN — MORPHINE SULFATE 2 MILLIGRAM(S): 30 TABLET, FILM COATED, EXTENDED RELEASE ORAL at 20:21

## 2024-10-06 NOTE — ED ADULT NURSE NOTE - OBJECTIVE STATEMENT
84 F PMHx CAD S/P PCI (2017, was on ASA/Plavix), AF (not on AC d/t GIB), PVD, HTN, HLD, T2DM (resolved), HFpEF, ESRD on HD MWF, COPD (on chronic home oxygen, thinks 2L), PUD/gastritis presenting for eval of bilateral flank pain onset last night. states its intermittent but worsening in severity. CM Afib. denies fevers, chills, CP, SOB, abdominal pain, nausea, vomiting, dysuria, hematuria, urinary frequency, headaches, recent travel. pt has not missed any HD. IV placed, labs drawn and sent, seen and eval by MD. Desi in lowest position and locked, call bell within reach. 84 F PMHx CAD S/P PCI (2017, was on ASA/Plavix), AF (not on AC d/t GIB), PVD, HTN, HLD, T2DM (resolved), HFpEF, ESRD on HD MWF, COPD (on chronic home oxygen, thinks 2L), PUD/gastritis presenting for eval of bilateral flank pain onset last night. states its intermittent but worsening in severity. CM Afib. denies fevers, chills, CP, SOB, abdominal pain, nausea, vomiting, dysuria, hematuria, urinary frequency, headaches, recent travel. pt has not missed any HD. USIV placed by MD, labs drawn and sent, seen and eval by MD. Stretcher in lowest position and locked, call bell within reach.

## 2024-10-06 NOTE — ED PROVIDER NOTE - PROGRESS NOTE DETAILS
Attending Alicia Avalos MD: UA negative, patient with improvement in symptoms following medication management. Treated hyperkalemia with insulin and calcium .Spoke with patient's PCP, agreement patient can go home with scheduled HD tomorrow   Results discussed with patient, all questions answered. Advised strict return precautions and PCP follow up. Patient demonstrates understanding of treatment plan and diagnosis

## 2024-10-06 NOTE — ED PROVIDER NOTE - ATTENDING CONTRIBUTION TO CARE
84-year-old female with past medical history of ESRD on dialysis Monday Wednesday Friday, CAD status post stents, A-fib not on AC presents with bilateral flank pain.  She reports pain began yesterday and worsening.  She reports that she has not taken any medication for her symptoms.  She denies fever, chills, dysuria, hematuria, nausea or vomiting.  She reports her last dialysis was on Friday.    PE: well appearing, nontoxic, no respiratory distress.  Neuro nonfocal.  Skin intact. Psych normal mood.    MDM: Differential diagnosis includes but is not limited to gastritis, urolithiases, diverticulitis, colitis, pancreatitis    Will assess with labs, UA, and noncon CTAP    Refer to progress notes for continued care

## 2024-10-06 NOTE — ED PROVIDER NOTE - NSFOLLOWUPINSTRUCTIONS_ED_ALL_ED_FT
You were seen in the ER for back pain   You should take tylenol for continued pain   Your potassium was high, you should follow up with your HD as scheduled tomorrow  Follow up with your PCP in the next 2-3 days

## 2024-10-06 NOTE — ED PROVIDER NOTE - OBJECTIVE STATEMENT
84 F PMHx CAD S/P PCI (2017, was on ASA/Plavix), AF (not on AC d/t GIB), PVD, HTN, HLD, T2DM (resolved), HFpEF, ESRD on HD MWF, COPD? (on chronic home oxygen, thinks 2L), PUD/gastritis presenting for eval of bilateral flank pain onset last night. states its intermittent but worsening in severity. denies fevers, chills, CP, SOB, abdominal pain, nausea, vomiting, dysuria, hematuria, urinary frequency, headaches, recent travel. pt has not missed any HD.

## 2024-10-06 NOTE — ED PROVIDER NOTE - PATIENT PORTAL LINK FT
You can access the FollowMyHealth Patient Portal offered by [facility] by registering at the following website: [email or web address]. By joining [facility]’s FollowMyHealth portal, you will also be able to view your health information using other applications (apps) compatible with our system.

## 2024-10-06 NOTE — ED PROVIDER NOTE - CLINICAL SUMMARY MEDICAL DECISION MAKING FREE TEXT BOX
ddx includes but is not limited to pyleo vs renal colic vs uti vs msk vs metabolic derangment vs dvt vs fluid overload. plan for labs, xray, dvt study, ct abd pelvis. pt noted to be in afib initially in RVR rate now in the 110s. will continue to monitor, may be in the setting of acute illness vs pain. ddx includes but is not limited to pyleo vs renal colic vs uti vs msk vs metabolic derangment vs dvt vs fluid overload. plan for labs, xray, dvt study, ct abd pelvis. pt noted to be in afib initially in RVR rate now in the 110s. will continue to monitor, may be in the setting of acute illness vs pain vs primary cardiac. ddx includes but is not limited to pyleo vs renal colic vs uti vs msk vs metabolic derangement vs dvt vs fluid overload. plan for labs, xray, dvt study, ct abd pelvis.

## 2024-10-06 NOTE — ED PROVIDER NOTE - PHYSICAL EXAMINATION
GENERAL: well appearing in no acute distress  HEAD: normocephalic, atraumatic  HEENT: normal conjunctiva, oral mucosa moist,  CARDIAC: regular rate and rhythm, no appreciable murmurs, HD catheter in right chest wall   PULM: faint crackles at b/l bases   GI: abdomen nondistended, soft, nontender, no guarding, rebound tenderness  : CVA tenderness b/l   NEURO: AAOx3  MSK: trace pitting edema b/l with calf tenderness on the left.   SKIN: well-perfused, extremities warm, no visible rashes  PSYCH: appropriate mood and affect

## 2024-10-08 LAB
CULTURE RESULTS: SIGNIFICANT CHANGE UP
SPECIMEN SOURCE: SIGNIFICANT CHANGE UP

## 2024-10-11 LAB
CULTURE RESULTS: SIGNIFICANT CHANGE UP
SPECIMEN SOURCE: SIGNIFICANT CHANGE UP

## 2025-03-07 NOTE — ED PROVIDER NOTE - NS_BEDUNITTYPES_ED_ALL_ED
3/7/2025      Gali Osorio  [address] [facility] [id number]    Dear Ms. Osorio,    Your procedure is scheduled with Dr. Bartolo Alexander on March 13, 2025 at 5:00 AM.      [facility]  [address] [place]   [address]  [phone]  Please enter the main entrance and take the elevators to the 3rd floor.  Check in at Same Day Surgery desk.      The following appointment(s) have been scheduled for you:    Post-op Appointment with TELMA Cruz for Dr. Alexander at the [facility] [place] [address], 1st Floor Orthopedics ([address] [place] [address]) on April 3, 2025 at 1:30 pm .      To better prepare for your surgery, please follow these instructions:    Please schedule an appointment with your Primary Care Physician for a PreOperative History and Physical for 2-3 weeks before your surgery date.   This is a pre-operative requirement for medical clearance for surgery/anesthesia. Your primary care physician will perform a history and physical and order lab work to review, ensuring there is not any other medical concern that would prevent safely proceeding with surgery.  We will send them the information about your planned procedure and what testing we are looking for (and where to send it to).  If you haven't already done so, please call them ASAP to schedule an appointment. Please call Adam surgery scheduler at [phone], when your appointment has been scheduled with your primary care physician. It is OK to leave a voicemail with this information (date, time, and name of your doctor).    All Weight Loss Medication (both traditional weight loss and diabetic medications used to treat weight loss) must be stopped 7 days before surgery or your surgery will be cancelled.  If you are diabetic, please consult with your prescribing physician for any alternative or additional medication instructions before surgery.             Starting 5 days prior to your  surgery, please do not take any aspirin products, anti-inflammatory medication or blood thinners.  This includes products such as Jennifer-[place], Pepto Bismol, Motrin, Ibuprofen and Advil should also be avoided.  (Standard Tylenol products are aspirin free, so standard Tylenol products are OK to take for pain).      If you take any other prescription medication, including blood thinners such as Coumadin, Eliquis, Plavix, or Xarelto, please contact your ordering or primary care physician ASAP, so that you can inform them about your upcoming surgery and so that they can decide with you if any changes to your medication schedule are necessary.  If approved by your physician, you may take blood pressure and heart medications the morning of the procedure with a small amount of water.      Do not have anything to eat or drink starting at midnight the night before your surgery.      For your safety, must have a ride home after surgery, due to both anesthesia and post-op pain medication.    This must be with someone who can take responsibility for you and assist with your discharge from the surgery center, not a cab, bus, etc.    You will need someone available to remain with you up to 24 hours after you have been discharged.    Please remember that all times are subject to change as the hospital coordinates the schedule to meet the needs of your surgery and the overall flow of the OR that day.  You will be called ASA to advise you of any changes to your surgery time or the time you need to arrive for your surgery.    Pre-Procedural COVID Testing is NOT required as long as you remain symptom-free from now through your surgery date.    At any time, if you experience COVID-like symptoms, please contact your primary care physician for evaluation.    If you test positive for COVID between now and your surgery date, please call our office as soon as possible.  We might need to postpone your procedure until it is safe for you to  proceed.      If you have any work related and/or disability forms that need to be completed, please contact the Forms Completion Department at [phone]. Forms can be dropped off at any of our [facility] locations or faxed to forms completion at [phone]. Please be advised that it can take 7 to 14 business days to complete these requests.    If you have questions regarding the procedure, medications, rehab, etc., please contact the nursing staff at [phone]                  If you have any scheduling questions or need to reschedule, please contact me at the telephone number and extension listed below.     Thank you,    Adam at [phone]  Surgery Scheduler for Dr. Bartolo Alexander  [facility]        MEDICATIONS TO STOP / CHANGE BEFORE SURGERY    Please read through this list to make sure you are adjusting your medication appropriately before surgery.  Failure to do so might result in cancellation or rescheduling surgery.    BLOOD THINNERS / ANTICOAGULATION MEDICATIONS    If you take prescription medication that is a blood thinner, please contact the prescribing provider or primary care ASAP to determine when to hold the medication prior to surgery.     Examples include:  Warfarin (Coumadin)     Clopidrogel (Plavix)  Apixaban (Eliquis)  Rivaroxaban (Xarelto)      ASPIRIN and ANTI-INFLAMMATORY (NSAID) PRODUCTS     Do not take FIVE (5) days prior to procedure.      OVER-THE-COUNTER:    Aspirin… including:  Anacin, Jg, [place], Aspergum, Aspercin, Aspermin, Aspertab, Back-quell, Duradyne, Empirin, Gemnisyn, Genprin, Gensan, Magnaprin, McNess Pain Tab, Momentum, P-A-C, Pain Reliever Tabs, Tri-Pain Caplets, Vanquish Caplet.  Buffered Aspirin… including:  Ascriptin, Bufferin, Ecotrin, Buffaprin, Buffasal, Buffinol, COPE.  Aspirin Suppositories (generic, any strength)  Excedrin, Excedrin Extra, Excedrin IB  Ibuprofen… including: Advil, Nuprin, Motrin IB, Adapin, Genpril, Ibufen 200,  Menadol, Midol IB, Dristan Sinus, Ursinus Inlay Tabs, Dimetapp Sinus, Valparin, Haltran Tabs, Clive's Pills, Kris.  Naproxen… including: Aleve  Jennifer Berkeley or Bromo-Berkeley  Pepto Bismol     PRESCRIPTION:    Brand Name Generic Name Brand Name Generic Name      Fiorinal   butalbital, aspirin, caffeine    Lodine   etodolac      Naprosyn, Anaprox   naproxen    Mobic   meloxicam      Voltaren, Cataflam   diclofenac    Meclomen   meclofenamate      Feldene   piroxicam    Nalfon   fenoprofen      Motrin (Rx), Rufen   ibuprofen    Ponstel   mefenamic acid      Ansaid   flurbiprofen    Relafen   nabumetone      Orudis   ketoprofen    Toradol   ketorolac      Dolobid   diflunisal    Azdone Tabs   aspirin plus hydrocodone      Clinoril   sulindac    Percodan   aspirin plus oxycodone      Indocin   indomethacin    Synalgos   aspirin plus dihydrocodeine      Tolectin   tolmetin    Daypro   oxaprozin       WEIGHT LOSS MEDICATIONS    If you are taking these medications and have DIABETES please contact your primary care doctor about when to stop these medications and whether you will need an alternative medication.    If on WEEKLY dosing, hold SEVEN (7) days prior to procedure.   If on DAILY dosing, hold on the day of procedure.   Dulaglutide (Trulicity)  Exenatide (Bydureon BCise, Byetta)  Liraglutide (Victoza, Saxenda)  Pramlintide acetate (Symlin)  Semaglutide (Ozempic, Wegovy, Rybelsus)  Tirzepatide (Mounjaro)  Liraglutide + insulin Degludec (Xultophy)  Lixisenatide + insulin Glargine (Soliqua)    Do not take SEVEN (7) days prior to procedure.    Benzphetamine  Diethylpropion  Phendimetrazine  Phentermine (Adipex, Lomaira)  Phentermine/topiramate (Qsymia)...  Note: to prevent seizures from abrupt withdrawal, take a dose every other day for at least 1 week before stopping treatment.      Herbs and Dietary Supplements    Do not take SEVEN (7) days prior to procedure.        Alfalfa    American ginseng    Alyse    Anise     Arnica montana    Asafetida    [place] bark    Bilberry    Birch    Black cohosh    Bladderwrack    Bogbean    Boldo    Borage seed oil    Bromelain    Capsicum    Cat's claw    Celery     Chamomile   Vienna    Clove    Coleus    Cordyceps       Dandelion     Danshen    Devil's claw    Dong quai    EPA (eicosapentaenoic    acid, found in fish oils)    Evening primrose oil    Fenugreek    Feverfew    Fish oil    Flaxseed/flax powder     Garlic    Lucinda    Ginkgo biloba    Grapefruit juice     Grapeseed     Green tea    Guggul    Gymnestra    Horse chestnut    Horseradish    Licorie root    Lovage root    Male fern    Meadowsweet    Melatonin    Nordihydroguairetic acid (NDGA)    Omega-3 fatty acids    Onion    Papain    Panax ginseng    Parsley      Passionflower    Poplar   Prickly geovanni    Propolis    Quassia    Red clover    Reishi    Saw palmetto    Siberian ginseng    Sweet clover    Rue    Sweet birch    Turmeric    Vitamin E    White willow    Wild carrot    Wild lettuce    [place]    Eland    Wyocena           Insurance Authorization Need to Know's    Prior to your surgical procedure, our team will contact your Insurance Company to initiate a Pre-Authorization request.  This is not a guarantee of payment from your insurance company, but rather a step taken to ensure that we have all of the information and documentation for them to confirm the procedure is one that is eligible for coverage under your plan.    We will contact you if we either need more information from you to fulfill the requirements of your insurance company, or if we need to discuss any concerns that may lead to postponement or cancellation of your procedure. If you have any questions regarding your surgery authorization, please check with your insurance company or call our office for an update.    If you need information regarding your level of benefits or out-of-pocket expenses, please contact your insurance company directly.  They can  also confirm for you whether or not we (the surgeon and the hospital/surgery center) are in your plan's preferred network (aka 'in-network').    What to do if… My Insurance Changes:  If, at any time, your insurance company, plan or even card changes… Please call our office as soon as possible so that our team can be sure to update your records.  We will need to make sure to submit any PreAuth or jozef to the correct, up-to-date insurance plan.      What to do if… My Insurance Requires A Referral:  If your insurance company requires a Referral for Specialty Care or to see a Specialist, you will need to confirm with them if you have one on file.    If your insurance carrier does not have a referral, then you will need to contact your Primary Care Physician to have one directly submitted to your insurance company ASAP.    Without a referral on file, your insurance company will not Pre-Authorize your surgery and may not cover any of your care with our specialty.    What to do if… I have a Workers Compensation (W/C) Claim:  If you have a W/C claim, please be sure to provide our team with the information you have regarding your claim ASAP.  We will contact your W/C carrier/adjustor to inform them of your upcoming surgery and check the status of your claim (open vs closed).  We will let you know if they advise of any concerns or issues with your claim.  Even if you have an open W/C claim, please also provide us with your personal/family insurance.  We will want to be sure this plan is loaded into your account.  We always PreAuthorize with personal insurance as a back-up to W/C.  Otherwise, if W/C doesn't cover something along the way, you will receive a bill for the services.    What to do if… I have Month-to-Month Coverage/Premiums:  If you have an insurance plan that is paid for month to month, or is subject to plan change on a monthly basis, please be aware we cannot initiate PreAuthorization until just before the  month of your surgery, as your insurance company will need to verify your premium payments/eligibility first.    What to do if… I Do Not Have Insurance Coverage or Have other Insurance/Billing Questions:  Please call our Patient Contact Center:  [phone] to speak with a team member about your billing needs, including possible coverage options, setting up payment plans, our fee schedule, etc.    MEDICINE

## 2025-04-03 NOTE — ED PROVIDER NOTE - EKG ADDITIONAL QUESTION - PERFORMED INDEPENDENT VISUALIZATION
What Type Of Note Output Would You Prefer (Optional)?: Standard Output Have Your Spot(S) Been Treated In The Past?: has not been treated Hpi Title: Evaluation of Skin Lesions Yes

## 2025-04-09 NOTE — PROCEDURE NOTE - NSICDXPROCEDURE_GEN_ALL_CORE_FT
PROCEDURES:  XR fluoro guided insertion of tunnelled dialysis catheter 24-Aug-2022 09:45:19  Radha Oakley   PAST MEDICAL HISTORY:  HLD (hyperlipidemia)     HTN (hypertension)     MDD (major depressive disorder)     Parkinson disease

## 2025-05-14 NOTE — DISCHARGE NOTE PROVIDER - NSDCMRMEDTOKEN_GEN_ALL_CORE_FT
aspirin 81 mg oral tablet: 1 tab(s) orally once a day  clopidogrel 75 mg oral tablet: 1 tab(s) orally once a day  Eliquis 2.5 mg oral tablet: 1 tab(s) orally 2 times a day   to go home amLODIPine 5 mg oral tablet: 1 tab(s) orally once a day  Eliquis 2.5 mg oral tablet: 1 tab(s) orally 2 times a day   amLODIPine 5 mg oral tablet: 1 tab(s) orally once a day  Eliquis 2.5 mg oral tablet: 1 tab(s) orally 2 times a day  Protonix 40 mg oral delayed release tablet: 1 tab(s) orally 2 times a day

## 2025-07-16 NOTE — DISCHARGE NOTE ADULT - PATIENT PORTAL LINK FT
[de-identified] : 7/9/2025 Sinus Bradycardia at 54 bpm.  Normal axis.   -Nonspecific QRS widening/IVCD. No prior ECG for comparison.  ABNORMAL [de-identified] : 7/11/2025 Normal LVEF, no WMA's. GIDD.  Mild AI, mild MR, mild TR.  Ascending aorta borderline at 3.8 cm. PASP 28 mm Hg You can access the [facility] Patient Portal, offered by [facility], by registering with the following website: [email or web address]

## (undated) DEVICE — PROBE FIAPC CIRC O.D. 2.3MM/6.9FR LNTH 220CM/7.2FT

## (undated) DEVICE — GOWN LG

## (undated) DEVICE — DENTURE CUP PINK

## (undated) DEVICE — DRSG 2X2

## (undated) DEVICE — TUBING IV SET GRAVITY 3Y 100" MACRO

## (undated) DEVICE — SALIVA EJECTOR (BLUE)

## (undated) DEVICE — DRSG CURITY GAUZE SPONGE 4 X 4" 12-PLY NON-STERILE

## (undated) DEVICE — DRSG BANDAID 0.75X3"

## (undated) DEVICE — CATH IV SAFE BC 22G X 1" (BLUE)

## (undated) DEVICE — LUBRICATING JELLY HR ONE SHOT 3G

## (undated) DEVICE — TUBING MEDI-VAC W MAXIGRIP CONNECTORS 1/4"X6'

## (undated) DEVICE — PACK IV START WITH CHG

## (undated) DEVICE — BITE BLOCK ADULT 20 X 27MM (GREEN)

## (undated) DEVICE — ELCTR ECG CONDUCTIVE ADHESIVE

## (undated) DEVICE — BIOPSY FORCEP COLD DISP

## (undated) DEVICE — UNDERPAD LINEN SAVER 17 X 24"

## (undated) DEVICE — CLAMP BX HOT RAD JAW 3

## (undated) DEVICE — BASIN EMESIS 10IN GRADUATED MAUVE

## (undated) DEVICE — CONTAINER FORMALIN 80ML YELLOW

## (undated) DEVICE — BIOPSY FORCEP RADIAL JAW 4 STANDARD WITH NEEDLE